# Patient Record
Sex: FEMALE | Race: BLACK OR AFRICAN AMERICAN | NOT HISPANIC OR LATINO | Employment: OTHER | URBAN - METROPOLITAN AREA
[De-identification: names, ages, dates, MRNs, and addresses within clinical notes are randomized per-mention and may not be internally consistent; named-entity substitution may affect disease eponyms.]

---

## 2018-09-26 ENCOUNTER — TELEPHONE (OUTPATIENT)
Dept: CARDIAC REHAB | Facility: HOSPITAL | Age: 43
End: 2018-09-26

## 2019-08-05 ENCOUNTER — APPOINTMENT (OUTPATIENT)
Dept: GENERAL RADIOLOGY | Facility: HOSPITAL | Age: 44
End: 2019-08-05

## 2019-08-05 ENCOUNTER — HOSPITAL ENCOUNTER (INPATIENT)
Facility: HOSPITAL | Age: 44
LOS: 2 days | Discharge: HOME | End: 2019-08-07
Attending: EMERGENCY MEDICINE | Admitting: HOSPITALIST

## 2019-08-05 DIAGNOSIS — I50.9 ACUTE ON CHRONIC CONGESTIVE HEART FAILURE, UNSPECIFIED HEART FAILURE TYPE (HCC): Primary | ICD-10-CM

## 2019-08-05 LAB
ALBUMIN SERPL-MCNC: 4 G/DL (ref 3.5–5.2)
ALBUMIN/GLOB SERPL: 1.7 G/DL
ALP SERPL-CCNC: 80 U/L (ref 39–117)
ALT SERPL W P-5'-P-CCNC: 38 U/L (ref 1–33)
ANION GAP SERPL CALCULATED.3IONS-SCNC: 11.4 MMOL/L (ref 5–15)
AST SERPL-CCNC: 36 U/L (ref 1–32)
BASOPHILS # BLD AUTO: 0.02 10*3/MM3 (ref 0–0.2)
BASOPHILS NFR BLD AUTO: 0.3 % (ref 0–1.5)
BILIRUB SERPL-MCNC: 0.3 MG/DL (ref 0.2–1.2)
BUN BLD-MCNC: 13 MG/DL (ref 6–20)
BUN/CREAT SERPL: 14.4 (ref 7–25)
CALCIUM SPEC-SCNC: 8.6 MG/DL (ref 8.6–10.5)
CHLORIDE SERPL-SCNC: 110 MMOL/L (ref 98–107)
CO2 SERPL-SCNC: 23.6 MMOL/L (ref 22–29)
CREAT BLD-MCNC: 0.9 MG/DL (ref 0.57–1)
DEPRECATED RDW RBC AUTO: 46.6 FL (ref 37–54)
EOSINOPHIL # BLD AUTO: 0.07 10*3/MM3 (ref 0–0.4)
EOSINOPHIL NFR BLD AUTO: 1.2 % (ref 0.3–6.2)
ERYTHROCYTE [DISTWIDTH] IN BLOOD BY AUTOMATED COUNT: 13.2 % (ref 12.3–15.4)
GFR SERPL CREATININE-BSD FRML MDRD: 83 ML/MIN/1.73
GLOBULIN UR ELPH-MCNC: 2.3 GM/DL
GLUCOSE BLD-MCNC: 104 MG/DL (ref 65–99)
HCG SERPL QL: NEGATIVE
HCT VFR BLD AUTO: 35 % (ref 34–46.6)
HGB BLD-MCNC: 11.3 G/DL (ref 12–15.9)
HOLD SPECIMEN: NORMAL
HOLD SPECIMEN: NORMAL
LYMPHOCYTES # BLD AUTO: 2.02 10*3/MM3 (ref 0.7–3.1)
LYMPHOCYTES NFR BLD AUTO: 34.8 % (ref 19.6–45.3)
MAGNESIUM SERPL-MCNC: 2 MG/DL (ref 1.6–2.6)
MCH RBC QN AUTO: 31.2 PG (ref 26.6–33)
MCHC RBC AUTO-ENTMCNC: 32.3 G/DL (ref 31.5–35.7)
MCV RBC AUTO: 96.7 FL (ref 79–97)
MONOCYTES # BLD AUTO: 0.45 10*3/MM3 (ref 0.1–0.9)
MONOCYTES NFR BLD AUTO: 7.7 % (ref 5–12)
NEUTROPHILS # BLD AUTO: 3.24 10*3/MM3 (ref 1.7–7)
NEUTROPHILS NFR BLD AUTO: 55.8 % (ref 42.7–76)
NT-PROBNP SERPL-MCNC: 5151 PG/ML (ref 5–450)
PLATELET # BLD AUTO: 122 10*3/MM3 (ref 140–450)
PMV BLD AUTO: 13.4 FL (ref 6–12)
POTASSIUM BLD-SCNC: 4 MMOL/L (ref 3.5–5.2)
PROT SERPL-MCNC: 6.3 G/DL (ref 6–8.5)
RBC # BLD AUTO: 3.62 10*6/MM3 (ref 3.77–5.28)
SODIUM BLD-SCNC: 145 MMOL/L (ref 136–145)
TROPONIN T SERPL-MCNC: <0.01 NG/ML (ref 0–0.03)
WBC NRBC COR # BLD: 5.81 10*3/MM3 (ref 3.4–10.8)
WHOLE BLOOD HOLD SPECIMEN: NORMAL
WHOLE BLOOD HOLD SPECIMEN: NORMAL

## 2019-08-05 PROCEDURE — 85025 COMPLETE CBC W/AUTO DIFF WBC: CPT | Performed by: NURSE PRACTITIONER

## 2019-08-05 PROCEDURE — 84484 ASSAY OF TROPONIN QUANT: CPT | Performed by: NURSE PRACTITIONER

## 2019-08-05 PROCEDURE — 99284 EMERGENCY DEPT VISIT MOD MDM: CPT

## 2019-08-05 PROCEDURE — 83735 ASSAY OF MAGNESIUM: CPT | Performed by: NURSE PRACTITIONER

## 2019-08-05 PROCEDURE — 93005 ELECTROCARDIOGRAM TRACING: CPT

## 2019-08-05 PROCEDURE — 93005 ELECTROCARDIOGRAM TRACING: CPT | Performed by: EMERGENCY MEDICINE

## 2019-08-05 PROCEDURE — 83880 ASSAY OF NATRIURETIC PEPTIDE: CPT | Performed by: NURSE PRACTITIONER

## 2019-08-05 PROCEDURE — 80053 COMPREHEN METABOLIC PANEL: CPT | Performed by: NURSE PRACTITIONER

## 2019-08-05 PROCEDURE — 84703 CHORIONIC GONADOTROPIN ASSAY: CPT | Performed by: NURSE PRACTITIONER

## 2019-08-05 PROCEDURE — 71046 X-RAY EXAM CHEST 2 VIEWS: CPT

## 2019-08-05 PROCEDURE — 93010 ELECTROCARDIOGRAM REPORT: CPT | Performed by: INTERNAL MEDICINE

## 2019-08-05 RX ORDER — BUMETANIDE 0.25 MG/ML
2 INJECTION INTRAMUSCULAR; INTRAVENOUS ONCE
Status: COMPLETED | OUTPATIENT
Start: 2019-08-05 | End: 2019-08-05

## 2019-08-05 RX ADMIN — METOPROLOL TARTRATE 2.5 MG: 5 INJECTION, SOLUTION INTRAVENOUS at 23:40

## 2019-08-05 RX ADMIN — BUMETANIDE 2 MG: 0.25 INJECTION INTRAMUSCULAR; INTRAVENOUS at 23:36

## 2019-08-06 ENCOUNTER — APPOINTMENT (OUTPATIENT)
Dept: CARDIOLOGY | Facility: HOSPITAL | Age: 44
End: 2019-08-06

## 2019-08-06 PROBLEM — B20 HIV (HUMAN IMMUNODEFICIENCY VIRUS INFECTION): Status: ACTIVE | Noted: 2019-08-06

## 2019-08-06 PROBLEM — I50.9 ACUTE ON CHRONIC CONGESTIVE HEART FAILURE: Status: ACTIVE | Noted: 2019-08-06

## 2019-08-06 PROBLEM — I10 HYPERTENSION: Status: ACTIVE | Noted: 2019-08-06

## 2019-08-06 PROBLEM — R06.09 EXERTIONAL DYSPNEA: Status: ACTIVE | Noted: 2019-08-06

## 2019-08-06 PROBLEM — I50.23 ACUTE ON CHRONIC SYSTOLIC CHF (CONGESTIVE HEART FAILURE): Status: ACTIVE | Noted: 2019-08-06

## 2019-08-06 PROBLEM — E66.9 CLASS 2 OBESITY IN ADULT: Status: ACTIVE | Noted: 2019-08-06

## 2019-08-06 PROBLEM — R07.9 CHEST PAIN: Status: ACTIVE | Noted: 2019-08-06

## 2019-08-06 PROBLEM — J45.909 ASTHMA: Status: ACTIVE | Noted: 2019-08-06

## 2019-08-06 LAB
ANION GAP SERPL CALCULATED.3IONS-SCNC: 12.4 MMOL/L (ref 5–15)
BH CV ECHO MEAS - ACS: 1.9 CM
BH CV ECHO MEAS - AO MAX PG (FULL): 3 MMHG
BH CV ECHO MEAS - AO MAX PG: 5.7 MMHG
BH CV ECHO MEAS - AO MEAN PG (FULL): 1 MMHG
BH CV ECHO MEAS - AO MEAN PG: 3 MMHG
BH CV ECHO MEAS - AO ROOT AREA (BSA CORRECTED): 1.4
BH CV ECHO MEAS - AO ROOT AREA: 6.6 CM^2
BH CV ECHO MEAS - AO ROOT DIAM: 2.9 CM
BH CV ECHO MEAS - AO V2 MAX: 119 CM/SEC
BH CV ECHO MEAS - AO V2 MEAN: 81.2 CM/SEC
BH CV ECHO MEAS - AO V2 VTI: 19.9 CM
BH CV ECHO MEAS - AVA(I,A): 2.2 CM^2
BH CV ECHO MEAS - AVA(I,D): 2.2 CM^2
BH CV ECHO MEAS - AVA(V,A): 2.4 CM^2
BH CV ECHO MEAS - AVA(V,D): 2.4 CM^2
BH CV ECHO MEAS - BSA(HAYCOCK): 2.2 M^2
BH CV ECHO MEAS - BSA: 2.1 M^2
BH CV ECHO MEAS - BZI_BMI: 35 KILOGRAMS/M^2
BH CV ECHO MEAS - BZI_METRIC_HEIGHT: 167.6 CM
BH CV ECHO MEAS - BZI_METRIC_WEIGHT: 98.4 KG
BH CV ECHO MEAS - EDV(CUBED): 474.6 ML
BH CV ECHO MEAS - EDV(MOD-SP2): 282 ML
BH CV ECHO MEAS - EDV(MOD-SP4): 363 ML
BH CV ECHO MEAS - EDV(TEICH): 325.7 ML
BH CV ECHO MEAS - EF(CUBED): 11.1 %
BH CV ECHO MEAS - EF(MOD-BP): 10 %
BH CV ECHO MEAS - EF(MOD-SP2): 5.3 %
BH CV ECHO MEAS - EF(MOD-SP4): 15.2 %
BH CV ECHO MEAS - EF(TEICH): 8.4 %
BH CV ECHO MEAS - ESV(CUBED): 421.9 ML
BH CV ECHO MEAS - ESV(MOD-SP2): 267 ML
BH CV ECHO MEAS - ESV(MOD-SP4): 308 ML
BH CV ECHO MEAS - ESV(TEICH): 298.3 ML
BH CV ECHO MEAS - FS: 3.8 %
BH CV ECHO MEAS - IVS/LVPW: 0.91
BH CV ECHO MEAS - IVSD: 1 CM
BH CV ECHO MEAS - LAT PEAK E' VEL: 5 CM/SEC
BH CV ECHO MEAS - LV DIASTOLIC VOL/BSA (35-75): 175.3 ML/M^2
BH CV ECHO MEAS - LV MASS(C)D: 413.1 GRAMS
BH CV ECHO MEAS - LV MASS(C)DI: 199.5 GRAMS/M^2
BH CV ECHO MEAS - LV MAX PG: 2.7 MMHG
BH CV ECHO MEAS - LV MEAN PG: 2 MMHG
BH CV ECHO MEAS - LV SYSTOLIC VOL/BSA (12-30): 148.7 ML/M^2
BH CV ECHO MEAS - LV V1 MAX: 81.8 CM/SEC
BH CV ECHO MEAS - LV V1 MEAN: 57.8 CM/SEC
BH CV ECHO MEAS - LV V1 VTI: 12.5 CM
BH CV ECHO MEAS - LVIDD: 7.8 CM
BH CV ECHO MEAS - LVIDS: 7.5 CM
BH CV ECHO MEAS - LVLD AP2: 9.3 CM
BH CV ECHO MEAS - LVLD AP4: 10 CM
BH CV ECHO MEAS - LVLS AP2: 8.7 CM
BH CV ECHO MEAS - LVLS AP4: 9.5 CM
BH CV ECHO MEAS - LVOT AREA (M): 3.5 CM^2
BH CV ECHO MEAS - LVOT AREA: 3.5 CM^2
BH CV ECHO MEAS - LVOT DIAM: 2.1 CM
BH CV ECHO MEAS - LVPWD: 1.1 CM
BH CV ECHO MEAS - MED PEAK E' VEL: 6 CM/SEC
BH CV ECHO MEAS - MR MAX PG: 94.9 MMHG
BH CV ECHO MEAS - MR MAX VEL: 487 CM/SEC
BH CV ECHO MEAS - MR MEAN PG: 52 MMHG
BH CV ECHO MEAS - MR MEAN VEL: 329 CM/SEC
BH CV ECHO MEAS - MR VTI: 158 CM
BH CV ECHO MEAS - MV A DUR: 0.11 SEC
BH CV ECHO MEAS - MV A MAX VEL: 120 CM/SEC
BH CV ECHO MEAS - MV DEC SLOPE: 1275 CM/SEC^2
BH CV ECHO MEAS - MV DEC TIME: 0.09 SEC
BH CV ECHO MEAS - MV E MAX VEL: 108 CM/SEC
BH CV ECHO MEAS - MV E/A: 0.9
BH CV ECHO MEAS - MV MAX PG: 8.1 MMHG
BH CV ECHO MEAS - MV MEAN PG: 3 MMHG
BH CV ECHO MEAS - MV P1/2T MAX VEL: 132 CM/SEC
BH CV ECHO MEAS - MV P1/2T: 30.3 MSEC
BH CV ECHO MEAS - MV V2 MAX: 142 CM/SEC
BH CV ECHO MEAS - MV V2 MEAN: 80.5 CM/SEC
BH CV ECHO MEAS - MV V2 VTI: 20.3 CM
BH CV ECHO MEAS - MVA P1/2T LCG: 1.7 CM^2
BH CV ECHO MEAS - MVA(P1/2T): 7.3 CM^2
BH CV ECHO MEAS - MVA(VTI): 2.1 CM^2
BH CV ECHO MEAS - PA ACC TIME: 0.07 SEC
BH CV ECHO MEAS - PA MAX PG (FULL): 0.99 MMHG
BH CV ECHO MEAS - PA MAX PG: 1.6 MMHG
BH CV ECHO MEAS - PA PR(ACCEL): 47.5 MMHG
BH CV ECHO MEAS - PA V2 MAX: 63.8 CM/SEC
BH CV ECHO MEAS - PULM A REVS DUR: 0.11 SEC
BH CV ECHO MEAS - PULM A REVS VEL: 28.4 CM/SEC
BH CV ECHO MEAS - PULM DIAS VEL: 39.9 CM/SEC
BH CV ECHO MEAS - PULM S/D: 1.5
BH CV ECHO MEAS - PULM SYS VEL: 60.3 CM/SEC
BH CV ECHO MEAS - PVA(V,A): 1.6 CM^2
BH CV ECHO MEAS - PVA(V,D): 1.6 CM^2
BH CV ECHO MEAS - QP/QS: 0.4
BH CV ECHO MEAS - RAP SYSTOLE: 8 MMHG
BH CV ECHO MEAS - RV MAX PG: 0.64 MMHG
BH CV ECHO MEAS - RV MEAN PG: 0 MMHG
BH CV ECHO MEAS - RV V1 MAX: 39.9 CM/SEC
BH CV ECHO MEAS - RV V1 MEAN: 27.6 CM/SEC
BH CV ECHO MEAS - RV V1 VTI: 6.9 CM
BH CV ECHO MEAS - RVOT AREA: 2.5 CM^2
BH CV ECHO MEAS - RVOT DIAM: 1.8 CM
BH CV ECHO MEAS - RVSP: 36 MMHG
BH CV ECHO MEAS - SI(AO): 63.5 ML/M^2
BH CV ECHO MEAS - SI(CUBED): 25.4 ML/M^2
BH CV ECHO MEAS - SI(LVOT): 20.9 ML/M^2
BH CV ECHO MEAS - SI(MOD-SP2): 7.2 ML/M^2
BH CV ECHO MEAS - SI(MOD-SP4): 26.6 ML/M^2
BH CV ECHO MEAS - SI(TEICH): 13.2 ML/M^2
BH CV ECHO MEAS - SV(AO): 131.4 ML
BH CV ECHO MEAS - SV(CUBED): 52.7 ML
BH CV ECHO MEAS - SV(LVOT): 43.3 ML
BH CV ECHO MEAS - SV(MOD-SP2): 15 ML
BH CV ECHO MEAS - SV(MOD-SP4): 55 ML
BH CV ECHO MEAS - SV(RVOT): 17.5 ML
BH CV ECHO MEAS - SV(TEICH): 27.4 ML
BH CV ECHO MEAS - TAPSE (>1.6): 2 CM2
BH CV ECHO MEAS - TR MAX VEL: 288 CM/SEC
BH CV ECHO MEASUREMENTS AVERAGE E/E' RATIO: 19.64
BH CV VAS BP RIGHT ARM: NORMAL MMHG
BH CV XLRA - RV BASE: 3.3 CM
BH CV XLRA - TDI S': 10 CM/SEC
BUN BLD-MCNC: 14 MG/DL (ref 6–20)
BUN/CREAT SERPL: 17.5 (ref 7–25)
CALCIUM SPEC-SCNC: 8.5 MG/DL (ref 8.6–10.5)
CHLORIDE SERPL-SCNC: 102 MMOL/L (ref 98–107)
CO2 SERPL-SCNC: 22.6 MMOL/L (ref 22–29)
CREAT BLD-MCNC: 0.8 MG/DL (ref 0.57–1)
DEPRECATED RDW RBC AUTO: 47.2 FL (ref 37–54)
ERYTHROCYTE [DISTWIDTH] IN BLOOD BY AUTOMATED COUNT: 13.2 % (ref 12.3–15.4)
GFR SERPL CREATININE-BSD FRML MDRD: 95 ML/MIN/1.73
GLUCOSE BLD-MCNC: 103 MG/DL (ref 65–99)
HCT VFR BLD AUTO: 36 % (ref 34–46.6)
HGB BLD-MCNC: 11.5 G/DL (ref 12–15.9)
LEFT ATRIUM VOLUME INDEX: 37 ML/M2
MAXIMAL PREDICTED HEART RATE: 177 BPM
MCH RBC QN AUTO: 30.8 PG (ref 26.6–33)
MCHC RBC AUTO-ENTMCNC: 31.9 G/DL (ref 31.5–35.7)
MCV RBC AUTO: 96.5 FL (ref 79–97)
PLATELET # BLD AUTO: 127 10*3/MM3 (ref 140–450)
PMV BLD AUTO: 12.9 FL (ref 6–12)
POTASSIUM BLD-SCNC: 3.7 MMOL/L (ref 3.5–5.2)
RBC # BLD AUTO: 3.73 10*6/MM3 (ref 3.77–5.28)
SODIUM BLD-SCNC: 137 MMOL/L (ref 136–145)
STRESS TARGET HR: 150 BPM
TROPONIN T SERPL-MCNC: <0.01 NG/ML (ref 0–0.03)
URATE SERPL-MCNC: 4.8 MG/DL (ref 2.4–5.7)
WBC NRBC COR # BLD: 7.06 10*3/MM3 (ref 3.4–10.8)

## 2019-08-06 PROCEDURE — G0378 HOSPITAL OBSERVATION PER HR: HCPCS

## 2019-08-06 PROCEDURE — 99222 1ST HOSP IP/OBS MODERATE 55: CPT | Performed by: INTERNAL MEDICINE

## 2019-08-06 PROCEDURE — 84550 ASSAY OF BLOOD/URIC ACID: CPT | Performed by: NURSE PRACTITIONER

## 2019-08-06 PROCEDURE — 25010000002 PERFLUTREN (DEFINITY) 8.476 MG IN SODIUM CHLORIDE 0.9 % 10 ML INJECTION: Performed by: INTERNAL MEDICINE

## 2019-08-06 PROCEDURE — 85027 COMPLETE CBC AUTOMATED: CPT | Performed by: NURSE PRACTITIONER

## 2019-08-06 PROCEDURE — 84484 ASSAY OF TROPONIN QUANT: CPT | Performed by: NURSE PRACTITIONER

## 2019-08-06 PROCEDURE — 80048 BASIC METABOLIC PNL TOTAL CA: CPT | Performed by: NURSE PRACTITIONER

## 2019-08-06 PROCEDURE — 94640 AIRWAY INHALATION TREATMENT: CPT

## 2019-08-06 PROCEDURE — 84484 ASSAY OF TROPONIN QUANT: CPT | Performed by: HOSPITALIST

## 2019-08-06 PROCEDURE — 94799 UNLISTED PULMONARY SVC/PX: CPT

## 2019-08-06 PROCEDURE — 93306 TTE W/DOPPLER COMPLETE: CPT

## 2019-08-06 PROCEDURE — 93306 TTE W/DOPPLER COMPLETE: CPT | Performed by: INTERNAL MEDICINE

## 2019-08-06 RX ORDER — FUROSEMIDE 20 MG/1
20 TABLET ORAL EVERY OTHER DAY
Status: ON HOLD | COMMUNITY
End: 2019-08-07 | Stop reason: SDUPTHER

## 2019-08-06 RX ORDER — NITROGLYCERIN 0.4 MG/1
0.4 TABLET SUBLINGUAL
Status: DISCONTINUED | OUTPATIENT
Start: 2019-08-05 | End: 2019-08-07 | Stop reason: HOSPADM

## 2019-08-06 RX ORDER — CARVEDILOL 6.25 MG/1
6.25 TABLET ORAL 2 TIMES DAILY WITH MEALS
Status: DISCONTINUED | OUTPATIENT
Start: 2019-08-06 | End: 2019-08-07

## 2019-08-06 RX ORDER — ASPIRIN 81 MG/1
81 TABLET ORAL DAILY
Status: DISCONTINUED | OUTPATIENT
Start: 2019-08-06 | End: 2019-08-07 | Stop reason: HOSPADM

## 2019-08-06 RX ORDER — ASPIRIN 81 MG/1
81 TABLET ORAL DAILY
COMMUNITY
End: 2020-12-17 | Stop reason: HOSPADM

## 2019-08-06 RX ORDER — LOSARTAN POTASSIUM 25 MG/1
25 TABLET ORAL DAILY
Status: DISCONTINUED | OUTPATIENT
Start: 2019-08-06 | End: 2019-08-06

## 2019-08-06 RX ORDER — LOSARTAN POTASSIUM 25 MG/1
25 TABLET ORAL DAILY
COMMUNITY
End: 2019-08-07 | Stop reason: HOSPADM

## 2019-08-06 RX ORDER — EMTRICITABINE AND TENOFOVIR DISOPROXIL FUMARATE 100; 150 MG/1; MG/1
1 TABLET, FILM COATED ORAL DAILY
Status: DISCONTINUED | OUTPATIENT
Start: 2019-08-06 | End: 2019-08-06

## 2019-08-06 RX ORDER — BISACODYL 5 MG/1
5 TABLET, DELAYED RELEASE ORAL DAILY PRN
Status: DISCONTINUED | OUTPATIENT
Start: 2019-08-06 | End: 2019-08-07 | Stop reason: HOSPADM

## 2019-08-06 RX ORDER — IPRATROPIUM BROMIDE AND ALBUTEROL SULFATE 2.5; .5 MG/3ML; MG/3ML
3 SOLUTION RESPIRATORY (INHALATION) EVERY 6 HOURS PRN
Status: DISCONTINUED | OUTPATIENT
Start: 2019-08-06 | End: 2019-08-07 | Stop reason: HOSPADM

## 2019-08-06 RX ORDER — ONDANSETRON 2 MG/ML
4 INJECTION INTRAMUSCULAR; INTRAVENOUS EVERY 6 HOURS PRN
Status: DISCONTINUED | OUTPATIENT
Start: 2019-08-06 | End: 2019-08-07 | Stop reason: HOSPADM

## 2019-08-06 RX ORDER — ACETAMINOPHEN 325 MG/1
650 TABLET ORAL EVERY 4 HOURS PRN
Status: DISCONTINUED | OUTPATIENT
Start: 2019-08-06 | End: 2019-08-07 | Stop reason: HOSPADM

## 2019-08-06 RX ORDER — FUROSEMIDE 20 MG/1
20 TABLET ORAL EVERY OTHER DAY
Status: DISCONTINUED | OUTPATIENT
Start: 2019-08-06 | End: 2019-08-06

## 2019-08-06 RX ORDER — RITONAVIR 100 MG/1
100 TABLET ORAL NIGHTLY
COMMUNITY
End: 2022-03-24

## 2019-08-06 RX ORDER — RITONAVIR 100 MG/1
100 TABLET ORAL DAILY
Status: DISCONTINUED | OUTPATIENT
Start: 2019-08-06 | End: 2019-08-06

## 2019-08-06 RX ORDER — RITONAVIR 100 MG/1
100 TABLET ORAL DAILY
Status: DISCONTINUED | OUTPATIENT
Start: 2019-08-06 | End: 2019-08-07 | Stop reason: HOSPADM

## 2019-08-06 RX ORDER — BUMETANIDE 0.25 MG/ML
2 INJECTION INTRAMUSCULAR; INTRAVENOUS EVERY 12 HOURS
Status: DISCONTINUED | OUTPATIENT
Start: 2019-08-06 | End: 2019-08-07

## 2019-08-06 RX ORDER — EMTRICITABINE AND TENOFOVIR DISOPROXIL FUMARATE 100; 150 MG/1; MG/1
1 TABLET, FILM COATED ORAL DAILY
Status: ON HOLD | COMMUNITY
End: 2020-07-08

## 2019-08-06 RX ORDER — CALCIUM CARBONATE 200(500)MG
2 TABLET,CHEWABLE ORAL 2 TIMES DAILY PRN
Status: DISCONTINUED | OUTPATIENT
Start: 2019-08-06 | End: 2019-08-07 | Stop reason: HOSPADM

## 2019-08-06 RX ORDER — POTASSIUM CHLORIDE 750 MG/1
40 CAPSULE, EXTENDED RELEASE ORAL DAILY
Status: DISCONTINUED | OUTPATIENT
Start: 2019-08-06 | End: 2019-08-07 | Stop reason: HOSPADM

## 2019-08-06 RX ORDER — SODIUM CHLORIDE 0.9 % (FLUSH) 0.9 %
3-10 SYRINGE (ML) INJECTION AS NEEDED
Status: DISCONTINUED | OUTPATIENT
Start: 2019-08-05 | End: 2019-08-07 | Stop reason: HOSPADM

## 2019-08-06 RX ORDER — EMTRICITABINE AND TENOFOVIR DISOPROXIL FUMARATE 200; 300 MG/1; MG/1
1 TABLET, FILM COATED ORAL DAILY
Status: DISCONTINUED | OUTPATIENT
Start: 2019-08-06 | End: 2019-08-07 | Stop reason: HOSPADM

## 2019-08-06 RX ORDER — ONDANSETRON 4 MG/1
4 TABLET, FILM COATED ORAL EVERY 6 HOURS PRN
Status: DISCONTINUED | OUTPATIENT
Start: 2019-08-06 | End: 2019-08-07 | Stop reason: HOSPADM

## 2019-08-06 RX ORDER — SODIUM CHLORIDE 0.9 % (FLUSH) 0.9 %
3 SYRINGE (ML) INJECTION EVERY 12 HOURS SCHEDULED
Status: DISCONTINUED | OUTPATIENT
Start: 2019-08-06 | End: 2019-08-07 | Stop reason: HOSPADM

## 2019-08-06 RX ORDER — ALBUTEROL SULFATE 90 UG/1
2 AEROSOL, METERED RESPIRATORY (INHALATION) EVERY 4 HOURS PRN
COMMUNITY
End: 2022-04-11 | Stop reason: SDUPTHER

## 2019-08-06 RX ORDER — ERGOCALCIFEROL 1.25 MG/1
50000 CAPSULE ORAL
COMMUNITY

## 2019-08-06 RX ADMIN — POTASSIUM CHLORIDE 40 MEQ: 750 CAPSULE, EXTENDED RELEASE ORAL at 11:14

## 2019-08-06 RX ADMIN — SACUBITRIL AND VALSARTAN 1 TABLET: 24; 26 TABLET, FILM COATED ORAL at 21:54

## 2019-08-06 RX ADMIN — IPRATROPIUM BROMIDE AND ALBUTEROL SULFATE 3 ML: 2.5; .5 SOLUTION RESPIRATORY (INHALATION) at 01:33

## 2019-08-06 RX ADMIN — SACUBITRIL AND VALSARTAN 1 TABLET: 24; 26 TABLET, FILM COATED ORAL at 11:15

## 2019-08-06 RX ADMIN — CARVEDILOL 6.25 MG: 6.25 TABLET, FILM COATED ORAL at 18:18

## 2019-08-06 RX ADMIN — ASPIRIN 81 MG: 81 TABLET, COATED ORAL at 08:09

## 2019-08-06 RX ADMIN — BUMETANIDE 2 MG: 0.25 INJECTION INTRAMUSCULAR; INTRAVENOUS at 21:53

## 2019-08-06 RX ADMIN — CARVEDILOL 6.25 MG: 6.25 TABLET, FILM COATED ORAL at 11:14

## 2019-08-06 RX ADMIN — RITONAVIR 100 MG: 100 TABLET ORAL at 21:53

## 2019-08-06 RX ADMIN — BUMETANIDE 2 MG: 0.25 INJECTION INTRAMUSCULAR; INTRAVENOUS at 11:15

## 2019-08-06 RX ADMIN — SODIUM CHLORIDE, PRESERVATIVE FREE 3 ML: 5 INJECTION INTRAVENOUS at 08:09

## 2019-08-06 RX ADMIN — PERFLUTREN 2 ML: 6.52 INJECTION, SUSPENSION INTRAVENOUS at 10:55

## 2019-08-06 RX ADMIN — SODIUM CHLORIDE, PRESERVATIVE FREE 3 ML: 5 INJECTION INTRAVENOUS at 21:53

## 2019-08-06 RX ADMIN — SODIUM CHLORIDE, PRESERVATIVE FREE 3 ML: 5 INJECTION INTRAVENOUS at 01:16

## 2019-08-06 NOTE — ED NOTES
Pt c/o SOA and chest pain that began 2 hours ago. Hx of heart disease and has defibrillator. Reports cough and HA.      Cora Gomez RN  08/05/19 2128

## 2019-08-06 NOTE — PROGRESS NOTES
Discharge Planning Assessment  [facility]     Patient Name: Nina Cheung  MRN: [id number]  Today's Date: 8/6/2019    Admit Date: 8/5/2019    Discharge Needs Assessment     Row Name 08/06/19 1603       Living Environment    Lives With  spouse    Current Living Arrangements  home/apartment/condo    Primary Care Provided by  self    Quality of Family Relationships  supportive    Able to Return to Prior Arrangements  yes       Discharge Needs Assessment    Concerns to be Addressed  adjustment to diagnosis/illness;medication    Equipment Currently Used at Home  none    Equipment Needed After Discharge  none        Discharge Plan     Row Name 08/06/19 1607       Plan    Patient/Family in Agreement with Plan  yes    Plan Comments  CCP spoke with Pt @ bedside.  Confirmed face sheet and primary pharmacy as [facility] on [facility]   Pt states she is independent with ADL's.  Plans are home.  Instructed that CCP would cont to follow and assist with dc.Enma Livingston RN    Row Name 08/06/19 1602       Plan    Plan  Plans are home. CCP noted plan to start on Entresto.  Per pt's managed medicaid, she shoule be covered to Entresto.  CCP started PA with [employer].  Enma Livingston RN/CCP        Destination      No service coordination in this encounter.      Durable Medical Equipment      No service coordination in this encounter.      Dialysis/Infusion      No service coordination in this encounter.      Home Medical Care      No service coordination in this encounter.      Therapy      No service coordination in this encounter.      Community Resources      No service coordination in this encounter.          Demographic Summary    No documentation.       Functional Status    No documentation.       Psychosocial    No documentation.       Abuse/Neglect    No documentation.       Legal    No documentation.       Substance Abuse    No documentation.       Patient Forms    No documentation.           Enma Livingston RN

## 2019-08-06 NOTE — H&P
Patient Name:  Nina Cheung  YOB: 1975  MRN:  [id number]  Admit Date:  8/5/2019  Patient Care Team:  System, Provider Not In as PCP - General      Subjective   History Present Illness     Chief Complaint   Patient presents with   • Chest Pain       Ms. Cheung is a 43 y.o. former smoker with a history of HIV, cardiomyopathy, ICD, pacemaker, HTN, obesity  that presents to [facility] complaining of shortness of breath worse with exertion and chest tightness.  Associated symptoms include a nonproductive cough.  Symptoms became progressively worse until yesterday evening when she had constant chest pain while laying in bed.  She has not taken her blood pressure medication in 4 days.  She denies dizziness, lightheadedness, syncope, palpitations, diaphoresis, fever, or chills. She denies BLE edema, orthopnea, or PND.  Does not follow a heart healthy or low-sodium diet.  Her  states she eats a lot of fried junk food.  She is primarily followed by cardiologist, Dr. Boyd at [facility]. She had an MI in May 2019 treated at [facility] in [place] and was placed on metoprolol tartrate at that time. She feels since starting this medication her cardiac symptoms and general fatigue has been worse. Prior cardiac workup is not available but patient reports an prior cardiac catheterization with EF of 15%.  She is also followed by infectious disease doctors at [facility] for HIV.  She has a history of asthma secondary to prior tobacco abuse for 20 years.  She uses an inhaler as needed but states it is under control.           History of Present Illness  Review of Systems   Constitutional: Positive for activity change and fatigue. Negative for appetite change, chills, diaphoresis, fever and unexpected weight change.   HENT: Negative for congestion and trouble swallowing.    Eyes: Negative for visual disturbance.   Respiratory: Positive for cough, chest  tightness and shortness of breath. Negative for choking, wheezing and stridor.    Cardiovascular: Positive for chest pain. Negative for palpitations and leg swelling.   Gastrointestinal: Negative for abdominal distention, abdominal pain, blood in stool, constipation, diarrhea, nausea and vomiting.   Endocrine: Negative for polydipsia, polyphagia and polyuria.   Genitourinary: Negative for decreased urine volume and dysuria.   Musculoskeletal: Negative for back pain and joint swelling.   Skin: Negative for color change and rash.   Allergic/Immunologic: Negative for environmental allergies and food allergies.   Neurological: Negative for dizziness, weakness, light-headedness, numbness and headaches.   Hematological: Does not bruise/bleed easily.   Psychiatric/Behavioral: Negative.  Negative for confusion.        Personal History     Past Medical History:   Diagnosis Date   • Asthma    • CHF (congestive heart failure) (CMS/[facility])    • Hypertension      Past Surgical History:   Procedure Laterality Date   • CARDIAC CATHETERIZATION       History reviewed. No pertinent family history.  Social History     Tobacco Use   • Smoking status: Former Smoker     Last attempt to quit: 2015     Years since quittin.5   • Smokeless tobacco: Never Used   Substance Use Topics   • Alcohol use: Yes     Alcohol/week: 0.6 oz     Types: 1 Glasses of wine per week     Comment: RARELY   • Drug use: Yes     Types: Marijuana     Medications Prior to Admission   Medication Sig Dispense Refill Last Dose   • albuterol sulfate  (90 Base) MCG/ACT inhaler Inhale 2 puffs Every 4 (Four) Hours As Needed for Wheezing.      • aspirin 81 MG EC tablet Take 81 mg by mouth Daily.   2019 at 2000   • darunavir ethanolate (PREZISTA) 800 MG tablet tablet Take 800 mg by mouth Every Night.   2019 at Unknown time   • Emtricitabine-Tenofovir DF (TRUVADA) 100-150 MG per tablet Take 1 tablet by mouth Daily.   2019 at Unknown time   • furosemide  (LASIX) 20 MG tablet Take 20 mg by mouth Every Other Day.   8/3/2019   • losartan (COZAAR) 25 MG tablet Take 25 mg by mouth Daily.   Past Week at Unknown time   • metoprolol tartrate (LOPRESSOR) 25 MG tablet Take 25 mg by mouth Daily.   8/6/2019 at Unknown time   • ritonavir (NORVIR) 100 MG tablet Take 100 mg by mouth Daily.   8/5/2019 at Unknown time   • vitamin D (ERGOCALCIFEROL) 54733 units capsule capsule Take 50,000 Units by mouth 1 (One) Time Per Week.   8/3/2019     Allergies:  No Known Allergies    Objective    Objective     Vital Signs  Temp:  [97.3 °F (36.3 °C)-98.5 °F (36.9 °C)] 98.1 °F (36.7 °C)  Heart Rate:  [] 92  Resp:  [16-20] 16  BP: ()/() 99/62  SpO2:  [86 %-100 %] 96 %  on  Flow (L/min):  [2] 2;   Device (Oxygen Therapy): room air  Body mass index is 35.02 kg/m².    Physical Exam   Constitutional: She is oriented to person, place, and time. She appears well-developed and well-nourished. No distress.   HENT:   Head: Normocephalic and atraumatic.   Mouth/Throat: Oropharynx is clear and moist.   Eyes: EOM are normal. No scleral icterus.   Neck: No JVD present. No tracheal deviation present. No thyromegaly present.   Cardiovascular: Normal rate, regular rhythm, normal heart sounds and intact distal pulses.   No murmur heard.  Pulmonary/Chest: Effort normal and breath sounds normal. No stridor. No respiratory distress. She has no wheezes. She has no rales. She exhibits no tenderness.   Dry cough during exam   Abdominal: Soft. Bowel sounds are normal. She exhibits no shifting dullness, no distension, no pulsatile liver, no fluid wave, no abdominal bruit, no ascites, no pulsatile midline mass and no mass. There is no hepatosplenomegaly. There is no tenderness. There is no rigidity and no guarding. No hernia.   Musculoskeletal: Normal range of motion. She exhibits no edema.   Neurological: She is alert and oriented to person, place, and time. No cranial nerve deficit.   Skin: Skin is  warm and dry. Capillary refill takes less than 2 seconds. She is not diaphoretic.   Psychiatric: She has a normal mood and affect. Her behavior is normal. Judgment and thought content normal.   Nursing note and vitals reviewed.      Results Review:  I reviewed the patient's new clinical results.  I reviewed the patient's new imaging results and agree with the interpretation.  I reviewed the patient's other test results and agree with the interpretation  I personally viewed and interpreted the patient's EKG/Telemetry data  Discussed with ED provider.    Results from last 7 days   Lab Units 08/06/19  0432 08/05/19  2203   WBC 10*3/mm3 7.06 5.81   HEMOGLOBIN g/dL 11.5* 11.3*   HEMATOCRIT % 36.0 35.0   PLATELETS 10*3/mm3 127* 122*     Results from last 7 days   Lab Units 08/06/19  0432 08/05/19  2203   SODIUM mmol/L 137 145   POTASSIUM mmol/L 3.7 4.0   CHLORIDE mmol/L 102 110*   CO2 mmol/L 22.6 23.6   BUN mg/dL 14 13   CREATININE mg/dL 0.80 0.90   CALCIUM mg/dL 8.5* 8.6   BILIRUBIN mg/dL  --  0.3   ALK PHOS U/L  --  80   ALT (SGPT) U/L  --  38*   AST (SGOT) U/L  --  36*   GLUCOSE mg/dL 103* 104*     Lab Results   Lab Value Date/Time    TROPONINT <0.010 08/06/2019 1001    TROPONINT <0.010 08/06/2019 0432    TROPONINT <0.010 08/05/2019 2203     BNP 5151    Imaging Results (last 24 hours)     Procedure Component Value Units Date/Time    XR Chest 2 View [[id number]] Collected:  08/05/19 2255     Updated:  08/05/19 2259    Narrative:       PA AND LATERAL CHEST RADIOGRAPH     HISTORY: Short severe on exertion and chest pain     COMPARISON: None available.     FINDINGS:  Cardiomegaly and vascular congestion are noted. No pneumothorax is seen.  No definite pleural effusion is identified. Left-sided defibrillator is  present.       Impression:       Cardiomegaly and vascular congestion.     This report was finalized on 8/5/2019 10:56 PM by Dr. Opal Jimenez M.D.             Results for orders placed during the hospital  encounter of 08/05/19   Adult Transthoracic Echo Complete W/ Cont if Necessary Per Protocol    Narrative · The left ventricular cavity is severely dilated.  · Left ventricular systolic function is severely decreased.  · Severe mitral valve regurgitation is present  · Left ventricular diastolic dysfunction (grade III) consistent with   reversible restrictive pattern.  · Left atrial cavity size is mild-to-moderately dilated.  · The following left ventricular wall segments are hypokinetic: mid   anterior, apical anterior, basal anterolateral, mid anterolateral, apical   lateral, basal inferolateral, mid inferolateral, apical inferior, mid   inferior, apical septal, basal inferoseptal, mid inferoseptal, apex   hypokinetic, mid anteroseptal, basal anterior, basal inferior and basal   inferoseptal.  · Calculated EF = 10.0%          ECG 12 Lead   Final Result   HEART RATE= 101  bpm   RR Interval= 592  ms   OR Interval= 172  ms   P Horizontal Axis= -6  deg   P Front Axis= 66  deg   QRSD Interval= 119  ms   QT Interval= 399  ms   QRS Axis= 28  deg   T Wave Axis= -24  deg   - ABNORMAL ECG -   Sinus tachycardia   Multiform ventricular premature complexes   Probable left atrial enlargement   Incomplete left bundle branch block   Left ventricular hypertrophy   Prolonged QT interval   NO PRIOR TRACING AVAILABLE FOR COMPARISON   Electronically Signed By: Scotty McgarryPHAM) ([facility]) 05-Aug-2019 23:20:52   Date and Time of Study: 2019-08-05 21:33:08           Assessment/Plan     Active Hospital Problems    Diagnosis POA   • **Acute on chronic systolic CHF (congestive heart failure) (CMS/HCC) [I50.23] Yes   • Asthma [J45.909] Yes   • Hypertension [I10] Yes   • HIV (human immunodeficiency virus infection) (CMS/HCC) [B20] Yes   • Class 2 obesity in adult [E66.9] Yes   • Exertional dyspnea [R06.09] Yes   • Chest pain [R07.9] Yes   • Acute on chronic congestive heart failure (CMS/HCC) [I50.9] Yes       Ms. Cheung is a 43 y.o. former  smoker  that presents with chest pain and dyspnea secondary to decompensated acute on chronic systolic CHF. Patient's BNP 5,151 with negative tropinin. EKG with sinus tachycardia but no acute changes. Chest x-ray with-cardiomegaly and vascular congestion.   · Continue telemetry.  ·  Appreciate Cardiology assistance.   · records requested from [facility]  · Strict I/Os and daily weights. NC oxygen.   · Diuresis with BUMEX 2mg  q 12hrs x 3 doses.   · Monitor renal function. Assess UOP   · Echocardiogram today  · Consult nutrition for cardiac diet education  · HTN- discontinue metoprolol and change to carvedilol. Entresto samples per cardiology  · HIV + will follow up with [facility]. Continue current regimen   · Asthma- PRN duonebs       ·    · I discussed the patients findings and my recommendations with patient, family and nursing staff.    VTE Prophylaxis - SCDs .  Code Status - Full code.       SAMUEL Rodriguez  [facility]  08/06/19  9:27 AM    I supervised care provided by the APRN. We have discussed the case. I have reviewed  the note and agree with the plan of treatment. I personally interviewed the patient and examined the patient.  This is a pleasant 43-year-old.  There is slight crackles at the bases but she is not in any respiratory distress.  No lower extremity edema.  Plan as above with diuretics.  Change of medications likely to Coreg and Entresto per cardiology.  Monitor volume status and renal function.    Damien Zapata MD  [facility]  08/06/19  3:07 PM

## 2019-08-06 NOTE — ED NOTES
Fax sent to [facility] for request of cardiac cath lab documents.      Ivan Childs, NIKHIL  08/06/19 0019

## 2019-08-06 NOTE — CONSULTS
Date of Hospital Visit: [unfilled]ENC@  Encounter Provider: Lisset Melton MD  Place of Service: [facility] CARDIOLOGY  Patient Name: Nina Cheung  :1975  Referral Provider: Ángel Crystal MD    Chief complaint    Chest pain    History of Present Illness    She has a history of nonischemic dilated cardiomyopathy, HIV, hypertension, obesity.  She follows with Dr. Laina Boyd at [facility].  Her ejection fraction is 15%.  Over , she was visiting family with her  and she got to heart failure issues and was sent emergently to [facility].  They are, her carvedilol was changed to metoprolol and she says she is been more short winded and fatigued since that time.  They did an echocardiogram and a cardiac catheterization there.  I do not have any of those results.    She presented to the emergency department on 2019 with chest pain and short windedness.  She was fatigued but had no palpitations, dizziness or syncope.  She was having tightness across her chest but her main complaint was exertional dyspnea which was worse and orthopnea.  She still has a slight amount of this today but does feel overall better after IV Bumex.  She had not taken her blood pressure medications in 4 days.  On arrival her blood pressure was 126/100, heart rate 102.  Her laboratory values have shown negative troponin x2, proBNP elevation at 5151, CMP unremarkable, hemoglobin 11.3.  Her magnesium was 2 and her beta hCG was negative.  ECG shows a normal sinus rhythm, PVC, left ventricular hypertrophy with lateral T wave flattening.  Chest x-ray shows cardiomegaly and vascular congestion.    She has a history of illicit drug use but has not used drugs in 5 years.  She uses no alcohol.  She does not smoke.  She lives with her .  She just obtain disability.    Past Medical History:   Diagnosis Date   • Asthma    • CHF (congestive heart failure) (CMS/[facility])     • Hypertension        Past Surgical History:   Procedure Laterality Date   • CARDIAC CATHETERIZATION         Medications Prior to Admission   Medication Sig Dispense Refill Last Dose   • albuterol sulfate  (90 Base) MCG/ACT inhaler Inhale 2 puffs Every 4 (Four) Hours As Needed for Wheezing.      • aspirin 81 MG EC tablet Take 81 mg by mouth Daily.   8/5/2019 at 2000   • darunavir ethanolate (PREZISTA) 800 MG tablet tablet Take 800 mg by mouth Every Night.   8/5/2019 at Unknown time   • Emtricitabine-Tenofovir DF (TRUVADA) 100-150 MG per tablet Take 1 tablet by mouth Daily.   8/5/2019 at Unknown time   • furosemide (LASIX) 20 MG tablet Take 20 mg by mouth Every Other Day.   8/3/2019   • losartan (COZAAR) 25 MG tablet Take 25 mg by mouth Daily.   Past Week at Unknown time   • metoprolol tartrate (LOPRESSOR) 25 MG tablet Take 25 mg by mouth Daily.   8/6/2019 at Unknown time   • ritonavir (NORVIR) 100 MG tablet Take 100 mg by mouth Daily.   8/5/2019 at Unknown time   • vitamin D (ERGOCALCIFEROL) 92190 units capsule capsule Take 50,000 Units by mouth 1 (One) Time Per Week.   8/3/2019       Current Meds  Scheduled Meds:  aspirin 81 mg Oral Daily   darunavir ethanolate 800 mg Oral Nightly   Emtricitabine-Tenofovir DF 1 tablet Oral Daily   furosemide 20 mg Oral Every Other Day   losartan 25 mg Oral Daily   metoprolol tartrate 25 mg Oral Daily   ritonavir 100 mg Oral Daily   sodium chloride 3 mL Intravenous Q12H     Continuous Infusions:   PRN Meds:.•  acetaminophen  •  bisacodyl  •  calcium carbonate  •  ipratropium-albuterol  •  nitroglycerin  •  ondansetron **OR** ondansetron  •  sodium chloride    Allergies as of 08/05/2019   • (No Known Allergies)       Social History     Socioeconomic History   • Marital status:      Spouse name: Not on file   • Number of children: Not on file   • Years of education: Not on file   • Highest education level: Not on file   Tobacco Use   • Smoking status: Former Smoker      "Last attempt to quit: 2015     Years since quittin.5   • Smokeless tobacco: Never Used   Substance and Sexual Activity   • Alcohol use: Yes     Alcohol/week: 0.6 oz     Types: 1 Glasses of wine per week     Comment: RARELY   • Drug use: Yes     Types: Marijuana   • Sexual activity: Defer       History reviewed. No pertinent family history.    REVIEW OF SYSTEMS:   12 point ROS was performed and is negative except as outlined in HPI          Objective:   Temp:  [97.3 °F (36.3 °C)-98.5 °F (36.9 °C)] 97.3 °F (36.3 °C)  Heart Rate:  [] 95  Resp:  [18-20] 20  BP: (126-147)/() 135/98  Body mass index is 35.17 kg/m².  Flowsheet Rows      First Filed Value   Admission Height  167.6 cm (66\") Documented at 2019 2154   Admission Weight  105 kg (231 lb) Documented at 2019 2154        Vitals:    19 0607   BP:    Pulse: 95   Resp:    Temp:    SpO2: 100%       General Appearance:    Alert, cooperative, in no acute distress   Head:    Normocephalic, without obvious abnormality, atraumatic   Eyes:            Lids and lashes normal, conjunctivae and sclerae normal, no   icterus, no pallor, corneas clear   Ears:    Ears appear intact with no abnormalities noted   Throat:   No oral lesions, no thrush, oral mucosa moist   Neck:   No adenopathy, supple, trachea midline, no thyromegaly, no   carotid bruit, no JVD   Back:     No kyphosis present, no scoliosis present, no skin lesions, erythema or scars, no tenderness to percussion or palpation, range of motion normal   Lungs:     Clear to auscultation,respirations regular, even and unlabored    Heart:    Regular rhythm and normal rate, normal S1 and S2, no murmur, no gallop, no rub, no click   Chest Wall:    No abnormalities observed   Abdomen:     Normal bowel sounds, no masses, no organomegaly, soft        non-tender, non-distended, no guarding, no rebound  tenderness   Extremities:   Moves all extremities well, no edema, no cyanosis, no redness   Pulses: "   Pulses palpable and equal bilaterally. Normal radial, carotid, femoral, dorsalis pedis and posterior tibial pulses bilaterally. Normal abdominal aorta   Skin:  Psychiatric:   No bleeding, bruising or rash    Alert and oriented x 3, normal mood and affect                 Lab Review:    Results from last 7 days   Lab Units 08/06/19  0432 08/05/19  2203   SODIUM mmol/L 137 145   POTASSIUM mmol/L 3.7 4.0   CHLORIDE mmol/L 102 110*   CO2 mmol/L 22.6 23.6   BUN mg/dL 14 13   CREATININE mg/dL 0.80 0.90   CALCIUM mg/dL 8.5* 8.6   BILIRUBIN mg/dL  --  0.3   ALK PHOS U/L  --  80   ALT (SGPT) U/L  --  38*   AST (SGOT) U/L  --  36*   GLUCOSE mg/dL 103* 104*     Results from last 7 days   Lab Units 08/06/19  0432 08/05/19  2203   TROPONIN T ng/mL <0.010 <0.010       Results from last 7 days   Lab Units 08/06/19  0432 08/05/19  2203   WBC 10*3/mm3 7.06 5.81   HEMOGLOBIN g/dL 11.5* 11.3*   HEMATOCRIT % 36.0 35.0   PLATELETS 10*3/mm3 127* 122*         Results from last 7 days   Lab Units 08/05/19  2203   MAGNESIUM mg/dL 2.0         I personally viewed and interpreted the patient's EKG/Telemetry data  )  Patient Active Problem List   Diagnosis   • CHF (congestive heart failure) (CMS/[facility])     Assessment and Plan:      1. Chest pain and dyspnea.  Her symptoms are consistent with acute decompensated systolic congestive heart failure.  She has a known history of severe dilated nonischemic cardiomyopathy.  Continue IV Bumex for 3 more doses and obtain her [facility]  2. Hypertension.  Change from metoprolol to carvedilol.  Metoprolol tartrate is not advised medication for somebody with a history of heart failure.  Would also try Entresto for both her blood pressure and her nonischemic dilated cardiomyopathy.  I am not certain she will be able to afford this.  We will have to provide samples.  3. HIV positive    Check echo, change to carvedilol and Entresto.  IV Bumex for 3 more  doses.    Addendum: [facility] arteries on cardiac catheterization on 4/23/2019.  Her LVEDP was normal at 12 with a wedge pressure normal at 13 mmHg.  Her PA pressure was 28/15 with a mean of 19 mmHg, RA pressure 4 mmHg, cardiac index 2.5 L/min/m², PVR 1.2 Wood units.    Lisset Melton MD  08/06/19  7:43 AM.  Time spent in reviewing chart, discussion and examination:

## 2019-08-06 NOTE — PAYOR COMM NOTE
"Nina Cheung (43 y.o. Female)     PLEASE SEE ATTACHED CLINICAL REVIEW. PT. WAS ADMITTED ON 8/5/19 TO A MONITORED TELEM FLOOR.    PLEASE CALL   OR  [phone] WITH INPT AUTH AND DAYS APPROVED.     THANK YOU    ALMA ROSA KEITH LPN CCP    Date of Birth Social Security Number Address Home Phone MRN    1975  [id number]  [address]  [facility] [id number]  [id number]    Sikh Marital Status          Buddhism        Admission Date Admission Type Admitting Provider Attending Provider Department, Room/Bed    8/5/19 Emergency Damien Zapata MD Jackson, Alan David, MD [address], [address]/1    Discharge Date Discharge Disposition Discharge Destination                       Attending Provider:  Damien Zapata MD    Allergies:  No Known Allergies    Isolation:  None   Infection:  None   Code Status:  CPR    Ht:  167.6 cm (66\")   Wt:  98.4 kg (217 lb)    Admission Cmt:  None   Principal Problem:  Acute on chronic systolic CHF (congestive heart failure) (CMS/[facility]) [I50.23]                 Active Insurance as of 8/5/2019     Primary Coverage     Payor Plan Insurance Group Employer/Plan Group    [employer] [place] [facility] [place]      Payor Plan Address Payor Plan Phone Number Payor Plan Fax Number Effective Dates    [address] [address]   6/1/2019 - None Entered    [place] [address]       Subscriber Name Subscriber Birth Date Member ID       NINA CHEUNG 1975 [id number]                 Emergency Contacts      (Rel.) Home Phone Work Phone Mobile Phone    Chacho cheung (Spouse) -- -- [phone]               History & Physical      Chelle Gonzales, APRN at 8/6/2019  9:17 AM              Patient Name:  Nina hCeung  YOB: 1975  MRN:  [id number]  Admit Date:  8/5/2019  Patient Care Team:  System, Provider Not In as PCP - General      Subjective   History Present Illness     Chief Complaint   Patient " presents with   • Chest Pain       Ms. Cheung is a 43 y.o. former smoker with a history of HIV, cardiomyopathy, ICD, pacemaker, HTN, obesity  that presents to [facility] complaining of shortness of breath worse with exertion and chest tightness.  Associated symptoms include a nonproductive cough.  Symptoms became progressively worse until yesterday evening when she had constant chest pain while laying in bed.  She has not taken her blood pressure medication in 4 days.  She denies dizziness, lightheadedness, syncope, palpitations, diaphoresis, fever, or chills. She denies BLE edema, orthopnea, or PND.  Does not follow a heart healthy or low-sodium diet.  Her  states she eats a lot of fried junk food.  She is primarily followed by cardiologist, Dr. Boyd at [facility]. She had an MI in May 2019 treated at [facility] in [place] and was placed on metoprolol tartrate at that time. She feels since starting this medication her cardiac symptoms and general fatigue has been worse. Prior cardiac workup is not available but patient reports an prior cardiac catheterization with EF of 15%.  She is also followed by infectious disease doctors at [facility] for HIV.  She has a history of asthma secondary to prior tobacco abuse for 20 years.  She uses an inhaler as needed but states it is under control.           History of Present Illness  Review of Systems   Constitutional: Positive for activity change and fatigue. Negative for appetite change, chills, diaphoresis, fever and unexpected weight change.   HENT: Negative for congestion and trouble swallowing.    Eyes: Negative for visual disturbance.   Respiratory: Positive for cough, chest tightness and shortness of breath. Negative for choking, wheezing and stridor.    Cardiovascular: Positive for chest pain. Negative for palpitations and leg swelling.   Gastrointestinal: Negative for abdominal distention, abdominal pain, blood  in stool, constipation, diarrhea, nausea and vomiting.   Endocrine: Negative for polydipsia, polyphagia and polyuria.   Genitourinary: Negative for decreased urine volume and dysuria.   Musculoskeletal: Negative for back pain and joint swelling.   Skin: Negative for color change and rash.   Allergic/Immunologic: Negative for environmental allergies and food allergies.   Neurological: Negative for dizziness, weakness, light-headedness, numbness and headaches.   Hematological: Does not bruise/bleed easily.   Psychiatric/Behavioral: Negative.  Negative for confusion.        Personal History     Past Medical History:   Diagnosis Date   • Asthma    • CHF (congestive heart failure) (CMS/[facility])    • Hypertension      Past Surgical History:   Procedure Laterality Date   • CARDIAC CATHETERIZATION       History reviewed. No pertinent family history.  Social History     Tobacco Use   • Smoking status: Former Smoker     Last attempt to quit: 2015     Years since quittin.5   • Smokeless tobacco: Never Used   Substance Use Topics   • Alcohol use: Yes     Alcohol/week: 0.6 oz     Types: 1 Glasses of wine per week     Comment: RARELY   • Drug use: Yes     Types: Marijuana     Medications Prior to Admission   Medication Sig Dispense Refill Last Dose   • albuterol sulfate  (90 Base) MCG/ACT inhaler Inhale 2 puffs Every 4 (Four) Hours As Needed for Wheezing.      • aspirin 81 MG EC tablet Take 81 mg by mouth Daily.   2019 at 2000   • darunavir ethanolate (PREZISTA) 800 MG tablet tablet Take 800 mg by mouth Every Night.   2019 at Unknown time   • Emtricitabine-Tenofovir DF (TRUVADA) 100-150 MG per tablet Take 1 tablet by mouth Daily.   2019 at Unknown time   • furosemide (LASIX) 20 MG tablet Take 20 mg by mouth Every Other Day.   8/3/2019   • losartan (COZAAR) 25 MG tablet Take 25 mg by mouth Daily.   Past Week at Unknown time   • metoprolol tartrate (LOPRESSOR) 25 MG tablet Take 25 mg by mouth Daily.   2019 at  Unknown time   • ritonavir (NORVIR) 100 MG tablet Take 100 mg by mouth Daily.   8/5/2019 at Unknown time   • vitamin D (ERGOCALCIFEROL) 00909 units capsule capsule Take 50,000 Units by mouth 1 (One) Time Per Week.   8/3/2019     Allergies:  No Known Allergies    Objective    Objective     Vital Signs  Temp:  [97.3 °F (36.3 °C)-98.5 °F (36.9 °C)] 98.4 °F (36.9 °C)  Heart Rate:  [] 95  Resp:  [18-20] 18  BP: ()/() 93/63  SpO2:  [86 %-100 %] 100 %  on  Flow (L/min):  [2] 2;   Device (Oxygen Therapy): nasal cannula  Body mass index is 35.17 kg/m².    Physical Exam   Constitutional: She is oriented to person, place, and time. She appears well-developed and well-nourished. No distress.   HENT:   Head: Normocephalic and atraumatic.   Mouth/Throat: Oropharynx is clear and moist.   Eyes: EOM are normal. No scleral icterus.   Neck: No JVD present. No tracheal deviation present. No thyromegaly present.   Cardiovascular: Normal rate, regular rhythm, normal heart sounds and intact distal pulses.   No murmur heard.  Pulmonary/Chest: Effort normal and breath sounds normal. No stridor. No respiratory distress. She has no wheezes. She has no rales. She exhibits no tenderness.   Dry cough during exam   Abdominal: Soft. Bowel sounds are normal. She exhibits no shifting dullness, no distension, no pulsatile liver, no fluid wave, no abdominal bruit, no ascites, no pulsatile midline mass and no mass. There is no hepatosplenomegaly. There is no tenderness. There is no rigidity and no guarding. No hernia.   Musculoskeletal: Normal range of motion. She exhibits no edema.   Neurological: She is alert and oriented to person, place, and time. No cranial nerve deficit.   Skin: Skin is warm and dry. Capillary refill takes less than 2 seconds. She is not diaphoretic.   Psychiatric: She has a normal mood and affect. Her behavior is normal. Judgment and thought content normal.   Nursing note and vitals reviewed.      Results  Review:  I reviewed the patient's new clinical results.  I reviewed the patient's new imaging results and agree with the interpretation.  I reviewed the patient's other test results and agree with the interpretation  I personally viewed and interpreted the patient's EKG/Telemetry data  Discussed with ED provider.    Results from last 7 days   Lab Units 08/06/19  0432 08/05/19  2203   WBC 10*3/mm3 7.06 5.81   HEMOGLOBIN g/dL 11.5* 11.3*   HEMATOCRIT % 36.0 35.0   PLATELETS 10*3/mm3 127* 122*     Results from last 7 days   Lab Units 08/06/19  0432 08/05/19  2203   SODIUM mmol/L 137 145   POTASSIUM mmol/L 3.7 4.0   CHLORIDE mmol/L 102 110*   CO2 mmol/L 22.6 23.6   BUN mg/dL 14 13   CREATININE mg/dL 0.80 0.90   CALCIUM mg/dL 8.5* 8.6   BILIRUBIN mg/dL  --  0.3   ALK PHOS U/L  --  80   ALT (SGPT) U/L  --  38*   AST (SGOT) U/L  --  36*   GLUCOSE mg/dL 103* 104*     Lab Results   Lab Value Date/Time    TROPONINT <0.010 08/06/2019 0432    TROPONINT <0.010 08/05/2019 2203     BNP 5151    Imaging Results (last 24 hours)     Procedure Component Value Units Date/Time    XR Chest 2 View [[id number]] Collected:  08/05/19 2255     Updated:  08/05/19 2259    Narrative:       PA AND LATERAL CHEST RADIOGRAPH     HISTORY: Short severe on exertion and chest pain     COMPARISON: None available.     FINDINGS:  Cardiomegaly and vascular congestion are noted. No pneumothorax is seen.  No definite pleural effusion is identified. Left-sided defibrillator is  present.       Impression:       Cardiomegaly and vascular congestion.     This report was finalized on 8/5/2019 10:56 PM by Dr. Opal Jimenez M.D.                  ECG 12 Lead   Final Result   HEART RATE= 101  bpm   RR Interval= 592  ms   CO Interval= 172  ms   P Horizontal Axis= -6  deg   P Front Axis= 66  deg   QRSD Interval= 119  ms   QT Interval= 399  ms   QRS Axis= 28  deg   T Wave Axis= -24  deg   - ABNORMAL ECG -   Sinus tachycardia   Multiform ventricular premature complexes    Probable left atrial enlargement   Incomplete left bundle branch block   Left ventricular hypertrophy   Prolonged QT interval   NO PRIOR TRACING AVAILABLE FOR COMPARISON   Electronically Signed By: Scotty Mcgarry ([facility]) ([facility]) 05-Aug-2019 23:20:52   Date and Time of Study: 2019-08-05 21:33:08           Assessment/Plan     Active Hospital Problems    Diagnosis POA   • Asthma [J45.909] Yes   • Hypertension [I10] Yes   • HIV (human immunodeficiency virus infection) (CMS/[facility]) [B20] Yes   • Class 2 obesity in adult [E66.9] Yes   • Acute on chronic systolic CHF (congestive heart failure) (CMS/HCC) [I50.23] Yes   • Exertional dyspnea [R06.09] Unknown   • Chest pain [R07.9] Unknown       Ms. Cheung is a 43 y.o. former smoker  that presents with chest pain and dyspnea secondary to decompensated acute on chronic systolic CHF. Patient's BNP 5,151 with negative tropinin. EKG with sinus tachycardia but no acute changes. Chest x-ray with-cardiomegaly and vascular congestion.   · Continue telemetry.  ·  Appreciate Cardiology assistance.   · records requested from [facility]  · Strict I/Os and daily weights. NC oxygen.   · Diuresis with BUMEX 2mg  q 12hrs x 3 doses.   · Monitor renal function. Assess UOP   · Echocardiogram today  · Consult nutrition for cardiac diet education  · HTN- discontinue metoprolol and change to carvedilol. Entresto samples per cardiology  · HIV + will follow up with [facility]. Continue current regimen   · Asthma- PRN duonebs       ·    · I discussed the patients findings and my recommendations with patient, family and nursing staff.    VTE Prophylaxis - SCDs .  Code Status - Full code.       SAMUEL Rodriguez  [facility]  08/06/19  9:27 AM      Electronically signed by Chelle Gonzales APRN at 8/6/2019 10:02 AM          Emergency Department Notes      Cora Gomez, RN at 8/5/2019  9:25 PM        Pt c/o SOA and chest pain that began 2 hours ago. Hx of heart disease and has  "defibrillator. Reports cough and CARLTON.      Cora Gomez, RN  08/05/19 2126      Electronically signed by Cora Gomez RN at 8/5/2019  9:26 PM     Jeannette Hayes RN at 8/5/2019  9:52 PM        Pt reports CP that started x 2 hours ago. Pt reports \"a lot\" of walking today. Pt also c/o cough and SOB. Pt describes pain as \"burning\" and \"like I need to stretch, like it is my muscles\".     Pt hx defib, CHF     Jeannette Hayes, RN  08/05/19 2153      Electronically signed by Jeannette Hayes RN at 8/5/2019  9:53 PM     Coy Sue MD at 8/5/2019 11:30 PM        Pt is a 43 y.o. female who presents to the ED complaining of CP and SOA that started around 2000. Pt notes that she took a very ng walk today and when she got home she laid down and could not get settled. Pt's family note that the pt has not hd her BP medication in 4 days but was able to take it today.       On exam,  A&Ox3, no acute distress, appears to be mildly dyspneic at rest, mild rhonchi bilaterally  HEENT is unremarkable  Heart is RRR and without murmur, lungs are clear bilaterally   Abd is soft, non distended, non tender, positive bowel sounds  Extremities unremarkable    Labs and imaging reviewed.     Plan: I agree with the plan to admit the pt for further workup      MD ATTESTATION NOTE  The FATIMAH and I have discussed this patient's history, physical exam, and treatment plan.  I have reviewed the documentation and personally had a face to face interaction with the patient. I affirm the documentation and agree with the treatment and plan.  The attached note describes my personal findings.    Documentation assistance provided by laurie Medina for Dr. Sue. Information recorded by the laurie was done at my direction and has been verified and validated by me.     Sydnee Medina  08/05/19 6575       Coy Sue MD  08/06/19 2154      Electronically signed by Coy Sue MD at 8/6/2019  2:54 AM     Ivan Childs, RN at 8/6/2019 12:18 AM      "   Fax sent to [facility] for request of cardiac cath lab documents.      Ivan Childs, RN  08/06/19 0019      Electronically signed by Ivan Childs RN at 8/6/2019 12:19 AM       Intake & Output (last day)       08/05 0701 - 08/06 0700 08/06 0701 - 08/07 0700    P.O.  1020    Total Intake(mL/kg)  1020 (10.4)    Urine (mL/kg/hr) 3100 800 (1.6)    Stool  0    Total Output 3100 800    Net -3100 +220          Stool Unmeasured Occurrence  1 x        Orders (last 24 hrs)     Start     Ordered    08/06/19 1002  Inpatient Nutrition Consult  Once     Comments:  Heart healthy low Na diet education - CHF, CAD   Provider:  (Not yet assigned)    08/06/19 1002    08/06/19 0931  Uric Acid  Once      08/06/19 0930    08/06/19 0859  Diet Regular; Cardiac  Diet Effective Now      08/06/19 0858    08/06/19 0845  bumetanide (BUMEX) injection 2 mg  Every 12 Hours      08/06/19 0754    08/06/19 0756  Adult Transthoracic Echo Complete W/ Cont if Necessary Per Protocol  Once      08/06/19 0755    08/06/19 0745  NPO Diet  Diet Effective Now,   Status:  Canceled      08/06/19 0744    08/06/19 0522  Inpatient Cardiology Consult  Once     Specialty:  Cardiology  Provider:  Haley Ball MD    08/06/19 0522    08/06/19 0400  Troponin  Now Then Every 6 Hours     Comments:  Perform 6 Hours After Last Troponin (If Done)      08/06/19 0121    08/06/19 0005  Daily Weights  Daily      08/06/19 0004    08/06/19 0003  Place Sequential Compression Device  Once      08/06/19 0004    08/06/19 0003  Maintain Sequential Compression Device  Continuous      08/06/19 0004    08/06/19 0000  Vital Signs  Every 4 Hours      08/06/19 0004    08/05/19 2355  Diet Regular; Cardiac  Diet Effective Now,   Status:  Canceled      08/06/19 0004    08/05/19 2355  Troponin  Now Then Every 6 Hours,   Status:  Canceled     Comments:  Perform 6 Hours After Last Troponin (If Done)      08/06/19 0004    08/05/19 2354  Place Sequential Compression Device   Once      08/06/19 0004    08/05/19 2354  Maintain Sequential Compression Device  Continuous      08/06/19 0004    08/05/19 2354  Telemetry - Maintain IV Access  Continuous      08/06/19 0004    08/05/19 2354  Continuous Cardiac Monitoring  Continuous      08/06/19 0004    08/05/19 2354  May Be Off Telemetry for Tests  Continuous      08/06/19 0004    08/05/19 2354  ACLS Protocol For Life Threatening Dysrhythmias (Unless Code Status Indicates Otherwise)  Continuous      08/06/19 0004    08/05/19 2353  Cardiac Monitoring  Continuous,   Status:  Canceled      08/06/19 0004    08/05/19 2353  Vital Signs  Per Hospital Policy      08/06/19 0004    08/05/19 2353  Intake & Output  Every Shift      08/06/19 0004    08/05/19 2353  Weigh Patient  Once      08/06/19 0004    08/05/19 2353  Oxygen Therapy- Nasal Cannula; Titrate for SPO2: 90% - 95%  Continuous      08/06/19 0004    08/05/19 2353  Cardiac Rehab Evaluation and Enrollment  Once     Provider:  (Not yet assigned)    08/06/19 0004    08/05/19 2349  Inpatient Admission  Once      08/05/19 2348    08/05/19 2334  [facility] (on-call MD unless specified) Details  Once     Specialty:  Hospitalist  Provider:  (Not yet assigned)    08/05/19 2333    08/05/19 2322  [facility] (on-call MD unless specified)  Once     Specialty:  Cardiology  Provider:  (Not yet assigned)    08/05/19 2321    08/05/19 2210  Device Interrogation. Device type? Pacemaker; Company to contact? [employer] [phone]  Once      08/05/19 2209 08/05/19 2208  hCG, Serum, Qualitative  STAT      08/05/19 2207 08/05/19 2208  Cardiac monitoring  Once,   Status:  Canceled      08/05/19 2207 08/05/19 2208  Obtain medical records  Once     Comments:  Obtain [facility] Notify physician when available.    08/05/19 2207    Unscheduled  Telemetry - Pulse Oximetry  Continuous PRN     Comments:  If Patient Develops Unresponsiveness, Acute Dyspnea, Cyanosis or Suspected Hypoxemia Start Continuous Pulse Ox  Monitoring, Apply Oxygen & Notify Provider    08/06/19 0004    Unscheduled  Oxygen Therapy- Nasal Cannula; Titrate for SPO2: 90% - 95%  Continuous PRN     Comments:  If Patient Develops Unresponsiveness, Acute Dyspnea, Cyanosis or Suspected Hypoxemia Start Continuous Pulse Ox Monitoring, Apply Oxygen & Notify Provider    08/06/19 0004     All medication doses during the admission are shown, including meds that are no longer on order.   Scheduled Meds Sorted by Name   for Nina Cheung as of 8/4/19 through 8/6/19     1 Day 3 Days 7 Days 10 Days < Today >    Legend:                          Inactive     Active     Other Encounter    Linked               Medications 08/04/19 08/05/19 08/06/19   aspirin EC tablet 81 mg   Dose: 81 mg  Freq: Daily Route: PO  Start: 08/06/19 0900    Admin Instructions:   Herbal/drug interaction: Avoid use with ginkgo biloba. Do not crush or chew.  Do not exceed 4 grams of aspirin in a 24 hr period.    If given for pain, use the following pain scale:   Mild Pain = Pain Score of 1-3, CPOT 1-2  Moderate Pain = Pain Score of 4-6, CPOT 3-4  Severe Pain = Pain Score of 7-10, CPOT 5-8      0809            bumetanide (BUMEX) injection 2 mg   Dose: 2 mg  Freq: Every 12 Hours Route: IV  Start: 08/06/19 0845 End: 08/07/19 2044    Admin Instructions:   Give slow IV push over 1-2 minutes.      1115   2045          bumetanide (BUMEX) injection 2 mg   Dose: 2 mg  Freq: Once Route: IV  Start: 08/05/19 2323 End: 08/05/19 2336    Admin Instructions:   Give slow IV push over 1-2 minutes.     2336             carvedilol (COREG) tablet 6.25 mg   Dose: 6.25 mg  Freq: 2 Times Daily With Meals Route: PO  Start: 08/06/19 0845    Admin Instructions:   Give with food.      1114   1800          darunavir ethanolate (PREZISTA) tablet 800 mg   Dose: 800 mg  Freq: Nightly Route: PO  Start: 08/06/19 0300    Admin Instructions:   Swallow whole. Do not crush, split, or chew. Take with food. Do not miss dose. Do not  change times.      (0221) [C]   2100          Emtricitabine-Tenofovir DF (TRUVADA) 100-150 MG per tablet 1 tablet   Dose: 1 tablet  Freq: Daily Route: PO  Start: 08/06/19 0900    Admin Instructions:   Take with or without food.      (1115)            furosemide (LASIX) tablet 20 mg   Dose: 20 mg  Freq: Every Other Day Route: PO  Start: 08/06/19 0900 End: 08/06/19 0754      0754-D/C'd          losartan (COZAAR) tablet 25 mg   Dose: 25 mg  Freq: Daily Route: PO  Start: 08/06/19 0900 End: 08/06/19 0755      0755-D/C'd          metoprolol tartrate (LOPRESSOR) injection 2.5 mg   Dose: 2.5 mg  Freq: Once Route: IV  Start: 08/05/19 2323 End: 08/05/19 2340     2340             metoprolol tartrate (LOPRESSOR) tablet 25 mg   Dose: 25 mg  Freq: Daily Route: PO  Start: 08/06/19 0900 End: 08/06/19 0754      0754-D/C'd          perflutren (DEFINITY) 8.476 mg in sodium chloride 0.9 % 10 mL injection   Dose: 2 mL  Freq: Once Route: IV  Start: 08/06/19 1055 End: 08/06/19 1055    Admin Instructions:   Mix 1.3 mL of mechanically activated Definity with 8.7 mL of normal saline. A total of 2 mL of the resulting Definity solution was administered.      1055            potassium chloride (MICRO-K) CR capsule 40 mEq   Dose: 40 mEq  Freq: Daily Route: PO  Start: 08/06/19 0900    Admin Instructions:   Do not crush. Take with food.      1114            ritonavir (NORVIR) tablet 100 mg   Dose: 100 mg  Freq: Daily Route: PO  Start: 08/06/19 0900    Admin Instructions:   Swallow whole. Do not crush or chew. Take with meals.      (0809) [C]            sacubitril-valsartan (ENTRESTO) 24-26 MG tablet 1 tablet   Dose: 1 tablet  Freq: Every 12 Hours Scheduled Route: PO  Start: 08/06/19 0900    Admin Instructions:   Do not cut or crush tablet.      1115 2100          sodium chloride 0.9 % flush 3 mL   Dose: 3 mL  Freq: Every 12 Hours Scheduled Route: IV  Start: 08/06/19 0006      0116   0809   2100        Medications 08/04/19 08/05/19 08/06/19        Continuous Meds Sorted by Name   for Nina Cheung as of 8/4/19 through 8/6/19    Legend:                  Inactive     Active     Other Encounter    Linked               Medications 08/04/19 08/05/19 08/06/19       PRN Meds Sorted by Name   for Nina Cheung as of 8/4/19 through 8/6/19    Legend:                          Inactive     Active     Other Encounter    Linked               Medications 08/04/19 08/05/19 08/06/19   acetaminophen (TYLENOL) tablet 650 mg   Dose: 650 mg  Freq: Every 4 Hours PRN Route: PO  PRN Reason: Mild Pain   Start: 08/06/19 0001    Admin Instructions:   Do not exceed 4 grams of acetaminophen in a 24 hr period.    If given for pain, use the following pain scale:   Mild Pain = Pain Score of 1-3, CPOT 1-2  Moderate Pain = Pain Score of 4-6, CPOT 3-4  Severe Pain = Pain Score of 7-10, CPOT 5-8         bisacodyl (DULCOLAX) EC tablet 5 mg   Dose: 5 mg  Freq: Daily PRN Route: PO  PRN Reason: Constipation  Start: 08/06/19 0001    Admin Instructions:   Swallow whole. Do not crush, split, or chew tablet.         calcium carbonate (TUMS) chewable tablet 500 mg (200 mg elemental)   Dose: 2 tablet  Freq: 2 Times Daily PRN Route: PO  PRN Reason: Heartburn  Start: 08/06/19 0002    Admin Instructions:   One tablet contains 200 mg elemental calcium. Take with food.         ipratropium-albuterol (DUO-NEB) nebulizer solution 3 mL   Dose: 3 mL  Freq: Every 6 Hours PRN Route: NEBULIZATION  PRN Reason: Shortness of Air  Start: 08/06/19 0119      0133            nitroglycerin (NITROSTAT) SL tablet 0.4 mg   Dose: 0.4 mg  Freq: Every 5 Minutes PRN Route: SL  PRN Reason: Chest Pain  PRN Comment: Only if SBP Greater Than 100  Start: 08/05/19 0983    Admin Instructions:   If Pain Unrelieved After 3 Doses Notify MD         ondansetron (ZOFRAN) tablet 4 mg   Dose: 4 mg  Freq: Every 6 Hours PRN Route: PO  PRN Reasons: Nausea,Vomiting  Start: 08/06/19 0001    Admin Instructions:   If BOTH ondansetron (ZOFRAN)  and promethazine (PHENERGAN) are ordered use ondansetron first and THEN promethazine IF ondansetron is ineffective.         Or  ondansetron (ZOFRAN) injection 4 mg   Dose: 4 mg  Freq: Every 6 Hours PRN Route: IV  PRN Reasons: Nausea,Vomiting  Start: 19 0001    Admin Instructions:   If BOTH ondansetron (ZOFRAN) and promethazine (PHENERGAN) are ordered use ondansetron first and THEN promethazine IF ondansetron is ineffective.         sodium chloride 0.9 % flush 3-10 mL   Dose: 3-10 mL  Freq: As Needed Route: IV  PRN Reason: Line Care  Start: 19 2353         Medications 19                  Consult Notes (all)      Lisset Melton MD at 2019  7:43 AM      Consult Orders    1. Inpatient Cardiology Consult [[id number]] ordered by Ángel Crystal MD at 19 0522                Date of Hospital Visit: [unfilled]ENC@  Encounter Provider: Lisset Melton MD  Place of Service: [facility] CARDIOLOGY  Patient Name: Nina Cheung  :1975  Referral Provider: Ángel Crystal MD    Chief complaint    Chest pain    History of Present Illness    She has a history of nonischemic dilated cardiomyopathy, HIV, hypertension, obesity.  She follows with Dr. Laina Boyd at [facility].  Her ejection fraction is 15%.  Over [facility], she was visiting family with her  and she got to heart failure issues and was sent emergently to [facility].  They are, her carvedilol was changed to metoprolol and she says she is been more short winded and fatigued since that time.  They did an echocardiogram and a cardiac catheterization there.  I do not have any of those results.    She presented to the emergency department on 2019 with chest pain and short windedness.  She was fatigued but had no palpitations, dizziness or syncope.  She was having tightness across her chest but her main complaint was exertional dyspnea which was worse  and orthopnea.  She still has a slight amount of this today but does feel overall better after IV Bumex.  She had not taken her blood pressure medications in 4 days.  On arrival her blood pressure was 126/100, heart rate 102.  Her laboratory values have shown negative troponin x2, proBNP elevation at 5151, CMP unremarkable, hemoglobin 11.3.  Her magnesium was 2 and her beta hCG was negative.  ECG shows a normal sinus rhythm, PVC, left ventricular hypertrophy with lateral T wave flattening.  Chest x-ray shows cardiomegaly and vascular congestion.    She has a history of illicit drug use but has not used drugs in 5 years.  She uses no alcohol.  She does not smoke.  She lives with her .  She just obtain disability.    Past Medical History:   Diagnosis Date   • Asthma    • CHF (congestive heart failure) (CMS/[facility])    • Hypertension        Past Surgical History:   Procedure Laterality Date   • CARDIAC CATHETERIZATION         Medications Prior to Admission   Medication Sig Dispense Refill Last Dose   • albuterol sulfate  (90 Base) MCG/ACT inhaler Inhale 2 puffs Every 4 (Four) Hours As Needed for Wheezing.      • aspirin 81 MG EC tablet Take 81 mg by mouth Daily.   8/5/2019 at 2000   • darunavir ethanolate (PREZISTA) 800 MG tablet tablet Take 800 mg by mouth Every Night.   8/5/2019 at Unknown time   • Emtricitabine-Tenofovir DF (TRUVADA) 100-150 MG per tablet Take 1 tablet by mouth Daily.   8/5/2019 at Unknown time   • furosemide (LASIX) 20 MG tablet Take 20 mg by mouth Every Other Day.   8/3/2019   • losartan (COZAAR) 25 MG tablet Take 25 mg by mouth Daily.   Past Week at Unknown time   • metoprolol tartrate (LOPRESSOR) 25 MG tablet Take 25 mg by mouth Daily.   8/6/2019 at Unknown time   • ritonavir (NORVIR) 100 MG tablet Take 100 mg by mouth Daily.   8/5/2019 at Unknown time   • vitamin D (ERGOCALCIFEROL) 16269 units capsule capsule Take 50,000 Units by mouth 1 (One) Time Per Week.   8/3/2019       Current  "Meds  Scheduled Meds:  aspirin 81 mg Oral Daily   darunavir ethanolate 800 mg Oral Nightly   Emtricitabine-Tenofovir DF 1 tablet Oral Daily   furosemide 20 mg Oral Every Other Day   losartan 25 mg Oral Daily   metoprolol tartrate 25 mg Oral Daily   ritonavir 100 mg Oral Daily   sodium chloride 3 mL Intravenous Q12H     Continuous Infusions:   PRN Meds:.•  acetaminophen  •  bisacodyl  •  calcium carbonate  •  ipratropium-albuterol  •  nitroglycerin  •  ondansetron **OR** ondansetron  •  sodium chloride    Allergies as of 2019   • (No Known Allergies)       Social History     Socioeconomic History   • Marital status:      Spouse name: Not on file   • Number of children: Not on file   • Years of education: Not on file   • Highest education level: Not on file   Tobacco Use   • Smoking status: Former Smoker     Last attempt to quit: 2015     Years since quittin.5   • Smokeless tobacco: Never Used   Substance and Sexual Activity   • Alcohol use: Yes     Alcohol/week: 0.6 oz     Types: 1 Glasses of wine per week     Comment: RARELY   • Drug use: Yes     Types: Marijuana   • Sexual activity: Defer       History reviewed. No pertinent family history.    REVIEW OF SYSTEMS:   12 point ROS was performed and is negative except as outlined in HPI          Objective:   Temp:  [97.3 °F (36.3 °C)-98.5 °F (36.9 °C)] 97.3 °F (36.3 °C)  Heart Rate:  [] 95  Resp:  [18-20] 20  BP: (126-147)/() 135/98  Body mass index is 35.17 kg/m².  Flowsheet Rows      First Filed Value   Admission Height  167.6 cm (66\") Documented at 2019   Admission Weight  105 kg (231 lb) Documented at 2019 215        Vitals:    19 0607   BP:    Pulse: 95   Resp:    Temp:    SpO2: 100%       General Appearance:    Alert, cooperative, in no acute distress   Head:    Normocephalic, without obvious abnormality, atraumatic   Eyes:            Lids and lashes normal, conjunctivae and sclerae normal, no   icterus, no " pallor, corneas clear   Ears:    Ears appear intact with no abnormalities noted   Throat:   No oral lesions, no thrush, oral mucosa moist   Neck:   No adenopathy, supple, trachea midline, no thyromegaly, no   carotid bruit, no JVD   Back:     No kyphosis present, no scoliosis present, no skin lesions, erythema or scars, no tenderness to percussion or palpation, range of motion normal   Lungs:     Clear to auscultation,respirations regular, even and unlabored    Heart:    Regular rhythm and normal rate, normal S1 and S2, no murmur, no gallop, no rub, no click   Chest Wall:    No abnormalities observed   Abdomen:     Normal bowel sounds, no masses, no organomegaly, soft        non-tender, non-distended, no guarding, no rebound  tenderness   Extremities:   Moves all extremities well, no edema, no cyanosis, no redness   Pulses:   Pulses palpable and equal bilaterally. Normal radial, carotid, femoral, dorsalis pedis and posterior tibial pulses bilaterally. Normal abdominal aorta   Skin:  Psychiatric:   No bleeding, bruising or rash    Alert and oriented x 3, normal mood and affect                 Lab Review:    Results from last 7 days   Lab Units 08/06/19  0432 08/05/19 2203   SODIUM mmol/L 137 145   POTASSIUM mmol/L 3.7 4.0   CHLORIDE mmol/L 102 110*   CO2 mmol/L 22.6 23.6   BUN mg/dL 14 13   CREATININE mg/dL 0.80 0.90   CALCIUM mg/dL 8.5* 8.6   BILIRUBIN mg/dL  --  0.3   ALK PHOS U/L  --  80   ALT (SGPT) U/L  --  38*   AST (SGOT) U/L  --  36*   GLUCOSE mg/dL 103* 104*     Results from last 7 days   Lab Units 08/06/19  0432 08/05/19 2203   TROPONIN T ng/mL <0.010 <0.010       Results from last 7 days   Lab Units 08/06/19 0432 08/05/19 2203   WBC 10*3/mm3 7.06 5.81   HEMOGLOBIN g/dL 11.5* 11.3*   HEMATOCRIT % 36.0 35.0   PLATELETS 10*3/mm3 127* 122*         Results from last 7 days   Lab Units 08/05/19  2203   MAGNESIUM mg/dL 2.0         I personally viewed and interpreted the patient's EKG/Telemetry  data  )  Patient Active Problem List   Diagnosis   • CHF (congestive heart failure) (CMS/[facility])     Assessment and Plan:      1. Chest pain and dyspnea.  Her symptoms are consistent with acute decompensated systolic congestive heart failure.  She has a known history of severe dilated nonischemic cardiomyopathy.  Continue IV Bumex for 3 more doses and obtain her records from [facility]  2. Hypertension.  Change from metoprolol to carvedilol.  Metoprolol tartrate is not advised medication for somebody with a history of heart failure.  Would also try Entresto for both her blood pressure and her nonischemic dilated cardiomyopathy.  I am not certain she will be able to afford this.  We will have to provide samples.  3. HIV positive    Check echo, change to carvedilol and Entresto.  IV Bumex for 3 more doses.    Lisset Melton MD  08/06/19  7:43 AM.  Time spent in reviewing chart, discussion and examination:            Electronically signed by Lisset Melton MD at 8/6/2019  9:02 AM

## 2019-08-06 NOTE — PLAN OF CARE
Problem: Patient Care Overview  Goal: Plan of Care Review  Outcome: Ongoing (interventions implemented as appropriate)   08/06/19 0424   Coping/Psychosocial   Plan of Care Reviewed With patient   Plan of Care Review   Progress improving   OTHER   Outcome Summary meds per order, see vs, i+o, labs     Goal: Individualization and Mutuality  Outcome: Ongoing (interventions implemented as appropriate)    Goal: Discharge Needs Assessment  Outcome: Ongoing (interventions implemented as appropriate)    Goal: Interprofessional Rounds/Family Conf  Outcome: Ongoing (interventions implemented as appropriate)      Problem: Cardiac: Heart Failure (Adult)  Goal: Signs and Symptoms of Listed Potential Problems Will be Absent, Minimized or Managed (Cardiac: Heart Failure)  Outcome: Ongoing (interventions implemented as appropriate)      Problem: Hypertensive Disease/Crisis (Arterial) (Adult)  Goal: Signs and Symptoms of Listed Potential Problems Will be Absent, Minimized or Managed (Hypertensive Disease/Crisis)  Outcome: Ongoing (interventions implemented as appropriate)

## 2019-08-06 NOTE — ED NOTES
"Pt reports CP that started x 2 hours ago. Pt reports \"a lot\" of walking today. Pt also c/o cough and SOB. Pt describes pain as \"burning\" and \"like I need to stretch, like it is my muscles\".     Pt hx patrick, CHF     Jeannette Hayes, RN  08/05/19 9075    "

## 2019-08-06 NOTE — ED PROVIDER NOTES
EMERGENCY DEPARTMENT ENCOUNTER    CHIEF COMPLAINT  Chief Complaint: CP  History given by: pt  History limited by: nothing  Room Number: 14/14  PMD: System, Provider Not In  Dr Boyd, [facility] @ [facility]    HPI:  Pt is a 43 y.o. female with a hx of CHF who presents complaining of constant generalized CP that started at 0800 tonight while she was laying in bed. Pt also c/o SOA and productive cough but denies fever, vomiting, nausea and all other complaints at this time. She notes a hx of exertional SOA and CP but today was significantly more severe. Pt uses an inhaler for her respiratory sx. Pt states she was diagnosed with a mild MI in May 2019. After this episode, pt notes a change from her previous medication to Metoprolol.  She states her SOA has worsened since this change. She was worked up at [facility] in [place] for these events. Pt is compliant with her medication. She is scheduled to see her cardiologist on August 14. She states she had a cath performed in the past and previously had an EF of 15%. She has a [employer] defibrillator placed. She denies experiencing firing of the defibrillator. She takes Losartan, daily Aspirin, Truvada, Norvir, Prezista, and a vitamin. Pt is HIV positive. She is followed by the infectious disease doctors at [place] and is seen every 6 months.     Duration:  2 hours  Onset: gradual  Timing: constant  Location: chest  Radiation: none  Quality: mild  Intensity/Severity: mild  Progression: unchanged  Associated Symptoms: SOA, productive cough  Aggravating Factors: exertion  Alleviating Factors: none  Previous Episodes: CHF hx  Treatment before arrival: inhaler for SOA    PAST MEDICAL HISTORY  Active Ambulatory Problems     Diagnosis Date Noted   • No Active Ambulatory Problems     Resolved Ambulatory Problems     Diagnosis Date Noted   • No Resolved Ambulatory Problems     No Additional Past Medical History       PAST SURGICAL HISTORY  No past surgical history  on file.    FAMILY HISTORY  No family history on file.    SOCIAL HISTORY  Social History     Socioeconomic History   • Marital status:      Spouse name: Not on file   • Number of children: Not on file   • Years of education: Not on file   • Highest education level: Not on file       ALLERGIES  Patient has no known allergies.    REVIEW OF SYSTEMS  Review of Systems   Constitutional: Negative for fever.   HENT: Negative for sore throat.    Eyes: Negative.    Respiratory: Positive for cough (productive) and shortness of breath.    Cardiovascular: Positive for chest pain.   Gastrointestinal: Negative for abdominal pain, diarrhea and vomiting.   Genitourinary: Negative for dysuria.   Musculoskeletal: Negative for neck pain.   Skin: Negative for rash.   Allergic/Immunologic: Negative.    Neurological: Negative for weakness, numbness and headaches.   Hematological: Negative.    Psychiatric/Behavioral: Negative.    All other systems reviewed and are negative.      PHYSICAL EXAM  ED Triage Vitals [08/05/19 2126]   Temp Heart Rate Resp BP SpO2   98.5 °F (36.9 °C) 117 18 -- 98 %       Physical Exam   Constitutional: She is oriented to person, place, and time. No distress.   HENT:   Head: Normocephalic and atraumatic.   Eyes: EOM are normal. Pupils are equal, round, and reactive to light.   Neck: Normal range of motion. Neck supple.   Cardiovascular: Normal rate, regular rhythm and normal heart sounds.   Pulmonary/Chest: Effort normal and breath sounds normal. No respiratory distress. She exhibits no tenderness.   Abdominal: Soft. There is no tenderness. There is no rebound and no guarding.   Musculoskeletal: Normal range of motion. She exhibits no edema.   No BLE edema.   Neurological: She is alert and oriented to person, place, and time. She has normal sensation and normal strength.   Skin: Skin is warm and dry. No rash noted.   Psychiatric: Mood and affect normal.   Nursing note and vitals reviewed.      LAB  RESULTS  Lab Results (last 24 hours)     Procedure Component Value Units Date/Time    CBC & Differential [[id number]] Collected:  08/05/19 2203    Specimen:  Blood Updated:  08/05/19 2224    Narrative:       The following orders were created for panel order CBC & Differential.  Procedure                               Abnormality         Status                     ---------                               -----------         ------                     CBC Auto Differential[[id number]]        Abnormal            Final result                 Please view results for these tests on the individual orders.    Comprehensive Metabolic Panel [[id number]]  (Abnormal) Collected:  08/05/19 2203    Specimen:  Blood Updated:  08/05/19 2301     Glucose 104 mg/dL      BUN 13 mg/dL      Creatinine 0.90 mg/dL      Sodium 145 mmol/L      Potassium 4.0 mmol/L      Chloride 110 mmol/L      CO2 23.6 mmol/L      Calcium 8.6 mg/dL      Total Protein 6.3 g/dL      Albumin 4.00 g/dL      ALT (SGPT) 38 U/L      AST (SGOT) 36 U/L      Alkaline Phosphatase 80 U/L      Total Bilirubin 0.3 mg/dL      eGFR   Amer 83 mL/min/1.73      Globulin 2.3 gm/dL      A/G Ratio 1.7 g/dL      BUN/Creatinine Ratio 14.4     Anion Gap 11.4 mmol/L     Narrative:       GFR Normal >60  Chronic Kidney Disease <60  Kidney Failure <15    Troponin [[id number]]  (Normal) Collected:  08/05/19 2203    Specimen:  Blood Updated:  08/05/19 2301     Troponin T <0.010 ng/mL     Narrative:       Troponin T Reference Range:  <= 0.03 ng/mL-   Negative for AMI  >0.03 ng/mL-     Abnormal for myocardial necrosis.  Clinicians would have to utilize clinical acumen, EKG, Troponin and serial changes to determine if it is an Acute Myocardial Infarction or myocardial injury due to an underlying chronic condition.     BNP [[id number]]  (Abnormal) Collected:  08/05/19 2203    Specimen:  Blood Updated:  08/05/19 2259     proBNP 5,151.0 pg/mL     Narrative:       Among patients with dyspnea,  NT-proBNP is highly sensitive for the detection of acute congestive heart failure. In addition NT-proBNP of <300 pg/ml effectively rules out acute congestive heart failure with 99% negative predictive value.    hCG, Serum, Qualitative [[id number]]  (Normal) Collected:  08/05/19 2203    Specimen:  Blood Updated:  08/05/19 2243     HCG Qualitative Negative    Magnesium [[id number]]  (Normal) Collected:  08/05/19 2203    Specimen:  Blood Updated:  08/05/19 2301     Magnesium 2.0 mg/dL     CBC Auto Differential [[id number]]  (Abnormal) Collected:  08/05/19 2203    Specimen:  Blood Updated:  08/05/19 2224     WBC 5.81 10*3/mm3      RBC 3.62 10*6/mm3      Hemoglobin 11.3 g/dL      Hematocrit 35.0 %      MCV 96.7 fL      MCH 31.2 pg      MCHC 32.3 g/dL      RDW 13.2 %      RDW-SD 46.6 fl      MPV 13.4 fL      Platelets 122 10*3/mm3      Neutrophil % 55.8 %      Lymphocyte % 34.8 %      Monocyte % 7.7 %      Eosinophil % 1.2 %      Basophil % 0.3 %      Neutrophils, Absolute 3.24 10*3/mm3      Lymphocytes, Absolute 2.02 10*3/mm3      Monocytes, Absolute 0.45 10*3/mm3      Eosinophils, Absolute 0.07 10*3/mm3      Basophils, Absolute 0.02 10*3/mm3           I ordered the above labs and reviewed the results    RADIOLOGY  XR Chest 2 View   Final Result   Cardiomegaly and vascular congestion.       This report was finalized on 8/5/2019 10:56 PM by Dr. Opal Jimenez M.D.               I ordered the above noted radiological studies. Interpreted by radiologist. Reviewed by me in PACS.       PROCEDURES  Procedures    EKG interpreted by ED physician. See note.       PROGRESS AND CONSULTS     2207. Ordered CXR and labwork for workup.     2243. Per [employer], interrogation reveals no events over the last 72 hours.     2330. Discussed case with Dr Sue. After a bedside evaluation, he agrees with the plan of care.       MEDICAL DECISION MAKING  Results were reviewed/discussed with the patient and they were also made aware of  online access. Pt also made aware that some labs, such as cultures, will not be resulted during ER visit and follow up with PMD is necessary.     MDM  Number of Diagnoses or Management Options     Amount and/or Complexity of Data Reviewed  Clinical lab tests: ordered and reviewed (Hemoglobin 11.3)  Tests in the radiology section of CPT®: ordered and reviewed (Cardiomegaly and vascular congestion seen on CXR)  Tests in the medicine section of CPT®: ordered and reviewed (See EKG procedure note.)    Patient Progress  Patient progress: stable           Latest Documented Vital Signs:  As of 11:08 PM  BP- 126/100 HR- 102 Temp- 98.5 °F (36.9 °C) (Tympanic) O2 sat- 94%    --  Documentation assistance provided by laurie Altamirano for SAMUEL Watson.  Information recorded by the scribe was done at my direction and has been verified and validated by me.       Monse Altamirano  08/05/19 5364       Monse Altamirano  08/05/19 5370       Haley Watson APRN  08/15/19 1030

## 2019-08-06 NOTE — CONSULTS
"Attempted to meet with patient to  discussed benefits of cardiac rehab. Provided phase II information packet, which includes; general information about cardiac rehab, [facility]iac [facility] handout and [facility] letter article entitled “Cardiac Rehab is often the Best Medicine for Recovery\", stresses the importance of cardiac rehab after a heart event.   I provided the contact information for cardiac rehab here at [facility].   "

## 2019-08-06 NOTE — ED PROVIDER NOTES
Pt is a 43 y.o. female who presents to the ED complaining of CP and SOA that started around 2000. Pt notes that she took a very ng walk today and when she got home she laid down and could not get settled. Pt's family note that the pt has not hd her BP medication in 4 days but was able to take it today.       On exam,  A&Ox3, no acute distress, appears to be mildly dyspneic at rest, mild rhonchi bilaterally  HEENT is unremarkable  Heart is RRR and without murmur, lungs are clear bilaterally   Abd is soft, non distended, non tender, positive bowel sounds  Extremities unremarkable    Labs and imaging reviewed.     Plan: I agree with the plan to admit the pt for further workup      MD ATTESTATION NOTE  The FATIMAH and I have discussed this patient's history, physical exam, and treatment plan.  I have reviewed the documentation and personally had a face to face interaction with the patient. I affirm the documentation and agree with the treatment and plan.  The attached note describes my personal findings.    Documentation assistance provided by laurie Medina for Dr. Sue. Information recorded by the laurie was done at my direction and has been verified and validated by me.     Sydnee Medina  08/05/19 2462       Coy Sue MD  08/06/19 2667

## 2019-08-06 NOTE — PLAN OF CARE
Problem: Patient Care Overview  Goal: Plan of Care Review  Outcome: Ongoing (interventions implemented as appropriate)   08/06/19 6912   Coping/Psychosocial   Plan of Care Reviewed With patient   Plan of Care Review   Progress improving   OTHER   Outcome Summary A&O. VSS. cardiac medications given per order. Still complains of shortness of air but is not in any acute distress. Back on room air. Education material on Heart failure given to and discussed with patient. Will CTM.     Goal: Individualization and Mutuality  Outcome: Ongoing (interventions implemented as appropriate)    Goal: Discharge Needs Assessment  Outcome: Ongoing (interventions implemented as appropriate)    Goal: Interprofessional Rounds/Family Conf  Outcome: Ongoing (interventions implemented as appropriate)      Problem: Cardiac: Heart Failure (Adult)  Goal: Signs and Symptoms of Listed Potential Problems Will be Absent, Minimized or Managed (Cardiac: Heart Failure)  Outcome: Ongoing (interventions implemented as appropriate)      Problem: Hypertensive Disease/Crisis (Arterial) (Adult)  Goal: Signs and Symptoms of Listed Potential Problems Will be Absent, Minimized or Managed (Hypertensive Disease/Crisis)  Outcome: Ongoing (interventions implemented as appropriate)

## 2019-08-07 ENCOUNTER — TELEPHONE (OUTPATIENT)
Dept: CARDIOLOGY | Facility: CLINIC | Age: 44
End: 2019-08-07

## 2019-08-07 VITALS
HEART RATE: 93 BPM | RESPIRATION RATE: 18 BRPM | SYSTOLIC BLOOD PRESSURE: 83 MMHG | BODY MASS INDEX: 34.73 KG/M2 | OXYGEN SATURATION: 97 % | WEIGHT: 216.1 LBS | HEIGHT: 66 IN | TEMPERATURE: 97.4 F | DIASTOLIC BLOOD PRESSURE: 71 MMHG

## 2019-08-07 LAB
ANION GAP SERPL CALCULATED.3IONS-SCNC: 13.9 MMOL/L (ref 5–15)
BUN BLD-MCNC: 17 MG/DL (ref 6–20)
BUN/CREAT SERPL: 20.2 (ref 7–25)
CALCIUM SPEC-SCNC: 9.2 MG/DL (ref 8.6–10.5)
CHLORIDE SERPL-SCNC: 102 MMOL/L (ref 98–107)
CO2 SERPL-SCNC: 24.1 MMOL/L (ref 22–29)
CREAT BLD-MCNC: 0.84 MG/DL (ref 0.57–1)
GFR SERPL CREATININE-BSD FRML MDRD: 90 ML/MIN/1.73
GLUCOSE BLD-MCNC: 122 MG/DL (ref 65–99)
POTASSIUM BLD-SCNC: 4 MMOL/L (ref 3.5–5.2)
SODIUM BLD-SCNC: 140 MMOL/L (ref 136–145)

## 2019-08-07 PROCEDURE — 80048 BASIC METABOLIC PNL TOTAL CA: CPT | Performed by: INTERNAL MEDICINE

## 2019-08-07 PROCEDURE — 99232 SBSQ HOSP IP/OBS MODERATE 35: CPT | Performed by: INTERNAL MEDICINE

## 2019-08-07 RX ORDER — FUROSEMIDE 20 MG/1
20 TABLET ORAL DAILY
Qty: 30 TABLET | Refills: 0 | Status: ON HOLD | OUTPATIENT
Start: 2019-08-07 | End: 2020-07-08

## 2019-08-07 RX ORDER — CARVEDILOL 3.12 MG/1
3.12 TABLET ORAL 2 TIMES DAILY WITH MEALS
Qty: 60 TABLET | Refills: 0 | Status: SHIPPED | OUTPATIENT
Start: 2019-08-07 | End: 2020-07-13 | Stop reason: HOSPADM

## 2019-08-07 RX ORDER — CARVEDILOL 3.12 MG/1
3.12 TABLET ORAL 2 TIMES DAILY WITH MEALS
Status: DISCONTINUED | OUTPATIENT
Start: 2019-08-07 | End: 2019-08-07 | Stop reason: HOSPADM

## 2019-08-07 RX ORDER — POTASSIUM CHLORIDE 750 MG/1
20 CAPSULE, EXTENDED RELEASE ORAL DAILY
Qty: 60 CAPSULE | Refills: 0 | Status: SHIPPED | OUTPATIENT
Start: 2019-08-08 | End: 2020-08-28

## 2019-08-07 RX ORDER — FUROSEMIDE 20 MG/1
20 TABLET ORAL DAILY
Status: DISCONTINUED | OUTPATIENT
Start: 2019-08-07 | End: 2019-08-07 | Stop reason: HOSPADM

## 2019-08-07 RX ADMIN — ASPIRIN 81 MG: 81 TABLET, COATED ORAL at 08:34

## 2019-08-07 RX ADMIN — POTASSIUM CHLORIDE 40 MEQ: 750 CAPSULE, EXTENDED RELEASE ORAL at 08:34

## 2019-08-07 RX ADMIN — SACUBITRIL AND VALSARTAN 1 TABLET: 24; 26 TABLET, FILM COATED ORAL at 08:34

## 2019-08-07 RX ADMIN — SODIUM CHLORIDE, PRESERVATIVE FREE 3 ML: 5 INJECTION INTRAVENOUS at 08:44

## 2019-08-07 NOTE — PROGRESS NOTES
LOS: 1 day   Patient Care Team:  System, Provider Not In as PCP - General    Chief Complaint:     F/u CHF    Interval History:     Breathing is better.  She has no chest pain.  She is concerned about her low blood pressure but she has no dizziness.  She does not feel her heart racing or skipping.    Objective   Vital Signs  Temp:  [97.4 °F (36.3 °C)-98.4 °F (36.9 °C)] 98.2 °F (36.8 °C)  Heart Rate:  [] 95  Resp:  [16-18] 16  BP: ()/(62-88) 111/75    Intake/Output Summary (Last 24 hours) at 8/7/2019 0745  Last data filed at 8/6/2019 2300  Gross per 24 hour   Intake 2390 ml   Output 3500 ml   Net -1110 ml       Comfortable NAD  Neck supple, no JVD or thyromegaly appreciated  S1/S2 RRR, no m/r/g  Lungs CTA B, normal effort  Abdomen S/NT/ND (+) BS, no HSM appreciated  Extremities warm, no clubbing, cyanosis, or edema  No visible or palpable skin lesions  A/Ox4, mood and affect appropriate    Results Review:      Results from last 7 days   Lab Units 08/07/19  0443 08/06/19  0432 08/05/19  2203   SODIUM mmol/L 140 137 145   POTASSIUM mmol/L 4.0 3.7 4.0   CHLORIDE mmol/L 102 102 110*   CO2 mmol/L 24.1 22.6 23.6   BUN mg/dL 17 14 13   CREATININE mg/dL 0.84 0.80 0.90   GLUCOSE mg/dL 122* 103* 104*   CALCIUM mg/dL 9.2 8.5* 8.6     Results from last 7 days   Lab Units 08/06/19  1604 08/06/19  1001 08/06/19  0432   TROPONIN T ng/mL <0.010 <0.010 <0.010     Results from last 7 days   Lab Units 08/06/19  0432 08/05/19  2203   WBC 10*3/mm3 7.06 5.81   HEMOGLOBIN g/dL 11.5* 11.3*   HEMATOCRIT % 36.0 35.0   PLATELETS 10*3/mm3 127* 122*             Results from last 7 days   Lab Units 08/05/19  2203   MAGNESIUM mg/dL 2.0           I reviewed the patient's new clinical results.  I personally viewed and interpreted the patient's EKG/Telemetry data        Medication Review:     aspirin 81 mg Oral Daily   bumetanide 2 mg Intravenous Q12H   carvedilol 6.25 mg Oral BID With Meals   darunavir ethanolate 800 mg Oral Nightly    emtricitabine-tenofovir 1 tablet Oral Daily   potassium chloride 40 mEq Oral Daily   ritonavir 100 mg Oral Daily   sacubitril-valsartan 1 tablet Oral Q12H   sodium chloride 3 mL Intravenous Q12H            Assessment/Plan       Acute on chronic systolic CHF (congestive heart failure) (CMS/[facility])    Asthma    Hypertension    HIV (human immunodeficiency virus infection) (CMS/[facility])    Class 2 obesity in adult    Exertional dyspnea    Chest pain    Acute on chronic congestive heart failure (CMS/[facility])    1. Chest pain and dyspnea.  Her symptoms are consistent with acute decompensated systolic congestive heart failure.  She has a known history of severe dilated nonischemic cardiomyopathy.    2. Hypertension.  Change from metoprolol to carvedilol.  Metoprolol tartrate is not advised medication for somebody with a history of heart failure.  Would also try Entresto for both her blood pressure and her nonischemic dilated cardiomyopathy.  I am not certain she will be able to afford this.  We will have to provide samples.  3. HIV positive  4. Severe mitral insufficiency.     OK to d/c - f/u with cardiology at [facility]. Will get samples of entresto from office.            Lisset Melton MD  08/07/19  7:45 AM

## 2019-08-07 NOTE — PLAN OF CARE
Problem: Patient Care Overview  Goal: Plan of Care Review   08/07/19 1323   Coping/Psychosocial   Plan of Care Reviewed With patient   Plan of Care Review   Progress improving   OTHER   Outcome Summary low BP, meds changed per cardiology, no falls, no pain, no sob, dc home today, continue to monitor

## 2019-08-07 NOTE — PLAN OF CARE
Problem: Patient Care Overview  Goal: Plan of Care Review  Outcome: Ongoing (interventions implemented as appropriate)   08/07/19 0154   Coping/Psychosocial   Plan of Care Reviewed With patient   Plan of Care Review   Progress improving   OTHER   Outcome Summary On [place]aria[place] for CHF/Diuresing well-still on IV Bumex/Up ad huey/gait steady/hopes to be D/C'd in am/will continue to monitor       Problem: Cardiac: Heart Failure (Adult)  Goal: Signs and Symptoms of Listed Potential Problems Will be Absent, Minimized or Managed (Cardiac: Heart Failure)  Outcome: Ongoing (interventions implemented as appropriate)      Problem: Hypertensive Disease/Crisis (Arterial) (Adult)  Goal: Signs and Symptoms of Listed Potential Problems Will be Absent, Minimized or Managed (Hypertensive Disease/Crisis)  Outcome: Ongoing (interventions implemented as appropriate)

## 2019-08-07 NOTE — DISCHARGE SUMMARY
NAME: Nina Cheung ADMIT: 2019   : 1975  PCP: System, Provider Not In    MRN: [id number] LOS: 1 days   AGE/SEX: 43 y.o. female  ROOM: S508/1     Date of Admission:  2019  Date of Discharge:  2019    PCP: System, Provider Not In    CHIEF COMPLAINT  Chest Pain      DISCHARGE DIAGNOSIS  Active Hospital Problems    Diagnosis  POA   • **Acute on chronic systolic CHF (congestive heart failure) (CMS/HCC) [I50.23]  Yes   • Asthma [J45.909]  Yes   • Hypertension [I10]  Yes   • HIV (human immunodeficiency virus infection) (CMS/[facility]) [B20]  Yes   • Class 2 obesity in adult [E66.9]  Yes   • Exertional dyspnea [R06.09]  Yes   • Chest pain [R07.9]  Yes   • Acute on chronic congestive heart failure (CMS/HCC) [I50.9]  Yes      Resolved Hospital Problems   No resolved problems to display.       SECONDARY DIAGNOSES  Past Medical History:   Diagnosis Date   • Asthma    • CHF (congestive heart failure) (CMS/[facility])    • Hypertension        CONSULTS   Cardiology    HOSPITAL COURSE  Ms. Cheung is a 43 y.o. former smoker with a history of HIV, cardiomyopathy, ICD, pacemaker, HTN, obesity  that presents to [facility] complaining of shortness of breath worse with exertion and chest tightness.    She recently was hospitalized at [place] and at the time she had normal coronary arteries on cardiac catheterization on 2019.  Her LVEDP was normal at 12 with a wedge pressure normal at 13 mmHg.  Her PA pressure was 28/15 with a mean of 19 mmHg, RA pressure 4 mmHg, cardiac index 2.5 L/min/m², PVR 1.2 Wood units.    She was admitted here and treated for acute decompensated systolic CHF.  She was given IV diuretics with improvement of her symptoms.  Cardiology recommended changing of medications to Entresto as well as changing metoprolol to Coreg.  By  she is much improved and ready for discharge with close follow-up with her cardiologist.    DIAGNOSTICS    Collected  Updated  Procedure  Result Status       08/07/2019 0443  08/07/2019 0532  Basic Metabolic Panel [[id number]]    (Abnormal)   Blood     Final result  Glucose 122 Abnormally high  mg/dL   BUN 17 mg/dL   Creatinine 0.84 mg/dL   Sodium 140 mmol/L   Potassium 4.0 mmol/L   Chloride 102 mmol/L   CO2 24.1 mmol/L   Calcium 9.2 mg/dL   eGFR African Am 90 mL/min/1.73   BUN/Creatinine Ratio 20.2    Anion Gap 13.9 mmol/L          08/06/2019 1604  08/06/2019 1725  Troponin [[id number]]    Blood     Final result  Troponin T <0.010 ng/mL          08/06/2019 1001  08/06/2019 1044  Troponin [[id number]]    Blood from Arm, Left     Final result  Troponin T <0.010 ng/mL            XR Chest 2 View [[id number]] Coy as Reviewed   Order Status: Completed Collected: 08/05/19 2255    Updated: 08/05/19 2259   Narrative:     PA AND LATERAL CHEST RADIOGRAPH     HISTORY: Short severe on exertion and chest pain     COMPARISON: None available.     FINDINGS:  Cardiomegaly and vascular congestion are noted. No pneumothorax is seen.  No definite pleural effusion is identified. Left-sided defibrillator is  present.      Impression:     Cardiomegaly and vascular congestion.     2D ECHO 8/6/19  · The left ventricular cavity is severely dilated.  · Left ventricular systolic function is severely decreased.  · Severe mitral valve regurgitation is present  · Left ventricular diastolic dysfunction (grade III) consistent with reversible restrictive pattern.  · Left atrial cavity size is mild-to-moderately dilated.  · The following left ventricular wall segments are hypokinetic: mid anterior, apical anterior, basal anterolateral, mid anterolateral, apical lateral, basal inferolateral, mid inferolateral, apical inferior, mid inferior, apical septal, basal inferoseptal, mid inferoseptal, apex hypokinetic, mid anteroseptal, basal anterior, basal inferior and basal inferoseptal.  · Calculated EF = 10.0%    PHYSICAL EXAM  Objective    Alert  nad  No resp distress  Lungs clear    CONDITION ON  DISCHARGE  Stable.      DISCHARGE DISPOSITION   Home or Self Care      DISCHARGE MEDICATIONS       Your medication list      START taking these medications      Instructions Last Dose Given Next Dose Due   carvedilol 3.125 MG tablet  Commonly known as:  COREG      Take 1 tablet by mouth 2 (Two) Times a Day With Meals.       potassium chloride 10 MEQ CR capsule  Commonly known as:  MICRO-K  Start taking on:  8/8/2019      Take 2 capsules by mouth Daily.       sacubitril-valsartan 24-26 MG tablet  Commonly known as:  ENTRESTO      Take 1 tablet by mouth Every 12 (Twelve) Hours.          CHANGE how you take these medications      Instructions Last Dose Given Next Dose Due   furosemide 20 MG tablet  Commonly known as:  LASIX  What changed:  when to take this      Take 1 tablet by mouth Daily.          CONTINUE taking these medications      Instructions Last Dose Given Next Dose Due   albuterol sulfate  (90 Base) MCG/ACT inhaler  Commonly known as:  PROVENTIL HFA;VENTOLIN HFA;PROAIR HFA      Inhale 2 puffs Every 4 (Four) Hours As Needed for Wheezing.       aspirin 81 MG EC tablet      Take 81 mg by mouth Daily.       PREZISTA 800 MG tablet tablet  Generic drug:  darunavir ethanolate      Take 800 mg by mouth Every Night.       ritonavir 100 MG tablet  Commonly known as:  NORVIR      Take 100 mg by mouth Daily.       TRUVADA 100-150 MG per tablet  Generic drug:  Emtricitabine-Tenofovir DF      Take 1 tablet by mouth Daily.       vitamin D 14710 units capsule capsule  Commonly known as:  ERGOCALCIFEROL      Take 50,000 Units by mouth 1 (One) Time Per Week.          STOP taking these medications    losartan 25 MG tablet  Commonly known as:  COZAAR        metoprolol tartrate 25 MG tablet  Commonly known as:  LOPRESSOR              Where to Get Your Medications      These medications were sent to [facility] - [address] [address]    Hours:  7:00AM-6PM Mon-Fri Phone:  [phone]    · carvedilol 3.125 MG tablet  · furosemide 20 MG tablet  · potassium chloride 10 MEQ CR capsule  · sacubitril-valsartan 24-26 MG tablet        No future appointments.  Additional Instructions for the Follow-ups that You Need to Schedule     Discharge Follow-up with PCP   As directed       Currently Documented PCP:    System, Provider Not In    PCP Phone Number:    None     Follow Up Details:  2 weeks         Discharge Follow-up with Specialty: Cardiologist at [facility]; 1 Week   As directed      Specialty:  Cardiologist at [facility]    Follow Up:  1 Week           Follow-up Information     System, Provider Not In .    Why:  2 weeks  Contact information:  [facility] [address]                   TEST  RESULTS PENDING AT DISCHARGE         Damien Zapata MD  [facility]  08/07/19  1:29 PM      It was a pleasure taking care of this patient while in the hospital.   Greater than 32 minutes spent on discharge

## 2019-08-07 NOTE — PROGRESS NOTES
Continued Stay Note  [facility]     Patient Name: Nina Cheung  MRN: [id number]  Today's Date: 8/7/2019    Admit Date: 8/5/2019    Discharge Plan     Row Name 08/07/19 1048       Plan    Plan  home    Plan Comments  Spoke with pt at bedside.  She confirms plan to return home with her dtr upon DC.  She is in agreement with Meds to Bed.  Received confirmation that prior auth has been received from [employer].  CCP discussed possible HH for CHF monitoring.  Pt states she is not home-bound and does not feel she will need HH................sk        Discharge Codes    No documentation.             Nat Sorensen RN

## 2019-08-08 ENCOUNTER — READMISSION MANAGEMENT (OUTPATIENT)
Dept: CALL CENTER | Facility: HOSPITAL | Age: 44
End: 2019-08-08

## 2019-08-08 NOTE — PAYOR COMM NOTE
"Nina Cheung (43 y.o. Female)     PLEASE SEE ATTACHED DC SUMMARY    REF#[id number]    THANK YOU      ALMA ROSA KEITH LPN CCP          Date of Birth Social Security Number Address Home Phone MRN    1975  [address]  [address]  [facility] [address]  [id number]    [employer] Mar[employer] Status          Evangel[employer]        Admission Date Admission Type Admitting Provider Attending Provider Department, Room/Bed    19 Emergency Ángel Crystal MD  [address], [address]/    Discharge Date Discharge Disposition Discharge Destination        2019 Home or Self Care              Attending Provider:  (none)   Allergies:  No Known Allergies    Isolation:  None   Infection:  None   Code Status:  Prior    Ht:  167.6 cm (66\")   Wt:  98 kg (216 lb 1.6 oz)    Admission Cmt:  None   Principal Problem:  Acute on chronic systolic CHF (congestive heart failure) (CMS/[facility]) [I50.23]                 Active Insurance as of 2019     Primary Coverage     Payor Plan Insurance Group Employer/Plan Group    [facility] [employer] [facility] [employer] [place]      Payor Plan Address Payor Plan Phone Number Payor Plan Fax Number Effective Dates    [address] [address]   2019 - None Entered    [place] [address]       Subscriber Name Subscriber Birth Date Member ID       NINA CHEUNG 1975 [id number]                 Emergency Contacts      (Rel.) Home Phone Work Phone Mobile Phone    Chacho cheung (Spouse) -- -- [phone]               Discharge Summary      Damien Zapata MD at 2019  1:15 PM                 NAME: Nina Cheung ADMIT: 2019   : 1975  PCP: System, Provider Not In    MRN: [id number] LOS: 1 days   AGE/SEX: 43 y.o. female  ROOM: S508/1     Date of Admission:  2019  Date of Discharge:  2019    PCP: System, Provider Not In    CHIEF COMPLAINT  Chest Pain      DISCHARGE DIAGNOSIS  Active Hospital Problems    Diagnosis  POA   • **Acute " on chronic systolic CHF (congestive heart failure) (CMS/[facility]) [I50.23]  Yes   • Asthma [J45.909]  Yes   • Hypertension [I10]  Yes   • HIV (human immunodeficiency virus infection) (CMS/[facility]) [B20]  Yes   • Class 2 obesity in adult [E66.9]  Yes   • Exertional dyspnea [R06.09]  Yes   • Chest pain [R07.9]  Yes   • Acute on chronic congestive heart failure (CMS/HCC) [I50.9]  Yes      Resolved Hospital Problems   No resolved problems to display.       SECONDARY DIAGNOSES  Past Medical History:   Diagnosis Date   • Asthma    • CHF (congestive heart failure) (CMS/[facility])    • Hypertension        CONSULTS   Cardiology    HOSPITAL COURSE  Ms. Cheung is a 43 y.o. former smoker with a history of HIV, cardiomyopathy, ICD, pacemaker, HTN, obesity  that presents to [facility] complaining of shortness of breath worse with exertion and chest tightness.     She recently was hospitalized at [place] and at the time she had normal coronary arteries on cardiac catheterization on 4/23/2019.  Her LVEDP was normal at 12 with a wedge pressure normal at 13 mmHg.  Her PA pressure was 28/15 with a mean of 19 mmHg, RA pressure 4 mmHg, cardiac index 2.5 L/min/m², PVR 1.2 Wood units.    She was admitted here and treated for acute decompensated systolic CHF.  She was given IV diuretics with improvement of her symptoms.  Cardiology recommended changing of medications to Entresto as well as changing metoprolol to Coreg.  By 8/7 she is much improved and ready for discharge with close follow-up with her cardiologist.    DIAGNOSTICS    Collected  Updated  Procedure  Result Status      08/07/2019 0443  08/07/2019 0532  Basic Metabolic Panel [[id number]]    (Abnormal)   Blood     Final result  Glucose 122 Abnormally high  mg/dL   BUN 17 mg/dL   Creatinine 0.84 mg/dL   Sodium 140 mmol/L   Potassium 4.0 mmol/L   Chloride 102 mmol/L   CO2 24.1 mmol/L   Calcium 9.2 mg/dL   eGFR African Am 90 mL/min/1.73   BUN/Creatinine Ratio 20.2    Anion Gap 13.9  mmol/L          08/06/2019 1604  08/06/2019 1725  Troponin [[id number]]    Blood     Final result  Troponin T <0.010 ng/mL          08/06/2019 1001  08/06/2019 1044  Troponin [[id number]]    Blood from Arm, Left     Final result  Troponin T <0.010 ng/mL            XR Chest 2 View [[id number]] Coy as Reviewed   Order Status: Completed Collected: 08/05/19 2255    Updated: 08/05/19 2259   Narrative:     PA AND LATERAL CHEST RADIOGRAPH     HISTORY: Short severe on exertion and chest pain     COMPARISON: None available.     FINDINGS:  Cardiomegaly and vascular congestion are noted. No pneumothorax is seen.  No definite pleural effusion is identified. Left-sided defibrillator is  present.      Impression:     Cardiomegaly and vascular congestion.     2D ECHO 8/6/19  · The left ventricular cavity is severely dilated.  · Left ventricular systolic function is severely decreased.  · Severe mitral valve regurgitation is present  · Left ventricular diastolic dysfunction (grade III) consistent with reversible restrictive pattern.  · Left atrial cavity size is mild-to-moderately dilated.  · The following left ventricular wall segments are hypokinetic: mid anterior, apical anterior, basal anterolateral, mid anterolateral, apical lateral, basal inferolateral, mid inferolateral, apical inferior, mid inferior, apical septal, basal inferoseptal, mid inferoseptal, apex hypokinetic, mid anteroseptal, basal anterior, basal inferior and basal inferoseptal.  · Calculated EF = 10.0%    PHYSICAL EXAM  Objective    Alert  nad  No resp distress  Lungs clear    CONDITION ON DISCHARGE  Stable.      DISCHARGE DISPOSITION   Home or Self Care      DISCHARGE MEDICATIONS       Your medication list      START taking these medications      Instructions Last Dose Given Next Dose Due   carvedilol 3.125 MG tablet  Commonly known as:  COREG      Take 1 tablet by mouth 2 (Two) Times a Day With Meals.       potassium chloride 10 MEQ CR capsule  Commonly known  as:  MICRO-K  Start taking on:  8/8/2019      Take 2 capsules by mouth Daily.       sacubitril-valsartan 24-26 MG tablet  Commonly known as:  ENTRESTO      Take 1 tablet by mouth Every 12 (Twelve) Hours.          CHANGE how you take these medications      Instructions Last Dose Given Next Dose Due   furosemide 20 MG tablet  Commonly known as:  LASIX  What changed:  when to take this      Take 1 tablet by mouth Daily.          CONTINUE taking these medications      Instructions Last Dose Given Next Dose Due   albuterol sulfate  (90 Base) MCG/ACT inhaler  Commonly known as:  PROVENTIL HFA;VENTOLIN HFA;PROAIR HFA      Inhale 2 puffs Every 4 (Four) Hours As Needed for Wheezing.       aspirin 81 MG EC tablet      Take 81 mg by mouth Daily.       PREZISTA 800 MG tablet tablet  Generic drug:  darunavir ethanolate      Take 800 mg by mouth Every Night.       ritonavir 100 MG tablet  Commonly known as:  NORVIR      Take 100 mg by mouth Daily.       TRUVADA 100-150 MG per tablet  Generic drug:  Emtricitabine-Tenofovir DF      Take 1 tablet by mouth Daily.       vitamin D 23830 units capsule capsule  Commonly known as:  ERGOCALCIFEROL      Take 50,000 Units by mouth 1 (One) Time Per Week.          STOP taking these medications    losartan 25 MG tablet  Commonly known as:  COZAAR        metoprolol tartrate 25 MG tablet  Commonly known as:  LOPRESSOR              Where to Get Your Medications      These medications were sent to [facility] - [place] [address]    Hours:  7:00AM-6PM Mon-Fri Phone:  [phone]   · carvedilol 3.125 MG tablet  · furosemide 20 MG tablet  · potassium chloride 10 MEQ CR capsule  · sacubitril-valsartan 24-26 MG tablet        No future appointments.  Additional Instructions for the Follow-ups that You Need to Schedule     Discharge Follow-up with PCP   As directed       Currently Documented PCP:    System, Provider Not In    PCP Phone Number:    None     Follow  Up Details:  2 weeks         Discharge Follow-up with Specialty: Cardiologist at [facility]; 1 Week   As directed      Specialty:  Cardiologist at [facility]    Follow Up:  1 Week           Follow-up Information     System, Provider Not In .    Why:  2 weeks  Contact information:  [place] [address]                   TEST  RESULTS PENDING AT DISCHARGE         Damien Zapata MD  [facility]  08/07/19  1:29 PM      It was a pleasure taking care of this patient while in the hospital.   Greater than 32 minutes spent on discharge    Electronically signed by Damien Zapata MD at 8/7/2019  2:36 PM

## 2019-08-09 ENCOUNTER — READMISSION MANAGEMENT (OUTPATIENT)
Dept: CALL CENTER | Facility: HOSPITAL | Age: 44
End: 2019-08-09

## 2019-08-09 NOTE — OUTREACH NOTE
CHF Week 1 Survey      Responses   Facility patient discharged from?  [place]   Does the patient have one of the following disease processes/diagnoses(primary or secondary)?  CHF   Is there a successful TCM telephone encounter documented?  No   CHF Week 1 attempt successful?  No   Unsuccessful attempts  Attempt 1          Raegan Morejon RN

## 2019-08-09 NOTE — OUTREACH NOTE
Prep Survey      Responses   Facility patient discharged from?  [place]   Is patient eligible?  Yes   Discharge diagnosis  Acute on chronic systolic CHF (congestive heart    Does the patient have one of the following disease processes/diagnoses(primary or secondary)?  CHF   Does the patient have Home health ordered?  No   Is there a DME ordered?  No   Prep survey completed?  Yes          Jeanette Guerrero RN

## 2019-08-12 ENCOUNTER — READMISSION MANAGEMENT (OUTPATIENT)
Dept: CALL CENTER | Facility: HOSPITAL | Age: 44
End: 2019-08-12

## 2019-08-12 NOTE — OUTREACH NOTE
CHF Week 1 Survey      Responses   Facility patient discharged from?  [place]   Does the patient have one of the following disease processes/diagnoses(primary or secondary)?  CHF   Is there a successful TCM telephone encounter documented?  No   CHF Week 1 attempt successful?  No   Unsuccessful attempts  Attempt 2          Raegan Morejon RN

## 2019-08-15 ENCOUNTER — READMISSION MANAGEMENT (OUTPATIENT)
Dept: CALL CENTER | Facility: HOSPITAL | Age: 44
End: 2019-08-15

## 2019-08-15 NOTE — OUTREACH NOTE
CHF Week 2 Survey      Responses   Facility patient discharged from?  [place]   Does the patient have one of the following disease processes/diagnoses(primary or secondary)?  CHF   Week 2 attempt successful?  No   Unsuccessful attempts  Attempt 1          Amalia Steve RN

## 2019-08-16 ENCOUNTER — READMISSION MANAGEMENT (OUTPATIENT)
Dept: CALL CENTER | Facility: HOSPITAL | Age: 44
End: 2019-08-16

## 2019-08-16 NOTE — OUTREACH NOTE
CHF Week 2 Survey      Responses   Facility patient discharged from?  [place]   Does the patient have one of the following disease processes/diagnoses(primary or secondary)?  CHF   Week 2 attempt successful?  No   Unsuccessful attempts  Attempt 2          Kristel Santos RN

## 2020-07-07 ENCOUNTER — APPOINTMENT (OUTPATIENT)
Dept: GENERAL RADIOLOGY | Facility: HOSPITAL | Age: 45
End: 2020-07-07

## 2020-07-07 ENCOUNTER — HOSPITAL ENCOUNTER (INPATIENT)
Facility: HOSPITAL | Age: 45
LOS: 1 days | Discharge: HOME | End: 2020-07-13
Attending: EMERGENCY MEDICINE | Admitting: HOSPITALIST

## 2020-07-07 DIAGNOSIS — R06.02 SHORTNESS OF BREATH: ICD-10-CM

## 2020-07-07 DIAGNOSIS — I50.9 ACUTE ON CHRONIC CONGESTIVE HEART FAILURE, UNSPECIFIED HEART FAILURE TYPE (HCC): Primary | ICD-10-CM

## 2020-07-07 LAB
BASOPHILS # BLD AUTO: 0.02 10*3/MM3 (ref 0–0.2)
BASOPHILS NFR BLD AUTO: 0.2 % (ref 0–1.5)
DEPRECATED RDW RBC AUTO: 46 FL (ref 37–54)
EOSINOPHIL # BLD AUTO: 0.01 10*3/MM3 (ref 0–0.4)
EOSINOPHIL NFR BLD AUTO: 0.1 % (ref 0.3–6.2)
ERYTHROCYTE [DISTWIDTH] IN BLOOD BY AUTOMATED COUNT: 12.6 % (ref 12.3–15.4)
HCT VFR BLD AUTO: 36.2 % (ref 34–46.6)
HGB BLD-MCNC: 12.2 G/DL (ref 12–15.9)
IMM GRANULOCYTES # BLD AUTO: 0.02 10*3/MM3 (ref 0–0.05)
IMM GRANULOCYTES NFR BLD AUTO: 0.2 % (ref 0–0.5)
LYMPHOCYTES # BLD AUTO: 2.67 10*3/MM3 (ref 0.7–3.1)
LYMPHOCYTES NFR BLD AUTO: 28.2 % (ref 19.6–45.3)
MCH RBC QN AUTO: 33.7 PG (ref 26.6–33)
MCHC RBC AUTO-ENTMCNC: 33.7 G/DL (ref 31.5–35.7)
MCV RBC AUTO: 100 FL (ref 79–97)
MONOCYTES # BLD AUTO: 0.66 10*3/MM3 (ref 0.1–0.9)
MONOCYTES NFR BLD AUTO: 7 % (ref 5–12)
NEUTROPHILS NFR BLD AUTO: 6.09 10*3/MM3 (ref 1.7–7)
NEUTROPHILS NFR BLD AUTO: 64.3 % (ref 42.7–76)
NRBC BLD AUTO-RTO: 0.1 /100 WBC (ref 0–0.2)
PLATELET # BLD AUTO: 142 10*3/MM3 (ref 140–450)
PMV BLD AUTO: 12.9 FL (ref 6–12)
RBC # BLD AUTO: 3.62 10*6/MM3 (ref 3.77–5.28)
WBC # BLD AUTO: 9.47 10*3/MM3 (ref 3.4–10.8)

## 2020-07-07 PROCEDURE — 99284 EMERGENCY DEPT VISIT MOD MDM: CPT

## 2020-07-07 PROCEDURE — 93005 ELECTROCARDIOGRAM TRACING: CPT | Performed by: EMERGENCY MEDICINE

## 2020-07-07 PROCEDURE — 84484 ASSAY OF TROPONIN QUANT: CPT | Performed by: EMERGENCY MEDICINE

## 2020-07-07 PROCEDURE — 93005 ELECTROCARDIOGRAM TRACING: CPT

## 2020-07-07 PROCEDURE — 83880 ASSAY OF NATRIURETIC PEPTIDE: CPT | Performed by: EMERGENCY MEDICINE

## 2020-07-07 PROCEDURE — 71045 X-RAY EXAM CHEST 1 VIEW: CPT

## 2020-07-07 PROCEDURE — 80053 COMPREHEN METABOLIC PANEL: CPT | Performed by: EMERGENCY MEDICINE

## 2020-07-07 PROCEDURE — 93010 ELECTROCARDIOGRAM REPORT: CPT | Performed by: INTERNAL MEDICINE

## 2020-07-07 PROCEDURE — 85025 COMPLETE CBC W/AUTO DIFF WBC: CPT | Performed by: EMERGENCY MEDICINE

## 2020-07-07 RX ORDER — SODIUM CHLORIDE 0.9 % (FLUSH) 0.9 %
10 SYRINGE (ML) INJECTION AS NEEDED
Status: DISCONTINUED | OUTPATIENT
Start: 2020-07-07 | End: 2020-07-13 | Stop reason: HOSPADM

## 2020-07-08 ENCOUNTER — APPOINTMENT (OUTPATIENT)
Dept: CARDIOLOGY | Facility: HOSPITAL | Age: 45
End: 2020-07-08

## 2020-07-08 PROBLEM — R31.9 HEMATURIA: Status: ACTIVE | Noted: 2020-07-08

## 2020-07-08 PROBLEM — R10.9 FLANK PAIN: Status: ACTIVE | Noted: 2020-07-08

## 2020-07-08 PROBLEM — I42.8 NICM (NONISCHEMIC CARDIOMYOPATHY) (HCC): Status: ACTIVE | Noted: 2020-07-08

## 2020-07-08 PROBLEM — R79.89 ELEVATED LFTS: Status: ACTIVE | Noted: 2020-07-08

## 2020-07-08 PROBLEM — E87.6 HYPOPOTASSEMIA: Status: ACTIVE | Noted: 2020-07-08

## 2020-07-08 LAB
ALBUMIN SERPL-MCNC: 3.9 G/DL (ref 3.5–5.2)
ALBUMIN/GLOB SERPL: 1.6 G/DL
ALP SERPL-CCNC: 101 U/L (ref 39–117)
ALT SERPL W P-5'-P-CCNC: 68 U/L (ref 1–33)
ANION GAP SERPL CALCULATED.3IONS-SCNC: 10.5 MMOL/L (ref 5–15)
ANION GAP SERPL CALCULATED.3IONS-SCNC: 9.9 MMOL/L (ref 5–15)
AST SERPL-CCNC: 45 U/L (ref 1–32)
B PARAPERT DNA SPEC QL NAA+PROBE: NOT DETECTED
B PERT DNA SPEC QL NAA+PROBE: NOT DETECTED
BACTERIA UR QL AUTO: ABNORMAL /HPF
BILIRUB SERPL-MCNC: 0.6 MG/DL (ref 0–1.2)
BILIRUB UR QL STRIP: NEGATIVE
BUN SERPL-MCNC: 14 MG/DL (ref 6–20)
BUN SERPL-MCNC: 14 MG/DL (ref 6–20)
BUN/CREAT SERPL: 15.7 (ref 7–25)
BUN/CREAT SERPL: 17.7 (ref 7–25)
C PNEUM DNA NPH QL NAA+NON-PROBE: NOT DETECTED
CALCIUM SPEC-SCNC: 8.2 MG/DL (ref 8.6–10.5)
CALCIUM SPEC-SCNC: 8.9 MG/DL (ref 8.6–10.5)
CHLORIDE SERPL-SCNC: 105 MMOL/L (ref 98–107)
CHLORIDE SERPL-SCNC: 107 MMOL/L (ref 98–107)
CLARITY UR: CLEAR
CO2 SERPL-SCNC: 22.1 MMOL/L (ref 22–29)
CO2 SERPL-SCNC: 22.5 MMOL/L (ref 22–29)
COLOR UR: YELLOW
CREAT SERPL-MCNC: 0.79 MG/DL (ref 0.57–1)
CREAT SERPL-MCNC: 0.89 MG/DL (ref 0.57–1)
DEPRECATED RDW RBC AUTO: 45.2 FL (ref 37–54)
ERYTHROCYTE [DISTWIDTH] IN BLOOD BY AUTOMATED COUNT: 12.6 % (ref 12.3–15.4)
FLUAV H1 2009 PAND RNA NPH QL NAA+PROBE: NOT DETECTED
FLUAV H1 HA GENE NPH QL NAA+PROBE: NOT DETECTED
FLUAV H3 RNA NPH QL NAA+PROBE: NOT DETECTED
FLUAV SUBTYP SPEC NAA+PROBE: NOT DETECTED
FLUBV RNA ISLT QL NAA+PROBE: NOT DETECTED
GFR SERPL CREATININE-BSD FRML MDRD: 84 ML/MIN/1.73
GFR SERPL CREATININE-BSD FRML MDRD: 96 ML/MIN/1.73
GLOBULIN UR ELPH-MCNC: 2.5 GM/DL
GLUCOSE SERPL-MCNC: 120 MG/DL (ref 65–99)
GLUCOSE SERPL-MCNC: 129 MG/DL (ref 65–99)
GLUCOSE UR STRIP-MCNC: NEGATIVE MG/DL
HADV DNA SPEC NAA+PROBE: NOT DETECTED
HCOV 229E RNA SPEC QL NAA+PROBE: NOT DETECTED
HCOV HKU1 RNA SPEC QL NAA+PROBE: NOT DETECTED
HCOV NL63 RNA SPEC QL NAA+PROBE: NOT DETECTED
HCOV OC43 RNA SPEC QL NAA+PROBE: NOT DETECTED
HCT VFR BLD AUTO: 32.6 % (ref 34–46.6)
HGB BLD-MCNC: 10.9 G/DL (ref 12–15.9)
HGB UR QL STRIP.AUTO: ABNORMAL
HMPV RNA NPH QL NAA+NON-PROBE: NOT DETECTED
HOLD SPECIMEN: NORMAL
HOLD SPECIMEN: NORMAL
HPIV1 RNA SPEC QL NAA+PROBE: NOT DETECTED
HPIV2 RNA SPEC QL NAA+PROBE: NOT DETECTED
HPIV3 RNA NPH QL NAA+PROBE: NOT DETECTED
HPIV4 P GENE NPH QL NAA+PROBE: NOT DETECTED
HYALINE CASTS UR QL AUTO: ABNORMAL /LPF
KETONES UR QL STRIP: NEGATIVE
LEUKOCYTE ESTERASE UR QL STRIP.AUTO: NEGATIVE
M PNEUMO IGG SER IA-ACNC: NOT DETECTED
MCH RBC QN AUTO: 32.8 PG (ref 26.6–33)
MCHC RBC AUTO-ENTMCNC: 33.4 G/DL (ref 31.5–35.7)
MCV RBC AUTO: 98.2 FL (ref 79–97)
NITRITE UR QL STRIP: NEGATIVE
NT-PROBNP SERPL-MCNC: ABNORMAL PG/ML (ref 0–450)
PH UR STRIP.AUTO: <=5 [PH] (ref 5–8)
PLATELET # BLD AUTO: 132 10*3/MM3 (ref 140–450)
PMV BLD AUTO: 12.9 FL (ref 6–12)
POTASSIUM SERPL-SCNC: 3.3 MMOL/L (ref 3.5–5.2)
POTASSIUM SERPL-SCNC: 3.8 MMOL/L (ref 3.5–5.2)
POTASSIUM SERPL-SCNC: 4.4 MMOL/L (ref 3.5–5.2)
PROT SERPL-MCNC: 6.4 G/DL (ref 6–8.5)
PROT UR QL STRIP: NEGATIVE
RBC # BLD AUTO: 3.32 10*6/MM3 (ref 3.77–5.28)
RBC # UR: ABNORMAL /HPF
REF LAB TEST METHOD: ABNORMAL
RHINOVIRUS RNA SPEC NAA+PROBE: NOT DETECTED
RSV RNA NPH QL NAA+NON-PROBE: NOT DETECTED
SARS-COV-2 N GENE NPH QL NAA+PROBE: NOT DETECTED
SODIUM SERPL-SCNC: 138 MMOL/L (ref 136–145)
SODIUM SERPL-SCNC: 139 MMOL/L (ref 136–145)
SP GR UR STRIP: 1.01 (ref 1–1.03)
SQUAMOUS #/AREA URNS HPF: ABNORMAL /HPF
TROPONIN T SERPL-MCNC: <0.01 NG/ML (ref 0–0.03)
UROBILINOGEN UR QL STRIP: ABNORMAL
WBC # BLD AUTO: 7.91 10*3/MM3 (ref 3.4–10.8)
WBC UR QL AUTO: ABNORMAL /HPF
WHOLE BLOOD HOLD SPECIMEN: NORMAL
WHOLE BLOOD HOLD SPECIMEN: NORMAL

## 2020-07-08 PROCEDURE — 25010000002 ONDANSETRON PER 1 MG: Performed by: NURSE PRACTITIONER

## 2020-07-08 PROCEDURE — 0100U HC BIOFIRE FILMARRAY RESP PANEL 2: CPT | Performed by: HOSPITALIST

## 2020-07-08 PROCEDURE — 81001 URINALYSIS AUTO W/SCOPE: CPT | Performed by: NURSE PRACTITIONER

## 2020-07-08 PROCEDURE — G0378 HOSPITAL OBSERVATION PER HR: HCPCS

## 2020-07-08 PROCEDURE — 80048 BASIC METABOLIC PNL TOTAL CA: CPT | Performed by: NURSE PRACTITIONER

## 2020-07-08 PROCEDURE — 25010000002 FUROSEMIDE PER 20 MG: Performed by: PHYSICIAN ASSISTANT

## 2020-07-08 PROCEDURE — 84132 ASSAY OF SERUM POTASSIUM: CPT | Performed by: HOSPITALIST

## 2020-07-08 PROCEDURE — 99214 OFFICE O/P EST MOD 30 MIN: CPT | Performed by: INTERNAL MEDICINE

## 2020-07-08 PROCEDURE — 87635 SARS-COV-2 COVID-19 AMP PRB: CPT | Performed by: HOSPITALIST

## 2020-07-08 PROCEDURE — 85027 COMPLETE CBC AUTOMATED: CPT | Performed by: NURSE PRACTITIONER

## 2020-07-08 PROCEDURE — 36415 COLL VENOUS BLD VENIPUNCTURE: CPT | Performed by: NURSE PRACTITIONER

## 2020-07-08 RX ORDER — BUMETANIDE 1 MG/1
1 TABLET ORAL 2 TIMES DAILY
COMMUNITY
End: 2020-07-29

## 2020-07-08 RX ORDER — SODIUM CHLORIDE 0.9 % (FLUSH) 0.9 %
10 SYRINGE (ML) INJECTION EVERY 12 HOURS SCHEDULED
Status: DISCONTINUED | OUTPATIENT
Start: 2020-07-08 | End: 2020-07-13 | Stop reason: HOSPADM

## 2020-07-08 RX ORDER — RITONAVIR 100 MG/1
100 TABLET ORAL NIGHTLY
Status: DISCONTINUED | OUTPATIENT
Start: 2020-07-08 | End: 2020-07-13 | Stop reason: HOSPADM

## 2020-07-08 RX ORDER — ACETAMINOPHEN 650 MG/1
650 SUPPOSITORY RECTAL EVERY 4 HOURS PRN
Status: DISCONTINUED | OUTPATIENT
Start: 2020-07-08 | End: 2020-07-13 | Stop reason: HOSPADM

## 2020-07-08 RX ORDER — ASPIRIN 81 MG/1
81 TABLET ORAL DAILY
Status: DISCONTINUED | OUTPATIENT
Start: 2020-07-08 | End: 2020-07-13 | Stop reason: HOSPADM

## 2020-07-08 RX ORDER — POTASSIUM CHLORIDE 1.5 G/1.77G
40 POWDER, FOR SOLUTION ORAL AS NEEDED
Status: DISCONTINUED | OUTPATIENT
Start: 2020-07-08 | End: 2020-07-13 | Stop reason: HOSPADM

## 2020-07-08 RX ORDER — ONDANSETRON 4 MG/1
4 TABLET, FILM COATED ORAL EVERY 6 HOURS PRN
Status: DISCONTINUED | OUTPATIENT
Start: 2020-07-08 | End: 2020-07-13 | Stop reason: HOSPADM

## 2020-07-08 RX ORDER — ONDANSETRON 2 MG/ML
4 INJECTION INTRAMUSCULAR; INTRAVENOUS EVERY 6 HOURS PRN
Status: DISCONTINUED | OUTPATIENT
Start: 2020-07-08 | End: 2020-07-13 | Stop reason: HOSPADM

## 2020-07-08 RX ORDER — OXYCODONE AND ACETAMINOPHEN 7.5; 325 MG/1; MG/1
1 TABLET ORAL EVERY 6 HOURS PRN
Status: DISCONTINUED | OUTPATIENT
Start: 2020-07-08 | End: 2020-07-10

## 2020-07-08 RX ORDER — SODIUM CHLORIDE 0.9 % (FLUSH) 0.9 %
10 SYRINGE (ML) INJECTION AS NEEDED
Status: DISCONTINUED | OUTPATIENT
Start: 2020-07-08 | End: 2020-07-13 | Stop reason: HOSPADM

## 2020-07-08 RX ORDER — ACETAMINOPHEN 160 MG/5ML
650 SOLUTION ORAL EVERY 4 HOURS PRN
Status: DISCONTINUED | OUTPATIENT
Start: 2020-07-08 | End: 2020-07-13 | Stop reason: HOSPADM

## 2020-07-08 RX ORDER — ALBUTEROL SULFATE 2.5 MG/3ML
2.5 SOLUTION RESPIRATORY (INHALATION) EVERY 6 HOURS PRN
Status: DISCONTINUED | OUTPATIENT
Start: 2020-07-08 | End: 2020-07-13 | Stop reason: HOSPADM

## 2020-07-08 RX ORDER — OXYCODONE AND ACETAMINOPHEN 7.5; 325 MG/1; MG/1
1 TABLET ORAL EVERY 6 HOURS PRN
COMMUNITY
End: 2020-07-13 | Stop reason: HOSPADM

## 2020-07-08 RX ORDER — POTASSIUM CHLORIDE 7.45 MG/ML
10 INJECTION INTRAVENOUS
Status: DISCONTINUED | OUTPATIENT
Start: 2020-07-08 | End: 2020-07-13 | Stop reason: HOSPADM

## 2020-07-08 RX ORDER — POTASSIUM CHLORIDE 750 MG/1
40 CAPSULE, EXTENDED RELEASE ORAL AS NEEDED
Status: DISCONTINUED | OUTPATIENT
Start: 2020-07-08 | End: 2020-07-13 | Stop reason: HOSPADM

## 2020-07-08 RX ORDER — CARVEDILOL 3.12 MG/1
3.12 TABLET ORAL 2 TIMES DAILY WITH MEALS
Status: DISCONTINUED | OUTPATIENT
Start: 2020-07-08 | End: 2020-07-11

## 2020-07-08 RX ORDER — ACETAMINOPHEN 325 MG/1
650 TABLET ORAL EVERY 6 HOURS PRN
COMMUNITY

## 2020-07-08 RX ORDER — BUMETANIDE 1 MG/1
1 TABLET ORAL 2 TIMES DAILY
Status: DISCONTINUED | OUTPATIENT
Start: 2020-07-08 | End: 2020-07-13 | Stop reason: HOSPADM

## 2020-07-08 RX ORDER — FUROSEMIDE 10 MG/ML
80 INJECTION INTRAMUSCULAR; INTRAVENOUS ONCE
Status: COMPLETED | OUTPATIENT
Start: 2020-07-08 | End: 2020-07-08

## 2020-07-08 RX ORDER — BISACODYL 5 MG/1
5 TABLET, DELAYED RELEASE ORAL DAILY PRN
Status: DISCONTINUED | OUTPATIENT
Start: 2020-07-08 | End: 2020-07-13 | Stop reason: HOSPADM

## 2020-07-08 RX ORDER — POTASSIUM CHLORIDE 750 MG/1
20 CAPSULE, EXTENDED RELEASE ORAL DAILY
Status: DISCONTINUED | OUTPATIENT
Start: 2020-07-08 | End: 2020-07-13 | Stop reason: HOSPADM

## 2020-07-08 RX ORDER — ACETAMINOPHEN 325 MG/1
650 TABLET ORAL EVERY 4 HOURS PRN
Status: DISCONTINUED | OUTPATIENT
Start: 2020-07-08 | End: 2020-07-13 | Stop reason: HOSPADM

## 2020-07-08 RX ORDER — BUMETANIDE 0.25 MG/ML
2 INJECTION INTRAMUSCULAR; INTRAVENOUS ONCE
Status: COMPLETED | OUTPATIENT
Start: 2020-07-08 | End: 2020-07-08

## 2020-07-08 RX ORDER — CALCIUM CARBONATE 200(500)MG
2 TABLET,CHEWABLE ORAL 2 TIMES DAILY PRN
Status: DISCONTINUED | OUTPATIENT
Start: 2020-07-08 | End: 2020-07-13 | Stop reason: HOSPADM

## 2020-07-08 RX ORDER — NITROGLYCERIN 0.4 MG/1
0.4 TABLET SUBLINGUAL
Status: DISCONTINUED | OUTPATIENT
Start: 2020-07-08 | End: 2020-07-13 | Stop reason: HOSPADM

## 2020-07-08 RX ADMIN — SACUBITRIL AND VALSARTAN 1 TABLET: 24; 26 TABLET, FILM COATED ORAL at 21:35

## 2020-07-08 RX ADMIN — BUMETANIDE 1 MG: 1 TABLET ORAL at 21:35

## 2020-07-08 RX ADMIN — RITONAVIR 100 MG: 100 TABLET, FILM COATED ORAL at 21:35

## 2020-07-08 RX ADMIN — BUMETANIDE 2 MG: 0.25 INJECTION INTRAMUSCULAR; INTRAVENOUS at 09:55

## 2020-07-08 RX ADMIN — SACUBITRIL AND VALSARTAN 1 TABLET: 24; 26 TABLET, FILM COATED ORAL at 09:55

## 2020-07-08 RX ADMIN — BISACODYL 5 MG: 5 TABLET ORAL at 22:45

## 2020-07-08 RX ADMIN — POTASSIUM CHLORIDE 20 MEQ: 10 CAPSULE, COATED, EXTENDED RELEASE ORAL at 09:56

## 2020-07-08 RX ADMIN — SODIUM CHLORIDE, PRESERVATIVE FREE 10 ML: 5 INJECTION INTRAVENOUS at 21:35

## 2020-07-08 RX ADMIN — FUROSEMIDE 80 MG: 10 INJECTION, SOLUTION INTRAMUSCULAR; INTRAVENOUS at 00:38

## 2020-07-08 RX ADMIN — ONDANSETRON 4 MG: 2 INJECTION INTRAMUSCULAR; INTRAVENOUS at 16:29

## 2020-07-08 RX ADMIN — CARVEDILOL 3.12 MG: 3.12 TABLET, FILM COATED ORAL at 09:55

## 2020-07-08 RX ADMIN — POTASSIUM CHLORIDE 40 MEQ: 10 CAPSULE, COATED, EXTENDED RELEASE ORAL at 16:50

## 2020-07-08 RX ADMIN — OXYCODONE HYDROCHLORIDE AND ACETAMINOPHEN 1 TABLET: 7.5; 325 TABLET ORAL at 13:03

## 2020-07-08 RX ADMIN — OXYCODONE HYDROCHLORIDE AND ACETAMINOPHEN 1 TABLET: 7.5; 325 TABLET ORAL at 04:35

## 2020-07-08 RX ADMIN — EMTRICITABINE AND TENOFOVIR ALAFENAMIDE 1 TABLET: 200; 25 TABLET ORAL at 21:35

## 2020-07-08 RX ADMIN — DARUNAVIR 800 MG: 800 TABLET, FILM COATED ORAL at 21:35

## 2020-07-08 RX ADMIN — ASPIRIN 81 MG: 81 TABLET, COATED ORAL at 09:55

## 2020-07-08 NOTE — PROGRESS NOTES
Discharge Planning Assessment  [facility]     Patient Name: Nina Marroquin  MRN: [id number]  Today's Date: 7/8/2020    Admit Date: 7/7/2020    Discharge Needs Assessment     Row Name 07/08/20 1224       Living Environment    Lives With  alone    Current Living Arrangements  home/apartment/condo    Primary Care Provided by  self    Provides Primary Care For  no one    Family Caregiver if Needed  child(wilfred), adult    Family Caregiver Names  Daughter Bill    Able to Return to Prior Arrangements  yes       Resource/Environmental Concerns    Resource/Environmental Concerns  none    Transportation Concerns  car, none       Transition Planning    Patient/Family Anticipates Transition to  home    Patient/Family Anticipated Services at Transition  none       Discharge Needs Assessment    Readmission Within the Last 30 Days  no previous admission in last 30 days    Concerns to be Addressed  other (see comments)    Concerns Comments  Needs new PCP    Equipment Currently Used at Home  none    Anticipated Changes Related to Illness  none    Equipment Needed After Discharge  none        Discharge Plan     Row Name 07/08/20 1229       Plan    Plan  Home, follow for DME needs    Patient/Family in Agreement with Plan  yes    Plan Comments  Called patient on phone d/t isolation for r/o Covid. Explained CCP role and verified facesheet. Patient lives alone in an aprtment. She is IADLs and has no DME. She is still able to drive. [employer] is her emergency contact and is able to help at DC if needed. She denies previous SNF use but has used [facility]  in the past. She plans to return home at DC and plans to drive herself because she drove herself here. CCP to follow for any needs. Reshma Anthony RN               Demographic Summary     Row Name 07/08/20 1224       General Information    Admission Type  observation    Arrived From  emergency department    Referral Source  admission list    Reason for Consult  discharge  planning    Preferred Language  English       Contact Information    Permission Granted to Share Info With  family/designee        Functional Status     Row Name 07/08/20 1224       Functional Status    Usual Activity Tolerance  good    Current Activity Tolerance  good       Functional Status, IADL    Medications  independent    Meal Preparation  independent    Housekeeping  independent    Laundry  independent    Shopping  independent       Mental Status    General Appearance WDL  --       Mental Status Summary    Recent Changes in Mental Status/Cognitive Functioning  no changes        Psychosocial     Row Name 07/08/20 1224       Behavior WDL    Behavior WDL  WDL       Emotion Mood WDL    Emotion/Mood/Affect WDL  WDL       Speech WDL    Speech WDL  WDL       Perceptual State WDL    Perceptual State WDL  WDL       Thought Process WDL    Thought Process WDL  WDL       Intellectual Performance WDL    Intellectual Performance WDL  WDL       Coping/Stress    Major Change/Loss/Stressor  none        Abuse/Neglect     Row Name 07/08/20 1224       Personal Safety    Feels Unsafe at Home or Work/School  no    Feels Threatened by Someone  no    Does Anyone Try to Keep You From Having Contact with Others or Doing Things Outside Your Home?  no    Physical Signs of Abuse Present  no                Reshma Anthony RN

## 2020-07-08 NOTE — PLAN OF CARE
Problem: Patient Care Overview  Goal: Plan of Care Review  7/8/2020 0536 by Jamar Navarro RN  Outcome: Ongoing (interventions implemented as appropriate)  Flowsheets (Taken 7/8/2020 0536)  Progress: no change  Plan of Care Reviewed With: patient  Outcome Summary: New admit for CHF.  Long Hx of heart issues.  C/O productive cough for several weeks.  Up ad huey to [facility]SC.  C/O left ankle pain from recent surgery.  Wounds healed.

## 2020-07-08 NOTE — PLAN OF CARE
Problem: Fall Risk (Adult)  Goal: Identify Related Risk Factors and Signs and Symptoms  Outcome: Outcome(s) achieved  Flowsheets (Taken 7/8/2020 0315)  Related Risk Factors (Fall Risk): environment unfamiliar  Signs and Symptoms (Fall Risk): presence of risk factors     Problem: Pain, Chronic (Adult)  Goal: Identify Related Risk Factors and Signs and Symptoms  Outcome: Outcome(s) achieved  Flowsheets (Taken 7/8/2020 0315)  Related Risk Factors (Chronic Pain): disease process; knowledge deficit; physical disability  Signs and Symptoms (Chronic Pain): abnormal posturing/positioning; fatigue/weakness

## 2020-07-08 NOTE — H&P
Patient Name:  Nina Marroquin  YOB: 1975  MRN:  [id number]  Admit Date:  7/7/2020  Patient Care Team:  System, Provider Not In as PCP - General      Subjective   History Present Illness     Chief Complaint   Patient presents with   • Shortness of Breath   • Cough   • Headache       Ms. Marroquin is a 44 y.o. former smoker with a history of HIV, hypertension, severe dilated nonischemic cardiomyopathy, chronic systolic congestive heart failure, obesity, and asthma that presents to [facility] complaining of shortness of breath and cough.  She reports the symptoms have been ongoing for the past three weeks, but have worsened in the last week.  She reports orthopnea and states her shortness of breath is worse on exertion.  She states that she could not walk from her bed to the bathroom last night without being short of breath, so she reported to the ED.  She states her cough has been productive of clear sputum.  She reports that she was tested for COVID 19 last week and the results came back negative.  She complains of chest pain when coughing.  She denies radiation of the chest pain into the arm, neck, and jaw.  She denies fever, chills, and sore throat.  She denies leg swelling, palpitations, and weight gain.  She reports right flank pain that began 2 days ago.  She denies dysuria, frequency, urgency, suprapubic pain, and decreased urine.  Work up in the ED revealed BNP 13,351, ALT 68, and AST 45.  Chest x-ray showed moderate cardiomegaly, was negative for an acute process.  EKG showed sinus tachycardia with a left bundle branch block.  She received 80 mg IV Lasix in the ED.       History of Present Illness  Review of Systems   Constitutional: Negative.  Negative for chills and fever.   HENT: Positive for congestion. Negative for sore throat.    Eyes: Negative.    Respiratory: Positive for cough, shortness of breath and wheezing. Negative for chest tightness.     Cardiovascular: Positive for chest pain (when coughing only). Negative for palpitations and leg swelling.   Gastrointestinal: Positive for nausea. Negative for abdominal pain and vomiting.   Genitourinary: Positive for flank pain (right). Negative for decreased urine volume, dysuria, frequency and urgency.   Musculoskeletal: Positive for arthralgias (left ankle). Negative for gait problem and myalgias.   Skin: Negative for color change and pallor.   Neurological: Negative.  Negative for dizziness, light-headedness and headaches.   Psychiatric/Behavioral: Negative.         Personal History     Past Medical History:   Diagnosis Date   • Asthma    • CHF (congestive heart failure) (CMS/[facility])    • Hypertension      Past Surgical History:   Procedure Laterality Date   • CARDIAC CATHETERIZATION     • FRACTURE SURGERY Left     left ankle     No family history on file.  Social History     Tobacco Use   • Smoking status: Former Smoker     Last attempt to quit: 2015     Years since quittin.5   • Smokeless tobacco: Never Used   Substance Use Topics   • Alcohol use: Yes     Alcohol/week: 1.0 standard drinks     Types: 1 Glasses of wine per week     Comment: RARELY   • Drug use: Yes     Types: Marijuana     Medications Prior to Admission   Medication Sig Dispense Refill Last Dose   • acetaminophen (TYLENOL) 325 MG tablet Take 650 mg by mouth Every 6 (Six) Hours As Needed for Mild Pain .   Past Week at Unknown time   • albuterol sulfate  (90 Base) MCG/ACT inhaler Inhale 2 puffs Every 4 (Four) Hours As Needed for Wheezing.   2020 at Unknown time   • aspirin 81 MG EC tablet Take 81 mg by mouth Daily.   2020 at Unknown time   • bumetanide (BUMEX) 1 MG tablet Take 1 mg by mouth 2 (Two) Times a Day.   2020 at Unknown time   • carvedilol (COREG) 3.125 MG tablet Take 1 tablet by mouth 2 (Two) Times a Day With Meals. 60 tablet 0 2020 at Unknown time   • darunavir ethanolate (PREZISTA) 800 MG tablet tablet Take  800 mg by mouth Every Night.   7/7/2020 at Unknown time   • Emtricitabine-Tenofovir AF (DESCOVY) 200-25 MG per tablet Take  by mouth Every Night.   7/7/2020 at Unknown time   • oxyCODONE-acetaminophen (PERCOCET) 7.5-325 MG per tablet Take 1 tablet by mouth Every 6 (Six) Hours As Needed for Moderate Pain .   7/7/2020 at Unknown time   • potassium chloride (MICRO-K) 10 MEQ CR capsule Take 2 capsules by mouth Daily. 60 capsule 0 7/7/2020 at Unknown time   • ritonavir (NORVIR) 100 MG tablet Take 100 mg by mouth Every Night.   7/7/2020 at Unknown time   • sacubitril-valsartan (ENTRESTO) 24-26 MG tablet Take 1 tablet by mouth Every 12 (Twelve) Hours. 60 tablet 0 7/7/2020 at Unknown time   • vitamin D (ERGOCALCIFEROL) 95475 units capsule capsule Take 50,000 Units by mouth 1 (One) Time Per Week.   Past Week at Unknown time     Allergies:  No Known Allergies    Objective    Objective     Vital Signs  Temp:  [96.8 °F (36 °C)-97.5 °F (36.4 °C)] 97.5 °F (36.4 °C)  Heart Rate:  [] 95  Resp:  [18-20] 18  BP: (104-122)/(73-91) 108/82  SpO2:  [94 %-98 %] 96 %  on   ;   Device (Oxygen Therapy): room air  Body mass index is 32.75 kg/m².    Physical Exam   Constitutional: She is oriented to person, place, and time. She appears well-developed and well-nourished.   HENT:   Head: Normocephalic and atraumatic.   Eyes: Conjunctivae and EOM are normal.   Neck: Normal range of motion. Neck supple.   Cardiovascular: Normal rate, regular rhythm and normal heart sounds.   No murmur heard.  Pulmonary/Chest: Effort normal.   Abdominal: Soft. Bowel sounds are normal. She exhibits no distension. There is no tenderness. There is no rebound and no guarding.   Musculoskeletal: Normal range of motion. She exhibits tenderness (left ankle).   Neurological: She is alert and oriented to person, place, and time.   Skin: Skin is warm and dry.   Psychiatric: She has a normal mood and affect. Her behavior is normal.   Nursing note and vitals  reviewed.      Results Review:  I reviewed the patient's new clinical results.  I reviewed the patient's new imaging results and agree with the interpretation.  I reviewed the patient's other test results and agree with the interpretation  I personally viewed and interpreted the patient's EKG/Telemetry data  Discussed with ED provider.    Lab Results (last 24 hours)     Procedure Component Value Units Date/Time    CBC & Differential [[id number]] Collected:  07/07/20 2315    Specimen:  Blood Updated:  07/07/20 2327    Narrative:       The following orders were created for panel order CBC & Differential.  Procedure                               Abnormality         Status                     ---------                               -----------         ------                     CBC Auto Differential[[id number]]        Abnormal            Final result                 Please view results for these tests on the individual orders.    Comprehensive Metabolic Panel [[id number]]  (Abnormal) Collected:  07/07/20 2315    Specimen:  Blood Updated:  07/08/20 0008     Glucose 129 mg/dL      BUN 14 mg/dL      Creatinine 0.89 mg/dL      Sodium 139 mmol/L      Potassium 3.8 mmol/L      Chloride 107 mmol/L      CO2 22.1 mmol/L      Calcium 8.9 mg/dL      Total Protein 6.4 g/dL      Albumin 3.90 g/dL      ALT (SGPT) 68 U/L      AST (SGOT) 45 U/L      Alkaline Phosphatase 101 U/L      Total Bilirubin 0.6 mg/dL      eGFR  African Amer 84 mL/min/1.73      Globulin 2.5 gm/dL      A/G Ratio 1.6 g/dL      BUN/Creatinine Ratio 15.7     Anion Gap 9.9 mmol/L     Narrative:       GFR Normal >60  Chronic Kidney Disease <60  Kidney Failure <15      BNP [[id number]]  (Abnormal) Collected:  07/07/20 2315    Specimen:  Blood Updated:  07/08/20 0004     proBNP 13,351.0 pg/mL     Narrative:       Among patients with dyspnea, NT-proBNP is highly sensitive for the detection of acute congestive heart failure. In addition NT-proBNP of <300 pg/ml effectively  rules out acute congestive heart failure with 99% negative predictive value.    Results may be falsely decreased if patient taking Biotin.      Troponin [[id number]]  (Normal) Collected:  07/07/20 2315    Specimen:  Blood Updated:  07/08/20 0008     Troponin T <0.010 ng/mL     Narrative:       Troponin T Reference Range:  <= 0.03 ng/mL-   Negative for AMI  >0.03 ng/mL-     Abnormal for myocardial necrosis.  Clinicians would have to utilize clinical acumen, EKG, Troponin and serial changes to determine if it is an Acute Myocardial Infarction or myocardial injury due to an underlying chronic condition.       Results may be falsely decreased if patient taking Biotin.      CBC Auto Differential [[id number]]  (Abnormal) Collected:  07/07/20 2315    Specimen:  Blood Updated:  07/07/20 2327     WBC 9.47 10*3/mm3      RBC 3.62 10*6/mm3      Hemoglobin 12.2 g/dL      Hematocrit 36.2 %      .0 fL      MCH 33.7 pg      MCHC 33.7 g/dL      RDW 12.6 %      RDW-SD 46.0 fl      MPV 12.9 fL      Platelets 142 10*3/mm3      Neutrophil % 64.3 %      Lymphocyte % 28.2 %      Monocyte % 7.0 %      Eosinophil % 0.1 %      Basophil % 0.2 %      Immature Grans % 0.2 %      Neutrophils, Absolute 6.09 10*3/mm3      Lymphocytes, Absolute 2.67 10*3/mm3      Monocytes, Absolute 0.66 10*3/mm3      Eosinophils, Absolute 0.01 10*3/mm3      Basophils, Absolute 0.02 10*3/mm3      Immature Grans, Absolute 0.02 10*3/mm3      nRBC 0.1 /100 WBC           Imaging Results (Last 24 Hours)     Procedure Component Value Units Date/Time    XR Chest 1 View [[id number]] Collected:  07/07/20 2327     Updated:  07/07/20 2332    Narrative:       PORTABLE CHEST 07/07/2020 AT 10:33 PM     CLINICAL HISTORY: CHF. COPD.     Compared to the previous chest dated 08/05/2019.     The lungs appear fairly well-expanded and grossly free of infiltrates.  There are no pleural effusions. The heart is moderately enlarged and  unchanged. A pacemaker remains in satisfactory  position in the left  subclavian vein. The pulmonary vasculature is within normal limits.     IMPRESSIONS: Moderate cardiomegaly. No evidence of acute disease within  the chest.     This report was finalized on 7/7/2020 11:29 PM by Dr. Tarun Delgadillo M.D.             Results for orders placed during the hospital encounter of 08/05/19   Adult Transthoracic Echo Complete W/ Cont if Necessary Per Protocol    Narrative · The left ventricular cavity is severely dilated.  · Left ventricular systolic function is severely decreased.  · Severe mitral valve regurgitation is present  · Left ventricular diastolic dysfunction (grade III) consistent with   reversible restrictive pattern.  · Left atrial cavity size is mild-to-moderately dilated.  · The following left ventricular wall segments are hypokinetic: mid   anterior, apical anterior, basal anterolateral, mid anterolateral, apical   lateral, basal inferolateral, mid inferolateral, apical inferior, mid   inferior, apical septal, basal inferoseptal, mid inferoseptal, apex   hypokinetic, mid anteroseptal, basal anterior, basal inferior and basal   inferoseptal.  · Calculated EF = 10.0%          ECG 12 Lead   Preliminary Result   HEART RATE= 100  bpm   RR Interval= 600  ms   PA Interval= 173  ms   P Horizontal Axis= -47  deg   P Front Axis= 74  deg   QRSD Interval= 125  ms   QT Interval= 404  ms   QRS Axis= 26  deg   T Wave Axis=   deg   - ABNORMAL ECG -   Sinus tachycardia   Left atrial enlargement   Left bundle branch block   Electronically Signed By:    Date and Time of Study: 2020-07-07 22:09:20      ECG 12 Lead    (Results Pending)        Assessment/Plan     Active Hospital Problems    Diagnosis POA   • **Acute on chronic systolic CHF (congestive heart failure) (CMS/[facility]) [I50.23] Yes   • Hypertension [I10] Yes   • Asthma [J45.909] Yes   • HIV (human immunodeficiency virus infection) (CMS/[facility]) [B20] Yes   • Class 2 obesity in adult [E66.9] Yes     Acute on Chronic Systolic  Congestive Heart Failure  -She does not appear volume overloaded on exam  -Troponin negative, no ischemic changes on EKG  -Cardiology consult  -She received a large dose of Lasix in the ED. She takes PO Bumex at home. Will order Bumex 2 mg IV in the AM and defer any further diuretic dosing to cardiology  -Monitor renal function.  Assess UOP  -Echo performed 8/6/2019 showed a calculated EF of 10%  -Daily weights  -Strict I&O  -Cardiac diet  -Will send UA for complaints of right flank pain. She denies any other urinary complaints    Hypertension  -Stable. Continue home regimen  -Monitor    Asthma  -Respiratory status stable on room air  -Continue home inhalers    HIV  -She is followed by [facility]  -Continue current antiviral regimen    -I discussed the patients findings and my recommendations with patient.    VTE Prophylaxis - SCDs.  Code Status - Full code.       SAMUEL Roe  [facility]  07/08/20  04:08]

## 2020-07-08 NOTE — CONSULTS
Patient Name: Nina Marroquin  :1975  44 y.o.    Date of Admission: 2020  Date of Consultation:  20  Encounter Provider: Lisset Melton MD  Place of Service: [facility] CARDIOLOGY  Referring Provider: Landon Walters MD  Patient Care Team:  System, Provider Not In as PCP - General      Chief complaint: shortness of breath, cough and headache    History of Present Illness:       This is a 44-year-old female with history of HIV, hypertension, history of smoking, severe dilated nonischemic cardiomyopathy with chronic congestive heart failure, obesity and asthma.    She was diagnosed with heart failure and cardiomyopathy in 2019 at [facility] when she was there visiting her family.  There she had an echocardiogram and a cardiac catheterization.  Her cardiac catheterization showed normal coronaries on 2019.  Her LVEDP was 12, wedge pressure 13, PA pressure 28/15 with a mean of 19 mmHg, RA pressure 4, cardiac index 2.5 L/min/m² with a PVR of 1.2 Woods units.    She does have a history of medication noncompliance.  She also has a history of illicit drug use but has not used drugs in 5 years and uses no alcohol.  She does not smoke and lives with her .    She presented to the emergency department 2020 with short windedness and cough for 3 weeks.  She had no fevers or chills.  She had not had a weight change.  She had no extremity edema.  She had eaten a lot of salt on Father's Day as well as Fourth of July.  In addition she missed 2 days of her medications prior to .  She was having exertional dyspnea as well as orthopnea.  She was negative for COVID last week.  She has had chest pain with coughing.  She is also had right flank pain for 2 days.  In the emergency department, her proBNP was 51985, ALT 68, AST 45, chest x-ray moderate cardiomegaly, ECG showed sinus tachycardia with left bundle branch block.  She was given  80 of IV Lasix in the emergency department.    Her last echocardiogram was done 8/6/2019 showing severe left ventricular dilation with severe reduction of left ventricular systolic function, severe mitral insufficiency, mild to moderate left atrial enlargement, grade 3 reversible restrictive diastolic dysfunction, global hypokinesis, ejection fraction 10%.    She does feel somewhat better now.  She is still coughing.  She has had no tachycardia, dizziness or syncope.    ECHO 8/6/2019    · The left ventricular cavity is severely dilated.  · Left ventricular systolic function is severely decreased.  · Severe mitral valve regurgitation is present  · Left ventricular diastolic dysfunction (grade III) consistent with reversible restrictive pattern.  · Left atrial cavity size is mild-to-moderately dilated.  · The following left ventricular wall segments are hypokinetic: mid anterior, apical anterior, basal anterolateral, mid anterolateral, apical lateral, basal inferolateral, mid inferolateral, apical inferior, mid inferior, apical septal, basal inferoseptal, mid inferoseptal, apex hypokinetic, mid anteroseptal, basal anterior, basal inferior and basal inferoseptal.  · Calculated EF = 10.0%                     Past Medical History:   Diagnosis Date   • Asthma    • CHF (congestive heart failure) (CMS/[facility])    • Hypertension        Past Surgical History:   Procedure Laterality Date   • CARDIAC CATHETERIZATION     • FRACTURE SURGERY Left     left ankle         Prior to Admission medications    Medication Sig Start Date End Date Taking? Authorizing Provider   acetaminophen (TYLENOL) 325 MG tablet Take 650 mg by mouth Every 6 (Six) Hours As Needed for Mild Pain .   Yes Gerard Lyn MD   albuterol sulfate  (90 Base) MCG/ACT inhaler Inhale 2 puffs Every 4 (Four) Hours As Needed for Wheezing.   Yes Gerard Lyn MD   aspirin 81 MG EC tablet Take 81 mg by mouth Daily.   Yes Gerard Lyn MD    bumetanide (BUMEX) 1 MG tablet Take 1 mg by mouth 2 (Two) Times a Day.   Yes Gerard Lyn MD   carvedilol (COREG) 3.125 MG tablet Take 1 tablet by mouth 2 (Two) Times a Day With Meals. 19  Yes Damien Zapata MD   darunavir ethanolate (PREZISTA) 800 MG tablet tablet Take 800 mg by mouth Every Night.   Yes Gerard Lyn MD   Emtricitabine-Tenofovir AF (DESCOVY) 200-25 MG per tablet Take  by mouth Every Night.   Yes Gerard Lyn MD   oxyCODONE-acetaminophen (PERCOCET) 7.5-325 MG per tablet Take 1 tablet by mouth Every 6 (Six) Hours As Needed for Moderate Pain .   Yes Gerard Lyn MD   potassium chloride (MICRO-K) 10 MEQ CR capsule Take 2 capsules by mouth Daily. 19  Yes Damien Zapata MD   ritonavir (NORVIR) 100 MG tablet Take 100 mg by mouth Every Night.   Yes Gearrd Lyn MD   sacubitril-valsartan (ENTRESTO) 24-26 MG tablet Take 1 tablet by mouth Every 12 (Twelve) Hours. 19  Yes Damien Zapata MD   vitamin D (ERGOCALCIFEROL) 33892 units capsule capsule Take 50,000 Units by mouth 1 (One) Time Per Week.   Yes Gerard Lyn MD   Emtricitabine-Tenofovir DF (TRUVADA) 100-150 MG per tablet Take 1 tablet by mouth Daily.  20  Gerard Lyn MD   furosemide (LASIX) 20 MG tablet Take 1 tablet by mouth Daily. 19  Damien Zapata MD       No Known Allergies    Social History     Socioeconomic History   • Marital status:      Spouse name: Not on file   • Number of children: Not on file   • Years of education: Not on file   • Highest education level: Not on file   Tobacco Use   • Smoking status: Former Smoker     Last attempt to quit: 2015     Years since quittin.5   • Smokeless tobacco: Never Used   Substance and Sexual Activity   • Alcohol use: Yes     Alcohol/week: 1.0 standard drinks     Types: 1 Glasses of wine per week     Comment: RARELY   • Drug use: Yes     Types: Marijuana   • Sexual activity: Defer        No family history on file.    REVIEW OF SYSTEMS:   All systems reviewed.  Pertinent positives identified in HPI.  All other systems are negative.      Objective:     Vitals:    07/08/20 0150 07/08/20 0151 07/08/20 0248 07/08/20 0706   BP: 116/91  108/82 104/82   BP Location:   Right arm Left arm   Patient Position:   Lying Lying   Pulse:  98 95 88   Resp:   18 16   Temp:   97.5 °F (36.4 °C) 96.9 °F (36.1 °C)   TempSrc:   Temporal Temporal   SpO2:  95% 96%    Weight:       Height:         Body mass index is 32.75 kg/m².    General Appearance:    Alert, cooperative, in no acute distress   Head:    Normocephalic, without obvious abnormality, atraumatic   Eyes:            Lids and lashes normal, conjunctivae and sclerae normal, no icterus, no pallor, corneas clear   Ears:    Ears appear intact with no abnormalities noted   Throat:   No oral lesions, no thrush, oral mucosa moist   Neck:   No adenopathy, supple, trachea midline, no thyromegaly, no carotid bruit, no JVD   Back:     No kyphosis present, no scoliosis present, no skin lesions, erythema or scars, no tenderness to palpation, range of motion normal   Lungs:     Clear to auscultation, respirations regular, even and unlabored    Heart:    Regular rhythm and normal rate, normal S1 and S2, no murmur, no gallop, no rub, no click   Chest Wall:    No abnormalities observed   Abdomen:     Normal bowel sounds, no masses, no organomegaly, soft, nontender, nondistended, no guarding, no rebound  tenderness   Extremities:   Moves all extremities well, no edema, no cyanosis, no redness   Pulses:   Pulses palpable and equal bilaterally. Normal radial, carotid, dorsalis pedis and posterior tibial pulses bilaterally.    Skin:  Psychiatric:   No bleeding, bruising or rash    Alert and oriented x 3, normal mood and affect   Lab Review:     Results from last 7 days   Lab Units 07/08/20  0639 07/07/20  2315   SODIUM mmol/L 138 139   POTASSIUM mmol/L 3.3* 3.8   CHLORIDE mmol/L  105 107   CO2 mmol/L 22.5 22.1   BUN mg/dL 14 14   CREATININE mg/dL 0.79 0.89   CALCIUM mg/dL 8.2* 8.9   BILIRUBIN mg/dL  --  0.6   ALK PHOS U/L  --  101   ALT (SGPT) U/L  --  68*   AST (SGOT) U/L  --  45*   GLUCOSE mg/dL 120* 129*     Results from last 7 days   Lab Units 07/07/20  2315   TROPONIN T ng/mL <0.010     Results from last 7 days   Lab Units 07/08/20  0639   WBC 10*3/mm3 7.91   HEMOGLOBIN g/dL 10.9*   HEMATOCRIT % 32.6*   PLATELETS 10*3/mm3 132*                                   I personally viewed and interpreted the patient's EKG/Telemetry data.        Assessment and Plan:       1. Decompensated systolic congestive heart failure.  2. Severe dilated nonischemic cardiomyopathy.  3. Mitral insufficiency, repeat echocardiogram.  4. HIV positive    She had a lot of salt over the last 10 days as well as missing 2 days of medications.  I think is what put her into heart failure.  We will transition her onto oral Bumex and repeat an echo once her COVID testing is negative.  We will continue to follow.    She seen Dr. Crawford in the past but wants to follow-up in our clinic.    Lisset Melton MD  07/08/20  11:34

## 2020-07-08 NOTE — ED PROVIDER NOTES
EMERGENCY DEPARTMENT ENCOUNTER    Room Number:  N646/1  Date of encounter:  7/10/2020  PCP: System, Provider Not In  Historian: Patient      HPI:  Chief Complaint: Shortness of breath, cough  A complete HPI/ROS/PMH/PSH/SH/FH are unobtainable due to: Nothing    Context: Nina Marroquin is a 44 y.o. female who presents to the ED c/o shortness of breath and cough intermittently for 3 weeks but progressively worsening over the past week.  Patient has history of HIV as well as CHF.  She states the cough is productive with whitish sputum and her symptoms are worse at night when lying supine.  They improve slightly when she sits or stands up for an extended period of time.  She denies associated chest pain, fever, chills, abdominal pain, nausea, vomiting, diarrhea, and  symptoms.  She states she has been following social distance guidelines to the best of her ability and is unaware of any recent sick contacts at this time.  She is not currently on anticoagulant therapy but she does take 81 mg of ASA daily.      PAST MEDICAL HISTORY  Active Ambulatory Problems     Diagnosis Date Noted   • Asthma 08/06/2019   • Hypertension 08/06/2019   • HIV (human immunodeficiency virus infection) (CMS/[facility]) 08/06/2019   • Class 2 obesity in adult 08/06/2019   • Acute on chronic systolic CHF (congestive heart failure) (CMS/[facility]) 08/06/2019   • Exertional dyspnea 08/06/2019   • Chest pain 08/06/2019   • Acute on chronic congestive heart failure (CMS/[facility]) 08/06/2019     Resolved Ambulatory Problems     Diagnosis Date Noted   • No Resolved Ambulatory Problems     Past Medical History:   Diagnosis Date   • CHF (congestive heart failure) (CMS/[facility])          PAST SURGICAL HISTORY  Past Surgical History:   Procedure Laterality Date   • CARDIAC CATHETERIZATION     • FRACTURE SURGERY Left     left ankle         FAMILY HISTORY  No family history on file.      SOCIAL HISTORY  Social History     Socioeconomic History   • Marital status:       Spouse name: Not on file   • Number of children: Not on file   • Years of education: Not on file   • Highest education level: Not on file   Tobacco Use   • Smoking status: Former Smoker     Last attempt to quit: 2015     Years since quittin.5   • Smokeless tobacco: Never Used   Substance and Sexual Activity   • Alcohol use: Yes     Alcohol/week: 1.0 standard drinks     Types: 1 Glasses of wine per week     Comment: RARELY   • Drug use: Yes     Types: Marijuana   • Sexual activity: Defer         ALLERGIES  Patient has no known allergies.        REVIEW OF SYSTEMS  Review of Systems     All systems reviewed and negative except for those discussed in HPI.       PHYSICAL EXAM    I have reviewed the triage vital signs and nursing notes.    ED Triage Vitals   Temp Heart Rate Resp BP SpO2   20 2105 20 2105 20 21020 2229 20   96.8 °F (36 °C) 111 20 111/83 98 %      Temp src Heart Rate Source Patient Position BP Location FiO2 (%)   20 -- -- -- --   Tympanic           Physical Exam  GENERAL: WDWN female, nontoxic and in no acute distress  HENT: nares patent, mucous membranes moist  EYES: no scleral icterus, conjunctiva not pale in appearance  CV: regular rhythm, sinus tach, normal S1-S2, no rubs murmurs gallops, no obvious JVD, no obvious pedal edema, +2 DP and PT bilaterally.  RESPIRATORY: normal effort, question rales at the base of both lungs  ABDOMEN: soft, nontender  MUSCULOSKELETAL: no deformity  NEURO: alert, moves all extremities, follows commands, face symmetrical, speech normal, no focal neuro deficits  SKIN: warm, dry no obvious skin rash        LAB RESULTS    Ordered the above labs and independently reviewed the results.        RADIOLOGY  Ct Abdomen Pelvis With & Without Contrast    Result Date: 2020  CT SCAN OF THE ABDOMEN AND PELVIS WITHOUT AND WITH INTRAVENOUS CONTRAST  HISTORY: Right flank pain and hematuria. Thrombocytopenia.  The CT scan was  performed through the abdomen and pelvis without and with intravenous contrast and demonstrates the followin. The kidneys are normal in size. Although there is no evidence of obstruction, there is a suspicion of a very tiny 1 to 2 mm stone in the distal third of the right ureter as seen on image 144. Unfortunately the postcontrast images do not show contrast in this portion of the right ureter to confirm its position. However, it remains very suspicious for a very tiny nonobstructing stone. Following contrast, there is symmetric renal enhancement and subsequent renal excretion. No renal abnormality is seen. 2. There is a very small right pleural effusion. A pacemaker is present and there is a very small pericardial effusion as well. The liver, spleen, pancreas, both adrenal glands appear normal. The gallbladder shows no gallstones or wall thickening. 3. There is no aortic aneurysm or retroperitoneal lymphadenopathy. There is some scattered fluid in both large and small bowel loops which is nonspecific but could relate to an element of enteritis and clinical correlation is recommended. 4. In the pelvis the uterus is slightly enlarged. The urinary bladder is decompressed and unremarkable.      Radiation dose reduction techniques were utilized, including automated exposure control and exposure modulation based on body size.  This report was finalized on 2020 4:47 PM by Dr. Ta Cordero M.D.        I ordered the above noted radiological studies. Reviewed by me and discussed with radiologist.  See dictation for official radiology interpretation.      PROCEDURES    Procedures      MEDICATIONS GIVEN IN ER    Medications   sodium chloride 0.9 % flush 10 mL (has no administration in time range)   sodium chloride 0.9 % flush 10 mL (10 mL Intravenous Given 20)   sodium chloride 0.9 % flush 10 mL (has no administration in time range)   nitroglycerin (NITROSTAT) SL tablet 0.4 mg (has no administration in time  range)   acetaminophen (TYLENOL) tablet 650 mg (has no administration in time range)     Or   acetaminophen (TYLENOL) 160 MG/5ML solution 650 mg (has no administration in time range)     Or   acetaminophen (TYLENOL) suppository 650 mg (has no administration in time range)   bisacodyl (DULCOLAX) EC tablet 5 mg (5 mg Oral Given 7/8/20 2245)   ondansetron (ZOFRAN) tablet 4 mg ( Oral Not Given:  See Alt 7/8/20 1629)     Or   ondansetron (ZOFRAN) injection 4 mg (4 mg Intravenous Given 7/8/20 1629)   calcium carbonate (TUMS) chewable tablet 500 mg (200 mg elemental) (has no administration in time range)   albuterol (PROVENTIL) nebulizer solution 0.083% 2.5 mg/3mL (has no administration in time range)   aspirin EC tablet 81 mg (81 mg Oral Given 7/9/20 0900)   carvedilol (COREG) tablet 3.125 mg (3.125 mg Oral Given 7/9/20 1821)   darunavir ethanolate (PREZISTA) tablet 800 mg (800 mg Oral Given 7/9/20 2000)   Emtricitabine-Tenofovir AF (DESCOVY) 200-25 MG per tablet 1 tablet (1 tablet Oral Given 7/9/20 2001)   potassium chloride (MICRO-K) CR capsule 20 mEq (20 mEq Oral Given 7/9/20 0900)   ritonavir (NORVIR) tablet 100 mg (100 mg Oral Given 7/9/20 2000)   sacubitril-valsartan (ENTRESTO) 24-26 MG tablet 1 tablet (1 tablet Oral Given 7/9/20 2000)   oxyCODONE-acetaminophen (PERCOCET) 7.5-325 MG per tablet 1 tablet (1 tablet Oral Given 7/10/20 0454)   bumetanide (BUMEX) tablet 1 mg (1 mg Oral Given 7/9/20 2001)   potassium chloride (MICRO-K) CR capsule 40 mEq (40 mEq Oral Given 7/8/20 1650)     Or   potassium chloride (KLOR-CON) packet 40 mEq ( Oral Not Given:  See Alt 7/8/20 1650)     Or   potassium chloride 10 mEq in 100 mL IVPB ( Intravenous Not Given:  See Alt 7/8/20 4500)   furosemide (LASIX) injection 80 mg (80 mg Intravenous Given 7/8/20 0038)   bumetanide (BUMEX) injection 2 mg (2 mg Intravenous Given 7/8/20 1512)   perflutren (DEFINITY) 8.476 mg in sodium chloride 0.9 % 10 mL injection (3 mL Intravenous Given 7/9/20  0815)   iopamidol (ISOVUE-300) 61 % injection 100 mL (85 mL Intravenous Given by Other 7/9/20 1500)         PROGRESS, DATA ANALYSIS, CONSULTS, AND MEDICAL DECISION MAKING    All labs have been independently reviewed by me.  All radiology studies have been reviewed by me and discussed with radiologist dictating the report.   EKG's independently viewed and interpreted by me.  Discussion below represents my analysis of pertinent findings related to patient's condition, differential diagnosis, treatment plan and final disposition.    DDX: Pneumonia, viral bronchitis, other viral infection, CHF, undiagnosed reactive airway disease.  History, physical exam, labs, chest x-ray are most consistent with CHF exacerbation.  Discussed the patient case with Dr. Gonzales my supervising physician who is seen and evaluated the patient at bedside.  Is felt that the patient would be better served by admission and IV diuretics at this time.  We will place a call to the hospitalist.    ED Course as of Jul 10 0639   Tue Jul 07, 2020   2317 EKG          EKG time: 2209  Rhythm/Rate: 100/sinus tachycardia  P waves and GA: GA interval normal  QRS, axis: LBBB, poor R wave progression  ST and T waves: No acute ST or T wave changes    Interpreted Contemporaneously by me, independently viewed  -The poor wave progression is new when compared to 8/5/2019.    [RC]   Wed Jul 08, 2020   0053 proBNP(!): 13,351.0 [RC]   0053 Glucose(!): 129 [RC]   0053 WBC: 9.47 [RC]   0053 BUN: 14 [RC]   0053 Creatinine: 0.89 [RC]   0053 I viewed the chest x-ray there appears to be some slight vascular congestion.   eGFR  Am: 84 [RC]   0139 Discussed patient case with SAMUEL Snider with [facility].  To place patient is an op status/telemetry bed under Dr. Walters's care.    [RC]      ED Course User Index  [RC] Compa Uriostegui III, PA     Patient was admitted for acute exacerbation of CHF in August 2019 to this hospital.  Patient was treated conservatively  and was discharged to follow-up with [facility].      Patient was placed in face mask in first look. Patient was wearing facemask when I entered the room and throughout our encounter. I wore full protective equipment throughout this patient encounter including a face mask, and gloves. Hand hygiene was performed before donning protective equipment and after removal when leaving the room.    AS OF 06:39 VITALS:    BP - 100/74  HR - 72  TEMP - 97.7 °F (36.5 °C) (Oral)  O2 SATS - 95%        DIAGNOSIS  Final diagnoses:   Acute on chronic congestive heart failure, unspecified heart failure type (CMS/HCC)   Shortness of breath         DISPOSITION  ADMISSION    Discussed treatment plan and reason for admission with pt/family and admitting physician.  Pt/family voiced understanding of the plan for admission for further testing/treatment as needed.              Compa Uriostegui III, PA  07/10/20 0660

## 2020-07-08 NOTE — ED PROVIDER NOTES
The FATIMAH and I have discussed this patients history, physical exam, and treatment plan. I have reviewed the documentation and personally had a face to face interaction with the patient. I affirm the documentation and agree with the treatment and plan.  The following note describes my personal findings    This patient is a 44-year-old female with a history of HIV as well as congestive heart failure presenting to the emergency department with shortness of breath over the last few days but a cough over the past month or so.  The patient states that the cough is productive of white sputum.  The patient denies fevers and chills.    Exam: This patient is resting comfortably and in no distress, without gross neurological deficit.  She is afebrile with stable vital signs and nontoxic in appearance.  Cardiovascular exam shows a regular tachycardia without murmur or rub.  Pulmonary exam shows mild rhonchi throughout but a touch worse in bilateral bases.  She has had no respiratory distress.    Plan: I have reviewed the patient's work-up and her proBNP is markedly elevated at over 13,000 which is a significant elevation over her previous.  I believe that given her history as well as the symptoms today, along with the tachycardia, that she should be admitted and diuresed and treated as congestive heart failure.      The patient was wearing a facemask upon entrance into the room and remained in such throughout their visit.  I was wearing PPE including a facemask as well as gloves at any point entering the room and throughout the visit     Óscar Gonzales MD  07/10/20 4307

## 2020-07-09 ENCOUNTER — APPOINTMENT (OUTPATIENT)
Dept: CARDIOLOGY | Facility: HOSPITAL | Age: 45
End: 2020-07-09

## 2020-07-09 ENCOUNTER — APPOINTMENT (OUTPATIENT)
Dept: CT IMAGING | Facility: HOSPITAL | Age: 45
End: 2020-07-09

## 2020-07-09 LAB
ALBUMIN SERPL-MCNC: 3.5 G/DL (ref 3.5–5.2)
ALBUMIN/GLOB SERPL: 1.3 G/DL
ALP SERPL-CCNC: 90 U/L (ref 39–117)
ALT SERPL W P-5'-P-CCNC: 71 U/L (ref 1–33)
ANION GAP SERPL CALCULATED.3IONS-SCNC: 10.5 MMOL/L (ref 5–15)
AORTIC DIMENSIONLESS INDEX: 0.6 (DI)
AST SERPL-CCNC: 35 U/L (ref 1–32)
BH CV ECHO MEAS - ACS: 1.6 CM
BH CV ECHO MEAS - AO MAX PG (FULL): 2.3 MMHG
BH CV ECHO MEAS - AO MAX PG: 4.8 MMHG
BH CV ECHO MEAS - AO MEAN PG (FULL): 2 MMHG
BH CV ECHO MEAS - AO MEAN PG: 3 MMHG
BH CV ECHO MEAS - AO ROOT AREA (BSA CORRECTED): 1.5
BH CV ECHO MEAS - AO ROOT AREA: 7.5 CM^2
BH CV ECHO MEAS - AO ROOT DIAM: 3.1 CM
BH CV ECHO MEAS - AO V2 MAX: 109 CM/SEC
BH CV ECHO MEAS - AO V2 MEAN: 76.8 CM/SEC
BH CV ECHO MEAS - AO V2 VTI: 18.1 CM
BH CV ECHO MEAS - AVA(I,A): 2 CM^2
BH CV ECHO MEAS - AVA(I,D): 2 CM^2
BH CV ECHO MEAS - AVA(V,A): 2.5 CM^2
BH CV ECHO MEAS - AVA(V,D): 2.5 CM^2
BH CV ECHO MEAS - BSA(HAYCOCK): 2.1 M^2
BH CV ECHO MEAS - BSA: 2 M^2
BH CV ECHO MEAS - BZI_BMI: 32.8 KILOGRAMS/M^2
BH CV ECHO MEAS - BZI_METRIC_HEIGHT: 167.6 CM
BH CV ECHO MEAS - BZI_METRIC_WEIGHT: 92.1 KG
BH CV ECHO MEAS - CONTRAST EF (2CH): 7 CM2
BH CV ECHO MEAS - CONTRAST EF 4CH: 9 CM2
BH CV ECHO MEAS - EDV(CUBED): 512 ML
BH CV ECHO MEAS - EDV(MOD-SP2): 342 ML
BH CV ECHO MEAS - EDV(MOD-SP4): 340 ML
BH CV ECHO MEAS - EDV(TEICH): 344.6 ML
BH CV ECHO MEAS - EF(CUBED): 51.2 %
BH CV ECHO MEAS - EF(MOD-BP): 6 %
BH CV ECHO MEAS - EF(MOD-SP2): 9.6 %
BH CV ECHO MEAS - EF(MOD-SP4): 4.1 %
BH CV ECHO MEAS - EF(TEICH): 41.6 %
BH CV ECHO MEAS - ESV(CUBED): 250 ML
BH CV ECHO MEAS - ESV(MOD-SP2): 309 ML
BH CV ECHO MEAS - ESV(MOD-SP4): 326 ML
BH CV ECHO MEAS - ESV(TEICH): 201.2 ML
BH CV ECHO MEAS - FS: 21.3 %
BH CV ECHO MEAS - IVS/LVPW: 0.78
BH CV ECHO MEAS - IVSD: 0.7 CM
BH CV ECHO MEAS - LV DIASTOLIC VOL/BSA (35-75): 168.9 ML/M^2
BH CV ECHO MEAS - LV MASS(C)D: 310.7 GRAMS
BH CV ECHO MEAS - LV MASS(C)DI: 154.4 GRAMS/M^2
BH CV ECHO MEAS - LV MAX PG: 2.4 MMHG
BH CV ECHO MEAS - LV MEAN PG: 1 MMHG
BH CV ECHO MEAS - LV SYSTOLIC VOL/BSA (12-30): 162 ML/M^2
BH CV ECHO MEAS - LV V1 MAX: 77.6 CM/SEC
BH CV ECHO MEAS - LV V1 MEAN: 51.5 CM/SEC
BH CV ECHO MEAS - LV V1 VTI: 10.6 CM
BH CV ECHO MEAS - LVIDD: 8 CM
BH CV ECHO MEAS - LVIDS: 6.3 CM
BH CV ECHO MEAS - LVLD AP2: 9.8 CM
BH CV ECHO MEAS - LVLD AP4: 10 CM
BH CV ECHO MEAS - LVLS AP2: 9.7 CM
BH CV ECHO MEAS - LVLS AP4: 9.8 CM
BH CV ECHO MEAS - LVOT AREA (M): 3.5 CM^2
BH CV ECHO MEAS - LVOT AREA: 3.5 CM^2
BH CV ECHO MEAS - LVOT DIAM: 2.1 CM
BH CV ECHO MEAS - LVPWD: 0.9 CM
BH CV ECHO MEAS - MED PEAK E' VEL: 6.4 CM/SEC
BH CV ECHO MEAS - MR MAX PG: 79.9 MMHG
BH CV ECHO MEAS - MR MAX VEL: 447 CM/SEC
BH CV ECHO MEAS - MR MEAN PG: 48 MMHG
BH CV ECHO MEAS - MR MEAN VEL: 326 CM/SEC
BH CV ECHO MEAS - MR VTI: 144 CM
BH CV ECHO MEAS - MV A DUR: 0.1 SEC
BH CV ECHO MEAS - MV A MAX VEL: 83.9 CM/SEC
BH CV ECHO MEAS - MV DEC SLOPE: 1554 CM/SEC^2
BH CV ECHO MEAS - MV DEC TIME: 132 SEC
BH CV ECHO MEAS - MV E MAX VEL: 144 CM/SEC
BH CV ECHO MEAS - MV E/A: 1.7
BH CV ECHO MEAS - MV MAX PG: 10.1 MMHG
BH CV ECHO MEAS - MV MEAN PG: 4 MMHG
BH CV ECHO MEAS - MV P1/2T MAX VEL: 176 CM/SEC
BH CV ECHO MEAS - MV P1/2T: 33.2 MSEC
BH CV ECHO MEAS - MV V2 MAX: 159 CM/SEC
BH CV ECHO MEAS - MV V2 MEAN: 97.2 CM/SEC
BH CV ECHO MEAS - MV V2 VTI: 27.2 CM
BH CV ECHO MEAS - MVA P1/2T LCG: 1.3 CM^2
BH CV ECHO MEAS - MVA(P1/2T): 6.6 CM^2
BH CV ECHO MEAS - MVA(VTI): 1.3 CM^2
BH CV ECHO MEAS - PA ACC TIME: 0.05 SEC
BH CV ECHO MEAS - PA MAX PG: 2.2 MMHG
BH CV ECHO MEAS - PA PR(ACCEL): 56.5 MMHG
BH CV ECHO MEAS - PA V2 MAX: 73.9 CM/SEC
BH CV ECHO MEAS - PI END-D VEL: 153 CM/SEC
BH CV ECHO MEAS - PULM A REVS VEL: 30.8 CM/SEC
BH CV ECHO MEAS - PULM DIAS VEL: 46 CM/SEC
BH CV ECHO MEAS - PULM S/D: 0.74
BH CV ECHO MEAS - PULM SYS VEL: 34.1 CM/SEC
BH CV ECHO MEAS - RAP SYSTOLE: 3 MMHG
BH CV ECHO MEAS - RV BASE (<4.1) - OBSOLETE: 4.1 CM
BH CV ECHO MEAS - RVOT AREA: 4.2 CM^2
BH CV ECHO MEAS - RVOT DIAM: 2.3 CM
BH CV ECHO MEAS - RVSP: 48 MMHG
BH CV ECHO MEAS - SI(AO): 67.9 ML/M^2
BH CV ECHO MEAS - SI(CUBED): 130.1 ML/M^2
BH CV ECHO MEAS - SI(LVOT): 18.2 ML/M^2
BH CV ECHO MEAS - SI(MOD-SP2): 16.4 ML/M^2
BH CV ECHO MEAS - SI(MOD-SP4): 7 ML/M^2
BH CV ECHO MEAS - SI(TEICH): 71.3 ML/M^2
BH CV ECHO MEAS - SV(AO): 136.6 ML
BH CV ECHO MEAS - SV(CUBED): 262 ML
BH CV ECHO MEAS - SV(LVOT): 36.7 ML
BH CV ECHO MEAS - SV(MOD-SP2): 33 ML
BH CV ECHO MEAS - SV(MOD-SP4): 14 ML
BH CV ECHO MEAS - SV(TEICH): 143.4 ML
BH CV ECHO MEAS - TAPSE (>1.6): 2.1 CM
BH CV ECHO MEAS - TR MAX PG: 45 MMHG
BH CV ECHO MEAS - TR MAX VEL: 334 CM/SEC
BH CV XLRA - RV BASE: 3.9 CM
BH CV XLRA - TDI S': 9.6 CM/SEC
BILIRUB SERPL-MCNC: 0.5 MG/DL (ref 0–1.2)
BUN SERPL-MCNC: 10 MG/DL (ref 6–20)
BUN/CREAT SERPL: 14.5 (ref 7–25)
CALCIUM SPEC-SCNC: 9 MG/DL (ref 8.6–10.5)
CHLORIDE SERPL-SCNC: 103 MMOL/L (ref 98–107)
CO2 SERPL-SCNC: 22.5 MMOL/L (ref 22–29)
CREAT SERPL-MCNC: 0.69 MG/DL (ref 0.57–1)
GFR SERPL CREATININE-BSD FRML MDRD: 112 ML/MIN/1.73
GLOBULIN UR ELPH-MCNC: 2.7 GM/DL
GLUCOSE SERPL-MCNC: 124 MG/DL (ref 65–99)
LEFT ATRIUM VOLUME INDEX: 46.2 ML/M2
MAXIMAL PREDICTED HEART RATE: 176 BPM
POTASSIUM SERPL-SCNC: 3.8 MMOL/L (ref 3.5–5.2)
PROT SERPL-MCNC: 6.2 G/DL (ref 6–8.5)
SODIUM SERPL-SCNC: 136 MMOL/L (ref 136–145)
STRESS TARGET HR: 150 BPM

## 2020-07-09 PROCEDURE — G0378 HOSPITAL OBSERVATION PER HR: HCPCS

## 2020-07-09 PROCEDURE — 74178 CT ABD&PLV WO CNTR FLWD CNTR: CPT

## 2020-07-09 PROCEDURE — 93306 TTE W/DOPPLER COMPLETE: CPT | Performed by: INTERNAL MEDICINE

## 2020-07-09 PROCEDURE — 25010000002 PERFLUTREN (DEFINITY) 8.476 MG IN SODIUM CHLORIDE 0.9 % 10 ML INJECTION: Performed by: INTERNAL MEDICINE

## 2020-07-09 PROCEDURE — 80053 COMPREHEN METABOLIC PANEL: CPT | Performed by: HOSPITALIST

## 2020-07-09 PROCEDURE — 93306 TTE W/DOPPLER COMPLETE: CPT

## 2020-07-09 PROCEDURE — 25010000002 IOPAMIDOL 61 % SOLUTION: Performed by: HOSPITALIST

## 2020-07-09 PROCEDURE — 99213 OFFICE O/P EST LOW 20 MIN: CPT | Performed by: INTERNAL MEDICINE

## 2020-07-09 RX ADMIN — RITONAVIR 100 MG: 100 TABLET, FILM COATED ORAL at 20:00

## 2020-07-09 RX ADMIN — EMTRICITABINE AND TENOFOVIR ALAFENAMIDE 1 TABLET: 200; 25 TABLET ORAL at 20:01

## 2020-07-09 RX ADMIN — SODIUM CHLORIDE, PRESERVATIVE FREE 10 ML: 5 INJECTION INTRAVENOUS at 09:01

## 2020-07-09 RX ADMIN — SACUBITRIL AND VALSARTAN 1 TABLET: 24; 26 TABLET, FILM COATED ORAL at 20:00

## 2020-07-09 RX ADMIN — OXYCODONE HYDROCHLORIDE AND ACETAMINOPHEN 1 TABLET: 7.5; 325 TABLET ORAL at 20:00

## 2020-07-09 RX ADMIN — CARVEDILOL 3.12 MG: 3.12 TABLET, FILM COATED ORAL at 18:21

## 2020-07-09 RX ADMIN — CARVEDILOL 3.12 MG: 3.12 TABLET, FILM COATED ORAL at 09:00

## 2020-07-09 RX ADMIN — POTASSIUM CHLORIDE 20 MEQ: 10 CAPSULE, COATED, EXTENDED RELEASE ORAL at 09:00

## 2020-07-09 RX ADMIN — SACUBITRIL AND VALSARTAN 1 TABLET: 24; 26 TABLET, FILM COATED ORAL at 09:00

## 2020-07-09 RX ADMIN — OXYCODONE HYDROCHLORIDE AND ACETAMINOPHEN 1 TABLET: 7.5; 325 TABLET ORAL at 13:58

## 2020-07-09 RX ADMIN — SODIUM CHLORIDE, PRESERVATIVE FREE 10 ML: 5 INJECTION INTRAVENOUS at 20:01

## 2020-07-09 RX ADMIN — BUMETANIDE 1 MG: 1 TABLET ORAL at 20:01

## 2020-07-09 RX ADMIN — OXYCODONE HYDROCHLORIDE AND ACETAMINOPHEN 1 TABLET: 7.5; 325 TABLET ORAL at 03:18

## 2020-07-09 RX ADMIN — DARUNAVIR 800 MG: 800 TABLET, FILM COATED ORAL at 20:00

## 2020-07-09 RX ADMIN — ASPIRIN 81 MG: 81 TABLET, COATED ORAL at 09:00

## 2020-07-09 RX ADMIN — PERFLUTREN 3 ML: 6.52 INJECTION, SUSPENSION INTRAVENOUS at 08:15

## 2020-07-09 RX ADMIN — IOPAMIDOL 85 ML: 612 INJECTION, SOLUTION INTRAVENOUS at 15:00

## 2020-07-09 RX ADMIN — BUMETANIDE 1 MG: 1 TABLET ORAL at 09:00

## 2020-07-09 NOTE — PLAN OF CARE
Problem: Patient Care Overview  Goal: Plan of Care Review  Outcome: Ongoing (interventions implemented as appropriate)  Flowsheets (Taken 7/9/2020 6975)  Progress: no change  Plan of Care Reviewed With: patient  Outcome Summary: C/O right flank pain.  States no BM in couple days-medicated per MAR.  To have Abd CT and 2D echo today, could not tolerate yesterday.

## 2020-07-09 NOTE — PROGRESS NOTES
[facility]               ASSOCIATES     LOS: 0 days     Name: Nina Marroquin  Age: 44 y.o.  Sex: female  :  1975  MRN: [id number]         Primary Care Physician: System, Provider Not In    Diet Regular; Cardiac    Subjective   She is complaining of right flank pain still.  She is lying on her right side that is the only position where she can get comfortable.  She denies any dysuria.  Shortness of breath is better.     Review of Systems   Constitutional: Negative for fever.   Respiratory: Negative for cough.    Cardiovascular: Negative for chest pain.   Genitourinary: Positive for flank pain. Negative for dysuria.     Objective   Temp:  [96.9 °F (36.1 °C)-98.2 °F (36.8 °C)] 96.9 °F (36.1 °C)  Heart Rate:  [83-98] 98  Resp:  [16] 16  BP: ()/(60-80) 103/78  SpO2:  [92 %-95 %] 95 %  on   ;   Device (Oxygen Therapy): room air  Body mass index is 32.77 kg/m².    Physical Exam   Constitutional: She is oriented to person, place, and time. No distress.   Cardiovascular: Normal rate and regular rhythm.   Pulmonary/Chest: Effort normal and breath sounds normal. No respiratory distress.   Abdominal: Soft. Bowel sounds are normal. There is no tenderness. There is CVA tenderness (mild right). There is no rebound and no guarding.   Musculoskeletal: She exhibits no edema.   Neurological: She is alert and oriented to person, place, and time.   Skin: Skin is warm and dry.   Psychiatric: She has a normal mood and affect. Her behavior is normal.   Nursing note and vitals reviewed.    Reviewed medications and new clinical results    Scheduled Meds  aspirin 81 mg Oral Daily   bumetanide 1 mg Oral BID   carvedilol 3.125 mg Oral BID With Meals   darunavir ethanolate 800 mg Oral Nightly   Emtricitabine-Tenofovir AF 1 tablet Oral Daily   potassium chloride 20 mEq Oral Daily   ritonavir 100 mg Oral Nightly   sacubitril-valsartan 1 tablet Oral Q12H   sodium chloride 10 mL Intravenous Q12H      Continuous Infusions   PRN Meds  •  acetaminophen **OR** acetaminophen **OR** acetaminophen  •  albuterol  •  bisacodyl  •  calcium carbonate  •  nitroglycerin  •  ondansetron **OR** ondansetron  •  oxyCODONE-acetaminophen  •  potassium chloride **OR** potassium chloride **OR** potassium chloride  •  sodium chloride  •  sodium chloride    Results from last 7 days   Lab Units 07/08/20  0639 07/07/20  2315   WBC 10*3/mm3 7.91 9.47   HEMOGLOBIN g/dL 10.9* 12.2   PLATELETS 10*3/mm3 132* 142     Results from last 7 days   Lab Units 07/09/20  0504 07/08/20  2100 07/08/20  0639 07/07/20  2315   SODIUM mmol/L 136  --  138 139   POTASSIUM mmol/L 3.8 4.4 3.3* 3.8   CHLORIDE mmol/L 103  --  105 107   CO2 mmol/L 22.5  --  22.5 22.1   BUN mg/dL 10  --  14 14   CREATININE mg/dL 0.69  --  0.79 0.89   CALCIUM mg/dL 9.0  --  8.2* 8.9   GLUCOSE mg/dL 124*  --  120* 129*     Lab Results   Component Value Date    ANIONGAP 10.5 07/09/2020     Results from last 7 days   Lab Units 07/09/20  0504 07/07/20  2315   ALK PHOS U/L 90 101   BILIRUBIN mg/dL 0.5 0.6   ALT (SGPT) U/L 71* 68*   AST (SGOT) U/L 35* 45*      Estimated Creatinine Clearance: 118.9 mL/min (by C-G formula based on SCr of 0.69 mg/dL).    Lab Results   Component Value Date    COVID19 Not Detected 07/08/2020     Intake/Output Summary (Last 24 hours) at 7/9/2020 1257  Last data filed at 7/8/2020 1525  Gross per 24 hour   Intake --   Output 500 ml   Net -500 ml      I personally reviewed tele     Results for orders placed during the hospital encounter of 07/07/20   Adult Transthoracic Echo Complete W/ Cont if Necessary Per Protocol    Narrative · Calculated EF = 6.0%  · Left ventricular systolic function is severely decreased.  · The left ventricular cavity is severely dilated.  · Left ventricular wall thickness is consistent with a thin walled   ventricle.  · Left ventricular diastolic dysfunction (grade III) consistent with fixed   restricive pattern.  ·  Moderate-to-severe mitral valve regurgitation is present  · Moderate tricuspid valve regurgitation is present.  · Estimated right ventricular systolic pressure from tricuspid   regurgitation is moderately elevated (45-55 mmHg).  · Calculated right ventricular systolic pressure from tricuspid   regurgitation is 48 mmHg.  · The following left ventricular wall segments are hypokinetic: mid   anterior, apical anterior, mid anterolateral, apical lateral, mid   inferolateral, apical inferior, mid inferior, apical septal, mid   inferoseptal, apex hypokinetic and mid anteroseptal. The following left   ventricular wall segments are akinetic: basal anterior, basal   anterolateral, basal inferolateral, basal inferior, basal inferoseptal and   basal anteroseptal.          Assessment/Plan   Active Hospital Problems    Diagnosis  POA   • **Acute on chronic systolic CHF (congestive heart failure) (CMS/[facility]) [I50.23]  Yes   • NICM (nonischemic cardiomyopathy) (CMS/[facility]) [I42.8]  Unknown   • Elevated LFTs [R79.89]  Unknown   • Hypopotassemia [E87.6]  Unknown   • Hematuria [R31.9]  Unknown   • Flank pain [R10.9]  Unknown   • Hypertension [I10]  Yes   • Asthma [J45.909]  Yes   • HIV (human immunodeficiency virus infection) (CMS/[facility]) [B20]  Yes   • Class 2 obesity in adult [E66.9]  Yes      Resolved Hospital Problems   No resolved problems to display.     44 y.o. female history of hypertension, HIV, dilated nonischemic cardiomyopathy admitted with shortness of breath    · Decompensated systolic CHF, AICD 2017: Medications per cardiology.  EF 6%.  · Right flank pain, hematuria: CT pending (not done yet, ordered yesterday)  · Elevated LFTs  · Hypokalemia improved  · HIV  · Thrombocytopenia, mild: Continue to monitor  · Body mass index is 32.77 kg/m².   · Disposition to be determined.  · Discussed with patient and nursing staff.    aSmi Mercado MD   07/09/20  12:55

## 2020-07-09 NOTE — PROGRESS NOTES
LOS: 0 days   Patient Care Team:  System, Provider Not In as PCP - General    Chief Complaint:     F/u cardiomyopathy    Interval History:     Her breathing is stable and she is having some mild chest wall pain.  She denies heart racing or skipping.  She has had no dizziness or nausea.  She is fatigued but just recently took her pain medication.    Echo 7/9/20  Interpretation Summary     · Calculated EF = 6.0%  · Left ventricular systolic function is severely decreased.  · The left ventricular cavity is severely dilated.  · Left ventricular wall thickness is consistent with a thin walled ventricle.  · Left ventricular diastolic dysfunction (grade III) consistent with fixed restricive pattern.  · Moderate-to-severe mitral valve regurgitation is present  · Moderate tricuspid valve regurgitation is present.  · Estimated right ventricular systolic pressure from tricuspid regurgitation is moderately elevated (45-55 mmHg).  · Calculated right ventricular systolic pressure from tricuspid regurgitation is 48 mmHg.  · The following left ventricular wall segments are hypokinetic: mid anterior, apical anterior, mid anterolateral, apical lateral, mid inferolateral, apical inferior, mid inferior, apical septal, mid inferoseptal, apex hypokinetic and mid anteroseptal. The following left ventricular wall segments are akinetic: basal anterior, basal anterolateral, basal inferolateral, basal inferior, basal inferoseptal and basal anteroseptal.         Objective   Vital Signs  Temp:  [96.9 °F (36.1 °C)-98.2 °F (36.8 °C)] 96.9 °F (36.1 °C)  Heart Rate:  [83-93] 83  Resp:  [16] 16  BP: ()/(60-80) 103/78    Intake/Output Summary (Last 24 hours) at 7/9/2020 0913  Last data filed at 7/8/2020 1525  Gross per 24 hour   Intake --   Output 500 ml   Net -500 ml       Comfortable NAD  conjunctivae clear  Neck supple, no JVD or thyromegaly appreciated  S1/S2 RRR, no m/r/g  Lungs CTA B, normal effort  Abdomen S/NT/ND (+) BS, no HSM  appreciated  Extremities warm, no clubbing, cyanosis, or edema  No visible or palpable skin lesions  A/Ox4, mood and affect appropriate    Results Review:      Results from last 7 days   Lab Units 07/09/20  0504 07/08/20  2100 07/08/20  0639 07/07/20  2315   SODIUM mmol/L 136  --  138 139   POTASSIUM mmol/L 3.8 4.4 3.3* 3.8   CHLORIDE mmol/L 103  --  105 107   CO2 mmol/L 22.5  --  22.5 22.1   BUN mg/dL 10  --  14 14   CREATININE mg/dL 0.69  --  0.79 0.89   GLUCOSE mg/dL 124*  --  120* 129*   CALCIUM mg/dL 9.0  --  8.2* 8.9     Results from last 7 days   Lab Units 07/07/20  2315   TROPONIN T ng/mL <0.010     Results from last 7 days   Lab Units 07/08/20  0639 07/07/20  2315   WBC 10*3/mm3 7.91 9.47   HEMOGLOBIN g/dL 10.9* 12.2   HEMATOCRIT % 32.6* 36.2   PLATELETS 10*3/mm3 132* 142                       I reviewed the patient's new clinical results.  I personally viewed and interpreted the patient's EKG/Telemetry data        Medication Review:     aspirin 81 mg Oral Daily   bumetanide 1 mg Oral BID   carvedilol 3.125 mg Oral BID With Meals   darunavir ethanolate 800 mg Oral Nightly   Emtricitabine-Tenofovir AF 1 tablet Oral Daily   potassium chloride 20 mEq Oral Daily   ritonavir 100 mg Oral Nightly   sacubitril-valsartan 1 tablet Oral Q12H   sodium chloride 10 mL Intravenous Q12H            Assessment/Plan       Acute on chronic systolic CHF (congestive heart failure) (CMS/HCC)    Asthma    Hypertension    HIV (human immunodeficiency virus infection) (CMS/[facility])    Class 2 obesity in adult    NICM (nonischemic cardiomyopathy) (CMS/HCC)    Elevated LFTs    Hypopotassemia    Hematuria    Flank pain    1. Decompensated systolic congestive heart failure, s/p AICD done 2017.  2. Severe dilated nonischemic cardiomyopathy.  3. Mitral insufficiency, moderate to severe   4. HIV positive  5. Moderate TR  6. Obesity.     Continue current medications.  In the future, she might need upgrade to a biventricular AICD.  I would not do  this right now.  I think she is very close to discharge.  If she goes home, I would like to see her in the office in 4 weeks.    Lisset Melton MD  07/09/20  09:13

## 2020-07-09 NOTE — PROGRESS NOTES
Continued Stay Note  [facility]     Patient Name: Nina Marroquin  MRN: [id number]  Today's Date: 7/9/2020    Admit Date: 7/7/2020    Discharge Plan     Row Name 07/09/20 1322       Plan    Plan Comments  Spoke with [facility] and it is ok for patient to DC. Still awaiting CT scan. Reshma Anthony RN        Discharge Codes    No documentation.             Reshma Anthony RN

## 2020-07-10 LAB
ALBUMIN SERPL-MCNC: 3.5 G/DL (ref 3.5–5.2)
ALBUMIN/GLOB SERPL: 1.6 G/DL
ALP SERPL-CCNC: 74 U/L (ref 39–117)
ALT SERPL W P-5'-P-CCNC: 56 U/L (ref 1–33)
ANION GAP SERPL CALCULATED.3IONS-SCNC: 9.7 MMOL/L (ref 5–15)
AST SERPL-CCNC: 24 U/L (ref 1–32)
BILIRUB SERPL-MCNC: 0.5 MG/DL (ref 0–1.2)
BUN SERPL-MCNC: 8 MG/DL (ref 6–20)
BUN/CREAT SERPL: 10.1 (ref 7–25)
CALCIUM SPEC-SCNC: 9 MG/DL (ref 8.6–10.5)
CHLORIDE SERPL-SCNC: 102 MMOL/L (ref 98–107)
CO2 SERPL-SCNC: 26.3 MMOL/L (ref 22–29)
CREAT SERPL-MCNC: 0.79 MG/DL (ref 0.57–1)
DEPRECATED RDW RBC AUTO: 44.7 FL (ref 37–54)
ERYTHROCYTE [DISTWIDTH] IN BLOOD BY AUTOMATED COUNT: 12.4 % (ref 12.3–15.4)
GFR SERPL CREATININE-BSD FRML MDRD: 96 ML/MIN/1.73
GLOBULIN UR ELPH-MCNC: 2.2 GM/DL
GLUCOSE SERPL-MCNC: 112 MG/DL (ref 65–99)
HCT VFR BLD AUTO: 33.4 % (ref 34–46.6)
HGB BLD-MCNC: 11.2 G/DL (ref 12–15.9)
MCH RBC QN AUTO: 33 PG (ref 26.6–33)
MCHC RBC AUTO-ENTMCNC: 33.5 G/DL (ref 31.5–35.7)
MCV RBC AUTO: 98.5 FL (ref 79–97)
PLATELET # BLD AUTO: 129 10*3/MM3 (ref 140–450)
PMV BLD AUTO: 13 FL (ref 6–12)
POTASSIUM SERPL-SCNC: 3.7 MMOL/L (ref 3.5–5.2)
PROT SERPL-MCNC: 5.7 G/DL (ref 6–8.5)
RBC # BLD AUTO: 3.39 10*6/MM3 (ref 3.77–5.28)
SODIUM SERPL-SCNC: 138 MMOL/L (ref 136–145)
WBC # BLD AUTO: 7.68 10*3/MM3 (ref 3.4–10.8)

## 2020-07-10 PROCEDURE — G0378 HOSPITAL OBSERVATION PER HR: HCPCS

## 2020-07-10 PROCEDURE — 80053 COMPREHEN METABOLIC PANEL: CPT | Performed by: HOSPITALIST

## 2020-07-10 PROCEDURE — 85027 COMPLETE CBC AUTOMATED: CPT | Performed by: HOSPITALIST

## 2020-07-10 RX ORDER — HYDROCODONE BITARTRATE AND ACETAMINOPHEN 7.5; 325 MG/1; MG/1
1 TABLET ORAL EVERY 6 HOURS PRN
Status: DISCONTINUED | OUTPATIENT
Start: 2020-07-10 | End: 2020-07-13 | Stop reason: HOSPADM

## 2020-07-10 RX ORDER — MORPHINE SULFATE 2 MG/ML
2 INJECTION, SOLUTION INTRAMUSCULAR; INTRAVENOUS
Status: ACTIVE | OUTPATIENT
Start: 2020-07-10 | End: 2020-07-12

## 2020-07-10 RX ADMIN — HYDROCODONE BITARTRATE AND ACETAMINOPHEN 1 TABLET: 7.5; 325 TABLET ORAL at 21:07

## 2020-07-10 RX ADMIN — RITONAVIR 100 MG: 100 TABLET, FILM COATED ORAL at 21:07

## 2020-07-10 RX ADMIN — BUMETANIDE 1 MG: 1 TABLET ORAL at 09:09

## 2020-07-10 RX ADMIN — SACUBITRIL AND VALSARTAN 1 TABLET: 24; 26 TABLET, FILM COATED ORAL at 21:07

## 2020-07-10 RX ADMIN — ASPIRIN 81 MG: 81 TABLET, COATED ORAL at 09:09

## 2020-07-10 RX ADMIN — SODIUM CHLORIDE, PRESERVATIVE FREE 10 ML: 5 INJECTION INTRAVENOUS at 09:08

## 2020-07-10 RX ADMIN — SODIUM CHLORIDE, PRESERVATIVE FREE 10 ML: 5 INJECTION INTRAVENOUS at 21:08

## 2020-07-10 RX ADMIN — CARVEDILOL 3.12 MG: 3.12 TABLET, FILM COATED ORAL at 09:08

## 2020-07-10 RX ADMIN — OXYCODONE HYDROCHLORIDE AND ACETAMINOPHEN 1 TABLET: 7.5; 325 TABLET ORAL at 12:14

## 2020-07-10 RX ADMIN — EMTRICITABINE AND TENOFOVIR ALAFENAMIDE 1 TABLET: 200; 25 TABLET ORAL at 21:08

## 2020-07-10 RX ADMIN — SACUBITRIL AND VALSARTAN 1 TABLET: 24; 26 TABLET, FILM COATED ORAL at 09:09

## 2020-07-10 RX ADMIN — OXYCODONE HYDROCHLORIDE AND ACETAMINOPHEN 1 TABLET: 7.5; 325 TABLET ORAL at 04:54

## 2020-07-10 RX ADMIN — POTASSIUM CHLORIDE 20 MEQ: 10 CAPSULE, COATED, EXTENDED RELEASE ORAL at 09:09

## 2020-07-10 RX ADMIN — DARUNAVIR 800 MG: 800 TABLET, FILM COATED ORAL at 21:07

## 2020-07-10 RX ADMIN — SERTRALINE 50 MG: 50 TABLET, FILM COATED ORAL at 12:14

## 2020-07-10 NOTE — PLAN OF CARE
Problem: Patient Care Overview  Goal: Plan of Care Review  Outcome: Ongoing (interventions implemented as appropriate)  Flowsheets (Taken 7/10/2020 0641)  Progress: improving  Plan of Care Reviewed With: patient  Outcome Summary: Slept minimal, right flank pain frequent, worse with stretching and sudden movements, CT Abdomen done 7/9- to relay results to pt c/o attending, oral pain meds given,, NSR, BM yesterday, will monitor closely     Problem: Fall Risk (Adult)  Goal: Absence of Fall  Outcome: Ongoing (interventions implemented as appropriate)  Flowsheets (Taken 7/10/2020 0641)  Absence of Fall: making progress toward outcome     Problem: Pain, Chronic (Adult)  Goal: Acceptable Pain/Comfort Level and Functional Ability  Outcome: Ongoing (interventions implemented as appropriate)  Flowsheets (Taken 7/10/2020 0641)  Acceptable Pain/Comfort Level and Functional Ability: making progress toward outcome     Problem: Cardiac: Heart Failure (Adult)  Goal: Signs and Symptoms of Listed Potential Problems Will be Absent, Minimized or Managed (Cardiac: Heart Failure)  Outcome: Ongoing (interventions implemented as appropriate)  Flowsheets (Taken 7/10/2020 0641)  Problems Assessed (Heart Failure): all  Problems Present (Heart Failure): cardiac pump dysfunction; decreased quality of life; situational response; functional decline/self-care deficit

## 2020-07-10 NOTE — PROGRESS NOTES
Continued Stay Note  [facility]     Patient Name: Nina Marroquin  MRN: [id number]  Today's Date: 7/10/2020    Admit Date: 7/7/2020    Discharge Plan     Row Name 07/10/20 1546       Plan    Plan  Plans to return home, no other needs identified    Plan Comments  CCP met with patient at bedside for follow up. Patient confirmed plan is to return home at discharge. Patient states she does not have a PCP but follows the [facility]. Patient requested CCP call to request new patient appointment with [facility] PCP. CCP called and spoke to Lesa/[phone]; patient is scheduled with Darcie BAKER on 7/31/2020 at 12:45 PM. Patient was given information regarding her appointment. Patient denies any further needs. Ely TUCKER        Discharge Codes    No documentation.             LUANNE Arredondo

## 2020-07-10 NOTE — PROGRESS NOTES
[facility]               ASSOCIATES     LOS: 0 days     Name: Nina Marroquin  Age: 44 y.o.  Sex: female  :  1975  MRN: [id number]         Primary Care Physician: System, Provider Not In    Diet Regular; Cardiac    Subjective    Right flank pain unchanged.  Denies any chest pain or shortness of breath at the moment.    Review of Systems   Constitutional: Negative for fever.   Respiratory: Negative for cough.    Cardiovascular: Negative for chest pain.   Genitourinary: Positive for flank pain. Negative for dysuria.     Objective   Temp:  [96.9 °F (36.1 °C)-98.4 °F (36.9 °C)] 96.9 °F (36.1 °C)  Heart Rate:  [72-94] 86  Resp:  [16-17] 16  BP: (100-103)/(60-74) 100/60  SpO2:  [92 %-95 %] 94 %  on   ;   Device (Oxygen Therapy): room air  Body mass index is 32.47 kg/m².    Physical Exam   Constitutional: She is oriented to person, place, and time. No distress.   Cardiovascular: Normal rate and regular rhythm.   Pulmonary/Chest: Effort normal and breath sounds normal. No respiratory distress.   Abdominal: Soft. Bowel sounds are normal. There is no tenderness. There is CVA tenderness (mild right). There is no rebound and no guarding.   Musculoskeletal: She exhibits no edema.   Neurological: She is alert and oriented to person, place, and time.   Skin: Skin is warm and dry.   Psychiatric: She has a normal mood and affect. Her behavior is normal.   Nursing note and vitals reviewed.    Reviewed medications and new clinical results    Scheduled Meds    aspirin 81 mg Oral Daily   bumetanide 1 mg Oral BID   carvedilol 3.125 mg Oral BID With Meals   darunavir ethanolate 800 mg Oral Nightly   Emtricitabine-Tenofovir AF 1 tablet Oral Daily   potassium chloride 20 mEq Oral Daily   ritonavir 100 mg Oral Nightly   sacubitril-valsartan 1 tablet Oral Q12H   sertraline 50 mg Oral Daily   sodium chloride 10 mL Intravenous Q12H     Continuous Infusions   PRN Meds  •  acetaminophen **OR**  acetaminophen **OR** acetaminophen  •  albuterol  •  bisacodyl  •  calcium carbonate  •  nitroglycerin  •  ondansetron **OR** ondansetron  •  oxyCODONE-acetaminophen  •  potassium chloride **OR** potassium chloride **OR** potassium chloride  •  sodium chloride  •  sodium chloride    Results from last 7 days   Lab Units 07/10/20  0548 07/08/20  0639 07/07/20  2315   WBC 10*3/mm3 7.68 7.91 9.47   HEMOGLOBIN g/dL 11.2* 10.9* 12.2   PLATELETS 10*3/mm3 129* 132* 142     Results from last 7 days   Lab Units 07/10/20  0548 07/09/20  0504 07/08/20  2100 07/08/20  0639 07/07/20  2315   SODIUM mmol/L 138 136  --  138 139   POTASSIUM mmol/L 3.7 3.8 4.4 3.3* 3.8   CHLORIDE mmol/L 102 103  --  105 107   CO2 mmol/L 26.3 22.5  --  22.5 22.1   BUN mg/dL 8 10  --  14 14   CREATININE mg/dL 0.79 0.69  --  0.79 0.89   CALCIUM mg/dL 9.0 9.0  --  8.2* 8.9   GLUCOSE mg/dL 112* 124*  --  120* 129*     Lab Results   Component Value Date    ANIONGAP 9.7 07/10/2020     Results from last 7 days   Lab Units 07/10/20  0548 07/09/20  0504 07/07/20  2315   ALK PHOS U/L 74 90 101   BILIRUBIN mg/dL 0.5 0.5 0.6   ALT (SGPT) U/L 56* 71* 68*   AST (SGOT) U/L 24 35* 45*      Estimated Creatinine Clearance: 103.4 mL/min (by C-G formula based on SCr of 0.79 mg/dL).    Lab Results   Component Value Date    COVID19 Not Detected 07/08/2020       Intake/Output Summary (Last 24 hours) at 7/10/2020 1317  Last data filed at 7/9/2020 2330  Gross per 24 hour   Intake --   Output 500 ml   Net -500 ml      I personally reviewed CT: Suspicion right ureter stone nonobstructing.  Possibly some enteritis.    Assessment/Plan   Active Hospital Problems    Diagnosis  POA   • **Acute on chronic systolic CHF (congestive heart failure) (CMS/HCC) [I50.23]  Yes   • NICM (nonischemic cardiomyopathy) (CMS/HCC) [I42.8]  Unknown   • Elevated LFTs [R79.89]  Unknown   • Hypopotassemia [E87.6]  Unknown   • Hematuria [R31.9]  Unknown   • Flank pain [R10.9]  Unknown   • Hypertension  [I10]  Yes   • Asthma [J45.909]  Yes   • HIV (human immunodeficiency virus infection) (CMS/HCC) [B20]  Yes   • Class 2 obesity in adult [E66.9]  Yes      Resolved Hospital Problems   No resolved problems to display.     44 y.o. female history of hypertension, HIV, dilated nonischemic cardiomyopathy admitted with shortness of breath    · Decompensated systolic CHF, AICD 2017: Medications per cardiology.  EF 6%.  · Right flank pain, hematuria: CT noted.  Will ask urology to see.  · Elevated LFTs.  Improved possibly from CHF  · Hypokalemia resolved  · HIV  · Thrombocytopenia, mild: Continue to monitor  · Body mass index is 32.47 kg/m².   · Disposition to be determined.  · Discussed with patient and nursing staff.    Sami Mercado MD   07/10/20  13:17

## 2020-07-10 NOTE — PLAN OF CARE
Pt medicated prn for right flank pain with some relief, up ad huey to bsc, vss, will ctm.      Problem: Patient Care Overview  Goal: Plan of Care Review  Outcome: Ongoing (interventions implemented as appropriate)  Flowsheets (Taken 7/10/2020 6791)  Plan of Care Reviewed With: patient

## 2020-07-11 LAB
ANION GAP SERPL CALCULATED.3IONS-SCNC: 9.3 MMOL/L (ref 5–15)
BUN SERPL-MCNC: 10 MG/DL (ref 6–20)
BUN/CREAT SERPL: 13.7 (ref 7–25)
CALCIUM SPEC-SCNC: 9 MG/DL (ref 8.6–10.5)
CHLORIDE SERPL-SCNC: 100 MMOL/L (ref 98–107)
CO2 SERPL-SCNC: 27.7 MMOL/L (ref 22–29)
CREAT SERPL-MCNC: 0.73 MG/DL (ref 0.57–1)
DEPRECATED RDW RBC AUTO: 45.9 FL (ref 37–54)
ERYTHROCYTE [DISTWIDTH] IN BLOOD BY AUTOMATED COUNT: 12.6 % (ref 12.3–15.4)
GFR SERPL CREATININE-BSD FRML MDRD: 105 ML/MIN/1.73
GLUCOSE SERPL-MCNC: 132 MG/DL (ref 65–99)
HCT VFR BLD AUTO: 35.1 % (ref 34–46.6)
HGB BLD-MCNC: 11.9 G/DL (ref 12–15.9)
MCH RBC QN AUTO: 33.8 PG (ref 26.6–33)
MCHC RBC AUTO-ENTMCNC: 33.9 G/DL (ref 31.5–35.7)
MCV RBC AUTO: 99.7 FL (ref 79–97)
PLATELET # BLD AUTO: 148 10*3/MM3 (ref 140–450)
PMV BLD AUTO: 12.5 FL (ref 6–12)
POTASSIUM SERPL-SCNC: 3.7 MMOL/L (ref 3.5–5.2)
RBC # BLD AUTO: 3.52 10*6/MM3 (ref 3.77–5.28)
SODIUM SERPL-SCNC: 137 MMOL/L (ref 136–145)
WBC # BLD AUTO: 8.53 10*3/MM3 (ref 3.4–10.8)

## 2020-07-11 PROCEDURE — 99213 OFFICE O/P EST LOW 20 MIN: CPT | Performed by: INTERNAL MEDICINE

## 2020-07-11 PROCEDURE — G0378 HOSPITAL OBSERVATION PER HR: HCPCS

## 2020-07-11 PROCEDURE — 85027 COMPLETE CBC AUTOMATED: CPT | Performed by: HOSPITALIST

## 2020-07-11 PROCEDURE — 80048 BASIC METABOLIC PNL TOTAL CA: CPT | Performed by: HOSPITALIST

## 2020-07-11 RX ORDER — HYDROCODONE BITARTRATE AND ACETAMINOPHEN 7.5; 325 MG/1; MG/1
1 TABLET ORAL EVERY 6 HOURS PRN
Qty: 10 TABLET | Refills: 0 | Status: SHIPPED | OUTPATIENT
Start: 2020-07-11 | End: 2020-07-20

## 2020-07-11 RX ADMIN — DARUNAVIR 800 MG: 800 TABLET, FILM COATED ORAL at 20:20

## 2020-07-11 RX ADMIN — POTASSIUM CHLORIDE 20 MEQ: 10 CAPSULE, COATED, EXTENDED RELEASE ORAL at 09:16

## 2020-07-11 RX ADMIN — EMTRICITABINE AND TENOFOVIR ALAFENAMIDE 1 TABLET: 200; 25 TABLET ORAL at 20:20

## 2020-07-11 RX ADMIN — RITONAVIR 100 MG: 100 TABLET, FILM COATED ORAL at 20:20

## 2020-07-11 RX ADMIN — SERTRALINE 50 MG: 50 TABLET, FILM COATED ORAL at 09:17

## 2020-07-11 RX ADMIN — ASPIRIN 81 MG: 81 TABLET, COATED ORAL at 09:17

## 2020-07-11 RX ADMIN — SODIUM CHLORIDE, PRESERVATIVE FREE 10 ML: 5 INJECTION INTRAVENOUS at 09:21

## 2020-07-11 NOTE — DISCHARGE SUMMARY
[facility]    Date of Discharge:  7/13/2020    PCP: System, Provider Not In    Discharge Diagnosis:   Active Hospital Problems    Diagnosis  POA   • **Acute on chronic systolic CHF (congestive heart failure) (CMS/[facility]) [I50.23]  Yes   • NICM (nonischemic cardiomyopathy) (CMS/[facility]) [I42.8]  Unknown   • Elevated LFTs [R79.89]  Unknown   • Hypopotassemia [E87.6]  Unknown   • Hematuria [R31.9]  Unknown   • Flank pain [R10.9]  Unknown   • Hypertension [I10]  Yes   • Asthma [J45.909]  Yes   • HIV (human immunodeficiency virus infection) (CMS/[facility]) [B20]  Yes   • Class 2 obesity in adult [E66.9]  Yes   • Acute on chronic congestive heart failure (CMS/[facility]) [I50.9]  Yes      Resolved Hospital Problems   No resolved problems to display.      Consults     Date and Time Order Name Status Description    7/10/2020 1319 Inpatient Urology Consult Completed     7/8/2020 0144 Inpatient Cardiology Consult Completed     7/8/2020 0041 [facility] (on-call MD unless specified) Details Completed         Hospital Course  Please see history and physical for details. Patient is a 44 y.o. female with a history of HIV, hypertension, smoker, severe dilated nonischemic cardiomyopathy admitted with shortness of breath.  She apparently has had a lot of salt over the previous 10 days as well as missing 2 days of medications.  Cardiology felt that was what put her into heart failure.  She was given IV diuretics with improvement in her symptoms and was quickly transitioned to p.o.  Echocardiogram is below.  Cardiology stated that she might need to upgrade to a biventricular AICD.  They will see her in 4 weeks.  Cardiology discontinued carvedilol due to hypotension.    She complained of right flank pain and a CT scan showed a 2 mm minimally obstructing stone in the right ureter.  Urology talked with the patient and she did not want to have anything done at this point and they felt she would probably pass  the stone without much problem.  Pain is controlled and she had like to go home today.  CT scan also showed a slightly enlarged uterus.  She can follow-up with PCP regarding any further work-up (she normally follows with [facility] but states that she is going to see a doctor in the [facility] system)    I discussed the patient's findings and my recommendations with patient.    Results for orders placed during the hospital encounter of 07/07/20   Adult Transthoracic Echo Complete W/ Cont if Necessary Per Protocol    Narrative · Calculated EF = 6.0%  · Left ventricular systolic function is severely decreased.  · The left ventricular cavity is severely dilated.  · Left ventricular wall thickness is consistent with a thin walled   ventricle.  · Left ventricular diastolic dysfunction (grade III) consistent with fixed   restricive pattern.  · Moderate-to-severe mitral valve regurgitation is present  · Moderate tricuspid valve regurgitation is present.  · Estimated right ventricular systolic pressure from tricuspid   regurgitation is moderately elevated (45-55 mmHg).  · Calculated right ventricular systolic pressure from tricuspid   regurgitation is 48 mmHg.  · The following left ventricular wall segments are hypokinetic: mid   anterior, apical anterior, mid anterolateral, apical lateral, mid   inferolateral, apical inferior, mid inferior, apical septal, mid   inferoseptal, apex hypokinetic and mid anteroseptal. The following left   ventricular wall segments are akinetic: basal anterior, basal   anterolateral, basal inferolateral, basal inferior, basal inferoseptal and   basal anteroseptal.         Condition on Discharge: Improved.  She is feeling better today.  She still has some right flank pain.  Today she states she does not wish to have any procedures and is hoping to pass it on her own.  She would like to go home today.    Temp:  [97.1 °F (36.2 °C)-98.7 °F (37.1 °C)] 97.6 °F (36.4 °C)  Heart Rate:  []  101  Resp:  [16] 16  BP: ()/(73-81) 102/78  Body mass index is 32.07 kg/m².    Physical Exam   Constitutional: She is oriented to person, place, and time. No distress.   Cardiovascular: Normal rate and regular rhythm.   Pulmonary/Chest: Effort normal and breath sounds normal. No respiratory distress.   Abdominal: Soft. Bowel sounds are normal. There is no tenderness.   Neurological: She is alert and oriented to person, place, and time.   Skin: Skin is warm and dry.   Psychiatric: She has a normal mood and affect. Her behavior is normal.   Nursing note and vitals reviewed.       Discharge Medications      New Medications      Instructions Start Date   HYDROcodone-acetaminophen 7.5-325 MG per tablet  Commonly known as:  NORCO   1 tablet, Oral, Every 6 Hours PRN         Continue These Medications      Instructions Start Date   acetaminophen 325 MG tablet  Commonly known as:  TYLENOL   650 mg, Oral, Every 6 Hours PRN      albuterol sulfate  (90 Base) MCG/ACT inhaler  Commonly known as:  PROVENTIL HFA;VENTOLIN HFA;PROAIR HFA   2 puffs, Inhalation, Every 4 Hours PRN      aspirin 81 MG EC tablet   81 mg, Oral, Daily      bumetanide 1 MG tablet  Commonly known as:  BUMEX   1 mg, Oral, 2 Times Daily      Emtricitabine-Tenofovir -25 MG per tablet  Commonly known as:  DESCOVY   Oral, Nightly      Entresto 24-26 MG tablet  Generic drug:  sacubitril-valsartan   1 tablet, Oral, Every 12 Hours Scheduled      potassium chloride 10 MEQ CR capsule  Commonly known as:  MICRO-K   20 mEq, Oral, Daily      Prezista 800 MG tablet tablet  Generic drug:  darunavir ethanolate   800 mg, Oral, Nightly      ritonavir 100 MG tablet  Commonly known as:  NORVIR   100 mg, Oral, Nightly      sertraline 50 MG tablet  Commonly known as:  ZOLOFT   50 mg, Oral, Daily      vitamin D 1.25 MG ([address] UT) capsule capsule  Commonly known as:  ERGOCALCIFEROL   50,000 Units, Oral, Weekly         Stop These Medications    carvedilol 3.125 MG  tablet  Commonly known as:  COREG     oxyCODONE-acetaminophen 7.5-325 MG per tablet  Commonly known as:  PERCOCET           Diet Instructions     Diet: Regular, Cardiac      Discharge Diet:   Regular  Cardiac            Activity Instructions     Activity as Tolerated           Additional Instructions for the Follow-ups that You Need to Schedule     Call MD for problems / concerns.   As directed        Follow-up Information     Lisset Melton MD Follow up in 4 week(s).    Specialty:  Cardiology  Why:  Call to schedule follow up appointment  Contact information:  [address] [facility] [address]  [place] [address]  [phone]             Andrew Watson MD Follow up in 2 week(s).    Specialty:  Urology  Contact information:  [address] [facility] [address] [facility] [address]  [place] [address]  [phone]             [facility] Follow up.    Contact information:  [place] [address]  [phone]           [facility] Follow up.    Contact information:  [address] [place] [id number] [phone]                Sami Mercado MD  07/13/20  11:10    Discharge time spent greater than 30 minutes.

## 2020-07-11 NOTE — PLAN OF CARE
Problem: Patient Care Overview  Goal: Plan of Care Review  7/11/2020 0619 by Nic Shine RN  Outcome: Ongoing (interventions implemented as appropriate)  Flowsheets (Taken 7/11/2020 0619)  Plan of Care Reviewed With: patient  Outcome Summary: Medicated for back pain. Bumex held dose last night due to low BP 88 manually -APRN Crawford's ordered to hold.,Vital signs monitored. Bed alarm refused this am . SCDs removed this am per patient request. Seen by Dr. Basilio and told patient that there will be no surgery at this time. Informed Dr. Basilio that patient was only given Lortab (pain controlled better). Afebrile. Monitored,

## 2020-07-11 NOTE — PLAN OF CARE
Pt remains hypotensive, no signs of distress, c/o weakness and dizziness when getting up to the bedside commode, on RA. Pt has orders to be discharge, however pt started feeling generalized weakness and dizziness.MD aware.Discharge orders are in however pt is not going to be discharged as of now per Dr. Mercado. Educated about bed alarm and prevention of falls, pt agreed to bed alarm, will continue to monitor

## 2020-07-11 NOTE — NURSING NOTE
SAMUEL Crawford informed of patient's  Manual  Low  BP 88/62 and was notified about  All the ordered meds that is scheduled for 9 pm and was told to give all except to hold Bumex tonight  (give entresto) and for pain med,changed Percocet to Norco (under Dr. Kc).

## 2020-07-11 NOTE — PROGRESS NOTES
Right 1-2 mm distal ureteral stone    afvss  Pain well controlled    Patient doews not want operative intervention    Ok to d/c home   Follow up with dr ayaan sifuentes in 2 weeks  Please call if any questions

## 2020-07-11 NOTE — PROGRESS NOTES
LOS: 0 days   Patient Care Team:  System, Provider Not In as PCP - General    Chief Complaint:     Dizziness and hypotension    Interval History:     She just wants to sleep. She is dizzy and weak.  Her BP is very low.  She has no chest pain and does not feel her heart racing.     Objective   Vital Signs  Temp:  [96.6 °F (35.9 °C)-97.3 °F (36.3 °C)] 97.1 °F (36.2 °C)  Heart Rate:  [78-97] 95  Resp:  [14-18] 16  BP: ()/(51-85) 97/85    Intake/Output Summary (Last 24 hours) at 7/11/2020 1417  Last data filed at 7/11/2020 0036  Gross per 24 hour   Intake 200 ml   Output 600 ml   Net -400 ml       Comfortable NAD  conjunctivae clear  Neck supple, no JVD or thyromegaly appreciated  S1/S2 RRR, no m/r/g  Lungs CTA B, normal effort  Abdomen S/NT/ND (+) BS, no HSM appreciated  Extremities warm, no clubbing, cyanosis, or edema  No visible or palpable skin lesions  A/Ox4, mood and affect appropriate    Results Review:      Results from last 7 days   Lab Units 07/11/20  0447 07/10/20  0548 07/09/20  0504   SODIUM mmol/L 137 138 136   POTASSIUM mmol/L 3.7 3.7 3.8   CHLORIDE mmol/L 100 102 103   CO2 mmol/L 27.7 26.3 22.5   BUN mg/dL 10 8 10   CREATININE mg/dL 0.73 0.79 0.69   GLUCOSE mg/dL 132* 112* 124*   CALCIUM mg/dL 9.0 9.0 9.0     Results from last 7 days   Lab Units 07/07/20  2315   TROPONIN T ng/mL <0.010     Results from last 7 days   Lab Units 07/11/20  0447 07/10/20  0548 07/08/20  0639   WBC 10*3/mm3 8.53 7.68 7.91   HEMOGLOBIN g/dL 11.9* 11.2* 10.9*   HEMATOCRIT % 35.1 33.4* 32.6*   PLATELETS 10*3/mm3 148 129* 132*                       I reviewed the patient's new clinical results.  I personally viewed and interpreted the patient's EKG/Telemetry data        Medication Review:     aspirin 81 mg Oral Daily   bumetanide 1 mg Oral BID   carvedilol 3.125 mg Oral BID With Meals   darunavir ethanolate 800 mg Oral Nightly   Emtricitabine-Tenofovir AF 1 tablet Oral Daily   potassium chloride 20 mEq Oral Daily    ritonavir 100 mg Oral Nightly   sacubitril-valsartan 1 tablet Oral Q12H   sertraline 50 mg Oral Daily   sodium chloride 10 mL Intravenous Q12H            Assessment/Plan       Acute on chronic systolic CHF (congestive heart failure) (CMS/HCC)    Asthma    Hypertension    HIV (human immunodeficiency virus infection) (CMS/HCC)    Class 2 obesity in adult    NICM (nonischemic cardiomyopathy) (CMS/HCC)    Elevated LFTs    Hypopotassemia    Hematuria    Flank pain    1. Decompensated systolic congestive heart failure, s/p AICD done 2017.  2. Severe dilated nonischemic cardiomyopathy.  3. Mitral insufficiency, moderate to severe   4. HIV positive  5. Moderate TR  6. Obesity.   7. Hypotension with weakness and fatigue.      Stop coreg, continue entresto and bumex, will follow.     Lisset Melton MD  07/11/20  14:17

## 2020-07-11 NOTE — NURSING NOTE
"Pt's BP has been decreasing since yesterday. Pt has remained asymptomatic, however during afternoon rounding, the pt c/o \"not feeling good\". Orthostatic Bps have been taken and charted. MD aware. No new orders. Thank you  "

## 2020-07-11 NOTE — CONSULTS
FIRST UROLOGY CONSULT      Patient Identification:  NAME:  Nina Marroquin  Age:  44 y.o.   Sex:  female   :  1975   MRN:  [id number]       Chief complaint: Flank pain    History of present illness: 44-year-old female history of congestive heart failure comes in with shortness of breath.  Started having right-sided flank pain.  CT scan shows a 2 mm minimally obstructing stone in the right ureter distally.  No prior history of stones.  No nausea.  Pain is manageable.      Past medical history:  Past Medical History:   Diagnosis Date   • Asthma    • CHF (congestive heart failure) (CMS/HCC)    • Hypertension        Past surgical history:  Past Surgical History:   Procedure Laterality Date   • CARDIAC CATHETERIZATION     • FRACTURE SURGERY Left     left ankle       Allergies:  Patient has no known allergies.    Home medications:  Medications Prior to Admission   Medication Sig Dispense Refill Last Dose   • acetaminophen (TYLENOL) 325 MG tablet Take 650 mg by mouth Every 6 (Six) Hours As Needed for Mild Pain .   Past Week at Unknown time   • albuterol sulfate  (90 Base) MCG/ACT inhaler Inhale 2 puffs Every 4 (Four) Hours As Needed for Wheezing.   2020 at Unknown time   • aspirin 81 MG EC tablet Take 81 mg by mouth Daily.   2020 at Unknown time   • bumetanide (BUMEX) 1 MG tablet Take 1 mg by mouth 2 (Two) Times a Day.   2020 at Unknown time   • carvedilol (COREG) 3.125 MG tablet Take 1 tablet by mouth 2 (Two) Times a Day With Meals. 60 tablet 0 2020 at Unknown time   • darunavir ethanolate (PREZISTA) 800 MG tablet tablet Take 800 mg by mouth Every Night.   2020 at Unknown time   • Emtricitabine-Tenofovir AF (DESCOVY) 200-25 MG per tablet Take  by mouth Every Night.   2020 at Unknown time   • oxyCODONE-acetaminophen (PERCOCET) 7.5-325 MG per tablet Take 1 tablet by mouth Every 6 (Six) Hours As Needed for Moderate Pain .   2020 at Unknown time   • potassium  chloride (MICRO-K) 10 MEQ CR capsule Take 2 capsules by mouth Daily. 60 capsule 0 2020 at Unknown time   • ritonavir (NORVIR) 100 MG tablet Take 100 mg by mouth Every Night.   2020 at Unknown time   • sacubitril-valsartan (ENTRESTO) 24-26 MG tablet Take 1 tablet by mouth Every 12 (Twelve) Hours. 60 tablet 0 2020 at Unknown time   • sertraline (ZOLOFT) 50 MG tablet Take 50 mg by mouth Daily.      • vitamin D (ERGOCALCIFEROL) 30458 units capsule capsule Take 50,000 Units by mouth 1 (One) Time Per Week.   Past Week at Unknown time       Hospital medications:    aspirin 81 mg Oral Daily   bumetanide 1 mg Oral BID   carvedilol 3.125 mg Oral BID With Meals   darunavir ethanolate 800 mg Oral Nightly   Emtricitabine-Tenofovir AF 1 tablet Oral Daily   potassium chloride 20 mEq Oral Daily   ritonavir 100 mg Oral Nightly   sacubitril-valsartan 1 tablet Oral Q12H   sertraline 50 mg Oral Daily   sodium chloride 10 mL Intravenous Q12H        •  acetaminophen **OR** acetaminophen **OR** acetaminophen  •  albuterol  •  bisacodyl  •  calcium carbonate  •  nitroglycerin  •  ondansetron **OR** ondansetron  •  oxyCODONE-acetaminophen  •  potassium chloride **OR** potassium chloride **OR** potassium chloride  •  sodium chloride  •  sodium chloride    Family history:  No family history on file.    Social history:  Social History     Tobacco Use   • Smoking status: Former Smoker     Last attempt to quit: 2015     Years since quittin.5   • Smokeless tobacco: Never Used   Substance Use Topics   • Alcohol use: Yes     Alcohol/week: 1.0 standard drinks     Types: 1 Glasses of wine per week     Comment: RARELY   • Drug use: Yes     Types: Marijuana       Review of systems:    Negative 12-system ROS except for the following: No fevers or chills.  Pain is above nausea as above.      Objective:  TMax 24 hours:   Temp (24hrs), Av.5 °F (36.4 °C), Min:96.9 °F (36.1 °C), Max:98.3 °F (36.8 °C)      Vitals Ranges:   Temp:  [96.9 °F  (36.1 °C)-98.3 °F (36.8 °C)] 97 °F (36.1 °C)  Heart Rate:  [72-91] 91  Resp:  [16-18] 18  BP: ()/(54-74) 88/68    Intake/Output Last 3 shifts:  I/O last 3 completed shifts:  In: -   Out: 900 [Urine:900]     Physical Exam:       General Appearance:    Alert, cooperative, in no acute distress   Head:    Normocephalic, without obvious abnormality, atraumatic   Eyes:          PERRL, conjunctivae and corneas clear   Ears:    Normal external inspection   Throat:   No oral lesions, oral mucosa moist   Neck:   Supple, no LAD, trachea midline   Back:     No CVA tenderness   Lungs:     Respirations unlabored, symmetric excursion    Heart:    RRR, intact peripheral pulses   Abdomen:    Mildly tender right flank   :   Deferred   Extremities:   No edema, no deformity   Skin:   No bleeding, bruising or rashes   Neuro/Psych:   Orientation intact, mood/affect pleasant, no focal findings       Results review:   I reviewed the patient's new clinical results.    Data review:  Lab Results (last 24 hours)     Procedure Component Value Units Date/Time    Comprehensive Metabolic Panel [[id number]]  (Abnormal) Collected:  07/10/20 0548    Specimen:  Blood from Hand, Left Updated:  07/10/20 0730     Glucose 112 mg/dL      BUN 8 mg/dL      Creatinine 0.79 mg/dL      Sodium 138 mmol/L      Potassium 3.7 mmol/L      Chloride 102 mmol/L      CO2 26.3 mmol/L      Calcium 9.0 mg/dL      Total Protein 5.7 g/dL      Albumin 3.50 g/dL      ALT (SGPT) 56 U/L      AST (SGOT) 24 U/L      Alkaline Phosphatase 74 U/L      Total Bilirubin 0.5 mg/dL      eGFR  African Amer 96 mL/min/1.73      Globulin 2.2 gm/dL      A/G Ratio 1.6 g/dL      BUN/Creatinine Ratio 10.1     Anion Gap 9.7 mmol/L     Narrative:       GFR Normal >60  Chronic Kidney Disease <60  Kidney Failure <15      CBC (No Diff) [[id number]]  (Abnormal) Collected:  07/10/20 0548    Specimen:  Blood Updated:  07/10/20 0658     WBC 7.68 10*3/mm3      RBC 3.39 10*6/mm3      Hemoglobin 11.2  g/dL      Hematocrit 33.4 %      MCV 98.5 fL      MCH 33.0 pg      MCHC 33.5 g/dL      RDW 12.4 %      RDW-SD 44.7 fl      MPV 13.0 fL      Platelets 129 10*3/mm3            Imaging:  Imaging Results (Last 24 Hours)     ** No results found for the last 24 hours. **             Assessment:       Acute on chronic systolic CHF (congestive heart failure) (CMS/HCC)    Asthma    Hypertension    HIV (human immunodeficiency virus infection) (CMS/HCC)    Class 2 obesity in adult    NICM (nonischemic cardiomyopathy) (CMS/HCC)    Elevated LFTs    Hypopotassemia    Hematuria    Flank pain    Nonobstructing right distal stone    Plan:     We will manage conservatively.  She does not want to have anything done at this point.  We will see how she is doing in the morning.  She should be able to pass this calculus without too much issues.    Andrew Watson MD  07/10/20  20:05

## 2020-07-11 NOTE — PROGRESS NOTES
[facility]               ASSOCIATES     LOS: 0 days     Name: Nina Marroquin  Age: 44 y.o.  Sex: female  :  1975  MRN: [id number]         Primary Care Physician: System, Provider Not In    Diet Regular; Cardiac    Subjective    She was feeling fine this morning and wanted to go home however had some hypotension now is feeling dizzy and lightheaded.  Flank pain was acceptable    Review of Systems   Constitutional: Negative for fever.   Respiratory: Negative for cough.    Cardiovascular: Negative for chest pain.   Genitourinary: Negative for dysuria.   Neurological: Positive for dizziness and light-headedness.     Objective   Temp:  [96.6 °F (35.9 °C)-97.3 °F (36.3 °C)] 97.1 °F (36.2 °C)  Heart Rate:  [78-97] 95  Resp:  [14-18] 16  BP: ()/(51-85) 97/85  SpO2:  [91 %-96 %] 95 %  on   ;   Device (Oxygen Therapy): room air  Body mass index is 32.59 kg/m².    Physical Exam   Constitutional: She is oriented to person, place, and time. No distress.   Cardiovascular: Normal rate and regular rhythm.   Pulmonary/Chest: Effort normal and breath sounds normal. No respiratory distress.   Abdominal: Soft. Bowel sounds are normal. There is no tenderness. There is no rebound and no guarding.   Musculoskeletal: She exhibits no edema.   Neurological: She is alert and oriented to person, place, and time.   Skin: Skin is warm and dry.   Psychiatric: She has a normal mood and affect. Her behavior is normal.   Nursing note and vitals reviewed.    Reviewed medications and new clinical results    Scheduled Meds    aspirin 81 mg Oral Daily   bumetanide 1 mg Oral BID   darunavir ethanolate 800 mg Oral Nightly   Emtricitabine-Tenofovir AF 1 tablet Oral Daily   potassium chloride 20 mEq Oral Daily   ritonavir 100 mg Oral Nightly   sacubitril-valsartan 1 tablet Oral Q12H   sertraline 50 mg Oral Daily   sodium chloride 10 mL Intravenous Q12H     Continuous Infusions   PRN Meds  •   acetaminophen **OR** acetaminophen **OR** acetaminophen  •  albuterol  •  bisacodyl  •  calcium carbonate  •  HYDROcodone-acetaminophen  •  Morphine  •  nitroglycerin  •  ondansetron **OR** ondansetron  •  potassium chloride **OR** potassium chloride **OR** potassium chloride  •  sodium chloride  •  sodium chloride    Results from last 7 days   Lab Units 07/11/20  0447 07/10/20  0548 07/08/20  0639 07/07/20  2315   WBC 10*3/mm3 8.53 7.68 7.91 9.47   HEMOGLOBIN g/dL 11.9* 11.2* 10.9* 12.2   PLATELETS 10*3/mm3 148 129* 132* 142     Results from last 7 days   Lab Units 07/11/20  0447 07/10/20  0548 07/09/20  0504 07/08/20 2100 07/08/20 0639 07/07/20  2315   SODIUM mmol/L 137 138 136  --  138 139   POTASSIUM mmol/L 3.7 3.7 3.8 4.4 3.3* 3.8   CHLORIDE mmol/L 100 102 103  --  105 107   CO2 mmol/L 27.7 26.3 22.5  --  22.5 22.1   BUN mg/dL 10 8 10  --  14 14   CREATININE mg/dL 0.73 0.79 0.69  --  0.79 0.89   CALCIUM mg/dL 9.0 9.0 9.0  --  8.2* 8.9   GLUCOSE mg/dL 132* 112* 124*  --  120* 129*     Lab Results   Component Value Date    ANIONGAP 9.3 07/11/2020     Results from last 7 days   Lab Units 07/10/20  0548 07/09/20  0504 07/07/20  2315   ALK PHOS U/L 74 90 101   BILIRUBIN mg/dL 0.5 0.5 0.6   ALT (SGPT) U/L 56* 71* 68*   AST (SGOT) U/L 24 35* 45*      Estimated Creatinine Clearance: 112.1 mL/min (by C-G formula based on SCr of 0.73 mg/dL).    Lab Results   Component Value Date    COVID19 Not Detected 07/08/2020       Intake/Output Summary (Last 24 hours) at 7/11/2020 1512  Last data filed at 7/11/2020 0036  Gross per 24 hour   Intake 200 ml   Output 600 ml   Net -400 ml      Assessment/Plan   Active Hospital Problems    Diagnosis  POA   • **Acute on chronic systolic CHF (congestive heart failure) (CMS/[facility]) [I50.23]  Yes   • NICM (nonischemic cardiomyopathy) (CMS/[facility]) [I42.8]  Unknown   • Elevated LFTs [R79.89]  Unknown   • Hypopotassemia [E87.6]  Unknown   • Hematuria [R31.9]  Unknown   • Flank pain [R10.9]  Unknown    • Hypertension [I10]  Yes   • Asthma [J45.909]  Yes   • HIV (human immunodeficiency virus infection) (CMS/HCC) [B20]  Yes   • Class 2 obesity in adult [E66.9]  Yes      Resolved Hospital Problems   No resolved problems to display.     44 y.o. female history of hypertension, HIV, dilated nonischemic cardiomyopathy admitted with shortness of breath    · Decompensated systolic CHF, AICD 2017: Medications per cardiology.  EF 6%.  Hypotensive and symptomatic today.  Cardiology stopped Coreg but continuing Entresto and Bumex.  Continue to monitor for now.  · Right flank pain, hematuria, right kidney stone: Patient declines any intervention at this time she will follow-up with urology in 2 weeks  · Elevated LFTs.  Improved possibly from CHF  · Hypokalemia resolved  · HIV  · Thrombocytopenia, mild: Improved today  · Body mass index is 32.59 kg/m².   · Disposition to be determined initially was going home today but was hypotensive, symptomatic  · Discussed with patient and nursing staff.    Sami Mercado MD   07/11/20  15:12

## 2020-07-12 LAB
ALBUMIN SERPL-MCNC: 3.5 G/DL (ref 3.5–5.2)
ALBUMIN/GLOB SERPL: 1.6 G/DL
ALP SERPL-CCNC: 72 U/L (ref 39–117)
ALT SERPL W P-5'-P-CCNC: 34 U/L (ref 1–33)
ANION GAP SERPL CALCULATED.3IONS-SCNC: 9.9 MMOL/L (ref 5–15)
AST SERPL-CCNC: 15 U/L (ref 1–32)
BILIRUB SERPL-MCNC: 0.6 MG/DL (ref 0–1.2)
BUN SERPL-MCNC: 11 MG/DL (ref 6–20)
BUN/CREAT SERPL: 12.4 (ref 7–25)
CALCIUM SPEC-SCNC: 9.1 MG/DL (ref 8.6–10.5)
CHLORIDE SERPL-SCNC: 101 MMOL/L (ref 98–107)
CO2 SERPL-SCNC: 25.1 MMOL/L (ref 22–29)
CREAT SERPL-MCNC: 0.89 MG/DL (ref 0.57–1)
GFR SERPL CREATININE-BSD FRML MDRD: 84 ML/MIN/1.73
GLOBULIN UR ELPH-MCNC: 2.2 GM/DL
GLUCOSE SERPL-MCNC: 104 MG/DL (ref 65–99)
POTASSIUM SERPL-SCNC: 4 MMOL/L (ref 3.5–5.2)
PROT SERPL-MCNC: 5.7 G/DL (ref 6–8.5)
SODIUM SERPL-SCNC: 136 MMOL/L (ref 136–145)

## 2020-07-12 PROCEDURE — 80053 COMPREHEN METABOLIC PANEL: CPT | Performed by: HOSPITALIST

## 2020-07-12 RX ADMIN — ASPIRIN 81 MG: 81 TABLET, COATED ORAL at 09:12

## 2020-07-12 RX ADMIN — SERTRALINE 50 MG: 50 TABLET, FILM COATED ORAL at 09:12

## 2020-07-12 RX ADMIN — HYDROCODONE BITARTRATE AND ACETAMINOPHEN 1 TABLET: 7.5; 325 TABLET ORAL at 16:40

## 2020-07-12 RX ADMIN — BUMETANIDE 1 MG: 1 TABLET ORAL at 20:29

## 2020-07-12 RX ADMIN — BUMETANIDE 1 MG: 1 TABLET ORAL at 09:14

## 2020-07-12 RX ADMIN — EMTRICITABINE AND TENOFOVIR ALAFENAMIDE 1 TABLET: 200; 25 TABLET ORAL at 20:29

## 2020-07-12 RX ADMIN — DARUNAVIR 800 MG: 800 TABLET, FILM COATED ORAL at 20:29

## 2020-07-12 RX ADMIN — SACUBITRIL AND VALSARTAN 1 TABLET: 24; 26 TABLET, FILM COATED ORAL at 20:29

## 2020-07-12 RX ADMIN — POTASSIUM CHLORIDE 20 MEQ: 10 CAPSULE, COATED, EXTENDED RELEASE ORAL at 09:12

## 2020-07-12 RX ADMIN — SACUBITRIL AND VALSARTAN 1 TABLET: 24; 26 TABLET, FILM COATED ORAL at 09:14

## 2020-07-12 RX ADMIN — SODIUM CHLORIDE, PRESERVATIVE FREE 10 ML: 5 INJECTION INTRAVENOUS at 05:35

## 2020-07-12 RX ADMIN — RITONAVIR 100 MG: 100 TABLET, FILM COATED ORAL at 20:29

## 2020-07-12 NOTE — PLAN OF CARE
Problem: Patient Care Overview  Goal: Plan of Care Review  Outcome: Ongoing (interventions implemented as appropriate)  Flowsheets (Taken 7/12/2020 2068)  Plan of Care Reviewed With: patient  Outcome Summary: Patient resting well,no complaints of of pain. VItals taken and recorded. Not in any distress. Daily weight. Possible discharge today?

## 2020-07-12 NOTE — PROGRESS NOTES
[facility]               ASSOCIATES     LOS: 0 days     Name: Nina Marroquin  Age: 44 y.o.  Sex: female  :  1975  MRN: [id number]         Primary Care Physician: System, Provider Not In    Diet Regular; Cardiac    Subjective    Does not feel as well today mostly right flank pain.  Taking mostly p.o. pain medication.  Denies that she is dizzy or lightheaded and denies having felt dizzy or lightheaded yesterday.    Review of Systems   Constitutional: Negative for fever.   Respiratory: Negative for cough.    Cardiovascular: Negative for chest pain.   Genitourinary: Positive for flank pain. Negative for dysuria.   Neurological: Negative for dizziness and light-headedness.     Objective   Temp:  [96.4 °F (35.8 °C)-98.1 °F (36.7 °C)] 98.1 °F (36.7 °C)  Heart Rate:  [] 96  Resp:  [16] 16  BP: ()/(51-88) 113/88  SpO2:  [91 %-98 %] 95 %  on   ;   Device (Oxygen Therapy): room air  Body mass index is 32.6 kg/m².    Physical Exam   Constitutional: She is oriented to person, place, and time. No distress.   Cardiovascular: Normal rate and regular rhythm.   Pulmonary/Chest: Effort normal and breath sounds normal. No respiratory distress.   Abdominal: Soft. Bowel sounds are normal. There is no tenderness. There is CVA tenderness (right). There is no rebound and no guarding.   Musculoskeletal: She exhibits no edema.   Neurological: She is alert and oriented to person, place, and time.   Skin: Skin is warm and dry.   Psychiatric: She has a normal mood and affect. Her behavior is normal.   Nursing note and vitals reviewed.    Reviewed medications and new clinical results    Scheduled Meds    aspirin 81 mg Oral Daily   bumetanide 1 mg Oral BID   darunavir ethanolate 800 mg Oral Nightly   Emtricitabine-Tenofovir AF 1 tablet Oral Daily   potassium chloride 20 mEq Oral Daily   ritonavir 100 mg Oral Nightly   sacubitril-valsartan 1 tablet Oral Q12H   sertraline 50 mg Oral Daily    sodium chloride 10 mL Intravenous Q12H     Continuous Infusions   PRN Meds  •  acetaminophen **OR** acetaminophen **OR** acetaminophen  •  albuterol  •  bisacodyl  •  calcium carbonate  •  HYDROcodone-acetaminophen  •  Morphine  •  nitroglycerin  •  ondansetron **OR** ondansetron  •  potassium chloride **OR** potassium chloride **OR** potassium chloride  •  sodium chloride  •  sodium chloride    Results from last 7 days   Lab Units 07/11/20 0447 07/10/20  0548 07/08/20  0639 07/07/20  2315   WBC 10*3/mm3 8.53 7.68 7.91 9.47   HEMOGLOBIN g/dL 11.9* 11.2* 10.9* 12.2   PLATELETS 10*3/mm3 148 129* 132* 142     Results from last 7 days   Lab Units 07/12/20  0640 07/11/20 0447 07/10/20  0548 07/09/20  0504 07/08/20  2100 07/08/20  0639 07/07/20  2315   SODIUM mmol/L 136 137 138 136  --  138 139   POTASSIUM mmol/L 4.0 3.7 3.7 3.8 4.4 3.3* 3.8   CHLORIDE mmol/L 101 100 102 103  --  105 107   CO2 mmol/L 25.1 27.7 26.3 22.5  --  22.5 22.1   BUN mg/dL 11 10 8 10  --  14 14   CREATININE mg/dL 0.89 0.73 0.79 0.69  --  0.79 0.89   CALCIUM mg/dL 9.1 9.0 9.0 9.0  --  8.2* 8.9   GLUCOSE mg/dL 104* 132* 112* 124*  --  120* 129*     Lab Results   Component Value Date    ANIONGAP 9.9 07/12/2020     Results from last 7 days   Lab Units 07/12/20  0640 07/10/20  0548 07/09/20  0504   ALK PHOS U/L 72 74 90   BILIRUBIN mg/dL 0.6 0.5 0.5   ALT (SGPT) U/L 34* 56* 71*   AST (SGOT) U/L 15 24 35*      Estimated Creatinine Clearance: 91.9 mL/min (by C-G formula based on SCr of 0.89 mg/dL).    Lab Results   Component Value Date    COVID19 Not Detected 07/08/2020       Intake/Output Summary (Last 24 hours) at 7/12/2020 1233  Last data filed at 7/11/2020 2020  Gross per 24 hour   Intake 100 ml   Output --   Net 100 ml      Assessment/Plan   Active Hospital Problems    Diagnosis  POA   • **Acute on chronic systolic CHF (congestive heart failure) (CMS/[facility]) [I50.23]  Yes   • NICM (nonischemic cardiomyopathy) (CMS/[facility]) [I42.8]  Unknown   • Elevated  LFTs [R79.89]  Unknown   • Hypopotassemia [E87.6]  Unknown   • Hematuria [R31.9]  Unknown   • Flank pain [R10.9]  Unknown   • Hypertension [I10]  Yes   • Asthma [J45.909]  Yes   • HIV (human immunodeficiency virus infection) (CMS/HCC) [B20]  Yes   • Class 2 obesity in adult [E66.9]  Yes      Resolved Hospital Problems   No resolved problems to display.     44 y.o. female history of hypertension, HIV, dilated nonischemic cardiomyopathy admitted with shortness of breath    · Decompensated systolic CHF, AICD 2017: Medications per cardiology.  EF 6%.  Hypotensive yesterday.  Cardiology adjusted medications.  Entresto, Bumex  · Right flank pain, hematuria, right kidney stone: Patient does not want any intervention.  Follow-up with urology in 2 weeks  · Elevated LFTs.  Improved possibly from CHF   · Hypokalemia resolved  · HIV continue antiretroviral therapy  · Thrombocytopenia, mild: Improved   · Body mass index is 32.6 kg/m².   · Disposition to be determined probably soon  · Discussed with patient and nursing staff.    Sami Mercado MD   07/12/20  12:33

## 2020-07-12 NOTE — PLAN OF CARE
VSS, no signs of distress, loss of IV but pt refuses another IV insertion. Pt had two BM today, c/o pain (See MAR), continues on RA, A&Ox4, will continue to monitor

## 2020-07-12 NOTE — NURSING NOTE
Patient called RN to come to her room again for the third time about turning her bed alarm off. Patient was educated why the bed alarm is used and that is for safety and prevent her from falling with previous low BP readings. Patient verbalized understanding but still strongly argued that she wants it turned off. Charge Nurse Talita updated.

## 2020-07-13 VITALS
HEIGHT: 66 IN | WEIGHT: 198.7 LBS | DIASTOLIC BLOOD PRESSURE: 71 MMHG | SYSTOLIC BLOOD PRESSURE: 98 MMHG | TEMPERATURE: 96.7 F | BODY MASS INDEX: 31.93 KG/M2 | HEART RATE: 101 BPM | OXYGEN SATURATION: 96 % | RESPIRATION RATE: 16 BRPM

## 2020-07-13 RX ADMIN — SODIUM CHLORIDE, PRESERVATIVE FREE 10 ML: 5 INJECTION INTRAVENOUS at 09:42

## 2020-07-13 RX ADMIN — SERTRALINE 50 MG: 50 TABLET, FILM COATED ORAL at 09:42

## 2020-07-13 RX ADMIN — POTASSIUM CHLORIDE 20 MEQ: 10 CAPSULE, COATED, EXTENDED RELEASE ORAL at 09:42

## 2020-07-13 RX ADMIN — ASPIRIN 81 MG: 81 TABLET, COATED ORAL at 09:42

## 2020-07-13 RX ADMIN — BUMETANIDE 1 MG: 1 TABLET ORAL at 09:42

## 2020-07-13 RX ADMIN — SACUBITRIL AND VALSARTAN 1 TABLET: 24; 26 TABLET, FILM COATED ORAL at 09:42

## 2020-07-13 NOTE — PROGRESS NOTES
Case Management Discharge Note      Final Note: Discharged home--patient denied having any needs. Ely Gibbs CSW         Destination      No service has been selected for the patient.      Durable Medical Equipment      No service has been selected for the patient.      Dialysis/Infusion      No service has been selected for the patient.      Home Medical Care      No service has been selected for the patient.      Therapy      No service has been selected for the patient.      Community Resources      No service has been selected for the patient.        Transportation Services  Private: Car    Final Discharge Disposition Code: 01 - home or self-care

## 2020-07-13 NOTE — PLAN OF CARE
Patient resting well. No complaints of pain. Not in any distress. Vital signs as recorded. Refused bed alarm. Possible discharge today. Monitored.

## 2020-07-14 ENCOUNTER — READMISSION MANAGEMENT (OUTPATIENT)
Dept: CALL CENTER | Facility: HOSPITAL | Age: 45
End: 2020-07-14

## 2020-07-14 NOTE — OUTREACH NOTE
Prep Survey      Responses   [facility] facility patient discharged from?  [place]   Is LACE score < 7 ?  No   Eligibility  Readm Mgmt   Discharge diagnosis  CHF   COVID-19 Test Status  Negative   Does the patient have one of the following disease processes/diagnoses(primary or secondary)?  CHF   Does the patient have Home health ordered?  No   Is there a DME ordered?  No   Comments regarding appointments  multiple apmts listed   Medication alerts for this patient  see AVS   Prep survey completed?  Yes          Bia Watson RN

## 2020-07-15 ENCOUNTER — READMISSION MANAGEMENT (OUTPATIENT)
Dept: CALL CENTER | Facility: HOSPITAL | Age: 45
End: 2020-07-15

## 2020-07-15 NOTE — OUTREACH NOTE
CHF Week 1 Survey      Responses   [facility] patient discharged from?  [place]   Does the patient have one of the following disease processes/diagnoses(primary or secondary)?  CHF   CHF Week 1 attempt successful?  Yes   Call start time  1544   Call end time  1552   Discharge diagnosis  CHF   Meds reviewed with patient/caregiver?  Yes   Is the patient having any side effects they believe may be caused by any medication additions or changes?  No   Does the patient have all medications ordered at discharge?  Yes   Is the patient taking all medications as directed (includes completed medication regime)?  Yes   Does the patient have a primary care provider?   Yes   Does the patient have an appointment with their PCP within 7 days of discharge?  Yes   Has the patient kept scheduled appointments due by today?  N/A   Psychosocial issues?  No   Comments  Pt reports SOA is better but reports still having pain in her right side. No issues urinating. She is on a NA restriction and 2L fluid restriction daily.    Did the patient receive a copy of their discharge instructions?  Yes   Nursing interventions  Reviewed instructions with patient   What is the patient's perception of their health status since discharge?  Improving   Nursing interventions  Nurse provided patient education   Is the patient weighing daily?  Yes   Does the patient have scales?  Yes   Daily weight interventions  Education provided on importance of daily weight   Is the patient able to teach back Heart Failure diet management?  Yes   Is the patient able to teach back signs and symptoms of worsening condition? (i.e. weight gain, shortness of air, etc.)  Yes   Is the patient/caregiver able to teach back the hierarchy of who to call/visit for symptoms/problems? PCP, Specialist, Home health nurse, Urgent Care, ED, 911  Yes    CHF Week 1 call completed?  Yes          Sherry Shukla RN

## 2020-07-22 ENCOUNTER — READMISSION MANAGEMENT (OUTPATIENT)
Dept: CALL CENTER | Facility: HOSPITAL | Age: 45
End: 2020-07-22

## 2020-07-22 NOTE — OUTREACH NOTE
CHF Week 2 Survey      Responses   [facility] [place] patient discharged from?  [place]   Does the patient have one of the following disease processes/diagnoses(primary or secondary)?  CHF   Week 2 attempt successful?  Yes   Call start time  1918   Call end time  1923   Discharge diagnosis  CHF   Meds reviewed with patient/caregiver?  Yes   Is the patient having any side effects they believe may be caused by any medication additions or changes?  No   Does the patient have all medications ordered at discharge?  Yes   Is the patient taking all medications as directed (includes completed medication regime)?  Yes   Does the patient have a primary care provider?   Yes   Does the patient have an appointment with their PCP within 7 days of discharge?  Yes   Has the patient kept scheduled appointments due by today?  N/A   Has home health visited the patient within 72 hours of discharge?  N/A   Pulse Ox monitoring  None   Did the patient receive a copy of their discharge instructions?  Yes   Nursing interventions  Reviewed instructions with patient   What is the patient's perception of their health status since discharge?  New symptoms unrelated to diagnosis   Nursing interventions  Nurse provided patient education   Is the patient weighing daily?  Yes   Does the patient have scales?  Yes   Is the patient able to teach back Heart Failure diet management?  Yes   Is the patient able to teach back Heart Failure Zones?  Yes   Is the patient able to teach back signs and symptoms of worsening condition? (i.e. weight gain, shortness of air, etc.)  Yes   Is the patient/caregiver able to teach back the hierarchy of who to call/visit for symptoms/problems? PCP, Specialist, Home health nurse, Urgent Care, ED, 911  Yes   Additional teach back comments  Patient states she is having pain in her legs and back from the stones.  States she was only given 6 pills to help.  Advised to contact PCP regarding the new for refills on pain  medication.  States she weighs daily and no weight gain is noted.     CHF Week 2 call completed?  Yes   Wrap up additional comments  Pt to request refills of pain medication to PCP          Enma Medina LPN

## 2020-07-28 ENCOUNTER — READMISSION MANAGEMENT (OUTPATIENT)
Dept: CALL CENTER | Facility: HOSPITAL | Age: 45
End: 2020-07-28

## 2020-07-28 NOTE — OUTREACH NOTE
CHF Week 3 Survey      Responses   [facility] patient discharged from?  [place]   Does the patient have one of the following disease processes/diagnoses(primary or secondary)?  CHF   Week 3 attempt successful?  Yes   Call start time  1557   Call end time  1559   Meds reviewed with patient/caregiver?  Yes   Is the patient having any side effects they believe may be caused by any medication additions or changes?  No   Does the patient have all medications ordered at discharge?  Yes   Is the patient taking all medications as directed (includes completed medication regime)?  Yes   Does the patient have a primary care provider?   Yes   Comments regarding PCP  PATIENT HAS TWO FOLLOW UP APPOINTMENTS THIS WEEK   Has the patient kept scheduled appointments due by today?  N/A   Has home health visited the patient within 72 hours of discharge?  N/A   Pulse Ox monitoring  None   Did the patient receive a copy of their discharge instructions?  Yes   Nursing interventions  Reviewed instructions with patient   What is the patient's perception of their health status since discharge?  Improving   Nursing interventions  Nurse provided patient education   Is the patient weighing daily?  Yes   Does the patient have scales?  Yes   Daily weight interventions  Education provided on importance of daily weight   Is the patient able to teach back Heart Failure diet management?  Yes   Is the patient able to teach back Heart Failure Zones?  Yes   Is the patient able to teach back signs and symptoms of worsening condition? (i.e. weight gain, shortness of air, etc.)  Yes   Is the patient/caregiver able to teach back the hierarchy of who to call/visit for symptoms/problems? PCP, Specialist, Home health nurse, Urgent Care, ED, 911  Yes   CHF Week 3 call completed?  Yes          Vivi Moore LPN

## 2020-07-29 ENCOUNTER — CLINICAL SUPPORT NO REQUIREMENTS (OUTPATIENT)
Dept: CARDIOLOGY | Facility: CLINIC | Age: 45
End: 2020-07-29

## 2020-07-29 ENCOUNTER — HOSPITAL ENCOUNTER (OUTPATIENT)
Dept: CARDIOLOGY | Facility: HOSPITAL | Age: 45
Discharge: HOME | End: 2020-07-29
Admitting: INTERNAL MEDICINE

## 2020-07-29 ENCOUNTER — OFFICE VISIT (OUTPATIENT)
Dept: CARDIOLOGY | Facility: CLINIC | Age: 45
End: 2020-07-29

## 2020-07-29 VITALS
HEART RATE: 106 BPM | HEIGHT: 66 IN | SYSTOLIC BLOOD PRESSURE: 102 MMHG | WEIGHT: 200 LBS | BODY MASS INDEX: 32.14 KG/M2 | DIASTOLIC BLOOD PRESSURE: 62 MMHG

## 2020-07-29 DIAGNOSIS — I50.22 CHRONIC SYSTOLIC CONGESTIVE HEART FAILURE (HCC): Primary | ICD-10-CM

## 2020-07-29 DIAGNOSIS — I50.22 CHRONIC SYSTOLIC CONGESTIVE HEART FAILURE (HCC): ICD-10-CM

## 2020-07-29 DIAGNOSIS — I10 ESSENTIAL HYPERTENSION: ICD-10-CM

## 2020-07-29 DIAGNOSIS — Z21 ASYMPTOMATIC HIV INFECTION (HCC): ICD-10-CM

## 2020-07-29 DIAGNOSIS — I42.8 NICM (NONISCHEMIC CARDIOMYOPATHY) (HCC): Primary | ICD-10-CM

## 2020-07-29 DIAGNOSIS — I42.8 NICM (NONISCHEMIC CARDIOMYOPATHY) (HCC): ICD-10-CM

## 2020-07-29 LAB
ALBUMIN SERPL-MCNC: 3.8 G/DL (ref 3.5–5.2)
ALBUMIN/GLOB SERPL: 1.4 G/DL
ALP SERPL-CCNC: 68 U/L (ref 39–117)
ALT SERPL W P-5'-P-CCNC: 10 U/L (ref 1–33)
ANION GAP SERPL CALCULATED.3IONS-SCNC: 10.1 MMOL/L (ref 5–15)
AST SERPL-CCNC: 12 U/L (ref 1–32)
BILIRUB SERPL-MCNC: 0.5 MG/DL (ref 0–1.2)
BUN SERPL-MCNC: 18 MG/DL (ref 6–20)
BUN/CREAT SERPL: 19.4 (ref 7–25)
CALCIUM SPEC-SCNC: 8.9 MG/DL (ref 8.6–10.5)
CHLORIDE SERPL-SCNC: 100 MMOL/L (ref 98–107)
CO2 SERPL-SCNC: 23.9 MMOL/L (ref 22–29)
CREAT SERPL-MCNC: 0.93 MG/DL (ref 0.57–1)
GFR SERPL CREATININE-BSD FRML MDRD: 79 ML/MIN/1.73
GLOBULIN UR ELPH-MCNC: 2.7 GM/DL
GLUCOSE SERPL-MCNC: 107 MG/DL (ref 65–99)
MAGNESIUM SERPL-MCNC: 1.8 MG/DL (ref 1.6–2.6)
NT-PROBNP SERPL-MCNC: 4176 PG/ML (ref 0–450)
POTASSIUM SERPL-SCNC: 3.6 MMOL/L (ref 3.5–5.2)
PROT SERPL-MCNC: 6.5 G/DL (ref 6–8.5)
SODIUM SERPL-SCNC: 134 MMOL/L (ref 136–145)
T-UPTAKE NFR SERPL: 1.15 TBI (ref 0.8–1.3)
T4 SERPL-MCNC: 5.38 MCG/DL (ref 4.5–11.7)
TSH SERPL DL<=0.05 MIU/L-ACNC: 2.85 UIU/ML (ref 0.27–4.2)

## 2020-07-29 PROCEDURE — 83735 ASSAY OF MAGNESIUM: CPT | Performed by: INTERNAL MEDICINE

## 2020-07-29 PROCEDURE — 99214 OFFICE O/P EST MOD 30 MIN: CPT | Performed by: INTERNAL MEDICINE

## 2020-07-29 PROCEDURE — 84436 ASSAY OF TOTAL THYROXINE: CPT | Performed by: INTERNAL MEDICINE

## 2020-07-29 PROCEDURE — 84479 ASSAY OF THYROID (T3 OR T4): CPT | Performed by: INTERNAL MEDICINE

## 2020-07-29 PROCEDURE — 25010000002 FUROSEMIDE PER 20 MG: Performed by: INTERNAL MEDICINE

## 2020-07-29 PROCEDURE — 96374 THER/PROPH/DIAG INJ IV PUSH: CPT

## 2020-07-29 PROCEDURE — 36415 COLL VENOUS BLD VENIPUNCTURE: CPT

## 2020-07-29 PROCEDURE — 83880 ASSAY OF NATRIURETIC PEPTIDE: CPT | Performed by: INTERNAL MEDICINE

## 2020-07-29 PROCEDURE — 80053 COMPREHEN METABOLIC PANEL: CPT | Performed by: INTERNAL MEDICINE

## 2020-07-29 PROCEDURE — 93282 PRGRMG EVAL IMPLANTABLE DFB: CPT | Performed by: INTERNAL MEDICINE

## 2020-07-29 PROCEDURE — 93000 ELECTROCARDIOGRAM COMPLETE: CPT | Performed by: INTERNAL MEDICINE

## 2020-07-29 PROCEDURE — 84443 ASSAY THYROID STIM HORMONE: CPT | Performed by: INTERNAL MEDICINE

## 2020-07-29 RX ORDER — FUROSEMIDE 10 MG/ML
40 INJECTION INTRAMUSCULAR; INTRAVENOUS ONCE
Status: COMPLETED | OUTPATIENT
Start: 2020-07-29 | End: 2020-07-29

## 2020-07-29 RX ORDER — OXYCODONE AND ACETAMINOPHEN 10; 325 MG/1; MG/1
1 TABLET ORAL AS NEEDED
COMMUNITY
Start: 2020-03-07 | End: 2020-07-31

## 2020-07-29 RX ORDER — METOPROLOL SUCCINATE 25 MG/1
25 TABLET, EXTENDED RELEASE ORAL NIGHTLY
Qty: 90 TABLET | Refills: 3 | Status: SHIPPED | OUTPATIENT
Start: 2020-07-29 | End: 2020-08-28 | Stop reason: SDUPTHER

## 2020-07-29 RX ORDER — FUROSEMIDE 80 MG
80 TABLET ORAL 2 TIMES DAILY
Qty: 180 TABLET | Refills: 3 | Status: SHIPPED | OUTPATIENT
Start: 2020-07-29 | End: 2021-07-12

## 2020-07-29 RX ADMIN — FUROSEMIDE 40 MG: 10 INJECTION, SOLUTION INTRAMUSCULAR; INTRAVENOUS at 12:10

## 2020-07-29 NOTE — PROGRESS NOTES
Date of Office Visit: 2020  Encounter Provider: Lisset Melton MD  Place of Service: [facility] CARDIOLOGY  Patient Name: Nina Marroquin  :1975      Patient ID:  Nina Marroquin is a 44 y.o. female is here for  followup for nonischemic cardiomyopathy and congestive heart failure        History of Present Illness    She has a history of HIV, essential hypertension, severe dilated nonischemic cardiomyopathy with chronic systolic congestive heart failure, obesity, history of illicit drug use and asthma.  She has an AICD.  It is a single-chamber [employer].    She was diagnosed with heart failure and cardiomyopathy in 2019 at [facility] when she was there visiting family.  An echocardiogram on cardiac catheterization which confirmed this diagnosis.  Her cardiac catheterization done 2019 showed normal coronary arteries, LVEDP 12, wedge pressure 13, PA pressure 28/15 with a mean of 19 mmHg, RA pressure 4, cardiac index 2.5 L/min/m² with a PVR of 1.2 [place] units.    She presented emergency on 20 with short windedness and cough for 3 weeks prior to presentation.  Her weight is been stable and she had no lower extremity edema but she had missed her cardiac medications and had been eating a lot of salt.  On arrival, she was found to be in congestive heart failure.  Echo done 2020 showed severe left ventricular dilation, ejection fraction 6%, very thin left ventricular walls, grade 3 diastolic dysfunction, moderate to severe mitral insufficiency, moderate tricuspid insufficiency, RVSP 48 mmHg.  There was essentially global hypokinesis with akinesis at the bases.  She was able to be dismissed on 2020.  While in the hospital, we did recommend consideration for upgrade to a biventricular AICD.  She also had some right flank pain during hospitalization and CT scan showed a 2 mm minimally obstructing stone in the right  ureter.  Urology saw her and felt like she could pass the stone without further intervention.    She is now again having more orthopnea and PND.  She thinks that the Bumex is not as effective for her as Lasix.  Her heart rates been faster.  She has had less energy.  She has had no dizziness or syncope but had been on carvedilol in the past which was stopped due to hypotension.  She has had no fevers, chills but has had a cough.  It is worse when she lies down.  She has had mild vomiting and very poor appetite with some nausea.  She has abdominal distention and fullness.  She has been eating no salt food but was eating some cold cuts when she was first released from the hospital.  Interrogation today of her ICD shows 10 and 16 beats of ventricular tachycardia and normal ICD function.    Past Medical History:   Diagnosis Date   • Asthma    • CHF (congestive heart failure) (CMS/HCC)    • HIV (human immunodeficiency virus infection) (CMS/[facility])    • Hypertension    • NICM (nonischemic cardiomyopathy) (CMS/[facility])          Past Surgical History:   Procedure Laterality Date   • CARDIAC CATHETERIZATION     • FRACTURE SURGERY Left     left ankle       Current Outpatient Medications on File Prior to Visit   Medication Sig Dispense Refill   • acetaminophen (TYLENOL) 325 MG tablet Take 650 mg by mouth Every 6 (Six) Hours As Needed for Mild Pain .     • albuterol sulfate  (90 Base) MCG/ACT inhaler Inhale 2 puffs Every 4 (Four) Hours As Needed for Wheezing.     • aspirin 81 MG EC tablet Take 81 mg by mouth Daily.     • darunavir ethanolate (PREZISTA) 800 MG tablet tablet Take 800 mg by mouth Every Night.     • Emtricitabine-Tenofovir AF (DESCOVY) 200-25 MG per tablet Take  by mouth Every Night.     • oxyCODONE-acetaminophen (PERCOCET)  MG per tablet Take 1 tablet by mouth As Needed.     • potassium chloride (MICRO-K) 10 MEQ CR capsule Take 2 capsules by mouth Daily. 60 capsule 0   • ritonavir (NORVIR) 100 MG tablet Take  100 mg by mouth Every Night.     • sacubitril-valsartan (ENTRESTO) 24-26 MG tablet Take 1 tablet by mouth Every 12 (Twelve) Hours. 60 tablet 0   • sertraline (ZOLOFT) 50 MG tablet Take 50 mg by mouth Daily.     • vitamin D (ERGOCALCIFEROL) 92073 units capsule capsule Take 50,000 Units by mouth 1 (One) Time Per Week.     • [DISCONTINUED] bumetanide (BUMEX) 1 MG tablet Take 1 mg by mouth 2 (Two) Times a Day.       No current facility-administered medications on file prior to visit.        Social History     Socioeconomic History   • Marital status:      Spouse name: Not on file   • Number of children: Not on file   • Years of education: Not on file   • Highest education level: Not on file   Tobacco Use   • Smoking status: Former Smoker     Last attempt to quit:      Years since quittin.5   • Smokeless tobacco: Never Used   Substance and Sexual Activity   • Alcohol use: Yes     Alcohol/week: 1.0 standard drinks     Types: 1 Glasses of wine per week     Comment: RARELY   • Drug use: Yes     Types: Marijuana   • Sexual activity: Defer           Review of Systems   Constitution: Negative.   HENT: Negative for congestion.    Eyes: Negative for vision loss in left eye and vision loss in right eye.   Respiratory: Negative.  Negative for cough, hemoptysis, shortness of breath, sleep disturbances due to breathing, snoring, sputum production and wheezing.    Endocrine: Negative.    Hematologic/Lymphatic: Negative.    Skin: Negative for poor wound healing and rash.   Musculoskeletal: Negative for falls, gout, muscle cramps and myalgias.   Gastrointestinal: Negative for abdominal pain, diarrhea, dysphagia, hematemesis, melena, nausea and vomiting.   Neurological: Negative for excessive daytime sleepiness, dizziness, headaches, light-headedness, loss of balance, seizures and vertigo.   Psychiatric/Behavioral: Negative for depression and substance abuse. The patient is not nervous/anxious.        Procedures    ECG 12  "Lead  Date/Time: 7/29/2020 10:38 AM  Performed by: Lisset Melton MD  Authorized by: Lisset Melton MD   Comparison: compared with previous ECG   Similar to previous ECG  Rhythm: sinus tachycardia  Conduction: left bundle branch block  Other findings: left atrial abnormality    Clinical impression: abnormal EKG                Objective:      Vitals:    07/29/20 1020   BP: 102/62   BP Location: Left arm   Patient Position: Sitting   Pulse: 106   Weight: 90.7 kg (200 lb)   Height: 167.6 cm (66\")     Body mass index is 32.28 kg/m².    Physical Exam   Constitutional: She is oriented to person, place, and time. She appears well-developed and well-nourished. No distress.   HENT:   Head: Normocephalic and atraumatic.   Eyes: Conjunctivae are normal. No scleral icterus.   Neck: Neck supple. No JVD present. Carotid bruit is not present. No thyromegaly present.   Cardiovascular: Normal rate, regular rhythm, S1 normal, S2 normal and intact distal pulses.  No extrasystoles are present. PMI is not displaced. Exam reveals no gallop.   No murmur heard.  Pulses:       Carotid pulses are 2+ on the right side, and 2+ on the left side.       Radial pulses are 2+ on the right side, and 2+ on the left side.        Dorsalis pedis pulses are 2+ on the right side, and 2+ on the left side.        Posterior tibial pulses are 2+ on the right side, and 2+ on the left side.   Pulmonary/Chest: Effort normal and breath sounds normal. No respiratory distress. She has no wheezes. She has no rhonchi. She has no rales. She exhibits no tenderness.   Abdominal: Soft. Bowel sounds are normal. She exhibits no distension, no abdominal bruit and no mass. There is no tenderness.   Musculoskeletal: She exhibits no edema or deformity.   Lymphadenopathy:     She has no cervical adenopathy.   Neurological: She is alert and oriented to person, place, and time. No cranial nerve deficit.   Skin: Skin is warm and dry. No rash noted. She is not " diaphoretic. No cyanosis. No pallor. Nails show no clubbing.   Psychiatric: She has a normal mood and affect. Judgment normal.   Vitals reviewed.      Lab Review:       Assessment:      Diagnosis Plan   1. NICM (nonischemic cardiomyopathy) (CMS/HCC)  furosemide (LASIX) injection 40 mg    Basic Metabolic Panel    Magnesium    Thyroid Panel With TSH    Ambulatory Referral to Cardiac Electrophysiology   2. Chronic systolic congestive heart failure (CMS/HCC)     3. Essential hypertension     4. Asymptomatic HIV infection (CMS/HCC)       1. Severe dilated nonischemic cardiomyopathy, ejection fraction 6%, AICD in place, chronic systolic heart failure.  She is decompensating again.  Will adjust medications and give her IV Lasix today.  2. Essential hypertension, controlled.  3. HIV positive.  4. Diastolic dysfunction  5. Obesity  6. Asthma     Plan:       Lasix 40 mg IV once daily check a BMP and CBC as well.  Stop Bumex as is not as effective for her and change to Lasix 80 mg twice daily.  Try low-dose metoprolol XL 25 mg nightly, refer to EP about possible biventricular ICD upgrade.  Will have device nurses see her today.  Follow-up with Nilda in 1 month.

## 2020-07-31 ENCOUNTER — OFFICE VISIT (OUTPATIENT)
Dept: INTERNAL MEDICINE | Age: 45
End: 2020-07-31

## 2020-07-31 VITALS
WEIGHT: 198 LBS | DIASTOLIC BLOOD PRESSURE: 62 MMHG | HEIGHT: 66 IN | TEMPERATURE: 98.2 F | HEART RATE: 97 BPM | SYSTOLIC BLOOD PRESSURE: 110 MMHG | BODY MASS INDEX: 31.82 KG/M2 | OXYGEN SATURATION: 97 %

## 2020-07-31 DIAGNOSIS — Z12.39 BREAST CANCER SCREENING: ICD-10-CM

## 2020-07-31 DIAGNOSIS — Z80.3 FAMILY HISTORY OF BREAST CANCER: ICD-10-CM

## 2020-07-31 DIAGNOSIS — I50.22 CHRONIC SYSTOLIC CONGESTIVE HEART FAILURE (HCC): ICD-10-CM

## 2020-07-31 DIAGNOSIS — R10.9 FLANK PAIN: ICD-10-CM

## 2020-07-31 DIAGNOSIS — N85.2 UTERINE ENLARGEMENT: ICD-10-CM

## 2020-07-31 DIAGNOSIS — Z21 ASYMPTOMATIC HIV INFECTION (HCC): ICD-10-CM

## 2020-07-31 DIAGNOSIS — F41.9 ANXIETY: Primary | ICD-10-CM

## 2020-07-31 DIAGNOSIS — Z12.31 ENCOUNTER FOR SCREENING MAMMOGRAM FOR MALIGNANT NEOPLASM OF BREAST: ICD-10-CM

## 2020-07-31 PROCEDURE — 99203 OFFICE O/P NEW LOW 30 MIN: CPT | Performed by: NURSE PRACTITIONER

## 2020-07-31 NOTE — PROGRESS NOTES
[facility] [place] [facility]  SIRI Rudddsoe / 44 y.o. / female  07/31/2020      ASSESSMENT & PLAN:    Problem List Items Addressed This Visit        Cardiovascular and Mediastinum    Chronic systolic congestive heart failure (CMS/HCC)    Relevant Medications    sacubitril-valsartan (ENTRESTO) 24-26 MG tablet    metoprolol succinate XL (TOPROL-XL) 25 MG 24 hr tablet       Nervous and Auditory    Flank pain       Other    HIV (human immunodeficiency virus infection) (CMS/HCC)    Relevant Medications    ritonavir (NORVIR) 100 MG tablet    darunavir ethanolate (PREZISTA) 800 MG tablet tablet    Emtricitabine-Tenofovir AF (DESCOVY) 200-25 MG per tablet      Other Visit Diagnoses     Anxiety    -  Primary    Relevant Orders    Ambulatory Referral to Behavioral Health    Uterine enlargement        Relevant Orders    Ambulatory Referral to Gynecology    Breast cancer screening        Relevant Orders    Mammo Screening Bilateral With CAD    Family history of breast cancer        Relevant Orders    Mammo Screening Bilateral With CAD    Encounter for screening mammogram for malignant neoplasm of breast         Relevant Orders    Mammo Screening Bilateral With CAD        Orders Placed This Encounter   Procedures   • Mammo Screening Bilateral With CAD   • Ambulatory Referral to Behavioral Health   • Ambulatory Referral to Gynecology     No orders of the defined types were placed in this encounter.      Summary/Discussion:    1. Anxiety  Discussed with patient that documentation and evaluation for need for emotional support animal would have to be done through behavioral health/ psychiatry. She is agreeable to referral.     - Ambulatory Referral to Behavioral Health    2. Uterine enlargement  Incidentally noted on CT scan during hospitalization. Patient has not had GYN in about 3 years. Denies any pelvic pain. History of heavy periods, regular. Denies any known family history fibroids.     - Ambulatory  "Referral to Gynecology    3. Breast cancer screening    - Mammo Screening Bilateral With CAD    4. Family history of breast cancer    - Mammo Screening Bilateral With CAD    5. Encounter for screening mammogram for malignant neoplasm of breast     - Mammo Screening Bilateral With CAD    6. Chronic systolic congestive heart failure (CMS/HCC)  Managed by cardiology.     7. Flank pain  Provided patient with information to contact urology office for outpatient follow up.     8. Asymptomatic HIV infection (CMS/HCC)  Continue management per         Return in about 4 months (around 11/30/2020) for Next scheduled follow up.    ____________________________________________________________________    VITALS:    Visit Vitals  /62   Pulse 97   Temp 98.2 °F (36.8 °C) (Temporal)   Ht 167.6 cm (65.98\")   Wt 89.8 kg (198 lb)   LMP 07/07/2020   SpO2 97%   BMI 31.97 kg/m²       BP Readings from Last 3 Encounters:   07/31/20 110/62   07/29/20 102/62   07/13/20 98/71     Wt Readings from Last 3 Encounters:   07/31/20 89.8 kg (198 lb)   07/29/20 90.7 kg (200 lb)   07/13/20 90.1 kg (198 lb 11.2 oz)      Body mass index is 31.97 kg/m².    CC: Main reason(s) for today's visit: Establish Care and Congestive Heart Failure      HPI:    Patient is a 44 y.o. female who is here to establish care. Previous patient of the [facility] with [facility]. Sees  [facility] for HIV management.     She has a history of HIV, essential hypertension, severe dilated nonischemic cardiomyopathy with chronic systolic congestive heart failure, obesity, history of illicit drug use and asthma.    History of significant HF, followed by cardiology. Recent discharge from hospital for acute exacerbation. Appointment with Dr. Melton 2 days ago, lasix increased, patient referred to EP cardiologist to discuss changing AICD.     Reports still having right flank pain, was seen in hospital, diagnosed with 2mm kidney stone. No intervention indicated. Per progress note, it " appears that patient was supposed to follow-up outpatient 2 weeks after discharge but she was never made aware of this appointment.  She continues to have some right flank pain, is out of pain medication that she was given at discharge.     Reports history of multiple abnormal mammograms. Family history of breast cancer in mother and grandmother.  Mammograms are being done every 6 months, [facility]     She is also requesting documentation for her dog as an emotional support animal for her apartment complex. History of anxiety, on sertraline.     Patient Care Team:  Darcie Ledbetter APRN as PCP - General (Internal Medicine)  Lisset Melton MD as Consulting Physician (Cardiology)  Andrew Watson MD as Consulting Physician (Urology)  ____________________________________________________________________      REVIEW OF SYSTEMS    Review of Systems   Constitutional: Negative for activity change, appetite change and unexpected weight change.   HENT: Negative for tinnitus.    Eyes: Negative for visual disturbance.   Respiratory: Negative for cough, chest tightness and shortness of breath.    Cardiovascular: Negative for chest pain, palpitations and leg swelling.   Genitourinary: Positive for flank pain. Negative for difficulty urinating and dysuria.   Allergic/Immunologic: Positive for immunocompromised state.   Neurological: Negative for dizziness, light-headedness and headaches.       PHYSICAL EXAMINATION    Physical Exam   Constitutional: She is oriented to person, place, and time. Vital signs are normal. She appears well-developed and well-nourished. She is cooperative. She does not appear ill. No distress.   Cardiovascular: Normal rate, regular rhythm, S1 normal, S2 normal and normal heart sounds.   No murmur heard.  Pulmonary/Chest: Effort normal and breath sounds normal. She has no decreased breath sounds. She has no wheezes. She has no rhonchi. She has no  rales.   Neurological: She is alert and oriented to person, place, and time.   Skin: Skin is warm, dry and intact.   Psychiatric: She has a normal mood and affect. Her speech is normal and behavior is normal. Judgment and thought content normal. Cognition and memory are normal.   Nursing note and vitals reviewed.        REVIEWED DATA:    Labs:   Lab Results   Component Value Date     (L) 07/29/2020    K 3.6 07/29/2020    AST 12 07/29/2020    ALT 10 07/29/2020    BUN 18 07/29/2020    CREATININE 0.93 07/29/2020    CREATININE 0.89 07/12/2020    CREATININE 0.73 07/11/2020    EGFRIFAFRI 79 07/29/2020       Lab Results   Component Value Date    GLUCOSE 107 (H) 07/29/2020    GLUCOSE 104 (H) 07/12/2020    GLUCOSE 132 (H) 07/11/2020       No results found for: LDL, HDL, TRIG, CHOLHDLRATIO    Lab Results   Component Value Date    TSH 2.850 07/29/2020          Lab Results   Component Value Date    WBC 8.53 07/11/2020    HGB 11.9 (L) 07/11/2020    HGB 11.2 (L) 07/10/2020    HGB 10.9 (L) 07/08/2020     07/11/2020         Imaging:        Medical Tests:        Summary of old records / correspondence / consultant report:        Request outside records:        ______________________________________________________________________    ALLERGIES  No Known Allergies     MEDICATIONS  Current Outpatient Medications on File Prior to Visit   Medication Sig   • acetaminophen (TYLENOL) 325 MG tablet Take 650 mg by mouth Every 6 (Six) Hours As Needed for Mild Pain .   • albuterol sulfate  (90 Base) MCG/ACT inhaler Inhale 2 puffs Every 4 (Four) Hours As Needed for Wheezing.   • aspirin 81 MG EC tablet Take 81 mg by mouth Daily.   • darunavir ethanolate (PREZISTA) 800 MG tablet tablet Take 800 mg by mouth Every Night.   • Emtricitabine-Tenofovir AF (DESCOVY) 200-25 MG per tablet Take  by mouth Every Night.   • furosemide (LASIX) 80 MG tablet Take 1 tablet by mouth 2 (Two) Times a Day.   • metoprolol succinate XL (TOPROL-XL)  25 MG 24 hr tablet Take 1 tablet by mouth Every Night.   • potassium chloride (MICRO-K) 10 MEQ CR capsule Take 2 capsules by mouth Daily.   • ritonavir (NORVIR) 100 MG tablet Take 100 mg by mouth Every Night.   • sacubitril-valsartan (ENTRESTO) 24-26 MG tablet Take 1 tablet by mouth Every 12 (Twelve) Hours.   • sertraline (ZOLOFT) 50 MG tablet Take 50 mg by mouth Daily.   • vitamin D (ERGOCALCIFEROL) 11724 units capsule capsule Take 50,000 Units by mouth 1 (One) Time Per Week.   • [DISCONTINUED] oxyCODONE-acetaminophen (PERCOCET)  MG per tablet Take 1 tablet by mouth As Needed.     No current facility-administered medications on file prior to visit.        PFSH:     The following portions of the patient's history were reviewed and updated as appropriate: Allergies / Current Medications / Past Medical History / Surgical History / Social History / Family History    PROBLEM LIST   Patient Active Problem List   Diagnosis   • Asthma   • Essential hypertension   • HIV (human immunodeficiency virus infection) (CMS/HCC)   • Class 2 obesity in adult   • Exertional dyspnea   • Chest pain   • Chronic systolic congestive heart failure (CMS/HCC)   • NICM (nonischemic cardiomyopathy) (CMS/HCC)   • Elevated LFTs   • Hypopotassemia   • Hematuria   • Flank pain       PAST MEDICAL HISTORY  Past Medical History:   Diagnosis Date   • Asthma    • CHF (congestive heart failure) (CMS/HCC)    • HIV (human immunodeficiency virus infection) (CMS/HCC)    • Hypertension    • NICM (nonischemic cardiomyopathy) (CMS/HCC)        SURGICAL HISTORY  Past Surgical History:   Procedure Laterality Date   • CARDIAC CATHETERIZATION     • FRACTURE SURGERY Left     left ankle       SOCIAL HISTORY  Social History     Socioeconomic History   • Marital status:      Spouse name: Not on file   • Number of children: Not on file   • Years of education: Not on file   • Highest education level: Not on file   Tobacco Use   • Smoking status: Former Smoker      Last attempt to quit: 2015     Years since quittin.5   • Smokeless tobacco: Never Used   Substance and Sexual Activity   • Alcohol use: Yes     Alcohol/week: 1.0 standard drinks     Types: 1 Glasses of wine per week     Comment: RARELY   • Drug use: Yes     Types: Marijuana   • Sexual activity: Defer       FAMILY HISTORY  Family History   Problem Relation Age of Onset   • Cancer Mother    • Breast cancer Mother    • Bone cancer Mother    • Hypertension Maternal Grandfather    • Hyperlipidemia Maternal Grandfather    • Diabetes Maternal Grandfather    • Prostate cancer Maternal Grandfather    • Ovarian cysts Daughter    • Breast cancer Maternal Grandmother    • Heart disease Maternal Grandmother    • No Known Problems Daughter    • No Known Problems Daughter          **Lyric Disclaimer:   Much of this encounter note is an electronic transcription/translation of spoken language to printed text. The electronic translation of spoken language may permit erroneous, or at times, nonsensical words or phrases to be inadvertently transcribed. Although I have reviewed the note for such errors, some may still exist.

## 2020-08-07 ENCOUNTER — READMISSION MANAGEMENT (OUTPATIENT)
Dept: CALL CENTER | Facility: HOSPITAL | Age: 45
End: 2020-08-07

## 2020-08-07 NOTE — OUTREACH NOTE
CHF Week 4 Survey      Responses   [facility] patient discharged from?  [place]   Does the patient have one of the following disease processes/diagnoses(primary or secondary)?  CHF   Week 4 attempt successful?  Yes   Call start time  1736   Call end time  1739   Discharge diagnosis  CHF   Is patient permission given to speak with other caregiver?  No   Meds reviewed with patient/caregiver?  Yes   Is the patient having any side effects they believe may be caused by any medication additions or changes?  No   Is the patient taking all medications as directed (includes completed medication regime)?  Yes   Has the patient kept scheduled appointments due by today?  Yes   Is the patient still receiving Home Health Services?  N/A   Pulse Ox monitoring  None   Psychosocial issues?  No   What is the patient's perception of their health status since discharge?  Improving   Nursing interventions  Nurse provided patient education   Is the patient weighing daily?  Yes   Does the patient have scales?  Yes   Daily weight interventions  Education provided on importance of daily weight   Is the patient able to teach back Heart Failure diet management?  Yes   Is the patient able to teach back signs and symptoms of worsening condition? (i.e. weight gain, shortness of air, etc.)  Yes   Week 4 Call Completed?  Yes   Would the patient like one additional call?  No   Graduated  Yes   Did the patient feel the follow up calls were helpful during their recovery period?  Yes   Was the number of calls appropriate?  Yes          Angie Whitfield RN

## 2020-08-26 ENCOUNTER — TELEPHONE (OUTPATIENT)
Dept: CARDIOLOGY | Facility: CLINIC | Age: 45
End: 2020-08-26

## 2020-08-28 ENCOUNTER — OFFICE VISIT (OUTPATIENT)
Dept: CARDIOLOGY | Facility: CLINIC | Age: 45
End: 2020-08-28

## 2020-08-28 ENCOUNTER — TELEPHONE (OUTPATIENT)
Dept: CARDIOLOGY | Facility: CLINIC | Age: 45
End: 2020-08-28

## 2020-08-28 ENCOUNTER — TELEPHONE (OUTPATIENT)
Dept: CARDIAC REHAB | Facility: HOSPITAL | Age: 45
End: 2020-08-28

## 2020-08-28 ENCOUNTER — HOSPITAL ENCOUNTER (OUTPATIENT)
Dept: CARDIOLOGY | Facility: HOSPITAL | Age: 45
Discharge: HOME | End: 2020-08-28
Admitting: NURSE PRACTITIONER

## 2020-08-28 VITALS
WEIGHT: 195.4 LBS | HEIGHT: 66 IN | BODY MASS INDEX: 31.4 KG/M2 | SYSTOLIC BLOOD PRESSURE: 110 MMHG | HEART RATE: 71 BPM | DIASTOLIC BLOOD PRESSURE: 60 MMHG

## 2020-08-28 DIAGNOSIS — E87.6 HYPOKALEMIA: Primary | ICD-10-CM

## 2020-08-28 DIAGNOSIS — I44.7 LBBB (LEFT BUNDLE BRANCH BLOCK): ICD-10-CM

## 2020-08-28 DIAGNOSIS — I42.8 NICM (NONISCHEMIC CARDIOMYOPATHY) (HCC): Primary | ICD-10-CM

## 2020-08-28 DIAGNOSIS — I42.8 NICM (NONISCHEMIC CARDIOMYOPATHY) (HCC): ICD-10-CM

## 2020-08-28 DIAGNOSIS — I10 ESSENTIAL HYPERTENSION: ICD-10-CM

## 2020-08-28 DIAGNOSIS — R06.09 EXERTIONAL DYSPNEA: ICD-10-CM

## 2020-08-28 DIAGNOSIS — Z95.810 AICD (AUTOMATIC CARDIOVERTER/DEFIBRILLATOR) PRESENT: ICD-10-CM

## 2020-08-28 DIAGNOSIS — R07.2 PRECORDIAL PAIN: ICD-10-CM

## 2020-08-28 DIAGNOSIS — I50.22 CHRONIC SYSTOLIC CONGESTIVE HEART FAILURE (HCC): ICD-10-CM

## 2020-08-28 PROBLEM — R10.9 FLANK PAIN: Status: RESOLVED | Noted: 2020-07-08 | Resolved: 2020-08-28

## 2020-08-28 PROBLEM — R31.9 HEMATURIA: Status: RESOLVED | Noted: 2020-07-08 | Resolved: 2020-08-28

## 2020-08-28 LAB
ANION GAP SERPL CALCULATED.3IONS-SCNC: 13.5 MMOL/L (ref 5–15)
BUN SERPL-MCNC: 10 MG/DL (ref 6–20)
BUN/CREAT SERPL: 12 (ref 7–25)
CALCIUM SPEC-SCNC: 9.4 MG/DL (ref 8.6–10.5)
CHLORIDE SERPL-SCNC: 102 MMOL/L (ref 98–107)
CO2 SERPL-SCNC: 23.5 MMOL/L (ref 22–29)
CREAT SERPL-MCNC: 0.83 MG/DL (ref 0.57–1)
GFR SERPL CREATININE-BSD FRML MDRD: 91 ML/MIN/1.73
GLUCOSE SERPL-MCNC: 122 MG/DL (ref 65–99)
NT-PROBNP SERPL-MCNC: 2756 PG/ML (ref 0–450)
POTASSIUM SERPL-SCNC: 3.5 MMOL/L (ref 3.5–5.2)
SODIUM SERPL-SCNC: 139 MMOL/L (ref 136–145)

## 2020-08-28 PROCEDURE — 99214 OFFICE O/P EST MOD 30 MIN: CPT | Performed by: NURSE PRACTITIONER

## 2020-08-28 PROCEDURE — 93000 ELECTROCARDIOGRAM COMPLETE: CPT | Performed by: NURSE PRACTITIONER

## 2020-08-28 PROCEDURE — 36415 COLL VENOUS BLD VENIPUNCTURE: CPT

## 2020-08-28 PROCEDURE — 83880 ASSAY OF NATRIURETIC PEPTIDE: CPT | Performed by: NURSE PRACTITIONER

## 2020-08-28 PROCEDURE — 80048 BASIC METABOLIC PNL TOTAL CA: CPT | Performed by: NURSE PRACTITIONER

## 2020-08-28 RX ORDER — METOPROLOL SUCCINATE 25 MG/1
25 TABLET, EXTENDED RELEASE ORAL 2 TIMES DAILY
Qty: 180 TABLET | Refills: 1 | Status: SHIPPED | OUTPATIENT
Start: 2020-08-28 | End: 2020-10-15 | Stop reason: SDUPTHER

## 2020-08-28 RX ORDER — POTASSIUM CHLORIDE 20 MEQ/1
60 TABLET, EXTENDED RELEASE ORAL DAILY
Qty: 90 TABLET | Refills: 1 | Status: ON HOLD | OUTPATIENT
Start: 2020-08-28 | End: 2020-12-17 | Stop reason: SDUPTHER

## 2020-08-28 NOTE — TELEPHONE ENCOUNTER
Blood work reviewed.  proBNP level has improved. BUN and creatinine normal.  Sodium normal.  Potassium low at 3.5.  Glucose 122.  She is currently taking potassium chloride 20 mEq daily and I recommended increasing that to 60 mEq daily. Patient informed & verbalizes understanding.  She will repeat a BMP in September 2020 at the main lab.

## 2020-08-28 NOTE — PROGRESS NOTES
Date of Office Visit: 2020  Encounter Provider: SAMUEL Yee  Place of Service: [facility] CARDIOLOGY  Patient Name: Nina Cheung  :1975  Primary Cardiologist: Dr. Lisset Melton     Chief Complaint   Patient presents with   • Follow-up   :     Dear SAMUEL Tamez,    HPI: Nina Cheung is a pleasant 44 y.o. female who presents today for cardiac follow up. She is a new patient to me and her previous records have been reviewed.     She has a known history of HIV, essential hypertension, obesity, asthma, and history of illicit drug use.    In 2019, she was diagnosed with heart failure and nonischemic cardiomyopathy at [facility] when visiting family there.  On 2019 cardiac catheterization showed normal coronary arteries, LVEDP 12, wedge pressure 13, PA pressure 28/15 with a mean of 19 mmHg, RA pressure 4, cardiac index 2.5 L/min/m² with a PVR of 1.2 Woods units.  She has a single-chamber [employer] AICD.    On 2020 she presented to the emergency department with dyspnea and cough for 3 weeks.  Symptoms were consistent with congestive heart failure.  Repeat echocardiogram 2020 showed severe left ventricular dilation, ejection fraction 6%, very thin left ventricular walls, grade 3 diastolic dysfunction, moderate to severe mitral insufficiency, moderate tricuspid insufficiency, RVSP 48 mmHg, and essentially global hypokinesis with akinesis at the bases.  During the hospitalization there was consideration that she may need upgrade to a biventricular AICD.  She was also diagnosed with a kidney stone during that admission.    On 2020 she presented to the office for follow-up with Dr. Melton.  She said her bumetanide was not as effective for her as the furosemide.  She reported faster heart rates, less energy, orthopnea, mild vomiting, very poor appetite, nausea, abdominal distention and fullness.  Her ICD was  interrogated which showed 10 and 16 beats of ventricular tachycardia. She was given 1 dose of IV furosemide.  BMP and CBC were stable.  Her bumetanide was discontinued and she was changed to furosemide 80 mg twice per day and started on metoprolol XL 25 mg nightly.  She will be seeing Dr. Jamar Azul in the near future for possible biventricular ICD upgrade.    She presents today for follow-up visit.  She is tolerating all of her medications without adverse effects.  She continues to experience dyspnea with exertion and is straining feeling of the chest that radiates to her left arm with activity.  For example it takes her about an hour to get dressed in the morning.  She really thinks if she had more exercise and was enrolled in cardiac rehab with supervision that she would improve her activity level.  She reports a cough, occasional brief palpitations, and abdominal bloating.  She denies PND, orthopnea, dizziness, syncope, or bleeding.  Her blood pressure and heart rate are normal today.  Her weights at home have ranged 194 to 196 pounds.  She is due for repeat BMP today.        Past Medical History:   Diagnosis Date   • AICD (automatic cardioverter/defibrillator) present 3/6/2020   • Asthma    • CHF (congestive heart failure) (CMS/[facility])    • Class 2 obesity in adult    • HIV (human immunodeficiency virus infection) (CMS/[facility])    • Hypertension    • LBBB (left bundle branch block)    • NICM (nonischemic cardiomyopathy) (CMS/[facility])     7/2020 EF 6% per echocardiogram; AICD present       Past Surgical History:   Procedure Laterality Date   • CARDIAC CATHETERIZATION     • CARDIAC DEFIBRILLATOR PLACEMENT     • FRACTURE SURGERY Left     left ankle       Social History     Socioeconomic History   • Marital status:      Spouse name: Not on file   • Number of children: Not on file   • Years of education: Not on file   • Highest education level: Not on file   Tobacco Use   • Smoking status: Former Smoker     Last  attempt to quit: 2015     Years since quittin.6   • Smokeless tobacco: Never Used   Substance and Sexual Activity   • Alcohol use: Yes     Alcohol/week: 1.0 standard drinks     Types: 1 Glasses of wine per week     Comment: RARELY//caffeine use: Occasionally   • Drug use: Yes     Types: Marijuana   • Sexual activity: Defer       Family History   Problem Relation Age of Onset   • Cancer Mother    • Breast cancer Mother    • Bone cancer Mother    • Hypertension Maternal Grandfather    • Hyperlipidemia Maternal Grandfather    • Diabetes Maternal Grandfather    • Prostate cancer Maternal Grandfather    • Ovarian cysts Daughter    • Breast cancer Maternal Grandmother    • Heart disease Maternal Grandmother    • No Known Problems Daughter    • No Known Problems Daughter        The following portion of the patient's history were reviewed and updated as appropriate: past medical history, past surgical history, past social history, past family history, allergies, current medications, and problem list.    Review of Systems   Constitution: Negative for chills, diaphoresis, fever, malaise/fatigue, night sweats, weight gain and weight loss.   HENT: Negative for hearing loss, nosebleeds, sore throat and tinnitus.    Eyes: Negative for blurred vision, double vision, pain and visual disturbance.   Cardiovascular: Positive for chest pain and dyspnea on exertion. Negative for claudication, cyanosis, irregular heartbeat, leg swelling, near-syncope, orthopnea, palpitations, paroxysmal nocturnal dyspnea and syncope.   Respiratory: Positive for cough and shortness of breath. Negative for hemoptysis, snoring and wheezing.    Endocrine: Negative for cold intolerance, heat intolerance and polyuria.   Hematologic/Lymphatic: Negative for bleeding problem. Does not bruise/bleed easily.   Skin: Negative for color change, dry skin, flushing and itching.   Musculoskeletal: Negative for falls, joint pain, joint swelling, muscle cramps, muscle  weakness and myalgias.   Gastrointestinal: Positive for bloating. Negative for abdominal pain, constipation, heartburn, melena, nausea and vomiting.   Genitourinary: Negative for dysuria and hematuria.   Neurological: Negative for excessive daytime sleepiness, dizziness, light-headedness, loss of balance, numbness, paresthesias, seizures and vertigo.   Psychiatric/Behavioral: Negative for altered mental status, depression, memory loss and substance abuse. The patient does not have insomnia and is not nervous/anxious.    Allergic/Immunologic: Negative for environmental allergies.       No Known Allergies      Current Outpatient Medications:   •  acetaminophen (TYLENOL) 325 MG tablet, Take 650 mg by mouth Every 6 (Six) Hours As Needed for Mild Pain ., Disp: , Rfl:   •  albuterol sulfate  (90 Base) MCG/ACT inhaler, Inhale 2 puffs Every 4 (Four) Hours As Needed for Wheezing., Disp: , Rfl:   •  aspirin 81 MG EC tablet, Take 81 mg by mouth Daily., Disp: , Rfl:   •  darunavir ethanolate (PREZISTA) 800 MG tablet tablet, Take 800 mg by mouth Every Night., Disp: , Rfl:   •  Emtricitabine-Tenofovir AF (DESCOVY) 200-25 MG per tablet, Take  by mouth Every Night., Disp: , Rfl:   •  furosemide (LASIX) 80 MG tablet, Take 1 tablet by mouth 2 (Two) Times a Day., Disp: 180 tablet, Rfl: 3  •  metoprolol succinate XL (TOPROL-XL) 25 MG 24 hr tablet, Take 1 tablet by mouth 2 (Two) Times a Day., Disp: 180 tablet, Rfl: 1  •  potassium chloride (MICRO-K) 10 MEQ CR capsule, Take 2 capsules by mouth Daily., Disp: 60 capsule, Rfl: 0  •  ritonavir (NORVIR) 100 MG tablet, Take 100 mg by mouth Every Night., Disp: , Rfl:   •  sacubitril-valsartan (ENTRESTO) 24-26 MG tablet, Take 1 tablet by mouth Every 12 (Twelve) Hours., Disp: 60 tablet, Rfl: 0  •  sertraline (ZOLOFT) 50 MG tablet, Take 50 mg by mouth Daily., Disp: , Rfl:   •  vitamin D (ERGOCALCIFEROL) 78294 units capsule capsule, Take 50,000 Units by mouth 1 (One) Time Per Week., Disp: ,  "Rfl:         Objective:     Vitals:    08/28/20 0759 08/28/20 0800   BP: 110/60 110/60   BP Location: Left arm Right arm   Pulse: 71    Weight: 88.6 kg (195 lb 6.4 oz)    Height: 167.6 cm (66\")      Body mass index is 31.54 kg/m².    PHYSICAL EXAM:    Vitals Reviewed.   General Appearance: No acute distress, well developed and well nourished. Obese.   Eyes: Conjunctiva and lids: No erythema, swelling, or discharge. Sclera non-icteric.  Wears glasses.  HENT: Atraumatic, normocephalic. External eyes, ears, and nose normal. No hearing loss noted. Mucous membranes normal. Lips not cyanotic. Neck supple with no tenderness.  Respiratory: No signs of respiratory distress. Respiration rhythm and depth normal.   Clear to auscultation. No rales, crackles, rhonchi, or wheezing auscultated.   Cardiovascular:  Jugular Venous Pressure: Normal  Heart Rate and Rhythm: Normal, Heart Sounds: Normal S1 and S2. No S3 or S4 noted.  Murmurs: No murmurs noted. No rubs, thrills, or gallops.   Lower Extremities: No edema noted.  Gastrointestinal:  Abdomen soft, non-distended, non-tender. Normal bowel sounds. No hepatomegaly.   Musculoskeletal: Normal movement of extremities  Skin and Nails: General appearance normal. No pallor, cyanosis, diaphoresis. Skin temperature normal. No clubbing of fingernails.   Psychiatric: Patient alert and oriented to person, place, and time. Speech and behavior appropriate. Normal mood and affect.       ECG 12 Lead  Date/Time: 8/28/2020 8:00 AM  Performed by: Nilda Gallagher APRN  Authorized by: Nilda Gallagher APRN   Comparison: compared with previous ECG from 7/29/2020  Similar to previous ECG  Rhythm: sinus rhythm  Rate: normal  BPM: 71  Conduction: left bundle branch block  T Waves: T waves normal  QRS axis: normal  Other findings: non-specific ST-T wave changes and left atrial abnormality    Clinical impression: abnormal EKG              Assessment:       Diagnosis Plan   1. NICM (nonischemic " cardiomyopathy) (CMS/[facility])  Basic Metabolic Panel    proBNP    Ambulatory Referral to Cardiac Rehab    ECG 12 Lead   2. Chronic systolic congestive heart failure (CMS/[facility])  Basic Metabolic Panel    proBNP    Ambulatory Referral to Cardiac Rehab   3. Exertional dyspnea  Basic Metabolic Panel    proBNP    Ambulatory Referral to Cardiac Rehab   4. Precordial pain  Ambulatory Referral to Cardiac Rehab   5. LBBB (left bundle branch block)  ECG 12 Lead   6. AICD (automatic cardioverter/defibrillator) present     7. Essential hypertension            Plan:       1/2.  Nonischemic Cardiomyopathy and Chronic Systolic Heart Failure: NYHA class IIIa.  EF 6% per echocardiogram and AICD present.  She is on goal-directed medical therapy with metoprolol succinate and sacubitril/valsartan.  She feels that her heart failure symptoms have improved since she was switched from bumetanide back to furosemide.  She is due for repeat BMP and proBNP today.  She does not feel that she needs an adjustment in her furosemide dosage.  She will continue with a low-sodium diet and daily weights.  I provided her my business card and asked her to call with a weight gain of 2 to 3 pounds overnight, 4-5 pounds in 1 week, or any other concerning symptoms.  I would like to further titrate metoprolol succinate to 25 mg twice per day.  On her next visit I may add spironolactone.  She said she was on the middle dosage of Entresto and did not tolerate that.    3/4.  Dyspnea and Chest Pain: Whenever she exerts herself she experiences significant dyspnea and a strain feeling of her left chest and arm.  She really wants to participate in cardiac rehab and the referral has been placed.    5.  Left Bundle Branch Block: Chronic and unchanged.    6.  AICD: We have referred her to Dr. Jamar Azul for consideration of biventricular ICD upgrade.    7.  Hypertension: Blood pressure is well controlled.    8.  Overall I think she is very stable today and euvolemic.  I  would like to keep a close eye on her and she will be seen by Dr. Jamar Azul in September and I have scheduled her an appointment with me in October.    ADDENDUM 8/28/2020:  Blood work reviewed.  proBNP level has improved. BUN and creatinine normal.  Sodium normal.  Potassium low at 3.5.  Glucose 122.  She is currently taking potassium chloride 20 mEq daily and I recommended increasing that to 60 mEq daily. Patient informed & verbalizes understanding.  She will repeat a BMP in September 2020 at the main lab.    As always, it has been a pleasure to participate in your patient's care. Thank you.       Sincerely,       SAMUEL Marquez  [facility] Cardiology      · COVID-19 Precautions - Patient was compliant in wearing a mask. When I saw the patient, I used appropriate personal protective equipment (PPE) including mask (standard procedure).  Additionally, I used gown, gloves, and eye shield if indicated.  Hand hygiene was completed before and after seeing the patient.  · Dictated utilizing Dragon Dictation

## 2020-08-28 NOTE — TELEPHONE ENCOUNTER
Referral received.  I just spoke with patient and she is scheduled for her initial assessment for cardiac rehab on Monday, August 31st, 2020 at 3:00 pm.  She is very excited about attending the program.  Thank you for the referral!

## 2020-08-31 ENCOUNTER — OFFICE VISIT (OUTPATIENT)
Dept: CARDIAC REHAB | Facility: HOSPITAL | Age: 45
End: 2020-08-31

## 2020-08-31 DIAGNOSIS — I50.23 ACUTE ON CHRONIC SYSTOLIC CHF (CONGESTIVE HEART FAILURE) (HCC): Primary | ICD-10-CM

## 2020-08-31 PROCEDURE — 93797 PHYS/QHP OP CAR RHAB WO ECG: CPT

## 2020-09-04 ENCOUNTER — TREATMENT (OUTPATIENT)
Dept: CARDIAC REHAB | Facility: HOSPITAL | Age: 45
End: 2020-09-04

## 2020-09-04 DIAGNOSIS — I50.23 ACUTE ON CHRONIC SYSTOLIC CHF (CONGESTIVE HEART FAILURE) (HCC): Primary | ICD-10-CM

## 2020-09-04 PROCEDURE — 93798 PHYS/QHP OP CAR RHAB W/ECG: CPT

## 2020-09-09 ENCOUNTER — TELEPHONE (OUTPATIENT)
Dept: CARDIOLOGY | Facility: CLINIC | Age: 45
End: 2020-09-09

## 2020-09-09 NOTE — TELEPHONE ENCOUNTER
Spoke with pt and reminded her to get her labs drawn before or after her appt tomorrow with Dr. Toro

## 2020-09-10 ENCOUNTER — OFFICE VISIT (OUTPATIENT)
Dept: CARDIOLOGY | Facility: CLINIC | Age: 45
End: 2020-09-10

## 2020-09-10 ENCOUNTER — LAB (OUTPATIENT)
Dept: LAB | Facility: HOSPITAL | Age: 45
End: 2020-09-10

## 2020-09-10 ENCOUNTER — CLINICAL SUPPORT NO REQUIREMENTS (OUTPATIENT)
Dept: CARDIOLOGY | Facility: CLINIC | Age: 45
End: 2020-09-10

## 2020-09-10 VITALS
WEIGHT: 199 LBS | HEART RATE: 99 BPM | DIASTOLIC BLOOD PRESSURE: 66 MMHG | HEIGHT: 66 IN | BODY MASS INDEX: 31.98 KG/M2 | SYSTOLIC BLOOD PRESSURE: 98 MMHG

## 2020-09-10 DIAGNOSIS — I42.8 NICM (NONISCHEMIC CARDIOMYOPATHY) (HCC): Primary | ICD-10-CM

## 2020-09-10 DIAGNOSIS — E87.6 HYPOKALEMIA: ICD-10-CM

## 2020-09-10 DIAGNOSIS — I50.22 CHRONIC SYSTOLIC CONGESTIVE HEART FAILURE (HCC): Primary | ICD-10-CM

## 2020-09-10 DIAGNOSIS — I50.22 CHRONIC SYSTOLIC CONGESTIVE HEART FAILURE (HCC): ICD-10-CM

## 2020-09-10 PROBLEM — I44.7 LBBB (LEFT BUNDLE BRANCH BLOCK): Status: RESOLVED | Noted: 2020-08-28 | Resolved: 2020-09-10

## 2020-09-10 LAB
ANION GAP SERPL CALCULATED.3IONS-SCNC: 11.2 MMOL/L (ref 5–15)
BUN SERPL-MCNC: 11 MG/DL (ref 6–20)
BUN/CREAT SERPL: 13.3 (ref 7–25)
CALCIUM SPEC-SCNC: 9.3 MG/DL (ref 8.6–10.5)
CHLORIDE SERPL-SCNC: 103 MMOL/L (ref 98–107)
CO2 SERPL-SCNC: 26.8 MMOL/L (ref 22–29)
CREAT SERPL-MCNC: 0.83 MG/DL (ref 0.57–1)
GFR SERPL CREATININE-BSD FRML MDRD: 91 ML/MIN/1.73
GLUCOSE SERPL-MCNC: 97 MG/DL (ref 65–99)
POTASSIUM SERPL-SCNC: 4.5 MMOL/L (ref 3.5–5.2)
SODIUM SERPL-SCNC: 141 MMOL/L (ref 136–145)

## 2020-09-10 PROCEDURE — 36415 COLL VENOUS BLD VENIPUNCTURE: CPT

## 2020-09-10 PROCEDURE — 93000 ELECTROCARDIOGRAM COMPLETE: CPT | Performed by: INTERNAL MEDICINE

## 2020-09-10 PROCEDURE — 99204 OFFICE O/P NEW MOD 45 MIN: CPT | Performed by: INTERNAL MEDICINE

## 2020-09-10 PROCEDURE — 80048 BASIC METABOLIC PNL TOTAL CA: CPT

## 2020-09-10 PROCEDURE — 93282 PRGRMG EVAL IMPLANTABLE DFB: CPT | Performed by: INTERNAL MEDICINE

## 2020-09-10 NOTE — PROGRESS NOTES
Date of Office Visit: 09/10/2020  Encounter Provider: Jamar Azul MD  Place of Service: [facility] CARDIOLOGY  Patient Name: Nina Cheung  :1975    Chief Complaint   Patient presents with   • Cardiomyopathy     New Patient Consult / NICM RM ref   :     HPI: Nina Cheung is a 44 y.o. female who presents today for consideration of biventricular ICD.  The patient has a history of severe nonischemic cardiomyopathy, with ejection fraction of 10%.  She has NYHA class II symptoms.  She has had EKGs which are interpreted as left bundle branch block.  I reviewed her outside records from [facility].  She was hospitalized at [facility] several months ago, where she had left heart catheterization, with no progression of coronary artery disease, and normal right heart pressures.  She improved with diuresis.  She had some nonsustained episodes of tachycardia noted on her device, but no other significant abnormalities.  Her ICD was originally implanted in 2017 at the [facility].  I reviewed her previous EKGs, her left bundle branch block is minimal.  The QRS is at most 130 ms, and I would really describe it is more of an incomplete left bundle branch block.  This is been consistent going back at least to the beginning of our records in 2018.          Past Medical History:   Diagnosis Date   • AICD (automatic cardioverter/defibrillator) present 3/6/2020   • Asthma    • CHF (congestive heart failure) (CMS/[facility])    • Class 2 obesity in adult    • HIV (human immunodeficiency virus infection) (CMS/[facility])    • Hypertension    • LBBB (left bundle branch block)    • NICM (nonischemic cardiomyopathy) (CMS/[facility])     2020 EF 6% per echocardiogram; AICD present       Past Surgical History:   Procedure Laterality Date   • CARDIAC CATHETERIZATION     • CARDIAC DEFIBRILLATOR PLACEMENT     • FRACTURE SURGERY Left     left ankle       Social History      Socioeconomic History   • Marital status:      Spouse name: Not on file   • Number of children: Not on file   • Years of education: Not on file   • Highest education level: Not on file   Tobacco Use   • Smoking status: Former Smoker     Last attempt to quit:      Years since quittin.6   • Smokeless tobacco: Never Used   Substance and Sexual Activity   • Alcohol use: Yes     Alcohol/week: 1.0 standard drinks     Types: 1 Glasses of wine per week     Comment: RARELY//caffeine use: Occasionally   • Drug use: Yes     Types: Marijuana   • Sexual activity: Defer       Family History   Problem Relation Age of Onset   • Cancer Mother    • Breast cancer Mother    • Bone cancer Mother    • Hypertension Maternal Grandfather    • Hyperlipidemia Maternal Grandfather    • Diabetes Maternal Grandfather    • Prostate cancer Maternal Grandfather    • Ovarian cysts Daughter    • Breast cancer Maternal Grandmother    • Heart disease Maternal Grandmother    • No Known Problems Daughter    • No Known Problems Daughter        Review of Systems   Constitution: Positive for malaise/fatigue.   HENT: Negative.    Eyes: Negative.    Cardiovascular: Positive for dyspnea on exertion. Negative for chest pain, leg swelling and near-syncope.   Respiratory: Positive for shortness of breath. Negative for cough.    Endocrine: Negative.    Hematologic/Lymphatic: Negative.    Skin: Negative.    Musculoskeletal: Negative.    Gastrointestinal: Negative.    Genitourinary: Negative.    Neurological: Negative.  Negative for weakness.   Psychiatric/Behavioral: Negative.    Allergic/Immunologic: Negative.        No Known Allergies      Current Outpatient Medications:   •  acetaminophen (TYLENOL) 325 MG tablet, Take 650 mg by mouth Every 6 (Six) Hours As Needed for Mild Pain ., Disp: , Rfl:   •  albuterol sulfate  (90 Base) MCG/ACT inhaler, Inhale 2 puffs Every 4 (Four) Hours As Needed for Wheezing., Disp: , Rfl:   •  aspirin 81 MG EC  "tablet, Take 81 mg by mouth Daily., Disp: , Rfl:   •  darunavir ethanolate (PREZISTA) 800 MG tablet tablet, Take 800 mg by mouth Every Night., Disp: , Rfl:   •  Emtricitabine-Tenofovir AF (DESCOVY) 200-25 MG per tablet, Take  by mouth Every Night., Disp: , Rfl:   •  furosemide (LASIX) 80 MG tablet, Take 1 tablet by mouth 2 (Two) Times a Day., Disp: 180 tablet, Rfl: 3  •  metoprolol succinate XL (TOPROL-XL) 25 MG 24 hr tablet, Take 1 tablet by mouth 2 (Two) Times a Day., Disp: 180 tablet, Rfl: 1  •  potassium chloride (K-DUR,KLOR-CON) 20 MEQ CR tablet, Take 3 tablets by mouth Daily., Disp: 90 tablet, Rfl: 1  •  ritonavir (NORVIR) 100 MG tablet, Take 100 mg by mouth Every Night., Disp: , Rfl:   •  sacubitril-valsartan (ENTRESTO) 24-26 MG tablet, Take 1 tablet by mouth Every 12 (Twelve) Hours., Disp: 60 tablet, Rfl: 0  •  sertraline (ZOLOFT) 50 MG tablet, Take 50 mg by mouth Daily., Disp: , Rfl:   •  vitamin D (ERGOCALCIFEROL) 36724 units capsule capsule, Take 50,000 Units by mouth 1 (One) Time Per Week., Disp: , Rfl:       Objective:     Vitals:    09/10/20 1304   BP: 98/66   BP Location: Right arm   Patient Position: Sitting   Cuff Size: Adult   Pulse: 99   Weight: 90.3 kg (199 lb)   Height: 167.6 cm (66\")     Body mass index is 32.12 kg/m².    PHYSICAL EXAM:    Physical Exam   Constitutional: She is oriented to person, place, and time. She appears well-developed and well-nourished. No distress.   HENT:   Head: Normocephalic and atraumatic.   Eyes: Right eye exhibits no discharge. Left eye exhibits no discharge.   Neck: No JVD present. No tracheal deviation present.   Cardiovascular: Normal rate, regular rhythm and normal heart sounds.   Pulmonary/Chest: Effort normal and breath sounds normal.   ICD in place on left chest.   Abdominal: Soft. She exhibits no distension.   Musculoskeletal: She exhibits no edema.   Neurological: She is alert and oriented to person, place, and time.   Skin: Skin is warm and dry. "   Psychiatric: She has a normal mood and affect. Her behavior is normal. Judgment and thought content normal.   Nursing note and vitals reviewed.          ECG 12 Lead  Date/Time: 9/10/2020 3:24 PM  Performed by: Jamar Azul MD  Authorized by: Jamar Azul MD   Comparison: compared with previous ECG from 8/28/2020  Rhythm: sinus rhythm  Conduction: incomplete left bundle branch block        I personally reviewed her echocardiographic images.  She has severe left ventricular dilation, and decreased systolic function.      Assessment:       Diagnosis Plan   1. NICM (nonischemic cardiomyopathy) (CMS/HCC)     2. Chronic systolic congestive heart failure (CMS/HCC)            Plan:       After scrutinizing her EKGs.  I do not think that she meets criteria for implantation of a CRT device.  I reviewed all her past EKGs from the year, and several are labeled as left bundle branch block, but close examination to me just does not show this degree of QRS widening.  They vary between probably 100 ms to 120 ms in duration.  I think it is clear that she does not have a class I indication for CRT placement, the possibly an argument could be made for class II indication.  At this point no plans for CRT implantation, return to routine follow-up with Dr. Melton.    As always, it has been a pleasure to participate in your patient's care.      Sincerely,         Jamar Azul MD

## 2020-09-11 ENCOUNTER — TELEPHONE (OUTPATIENT)
Dept: CARDIOLOGY | Facility: CLINIC | Age: 45
End: 2020-09-11

## 2020-09-11 ENCOUNTER — TREATMENT (OUTPATIENT)
Dept: CARDIAC REHAB | Facility: HOSPITAL | Age: 45
End: 2020-09-11

## 2020-09-11 DIAGNOSIS — I50.23 ACUTE ON CHRONIC SYSTOLIC CHF (CONGESTIVE HEART FAILURE) (HCC): Primary | ICD-10-CM

## 2020-09-11 PROCEDURE — 93798 PHYS/QHP OP CAR RHAB W/ECG: CPT

## 2020-09-11 NOTE — TELEPHONE ENCOUNTER
Notified patient of results. She verbalized understanding.    Geovanna Page, RN  Triage [facility]

## 2020-09-11 NOTE — TELEPHONE ENCOUNTER
----- Message from SAMUEL Torres sent at 9/11/2020  5:41 AM EDT -----  Please call patient.  BMP normal.  Potassium now 4.5 which is excellent.  Continue potassium 60 mEq daily. Thank you.

## 2020-09-14 ENCOUNTER — TREATMENT (OUTPATIENT)
Dept: CARDIAC REHAB | Facility: HOSPITAL | Age: 45
End: 2020-09-14

## 2020-09-14 DIAGNOSIS — I50.23 ACUTE ON CHRONIC SYSTOLIC CHF (CONGESTIVE HEART FAILURE) (HCC): Primary | ICD-10-CM

## 2020-09-14 PROCEDURE — 93798 PHYS/QHP OP CAR RHAB W/ECG: CPT

## 2020-09-18 ENCOUNTER — TREATMENT (OUTPATIENT)
Dept: CARDIAC REHAB | Facility: HOSPITAL | Age: 45
End: 2020-09-18

## 2020-09-18 DIAGNOSIS — I50.23 ACUTE ON CHRONIC SYSTOLIC CHF (CONGESTIVE HEART FAILURE) (HCC): Primary | ICD-10-CM

## 2020-09-18 PROCEDURE — 93798 PHYS/QHP OP CAR RHAB W/ECG: CPT

## 2020-09-25 ENCOUNTER — TELEPHONE (OUTPATIENT)
Dept: INTERNAL MEDICINE | Age: 45
End: 2020-09-25

## 2020-09-25 NOTE — TELEPHONE ENCOUNTER
PATIENT STATES THAT SHE IS HAVING THE FOLLOWING SYMPTOMS:    COUGHING FOR 2 WEEKS  COUGHING CAUSES CHEST PAIN  HEADACHE  INHALER DOES NOT WORK    PATIENT REQUESTING A COUGH MEDICINE.      PATIENT CALL BACK #[phone]     PHARMACY: [address] ([facility] [place] - [address] [facility] [phone] [facility] [phone] FX

## 2020-09-29 ENCOUNTER — HOSPITAL ENCOUNTER (EMERGENCY)
Facility: HOSPITAL | Age: 45
Discharge: HOME | End: 2020-09-30
Attending: EMERGENCY MEDICINE | Admitting: EMERGENCY MEDICINE

## 2020-09-29 ENCOUNTER — APPOINTMENT (OUTPATIENT)
Dept: GENERAL RADIOLOGY | Facility: HOSPITAL | Age: 45
End: 2020-09-29

## 2020-09-29 DIAGNOSIS — I42.0 DILATED CARDIOMYOPATHY (HCC): ICD-10-CM

## 2020-09-29 DIAGNOSIS — J30.2 SEASONAL ALLERGIES: ICD-10-CM

## 2020-09-29 DIAGNOSIS — R05.9 COUGH: Primary | ICD-10-CM

## 2020-09-29 LAB
ALBUMIN SERPL-MCNC: 4 G/DL (ref 3.5–5.2)
ALBUMIN/GLOB SERPL: 1.5 G/DL
ALP SERPL-CCNC: 105 U/L (ref 39–117)
ALT SERPL W P-5'-P-CCNC: 90 U/L (ref 1–33)
ANION GAP SERPL CALCULATED.3IONS-SCNC: 13.3 MMOL/L (ref 5–15)
AST SERPL-CCNC: 44 U/L (ref 1–32)
BASOPHILS # BLD AUTO: 0.03 10*3/MM3 (ref 0–0.2)
BASOPHILS NFR BLD AUTO: 0.3 % (ref 0–1.5)
BILIRUB SERPL-MCNC: 0.5 MG/DL (ref 0–1.2)
BUN SERPL-MCNC: 15 MG/DL (ref 6–20)
BUN/CREAT SERPL: 12.9 (ref 7–25)
CALCIUM SPEC-SCNC: 9.3 MG/DL (ref 8.6–10.5)
CHLORIDE SERPL-SCNC: 104 MMOL/L (ref 98–107)
CO2 SERPL-SCNC: 19.7 MMOL/L (ref 22–29)
CREAT SERPL-MCNC: 1.16 MG/DL (ref 0.57–1)
D DIMER PPP FEU-MCNC: 1.5 MCGFEU/ML (ref 0–0.49)
DEPRECATED RDW RBC AUTO: 46 FL (ref 37–54)
EOSINOPHIL # BLD AUTO: 0.02 10*3/MM3 (ref 0–0.4)
EOSINOPHIL NFR BLD AUTO: 0.2 % (ref 0.3–6.2)
ERYTHROCYTE [DISTWIDTH] IN BLOOD BY AUTOMATED COUNT: 12.7 % (ref 12.3–15.4)
GFR SERPL CREATININE-BSD FRML MDRD: 62 ML/MIN/1.73
GLOBULIN UR ELPH-MCNC: 2.6 GM/DL
GLUCOSE SERPL-MCNC: 150 MG/DL (ref 65–99)
HCT VFR BLD AUTO: 37.2 % (ref 34–46.6)
HGB BLD-MCNC: 12.5 G/DL (ref 12–15.9)
IMM GRANULOCYTES # BLD AUTO: 0.03 10*3/MM3 (ref 0–0.05)
IMM GRANULOCYTES NFR BLD AUTO: 0.3 % (ref 0–0.5)
LYMPHOCYTES # BLD AUTO: 2.57 10*3/MM3 (ref 0.7–3.1)
LYMPHOCYTES NFR BLD AUTO: 27.5 % (ref 19.6–45.3)
MCH RBC QN AUTO: 33.7 PG (ref 26.6–33)
MCHC RBC AUTO-ENTMCNC: 33.6 G/DL (ref 31.5–35.7)
MCV RBC AUTO: 100.3 FL (ref 79–97)
MONOCYTES # BLD AUTO: 0.56 10*3/MM3 (ref 0.1–0.9)
MONOCYTES NFR BLD AUTO: 6 % (ref 5–12)
NEUTROPHILS NFR BLD AUTO: 6.14 10*3/MM3 (ref 1.7–7)
NEUTROPHILS NFR BLD AUTO: 65.7 % (ref 42.7–76)
NRBC BLD AUTO-RTO: 0 /100 WBC (ref 0–0.2)
NT-PROBNP SERPL-MCNC: ABNORMAL PG/ML (ref 0–450)
PLAT MORPH BLD: NORMAL
PLATELET # BLD AUTO: 120 10*3/MM3 (ref 140–450)
PMV BLD AUTO: 14.1 FL (ref 6–12)
POTASSIUM SERPL-SCNC: 3.8 MMOL/L (ref 3.5–5.2)
PROT SERPL-MCNC: 6.6 G/DL (ref 6–8.5)
RBC # BLD AUTO: 3.71 10*6/MM3 (ref 3.77–5.28)
RBC MORPH BLD: NORMAL
SODIUM SERPL-SCNC: 137 MMOL/L (ref 136–145)
TROPONIN T SERPL-MCNC: <0.01 NG/ML (ref 0–0.03)
WBC # BLD AUTO: 9.35 10*3/MM3 (ref 3.4–10.8)
WBC MORPH BLD: NORMAL

## 2020-09-29 PROCEDURE — 71045 X-RAY EXAM CHEST 1 VIEW: CPT

## 2020-09-29 PROCEDURE — 84145 PROCALCITONIN (PCT): CPT | Performed by: EMERGENCY MEDICINE

## 2020-09-29 PROCEDURE — 99284 EMERGENCY DEPT VISIT MOD MDM: CPT

## 2020-09-29 PROCEDURE — 85007 BL SMEAR W/DIFF WBC COUNT: CPT | Performed by: NURSE PRACTITIONER

## 2020-09-29 PROCEDURE — 85379 FIBRIN DEGRADATION QUANT: CPT | Performed by: NURSE PRACTITIONER

## 2020-09-29 PROCEDURE — 80053 COMPREHEN METABOLIC PANEL: CPT | Performed by: NURSE PRACTITIONER

## 2020-09-29 PROCEDURE — 83880 ASSAY OF NATRIURETIC PEPTIDE: CPT | Performed by: NURSE PRACTITIONER

## 2020-09-29 PROCEDURE — 0202U NFCT DS 22 TRGT SARS-COV-2: CPT | Performed by: NURSE PRACTITIONER

## 2020-09-29 PROCEDURE — 85025 COMPLETE CBC W/AUTO DIFF WBC: CPT | Performed by: NURSE PRACTITIONER

## 2020-09-29 PROCEDURE — 93005 ELECTROCARDIOGRAM TRACING: CPT | Performed by: EMERGENCY MEDICINE

## 2020-09-29 PROCEDURE — 84484 ASSAY OF TROPONIN QUANT: CPT | Performed by: NURSE PRACTITIONER

## 2020-09-29 PROCEDURE — 93010 ELECTROCARDIOGRAM REPORT: CPT | Performed by: INTERNAL MEDICINE

## 2020-09-29 RX ORDER — SODIUM CHLORIDE 0.9 % (FLUSH) 0.9 %
10 SYRINGE (ML) INJECTION AS NEEDED
Status: DISCONTINUED | OUTPATIENT
Start: 2020-09-29 | End: 2020-09-30 | Stop reason: HOSPADM

## 2020-09-30 ENCOUNTER — APPOINTMENT (OUTPATIENT)
Dept: CT IMAGING | Facility: HOSPITAL | Age: 45
End: 2020-09-30

## 2020-09-30 VITALS
TEMPERATURE: 98 F | SYSTOLIC BLOOD PRESSURE: 110 MMHG | OXYGEN SATURATION: 94 % | RESPIRATION RATE: 16 BRPM | DIASTOLIC BLOOD PRESSURE: 83 MMHG | HEART RATE: 89 BPM

## 2020-09-30 LAB

## 2020-09-30 PROCEDURE — 71275 CT ANGIOGRAPHY CHEST: CPT

## 2020-09-30 PROCEDURE — 0 IOPAMIDOL PER 1 ML: Performed by: EMERGENCY MEDICINE

## 2020-09-30 RX ORDER — LEVOCETIRIZINE DIHYDROCHLORIDE 5 MG/1
5 TABLET, FILM COATED ORAL EVERY EVENING
Qty: 30 TABLET | Refills: 0 | Status: SHIPPED | OUTPATIENT
Start: 2020-09-30 | End: 2020-10-23

## 2020-09-30 RX ORDER — FAMOTIDINE 20 MG/1
20 TABLET, FILM COATED ORAL 2 TIMES DAILY
Qty: 60 TABLET | Refills: 0 | Status: SHIPPED | OUTPATIENT
Start: 2020-09-30 | End: 2020-10-15

## 2020-09-30 RX ADMIN — IOPAMIDOL 95 ML: 755 INJECTION, SOLUTION INTRAVENOUS at 02:42

## 2020-09-30 RX ADMIN — IOPAMIDOL 95 ML: 755 INJECTION, SOLUTION INTRAVENOUS at 05:46

## 2020-10-08 ENCOUNTER — TELEPHONE (OUTPATIENT)
Dept: INTERNAL MEDICINE | Age: 45
End: 2020-10-08

## 2020-10-08 DIAGNOSIS — J32.9 SINUSITIS, UNSPECIFIED CHRONICITY, UNSPECIFIED LOCATION: Primary | ICD-10-CM

## 2020-10-08 RX ORDER — AMOXICILLIN AND CLAVULANATE POTASSIUM 875; 125 MG/1; MG/1
1 TABLET, FILM COATED ORAL EVERY 12 HOURS SCHEDULED
Qty: 20 TABLET | Refills: 0 | Status: SHIPPED | OUTPATIENT
Start: 2020-10-08 | End: 2020-10-18

## 2020-10-08 NOTE — TELEPHONE ENCOUNTER
Contact patient.     Will send RX to pharmacy for antibiotic for what sounds like possible sinus infection.    Why is she requesting orders for PFTs and sleep study? She will probably need an appointment to discuss these.

## 2020-10-08 NOTE — TELEPHONE ENCOUNTER
Pt is having cough and congestion for two weeks. Sinus headache and facial pain. Pt went to the [facility] on 9/[facility]/20 covid was neg and ct was negative for blood clots. Pt would like something for congestion and a referral for an sleep study and lung function test.

## 2020-10-15 ENCOUNTER — OFFICE VISIT (OUTPATIENT)
Dept: CARDIOLOGY | Facility: CLINIC | Age: 45
End: 2020-10-15

## 2020-10-15 ENCOUNTER — HOSPITAL ENCOUNTER (OUTPATIENT)
Dept: CARDIOLOGY | Facility: HOSPITAL | Age: 45
Discharge: HOME | End: 2020-10-15
Admitting: NURSE PRACTITIONER

## 2020-10-15 VITALS
HEART RATE: 82 BPM | BODY MASS INDEX: 31.6 KG/M2 | DIASTOLIC BLOOD PRESSURE: 60 MMHG | HEIGHT: 66 IN | SYSTOLIC BLOOD PRESSURE: 110 MMHG | WEIGHT: 196.6 LBS

## 2020-10-15 DIAGNOSIS — I42.8 NICM (NONISCHEMIC CARDIOMYOPATHY) (HCC): ICD-10-CM

## 2020-10-15 DIAGNOSIS — I50.22 CHRONIC SYSTOLIC CONGESTIVE HEART FAILURE (HCC): Primary | ICD-10-CM

## 2020-10-15 DIAGNOSIS — Z95.810 AICD (AUTOMATIC CARDIOVERTER/DEFIBRILLATOR) PRESENT: ICD-10-CM

## 2020-10-15 DIAGNOSIS — I10 ESSENTIAL HYPERTENSION: ICD-10-CM

## 2020-10-15 DIAGNOSIS — I50.22 CHRONIC SYSTOLIC CONGESTIVE HEART FAILURE (HCC): ICD-10-CM

## 2020-10-15 DIAGNOSIS — I44.7 LBBB (LEFT BUNDLE BRANCH BLOCK): ICD-10-CM

## 2020-10-15 DIAGNOSIS — R05.9 COUGH: ICD-10-CM

## 2020-10-15 DIAGNOSIS — I10 ESSENTIAL HYPERTENSION: Primary | ICD-10-CM

## 2020-10-15 DIAGNOSIS — R06.09 EXERTIONAL DYSPNEA: ICD-10-CM

## 2020-10-15 PROBLEM — R07.9 CHEST PAIN: Status: RESOLVED | Noted: 2019-08-06 | Resolved: 2020-10-15

## 2020-10-15 LAB
BASOPHILS # BLD AUTO: 0.03 10*3/MM3 (ref 0–0.2)
BASOPHILS NFR BLD AUTO: 0.4 % (ref 0–1.5)
DEPRECATED RDW RBC AUTO: 44.7 FL (ref 37–54)
EOSINOPHIL # BLD AUTO: 0.06 10*3/MM3 (ref 0–0.4)
EOSINOPHIL NFR BLD AUTO: 0.8 % (ref 0.3–6.2)
ERYTHROCYTE [DISTWIDTH] IN BLOOD BY AUTOMATED COUNT: 12.5 % (ref 12.3–15.4)
HCT VFR BLD AUTO: 39.9 % (ref 34–46.6)
HGB BLD-MCNC: 13.5 G/DL (ref 12–15.9)
IMM GRANULOCYTES # BLD AUTO: 0.02 10*3/MM3 (ref 0–0.05)
IMM GRANULOCYTES NFR BLD AUTO: 0.3 % (ref 0–0.5)
LYMPHOCYTES # BLD AUTO: 2.82 10*3/MM3 (ref 0.7–3.1)
LYMPHOCYTES NFR BLD AUTO: 35.7 % (ref 19.6–45.3)
MCH RBC QN AUTO: 33.2 PG (ref 26.6–33)
MCHC RBC AUTO-ENTMCNC: 33.8 G/DL (ref 31.5–35.7)
MCV RBC AUTO: 98 FL (ref 79–97)
MONOCYTES # BLD AUTO: 0.49 10*3/MM3 (ref 0.1–0.9)
MONOCYTES NFR BLD AUTO: 6.2 % (ref 5–12)
NEUTROPHILS NFR BLD AUTO: 4.47 10*3/MM3 (ref 1.7–7)
NEUTROPHILS NFR BLD AUTO: 56.6 % (ref 42.7–76)
NRBC BLD AUTO-RTO: 0 /100 WBC (ref 0–0.2)
PLATELET # BLD AUTO: 159 10*3/MM3 (ref 140–450)
PMV BLD AUTO: 13 FL (ref 6–12)
RBC # BLD AUTO: 4.07 10*6/MM3 (ref 3.77–5.28)
WBC # BLD AUTO: 7.89 10*3/MM3 (ref 3.4–10.8)

## 2020-10-15 PROCEDURE — 93000 ELECTROCARDIOGRAM COMPLETE: CPT | Performed by: NURSE PRACTITIONER

## 2020-10-15 PROCEDURE — 96374 THER/PROPH/DIAG INJ IV PUSH: CPT

## 2020-10-15 PROCEDURE — 85025 COMPLETE CBC W/AUTO DIFF WBC: CPT | Performed by: NURSE PRACTITIONER

## 2020-10-15 PROCEDURE — 25010000002 FUROSEMIDE PER 20 MG: Performed by: NURSE PRACTITIONER

## 2020-10-15 PROCEDURE — 99214 OFFICE O/P EST MOD 30 MIN: CPT | Performed by: NURSE PRACTITIONER

## 2020-10-15 PROCEDURE — 36415 COLL VENOUS BLD VENIPUNCTURE: CPT

## 2020-10-15 RX ORDER — METOPROLOL SUCCINATE 25 MG/1
25 TABLET, EXTENDED RELEASE ORAL 2 TIMES DAILY
Qty: 180 TABLET | Refills: 1 | Status: ON HOLD | OUTPATIENT
Start: 2020-10-15 | End: 2020-10-27

## 2020-10-15 RX ORDER — FUROSEMIDE 10 MG/ML
80 INJECTION INTRAMUSCULAR; INTRAVENOUS ONCE
Status: COMPLETED | OUTPATIENT
Start: 2020-10-15 | End: 2020-10-15

## 2020-10-15 RX ADMIN — FUROSEMIDE 80 MG: 10 INJECTION, SOLUTION INTRAMUSCULAR; INTRAVENOUS at 15:39

## 2020-10-15 NOTE — PROGRESS NOTES
Date of Office Visit: 10/15/2020  Encounter Provider: SAMUEL Yee  Place of Service: [facility] CARDIOLOGY  Patient Name: Nina Cheung  :1975  Primary Cardiologist: Dr. Lisset Melton     Chief Complaint   Patient presents with   • Follow-up   :     Dear SAMUEL Tamez,    HPI: Nina Cheung is a pleasant 44 y.o. female who presents today for cardiac follow up.     She has a known history of HIV, essential hypertension, obesity, asthma, and history of illicit drug use.    In 2019, she was diagnosed with nonischemic cardiomyopathy and heart failure at [facility].  She was visiting family there. On 2019 cardiac catheterization showed normal coronary arteries, LVEDP 12, wedge pressure 13, PA pressure 28/15 with a mean of 19 mmHg, RA pressure 4, cardiac index 2.5 L/min/m² with a PVR of 1.2 Woods units.  She underwent single-chamber [employer] AICD implantation.    In 2020, she presented to the [facility] ED with heart failure symptoms.  On 2020 repeat echocardiogram showed the following: severe left ventricular dilation, ejection fraction 6%, very thin left ventricular walls, grade 3 diastolic dysfunction, moderate to severe mitral insufficiency, moderate tricuspid insufficiency, RVSP 48 mmHg, and essentially global hypokinesis with akinesis at the bases.  Her medications were adjusted and she received IV diuretic therapy.    Post hospitalization, she followed up with Dr. Melton in the office and felt that the bumetanide was not as effective as oral furosemide.  She was still experiencing heart failure symptoms.  ICD was interrogated and showed nonsustained ventricular tachycardia.  She has been unable to tolerate the moderate dose of Entresto.  On her last visit I titrated her beta-blocker.    On 9/10/2020, she saw Dr. Jamar Azul in the office and he did not feel that she met criteria for implantation of CRT  device.    She recently presented to the [facility] emergency department with shortness of breath, orthopnea, congestion, and productive cough.  Her d-dimer was elevated and a CTA was negative for pulmonary emboli.  Her proBNP was elevated.  She was treated for an upper respiratory infection with antibiotic therapy.    She presents today for follow-up visit.  She is concerned that she is still coughing up clear phlegm and is experiencing shortness of breath.  She was experiencing orthopnea which has resolved.  Her weights have been stable at home.  Blood pressure and heart rate are normal.  She feels that she is retaining fluid in her abdomen and really thinks that her cough is more from heart failure than the upper respiratory infection.  She denies chest pain, palpitations, lower extremity edema, dizziness, syncope, or bleeding.    Past Medical History:   Diagnosis Date   • AICD (automatic cardioverter/defibrillator) present 3/6/2020   • Asthma    • CHF (congestive heart failure) (CMS/[facility])    • Class 2 obesity in adult    • HIV (human immunodeficiency virus infection) (CMS/[facility])    • Hypertension    • LBBB (left bundle branch block)    • NICM (nonischemic cardiomyopathy) (CMS/[facility])     2020 EF 6% per echocardiogram; AICD present       Past Surgical History:   Procedure Laterality Date   • CARDIAC CATHETERIZATION     • CARDIAC DEFIBRILLATOR PLACEMENT     • FRACTURE SURGERY Left     left ankle       Social History     Socioeconomic History   • Marital status:      Spouse name: Not on file   • Number of children: Not on file   • Years of education: Not on file   • Highest education level: Not on file   Tobacco Use   • Smoking status: Former Smoker     Quit date:      Years since quittin.7   • Smokeless tobacco: Never Used   Substance and Sexual Activity   • Alcohol use: Yes     Comment: holiday and special occ//caffeine use: 1 cup daily   • Drug use: Yes     Types: Marijuana   • Sexual activity: Defer        Family History   Problem Relation Age of Onset   • Cancer Mother    • Breast cancer Mother    • Bone cancer Mother    • Hypertension Maternal Grandfather    • Hyperlipidemia Maternal Grandfather    • Diabetes Maternal Grandfather    • Prostate cancer Maternal Grandfather    • Ovarian cysts Daughter    • Breast cancer Maternal Grandmother    • Heart disease Maternal Grandmother    • No Known Problems Daughter    • No Known Problems Daughter        The following portion of the patient's history were reviewed and updated as appropriate: past medical history, past surgical history, past social history, past family history, allergies, current medications, and problem list.    Review of Systems   Constitution: Negative for chills, diaphoresis, fever, malaise/fatigue, night sweats, weight gain and weight loss.   HENT: Negative for hearing loss, nosebleeds, sore throat and tinnitus.    Eyes: Negative for blurred vision, double vision, pain and visual disturbance.   Cardiovascular: Positive for dyspnea on exertion. Negative for chest pain, claudication, cyanosis, irregular heartbeat, leg swelling, near-syncope, orthopnea, palpitations, paroxysmal nocturnal dyspnea and syncope.   Respiratory: Positive for cough and shortness of breath. Negative for hemoptysis, snoring and wheezing.    Endocrine: Negative for cold intolerance, heat intolerance and polyuria.   Hematologic/Lymphatic: Negative for bleeding problem. Does not bruise/bleed easily.   Skin: Negative for color change, dry skin, flushing and itching.   Musculoskeletal: Negative for falls, joint pain, joint swelling, muscle cramps, muscle weakness and myalgias.   Gastrointestinal: Positive for bloating. Negative for abdominal pain, constipation, heartburn, melena, nausea and vomiting.   Genitourinary: Negative for dysuria and hematuria.   Neurological: Negative for excessive daytime sleepiness, dizziness, light-headedness, loss of balance, numbness, paresthesias,  seizures and vertigo.   Psychiatric/Behavioral: Negative for altered mental status, depression, memory loss and substance abuse. The patient does not have insomnia and is not nervous/anxious.    Allergic/Immunologic: Negative for environmental allergies.       No Known Allergies      Current Outpatient Medications:   •  acetaminophen (TYLENOL) 325 MG tablet, Take 650 mg by mouth Every 6 (Six) Hours As Needed for Mild Pain ., Disp: , Rfl:   •  albuterol sulfate  (90 Base) MCG/ACT inhaler, Inhale 2 puffs Every 4 (Four) Hours As Needed for Wheezing., Disp: , Rfl:   •  amoxicillin-clavulanate (Augmentin) 875-125 MG per tablet, Take 1 tablet by mouth Every 12 (Twelve) Hours for 10 days., Disp: 20 tablet, Rfl: 0  •  aspirin 81 MG EC tablet, Take 81 mg by mouth Daily., Disp: , Rfl:   •  darunavir ethanolate (PREZISTA) 800 MG tablet tablet, Take 800 mg by mouth Every Night., Disp: , Rfl:   •  Emtricitabine-Tenofovir AF (DESCOVY) 200-25 MG per tablet, Take  by mouth Every Night., Disp: , Rfl:   •  furosemide (LASIX) 80 MG tablet, Take 1 tablet by mouth 2 (Two) Times a Day., Disp: 180 tablet, Rfl: 3  •  levocetirizine (Xyzal) 5 MG tablet, Take 1 tablet by mouth Every Evening., Disp: 30 tablet, Rfl: 0  •  metoprolol succinate XL (TOPROL-XL) 25 MG 24 hr tablet, Take 1 tablet by mouth 2 (Two) Times a Day., Disp: 180 tablet, Rfl: 1  •  potassium chloride (K-DUR,KLOR-CON) 20 MEQ CR tablet, Take 3 tablets by mouth Daily., Disp: 90 tablet, Rfl: 1  •  ritonavir (NORVIR) 100 MG tablet, Take 100 mg by mouth Every Night., Disp: , Rfl:   •  sacubitril-valsartan (ENTRESTO) 24-26 MG tablet, Take 1 tablet by mouth Every 12 (Twelve) Hours., Disp: 60 tablet, Rfl: 0  •  sertraline (ZOLOFT) 50 MG tablet, Take 50 mg by mouth Daily., Disp: , Rfl:   •  vitamin D (ERGOCALCIFEROL) 56721 units capsule capsule, Take 50,000 Units by mouth 1 (One) Time Per Week., Disp: , Rfl:   No current facility-administered medications for this visit.        "  Objective:     Vitals:    10/15/20 1437 10/15/20 1438   BP: 110/60 110/60   BP Location: Left arm Right arm   Pulse: 82    Weight: 89.2 kg (196 lb 9.6 oz)    Height: 167.6 cm (66\")      Body mass index is 31.73 kg/m².    PHYSICAL EXAM:    Vitals Reviewed.   General Appearance: No acute distress, well developed and well nourished. Obese.   Eyes: Conjunctiva and lids: No erythema, swelling, or discharge. Sclera non-icteric.  Wears glasses.  HENT: Atraumatic, normocephalic. External eyes, ears, and nose normal. No hearing loss noted. Mucous membranes normal. Lips not cyanotic. Neck supple with no tenderness.  Respiratory: No signs of respiratory distress. Respiration rhythm and depth normal.   Clear to auscultation. No rales, crackles, rhonchi, or wheezing auscultated.   Cardiovascular:  Jugular Venous Pressure: Normal  Heart Rate and Rhythm: Normal, Heart Sounds: Normal S1 and S2. No S3 or S4 noted.  Murmurs: No murmurs noted. No rubs, thrills, or gallops.   Lower Extremities: No edema noted.  Gastrointestinal:  Abdomen soft, non-distended, non-tender. Normal bowel sounds. No hepatomegaly.   Musculoskeletal: Normal movement of extremities  Skin and Nails: General appearance normal. No pallor, cyanosis, diaphoresis. Skin temperature normal. No clubbing of fingernails.   Psychiatric: Patient alert and oriented to person, place, and time. Speech and behavior appropriate. Normal mood and affect.       ECG 12 Lead    Date/Time: 10/15/2020 2:42 PM  Performed by: Nilda Gallagher APRN  Authorized by: Nilda Gallagher APRN   Comparison: compared with previous ECG from 9/29/2020  Similar to previous ECG  Rhythm: sinus rhythm  Rate: normal  BPM: 82  Conduction: left bundle branch block  T Waves: T waves normal  QRS axis: normal  Other findings: non-specific ST-T wave changes    Clinical impression: abnormal EKG              Assessment:       Diagnosis Plan   1. Chronic systolic congestive heart failure (CMS/HCC)     2. NICM " (nonischemic cardiomyopathy) (CMS/HCC)     3. Exertional dyspnea     4. Cough     5. LBBB (left bundle branch block)     6. AICD (automatic cardioverter/defibrillator) present     7. Essential hypertension            Plan:       1/2.  Nonischemic Cardiomyopathy and Chronic Systolic Heart Failure: NYHA class IIIa.  EF 6% per echocardiogram and AICD present.  She is on goal-directed medical therapy with metoprolol succinate and sacubitril/valsartan.  She is unable to tolerate the moderate dosage of sacubitril/valsartan.  Although she has abdominal bloating, she did not feel that the bumetanide was as effective as the furosemide.  On her last visit I titrated her metoprolol succinate to 25 mg twice per day and she said she did it for a couple days but did not like it.  She is willing to retry the metoprolol succinate 25 mg twice per day.  She will call me with an update in a week and at that time I may add spironolactone 25 mg daily to her regimen.  I will refer her to the heart failure clinic for further education about heart failure management.  She admits that it is very hard to follow a low-sodium diet.    3/4.  Shortness of breath and Cough: She is currently being treated for an upper respiratory infection with antibiotic therapy.  She is concerned that she may also be experiencing heart failure symptoms and is had abdominal bloating.  I recommended a CBC, BMP, and proBNP to be checked today.  I will give her 1 dose of IV furosemide 80 mg in our CEC unit.  We will see if that makes a difference in her shortness of breath and cough.    5.  Left Bundle Branch Block: Chronic and unchanged.    6.  AICD: We referred her to Dr. Jamar Azul for consideration of CRT device and he did not feel that she qualified.    7.  Hypertension: Blood pressure is well controlled.    8.  She will call me with an update in 1 week and further recommendations to follow.    ADDENDUM:  · CBC was stable and patient was informed.  proBNP  and BMP to be hemolyzed.  We will recheck when she goes to the heart failure clinic.    As always, it has been a pleasure to participate in your patient's care. Thank you.       Sincerely,       SAMUEL Marquez  [facility] Cardiology      · COVID-19 Precautions - Patient was compliant in wearing a mask. When I saw the patient, I used appropriate personal protective equipment (PPE) including mask (standard procedure).  Additionally, I used gown, gloves, and eye shield if indicated.  Hand hygiene was completed before and after seeing the patient.  · Dictated utilizing Dragon Dictation

## 2020-10-20 ENCOUNTER — TELEPHONE (OUTPATIENT)
Dept: CARDIOLOGY | Facility: HOSPITAL | Age: 45
End: 2020-10-20

## 2020-10-20 NOTE — TELEPHONE ENCOUNTER
Called patient to set up appointment. Left VM message with clinic telephone number.  Will call back.    Lisha Shine RN  [facility]

## 2020-10-21 ENCOUNTER — TELEPHONE (OUTPATIENT)
Dept: CARDIOLOGY | Facility: HOSPITAL | Age: 45
End: 2020-10-21

## 2020-10-21 NOTE — TELEPHONE ENCOUNTER
Patient called to schedule appointment.  I left a previous VM abut setting up appt. at HF Clinic.  Appt. scheduled for Tues, Oct 27, 2020 at 2:00 pm.  I asked her to bring all of her medications to her appointment. Clinic telephone number provided.  She asked me to email the address, which I will send via [employer].  Patient had no additional questions at this time.    Lisha Shine RN  [facility]

## 2020-10-22 ENCOUNTER — TELEPHONE (OUTPATIENT)
Dept: CARDIOLOGY | Facility: CLINIC | Age: 45
End: 2020-10-22

## 2020-10-22 NOTE — TELEPHONE ENCOUNTER
Can you please call patient and check on her and see how her heart failure symptoms are doing?  Thank you

## 2020-10-23 ENCOUNTER — OFFICE VISIT (OUTPATIENT)
Dept: INTERNAL MEDICINE | Age: 45
End: 2020-10-23

## 2020-10-23 DIAGNOSIS — R06.01 ORTHOPNEA: ICD-10-CM

## 2020-10-23 DIAGNOSIS — R09.82 POST-NASAL DRAINAGE: ICD-10-CM

## 2020-10-23 DIAGNOSIS — R05.9 COUGH: Primary | ICD-10-CM

## 2020-10-23 PROCEDURE — 99214 OFFICE O/P EST MOD 30 MIN: CPT | Performed by: NURSE PRACTITIONER

## 2020-10-23 RX ORDER — FLUTICASONE PROPIONATE 50 MCG
2 SPRAY, SUSPENSION (ML) NASAL DAILY
Qty: 1 BOTTLE | Refills: 2 | Status: SHIPPED | OUTPATIENT
Start: 2020-10-23

## 2020-10-23 RX ORDER — BENZONATATE 100 MG/1
100 CAPSULE ORAL 3 TIMES DAILY PRN
Qty: 30 CAPSULE | Refills: 0 | Status: ON HOLD | OUTPATIENT
Start: 2020-10-23 | End: 2020-10-27

## 2020-10-23 RX ORDER — LORATADINE 10 MG/1
10 TABLET ORAL DAILY
Qty: 30 TABLET | Refills: 0 | Status: ON HOLD | OUTPATIENT
Start: 2020-10-23 | End: 2020-10-27

## 2020-10-23 NOTE — PROGRESS NOTES
[facility] [place] INTERNAL MEDICINE  Darcie Cheung / 44 y.o. / female  10/23/2020      ASSESSMENT & PLAN:    Problem List Items Addressed This Visit     None      Visit Diagnoses     Cough    -  Primary    Relevant Medications    fluticasone (Flonase) 50 MCG/ACT nasal spray    loratadine (Claritin) 10 MG tablet    benzonatate (TESSALON) 100 MG capsule    Post-nasal drainage        Relevant Medications    fluticasone (Flonase) 50 MCG/ACT nasal spray    loratadine (Claritin) 10 MG tablet    benzonatate (TESSALON) 100 MG capsule    Orthopnea            No orders of the defined types were placed in this encounter.    New Medications Ordered This Visit   Medications   • fluticasone (Flonase) 50 MCG/ACT nasal spray     Si sprays into the nostril(s) as directed by provider Daily.     Dispense:  1 bottle     Refill:  2   • loratadine (Claritin) 10 MG tablet     Sig: Take 1 tablet by mouth Daily.     Dispense:  30 tablet     Refill:  0   • benzonatate (TESSALON) 100 MG capsule     Sig: Take 1 capsule by mouth 3 (Three) Times a Day As Needed for Cough.     Dispense:  30 capsule     Refill:  0       Summary/Discussion:    This patient has consented to a telehealth visit via phone. The visit was scheduled as a telehealth phone visit to comply with patient safety concerns in accordance with CDC recommendations.  All vitals recorded within this visit are reported by the patient.  I spent 17 minutes in total including but not limited to the 12 minutes spent in direct conversation with this patient.       1. Cough  Difficult to assert obtain because of cough as this is a telephone visit and I am unable to assess her lungs or vital signs.  It does sound like this is likely allergy related with a component of postnasal drainage.  Recommend she start different antihistamine, start fluticasone nasal spray, will send Tessalon Perles into her pharmacy.  She does have an appointment with the heart failure clinic on  Tuesday and they will draw labs at that time to determine if orthopnea related to CHF.   Instructed patient if symptoms worsen over the weekend to go to the ER for evaluation.  I also discussed that if she is not feeling better in the next couple of weeks that she would need to make an appointment to actually physically come into the office at that time so I could fully evaluate her.  She has had multiple negative Covid test up to this point.    - fluticasone (Flonase) 50 MCG/ACT nasal spray; 2 sprays into the nostril(s) as directed by provider Daily.  Dispense: 1 bottle; Refill: 2  - loratadine (Claritin) 10 MG tablet; Take 1 tablet by mouth Daily.  Dispense: 30 tablet; Refill: 0  - benzonatate (TESSALON) 100 MG capsule; Take 1 capsule by mouth 3 (Three) Times a Day As Needed for Cough.  Dispense: 30 capsule; Refill: 0    2. Post-nasal drainage    - fluticasone (Flonase) 50 MCG/ACT nasal spray; 2 sprays into the nostril(s) as directed by provider Daily.  Dispense: 1 bottle; Refill: 2  - loratadine (Claritin) 10 MG tablet; Take 1 tablet by mouth Daily.  Dispense: 30 tablet; Refill: 0  - benzonatate (TESSALON) 100 MG capsule; Take 1 capsule by mouth 3 (Three) Times a Day As Needed for Cough.  Dispense: 30 capsule; Refill: 0    3. Orthopnea          No follow-ups on file.    ____________________________________________________________________    MEDICATIONS  Current Outpatient Medications   Medication Sig Dispense Refill   • acetaminophen (TYLENOL) 325 MG tablet Take 650 mg by mouth Every 6 (Six) Hours As Needed for Mild Pain .     • albuterol sulfate  (90 Base) MCG/ACT inhaler Inhale 2 puffs Every 4 (Four) Hours As Needed for Wheezing.     • aspirin 81 MG EC tablet Take 81 mg by mouth Daily.     • darunavir ethanolate (PREZISTA) 800 MG tablet tablet Take 800 mg by mouth Every Night.     • Emtricitabine-Tenofovir AF (DESCOVY) 200-25 MG per tablet Take  by mouth Every Night.     • furosemide (LASIX) 80 MG tablet  Take 1 tablet by mouth 2 (Two) Times a Day. 180 tablet 3   • metoprolol succinate XL (TOPROL-XL) 25 MG 24 hr tablet Take 1 tablet by mouth 2 (Two) Times a Day. 180 tablet 1   • potassium chloride (K-DUR,KLOR-CON) 20 MEQ CR tablet Take 3 tablets by mouth Daily. 90 tablet 1   • ritonavir (NORVIR) 100 MG tablet Take 100 mg by mouth Every Night.     • sacubitril-valsartan (ENTRESTO) 24-26 MG tablet Take 1 tablet by mouth Every 12 (Twelve) Hours. 60 tablet 0   • sertraline (ZOLOFT) 50 MG tablet Take 50 mg by mouth Daily.     • vitamin D (ERGOCALCIFEROL) 67452 units capsule capsule Take 50,000 Units by mouth 1 (One) Time Per Week.     • benzonatate (TESSALON) 100 MG capsule Take 1 capsule by mouth 3 (Three) Times a Day As Needed for Cough. 30 capsule 0   • fluticasone (Flonase) 50 MCG/ACT nasal spray 2 sprays into the nostril(s) as directed by provider Daily. 1 bottle 2   • loratadine (Claritin) 10 MG tablet Take 1 tablet by mouth Daily. 30 tablet 0     No current facility-administered medications for this visit.           VITALS:    There were no vitals taken for this visit.    BP Readings from Last 3 Encounters:   10/15/20 110/60   09/30/20 110/83   09/10/20 98/66     Wt Readings from Last 3 Encounters:   10/15/20 89.2 kg (196 lb 9.6 oz)   09/10/20 90.3 kg (199 lb)   08/28/20 88.6 kg (195 lb 6.4 oz)      There is no height or weight on file to calculate BMI.    CC:  Main reason(s) for today's visit: Cough      HPI:     You have chosen to receive care through a telephone visit. Do you consent to use a telephone visit for your medical care today? Yes    Cough: Patient complains of productive cough and orthopnea.  Symptoms began several weeks ago.  The cough is productive of clear sputum, nocturnal and is aggravated by reclining position Associated symptoms include:postnasal drip.Patient does have a history of asthma. Patient does not have a history of environmental allergens.  Patient does have a history of smoking.  Patient  had previous Chest X-ray. She reports issue with needing to clear throat, coughing so much that it is causing vomiting.     She was seen in the emergency room on September 29 for similar symptoms, had negative Covid testing, negative chest x-ray, CTA of the chest, and treated for allergies.  She subsequently contacted me a few days later around her symptoms were worse, she was prescribed a course of Augmentin, which seemed to help somewhat while taking, but symptoms returned.  She is also seen recently by cardiology, given dose of IV Lasix, but patient is adamant that her symptoms are not related to her heart failure as her weights have been stable.  She is going to the heart failure clinic on Tuesday and will have labs done at that time, including BNP and BMP.         Patient Care Team:  Darcie Ledbetter APRN as PCP - General (Internal Medicine)  Lisset Melton MD as Consulting Physician (Cardiology)  Andrew Watson MD as Consulting Physician (Urology)    ____________________________________________________________________    REVIEW OF SYSTEMS    Review of Systems   Constitutional: Negative for chills, fever and unexpected weight change.   HENT: Positive for congestion and postnasal drip. Negative for ear pain, rhinorrhea, sinus pressure, sneezing, sore throat and trouble swallowing.    Respiratory: Positive for cough. Negative for shortness of breath and wheezing.    Cardiovascular: Negative for chest pain, palpitations and leg swelling.   Gastrointestinal: Negative for diarrhea, nausea and vomiting.   Neurological: Positive for headaches.   Hematological: Negative for adenopathy.         PHYSICAL EXAMINATION    Physical Exam  Neurological:      Mental Status: She is alert and oriented to person, place, and time. She is not disoriented.   Psychiatric:         Attention and Perception: She is attentive.         Speech: Speech normal.         Behavior: Behavior is cooperative.         Thought  Content: Thought content normal.         Judgment: Judgment normal.     UNABLE TO PERFORM PHYSICAL EXAMINATION AS THIS IS A TELEPHONE ENCOUNTER VISIT.       REVIEWED DATA:    Labs:     Lab Results   Component Value Date     09/29/2020    K 3.8 09/29/2020    AST 44 (H) 09/29/2020    ALT 90 (H) 09/29/2020    BUN 15 09/29/2020    CREATININE 1.16 (H) 09/29/2020    CREATININE 0.83 09/10/2020    CREATININE 0.83 08/28/2020    EGFRIFAFRI 62 09/29/2020       Lab Results   Component Value Date    GLUCOSE 150 (H) 09/29/2020    GLUCOSE 97 09/10/2020    GLUCOSE 122 (H) 08/28/2020       No results found for: LDL, HDL, TRIG, CHOLHDLRATIO    Lab Results   Component Value Date    TSH 2.850 07/29/2020       Lab Results   Component Value Date    WBC 7.89 10/15/2020    HGB 13.5 10/15/2020    HGB 12.5 09/29/2020    HGB 11.9 (L) 07/11/2020     10/15/2020       Lab Results   Component Value Date    GLUCOSEU Negative 07/08/2020    BLOODU Trace (A) 07/08/2020    NITRITEU Negative 07/08/2020    LEUKOCYTESUR Negative 07/08/2020       Imaging:         Medical Tests:         Summary of old records / correspondence / consultant report:         Request outside records:         ALLERGIES  No Known Allergies     PFSH:     The following portions of the patient's history were reviewed and updated as appropriate: Allergies / Current Medications / Past Medical History / Surgical History / Social History / Family History    PROBLEM LIST   Patient Active Problem List   Diagnosis   • Asthma   • Essential hypertension   • HIV (human immunodeficiency virus infection) (CMS/[facility])   • Class 2 obesity in adult   • Exertional dyspnea   • Chronic systolic congestive heart failure (CMS/[facility])   • NICM (nonischemic cardiomyopathy) (CMS/[facility])   • Elevated LFTs   • Hypopotassemia   • AICD (automatic cardioverter/defibrillator) present       PAST MEDICAL HISTORY  Past Medical History:   Diagnosis Date   • AICD (automatic cardioverter/defibrillator) present  3/6/2020   • Asthma    • CHF (congestive heart failure) (CMS/[facility])    • Class 2 obesity in adult    • HIV (human immunodeficiency virus infection) (CMS/[facility])    • Hypertension    • LBBB (left bundle branch block)    • NICM (nonischemic cardiomyopathy) (CMS/[facility])     2020 EF 6% per echocardiogram; AICD present       SURGICAL HISTORY  Past Surgical History:   Procedure Laterality Date   • CARDIAC CATHETERIZATION     • CARDIAC DEFIBRILLATOR PLACEMENT     • FRACTURE SURGERY Left     left ankle       SOCIAL HISTORY  Social History     Socioeconomic History   • Marital status:      Spouse name: Not on file   • Number of children: Not on file   • Years of education: Not on file   • Highest education level: Not on file   Tobacco Use   • Smoking status: Former Smoker     Quit date:      Years since quittin.8   • Smokeless tobacco: Never Used   Substance and Sexual Activity   • Alcohol use: Yes     Comment: holiday and special occ//caffeine use: 1 cup daily   • Drug use: Yes     Types: Marijuana   • Sexual activity: Defer       FAMILY HISTORY  Family History   Problem Relation Age of Onset   • Cancer Mother    • Breast cancer Mother    • Bone cancer Mother    • Hypertension Maternal Grandfather    • Hyperlipidemia Maternal Grandfather    • Diabetes Maternal Grandfather    • Prostate cancer Maternal Grandfather    • Ovarian cysts Daughter    • Breast cancer Maternal Grandmother    • Heart disease Maternal Grandmother    • No Known Problems Daughter    • No Known Problems Daughter          **Dragon Disclaimer:   Much of this encounter note is an electronic transcription/translation of spoken language to printed text. The electronic translation of spoken language may permit erroneous, or at times, nonsensical words or phrases to be inadvertently transcribed. Although I have reviewed the note for such errors, some may still exist.

## 2020-10-23 NOTE — TELEPHONE ENCOUNTER
Nilda,    Spoke with patient. She said she has been sick lately, but not with symptoms of heart failure. She has no swelling or weight gain. She does have a productive cough, headache, nausea and vomiting. She has spoken with her PCP about these symptoms and they asked her to go to the ER or urgent care to be COVID tested and have other tests completed relating to her symptoms. She refused to go because she has been tested for COVID three times and the tests have been negative.    Thank you,    Geovanna Page RN  Triage [facility] [place]

## 2020-10-26 ENCOUNTER — TELEPHONE (OUTPATIENT)
Dept: CARDIOLOGY | Facility: HOSPITAL | Age: 45
End: 2020-10-26

## 2020-10-26 NOTE — TELEPHONE ENCOUNTER
Left VM message for patient to remind her of her appt. Tomorrow, Oct 27, 2020 @ 2:00pm.  Clinic telephone number and address provided.    Lisha Shine RN  [facility]

## 2020-10-27 ENCOUNTER — HOSPITAL ENCOUNTER (INPATIENT)
Facility: HOSPITAL | Age: 45
LOS: 6 days | Discharge: HOME | End: 2020-11-02
Attending: EMERGENCY MEDICINE | Admitting: INTERNAL MEDICINE

## 2020-10-27 ENCOUNTER — APPOINTMENT (OUTPATIENT)
Dept: GENERAL RADIOLOGY | Facility: HOSPITAL | Age: 45
End: 2020-10-27

## 2020-10-27 DIAGNOSIS — R19.7 DIARRHEA FOLLOWING GASTROINTESTINAL SURGERY: ICD-10-CM

## 2020-10-27 DIAGNOSIS — Z98.890 DIARRHEA FOLLOWING GASTROINTESTINAL SURGERY: ICD-10-CM

## 2020-10-27 DIAGNOSIS — I50.9 ACUTE ON CHRONIC CONGESTIVE HEART FAILURE, UNSPECIFIED HEART FAILURE TYPE (HCC): Primary | ICD-10-CM

## 2020-10-27 DIAGNOSIS — I42.8 NONISCHEMIC CARDIOMYOPATHY (HCC): ICD-10-CM

## 2020-10-27 DIAGNOSIS — R11.2 INTRACTABLE VOMITING WITH NAUSEA: ICD-10-CM

## 2020-10-27 PROBLEM — I50.23 ACUTE ON CHRONIC SYSTOLIC CONGESTIVE HEART FAILURE: Status: ACTIVE | Noted: 2020-10-27

## 2020-10-27 LAB
ALBUMIN SERPL-MCNC: 3.6 G/DL (ref 3.5–5.2)
ALBUMIN/GLOB SERPL: 1.8 G/DL
ALP SERPL-CCNC: 144 U/L (ref 39–117)
ALT SERPL W P-5'-P-CCNC: 231 U/L (ref 1–33)
ANION GAP SERPL CALCULATED.3IONS-SCNC: 12.5 MMOL/L (ref 5–15)
AST SERPL-CCNC: 147 U/L (ref 1–32)
B PARAPERT DNA SPEC QL NAA+PROBE: NOT DETECTED
B PERT DNA SPEC QL NAA+PROBE: NOT DETECTED
BACTERIA UR QL AUTO: ABNORMAL /HPF
BASOPHILS # BLD AUTO: 0.03 10*3/MM3 (ref 0–0.2)
BASOPHILS NFR BLD AUTO: 0.3 % (ref 0–1.5)
BILIRUB SERPL-MCNC: 1.2 MG/DL (ref 0–1.2)
BILIRUB UR QL STRIP: NEGATIVE
BUN SERPL-MCNC: 16 MG/DL (ref 6–20)
BUN/CREAT SERPL: 14.8 (ref 7–25)
C PNEUM DNA NPH QL NAA+NON-PROBE: NOT DETECTED
CALCIUM SPEC-SCNC: 8.5 MG/DL (ref 8.6–10.5)
CHLORIDE SERPL-SCNC: 103 MMOL/L (ref 98–107)
CLARITY UR: CLEAR
CO2 SERPL-SCNC: 22.5 MMOL/L (ref 22–29)
COLOR UR: YELLOW
CREAT SERPL-MCNC: 1.08 MG/DL (ref 0.57–1)
DEPRECATED RDW RBC AUTO: 45.2 FL (ref 37–54)
EOSINOPHIL # BLD AUTO: 0.01 10*3/MM3 (ref 0–0.4)
EOSINOPHIL NFR BLD AUTO: 0.1 % (ref 0.3–6.2)
ERYTHROCYTE [DISTWIDTH] IN BLOOD BY AUTOMATED COUNT: 12.8 % (ref 12.3–15.4)
FLUAV SUBTYP SPEC NAA+PROBE: NOT DETECTED
FLUBV RNA ISLT QL NAA+PROBE: NOT DETECTED
GFR SERPL CREATININE-BSD FRML MDRD: 67 ML/MIN/1.73
GLOBULIN UR ELPH-MCNC: 2 GM/DL
GLUCOSE SERPL-MCNC: 163 MG/DL (ref 65–99)
GLUCOSE UR STRIP-MCNC: NEGATIVE MG/DL
HADV DNA SPEC NAA+PROBE: NOT DETECTED
HCG SERPL QL: NEGATIVE
HCOV 229E RNA SPEC QL NAA+PROBE: NOT DETECTED
HCOV HKU1 RNA SPEC QL NAA+PROBE: NOT DETECTED
HCOV NL63 RNA SPEC QL NAA+PROBE: NOT DETECTED
HCOV OC43 RNA SPEC QL NAA+PROBE: NOT DETECTED
HCT VFR BLD AUTO: 34.4 % (ref 34–46.6)
HGB BLD-MCNC: 11.8 G/DL (ref 12–15.9)
HGB UR QL STRIP.AUTO: ABNORMAL
HMPV RNA NPH QL NAA+NON-PROBE: NOT DETECTED
HPIV1 RNA SPEC QL NAA+PROBE: NOT DETECTED
HPIV2 RNA SPEC QL NAA+PROBE: NOT DETECTED
HPIV3 RNA NPH QL NAA+PROBE: NOT DETECTED
HPIV4 P GENE NPH QL NAA+PROBE: NOT DETECTED
HYALINE CASTS UR QL AUTO: ABNORMAL /LPF
KETONES UR QL STRIP: NEGATIVE
LEUKOCYTE ESTERASE UR QL STRIP.AUTO: NEGATIVE
LIPASE SERPL-CCNC: 25 U/L (ref 13–60)
LYMPHOCYTES # BLD AUTO: 2.3 10*3/MM3 (ref 0.7–3.1)
LYMPHOCYTES NFR BLD AUTO: 24.2 % (ref 19.6–45.3)
M PNEUMO IGG SER IA-ACNC: NOT DETECTED
MCH RBC QN AUTO: 33.7 PG (ref 26.6–33)
MCHC RBC AUTO-ENTMCNC: 34.3 G/DL (ref 31.5–35.7)
MCV RBC AUTO: 98.3 FL (ref 79–97)
MONOCYTES # BLD AUTO: 0.64 10*3/MM3 (ref 0.1–0.9)
MONOCYTES NFR BLD AUTO: 6.7 % (ref 5–12)
NEUTROPHILS NFR BLD AUTO: 6.47 10*3/MM3 (ref 1.7–7)
NEUTROPHILS NFR BLD AUTO: 68.3 % (ref 42.7–76)
NITRITE UR QL STRIP: NEGATIVE
NT-PROBNP SERPL-MCNC: ABNORMAL PG/ML (ref 0–450)
PH UR STRIP.AUTO: 7 [PH] (ref 5–8)
PLATELET # BLD AUTO: 127 10*3/MM3 (ref 140–450)
PMV BLD AUTO: 13.5 FL (ref 6–12)
POTASSIUM SERPL-SCNC: 4 MMOL/L (ref 3.5–5.2)
PROT SERPL-MCNC: 5.6 G/DL (ref 6–8.5)
PROT UR QL STRIP: NEGATIVE
QT INTERVAL: 374 MS
RBC # BLD AUTO: 3.5 10*6/MM3 (ref 3.77–5.28)
RBC # UR: ABNORMAL /HPF
REF LAB TEST METHOD: ABNORMAL
RHINOVIRUS RNA SPEC NAA+PROBE: NOT DETECTED
RSV RNA NPH QL NAA+NON-PROBE: NOT DETECTED
SARS-COV-2 RNA NPH QL NAA+NON-PROBE: NOT DETECTED
SODIUM SERPL-SCNC: 138 MMOL/L (ref 136–145)
SP GR UR STRIP: 1.01 (ref 1–1.03)
SQUAMOUS #/AREA URNS HPF: ABNORMAL /HPF
TROPONIN T SERPL-MCNC: <0.01 NG/ML (ref 0–0.03)
UROBILINOGEN UR QL STRIP: ABNORMAL
WBC # BLD AUTO: 9.49 10*3/MM3 (ref 3.4–10.8)
WBC UR QL AUTO: ABNORMAL /HPF

## 2020-10-27 PROCEDURE — 83880 ASSAY OF NATRIURETIC PEPTIDE: CPT | Performed by: EMERGENCY MEDICINE

## 2020-10-27 PROCEDURE — 99221 1ST HOSP IP/OBS SF/LOW 40: CPT | Performed by: NURSE PRACTITIONER

## 2020-10-27 PROCEDURE — 84703 CHORIONIC GONADOTROPIN ASSAY: CPT | Performed by: EMERGENCY MEDICINE

## 2020-10-27 PROCEDURE — 85025 COMPLETE CBC W/AUTO DIFF WBC: CPT | Performed by: EMERGENCY MEDICINE

## 2020-10-27 PROCEDURE — 93005 ELECTROCARDIOGRAM TRACING: CPT | Performed by: EMERGENCY MEDICINE

## 2020-10-27 PROCEDURE — 93010 ELECTROCARDIOGRAM REPORT: CPT | Performed by: INTERNAL MEDICINE

## 2020-10-27 PROCEDURE — 99284 EMERGENCY DEPT VISIT MOD MDM: CPT

## 2020-10-27 PROCEDURE — 25010000002 ONDANSETRON PER 1 MG: Performed by: NURSE PRACTITIONER

## 2020-10-27 PROCEDURE — 71045 X-RAY EXAM CHEST 1 VIEW: CPT

## 2020-10-27 PROCEDURE — 84484 ASSAY OF TROPONIN QUANT: CPT | Performed by: EMERGENCY MEDICINE

## 2020-10-27 PROCEDURE — 25010000002 ENOXAPARIN PER 10 MG: Performed by: NURSE PRACTITIONER

## 2020-10-27 PROCEDURE — 80053 COMPREHEN METABOLIC PANEL: CPT | Performed by: EMERGENCY MEDICINE

## 2020-10-27 PROCEDURE — 81001 URINALYSIS AUTO W/SCOPE: CPT | Performed by: EMERGENCY MEDICINE

## 2020-10-27 PROCEDURE — 25010000002 FUROSEMIDE PER 20 MG: Performed by: PHYSICIAN ASSISTANT

## 2020-10-27 PROCEDURE — 83690 ASSAY OF LIPASE: CPT | Performed by: EMERGENCY MEDICINE

## 2020-10-27 PROCEDURE — 0202U NFCT DS 22 TRGT SARS-COV-2: CPT | Performed by: EMERGENCY MEDICINE

## 2020-10-27 RX ORDER — ONDANSETRON 2 MG/ML
4 INJECTION INTRAMUSCULAR; INTRAVENOUS EVERY 6 HOURS PRN
Status: DISCONTINUED | OUTPATIENT
Start: 2020-10-27 | End: 2020-11-02 | Stop reason: HOSPADM

## 2020-10-27 RX ORDER — ACETAMINOPHEN 160 MG/5ML
650 SOLUTION ORAL EVERY 4 HOURS PRN
Status: DISCONTINUED | OUTPATIENT
Start: 2020-10-27 | End: 2020-11-02 | Stop reason: HOSPADM

## 2020-10-27 RX ORDER — POTASSIUM CHLORIDE 750 MG/1
40 TABLET, FILM COATED, EXTENDED RELEASE ORAL DAILY
Status: DISCONTINUED | OUTPATIENT
Start: 2020-10-27 | End: 2020-11-02 | Stop reason: HOSPADM

## 2020-10-27 RX ORDER — RITONAVIR 100 MG/1
100 TABLET ORAL
Status: DISCONTINUED | OUTPATIENT
Start: 2020-10-27 | End: 2020-10-27

## 2020-10-27 RX ORDER — ACETAMINOPHEN 650 MG/1
650 SUPPOSITORY RECTAL EVERY 4 HOURS PRN
Status: DISCONTINUED | OUTPATIENT
Start: 2020-10-27 | End: 2020-11-02 | Stop reason: HOSPADM

## 2020-10-27 RX ORDER — SODIUM CHLORIDE 0.9 % (FLUSH) 0.9 %
10 SYRINGE (ML) INJECTION AS NEEDED
Status: DISCONTINUED | OUTPATIENT
Start: 2020-10-27 | End: 2020-11-02 | Stop reason: HOSPADM

## 2020-10-27 RX ORDER — RITONAVIR 100 MG/1
100 TABLET ORAL NIGHTLY
Status: DISCONTINUED | OUTPATIENT
Start: 2020-10-27 | End: 2020-11-02 | Stop reason: HOSPADM

## 2020-10-27 RX ORDER — ASPIRIN 81 MG/1
81 TABLET ORAL DAILY
Status: DISCONTINUED | OUTPATIENT
Start: 2020-10-27 | End: 2020-11-02 | Stop reason: HOSPADM

## 2020-10-27 RX ORDER — ALBUTEROL SULFATE 2.5 MG/3ML
2.5 SOLUTION RESPIRATORY (INHALATION) EVERY 4 HOURS PRN
Status: DISCONTINUED | OUTPATIENT
Start: 2020-10-27 | End: 2020-11-02 | Stop reason: HOSPADM

## 2020-10-27 RX ORDER — FUROSEMIDE 10 MG/ML
80 INJECTION INTRAMUSCULAR; INTRAVENOUS ONCE
Status: COMPLETED | OUTPATIENT
Start: 2020-10-27 | End: 2020-10-27

## 2020-10-27 RX ORDER — FLUTICASONE PROPIONATE 50 MCG
2 SPRAY, SUSPENSION (ML) NASAL DAILY
Status: DISCONTINUED | OUTPATIENT
Start: 2020-10-27 | End: 2020-11-02 | Stop reason: HOSPADM

## 2020-10-27 RX ORDER — RITONAVIR 100 MG/1
100 TABLET ORAL NIGHTLY
Status: DISCONTINUED | OUTPATIENT
Start: 2020-10-27 | End: 2020-10-27

## 2020-10-27 RX ORDER — ACETAMINOPHEN 325 MG/1
650 TABLET ORAL EVERY 4 HOURS PRN
Status: DISCONTINUED | OUTPATIENT
Start: 2020-10-27 | End: 2020-11-02 | Stop reason: HOSPADM

## 2020-10-27 RX ORDER — FUROSEMIDE 10 MG/ML
80 INJECTION INTRAMUSCULAR; INTRAVENOUS DAILY
Status: DISCONTINUED | OUTPATIENT
Start: 2020-10-28 | End: 2020-10-29

## 2020-10-27 RX ORDER — ONDANSETRON 4 MG/1
4 TABLET, FILM COATED ORAL EVERY 6 HOURS PRN
Status: DISCONTINUED | OUTPATIENT
Start: 2020-10-27 | End: 2020-11-02 | Stop reason: HOSPADM

## 2020-10-27 RX ORDER — CARVEDILOL 3.12 MG/1
3.12 TABLET ORAL 2 TIMES DAILY WITH MEALS
Status: DISCONTINUED | OUTPATIENT
Start: 2020-10-27 | End: 2020-11-02 | Stop reason: HOSPADM

## 2020-10-27 RX ORDER — NITROGLYCERIN 0.4 MG/1
0.4 TABLET SUBLINGUAL
Status: DISCONTINUED | OUTPATIENT
Start: 2020-10-27 | End: 2020-11-02 | Stop reason: HOSPADM

## 2020-10-27 RX ADMIN — DARUNAVIR 800 MG: 800 TABLET, FILM COATED ORAL at 20:34

## 2020-10-27 RX ADMIN — ASPIRIN 81 MG: 81 TABLET, COATED ORAL at 16:35

## 2020-10-27 RX ADMIN — RITONAVIR 100 MG: 100 TABLET, FILM COATED ORAL at 20:33

## 2020-10-27 RX ADMIN — EMTRICITABINE AND TENOFOVIR ALAFENAMIDE 1 TABLET: 200; 25 TABLET ORAL at 20:32

## 2020-10-27 RX ADMIN — SACUBITRIL AND VALSARTAN 1 TABLET: 24; 26 TABLET, FILM COATED ORAL at 20:32

## 2020-10-27 RX ADMIN — ACETAMINOPHEN 650 MG: 325 TABLET, FILM COATED ORAL at 23:00

## 2020-10-27 RX ADMIN — POTASSIUM CHLORIDE 40 MEQ: 750 TABLET, EXTENDED RELEASE ORAL at 16:35

## 2020-10-27 RX ADMIN — CARVEDILOL 3.12 MG: 3.12 TABLET, FILM COATED ORAL at 17:28

## 2020-10-27 RX ADMIN — FUROSEMIDE 80 MG: 20 INJECTION, SOLUTION INTRAMUSCULAR; INTRAVENOUS at 13:05

## 2020-10-27 RX ADMIN — SERTRALINE 50 MG: 50 TABLET, FILM COATED ORAL at 16:35

## 2020-10-27 RX ADMIN — ENOXAPARIN SODIUM 40 MG: 40 INJECTION SUBCUTANEOUS at 16:35

## 2020-10-27 RX ADMIN — ONDANSETRON HYDROCHLORIDE 4 MG: 2 INJECTION, SOLUTION INTRAMUSCULAR; INTRAVENOUS at 20:35

## 2020-10-28 LAB
ALBUMIN SERPL-MCNC: 3.4 G/DL (ref 3.5–5.2)
ALBUMIN/GLOB SERPL: 2.1 G/DL
ALP SERPL-CCNC: 135 U/L (ref 39–117)
ALPHA1 GLOB MFR UR ELPH: 162 MG/DL (ref 90–200)
ALT SERPL W P-5'-P-CCNC: 268 U/L (ref 1–33)
AMMONIA BLD-SCNC: 50 UMOL/L (ref 11–51)
ANION GAP SERPL CALCULATED.3IONS-SCNC: 10.9 MMOL/L (ref 5–15)
AST SERPL-CCNC: 177 U/L (ref 1–32)
BILIRUB CONJ SERPL-MCNC: 0.4 MG/DL (ref 0–0.3)
BILIRUB INDIRECT SERPL-MCNC: 0.5 MG/DL
BILIRUB SERPL-MCNC: 0.9 MG/DL (ref 0–1.2)
BILIRUB SERPL-MCNC: 1.2 MG/DL (ref 0–1.2)
BUN SERPL-MCNC: 15 MG/DL (ref 6–20)
BUN/CREAT SERPL: 15.8 (ref 7–25)
CALCIUM SPEC-SCNC: 8 MG/DL (ref 8.6–10.5)
CERULOPLASMIN SERPL-MCNC: 30 MG/DL (ref 19–39)
CHLORIDE SERPL-SCNC: 102 MMOL/L (ref 98–107)
CK SERPL-CCNC: 91 U/L (ref 20–180)
CO2 SERPL-SCNC: 22.1 MMOL/L (ref 22–29)
CREAT SERPL-MCNC: 0.95 MG/DL (ref 0.57–1)
DEPRECATED RDW RBC AUTO: 44.5 FL (ref 37–54)
ERYTHROCYTE [DISTWIDTH] IN BLOOD BY AUTOMATED COUNT: 12.5 % (ref 12.3–15.4)
FERRITIN SERPL-MCNC: 67.8 NG/ML (ref 13–150)
GFR SERPL CREATININE-BSD FRML MDRD: 77 ML/MIN/1.73
GLOBULIN UR ELPH-MCNC: 1.6 GM/DL
GLUCOSE SERPL-MCNC: 146 MG/DL (ref 65–99)
HCT VFR BLD AUTO: 33.7 % (ref 34–46.6)
HGB BLD-MCNC: 11.3 G/DL (ref 12–15.9)
IGA1 MFR SER: 226 MG/DL (ref 70–400)
IGG1 SER-MCNC: 615 MG/DL (ref 700–1600)
IGM SERPL-MCNC: 69 MG/DL (ref 40–230)
MCH RBC QN AUTO: 32.8 PG (ref 26.6–33)
MCHC RBC AUTO-ENTMCNC: 33.5 G/DL (ref 31.5–35.7)
MCV RBC AUTO: 98 FL (ref 79–97)
PLATELET # BLD AUTO: 99 10*3/MM3 (ref 140–450)
PMV BLD AUTO: 13.4 FL (ref 6–12)
POTASSIUM SERPL-SCNC: 3.6 MMOL/L (ref 3.5–5.2)
PROT SERPL-MCNC: 5 G/DL (ref 6–8.5)
RBC # BLD AUTO: 3.44 10*6/MM3 (ref 3.77–5.28)
SODIUM SERPL-SCNC: 135 MMOL/L (ref 136–145)
TSH SERPL DL<=0.05 MIU/L-ACNC: 2.33 UIU/ML (ref 0.27–4.2)
WBC # BLD AUTO: 9.08 10*3/MM3 (ref 3.4–10.8)

## 2020-10-28 PROCEDURE — 86696 HERPES SIMPLEX TYPE 2 TEST: CPT | Performed by: INTERNAL MEDICINE

## 2020-10-28 PROCEDURE — 80074 ACUTE HEPATITIS PANEL: CPT | Performed by: INTERNAL MEDICINE

## 2020-10-28 PROCEDURE — 99222 1ST HOSP IP/OBS MODERATE 55: CPT | Performed by: INTERNAL MEDICINE

## 2020-10-28 PROCEDURE — 86645 CMV ANTIBODY IGM: CPT | Performed by: INTERNAL MEDICINE

## 2020-10-28 PROCEDURE — 25010000002 FUROSEMIDE PER 20 MG: Performed by: NURSE PRACTITIONER

## 2020-10-28 PROCEDURE — 84443 ASSAY THYROID STIM HORMONE: CPT | Performed by: INTERNAL MEDICINE

## 2020-10-28 PROCEDURE — 82248 BILIRUBIN DIRECT: CPT | Performed by: INTERNAL MEDICINE

## 2020-10-28 PROCEDURE — 86664 EPSTEIN-BARR NUCLEAR ANTIGEN: CPT | Performed by: INTERNAL MEDICINE

## 2020-10-28 PROCEDURE — 80053 COMPREHEN METABOLIC PANEL: CPT | Performed by: NURSE PRACTITIONER

## 2020-10-28 PROCEDURE — 83516 IMMUNOASSAY NONANTIBODY: CPT | Performed by: INTERNAL MEDICINE

## 2020-10-28 PROCEDURE — 99232 SBSQ HOSP IP/OBS MODERATE 35: CPT | Performed by: INTERNAL MEDICINE

## 2020-10-28 PROCEDURE — 86644 CMV ANTIBODY: CPT | Performed by: INTERNAL MEDICINE

## 2020-10-28 PROCEDURE — 82390 ASSAY OF CERULOPLASMIN: CPT | Performed by: INTERNAL MEDICINE

## 2020-10-28 PROCEDURE — 25010000002 ENOXAPARIN PER 10 MG: Performed by: NURSE PRACTITIONER

## 2020-10-28 PROCEDURE — 82247 BILIRUBIN TOTAL: CPT | Performed by: INTERNAL MEDICINE

## 2020-10-28 PROCEDURE — 85027 COMPLETE CBC AUTOMATED: CPT | Performed by: NURSE PRACTITIONER

## 2020-10-28 PROCEDURE — 86665 EPSTEIN-BARR CAPSID VCA: CPT | Performed by: INTERNAL MEDICINE

## 2020-10-28 PROCEDURE — 82784 ASSAY IGA/IGD/IGG/IGM EACH: CPT | Performed by: INTERNAL MEDICINE

## 2020-10-28 PROCEDURE — 82140 ASSAY OF AMMONIA: CPT | Performed by: INTERNAL MEDICINE

## 2020-10-28 PROCEDURE — 86694 HERPES SIMPLEX NES ANTBDY: CPT | Performed by: INTERNAL MEDICINE

## 2020-10-28 PROCEDURE — 82728 ASSAY OF FERRITIN: CPT | Performed by: INTERNAL MEDICINE

## 2020-10-28 PROCEDURE — 82103 ALPHA-1-ANTITRYPSIN TOTAL: CPT | Performed by: INTERNAL MEDICINE

## 2020-10-28 PROCEDURE — 86695 HERPES SIMPLEX TYPE 1 TEST: CPT | Performed by: INTERNAL MEDICINE

## 2020-10-28 PROCEDURE — 82550 ASSAY OF CK (CPK): CPT | Performed by: INTERNAL MEDICINE

## 2020-10-28 RX ORDER — ALUMINA, MAGNESIA, AND SIMETHICONE 2400; 2400; 240 MG/30ML; MG/30ML; MG/30ML
15 SUSPENSION ORAL EVERY 6 HOURS PRN
Status: DISCONTINUED | OUTPATIENT
Start: 2020-10-28 | End: 2020-11-02 | Stop reason: HOSPADM

## 2020-10-28 RX ADMIN — SACUBITRIL AND VALSARTAN 1 TABLET: 24; 26 TABLET, FILM COATED ORAL at 08:04

## 2020-10-28 RX ADMIN — CARVEDILOL 3.12 MG: 3.12 TABLET, FILM COATED ORAL at 08:04

## 2020-10-28 RX ADMIN — EMTRICITABINE AND TENOFOVIR ALAFENAMIDE 1 TABLET: 200; 25 TABLET ORAL at 20:05

## 2020-10-28 RX ADMIN — ASPIRIN 81 MG: 81 TABLET, COATED ORAL at 08:04

## 2020-10-28 RX ADMIN — CARVEDILOL 3.12 MG: 3.12 TABLET, FILM COATED ORAL at 17:11

## 2020-10-28 RX ADMIN — RITONAVIR 100 MG: 100 TABLET, FILM COATED ORAL at 20:05

## 2020-10-28 RX ADMIN — SERTRALINE 50 MG: 50 TABLET, FILM COATED ORAL at 08:04

## 2020-10-28 RX ADMIN — ALUMINUM HYDROXIDE, MAGNESIUM HYDROXIDE, AND DIMETHICONE 15 ML: 400; 400; 40 SUSPENSION ORAL at 11:54

## 2020-10-28 RX ADMIN — ENOXAPARIN SODIUM 40 MG: 40 INJECTION SUBCUTANEOUS at 15:34

## 2020-10-28 RX ADMIN — FUROSEMIDE 80 MG: 10 INJECTION, SOLUTION INTRAMUSCULAR; INTRAVENOUS at 08:04

## 2020-10-28 RX ADMIN — FLUTICASONE PROPIONATE 2 SPRAY: 50 SPRAY, METERED NASAL at 08:04

## 2020-10-28 RX ADMIN — DARUNAVIR 800 MG: 800 TABLET, FILM COATED ORAL at 20:05

## 2020-10-28 RX ADMIN — ACETAMINOPHEN 650 MG: 325 TABLET, FILM COATED ORAL at 20:10

## 2020-10-28 RX ADMIN — SACUBITRIL AND VALSARTAN 1 TABLET: 24; 26 TABLET, FILM COATED ORAL at 20:05

## 2020-10-28 RX ADMIN — POTASSIUM CHLORIDE 40 MEQ: 750 TABLET, EXTENDED RELEASE ORAL at 08:03

## 2020-10-29 ENCOUNTER — APPOINTMENT (OUTPATIENT)
Dept: ULTRASOUND IMAGING | Facility: HOSPITAL | Age: 45
End: 2020-10-29

## 2020-10-29 LAB
ACTIN IGG SERPL-ACNC: 6 UNITS (ref 0–19)
ALBUMIN SERPL-MCNC: 3.4 G/DL (ref 3.5–5.2)
ALBUMIN/GLOB SERPL: 1.9 G/DL
ALP SERPL-CCNC: 150 U/L (ref 39–117)
ALT SERPL W P-5'-P-CCNC: 239 U/L (ref 1–33)
ANION GAP SERPL CALCULATED.3IONS-SCNC: 11.4 MMOL/L (ref 5–15)
AST SERPL-CCNC: 112 U/L (ref 1–32)
BILIRUB SERPL-MCNC: 0.9 MG/DL (ref 0–1.2)
BUN SERPL-MCNC: 14 MG/DL (ref 6–20)
BUN/CREAT SERPL: 15.2 (ref 7–25)
CALCIUM SPEC-SCNC: 8.3 MG/DL (ref 8.6–10.5)
CHLORIDE SERPL-SCNC: 101 MMOL/L (ref 98–107)
CMV IGG SERPL IA-ACNC: >10 U/ML (ref 0–0.59)
CMV IGM SERPL IA-ACNC: <30 AU/ML (ref 0–29.9)
CO2 SERPL-SCNC: 21.6 MMOL/L (ref 22–29)
CREAT SERPL-MCNC: 0.92 MG/DL (ref 0.57–1)
DEPRECATED RDW RBC AUTO: 44 FL (ref 37–54)
EBV NA IGG SER IA-ACNC: 279 U/ML (ref 0–17.9)
EBV VCA IGG SER IA-ACNC: >600 U/ML (ref 0–17.9)
EBV VCA IGM SER IA-ACNC: <36 U/ML (ref 0–35.9)
ERYTHROCYTE [DISTWIDTH] IN BLOOD BY AUTOMATED COUNT: 12.3 % (ref 12.3–15.4)
GFR SERPL CREATININE-BSD FRML MDRD: 80 ML/MIN/1.73
GLOBULIN UR ELPH-MCNC: 1.8 GM/DL
GLUCOSE SERPL-MCNC: 149 MG/DL (ref 65–99)
HAV IGM SERPL QL IA: NORMAL
HBV CORE IGM SERPL QL IA: NORMAL
HBV SURFACE AG SERPL QL IA: NORMAL
HCT VFR BLD AUTO: 33.1 % (ref 34–46.6)
HCV AB SER DONR QL: NORMAL
HGB BLD-MCNC: 11.2 G/DL (ref 12–15.9)
HSV1 IGG SER IA-ACNC: 14.6 INDEX (ref 0–0.9)
HSV1+2 IGM SER IA-ACNC: <0.91 RATIO (ref 0–0.9)
HSV2 IGG SER IA-ACNC: 8.99 INDEX (ref 0–0.9)
IRON 24H UR-MRATE: 28 MCG/DL (ref 37–145)
IRON SATN MFR SERPL: 6 % (ref 20–50)
MAGNESIUM SERPL-MCNC: 2 MG/DL (ref 1.6–2.6)
MCH RBC QN AUTO: 33.1 PG (ref 26.6–33)
MCHC RBC AUTO-ENTMCNC: 33.8 G/DL (ref 31.5–35.7)
MCV RBC AUTO: 97.9 FL (ref 79–97)
MITOCHONDRIA M2 IGG SER-ACNC: <20 UNITS (ref 0–20)
PLATELET # BLD AUTO: 117 10*3/MM3 (ref 140–450)
PMV BLD AUTO: 12.9 FL (ref 6–12)
POTASSIUM SERPL-SCNC: 3.5 MMOL/L (ref 3.5–5.2)
PROT SERPL-MCNC: 5.2 G/DL (ref 6–8.5)
RBC # BLD AUTO: 3.38 10*6/MM3 (ref 3.77–5.28)
SERVICE CMNT-IMP: ABNORMAL
SODIUM SERPL-SCNC: 134 MMOL/L (ref 136–145)
TIBC SERPL-MCNC: 447 MCG/DL (ref 298–536)
TRANSFERRIN SERPL-MCNC: 300 MG/DL (ref 200–360)
WBC # BLD AUTO: 7.57 10*3/MM3 (ref 3.4–10.8)

## 2020-10-29 PROCEDURE — 25010000002 FUROSEMIDE PER 20 MG: Performed by: NURSE PRACTITIONER

## 2020-10-29 PROCEDURE — 83540 ASSAY OF IRON: CPT | Performed by: INTERNAL MEDICINE

## 2020-10-29 PROCEDURE — 76705 ECHO EXAM OF ABDOMEN: CPT

## 2020-10-29 PROCEDURE — 83735 ASSAY OF MAGNESIUM: CPT | Performed by: INTERNAL MEDICINE

## 2020-10-29 PROCEDURE — 99232 SBSQ HOSP IP/OBS MODERATE 35: CPT | Performed by: INTERNAL MEDICINE

## 2020-10-29 PROCEDURE — 84466 ASSAY OF TRANSFERRIN: CPT | Performed by: INTERNAL MEDICINE

## 2020-10-29 PROCEDURE — 99232 SBSQ HOSP IP/OBS MODERATE 35: CPT | Performed by: NURSE PRACTITIONER

## 2020-10-29 PROCEDURE — 85027 COMPLETE CBC AUTOMATED: CPT | Performed by: INTERNAL MEDICINE

## 2020-10-29 PROCEDURE — 94799 UNLISTED PULMONARY SVC/PX: CPT

## 2020-10-29 PROCEDURE — 80053 COMPREHEN METABOLIC PANEL: CPT | Performed by: INTERNAL MEDICINE

## 2020-10-29 PROCEDURE — 25010000002 ENOXAPARIN PER 10 MG: Performed by: NURSE PRACTITIONER

## 2020-10-29 RX ORDER — FUROSEMIDE 10 MG/ML
80 INJECTION INTRAMUSCULAR; INTRAVENOUS
Status: DISCONTINUED | OUTPATIENT
Start: 2020-10-29 | End: 2020-10-30

## 2020-10-29 RX ADMIN — SODIUM CHLORIDE, PRESERVATIVE FREE 10 ML: 5 INJECTION INTRAVENOUS at 08:41

## 2020-10-29 RX ADMIN — ASPIRIN 81 MG: 81 TABLET, COATED ORAL at 08:40

## 2020-10-29 RX ADMIN — SACUBITRIL AND VALSARTAN 1 TABLET: 24; 26 TABLET, FILM COATED ORAL at 08:40

## 2020-10-29 RX ADMIN — SACUBITRIL AND VALSARTAN 1 TABLET: 24; 26 TABLET, FILM COATED ORAL at 21:09

## 2020-10-29 RX ADMIN — ACETAMINOPHEN 650 MG: 325 TABLET, FILM COATED ORAL at 12:32

## 2020-10-29 RX ADMIN — DARUNAVIR 800 MG: 800 TABLET, FILM COATED ORAL at 21:09

## 2020-10-29 RX ADMIN — ENOXAPARIN SODIUM 40 MG: 40 INJECTION SUBCUTANEOUS at 16:21

## 2020-10-29 RX ADMIN — POTASSIUM CHLORIDE 40 MEQ: 750 TABLET, EXTENDED RELEASE ORAL at 08:40

## 2020-10-29 RX ADMIN — FUROSEMIDE 80 MG: 10 INJECTION, SOLUTION INTRAMUSCULAR; INTRAVENOUS at 08:41

## 2020-10-29 RX ADMIN — RITONAVIR 100 MG: 100 TABLET, FILM COATED ORAL at 21:09

## 2020-10-29 RX ADMIN — CARVEDILOL 3.12 MG: 3.12 TABLET, FILM COATED ORAL at 08:41

## 2020-10-29 RX ADMIN — FUROSEMIDE 80 MG: 10 INJECTION, SOLUTION INTRAMUSCULAR; INTRAVENOUS at 17:27

## 2020-10-29 RX ADMIN — CARVEDILOL 3.12 MG: 3.12 TABLET, FILM COATED ORAL at 17:27

## 2020-10-29 RX ADMIN — EMTRICITABINE AND TENOFOVIR ALAFENAMIDE 1 TABLET: 200; 25 TABLET ORAL at 21:09

## 2020-10-29 RX ADMIN — FLUTICASONE PROPIONATE 2 SPRAY: 50 SPRAY, METERED NASAL at 08:41

## 2020-10-29 RX ADMIN — SERTRALINE 50 MG: 50 TABLET, FILM COATED ORAL at 08:40

## 2020-10-30 PROBLEM — R19.7 DIARRHEA FOLLOWING GASTROINTESTINAL SURGERY: Status: ACTIVE | Noted: 2020-10-27

## 2020-10-30 PROBLEM — Z98.890 DIARRHEA FOLLOWING GASTROINTESTINAL SURGERY: Status: ACTIVE | Noted: 2020-10-27

## 2020-10-30 PROBLEM — R11.2 INTRACTABLE VOMITING WITH NAUSEA: Status: ACTIVE | Noted: 2020-10-30

## 2020-10-30 PROCEDURE — 99232 SBSQ HOSP IP/OBS MODERATE 35: CPT | Performed by: NURSE PRACTITIONER

## 2020-10-30 PROCEDURE — 94799 UNLISTED PULMONARY SVC/PX: CPT

## 2020-10-30 PROCEDURE — 25010000002 FUROSEMIDE PER 20 MG: Performed by: NURSE PRACTITIONER

## 2020-10-30 PROCEDURE — 25010000002 ENOXAPARIN PER 10 MG: Performed by: NURSE PRACTITIONER

## 2020-10-30 RX ORDER — FUROSEMIDE 80 MG
80 TABLET ORAL DAILY
Status: DISCONTINUED | OUTPATIENT
Start: 2020-10-31 | End: 2020-11-02 | Stop reason: HOSPADM

## 2020-10-30 RX ORDER — POLYETHYLENE GLYCOL 3350 17 G/17G
1 POWDER, FOR SOLUTION ORAL 2 TIMES DAILY
Status: COMPLETED | OUTPATIENT
Start: 2020-10-30 | End: 2020-10-30

## 2020-10-30 RX ORDER — POLYETHYLENE GLYCOL 3350 17 G/17G
1 POWDER, FOR SOLUTION ORAL ONCE
Status: DISCONTINUED | OUTPATIENT
Start: 2020-10-30 | End: 2020-10-30

## 2020-10-30 RX ADMIN — FLUTICASONE PROPIONATE 2 SPRAY: 50 SPRAY, METERED NASAL at 08:45

## 2020-10-30 RX ADMIN — SERTRALINE 50 MG: 50 TABLET, FILM COATED ORAL at 08:45

## 2020-10-30 RX ADMIN — CARVEDILOL 3.12 MG: 3.12 TABLET, FILM COATED ORAL at 17:21

## 2020-10-30 RX ADMIN — POTASSIUM CHLORIDE 40 MEQ: 750 TABLET, EXTENDED RELEASE ORAL at 08:45

## 2020-10-30 RX ADMIN — POLYETHYLENE GLYCOL 3350 1 BOTTLE: 17 POWDER, FOR SOLUTION ORAL at 16:41

## 2020-10-30 RX ADMIN — EMTRICITABINE AND TENOFOVIR ALAFENAMIDE 1 TABLET: 200; 25 TABLET ORAL at 21:04

## 2020-10-30 RX ADMIN — FUROSEMIDE 80 MG: 10 INJECTION, SOLUTION INTRAMUSCULAR; INTRAVENOUS at 08:45

## 2020-10-30 RX ADMIN — ENOXAPARIN SODIUM 40 MG: 40 INJECTION SUBCUTANEOUS at 16:41

## 2020-10-30 RX ADMIN — DARUNAVIR 800 MG: 800 TABLET, FILM COATED ORAL at 21:04

## 2020-10-30 RX ADMIN — SACUBITRIL AND VALSARTAN 1 TABLET: 24; 26 TABLET, FILM COATED ORAL at 21:04

## 2020-10-30 RX ADMIN — ASPIRIN 81 MG: 81 TABLET, COATED ORAL at 08:45

## 2020-10-30 RX ADMIN — CARVEDILOL 3.12 MG: 3.12 TABLET, FILM COATED ORAL at 08:45

## 2020-10-30 RX ADMIN — RITONAVIR 100 MG: 100 TABLET, FILM COATED ORAL at 21:04

## 2020-10-30 RX ADMIN — POLYETHYLENE GLYCOL 3350 1 BOTTLE: 17 POWDER, FOR SOLUTION ORAL at 21:12

## 2020-10-30 RX ADMIN — SACUBITRIL AND VALSARTAN 1 TABLET: 24; 26 TABLET, FILM COATED ORAL at 08:45

## 2020-10-31 ENCOUNTER — ANESTHESIA (OUTPATIENT)
Dept: ICU | Facility: HOSPITAL | Age: 45
End: 2020-10-31

## 2020-10-31 ENCOUNTER — ANESTHESIA EVENT (OUTPATIENT)
Dept: ICU | Facility: HOSPITAL | Age: 45
End: 2020-10-31

## 2020-10-31 LAB
ALBUMIN SERPL-MCNC: 3.4 G/DL (ref 3.5–5.2)
ALP SERPL-CCNC: 168 U/L (ref 39–117)
ALT SERPL W P-5'-P-CCNC: 184 U/L (ref 1–33)
AST SERPL-CCNC: 71 U/L (ref 1–32)
BILIRUB CONJ SERPL-MCNC: 0.3 MG/DL (ref 0–0.3)
BILIRUB INDIRECT SERPL-MCNC: 0.5 MG/DL
BILIRUB SERPL-MCNC: 0.8 MG/DL (ref 0–1.2)
MAGNESIUM SERPL-MCNC: 1.9 MG/DL (ref 1.6–2.6)
POTASSIUM SERPL-SCNC: 3.8 MMOL/L (ref 3.5–5.2)
PROT SERPL-MCNC: 5.3 G/DL (ref 6–8.5)

## 2020-10-31 PROCEDURE — G0463 HOSPITAL OUTPT CLINIC VISIT: HCPCS | Performed by: INTERNAL MEDICINE

## 2020-10-31 PROCEDURE — 99232 SBSQ HOSP IP/OBS MODERATE 35: CPT | Performed by: INTERNAL MEDICINE

## 2020-10-31 PROCEDURE — 84132 ASSAY OF SERUM POTASSIUM: CPT | Performed by: INTERNAL MEDICINE

## 2020-10-31 PROCEDURE — 80076 HEPATIC FUNCTION PANEL: CPT | Performed by: INTERNAL MEDICINE

## 2020-10-31 PROCEDURE — 25010000002 ENOXAPARIN PER 10 MG: Performed by: NURSE PRACTITIONER

## 2020-10-31 PROCEDURE — 83735 ASSAY OF MAGNESIUM: CPT | Performed by: INTERNAL MEDICINE

## 2020-10-31 RX ORDER — SODIUM CHLORIDE 9 MG/ML
30 INJECTION, SOLUTION INTRAVENOUS CONTINUOUS PRN
Status: CANCELLED | OUTPATIENT
Start: 2020-10-31

## 2020-10-31 RX ADMIN — EMTRICITABINE AND TENOFOVIR ALAFENAMIDE 1 TABLET: 200; 25 TABLET ORAL at 20:50

## 2020-10-31 RX ADMIN — CARVEDILOL 3.12 MG: 3.12 TABLET, FILM COATED ORAL at 07:54

## 2020-10-31 RX ADMIN — ENOXAPARIN SODIUM 40 MG: 40 INJECTION SUBCUTANEOUS at 17:26

## 2020-10-31 RX ADMIN — RITONAVIR 100 MG: 100 TABLET, FILM COATED ORAL at 20:51

## 2020-10-31 RX ADMIN — CARVEDILOL 3.12 MG: 3.12 TABLET, FILM COATED ORAL at 17:26

## 2020-10-31 RX ADMIN — SACUBITRIL AND VALSARTAN 1 TABLET: 24; 26 TABLET, FILM COATED ORAL at 20:50

## 2020-10-31 RX ADMIN — DARUNAVIR 800 MG: 800 TABLET, FILM COATED ORAL at 20:51

## 2020-11-01 LAB
ALBUMIN SERPL-MCNC: 3.2 G/DL (ref 3.5–5.2)
ALBUMIN/GLOB SERPL: 1.7 G/DL
ALP SERPL-CCNC: 148 U/L (ref 39–117)
ALT SERPL W P-5'-P-CCNC: 157 U/L (ref 1–33)
ANION GAP SERPL CALCULATED.3IONS-SCNC: 9.3 MMOL/L (ref 5–15)
AST SERPL-CCNC: 53 U/L (ref 1–32)
BASOPHILS # BLD AUTO: 0.02 10*3/MM3 (ref 0–0.2)
BASOPHILS NFR BLD AUTO: 0.3 % (ref 0–1.5)
BILIRUB SERPL-MCNC: 0.7 MG/DL (ref 0–1.2)
BUN SERPL-MCNC: 15 MG/DL (ref 6–20)
BUN/CREAT SERPL: 16 (ref 7–25)
CALCIUM SPEC-SCNC: 8.6 MG/DL (ref 8.6–10.5)
CHLORIDE SERPL-SCNC: 102 MMOL/L (ref 98–107)
CO2 SERPL-SCNC: 24.7 MMOL/L (ref 22–29)
CREAT SERPL-MCNC: 0.94 MG/DL (ref 0.57–1)
DEPRECATED RDW RBC AUTO: 44.5 FL (ref 37–54)
EOSINOPHIL # BLD AUTO: 0.02 10*3/MM3 (ref 0–0.4)
EOSINOPHIL NFR BLD AUTO: 0.3 % (ref 0.3–6.2)
ERYTHROCYTE [DISTWIDTH] IN BLOOD BY AUTOMATED COUNT: 12.3 % (ref 12.3–15.4)
GFR SERPL CREATININE-BSD FRML MDRD: 78 ML/MIN/1.73
GLOBULIN UR ELPH-MCNC: 1.9 GM/DL
GLUCOSE SERPL-MCNC: 113 MG/DL (ref 65–99)
HCT VFR BLD AUTO: 35.1 % (ref 34–46.6)
HGB BLD-MCNC: 11.5 G/DL (ref 12–15.9)
LYMPHOCYTES # BLD AUTO: 2.35 10*3/MM3 (ref 0.7–3.1)
LYMPHOCYTES # BLD MANUAL: 1.62 10*3/MM3 (ref 0.7–3.1)
LYMPHOCYTES NFR BLD AUTO: 35.5 % (ref 19.6–45.3)
LYMPHOCYTES NFR BLD MANUAL: 24.5 % (ref 19.6–45.3)
LYMPHOCYTES NFR BLD MANUAL: 3.2 % (ref 5–12)
MCH RBC QN AUTO: 32.8 PG (ref 26.6–33)
MCHC RBC AUTO-ENTMCNC: 32.8 G/DL (ref 31.5–35.7)
MCV RBC AUTO: 100 FL (ref 79–97)
MONOCYTES # BLD AUTO: 0.21 10*3/MM3 (ref 0.1–0.9)
MONOCYTES # BLD AUTO: 0.5 10*3/MM3 (ref 0.1–0.9)
MONOCYTES NFR BLD AUTO: 7.6 % (ref 5–12)
NEUTROPHILS # BLD AUTO: 4.79 10*3/MM3 (ref 1.7–7)
NEUTROPHILS NFR BLD AUTO: 3.71 10*3/MM3 (ref 1.7–7)
NEUTROPHILS NFR BLD AUTO: 56 % (ref 42.7–76)
NEUTROPHILS NFR BLD MANUAL: 72.3 % (ref 42.7–76)
PLAT MORPH BLD: NORMAL
PLATELET # BLD AUTO: 158 10*3/MM3 (ref 140–450)
PMV BLD AUTO: 13.1 FL (ref 6–12)
POTASSIUM SERPL-SCNC: 3.9 MMOL/L (ref 3.5–5.2)
PROT SERPL-MCNC: 5.1 G/DL (ref 6–8.5)
RBC # BLD AUTO: 3.51 10*6/MM3 (ref 3.77–5.28)
RBC MORPH BLD: NORMAL
SMUDGE CELLS BLD QL SMEAR: ABNORMAL
SODIUM SERPL-SCNC: 136 MMOL/L (ref 136–145)
WBC # BLD AUTO: 6.62 10*3/MM3 (ref 3.4–10.8)

## 2020-11-01 PROCEDURE — 99232 SBSQ HOSP IP/OBS MODERATE 35: CPT | Performed by: INTERNAL MEDICINE

## 2020-11-01 PROCEDURE — 85007 BL SMEAR W/DIFF WBC COUNT: CPT | Performed by: INTERNAL MEDICINE

## 2020-11-01 PROCEDURE — 85025 COMPLETE CBC W/AUTO DIFF WBC: CPT | Performed by: INTERNAL MEDICINE

## 2020-11-01 PROCEDURE — 99231 SBSQ HOSP IP/OBS SF/LOW 25: CPT | Performed by: INTERNAL MEDICINE

## 2020-11-01 PROCEDURE — 80053 COMPREHEN METABOLIC PANEL: CPT | Performed by: INTERNAL MEDICINE

## 2020-11-01 RX ADMIN — CARVEDILOL 3.12 MG: 3.12 TABLET, FILM COATED ORAL at 19:20

## 2020-11-01 RX ADMIN — SERTRALINE 50 MG: 50 TABLET, FILM COATED ORAL at 08:14

## 2020-11-01 RX ADMIN — SACUBITRIL AND VALSARTAN 1 TABLET: 24; 26 TABLET, FILM COATED ORAL at 08:14

## 2020-11-01 RX ADMIN — EMTRICITABINE AND TENOFOVIR ALAFENAMIDE 1 TABLET: 200; 25 TABLET ORAL at 21:46

## 2020-11-01 RX ADMIN — RITONAVIR 100 MG: 100 TABLET, FILM COATED ORAL at 21:46

## 2020-11-01 RX ADMIN — POTASSIUM CHLORIDE 40 MEQ: 750 TABLET, EXTENDED RELEASE ORAL at 08:14

## 2020-11-01 RX ADMIN — DARUNAVIR 800 MG: 800 TABLET, FILM COATED ORAL at 21:46

## 2020-11-01 RX ADMIN — FUROSEMIDE 80 MG: 80 TABLET ORAL at 08:14

## 2020-11-01 RX ADMIN — SACUBITRIL AND VALSARTAN 1 TABLET: 24; 26 TABLET, FILM COATED ORAL at 21:46

## 2020-11-01 RX ADMIN — ASPIRIN 81 MG: 81 TABLET, COATED ORAL at 08:14

## 2020-11-01 RX ADMIN — CARVEDILOL 3.12 MG: 3.12 TABLET, FILM COATED ORAL at 08:14

## 2020-11-01 NOTE — PLAN OF CARE
Goal Outcome Evaluation:  Plan of Care Reviewed With: patient  Progress: improving  Outcome Summary: VSS, no c/o pain, Ad huey, hopeful discharge today.  Will continue to monitor.

## 2020-11-01 NOTE — PLAN OF CARE
Vss, RA, NSR, A&O, up ad huey. Denies pain. Cleared for discharge by cardiology and GI however pt has informed me that attending physician wants to keep her until tomorrow. CTM

## 2020-11-01 NOTE — PROGRESS NOTES
Name: Nina Saez ADMIT: 10/27/2020   : 1975  PCP: Darcie Ledbetter APRN    MRN: [id number] LOS: 5 days   AGE/SEX: 44 y.o. female  ROOM: [facility]     Subjective   Subjective     Patient is lying in the bed and is in no major distress.  No new events overnight.  Denies nausea, vomiting abdominal pain, chest pain.  She denies any shortness of breath, PND, orthopnea.     Objective   Objective   Vital Signs  Temp:  [97.2 °F (36.2 °C)-98.3 °F (36.8 °C)] 97.9 °F (36.6 °C)  Heart Rate:  [] 81  Resp:  [18] 18  BP: ()/(67-75) 94/70  SpO2:  [90 %-100 %] 90 %  on   ;   Device (Oxygen Therapy): room air    Intake/Output Summary (Last 24 hours) at 2020 1528  Last data filed at 2020 1300  Gross per 24 hour   Intake 960 ml   Output --   Net 960 ml     Body mass index is 32.75 kg/m².      10/30/20  0500 10/31/20  0550 20  0602   Weight: 90.2 kg (198 lb 12.8 oz) 88.6 kg (195 lb 6.4 oz) 92 kg (202 lb 14.4 oz)     Physical Exam  General.  Middle-aged female alert oriented x3 no apparent pain distress with diaphoresis normal mood and affect   eyes.  Pupils equal round and reactive intact extraocular musculature no pallor or jaundice normal conjunctive and lids  ENT.  Moist mucous membrane  Neck.  Supple no JVD no lymphadenopathy no thyromegaly  Cardio vascular.  Regular rate and rhythm no gallops or murmurs  Chest.  Poor but clear to auscultation bilaterally with no added sounds   Abdomen.  Soft lax no tenderness no organomegaly no guarding or rebound  Extremities.  No clubbing cyanosis or edema  CNS.  No acute focal neurological deficits    Results Review:      Results from last 7 days   Lab Units 20  0538 10/31/20  1047 10/29/20  0630 10/28/20  0443 10/27/20  1036   SODIUM mmol/L 136  --  134* 135* 138   POTASSIUM mmol/L 3.9 3.8 3.5 3.6 4.0   CHLORIDE mmol/L 102  --  101 102 103   CO2 mmol/L 24.7  --  21.6* 22.1 22.5   BUN mg/dL 15  --  14 15 16   CREATININE mg/dL 0.94  --  0.92 0.95  1.08*   GLUCOSE mg/dL 113*  --  149* 146* 163*   CALCIUM mg/dL 8.6  --  8.3* 8.0* 8.5*   AST (SGOT) U/L 53* 71* 112* 177* 147*   ALT (SGPT) U/L 157* 184* 239* 268* 231*     Estimated Creatinine Clearance: 87.3 mL/min (by C-G formula based on SCr of 0.94 mg/dL).          Results from last 7 days   Lab Units 10/28/20  0443 10/27/20  1036   CK TOTAL U/L 91  --    TROPONIN T ng/mL  --  <0.010     Results from last 7 days   Lab Units 10/27/20  1036   PROBNP pg/mL 16,206.0*     Results from last 7 days   Lab Units 10/28/20  1918   TSH uIU/mL 2.330     Results from last 7 days   Lab Units 10/31/20  1047 10/29/20  0630   MAGNESIUM mg/dL 1.9 2.0           Invalid input(s): LDLCALC  Results from last 7 days   Lab Units 11/01/20  0537 10/29/20  0630 10/28/20  0443 10/27/20  1036   WBC 10*3/mm3 6.62 7.57 9.08 9.49   HEMOGLOBIN g/dL 11.5* 11.2* 11.3* 11.8*   HEMATOCRIT % 35.1 33.1* 33.7* 34.4   PLATELETS 10*3/mm3 158 117* 99* 127*   MCV fL 100.0* 97.9* 98.0* 98.3*   MCH pg 32.8 33.1* 32.8 33.7*   MCHC g/dL 32.8 33.8 33.5 34.3   RDW % 12.3 12.3 12.5 12.8   RDW-SD fl 44.5 44.0 44.5 45.2   MPV fL 13.1* 12.9* 13.4* 13.5*   NEUTROPHIL % % 56.0  --   --  68.3   LYMPHOCYTE % % 35.5  --   --  24.2   MONOCYTES % % 7.6  --   --  6.7   EOSINOPHIL % % 0.3  --   --  0.1*   BASOPHIL % % 0.3  --   --  0.3   NEUTROS ABS 10*3/mm3 3.71  4.79  --   --  6.47   LYMPHS ABS 10*3/mm3 2.35  --   --  2.30   MONOS ABS 10*3/mm3 0.50  --   --  0.64   EOS ABS 10*3/mm3 0.02  --   --  0.01   BASOS ABS 10*3/mm3 0.02  --   --  0.03                     Results from last 7 days   Lab Units 10/28/20  1918 10/27/20  1036   LIPASE U/L  --  25   AMMONIA umol/L 50  --          Results from last 7 days   Lab Units 10/27/20  1305   ADENOVIRUS DETECTION BY PCR  Not Detected   CORONAVIRUS 229E  Not Detected   CORONAVIRUS HKU1  Not Detected   CORONAVIRUS NL63  Not Detected   CORONAVIRUS OC43  Not Detected   HUMAN METAPNEUMOVIRUS  Not Detected   HUMAN RHINOVIRUS/ENTEROVIRUS   Not Detected   INFLUENZA B PCR  Not Detected   PARAINFLUENZA 1  Not Detected   PARAINFLUENZA VIRUS 2  Not Detected   PARAINFLUENZA VIRUS 3  Not Detected   PARAINFLUENZA VIRUS 4  Not Detected   BORDETELLA PERTUSSIS PCR  Not Detected   CHLAMYDOPHILA PNEUMONIAE PCR  Not Detected   MYCOPLAMA PNEUMO PCR  Not Detected   INFLUENZA A PCR  Not Detected   RSV, PCR  Not Detected     Results from last 7 days   Lab Units 10/27/20  1410   NITRITE UA  Negative   WBC UA /HPF 0-2   BACTERIA UA /HPF None Seen   SQUAM EPITHEL UA /HPF 0-2           Imaging:  Imaging Results (Last 24 Hours)     ** No results found for the last 24 hours. **             I reviewed the patient's new clinical results / labs / tests / procedures      Assessment/Plan     Active Hospital Problems    Diagnosis  POA   • **Acute on chronic systolic congestive heart failure (CMS/HCC) [I50.23]  Yes   • Intractable vomiting with nausea [R11.2]  Unknown   • Diarrhea following gastrointestinal surgery [R19.7, Z98.890]  Unknown   • NICM (nonischemic cardiomyopathy) (CMS/HCC) [I42.8]  Yes   • Elevated LFTs [R79.89]  Yes   • AICD (automatic cardioverter/defibrillator) present [Z95.810]  Yes   • Essential hypertension [I10]  Yes   • HIV (human immunodeficiency virus infection) (CMS/HCC) [B20]  Yes   • Asthma [J45.909]  Yes      Resolved Hospital Problems   No resolved problems to display.       1. Combined chronic systolic/ diastolic heart failure with non ischemia.  Patient is not a candidate for CRT given that she has narrow QRS.  Underwent AICD placement.  Currently patient is compensated and is on aspirin, Coreg, Entresto, Lasix.  Currently not requiring any oxygen and appears stable hemodynamically.  Patient's ejection fraction is 6 and might benefit from transplant evaluation and will have her follow-up with cardiology as an outpatient basis.  2. V-tach, as mentioned above.  3. History HIV, continue with Descovy and Norvir.  4. Acute hepatitis, elevated LFTs were  felt to be from passive congestion and been improving.  Patient has declined EGD and colonoscopy for further workup.  Gallbladder ultrasound revealed sludge.  5. Mild anemia and thrombocytopenia, no need for any further workup at this point during this hospital stay.  Advised her to follow-up with primary care provider as an outpatient basis.  6. History of bronchial asthma, currently not in any exacerbation.

## 2020-11-01 NOTE — PROGRESS NOTES
[facility]  Progress note: 2020    Patient Identification:  Name:Nina Saez  Age:44 y.o.  Sex: female  :  1975  MRN: [id number]           CC:  Cardiomyopathy, ventricular tachycardia    Interval history:  Patient refused EGD and colonoscopy yesterday afternoon.  Transaminases improved.  Nausea and abdominal pain improved    Vital Signs:   Temp:  [97.2 °F (36.2 °C)-98.3 °F (36.8 °C)] 98.3 °F (36.8 °C)  Heart Rate:  [] 104  Resp:  [16-18] 18  BP: ()/(67-79) 105/68    Intake/Output Summary (Last 24 hours) at 2020 1119  Last data filed at 2020 0300  Gross per 24 hour   Intake 240 ml   Output --   Net 240 ml       Physical Examination:    General Appearance No acute distress   Neck No adenopathy, supple, trachea midline, no thyromegaly, no carotid bruit, no JVD   Lungs Clear to auscultation,respirations regular, even and unlabored   Heart Regular rhythm and normal rate, normal S1 and S2, no murmur, no gallop, no rub, no click   Chest wall No abnormalities observed   Abdomen Normal bowel sounds, no masses, no hepatomegaly, soft   Extremities Moves all extremities well, no edema, no cyanosis, no redness   Neurological Alert and oriented x 3     Lab Review:  Personally reviewed the labs, radiology imaging and other cardiac procedures.   Results from last 7 days   Lab Units 20  0538   SODIUM mmol/L 136   POTASSIUM mmol/L 3.9   CHLORIDE mmol/L 102   CO2 mmol/L 24.7   BUN mg/dL 15   CREATININE mg/dL 0.94   CALCIUM mg/dL 8.6   BILIRUBIN mg/dL 0.7   ALK PHOS U/L 148*   ALT (SGPT) U/L 157*   AST (SGOT) U/L 53*   GLUCOSE mg/dL 113*     Results from last 7 days   Lab Units 10/28/20  0443 10/27/20  1036   CK TOTAL U/L 91  --    TROPONIN T ng/mL  --  <0.010     Results from last 7 days   Lab Units 20  0537 10/29/20  0630 10/28/20  0443   WBC 10*3/mm3 6.62 7.57 9.08   HEMOGLOBIN g/dL 11.5* 11.2* 11.3*   HEMATOCRIT % 35.1 33.1* 33.7*   PLATELETS 10*3/mm3 158 117* 99*  Nursing Note by Corry Marrufo NCMA at 11/17/18 08:40 AM     Author:  Corry Marrufo NCMA Service:  (none) Author Type:  Certified Medical Assistant     Filed:  11/17/18 08:40 AM Encounter Date:  11/17/2018 Status:  Signed     :  Corry Marrufo NCMA (Certified Medical Assistant)            If provider orders tests at today's visit, patient would like to be contacted via[HH1.1T] phone[HH1.1M] (gifteehart or by telephone).  If to contact patient by phone, patient's preferred phone # is  [phone] (cell)   and it is[HH1.1T] ok[HH1.1M] to leave message on voice mail or with family member.  If medications are ordered at today's visit, the pharmacy name/location patient would like them to be sent to is [facility]  [address])  .[HH1.1T]       Revision History        User Key Date/Time User Provider Type Action    > HH1.1 11/17/18 08:40 AM Corry Marrufo NCMA Certified Medical Assistant Sign    M - Manual, T - Template                 Medication Review:   Meds reviewed  Scheduled Meds:aspirin, 81 mg, Oral, Daily  carvedilol, 3.125 mg, Oral, BID With Meals  darunavir, 800 mg, Oral, Nightly    And  ritonavir, 100 mg, Oral, Nightly  Emtricitabine-Tenofovir AF, 1 tablet, Oral, Nightly  enoxaparin, 40 mg, Subcutaneous, Q24H  fluticasone, 2 spray, Nasal, Daily  furosemide, 80 mg, Oral, Daily  potassium chloride, 40 mEq, Oral, Daily  sacubitril-valsartan, 1 tablet, Oral, Q12H  sertraline, 50 mg, Oral, Daily      I personally viewed and interpreted the patient's EKG/Telemetry data    Assessment and Plan  1.  Severe nonischemic systolic and diastolic dysfunction/heart failure,  not a candidate for CRT with narrow QRS  2.  Ventricular tachycardia, status post AICD placement.  Not surprising given severity of LV dysfunction.  Clinically she is compensated at the moment with the overt heart failure.  Patient and GI service have decided to not pursue further testing with colonoscopy or EGD for now.  Clinically improved.  Plan on referral to [facility] transplant service to see as an outpatient.  No change in current medical regimen.  3.  History of HIV  4.  History of drug use, now clean  5.  Elevated liver function with gallbladder sludge.  Recommendations per GI service.  Repeat labs added to labs drawn earlier this morning  6.  Diarrhea      Mini Chou  11/1/202011:19 EST  25min spent in reviewing records, discussion and examination of the patient and discussion with other members of the patient's medical team.     Dictated utilizing Dragon dictation

## 2020-11-02 ENCOUNTER — READMISSION MANAGEMENT (OUTPATIENT)
Dept: CALL CENTER | Facility: HOSPITAL | Age: 45
End: 2020-11-02

## 2020-11-02 VITALS
DIASTOLIC BLOOD PRESSURE: 79 MMHG | SYSTOLIC BLOOD PRESSURE: 97 MMHG | BODY MASS INDEX: 32.47 KG/M2 | TEMPERATURE: 97.8 F | HEART RATE: 87 BPM | OXYGEN SATURATION: 97 % | WEIGHT: 202 LBS | RESPIRATION RATE: 16 BRPM | HEIGHT: 66 IN

## 2020-11-02 LAB
ANION GAP SERPL CALCULATED.3IONS-SCNC: 9.6 MMOL/L (ref 5–15)
BASOPHILS # BLD AUTO: 0.03 10*3/MM3 (ref 0–0.2)
BASOPHILS NFR BLD AUTO: 0.5 % (ref 0–1.5)
BUN SERPL-MCNC: 14 MG/DL (ref 6–20)
BUN/CREAT SERPL: 15.4 (ref 7–25)
CALCIUM SPEC-SCNC: 8.6 MG/DL (ref 8.6–10.5)
CHLORIDE SERPL-SCNC: 104 MMOL/L (ref 98–107)
CO2 SERPL-SCNC: 23.4 MMOL/L (ref 22–29)
CREAT SERPL-MCNC: 0.91 MG/DL (ref 0.57–1)
DEPRECATED RDW RBC AUTO: 44.2 FL (ref 37–54)
EOSINOPHIL # BLD AUTO: 0.03 10*3/MM3 (ref 0–0.4)
EOSINOPHIL NFR BLD AUTO: 0.5 % (ref 0.3–6.2)
ERYTHROCYTE [DISTWIDTH] IN BLOOD BY AUTOMATED COUNT: 12.5 % (ref 12.3–15.4)
GFR SERPL CREATININE-BSD FRML MDRD: 81 ML/MIN/1.73
GLUCOSE SERPL-MCNC: 114 MG/DL (ref 65–99)
HCT VFR BLD AUTO: 33.4 % (ref 34–46.6)
HGB BLD-MCNC: 11.5 G/DL (ref 12–15.9)
IMM GRANULOCYTES # BLD AUTO: 0.02 10*3/MM3 (ref 0–0.05)
IMM GRANULOCYTES NFR BLD AUTO: 0.3 % (ref 0–0.5)
LYMPHOCYTES # BLD AUTO: 2.36 10*3/MM3 (ref 0.7–3.1)
LYMPHOCYTES NFR BLD AUTO: 35.5 % (ref 19.6–45.3)
MAGNESIUM SERPL-MCNC: 2 MG/DL (ref 1.6–2.6)
MCH RBC QN AUTO: 33.8 PG (ref 26.6–33)
MCHC RBC AUTO-ENTMCNC: 34.4 G/DL (ref 31.5–35.7)
MCV RBC AUTO: 98.2 FL (ref 79–97)
MONOCYTES # BLD AUTO: 0.47 10*3/MM3 (ref 0.1–0.9)
MONOCYTES NFR BLD AUTO: 7.1 % (ref 5–12)
NEUTROPHILS NFR BLD AUTO: 3.73 10*3/MM3 (ref 1.7–7)
NEUTROPHILS NFR BLD AUTO: 56.1 % (ref 42.7–76)
NRBC BLD AUTO-RTO: 0 /100 WBC (ref 0–0.2)
PLATELET # BLD AUTO: 156 10*3/MM3 (ref 140–450)
PMV BLD AUTO: 12.8 FL (ref 6–12)
POTASSIUM SERPL-SCNC: 4.3 MMOL/L (ref 3.5–5.2)
RBC # BLD AUTO: 3.4 10*6/MM3 (ref 3.77–5.28)
SODIUM SERPL-SCNC: 137 MMOL/L (ref 136–145)
WBC # BLD AUTO: 6.64 10*3/MM3 (ref 3.4–10.8)

## 2020-11-02 PROCEDURE — 83735 ASSAY OF MAGNESIUM: CPT | Performed by: INTERNAL MEDICINE

## 2020-11-02 PROCEDURE — 80048 BASIC METABOLIC PNL TOTAL CA: CPT | Performed by: INTERNAL MEDICINE

## 2020-11-02 PROCEDURE — 85025 COMPLETE CBC W/AUTO DIFF WBC: CPT | Performed by: INTERNAL MEDICINE

## 2020-11-02 PROCEDURE — 99231 SBSQ HOSP IP/OBS SF/LOW 25: CPT | Performed by: INTERNAL MEDICINE

## 2020-11-02 RX ORDER — CARVEDILOL 3.12 MG/1
3.12 TABLET ORAL 2 TIMES DAILY WITH MEALS
Qty: 60 TABLET | Refills: 0 | Status: SHIPPED | OUTPATIENT
Start: 2020-11-02 | End: 2021-03-17

## 2020-11-02 RX ADMIN — FUROSEMIDE 80 MG: 80 TABLET ORAL at 08:59

## 2020-11-02 RX ADMIN — SACUBITRIL AND VALSARTAN 1 TABLET: 24; 26 TABLET, FILM COATED ORAL at 08:59

## 2020-11-02 RX ADMIN — ASPIRIN 81 MG: 81 TABLET, COATED ORAL at 08:59

## 2020-11-02 RX ADMIN — POTASSIUM CHLORIDE 40 MEQ: 750 TABLET, EXTENDED RELEASE ORAL at 08:59

## 2020-11-02 RX ADMIN — SERTRALINE 50 MG: 50 TABLET, FILM COATED ORAL at 08:59

## 2020-11-02 RX ADMIN — CARVEDILOL 3.12 MG: 3.12 TABLET, FILM COATED ORAL at 08:59

## 2020-11-02 NOTE — PROGRESS NOTES
Case Management Discharge Note      Final Note: Pt discharged home, no known needs.  PATRICE peres RN         Selected Continued Care - Discharged on 11/2/2020 Admission date: 10/27/2020 - Discharge disposition: Home or Self Care    Destination    No services have been selected for the patient.              Durable Medical Equipment    No services have been selected for the patient.              Dialysis/Infusion    No services have been selected for the patient.              Home Medical Care    No services have been selected for the patient.              Therapy    No services have been selected for the patient.              Community Resources    No services have been selected for the patient.                  Transportation Services  Private: Car    Final Discharge Disposition Code: 01 - home or self-care

## 2020-11-02 NOTE — PROGRESS NOTES
Hospital Follow Up    LOS:  LOS: 6 days   Patient Name: Nina Saez  Age/Sex: 44 y.o. female  : 1975  MRN: [id number]    Day of Service: 20   Length of Stay: 6  Encounter Provider: Saul Lewis MD  Place of Service: [facility] CARDIOLOGY  Patient Care Team:  Darcie Ledbetter APRN as PCP - General (Internal Medicine)  Lisset Melton MD as Consulting Physician (Cardiology)  Andrew Watson MD as Consulting Physician (Urology)    Subjective:     Chief Complaint: Cardiomyopathy/ventricular tachycardia    Interval History: Patient says she feels good wants to go home.  No shortness of breath no chest pains no lower extremity edema.    Objective:     Objective:  Temp:  [97.1 °F (36.2 °C)-98 °F (36.7 °C)] 97.8 °F (36.6 °C)  Heart Rate:  [75-88] 87  Resp:  [16-18] 16  BP: ()/(65-76) 104/71     Intake/Output Summary (Last 24 hours) at 2020 1037  Last data filed at 2020 0900  Gross per 24 hour   Intake 720 ml   Output --   Net 720 ml     Body mass index is 32.6 kg/m².      10/31/20  0550 20  0602 20  0500   Weight: 88.6 kg (195 lb 6.4 oz) 92 kg (202 lb 14.4 oz) 91.6 kg (202 lb)     Weight change: -0.408 kg (-14.4 oz)      Physical Exam:   General : Alert, cooperative, in no acute distress.  Neuro: Alert,cooperative and oriented.  Lungs: CTAB. Normal respiratory effort and rate.  CV: Regular rate and rhythm, normal S1 and S2, no murmurs, gallops or rubs.  ABD: Soft, nontender, nondistended. Positive bowel sounds.  Extr: No edema or cyanosis, moves all extremities.    Lab Review:   Results from last 7 days   Lab Units 20  0559 20  0538 10/31/20  1047   SODIUM mmol/L 137 136  --    POTASSIUM mmol/L 4.3 3.9 3.8   CHLORIDE mmol/L 104 102  --    CO2 mmol/L 23.4 24.7  --    BUN mg/dL 14 15  --    CREATININE mg/dL 0.91 0.94  --    GLUCOSE mg/dL 114* 113*  --    CALCIUM mg/dL 8.6 8.6  --    AST (SGOT) U/L  --  53* 71*   ALT  (SGPT) U/L  --  157* 184*     Results from last 7 days   Lab Units 10/28/20  0443 10/27/20  1036   CK TOTAL U/L 91  --    TROPONIN T ng/mL  --  <0.010     Results from last 7 days   Lab Units 11/02/20  0559 11/01/20  0537   WBC 10*3/mm3 6.64 6.62   HEMOGLOBIN g/dL 11.5* 11.5*   HEMATOCRIT % 33.4* 35.1   PLATELETS 10*3/mm3 156 158         Results from last 7 days   Lab Units 10/31/20  1047 10/29/20  0630   MAGNESIUM mg/dL 1.9 2.0           Invalid input(s): LDLCALC  Results from last 7 days   Lab Units 10/27/20  1036   PROBNP pg/mL 16,206.0*     Results from last 7 days   Lab Units 10/28/20  1918   TSH uIU/mL 2.330       Current Medications:   Scheduled Meds:aspirin, 81 mg, Oral, Daily  carvedilol, 3.125 mg, Oral, BID With Meals  darunavir, 800 mg, Oral, Nightly    And  ritonavir, 100 mg, Oral, Nightly  Emtricitabine-Tenofovir AF, 1 tablet, Oral, Nightly  enoxaparin, 40 mg, Subcutaneous, Q24H  fluticasone, 2 spray, Nasal, Daily  furosemide, 80 mg, Oral, Daily  potassium chloride, 40 mEq, Oral, Daily  sacubitril-valsartan, 1 tablet, Oral, Q12H  sertraline, 50 mg, Oral, Daily      Continuous Infusions:     Allergies:  No Known Allergies    Assessment:       Acute on chronic systolic congestive heart failure (CMS/[facility])    Asthma    Essential hypertension    HIV (human immunodeficiency virus infection) (CMS/[facility])    NICM (nonischemic cardiomyopathy) (CMS/[facility])    Elevated LFTs    AICD (automatic cardioverter/defibrillator) present    Intractable vomiting with nausea    Diarrhea following gastrointestinal surgery        Plan:     1.  Severe nonischemic systolic and diastolic dysfunction/heart failure,  not a candidate for CRT with narrow QRS  2.  Ventricular tachycardia, status post AICD placement.  Not surprising given severity of LV dysfunction.  Clinically she is compensated at the moment with the overt heart failure.  Patient and GI service have decided to not pursue further testing with colonoscopy or EGD for now.   Clinically improved.  Plan on referral to [facility] transplant service to see as an outpatient.  No change in current medical regimen.  3.  History of HIV  4.  History of drug use, now clean  5.  Patient remains stable from a cardiovascular standpoint.  Okay to discharge from my standpoint.  My office did make an appointment for her to be evaluated by [facility] transplant services.  We will see patient on an as-needed basis.    Saul Lewis MD  11/02/20  10:37 EST

## 2020-11-02 NOTE — PLAN OF CARE
Goal Outcome Evaluation:  Plan of Care Reviewed With: patient  Progress: improving  Outcome Summary: VSS, denies pain, ad huey, no issues overnight. Hopeful discharge home today.  Will continue to monitor.

## 2020-11-02 NOTE — OUTREACH NOTE
Prep Survey      Responses   [facility] patient discharged from?  [place]   Is LACE score < 7 ?  No   Eligibility  [facility]   Date of Admission  10/27/20   Date of Discharge  11/02/20   Discharge Disposition  Home or Self Care   Discharge diagnosis  A/C CHF, elevated liver enzymes, HIV   Does the patient have one of the following disease processes/diagnoses(primary or secondary)?  CHF   Does the patient have Home health ordered?  No   Is there a DME ordered?  No   Prep survey completed?  Yes          Marisabel Watson RN

## 2020-11-03 ENCOUNTER — TRANSITIONAL CARE MANAGEMENT TELEPHONE ENCOUNTER (OUTPATIENT)
Dept: CALL CENTER | Facility: HOSPITAL | Age: 45
End: 2020-11-03

## 2020-11-03 ENCOUNTER — TELEPHONE (OUTPATIENT)
Dept: CARDIOLOGY | Facility: CLINIC | Age: 45
End: 2020-11-03

## 2020-11-03 DIAGNOSIS — I42.8 NICM (NONISCHEMIC CARDIOMYOPATHY) (HCC): Primary | ICD-10-CM

## 2020-11-03 DIAGNOSIS — I50.22 CHRONIC SYSTOLIC CONGESTIVE HEART FAILURE (HCC): ICD-10-CM

## 2020-11-03 NOTE — TELEPHONE ENCOUNTER
Attempted to call patient to schedule appointment in heart failure clinic.  Voicemail unavailable, no release of information signed to discuss patient with her daughter.  Will attempt to call patient again.    Kiley Boyd, APRN

## 2020-11-03 NOTE — DISCHARGE SUMMARY
"    Patient Name: Nina Saez  : 1975  MRN: [id number]    Date of Admission: 10/27/2020  Date of Discharge:  2020  Primary Care Physician: Darcie Ledbetter APRN      Chief Complaint:   Shortness of Breath      Discharge Diagnoses     Active Hospital Problems    Diagnosis  POA   • **Acute on chronic systolic congestive heart failure (CMS/HCC) [I50.23]  Yes   • Intractable vomiting with nausea [R11.2]  Unknown   • Diarrhea following gastrointestinal surgery [R19.7, Z98.890]  Unknown   • NICM (nonischemic cardiomyopathy) (CMS/[facility]) [I42.8]  Yes   • Elevated LFTs [R79.89]  Yes   • AICD (automatic cardioverter/defibrillator) present [Z95.810]  Yes   • Essential hypertension [I10]  Yes   • HIV (human immunodeficiency virus infection) (CMS/[facility]) [B20]  Yes   • Asthma [J45.909]  Yes      Resolved Hospital Problems   No resolved problems to display.        Hospital Course   Ms. Saez is a 44 y.o. female with a history of NICM, AICD, HIV, HTN, Asthma, LBBB that presents to [facility] complaining of worsening dyspnea, orthopnea, nausea, epigastric discomfort, cough. She was supposed to be seen in the heart failure clinic today but due to worsening symptoms, presented to ED. She reports her symptoms are worse than what she has been hospitalized for previously. She is unable to lay flat, has ULRICH. She reports a \"foamy\" cough; denies wheezing. Has some epigastric discomfort. No LE swelling but feels distended in her abdomen. She has a hard time following low sodium diet restriction. Unknown weight changes. She reports she has not taken metoprolol in ~ 2 days due to intolerance; she is concerned it is causing her to be nauseated, have epigastric pain. Denies recent fever, chest pain, palpitations, diarrhea, dysuria.       Afebrile. HR 110s. BP stable. Lipase wnl. Hgb 11.8, WBC 9.49, Plts 127. Trop neg. BNP 30751. Gluc 163, BUN/Cr 16/1.08, ALT//147, ALK phos 144. RVP/Covid 19 neg. UA 3+ " blood, 31-50 RBC (pt is menstruating) otherwise negative. CXR increased pulm vascular congestion & RLL opacification since previous imaging, suggestive of pulm edema.        1. Combined chronic systolic/ diastolic heart failure with non ischemia.  Patient is not a candidate for CRT given that she has narrow QRS.  Underwent AICD placement.  Currently patient is compensated and is on aspirin, Coreg, Entresto, Lasix.  Currently not requiring any oxygen and appears stable hemodynamically.  Patient's ejection fraction is 6% and might benefit from transplant evaluation and will have her follow-up with cardiology as an outpatient basis. Cardiology staff with call patient with appoint date and time for  heart transplant clinic follow up.  2. V-tach, as mentioned above.  3. History HIV, continue with Descovy and Norvir.  4. Acute hepatitis, elevated LFTs were felt to be from passive congestion and been improving.  Patient has declined EGD and colonoscopy for further workup.  Gallbladder ultrasound revealed sludge.  5. Mild anemia and thrombocytopenia, no need for any further workup at this point during this hospital stay.  Advised her to follow-up with primary care provider as an outpatient basis.  6. History of bronchial asthma, currently not in any exacerbation.     Please note patient was seen and examined today on the day of discharge.    Day of Discharge           Physical Exam:  Temp:  [97.1 °F (36.2 °C)-98 °F (36.7 °C)] 97.8 °F (36.6 °C)  Heart Rate:  [75-88] 87  Resp:  [16] 16  BP: ()/(69-79) 97/79  Body mass index is 32.6 kg/m².  Physical Exam    General.  Middle-aged female alert oriented x3 no apparent pain distress with diaphoresis normal mood and affect   eyes.  Pupils equal round and reactive intact extraocular musculature no pallor or jaundice normal conjunctive and lids  ENT.  Moist mucous membrane  Neck.  Supple no JVD no lymphadenopathy no thyromegaly  Cardio vascular.  Regular rate and rhythm no  gallops or murmurs  Chest.  Poor but clear to auscultation bilaterally with no added sounds   Abdomen.  Soft lax no tenderness no organomegaly no guarding or rebound  Extremities.  No clubbing cyanosis or edema  CNS.  No acute focal neurological deficits        Consultants     Consult Orders (all) (From admission, onward)     Start     Ordered    10/28/20 1514  Inpatient Gastroenterology Consult  Once     Specialty:  Gastroenterology  Provider:  Michael Bryant MD    10/28/20 1514    10/27/20 1228  [facility] (on-call MD unless specified) Details  Once     Specialty:  Hospitalist  Provider:  (Not yet assigned)    10/27/20 1227    10/27/20 1227  Cardiology (on-call MD unless specified)  Once     Specialty:  Cardiology  Provider:  Lisset Melton MD    10/27/20 1227              Procedures     Imaging Results (All)     Procedure Component Value Units Date/Time    US Gallbladder [[id number]] Collected: 10/29/20 0547     Updated: 10/29/20 0552    Narrative:      PROCEDURE: US GALLBLADDER-     HISTORY: Right upper quadrant pain.     TECHNIQUE: Grayscale and color Doppler ultrasound of the gallbladder was  performed.     COMPARISON: CT abdomen and pelvis 07/09/2020.      FINDINGS:     MEASUREMENTS:   Liver:  15.2 cm   Common bile duct diameter:  0.2 cm  Right kidney: 11.4 x 3.9 x 4.7 cm     LIVER: The liver is normal in size and echogenicity. The echotexture is  smooth. There is no intrahepatic mass or biliary ductal dilatation.  There is hepatopetal flow in the main portal vein.      GALLBLADDER: Low level echogenic material is seen in the gallbladder  consistent with sludge. There are no shadowing stones. There is no  gallbladder wall thickening or pericholecystic fluid. No sonographic  Hernandes's sign was reported. There is no extrahepatic biliary ductal  dilatation.     PANCREAS: The visualized portions of the pancreas are within normal  limits.     RIGHT KIDNEY: There is no hydronephrosis. No shadowing renal stone  is  visualized.       Impression:         Biliary sludge. No biliary ductal dilatation.     This report was finalized on 10/29/2020 5:49 AM by Dr. Toya Viramontes M.D.       XR Chest 1 View [[id number]] Collected: 10/27/20 1056     Updated: 10/27/20 1101    Narrative:      Portable chest radiograph     HISTORY:Shortness of air     TECHNIQUE: Single AP portable radiograph of the chest     COMPARISON:Chest radiograph 09/29/2020       Impression:      FINDINGS AND IMPRESSION:  Evaluation is suboptimal due to patient body habitus.     Left pacer/AICD is present. There is apparent increased pulmonary  vascular congestion as well as right lower lung pulmonary opacification  since 09/29/2020. There is marked cardiomegaly, as before. Constellation  findings are most suggestive of pulmonary edema in the appropriate  clinical context with pneumonia also a differential consideration.  Correlation with patient history is recommended.     No pneumothorax or pleural effusion is seen.     This report was finalized on 10/27/2020 10:58 AM by Dr. Ricardo Enrique M.D.             Pertinent Labs     Results from last 7 days   Lab Units 11/02/20 0559 11/01/20 0537 10/29/20  0630 10/28/20  0443   WBC 10*3/mm3 6.64 6.62 7.57 9.08   HEMOGLOBIN g/dL 11.5* 11.5* 11.2* 11.3*   PLATELETS 10*3/mm3 156 158 117* 99*     Results from last 7 days   Lab Units 11/02/20 0559 11/01/20 0538 10/31/20  1047 10/29/20  0630 10/28/20  0443   SODIUM mmol/L 137 136  --  134* 135*   POTASSIUM mmol/L 4.3 3.9 3.8 3.5 3.6   CHLORIDE mmol/L 104 102  --  101 102   CO2 mmol/L 23.4 24.7  --  21.6* 22.1   BUN mg/dL 14 15  --  14 15   CREATININE mg/dL 0.91 0.94  --  0.92 0.95   GLUCOSE mg/dL 114* 113*  --  149* 146*   Estimated Creatinine Clearance: 89.9 mL/min (by C-G formula based on SCr of 0.91 mg/dL).  Results from last 7 days   Lab Units 11/01/20 0538 10/31/20  1047 10/29/20  0630 10/28/20  1918 10/28/20  0443   ALBUMIN g/dL 3.20* 3.40* 3.40*  --  3.40*    BILIRUBIN mg/dL 0.7 0.8 0.9 0.9 1.2   ALK PHOS U/L 148* 168* 150*  --  135*   AST (SGOT) U/L 53* 71* 112*  --  177*   ALT (SGPT) U/L 157* 184* 239*  --  268*     Results from last 7 days   Lab Units 11/02/20  0559 11/01/20  0538 10/31/20  1047 10/29/20  0630 10/28/20  0443   CALCIUM mg/dL 8.6 8.6  --  8.3* 8.0*   ALBUMIN g/dL  --  3.20* 3.40* 3.40* 3.40*   MAGNESIUM mg/dL 2.0  --  1.9 2.0  --      Results from last 7 days   Lab Units 10/28/20  1918 10/27/20  1036   LIPASE U/L  --  25   AMMONIA umol/L 50  --      Results from last 7 days   Lab Units 10/28/20  0443 10/27/20  1036   CK TOTAL U/L 91  --    TROPONIN T ng/mL  --  <0.010   PROBNP pg/mL  --  16,206.0*           Invalid input(s): LDLCALC        Test Results Pending at Discharge       Discharge Details        Discharge Medications      New Medications      Instructions Start Date   carvedilol 3.125 MG tablet  Commonly known as: COREG   3.125 mg, Oral, 2 Times Daily With Meals         Changes to Medications      Instructions Start Date   Prezista 800 MG tablet tablet  Generic drug: darunavir ethanolate  What changed: Another medication with the same name was added. Make sure you understand how and when to take each.   800 mg, Oral, Nightly      darunavir ethanolate 800 MG tablet tablet  Commonly known as: PREZISTA  What changed: You were already taking a medication with the same name, and this prescription was added. Make sure you understand how and when to take each.   800 mg, Oral, Nightly         Continue These Medications      Instructions Start Date   acetaminophen 325 MG tablet  Commonly known as: TYLENOL   650 mg, Oral, Every 6 Hours PRN      albuterol sulfate  (90 Base) MCG/ACT inhaler  Commonly known as: PROVENTIL HFA;VENTOLIN HFA;PROAIR HFA   2 puffs, Inhalation, Every 4 Hours PRN      aspirin 81 MG EC tablet   81 mg, Oral, Daily      Emtricitabine-Tenofovir -25 MG per tablet  Commonly known as: DESCOVY   Oral, Nightly      Entresto  24-26 MG tablet  Generic drug: sacubitril-valsartan   1 tablet, Oral, Every 12 Hours Scheduled      fluticasone 50 MCG/ACT nasal spray  Commonly known as: Flonase   2 sprays, Nasal, Daily      furosemide 80 MG tablet  Commonly known as: LASIX   80 mg, Oral, 2 Times Daily      potassium chloride 20 MEQ CR tablet  Commonly known as: K-DUR,KLOR-CON   60 mEq, Oral, Daily      ritonavir 100 MG tablet  Commonly known as: NORVIR   100 mg, Oral, Nightly      sertraline 50 MG tablet  Commonly known as: ZOLOFT   50 mg, Oral, Daily      vitamin D 1.25 MG (02451 UT) capsule capsule  Commonly known as: ERGOCALCIFEROL   50,000 Units, Oral, Every 30 Days             No Known Allergies      Discharge Disposition:  Home or Self Care    Discharge Diet:  No active diet order      Discharge Activity:   Activity Instructions     Activity as Tolerated            CODE STATUS:    Code Status and Medical Interventions:   Ordered at: 10/27/20 1300     Code Status:    CPR     Medical Interventions (Level of Support Prior to Arrest):    Full       Future Appointments   Date Time Provider Department Center   11/4/2020  4:00 PM TELEMETRY MONITOR -  [facility] CARD  [facility]   11/6/2020  4:00 PM TELEMETRY MONITOR -  [facility] CARD  [facility]   11/9/2020  4:00 PM TELEMETRY MONITOR -  [facility] CARD  [facility]   11/11/2020  4:00 PM TELEMETRY MONITOR -  [facility] CARD  [facility] CAR [facility]   11/13/2020  4:00 PM TELEMETRY MONITOR -  [facility] CARD  [facility] CAR [facility]   11/16/2020  4:00 PM TELEMETRY MONITOR -  [facility] CARD [facility]   11/18/2020  4:00 PM TELEMETRY MONITOR -  [facility] CARD [facility]   11/20/2020  4:00 PM TELEMETRY MONITOR -  [facility] CARD [facility] CAR [facility]   11/23/2020  4:00 PM TELEMETRY MONITOR -  NOMI CARD  NOMI CAR [facility]   11/25/2020  4:00 PM TELEMETRY MONITOR -  [facility]OM[facility] CARD  [facility] CAR [facility]   11/27/2020  4:00 PM TELEMETRY MONITOR -  NOMI CARD  NOMI CAR NOMI   11/30/2020  2:15 PM Darcie Ledbetter APRN [facility] None   11/30/2020  4:00 PM TELEMETRY  MONITOR - BH NOMI CARD [facility] NOMI CAR N[facility]   12/2/2020  4:00 PM TELEMETRY MONITOR - BH NOMI CARD BH [facility]OMI CAR NOMI   12/4/2020  4:00 PM TELEMETRY MONITOR - BH NOMI CARD BH NOMI CAR NOMI     Additional Instructions for the Follow-ups that You Need to Schedule     Discharge Follow-up with PCP   As directed       Currently Documented PCP:    Darcie Ledbetter APRN    PCP Phone Number:    [phone]     Follow Up Details: 2 weeks         Discharge Follow-up with Specified Provider: ; 1 Month   As directed      To:     Follow Up: 1 Month           Follow-up Information     Angelica Chou MD Follow up.    Specialty: Cardiology  Why: This is the cardiology group that has seen you for this admission. Their office will set up your referral to outpatient heart failure clinic. Please call their number if you have any questions or concerns  Contact information:  [address]  [place] [address]  [phone]             Jamar Burger MD Follow up.    Specialty: Gastroenterology  Why: This is the gastroenterology group that has seen you for this admission. Please call their number if you have any questions or concerns.  Contact information:  [address]  [facility] [address]  [phone]             Darcie Ledbetter APRN .    Specialty: Internal Medicine  Why: 2 weeks  Contact information:  [address]  [facility] [address]  [phone]                   Additional Instructions for the Follow-ups that You Need to Schedule     Discharge Follow-up with PCP   As directed       Currently Documented PCP:    Darcie Ledbetter APRN    PCP Phone Number:    [phone]     Follow Up Details: 2 weeks         Discharge Follow-up with Specified Provider: ; 1 Month   As directed      To:     Follow Up: 1 Month           Time Spent on Discharge:  Greater than 30 minutes      Homer Owen MD  [facility]  11/02/20  20:41 EST

## 2020-11-03 NOTE — OUTREACH NOTE
Call Center TCM Note      Responses   [facility] patient discharged from?  [place]   Does the patient have one of the following disease processes/diagnoses(primary or secondary)?  CHF   TCM attempt successful?  Yes   Discharge diagnosis  A/C CHF, elevated liver enzymes, HIV   Meds reviewed with patient/caregiver?  Yes   Is the patient having any side effects they believe may be caused by any medication additions or changes?  No   Does the patient have all medications ordered at discharge?  Yes   Is the patient taking all medications as directed (includes completed medication regime)?  Yes   Medication comments  Pt states understanding of all medication changes   Does the patient have a primary care provider?   Yes   Does the patient have an appointment with their PCP within 7 days of discharge?  Yes   Comments regarding PCP  Darcie BAKER   Has the patient kept scheduled appointments due by today?  Yes   Has home health visited the patient within 72 hours of discharge?  N/A   Psychosocial issues?  No   Did the patient receive a copy of their discharge instructions?  Yes   Nursing interventions  Reviewed instructions with patient   What is the patient's perception of their health status since discharge?  Improving   Is the patient weighing daily?  Yes   Does the patient have scales?  Yes   Is the patient able to teach back Heart Failure diet management?  Yes   Is the patient able to teach back signs and symptoms of worsening condition? (i.e. weight gain, shortness of air, etc.)  Yes   If the patient is a current smoker, are they able to teach back resources for cessation?  Not a smoker   Is the patient/caregiver able to teach back the hierarchy of who to call/visit for symptoms/problems? PCP, Specialist, Home health nurse, Urgent Care, ED, 911  Yes   TCM call completed?  Yes   Wrap up additional comments  Pt has no questions/concerns and states she is feeling so much better. Understands medication  changes. Pt has cardiac rehab Telemetry appts, and will be fwp with GI and Cardio. Pt is now sched for TCM FWP by [facility] on 11/09/2020           Angie Mosley MA    11/3/2020, 12:52 EST

## 2020-11-09 ENCOUNTER — TELEMEDICINE (OUTPATIENT)
Dept: INTERNAL MEDICINE | Age: 45
End: 2020-11-09

## 2020-11-09 DIAGNOSIS — R79.89 ELEVATED LFTS: ICD-10-CM

## 2020-11-09 DIAGNOSIS — Z21 ASYMPTOMATIC HIV INFECTION (HCC): ICD-10-CM

## 2020-11-09 DIAGNOSIS — I50.23 ACUTE ON CHRONIC SYSTOLIC CONGESTIVE HEART FAILURE (HCC): Primary | ICD-10-CM

## 2020-11-09 DIAGNOSIS — R11.0 NAUSEA: ICD-10-CM

## 2020-11-09 PROCEDURE — 99495 TRANSJ CARE MGMT MOD F2F 14D: CPT | Performed by: NURSE PRACTITIONER

## 2020-11-09 RX ORDER — PROMETHAZINE HYDROCHLORIDE 12.5 MG/1
12.5 TABLET ORAL EVERY 6 HOURS PRN
Qty: 15 TABLET | Refills: 0 | Status: SHIPPED | OUTPATIENT
Start: 2020-11-09 | End: 2020-12-07 | Stop reason: ALTCHOICE

## 2020-11-09 NOTE — PROGRESS NOTES
"Transitional Care Follow Up Visit  Subjective     Nina Saez is a 44 y.o. female who presents for a transitional care management visit.    Within 48 business hours after discharge our office contacted her via telephone to coordinate her care and needs.      I reviewed and discussed the details of that call along with the discharge summary, hospital problems, inpatient lab results, inpatient diagnostic studies, and consultation reports with Nina.     Current outpatient and discharge medications have been reconciled for the patient.  Reviewed by: SAMUEL Robins      Date of TCM Phone Call 11/2/2020   [facility]   Date of Admission 10/27/2020   Date of Discharge 11/2/2020   Discharge Disposition Home or Self Care     Risk for Readmission (LACE) Score: 15 (11/2/2020  6:00 AM)      History of Present Illness   Course During Hospital Stay:  Ms. Saez is a 44 y.o. female with a history of NICM, AICD, HIV, HTN, Asthma, LBBB that presents to [facility] complaining of worsening dyspnea, orthopnea, nausea, epigastric discomfort, cough. She was supposed to be seen in the heart failure clinic today but due to worsening symptoms, presented to ED. She reports her symptoms are worse than what she has been hospitalized for previously. She is unable to lay flat, has ULRICH. She reports a \"foamy\" cough; denies wheezing. Has some epigastric discomfort. No LE swelling but feels distended in her abdomen. She has a hard time following low sodium diet restriction. Unknown weight changes. She reports she has not taken metoprolol in ~ 2 days due to intolerance; she is concerned it is causing her to be nauseated, have epigastric pain. Denies recent fever, chest pain, palpitations, diarrhea, dysuria.        Afebrile. HR 110s. BP stable. Lipase wnl. Hgb 11.8, WBC 9.49, Plts 127. Trop neg. BNP 41004. Gluc 163, BUN/Cr 16/1.08, ALT//147, ALK phos 144. RVP/Covid 19 neg. UA 3+ blood, 31-50 RBC (pt is " menstruating) otherwise negative. CXR increased pulm vascular congestion & RLL opacification since previous imaging, suggestive of pulm edema.        1. Combined chronic systolic/ diastolic heart failure with non ischemia.  Patient is not a candidate for CRT given that she has narrow QRS.  Underwent AICD placement.  Currently patient is compensated and is on aspirin, Coreg, Entresto, Lasix.  Currently not requiring any oxygen and appears stable hemodynamically.  Patient's ejection fraction is 6% and might benefit from transplant evaluation and will have her follow-up with cardiology as an outpatient basis. Cardiology staff with call patient with appoint date and time for  heart transplant clinic follow up.  2. V-tach, as mentioned above.  3. History HIV, continue with Descovy and Norvir.  4. Acute hepatitis, elevated LFTs were felt to be from passive congestion and been improving.  Patient has declined EGD and colonoscopy for further workup.  Gallbladder ultrasound revealed sludge.  5. Mild anemia and thrombocytopenia, no need for any further workup at this point during this hospital stay.  Advised her to follow-up with primary care provider as an outpatient basis.  6. History of bronchial asthma, currently not in any exacerbation.     Please note patient was seen and examined today on the day of discharge.       Patient reports continued nausea at home, difficulty eating. Her weights have been stable. She did have an appointment scheduled today for the heart failure clinic, but she reports that she did not go to it as she did not feel good enough to leave the house. Denies any chest pain or shortness of breath currently, leg swelling at baseline.   She does have an appointment in [place] in December for evaluation for heart transplant.    The following portions of the patient's history were reviewed and updated as appropriate: allergies, current medications, past family history, past medical history, past  social history, past surgical history and problem list.    Review of Systems   Constitutional: Positive for fatigue. Negative for fever and unexpected weight change.   Respiratory: Negative for shortness of breath.    Cardiovascular: Positive for leg swelling. Negative for chest pain.   Gastrointestinal: Positive for nausea. Negative for constipation, diarrhea and vomiting.       Objective   Physical Exam  Vitals signs and nursing note reviewed.   Constitutional:       Appearance: She is well-developed.   Neurological:      Mental Status: She is alert and oriented to person, place, and time. She is not disoriented.   Psychiatric:         Attention and Perception: She is attentive.         Speech: Speech normal.         Behavior: Behavior normal. Behavior is cooperative.         Thought Content: Thought content normal.         Judgment: Judgment normal.         Assessment/Plan   Problems Addressed this Visit        Cardiovascular and Mediastinum    Acute on chronic systolic congestive heart failure (CMS/[facility])       Other    HIV (human immunodeficiency virus infection) (CMS/[facility])    Elevated LFTs      Other Visit Diagnoses     Nausea    -  Primary    Relevant Medications    promethazine (PHENERGAN) 12.5 MG tablet      Diagnoses       Codes Comments    Nausea    -  Primary ICD-10-CM: R11.0  ICD-9-CM: 787.02     Acute on chronic systolic congestive heart failure (CMS/[facility])     ICD-10-CM: I50.23  ICD-9-CM: 428.23, 428.0     Elevated LFTs     ICD-10-CM: R79.89  ICD-9-CM: 790.6     Asymptomatic HIV infection (CMS/[facility])     ICD-10-CM: Z21  ICD-9-CM: V08         This visit has been rescheduled as a video visit to comply with patient safety concerns in accordance with CDC recommendations. Total time of discussion was 10 minutes.      1. Acute on chronic systolic congestive heart failure (CMS/HCC)  Patient reports weights have been stable, no significant shortness of breath or swelling beyond baseline.  She did have an appointment  scheduled for the heart failure clinic today, but reports she felt too poorly to attend.  Advised patient to reschedule this appointment as it is crucial to ensure to watch for early symptoms of decompensation/fluid overload.     2. Nausea    - promethazine (PHENERGAN) 12.5 MG tablet; Take 1 tablet by mouth Every 6 (Six) Hours As Needed for Nausea or Vomiting.  Dispense: 15 tablet; Refill: 0    3. Elevated LFTs  LFTs were trending down at time of discharge.  Elevated LFTs were thought to be related to the congestion.  Unable to recheck labs today as this is a telephone/video visit.  Patient has a scheduled follow-up with me at the end of this month, will likely need to check labs at that time.     4. Asymptomatic HIV infection (CMS/HCC)

## 2020-11-12 ENCOUNTER — APPOINTMENT (OUTPATIENT)
Dept: CARDIOLOGY | Facility: HOSPITAL | Age: 45
End: 2020-11-12

## 2020-11-12 ENCOUNTER — READMISSION MANAGEMENT (OUTPATIENT)
Dept: CALL CENTER | Facility: HOSPITAL | Age: 45
End: 2020-11-12

## 2020-11-12 NOTE — OUTREACH NOTE
CHF Week 2 Survey      Responses   [facility] patient discharged from?  [place]   Does the patient have one of the following disease processes/diagnoses(primary or secondary)?  CHF   Week 2 attempt successful?  No   Unsuccessful attempts  Attempt 1          Enma Medina LPN

## 2020-11-13 ENCOUNTER — HOSPITAL ENCOUNTER (OUTPATIENT)
Dept: CARDIOLOGY | Facility: HOSPITAL | Age: 45
Discharge: HOME | End: 2020-11-13
Admitting: NURSE PRACTITIONER

## 2020-11-13 VITALS
HEART RATE: 102 BPM | WEIGHT: 191 LBS | BODY MASS INDEX: 30.7 KG/M2 | DIASTOLIC BLOOD PRESSURE: 76 MMHG | OXYGEN SATURATION: 96 % | HEIGHT: 66 IN | SYSTOLIC BLOOD PRESSURE: 110 MMHG | RESPIRATION RATE: 20 BRPM

## 2020-11-13 DIAGNOSIS — I50.22 CHRONIC SYSTOLIC CONGESTIVE HEART FAILURE (HCC): Primary | ICD-10-CM

## 2020-11-13 DIAGNOSIS — Z21 ASYMPTOMATIC HIV INFECTION (HCC): ICD-10-CM

## 2020-11-13 DIAGNOSIS — I42.8 NICM (NONISCHEMIC CARDIOMYOPATHY) (HCC): ICD-10-CM

## 2020-11-13 LAB
ANION GAP SERPL CALCULATED.3IONS-SCNC: 9.2 MMOL/L (ref 5–15)
BUN SERPL-MCNC: 13 MG/DL (ref 6–20)
BUN/CREAT SERPL: 11.3 (ref 7–25)
CALCIUM SPEC-SCNC: 8.6 MG/DL (ref 8.6–10.5)
CHLORIDE SERPL-SCNC: 106 MMOL/L (ref 98–107)
CO2 SERPL-SCNC: 21.8 MMOL/L (ref 22–29)
CREAT SERPL-MCNC: 1.15 MG/DL (ref 0.57–1)
GFR SERPL CREATININE-BSD FRML MDRD: 62 ML/MIN/1.73
GLUCOSE SERPL-MCNC: 117 MG/DL (ref 65–99)
NT-PROBNP SERPL-MCNC: 6803 PG/ML (ref 0–450)
POTASSIUM SERPL-SCNC: 4.8 MMOL/L (ref 3.5–5.2)
SODIUM SERPL-SCNC: 137 MMOL/L (ref 136–145)

## 2020-11-13 PROCEDURE — G0463 HOSPITAL OUTPT CLINIC VISIT: HCPCS

## 2020-11-13 PROCEDURE — 83880 ASSAY OF NATRIURETIC PEPTIDE: CPT

## 2020-11-13 PROCEDURE — 99215 OFFICE O/P EST HI 40 MIN: CPT | Performed by: NURSE PRACTITIONER

## 2020-11-13 PROCEDURE — 80048 BASIC METABOLIC PNL TOTAL CA: CPT

## 2020-11-13 RX ORDER — EMPAGLIFLOZIN 10 MG/1
1 TABLET, FILM COATED ORAL DAILY
Qty: 30 TABLET | Refills: 11 | Status: SHIPPED | OUTPATIENT
Start: 2020-11-13 | End: 2021-12-01 | Stop reason: SDUPTHER

## 2020-11-13 NOTE — PROGRESS NOTES
[facility] HEART FAILURE      Patient Name: Nina Saez  :1975  Age: 44 y.o.  Sex: female  Referring Provider: Nilda Gallagher APRN   Primary Cardiologist: Lisset Melton MD  Encounter Provider:  SAMUEL Cisneros      Chief Complaint:   Chief Complaint   Patient presents with   • Congestive Heart Failure         History of Present Illness this 44-year-old female, new to this provider, comes to us today for further evaluation of her chronic systolic congestive heart failure.  Current diagnoses to include severe nonischemic cardiomyopathy, left bundle branch block, hypertension, HIV, obesity, and asthma.    Historically she had followed with Dr. Laina Boyd at [facility].  In spring 2019 she was visiting family in [place] and was taken emergently to hospitals.  Echocardiogram and cardiac catheterization were performed at that point, carvedilol was changed to metoprolol.    In 2019 she presented through the emergency department at [facility] with chest pain and shortness of breath.  She was treated with IV diuresis, troponin was negative x2 and proBNP was elevated at 5151.  Entresto was started at that point given her severe dilated cardiomyopathy.  She was to follow with cardiology at [facility] at that time.    In 2020 she again presented to the emergency department with shortness of breath and cough x3 weeks.  BNP was elevated at 13,351.  Carvedilol was discontinued that admission given hypotension.  At subsequent outpatient appointment it was felt that Bumex was less effective than Lasix by the patient.  She complained of progressively worsening fatigue.  AICD, single-chamber [employer], interrogation 2020 revealed 10-16 beats of VT.    On September 10, 2020 she saw Dr. Jamar Azul MD, for consideration of upgrade to biventricular device.  At that point he felt that her left bundle branch block  was incomplete and she did not meet criteria for implantation of CRT-D.    She presented 10/27/2020 to [facility] with complaints of acute on chronic shortness of breath that began approximately 1 week prior.  BNP was further elevated at 16,206 that point.  Pulmonary edema was noted on chest x-ray.  Spironolactone was started that admission.  Referral for evaluation by [facility] transplant services was made November 2, 2020.    [facility] with normal filling pressures.  Echocardiogram at that time revealed an EF less than 15% with global hypokinesis-information extracted from external medical record note.  Cardiac catheterization May 30, 2017 performed at [facility] yielded codominant system with normal left main, LAD with distal 30% stenosis, RCA normal, LVEDP 7 mmHg.    Echocardiogram July 9, 2020 revealed EF of 6%, grade 3 LV diastolic dysfunction, significant LV wall motion hypokinesis/akinesis, RVSP 45 to 55 mmHg secondary to moderate tricuspid valve regurgitation, moderate to severe MR noted.    Last remote device check 10/17/2020 revealed 1 episode of NSVT lasting 27 beats, normal function.  BMP 11/2/2020 revealed creatinine of 0.91 with GFR of 81, electrolytes stable and normal.  TSH normal 10/28/2020.  Last BNP 10/27/2020 was 16,206.    She is currently euvolemic, although she complains of very significant fatigue and dyspnea on exertion.  Her daughter is instrumental in helping her to care for herself.  She is currently scheduled to be evaluated at the [facility] on December 1.      The following portions of the patient's history were reviewed and updated as appropriate: allergies, current medications, past family history, past medical history, past social history, past surgical history and problem list.    Current Outpatient Medications   Medication Sig Dispense Refill   •  acetaminophen (TYLENOL) 325 MG tablet Take 650 mg by mouth Every 6 (Six) Hours As Needed for Mild Pain .     • albuterol sulfate  (90 Base) MCG/ACT inhaler Inhale 2 puffs Every 4 (Four) Hours As Needed for Wheezing.     • aspirin 81 MG EC tablet Take 81 mg by mouth Daily.     • carvedilol (COREG) 3.125 MG tablet Take 1 tablet by mouth 2 (Two) Times a Day With Meals for 30 days. 60 tablet 0   • darunavir ethanolate (PREZISTA) 800 MG tablet tablet Take 800 mg by mouth Every Night.     • Emtricitabine-Tenofovir AF (DESCOVY) 200-25 MG per tablet Take  by mouth Every Night.     • fluticasone (Flonase) 50 MCG/ACT nasal spray 2 sprays into the nostril(s) as directed by provider Daily. 1 bottle 2   • furosemide (LASIX) 80 MG tablet Take 1 tablet by mouth 2 (Two) Times a Day. 180 tablet 3   • potassium chloride (K-DUR,KLOR-CON) 20 MEQ CR tablet Take 3 tablets by mouth Daily. 90 tablet 1   • promethazine (PHENERGAN) 12.5 MG tablet Take 1 tablet by mouth Every 6 (Six) Hours As Needed for Nausea or Vomiting. 15 tablet 0   • ritonavir (NORVIR) 100 MG tablet Take 100 mg by mouth Every Night.     • sacubitril-valsartan (ENTRESTO) 24-26 MG tablet Take 1 tablet by mouth Every 12 (Twelve) Hours. 60 tablet 0   • sertraline (ZOLOFT) 50 MG tablet Take 50 mg by mouth Daily.     • vitamin D (ERGOCALCIFEROL) 18770 units capsule capsule Take 50,000 Units by mouth Every 30 (Thirty) Days.     • darunavir ethanolate (PREZISTA) 800 MG tablet tablet Take 1 tablet by mouth Every Night for 30 days. 30 tablet 0   • Empagliflozin (Jardiance) 10 MG tablet Take 10 mg by mouth Daily. 30 tablet 11     No current facility-administered medications for this encounter.        Past Medical History:   Diagnosis Date   • AICD (automatic cardioverter/defibrillator) present 3/6/2020   • Asthma    • CHF (congestive heart failure) (CMS/[facility])    • Class 2 obesity in adult    • HIV (human immunodeficiency virus infection) (CMS/[facility])    • Hypertension    • LBBB  (left bundle branch block)    • NICM (nonischemic cardiomyopathy) (CMS/HCC)     7/2020 EF 6% per echocardiogram; AICD present       Past Surgical History:   Procedure Laterality Date   • CARDIAC CATHETERIZATION     • CARDIAC DEFIBRILLATOR PLACEMENT     • FRACTURE SURGERY Left     left ankle       Physical Exam  Vitals signs and nursing note reviewed.   Constitutional:       General: She is not in acute distress.     Appearance: She is well-developed. She is ill-appearing.   HENT:      Head: Normocephalic and atraumatic.   Eyes:      Conjunctiva/sclera: Conjunctivae normal.      Pupils: Pupils are equal, round, and reactive to light.   Neck:      Vascular: No JVD.   Cardiovascular:      Rate and Rhythm: Normal rate and regular rhythm.      Heart sounds: Normal heart sounds. No murmur. No friction rub. No gallop.    Pulmonary:      Effort: Pulmonary effort is normal. No respiratory distress.      Breath sounds: Normal breath sounds.   Abdominal:      General: Bowel sounds are normal. There is no distension.      Palpations: Abdomen is soft.   Musculoskeletal:         General: No swelling or deformity.   Skin:     General: Skin is warm and dry.      Capillary Refill: Capillary refill takes less than 2 seconds.   Neurological:      Mental Status: She is alert and oriented to person, place, and time. Mental status is at baseline.   Psychiatric:         Attention and Perception: Attention normal.         Mood and Affect: Affect is tearful.         Behavior: Behavior is cooperative.          Review of Systems   Constitutional: Positive for fatigue. Negative for appetite change.   HENT: Negative for congestion and nosebleeds.    Eyes: Negative for visual disturbance.   Respiratory: Positive for shortness of breath. Negative for cough and chest tightness.    Cardiovascular: Negative for chest pain, palpitations and leg swelling.   Gastrointestinal: Negative for abdominal distention and blood in stool.   Genitourinary:  "Negative for enuresis and frequency.   Musculoskeletal: Negative for joint swelling and myalgias.   Neurological: Negative for dizziness, syncope, weakness, light-headedness, numbness and headaches.   Hematological: Does not bruise/bleed easily.   Psychiatric/Behavioral: Negative for decreased concentration and sleep disturbance.        OBJECTIVE:  /76 (BP Location: Left arm, Patient Position: Sitting, Cuff Size: Adult)   Pulse 102   Resp 20   Ht 167.6 cm (65.98\")   Wt 86.6 kg (191 lb)   LMP 10/27/2020   SpO2 96%   BMI 30.84 kg/m²      Body mass index is 30.84 kg/m².  Wt Readings from Last 1 Encounters:   11/13/20 86.6 kg (191 lb)       Lab Review:  Renal Function: Estimated Creatinine Clearance: 87.4 mL/min (by C-G formula based on SCr of 0.91 mg/dL).    Lab Results   Component Value Date    PROBNP 6,803.0 (H) 11/13/2020       Results for orders placed during the hospital encounter of 07/07/20   Adult Transthoracic Echo Complete W/ Cont if Necessary Per Protocol    Narrative · Calculated EF = 6.0%  · Left ventricular systolic function is severely decreased.  · The left ventricular cavity is severely dilated.  · Left ventricular wall thickness is consistent with a thin walled   ventricle.  · Left ventricular diastolic dysfunction (grade III) consistent with fixed   restricive pattern.  · Moderate-to-severe mitral valve regurgitation is present  · Moderate tricuspid valve regurgitation is present.  · Estimated right ventricular systolic pressure from tricuspid   regurgitation is moderately elevated (45-55 mmHg).  · Calculated right ventricular systolic pressure from tricuspid   regurgitation is 48 mmHg.  · The following left ventricular wall segments are hypokinetic: mid   anterior, apical anterior, mid anterolateral, apical lateral, mid   inferolateral, apical inferior, mid inferior, apical septal, mid   inferoseptal, apex hypokinetic and mid anteroseptal. The following left   ventricular wall segments " are akinetic: basal anterior, basal   anterolateral, basal inferolateral, basal inferior, basal inferoseptal and   basal anteroseptal.            6 MINUTE WALK  Patient declined       Cardiac Procedures:  1. Cardiac catheterization [facility] 5/30/17       2.  R/C [facility] 4/2019   Data deficit      ASSESSMENT:     Diagnosis Plan   1. Chronic systolic congestive heart failure (CMS/HCC)  Basic Metabolic Panel    BNP   2. NICM (nonischemic cardiomyopathy) (CMS/[facility])     3. Asymptomatic HIV infection (CMS/[facility])           PLAN OF CARE:  1.  HFrEF-most recent EF per echocardiogram 6%.  NYHA class IV.  Currently stable.  Evaluation with [facility] transplant team scheduled December 1.  Currently she is on Entresto, Lasix, carvedilol, and potassium supplementation.  Historically she has been unable to tolerate metoprolol succinate as it made her very dizzy.  Not on spironolactone given relative hypotension.    Diet was reviewed with the patient, she does a good job of cooking for herself and using salt free substitutes for seasoning.  Her primary source of protein is baked fish and chicken using Mrs. Giang and [employer] seasoning.  I plan to further review diet with her as she is seeking ideas of things to eat that also actually taste good.    She is compliant with weighing herself daily, although she does not write this down and keep a log.  Have asked that she begin keeping a log as it can be difficult to track more insidious weight gains by memory alone.    I will start Jardiance 10 mg daily.  I will see her back in 2 weeks or sooner if needed.  Written literature provided regarding low sodium diet, as well as instructions as below.      Directions for when to call the clinic reviewed with the patient to include weight gain of 2 to 3 pounds in 24 hours, weight gain of 5 to 10 pounds within 7 days; worsening shortness of breath; worsening lower extremity edema or abdominal distention.    2.  Nonobstructive CAD-diffuse  30% LAD lesion noted on prior cardiac catheterization as above.  Denies angina.  Stable.    3.  Nonischemic cardiomyopathy (dilated)-presence of AICD noted.  Most recent EF per echocardiogram is 6%.  Most recent AICD interrogation as above.  Currently on Entresto.  Unable to reach target dose given low normal blood pressure.    4.  NSVT-noted on most recent device interrogation.  Currently on beta-blockade.  No shocks received.     5.  HIV-history noted.        Start Jardiance 10 mg daily; follow up in HF clinic in 2 weeks or sooner if needed; Keep log of weight        Written directions provided to patient  • Start Jardiance 10 mg daily.   • Weigh yourself each morning as soon as you wake up in the same amount of clothing.  Write these weights down.   • If you notice you have gained 2 lbs within 24 hours, or 5 or more lbs within one week or less, call the heart failure clinic immediately.   • If you notice you are more short of breath, or have swelling in your abdomen or legs please call the heart failure clinic right away.   • Please follow up here in 2 weeks.    • If you eat any canned foods, rinse in a strainer with water really well prior to cooking/eating.  This will remove the majority of the sodium it is preserved in.     Thank you for allowing me to participate in the care of your patient,    Time spent in direct care with patient >45 minutes  Time spent in chart review prior >30 minutes     SAMUEL Cisneros  [facility] HEART FAIL[facility]  11/13/20  13:56 EST      **Lyric Disclaimer:**  Much of this encounter note is an electronic transcription/translation of spoken language to printed text. The electronic translation of spoken language may permit erroneous, or at times, nonsensical words or phrases to be inadvertently transcribed. Although I have reviewed the note for such errors, some may still exist.

## 2020-11-16 ENCOUNTER — READMISSION MANAGEMENT (OUTPATIENT)
Dept: CALL CENTER | Facility: HOSPITAL | Age: 45
End: 2020-11-16

## 2020-11-16 NOTE — OUTREACH NOTE
CHF Week 2 Survey      Responses   [facility] patient discharged from?  [place]   Does the patient have one of the following disease processes/diagnoses(primary or secondary)?  CHF   Week 2 attempt successful?  Yes   Call start time  1502   Call end time  1503   Discharge diagnosis  A/C CHF, elevated liver enzymes, HIV   Meds reviewed with patient/caregiver?  Yes   Is the patient having any side effects they believe may be caused by any medication additions or changes?  No   Does the patient have all medications ordered at discharge?  Yes   Is the patient taking all medications as directed (includes completed medication regime)?  Yes   Does the patient have a primary care provider?   Yes   Does the patient have an appointment with their PCP within 7 days of discharge?  Yes   Has the patient kept scheduled appointments due by today?  Yes   Pulse Ox monitoring  None   Psychosocial issues?  No   Did the patient receive a copy of their discharge instructions?  Yes   Nursing interventions  Reviewed instructions with patient   What is the patient's perception of their health status since discharge?  Improving   Nursing interventions  Nurse provided patient education   Is the patient weighing daily?  Yes   Does the patient have scales?  Yes   Daily weight interventions  Education provided on importance of daily weight   Is the patient able to teach back Heart Failure diet management?  Yes   Is the patient able to teach back Heart Failure Zones?  Yes   Is the patient able to teach back signs and symptoms of worsening condition? (i.e. weight gain, shortness of air, etc.)  Yes   If the patient is a current smoker, are they able to teach back resources for cessation?  Not a smoker   Is the patient/caregiver able to teach back the hierarchy of who to call/visit for symptoms/problems? PCP, Specialist, Home health nurse, Urgent Care, ED, 911  Yes   CHF Week 2 call completed?  Yes          Inez Acuña RN

## 2020-11-23 ENCOUNTER — TELEPHONE (OUTPATIENT)
Dept: CARDIOLOGY | Facility: CLINIC | Age: 45
End: 2020-11-23

## 2020-11-23 NOTE — TELEPHONE ENCOUNTER
The patient called stating that since starting SGLT2 inhibitor she has developed a yeast infection.  She does not currently have a diagnosis of diabetes mellitus type 2, nor does she seem to have elevated blood sugars based upon lab work.  I advised that she call her PCP to get treated, and we will continue the medication for now.  If this becomes a recurrent issue unfortunately we will have to discontinue it.  Understanding and agreement verbalized.  All questions and concerns addressed at this time.    SAMUEL Cisneros

## 2020-11-24 ENCOUNTER — READMISSION MANAGEMENT (OUTPATIENT)
Dept: CALL CENTER | Facility: HOSPITAL | Age: 45
End: 2020-11-24

## 2020-11-24 NOTE — OUTREACH NOTE
CHF Week 3 Survey      Responses   [facility] patient discharged from?  [place]   Does the patient have one of the following disease processes/diagnoses(primary or secondary)?  CHF   Week 3 attempt successful?  No   Unsuccessful attempts  Attempt 1          Sera Bradford RN

## 2020-11-27 ENCOUNTER — APPOINTMENT (OUTPATIENT)
Dept: CARDIOLOGY | Facility: HOSPITAL | Age: 45
End: 2020-11-27

## 2020-11-30 ENCOUNTER — TELEPHONE (OUTPATIENT)
Dept: INTERNAL MEDICINE | Age: 45
End: 2020-11-30

## 2020-11-30 ENCOUNTER — HOSPITAL ENCOUNTER (OUTPATIENT)
Dept: CARDIOLOGY | Facility: HOSPITAL | Age: 45
Discharge: HOME | End: 2020-11-30
Admitting: NURSE PRACTITIONER

## 2020-11-30 VITALS
HEIGHT: 66 IN | DIASTOLIC BLOOD PRESSURE: 68 MMHG | SYSTOLIC BLOOD PRESSURE: 100 MMHG | HEART RATE: 104 BPM | OXYGEN SATURATION: 97 % | WEIGHT: 191 LBS | RESPIRATION RATE: 20 BRPM | BODY MASS INDEX: 30.7 KG/M2

## 2020-11-30 DIAGNOSIS — I10 ESSENTIAL HYPERTENSION: ICD-10-CM

## 2020-11-30 DIAGNOSIS — I42.8 NICM (NONISCHEMIC CARDIOMYOPATHY) (HCC): ICD-10-CM

## 2020-11-30 DIAGNOSIS — I50.22 CHRONIC SYSTOLIC CONGESTIVE HEART FAILURE (HCC): Primary | ICD-10-CM

## 2020-11-30 DIAGNOSIS — Z00.00 ROUTINE HEALTH MAINTENANCE: Primary | ICD-10-CM

## 2020-11-30 PROCEDURE — G0463 HOSPITAL OUTPT CLINIC VISIT: HCPCS

## 2020-11-30 PROCEDURE — 99214 OFFICE O/P EST MOD 30 MIN: CPT | Performed by: NURSE PRACTITIONER

## 2020-11-30 NOTE — PROGRESS NOTES
[facility] HEART FAILURE      Patient Name: Nina Saez  :1975  Age: 45 y.o.  Sex: female  Referring Provider: Lisset Melton MD   Primary Cardiologist: Lisset Melton MD  Encounter Provider:  SAMUEL Cisneros      Chief Complaint:   Chief Complaint   Patient presents with   • Congestive Heart Failure         Congestive Heart Failure  Associated symptoms include fatigue. Pertinent negatives include no chest pain, palpitations or shortness of breath.    this 44-year-old female, known to this provider, comes to us today for further evaluation of her chronic systolic congestive heart failure.  Current diagnoses to include severe nonischemic cardiomyopathy, left bundle branch block, hypertension, HIV, obesity, and asthma.    Historically she had followed with Dr. Laina Boyd at [facility].  In spring 2019 she was visiting family in [place] and was taken emergently to [facility].  Echocardiogram and cardiac catheterization were performed at that point, carvedilol was changed to metoprolol.    In 2019 she presented through the emergency department at [facility] with chest pain and shortness of breath.  She was treated with IV diuresis, troponin was negative x2 and proBNP was elevated at 5151.  Entresto was started at that point given her severe dilated cardiomyopathy.  She was to follow with cardiology at [facility] at that time.    In 2020 she again presented to the emergency department with shortness of breath and cough x3 weeks.  BNP was elevated at 13,351.  Carvedilol was discontinued that admission given hypotension.  At subsequent outpatient appointment it was felt that Bumex was less effective than Lasix by the patient.  She complained of progressively worsening fatigue.  AICD, single-chamber [employer], interrogation 2020 revealed 10-16 beats of VT.    On September 10, 2020 she saw Dr. Roldan  MD Jorge Alberto, for consideration of upgrade to biventricular device.  At that point he felt that her left bundle branch block was incomplete and she did not meet criteria for implantation of CRT-D.    She presented 10/27/2020 to [facility] with complaints of acute on chronic shortness of breath that began approximately 1 week prior.  BNP was further elevated at 16,206 that point.  Pulmonary edema was noted on chest x-ray.  Spironolactone was started that admission.  Referral for evaluation by  transplant services was made November 2, 2020.    Left and right cardiac catheterization at [facility] revealed no obstructive CAD with normal filling pressures.  Echocardiogram at that time revealed an EF less than 15% with global hypokinesis-information extracted from external medical record note.  [facility] codominant system with normal left main, LAD with distal 30% stenosis, RCA normal, LVEDP 7 mmHg.    Echocardiogram July 9, 2020 revealed EF of 6%, grade 3 LV diastolic dysfunction, significant LV wall motion hypokinesis/akinesis, RVSP 45 to 55 mmHg secondary to moderate tricuspid valve regurgitation, moderate to severe MR noted.    Last remote device check 10/17/2020 revealed 1 episode of NSVT lasting 27 beats, normal function.  BMP 11/2/2020 revealed creatinine of 0.91 with GFR of 81, electrolytes stable and normal.  TSH normal 10/28/2020.  Last BNP 10/27/2020 was 16,206.    Jardiance 10 mg daily was started on 11/13/2020.  She is tolerating this well.  She has experienced some weight loss since then, and is currently feeling significantly better.  She is now able to be more active with her grandchildren and is able to take better care of herself at home as well.  She is currently scheduled Wednesday, December 2 at  for further evaluation for heart transplant.  She did experience a yeast infection after starting  Jardiance, however after treatment this has cleared up and she has had no recurrence to date.      The following portions of the patient's history were reviewed and updated as appropriate: allergies, current medications, past family history, past medical history, past social history, past surgical history and problem list.    Current Outpatient Medications   Medication Sig Dispense Refill   • acetaminophen (TYLENOL) 325 MG tablet Take 650 mg by mouth Every 6 (Six) Hours As Needed for Mild Pain .     • albuterol sulfate  (90 Base) MCG/ACT inhaler Inhale 2 puffs Every 4 (Four) Hours As Needed for Wheezing.     • aspirin 81 MG EC tablet Take 81 mg by mouth Daily.     • carvedilol (COREG) 3.125 MG tablet Take 1 tablet by mouth 2 (Two) Times a Day With Meals for 30 days. 60 tablet 0   • darunavir ethanolate (PREZISTA) 800 MG tablet tablet Take 800 mg by mouth Every Night.     • darunavir ethanolate (PREZISTA) 800 MG tablet tablet Take 1 tablet by mouth Every Night for 30 days. 30 tablet 0   • Empagliflozin (Jardiance) 10 MG tablet Take 10 mg by mouth Daily. 30 tablet 11   • Emtricitabine-Tenofovir AF (DESCOVY) 200-25 MG per tablet Take  by mouth Every Night.     • fluticasone (Flonase) 50 MCG/ACT nasal spray 2 sprays into the nostril(s) as directed by provider Daily. 1 bottle 2   • furosemide (LASIX) 80 MG tablet Take 1 tablet by mouth 2 (Two) Times a Day. 180 tablet 3   • potassium chloride (K-DUR,KLOR-CON) 20 MEQ CR tablet Take 3 tablets by mouth Daily. 90 tablet 1   • promethazine (PHENERGAN) 12.5 MG tablet Take 1 tablet by mouth Every 6 (Six) Hours As Needed for Nausea or Vomiting. 15 tablet 0   • ritonavir (NORVIR) 100 MG tablet Take 100 mg by mouth Every Night.     • sacubitril-valsartan (ENTRESTO) 24-26 MG tablet Take 1 tablet by mouth Every 12 (Twelve) Hours. 60 tablet 0   • sertraline (ZOLOFT) 50 MG tablet Take 50 mg by mouth Daily.     • vitamin D (ERGOCALCIFEROL) 48689 units capsule capsule Take 50,000  Units by mouth Every 30 (Thirty) Days.       No current facility-administered medications for this encounter.        Past Medical History:   Diagnosis Date   • AICD (automatic cardioverter/defibrillator) present 3/6/2020   • Asthma    • CHF (congestive heart failure) (CMS/[facility])    • Class 2 obesity in adult    • HIV (human immunodeficiency virus infection) (CMS/[facility])    • Hypertension    • LBBB (left bundle branch block)    • NICM (nonischemic cardiomyopathy) (CMS/[facility])     7/2020 EF 6% per echocardiogram; AICD present       Past Surgical History:   Procedure Laterality Date   • CARDIAC CATHETERIZATION     • CARDIAC DEFIBRILLATOR PLACEMENT     • FRACTURE SURGERY Left     left ankle       Physical Exam  Vitals signs and nursing note reviewed.   Constitutional:       General: She is not in acute distress.     Appearance: She is well-developed. She is not ill-appearing.   HENT:      Head: Normocephalic and atraumatic.   Eyes:      Conjunctiva/sclera: Conjunctivae normal.      Pupils: Pupils are equal, round, and reactive to light.   Neck:      Vascular: No JVD.   Cardiovascular:      Rate and Rhythm: Normal rate and regular rhythm.      Heart sounds: Normal heart sounds. No murmur. No friction rub. No gallop.    Pulmonary:      Effort: Pulmonary effort is normal. No respiratory distress.      Breath sounds: Normal breath sounds.   Abdominal:      General: Bowel sounds are normal. There is no distension.      Palpations: Abdomen is soft.   Musculoskeletal:         General: No swelling or deformity.   Skin:     General: Skin is warm and dry.      Capillary Refill: Capillary refill takes less than 2 seconds.   Neurological:      Mental Status: She is alert and oriented to person, place, and time. Mental status is at baseline.   Psychiatric:         Attention and Perception: Attention normal.         Mood and Affect: Mood normal. Affect is tearful.         Behavior: Behavior normal. Behavior is cooperative.          Review of  "Systems   Constitutional: Positive for fatigue. Negative for appetite change.   HENT: Negative for congestion and nosebleeds.    Eyes: Negative for visual disturbance.   Respiratory: Negative for cough, chest tightness and shortness of breath.    Cardiovascular: Negative for chest pain, palpitations and leg swelling.   Gastrointestinal: Negative for abdominal distention and blood in stool.   Genitourinary: Negative for enuresis and frequency.   Musculoskeletal: Negative for joint swelling and myalgias.   Neurological: Negative for dizziness, syncope, weakness, light-headedness, numbness and headaches.   Hematological: Does not bruise/bleed easily.   Psychiatric/Behavioral: Negative for decreased concentration and sleep disturbance.        OBJECTIVE:  /68 (BP Location: Left arm, Patient Position: Sitting, Cuff Size: Adult)   Pulse 104   Resp 20   Ht 167.6 cm (65.98\")   Wt 86.6 kg (191 lb)   SpO2 97%   BMI 30.84 kg/m²      Body mass index is 30.84 kg/m².  Wt Readings from Last 1 Encounters:   11/30/20 86.6 kg (191 lb)       Lab Review:  Renal Function: Estimated Creatinine Clearance: 68.5 mL/min (A) (by C-G formula based on SCr of 1.15 mg/dL (H)).    Lab Results   Component Value Date    PROBNP 6,803.0 (H) 11/13/2020       Results for orders placed during the hospital encounter of 07/07/20   Adult Transthoracic Echo Complete W/ Cont if Necessary Per Protocol    Narrative · Calculated EF = 6.0%  · Left ventricular systolic function is severely decreased.  · The left ventricular cavity is severely dilated.  · Left ventricular wall thickness is consistent with a thin walled   ventricle.  · Left ventricular diastolic dysfunction (grade III) consistent with fixed   restricive pattern.  · Moderate-to-severe mitral valve regurgitation is present  · Moderate tricuspid valve regurgitation is present.  · Estimated right ventricular systolic pressure from tricuspid   regurgitation is moderately elevated (45-55 " mmHg).  · Calculated right ventricular systolic pressure from tricuspid   regurgitation is 48 mmHg.  · The following left ventricular wall segments are hypokinetic: mid   anterior, apical anterior, mid anterolateral, apical lateral, mid   inferolateral, apical inferior, mid inferior, apical septal, mid   inferoseptal, apex hypokinetic and mid anteroseptal. The following left   ventricular wall segments are akinetic: basal anterior, basal   anterolateral, basal inferolateral, basal inferior, basal inferoseptal and   basal anteroseptal.            6 MINUTE WALK  Patient declined       Cardiac Procedures:  1. Cardiac catheterization [facility] 5/30/17       2.  R/LHC [facility] 4/2019   Data deficit      ASSESSMENT:     Diagnosis Plan   1. Chronic systolic congestive heart failure (CMS/HCC)     2. NICM (nonischemic cardiomyopathy) (CMS/HCC)     3. Essential hypertension           PLAN OF CARE:  1.  HFrEF-most recent EF per echocardiogram 6%.  NYHA class IV.  Currently stable.  Evaluation with [facility] transplant team scheduled December 1.  Currently she is on Entresto, Lasix, carvedilol, and potassium supplementation.  Historically she has been unable to tolerate metoprolol succinate as it made her very dizzy.  Not on spironolactone given relative hypotension.    Jardiance 10 mg daily was started 11/13/2020.  She is tolerating this very well, although she did have one yeast infection which has resolved with treatment and not recurred.  She is now feeling tremendously better since starting this medication.  She is to follow-up with [facility] on December 2 regarding placement on transplant list.    She remains compliant with weighing herself daily and has brought this log with her, as well as a blood pressure log to her office appointment today.  Blood pressure is low normal, but tolerable.  Weight is trending down by approximately 4 to 6 pounds.  I will have her continue her current medication regimen, and plan to  follow-up in 6 weeks once she has been evaluated by .    Directions for when to call the clinic reviewed with the patient to include weight gain of 2 to 3 pounds in 24 hours, weight gain of 5 to 10 pounds within 7 days; worsening shortness of breath; worsening lower extremity edema or abdominal distention.    2.  Nonobstructive CAD-diffuse 30% LAD lesion noted on prior cardiac catheterization as above.  Remains without angina, stable.    3.  Nonischemic cardiomyopathy (dilated)-presence of AICD noted.  Most recent EF per echocardiogram is 6%.  Most recent AICD interrogation as above.  Currently on Entresto.  Unable to reach target dose given low normal blood pressure.    4.  NSVT-noted on most recent device interrogation.  Currently on beta-blockade.  No shocks received.     5.  HIV-history noted.      Continue current plan of care; follow-up in 6 weeks or sooner if needed        Thank you for allowing me to participate in the care of your patient,         Kiley Boyd APRDEBRA  [facility] HEART FAILURE  11/30/20  13:56 EST      **Lyric Disclaimer:**  Much of this encounter note is an electronic transcription/translation of spoken language to printed text. The electronic translation of spoken language may permit erroneous, or at times, nonsensical words or phrases to be inadvertently transcribed. Although I have reviewed the note for such errors, some may still exist.

## 2020-12-07 ENCOUNTER — TELEPHONE (OUTPATIENT)
Dept: INTERNAL MEDICINE | Age: 45
End: 2020-12-07

## 2020-12-07 ENCOUNTER — TELEMEDICINE (OUTPATIENT)
Dept: INTERNAL MEDICINE | Age: 45
End: 2020-12-07

## 2020-12-07 VITALS — BODY MASS INDEX: 30.34 KG/M2 | WEIGHT: 187.9 LBS | SYSTOLIC BLOOD PRESSURE: 107 MMHG | DIASTOLIC BLOOD PRESSURE: 83 MMHG

## 2020-12-07 DIAGNOSIS — I50.22 CHRONIC SYSTOLIC CONGESTIVE HEART FAILURE (HCC): ICD-10-CM

## 2020-12-07 DIAGNOSIS — R11.0 CHRONIC NAUSEA: Primary | ICD-10-CM

## 2020-12-07 PROCEDURE — 99214 OFFICE O/P EST MOD 30 MIN: CPT | Performed by: NURSE PRACTITIONER

## 2020-12-07 RX ORDER — ONDANSETRON 4 MG/1
4 TABLET, ORALLY DISINTEGRATING ORAL EVERY 8 HOURS PRN
Qty: 30 TABLET | Refills: 0 | Status: SHIPPED | OUTPATIENT
Start: 2020-12-07 | End: 2022-03-24

## 2020-12-07 RX ORDER — IVABRADINE 5 MG/1
5 TABLET, FILM COATED ORAL 2 TIMES DAILY WITH MEALS
Status: ON HOLD | COMMUNITY
Start: 2020-12-03 | End: 2020-12-11

## 2020-12-07 RX ORDER — SPIRONOLACTONE 25 MG/1
25 TABLET ORAL DAILY
COMMUNITY
Start: 2020-12-03

## 2020-12-07 NOTE — PROGRESS NOTES
[facility]  Darcie Saez / 45 y.o. / female  12/07/2020      ASSESSMENT & PLAN:    Problem List Items Addressed This Visit        Cardiovascular and Mediastinum    Chronic systolic congestive heart failure (CMS/HCC)    Relevant Medications    sacubitril-valsartan (ENTRESTO) 24-26 MG tablet    Corlanor 5 MG tablet tablet      Other Visit Diagnoses     Chronic nausea    -  Primary    Relevant Medications    ondansetron ODT (Zofran ODT) 4 MG disintegrating tablet    Other Relevant Orders    Ambulatory Referral to Gastroenterology        Orders Placed This Encounter   Procedures   • Ambulatory Referral to Gastroenterology     New Medications Ordered This Visit   Medications   • ondansetron ODT (Zofran ODT) 4 MG disintegrating tablet     Sig: Place 1 tablet on the tongue Every 8 (Eight) Hours As Needed for Nausea or Vomiting.     Dispense:  30 tablet     Refill:  0       Summary/Discussion:    This patient has consented to a telehealth visit via phone. The visit was scheduled as a telehealth phone visit to comply with patient safety concerns in accordance with CDC recommendations.  All vitals recorded within this visit are reported by the patient.  I spent 16 minutes in total including but not limited to the 11 minutes spent in direct conversation with this patient.     1. Chronic nausea  Ongoing nausea x 6 weeks. Patient reports she was told that liver enzymes were normal within the past couple of weeks.  DC Phenergan, try Zofran.  Advised patient referral to gastroenterology would be next step for evaluation, she is agreeable to this.   Consider possibly related to polypharmacy as well.     - ondansetron ODT (Zofran ODT) 4 MG disintegrating tablet; Place 1 tablet on the tongue Every 8 (Eight) Hours As Needed for Nausea or Vomiting.  Dispense: 30 tablet; Refill: 0  - Ambulatory Referral to Gastroenterology    2. Chronic systolic congestive heart failure (CMS/HCC)  Stable weight  and vital signs.   Continue management per HF Clinic/ [facility] Transplant clinic.       No follow-ups on file.    ____________________________________________________________________    MEDICATIONS  Current Outpatient Medications   Medication Sig Dispense Refill   • acetaminophen (TYLENOL) 325 MG tablet Take 650 mg by mouth Every 6 (Six) Hours As Needed for Mild Pain .     • albuterol sulfate  (90 Base) MCG/ACT inhaler Inhale 2 puffs Every 4 (Four) Hours As Needed for Wheezing.     • aspirin 81 MG EC tablet Take 81 mg by mouth Daily.     • Corlanor 5 MG tablet tablet Take 5 mg by mouth 2 (Two) Times a Day With Meals.     • darunavir ethanolate (PREZISTA) 800 MG tablet tablet Take 800 mg by mouth Every Night.     • Empagliflozin (Jardiance) 10 MG tablet Take 10 mg by mouth Daily. 30 tablet 11   • Emtricitabine-Tenofovir AF (DESCOVY) 200-25 MG per tablet Take  by mouth Every Night.     • fluticasone (Flonase) 50 MCG/ACT nasal spray 2 sprays into the nostril(s) as directed by provider Daily. 1 bottle 2   • furosemide (LASIX) 80 MG tablet Take 1 tablet by mouth 2 (Two) Times a Day. 180 tablet 3   • potassium chloride (K-DUR,KLOR-CON) 20 MEQ CR tablet Take 3 tablets by mouth Daily. 90 tablet 1   • ritonavir (NORVIR) 100 MG tablet Take 100 mg by mouth Every Night.     • sacubitril-valsartan (ENTRESTO) 24-26 MG tablet Take 1 tablet by mouth Every 12 (Twelve) Hours. 60 tablet 0   • sertraline (ZOLOFT) 50 MG tablet Take 50 mg by mouth Daily.     • spironolactone (ALDACTONE) 25 MG tablet TAKE 1 2 (ONE HALF) TABLET BY MOUTH ONCE DAILY     • vitamin D (ERGOCALCIFEROL) 42019 units capsule capsule Take 50,000 Units by mouth Every 30 (Thirty) Days.     • carvedilol (COREG) 3.125 MG tablet Take 1 tablet by mouth 2 (Two) Times a Day With Meals for 30 days. 60 tablet 0   • ondansetron ODT (Zofran ODT) 4 MG disintegrating tablet Place 1 tablet on the tongue Every 8 (Eight) Hours As Needed for Nausea or Vomiting. 30 tablet 0     No  "current facility-administered medications for this visit.           VITALS:    Visit Vitals  /83   Wt 85.2 kg (187 lb 14.4 oz)   BMI 30.34 kg/m²       BP Readings from Last 3 Encounters:   12/07/20 107/83   11/30/20 100/68   11/13/20 110/76     Wt Readings from Last 3 Encounters:   12/07/20 85.2 kg (187 lb 14.4 oz)   11/30/20 86.6 kg (191 lb)   11/13/20 86.6 kg (191 lb)      Body mass index is 30.34 kg/m².    CC:  Main reason(s) for today's visit: Nausea (C/O NAUSEA X1 MONTH ) and Congestive Heart Failure      HPI:   You have chosen to receive care through a telephone visit. Do you consent to use a telephone visit for your medical care today? Yes    Patient reports ongoing nausea for about 6 weeks now. She attributed it to metoprolol which she stopped taking prior to most recent hospitalization.  She was hospitalized end of October for acute heart failure exacerbation.  She also had some acute elevation of liver enzymes while in the hospital/acute hepatitis, was unable to have EGD done due to risks with her severe cardiac issues.    She reports nausea is ongoing, lasts all day.  Not any better or worse with food.  She denies any abdominal pain.  Not having any jaundice, vomiting, diarrhea,, constipation. Taking Phenergan without much relief. No other recent medication changes.     She is in the process of evaluation for heart transplant through .  Following up with [facility]. Reports weights stable, BP good.     She also reports recently had labs done about 2 weeks ago at the [facility] and states \"labs were good, liver enzymes were normal\".     Patient Care Team:  Darcie Ledbetter APRN as PCP - General (Internal Medicine)  Lisset Melton MD as Consulting Physician (Cardiology)  Andrew Watson MD as Consulting Physician (Urology)    ____________________________________________________________________    REVIEW OF SYSTEMS    Review of Systems   Constitutional: Positive for appetite " change and fatigue. Negative for activity change, fever and unexpected weight change.   Gastrointestinal: Positive for nausea. Negative for abdominal pain, blood in stool, constipation, diarrhea and vomiting.   Genitourinary: Negative for dysuria.         PHYSICAL EXAMINATION    Physical Exam  Neurological:      Mental Status: She is alert and oriented to person, place, and time. She is not disoriented.   Psychiatric:         Attention and Perception: She is attentive.         Speech: Speech normal.         Behavior: Behavior is cooperative.         Thought Content: Thought content normal.         Judgment: Judgment normal.     UNABLE TO PERFORM PHYSICAL EXAMINATION AS THIS IS A TELEPHONE ENCOUNTER VISIT.     REVIEWED DATA:    Labs:     Lab Results   Component Value Date     11/13/2020    K 4.8 11/13/2020    AST 53 (H) 11/01/2020     (H) 11/01/2020    BUN 13 11/13/2020    CREATININE 1.15 (H) 11/13/2020    CREATININE 0.91 11/02/2020    CREATININE 0.94 11/01/2020    EGFRIFAFRI 62 11/13/2020       Lab Results   Component Value Date    GLUCOSE 117 (H) 11/13/2020    GLUCOSE 114 (H) 11/02/2020    GLUCOSE 113 (H) 11/01/2020       No results found for: LDL, HDL, TRIG, CHOLHDLRATIO    Lab Results   Component Value Date    TSH 2.330 10/28/2020       Lab Results   Component Value Date    WBC 6.64 11/02/2020    HGB 11.5 (L) 11/02/2020    HGB 11.5 (L) 11/01/2020    HGB 11.2 (L) 10/29/2020     11/02/2020       Lab Results   Component Value Date    GLUCOSEU Negative 10/27/2020    BLOODU Large (3+) (A) 10/27/2020    NITRITEU Negative 10/27/2020    LEUKOCYTESUR Negative 10/27/2020       Imaging:         Medical Tests:         Summary of old records / correspondence / consultant report:         Request outside records:         ALLERGIES  No Known Allergies     PFSH:     The following portions of the patient's history were reviewed and updated as appropriate: Allergies / Current Medications / Past Medical History /  Surgical History / Social History / Family History    PROBLEM LIST   Patient Active Problem List   Diagnosis   • Asthma   • Essential hypertension   • HIV (human immunodeficiency virus infection) (CMS/[facility])   • Class 2 obesity in adult   • Exertional dyspnea   • Chronic systolic congestive heart failure (CMS/HCC)   • NICM (nonischemic cardiomyopathy) (CMS/[facility])   • Elevated LFTs   • Hypopotassemia   • AICD (automatic cardioverter/defibrillator) present   • Acute on chronic systolic congestive heart failure (CMS/HCC)   • Intractable vomiting with nausea   • Diarrhea following gastrointestinal surgery       PAST MEDICAL HISTORY  Past Medical History:   Diagnosis Date   • AICD (automatic cardioverter/defibrillator) present 3/6/2020   • Asthma    • CHF (congestive heart failure) (CMS/[facility])    • Class 2 obesity in adult    • HIV (human immunodeficiency virus infection) (CMS/[facility])    • Hypertension    • LBBB (left bundle branch block)    • NICM (nonischemic cardiomyopathy) (CMS/[facility])     2020 EF 6% per echocardiogram; AICD present       SURGICAL HISTORY  Past Surgical History:   Procedure Laterality Date   • CARDIAC CATHETERIZATION     • CARDIAC DEFIBRILLATOR PLACEMENT     • FRACTURE SURGERY Left     left ankle       SOCIAL HISTORY  Social History     Socioeconomic History   • Marital status:      Spouse name: Not on file   • Number of children: Not on file   • Years of education: Not on file   • Highest education level: Not on file   Tobacco Use   • Smoking status: Former Smoker     Quit date:      Years since quittin.9   • Smokeless tobacco: Never Used   Substance and Sexual Activity   • Alcohol use: Yes     Comment: holiday and special occ//caffeine use: 1 cup daily   • Drug use: Yes     Types: Marijuana   • Sexual activity: Defer       FAMILY HISTORY  Family History   Problem Relation Age of Onset   • Cancer Mother    • Breast cancer Mother    • Bone cancer Mother    • Hypertension Maternal Grandfather     • Hyperlipidemia Maternal Grandfather    • Diabetes Maternal Grandfather    • Prostate cancer Maternal Grandfather    • Ovarian cysts Daughter    • Breast cancer Maternal Grandmother    • Heart disease Maternal Grandmother    • No Known Problems Daughter    • No Known Problems Daughter          **Lyric Disclaimer:   Much of this encounter note is an electronic transcription/translation of spoken language to printed text. The electronic translation of spoken language may permit erroneous, or at times, nonsensical words or phrases to be inadvertently transcribed. Although I have reviewed the note for such errors, some may still exist.

## 2020-12-07 NOTE — TELEPHONE ENCOUNTER
Contact patient.     She has a video visit this afternoon already scheduled with me at 2:45, lets discuss then.

## 2020-12-07 NOTE — TELEPHONE ENCOUNTER
PATIENT HAS BEEN EXPERIENCING CONSTANT NAUSEA FOR ABOUT A MONTH AND SIRI MENEZES CALLED IN promethazine (PHENERGAN) 12.5 MG tablet TO HELP GET IT UNDER CONTROL. PATIENT IS STATING IT IS NOT HELPING, NOT STRONG ENOUGH TO COMPLETELY KICK THE NAUSEA. SHE WANTS TO KNOW IF THERE IS SOMETHING SIRI WANTS TO DO TO HELP PATIENT FIGURE OUT WHY SHE HAS BEEN STAYING SO NAUSEOUS. DOES SHE WANT HER TO COME IN, RUN TEST, OR PRESCRIBE SOMETHING DIFFERENT?       PATIENT CAN BE REACHED AT: [phone]

## 2020-12-08 ENCOUNTER — DOCUMENTATION (OUTPATIENT)
Dept: CARDIOLOGY | Facility: CLINIC | Age: 45
End: 2020-12-08

## 2020-12-08 ENCOUNTER — TELEPHONE (OUTPATIENT)
Dept: INTERNAL MEDICINE | Age: 45
End: 2020-12-08

## 2020-12-08 NOTE — PROGRESS NOTES
The patient called to update us on her recent visit with .  They have added spironolactone 12.5 mg daily, as well as Corlanor.

## 2020-12-08 NOTE — TELEPHONE ENCOUNTER
----- Message from SAMUEL Robins sent at 12/7/2020  3:56 PM EST -----  Please call the [facility] (HIV clinic) to get patient's most recent labwork. Thanks.

## 2020-12-11 ENCOUNTER — APPOINTMENT (OUTPATIENT)
Dept: CT IMAGING | Facility: HOSPITAL | Age: 45
End: 2020-12-11

## 2020-12-11 ENCOUNTER — APPOINTMENT (OUTPATIENT)
Dept: GENERAL RADIOLOGY | Facility: HOSPITAL | Age: 45
End: 2020-12-11

## 2020-12-11 ENCOUNTER — HOSPITAL ENCOUNTER (INPATIENT)
Facility: HOSPITAL | Age: 45
LOS: 5 days | Discharge: HOME HEALTH | End: 2020-12-17
Attending: EMERGENCY MEDICINE | Admitting: INTERNAL MEDICINE

## 2020-12-11 ENCOUNTER — TELEPHONE (OUTPATIENT)
Dept: CARDIOLOGY | Facility: HOSPITAL | Age: 45
End: 2020-12-11

## 2020-12-11 DIAGNOSIS — E87.6 HYPOPOTASSEMIA: ICD-10-CM

## 2020-12-11 DIAGNOSIS — I26.99 ACUTE PULMONARY EMBOLISM, UNSPECIFIED PULMONARY EMBOLISM TYPE, UNSPECIFIED WHETHER ACUTE COR PULMONALE PRESENT (HCC): ICD-10-CM

## 2020-12-11 DIAGNOSIS — R04.2 HEMOPTYSIS: ICD-10-CM

## 2020-12-11 DIAGNOSIS — I26.99 OTHER ACUTE PULMONARY EMBOLISM, UNSPECIFIED WHETHER ACUTE COR PULMONALE PRESENT (HCC): Primary | ICD-10-CM

## 2020-12-11 LAB
ALBUMIN SERPL-MCNC: 3.6 G/DL (ref 3.5–5.2)
ALBUMIN/GLOB SERPL: 1.5 G/DL
ALP SERPL-CCNC: 127 U/L (ref 39–117)
ALT SERPL W P-5'-P-CCNC: 51 U/L (ref 1–33)
ANION GAP SERPL CALCULATED.3IONS-SCNC: 10.6 MMOL/L (ref 5–15)
APTT PPP: 26.5 SECONDS (ref 22.7–35.4)
APTT PPP: >200 SECONDS (ref 22.7–35.4)
AST SERPL-CCNC: 29 U/L (ref 1–32)
BACTERIA UR QL AUTO: ABNORMAL /HPF
BASOPHILS # BLD AUTO: 0.03 10*3/MM3 (ref 0–0.2)
BASOPHILS # BLD AUTO: 0.04 10*3/MM3 (ref 0–0.2)
BASOPHILS NFR BLD AUTO: 0.3 % (ref 0–1.5)
BASOPHILS NFR BLD AUTO: 0.4 % (ref 0–1.5)
BILIRUB SERPL-MCNC: 1.1 MG/DL (ref 0–1.2)
BILIRUB UR QL STRIP: NEGATIVE
BUN SERPL-MCNC: 10 MG/DL (ref 6–20)
BUN/CREAT SERPL: 10.2 (ref 7–25)
CALCIUM SPEC-SCNC: 8.6 MG/DL (ref 8.6–10.5)
CHLORIDE SERPL-SCNC: 97 MMOL/L (ref 98–107)
CLARITY UR: ABNORMAL
CO2 SERPL-SCNC: 23.4 MMOL/L (ref 22–29)
COLOR UR: YELLOW
CREAT SERPL-MCNC: 0.98 MG/DL (ref 0.57–1)
D DIMER PPP FEU-MCNC: 2.47 MCGFEU/ML (ref 0–0.49)
DEPRECATED RDW RBC AUTO: 47.1 FL (ref 37–54)
DEPRECATED RDW RBC AUTO: 47.5 FL (ref 37–54)
EOSINOPHIL # BLD AUTO: 0 10*3/MM3 (ref 0–0.4)
EOSINOPHIL # BLD AUTO: 0 10*3/MM3 (ref 0–0.4)
EOSINOPHIL NFR BLD AUTO: 0 % (ref 0.3–6.2)
EOSINOPHIL NFR BLD AUTO: 0 % (ref 0.3–6.2)
ERYTHROCYTE [DISTWIDTH] IN BLOOD BY AUTOMATED COUNT: 13.2 % (ref 12.3–15.4)
ERYTHROCYTE [DISTWIDTH] IN BLOOD BY AUTOMATED COUNT: 13.2 % (ref 12.3–15.4)
GFR SERPL CREATININE-BSD FRML MDRD: 74 ML/MIN/1.73
GLOBULIN UR ELPH-MCNC: 2.4 GM/DL
GLUCOSE SERPL-MCNC: 123 MG/DL (ref 65–99)
GLUCOSE UR STRIP-MCNC: ABNORMAL MG/DL
HCG SERPL QL: NEGATIVE
HCT VFR BLD AUTO: 36.5 % (ref 34–46.6)
HCT VFR BLD AUTO: 37.8 % (ref 34–46.6)
HGB BLD-MCNC: 12 G/DL (ref 12–15.9)
HGB BLD-MCNC: 12.4 G/DL (ref 12–15.9)
HGB UR QL STRIP.AUTO: ABNORMAL
HYALINE CASTS UR QL AUTO: ABNORMAL /LPF
IMM GRANULOCYTES # BLD AUTO: 0.04 10*3/MM3 (ref 0–0.05)
IMM GRANULOCYTES # BLD AUTO: 0.06 10*3/MM3 (ref 0–0.05)
IMM GRANULOCYTES NFR BLD AUTO: 0.4 % (ref 0–0.5)
IMM GRANULOCYTES NFR BLD AUTO: 0.5 % (ref 0–0.5)
INR PPP: 1.52 (ref 0.9–1.1)
KETONES UR QL STRIP: NEGATIVE
LEUKOCYTE ESTERASE UR QL STRIP.AUTO: NEGATIVE
LYMPHOCYTES # BLD AUTO: 1.69 10*3/MM3 (ref 0.7–3.1)
LYMPHOCYTES # BLD AUTO: 1.86 10*3/MM3 (ref 0.7–3.1)
LYMPHOCYTES NFR BLD AUTO: 16.5 % (ref 19.6–45.3)
LYMPHOCYTES NFR BLD AUTO: 18.1 % (ref 19.6–45.3)
MCH RBC QN AUTO: 31.7 PG (ref 26.6–33)
MCH RBC QN AUTO: 32.3 PG (ref 26.6–33)
MCHC RBC AUTO-ENTMCNC: 32.8 G/DL (ref 31.5–35.7)
MCHC RBC AUTO-ENTMCNC: 32.9 G/DL (ref 31.5–35.7)
MCV RBC AUTO: 96.7 FL (ref 79–97)
MCV RBC AUTO: 98.4 FL (ref 79–97)
MONOCYTES # BLD AUTO: 0.91 10*3/MM3 (ref 0.1–0.9)
MONOCYTES # BLD AUTO: 1.05 10*3/MM3 (ref 0.1–0.9)
MONOCYTES NFR BLD AUTO: 9.3 % (ref 5–12)
MONOCYTES NFR BLD AUTO: 9.7 % (ref 5–12)
NEUTROPHILS NFR BLD AUTO: 6.68 10*3/MM3 (ref 1.7–7)
NEUTROPHILS NFR BLD AUTO: 71.5 % (ref 42.7–76)
NEUTROPHILS NFR BLD AUTO: 73.3 % (ref 42.7–76)
NEUTROPHILS NFR BLD AUTO: 8.26 10*3/MM3 (ref 1.7–7)
NITRITE UR QL STRIP: NEGATIVE
NRBC BLD AUTO-RTO: 0 /100 WBC (ref 0–0.2)
NRBC BLD AUTO-RTO: 0.1 /100 WBC (ref 0–0.2)
PH UR STRIP.AUTO: 6.5 [PH] (ref 5–8)
PLATELET # BLD AUTO: 138 10*3/MM3 (ref 140–450)
PLATELET # BLD AUTO: 150 10*3/MM3 (ref 140–450)
PMV BLD AUTO: 12.5 FL (ref 6–12)
PMV BLD AUTO: 12.8 FL (ref 6–12)
POTASSIUM SERPL-SCNC: 3.4 MMOL/L (ref 3.5–5.2)
PROT SERPL-MCNC: 6 G/DL (ref 6–8.5)
PROT UR QL STRIP: NEGATIVE
PROTHROMBIN TIME: 18.1 SECONDS (ref 11.7–14.2)
RBC # BLD AUTO: 3.71 10*6/MM3 (ref 3.77–5.28)
RBC # BLD AUTO: 3.91 10*6/MM3 (ref 3.77–5.28)
RBC # UR: ABNORMAL /HPF
REF LAB TEST METHOD: ABNORMAL
SODIUM SERPL-SCNC: 131 MMOL/L (ref 136–145)
SP GR UR STRIP: 1.01 (ref 1–1.03)
SQUAMOUS #/AREA URNS HPF: ABNORMAL /HPF
TROPONIN T SERPL-MCNC: <0.01 NG/ML (ref 0–0.03)
UROBILINOGEN UR QL STRIP: ABNORMAL
WBC # BLD AUTO: 11.27 10*3/MM3 (ref 3.4–10.8)
WBC # BLD AUTO: 9.35 10*3/MM3 (ref 3.4–10.8)
WBC UR QL AUTO: ABNORMAL /HPF

## 2020-12-11 PROCEDURE — 71275 CT ANGIOGRAPHY CHEST: CPT

## 2020-12-11 PROCEDURE — 85730 THROMBOPLASTIN TIME PARTIAL: CPT | Performed by: INTERNAL MEDICINE

## 2020-12-11 PROCEDURE — 71045 X-RAY EXAM CHEST 1 VIEW: CPT

## 2020-12-11 PROCEDURE — 36415 COLL VENOUS BLD VENIPUNCTURE: CPT | Performed by: PHYSICIAN ASSISTANT

## 2020-12-11 PROCEDURE — G0378 HOSPITAL OBSERVATION PER HR: HCPCS

## 2020-12-11 PROCEDURE — 85025 COMPLETE CBC W/AUTO DIFF WBC: CPT | Performed by: PHYSICIAN ASSISTANT

## 2020-12-11 PROCEDURE — 84703 CHORIONIC GONADOTROPIN ASSAY: CPT | Performed by: PHYSICIAN ASSISTANT

## 2020-12-11 PROCEDURE — U0004 COV-19 TEST NON-CDC HGH THRU: HCPCS | Performed by: PHYSICIAN ASSISTANT

## 2020-12-11 PROCEDURE — 85730 THROMBOPLASTIN TIME PARTIAL: CPT | Performed by: PHYSICIAN ASSISTANT

## 2020-12-11 PROCEDURE — 93010 ELECTROCARDIOGRAM REPORT: CPT | Performed by: INTERNAL MEDICINE

## 2020-12-11 PROCEDURE — 85379 FIBRIN DEGRADATION QUANT: CPT | Performed by: PHYSICIAN ASSISTANT

## 2020-12-11 PROCEDURE — 80053 COMPREHEN METABOLIC PANEL: CPT | Performed by: PHYSICIAN ASSISTANT

## 2020-12-11 PROCEDURE — 85610 PROTHROMBIN TIME: CPT | Performed by: PHYSICIAN ASSISTANT

## 2020-12-11 PROCEDURE — 0 IOPAMIDOL PER 1 ML: Performed by: EMERGENCY MEDICINE

## 2020-12-11 PROCEDURE — 99284 EMERGENCY DEPT VISIT MOD MDM: CPT

## 2020-12-11 PROCEDURE — 25010000002 HEPARIN (PORCINE) PER 1000 UNITS: Performed by: PHYSICIAN ASSISTANT

## 2020-12-11 PROCEDURE — 81001 URINALYSIS AUTO W/SCOPE: CPT | Performed by: PHYSICIAN ASSISTANT

## 2020-12-11 PROCEDURE — 93005 ELECTROCARDIOGRAM TRACING: CPT | Performed by: PHYSICIAN ASSISTANT

## 2020-12-11 PROCEDURE — 84484 ASSAY OF TROPONIN QUANT: CPT | Performed by: PHYSICIAN ASSISTANT

## 2020-12-11 RX ORDER — POTASSIUM CHLORIDE 7.45 MG/ML
10 INJECTION INTRAVENOUS
Status: DISCONTINUED | OUTPATIENT
Start: 2020-12-11 | End: 2020-12-17 | Stop reason: HOSPADM

## 2020-12-11 RX ORDER — NITROGLYCERIN 0.4 MG/1
0.4 TABLET SUBLINGUAL
Status: DISCONTINUED | OUTPATIENT
Start: 2020-12-11 | End: 2020-12-17 | Stop reason: HOSPADM

## 2020-12-11 RX ORDER — ONDANSETRON 4 MG/1
4 TABLET, FILM COATED ORAL EVERY 6 HOURS PRN
Status: DISCONTINUED | OUTPATIENT
Start: 2020-12-11 | End: 2020-12-17 | Stop reason: HOSPADM

## 2020-12-11 RX ORDER — CARVEDILOL 3.12 MG/1
3.12 TABLET ORAL 2 TIMES DAILY WITH MEALS
Status: DISCONTINUED | OUTPATIENT
Start: 2020-12-11 | End: 2020-12-17 | Stop reason: HOSPADM

## 2020-12-11 RX ORDER — ASPIRIN 81 MG/1
81 TABLET ORAL DAILY
Status: DISCONTINUED | OUTPATIENT
Start: 2020-12-11 | End: 2020-12-12

## 2020-12-11 RX ORDER — HEPARIN SODIUM 10000 [USP'U]/100ML
17.7 INJECTION, SOLUTION INTRAVENOUS
Status: DISCONTINUED | OUTPATIENT
Start: 2020-12-11 | End: 2020-12-14

## 2020-12-11 RX ORDER — POTASSIUM CHLORIDE 750 MG/1
40 TABLET, FILM COATED, EXTENDED RELEASE ORAL AS NEEDED
Status: DISCONTINUED | OUTPATIENT
Start: 2020-12-11 | End: 2020-12-17 | Stop reason: HOSPADM

## 2020-12-11 RX ORDER — ONDANSETRON 2 MG/ML
4 INJECTION INTRAMUSCULAR; INTRAVENOUS EVERY 6 HOURS PRN
Status: DISCONTINUED | OUTPATIENT
Start: 2020-12-11 | End: 2020-12-17 | Stop reason: HOSPADM

## 2020-12-11 RX ORDER — RITONAVIR 100 MG/1
100 TABLET ORAL NIGHTLY
Status: DISCONTINUED | OUTPATIENT
Start: 2020-12-11 | End: 2020-12-17 | Stop reason: HOSPADM

## 2020-12-11 RX ORDER — POTASSIUM CHLORIDE 1.5 G/1.77G
40 POWDER, FOR SOLUTION ORAL AS NEEDED
Status: DISCONTINUED | OUTPATIENT
Start: 2020-12-11 | End: 2020-12-17 | Stop reason: HOSPADM

## 2020-12-11 RX ORDER — SPIRONOLACTONE 25 MG/1
12.5 TABLET ORAL DAILY
Status: DISCONTINUED | OUTPATIENT
Start: 2020-12-11 | End: 2020-12-17 | Stop reason: HOSPADM

## 2020-12-11 RX ORDER — UREA 10 %
3 LOTION (ML) TOPICAL NIGHTLY PRN
Status: DISCONTINUED | OUTPATIENT
Start: 2020-12-11 | End: 2020-12-17 | Stop reason: HOSPADM

## 2020-12-11 RX ORDER — ACETAMINOPHEN 325 MG/1
650 TABLET ORAL EVERY 4 HOURS PRN
Status: DISCONTINUED | OUTPATIENT
Start: 2020-12-11 | End: 2020-12-17 | Stop reason: HOSPADM

## 2020-12-11 RX ORDER — POTASSIUM CHLORIDE 750 MG/1
60 TABLET, EXTENDED RELEASE ORAL DAILY
Status: DISCONTINUED | OUTPATIENT
Start: 2020-12-11 | End: 2020-12-17

## 2020-12-11 RX ORDER — ALBUTEROL SULFATE 2.5 MG/3ML
2.5 SOLUTION RESPIRATORY (INHALATION) EVERY 6 HOURS PRN
Status: DISCONTINUED | OUTPATIENT
Start: 2020-12-11 | End: 2020-12-17 | Stop reason: HOSPADM

## 2020-12-11 RX ORDER — HEPARIN SODIUM 5000 [USP'U]/ML
40-80 INJECTION, SOLUTION INTRAVENOUS; SUBCUTANEOUS EVERY 6 HOURS PRN
Status: DISCONTINUED | OUTPATIENT
Start: 2020-12-11 | End: 2020-12-14

## 2020-12-11 RX ORDER — FUROSEMIDE 80 MG
80 TABLET ORAL
Status: DISCONTINUED | OUTPATIENT
Start: 2020-12-11 | End: 2020-12-17 | Stop reason: HOSPADM

## 2020-12-11 RX ORDER — HEPARIN SODIUM 5000 [USP'U]/ML
80 INJECTION, SOLUTION INTRAVENOUS; SUBCUTANEOUS ONCE
Status: COMPLETED | OUTPATIENT
Start: 2020-12-11 | End: 2020-12-11

## 2020-12-11 RX ORDER — FLUTICASONE PROPIONATE 50 MCG
2 SPRAY, SUSPENSION (ML) NASAL DAILY
Status: DISCONTINUED | OUTPATIENT
Start: 2020-12-11 | End: 2020-12-12

## 2020-12-11 RX ADMIN — HEPARIN SODIUM 6800 UNITS: 5000 INJECTION INTRAVENOUS; SUBCUTANEOUS at 15:27

## 2020-12-11 RX ADMIN — HEPARIN SODIUM 17.7 UNITS/KG/HR: 10000 INJECTION, SOLUTION INTRAVENOUS at 15:28

## 2020-12-11 RX ADMIN — IOPAMIDOL 95 ML: 755 INJECTION, SOLUTION INTRAVENOUS at 14:45

## 2020-12-11 NOTE — ED NOTES
Patient was placed in face mask in first look.  Patient was wearing a face mask throughout our encounter.  I wore protective eye protection throughout the encounter.  Hand hygiene was performed before and after patient encounter.       Patient c/o cough x 2 months, patient was seen for cough 2 weeks ago at the [facility].     Livan White RN  12/11/20 [id number]

## 2020-12-11 NOTE — ED PROVIDER NOTES
EMERGENCY DEPARTMENT ENCOUNTER    Room Number:  42/42  Date seen:  12/11/2020  Time seen: 12:07 EST  PCP: Darcie Ledbetter APRN  Historian: Patient    HPI:  Chief complaint: Cough  A complete HPI/ROS/PMH/PSH/SH/FH are unobtainable due to: None  Context:Nina Saez is a 45 y.o. female, hx of congestive heart failure, who presents to the ED with c/o chronic productive cough for several months.  She says that she woke up this morning with coughing with blood-tinged sputum.  Patient denies any shortness of breath.  He says that she have chest pain when she coughs.  Denies any recent travel or prolonged immobilization, denies calf pain.  She does report that she take aspirin but denies any other anticoagulants.  Denies any history of pulmonary embolism or DVTs.    Patient's cardiologist is Dr. Melton.    Patient was placed in face mask in first look. Patient was wearing facemask when I entered the room and throughout our encounter. I wore full protective equipment throughout this patient encounter including a face mask, goggles, and gloves. Hand hygiene was performed before donning protective equipment and after removal when leaving the room.    MEDICAL RECORD REVIEW  Patient had a echocardiogram done on July 2020 which showed an EF of 6%.    Patient had a CTA chest September 2020 which showed no pulmonary embolism.    ALLERGIES  Patient has no known allergies.    PAST MEDICAL HISTORY  Active Ambulatory Problems     Diagnosis Date Noted   • Asthma 08/06/2019   • Essential hypertension 08/06/2019   • HIV (human immunodeficiency virus infection) (CMS/[facility]) 08/06/2019   • Class 2 obesity in adult 08/06/2019   • Exertional dyspnea 08/06/2019   • Chronic systolic congestive heart failure (CMS/[facility]) 08/06/2019   • NICM (nonischemic cardiomyopathy) (CMS/[facility]) 07/08/2020   • Elevated LFTs 07/08/2020   • Hypopotassemia 07/08/2020   • AICD (automatic cardioverter/defibrillator) present 03/06/2020   • Acute on chronic systolic  congestive heart failure (CMS/HCC) 10/27/2020   • Intractable vomiting with nausea 10/30/2020   • Diarrhea following gastrointestinal surgery 10/27/2020     Resolved Ambulatory Problems     Diagnosis Date Noted   • Chest pain 2019   • Hematuria 2020   • Flank pain 2020   • LBBB (left bundle branch block) 2020     Past Medical History:   Diagnosis Date   • CHF (congestive heart failure) (CMS/HCC)    • Hypertension        PAST SURGICAL HISTORY  Past Surgical History:   Procedure Laterality Date   • CARDIAC CATHETERIZATION     • CARDIAC DEFIBRILLATOR PLACEMENT     • FRACTURE SURGERY Left     left ankle       FAMILY HISTORY  Family History   Problem Relation Age of Onset   • Cancer Mother    • Breast cancer Mother    • Bone cancer Mother    • Hypertension Maternal Grandfather    • Hyperlipidemia Maternal Grandfather    • Diabetes Maternal Grandfather    • Prostate cancer Maternal Grandfather    • Ovarian cysts Daughter    • Breast cancer Maternal Grandmother    • Heart disease Maternal Grandmother    • No Known Problems Daughter    • No Known Problems Daughter        SOCIAL HISTORY  Social History     Socioeconomic History   • Marital status:      Spouse name: Not on file   • Number of children: Not on file   • Years of education: Not on file   • Highest education level: Not on file   Tobacco Use   • Smoking status: Former Smoker     Quit date:      Years since quittin.9   • Smokeless tobacco: Never Used   Substance and Sexual Activity   • Alcohol use: Yes     Comment: holiday and special occ//caffeine use: 1 cup daily   • Drug use: Yes     Types: Marijuana   • Sexual activity: Defer       REVIEW OF SYSTEMS  Review of Systems    All systems reviewed and negative except for those discussed in HPI.     PHYSICAL EXAM    ED Triage Vitals   Temp Heart Rate Resp BP SpO2   20 1120 20 1120 20 1120 20 1159 20 1120   98.4 °F (36.9 °C) 114 16 106/84 100 %       Temp src Heart Rate Source Patient Position BP Location FiO2 (%)   12/11/20 1120 12/11/20 1120 -- -- --   Tympanic Monitor        Physical Exam    I have reviewed the triage vital signs and nursing notes.      GENERAL: not distressed, appears older than stated age  HENT: nares patent  EYES: no scleral icterus  NECK: no ROM limitations  CV: regular rhythm, mildly tachycardic with a rate of 105, no calf tenderness, no lower extremity swelling  RESPIRATORY: normal effort, diminished breath sounds to right lower lobe  ABDOMEN: soft, nontender  MUSCULOSKELETAL: no deformity  NEURO: alert, moves all extremities, follows commands  SKIN: warm, dry    LAB RESULTS  Recent Results (from the past 24 hour(s))   ECG 12 Lead    Collection Time: 12/11/20 12:29 PM   Result Value Ref Range    QT Interval 420 ms   Comprehensive Metabolic Panel    Collection Time: 12/11/20 12:36 PM    Specimen: Blood   Result Value Ref Range    Glucose 123 (H) 65 - 99 mg/dL    BUN 10 6 - 20 mg/dL    Creatinine 0.98 0.57 - 1.00 mg/dL    Sodium 131 (L) 136 - 145 mmol/L    Potassium 3.4 (L) 3.5 - 5.2 mmol/L    Chloride 97 (L) 98 - 107 mmol/L    CO2 23.4 22.0 - 29.0 mmol/L    Calcium 8.6 8.6 - 10.5 mg/dL    Total Protein 6.0 6.0 - 8.5 g/dL    Albumin 3.60 3.50 - 5.20 g/dL    ALT (SGPT) 51 (H) 1 - 33 U/L    AST (SGOT) 29 1 - 32 U/L    Alkaline Phosphatase 127 (H) 39 - 117 U/L    Total Bilirubin 1.1 0.0 - 1.2 mg/dL    eGFR  African Amer 74 >60 mL/min/1.73    Globulin 2.4 gm/dL    A/G Ratio 1.5 g/dL    BUN/Creatinine Ratio 10.2 7.0 - 25.0    Anion Gap 10.6 5.0 - 15.0 mmol/L   Troponin    Collection Time: 12/11/20 12:36 PM    Specimen: Blood   Result Value Ref Range    Troponin T <0.010 0.000 - 0.030 ng/mL   hCG, Serum, Qualitative    Collection Time: 12/11/20 12:36 PM    Specimen: Blood   Result Value Ref Range    HCG Qualitative Negative Negative   CBC Auto Differential    Collection Time: 12/11/20 12:36 PM    Specimen: Blood   Result Value Ref Range     WBC 9.35 3.40 - 10.80 10*3/mm3    RBC 3.91 3.77 - 5.28 10*6/mm3    Hemoglobin 12.4 12.0 - 15.9 g/dL    Hematocrit 37.8 34.0 - 46.6 %    MCV 96.7 79.0 - 97.0 fL    MCH 31.7 26.6 - 33.0 pg    MCHC 32.8 31.5 - 35.7 g/dL    RDW 13.2 12.3 - 15.4 %    RDW-SD 47.1 37.0 - 54.0 fl    MPV 12.8 (H) 6.0 - 12.0 fL    Platelets 150 140 - 450 10*3/mm3    Neutrophil % 71.5 42.7 - 76.0 %    Lymphocyte % 18.1 (L) 19.6 - 45.3 %    Monocyte % 9.7 5.0 - 12.0 %    Eosinophil % 0.0 (L) 0.3 - 6.2 %    Basophil % 0.3 0.0 - 1.5 %    Immature Grans % 0.4 0.0 - 0.5 %    Neutrophils, Absolute 6.68 1.70 - 7.00 10*3/mm3    Lymphocytes, Absolute 1.69 0.70 - 3.10 10*3/mm3    Monocytes, Absolute 0.91 (H) 0.10 - 0.90 10*3/mm3    Eosinophils, Absolute 0.00 0.00 - 0.40 10*3/mm3    Basophils, Absolute 0.03 0.00 - 0.20 10*3/mm3    Immature Grans, Absolute 0.04 0.00 - 0.05 10*3/mm3    nRBC 0.1 0.0 - 0.2 /100 WBC   D-dimer, Quantitative    Collection Time: 12/11/20 12:36 PM    Specimen: Blood   Result Value Ref Range    D-Dimer, Quantitative 2.47 (H) 0.00 - 0.49 MCGFEU/mL   Protime-INR    Collection Time: 12/11/20 12:36 PM    Specimen: Blood   Result Value Ref Range    Protime 18.1 (H) 11.7 - 14.2 Seconds    INR 1.52 (H) 0.90 - 1.10   aPTT    Collection Time: 12/11/20 12:36 PM    Specimen: Blood   Result Value Ref Range    PTT 26.5 22.7 - 35.4 seconds   Urinalysis With Microscopic If Indicated (No Culture) - Urine, Clean Catch    Collection Time: 12/11/20 12:47 PM    Specimen: Urine, Clean Catch   Result Value Ref Range    Color, UA Yellow Yellow, Straw    Appearance, UA Turbid (A) Clear    pH, UA 6.5 5.0 - 8.0    Specific Gravity, UA 1.014 1.005 - 1.030    Glucose, UA >=1000 mg/dL (3+) (A) Negative    Ketones, UA Negative Negative    Bilirubin, UA Negative Negative    Blood, UA Trace (A) Negative    Protein, UA Negative Negative    Leuk Esterase, UA Negative Negative    Nitrite, UA Negative Negative    Urobilinogen, UA 1.0 E.U./dL 0.2 - 1.0 E.U./dL    Urinalysis, Microscopic Only - Urine, Clean Catch    Collection Time: 12/11/20 12:47 PM    Specimen: Urine, Clean Catch   Result Value Ref Range    RBC, UA 3-5 (A) None Seen, 0-2 /HPF    WBC, UA 0-2 None Seen, 0-2 /HPF    Bacteria, UA None Seen None Seen /HPF    Squamous Epithelial Cells, UA 0-2 None Seen, 0-2 /HPF    Hyaline Casts, UA 0-2 None Seen /LPF    Methodology Automated Microscopy          RADIOLOGY RESULTS  CT Angiogram Chest   Final Result   There are peripheral, acute pulmonary thromboemboli in right   upper and right lower lobe pulmonary artery branches. That along the   anterolateral right lower lobe appears occlusive and there is   parenchymal change suggesting pulmonary infarction along the   anterolateral right lower lobe. No other embolism is identified. There   is massive dilatation of the left cardiac chambers as was seen on   previous CT. There is no enlargement of the right heart chambers. I   called the findings to Anahy Rocha PA-C, in the emergency department   at 3:15 PM.       This report was finalized on 12/11/2020 3:27 PM by Dr. Gerardo Joshua M.D.          XR Chest 1 View   Final Result            PROGRESS, DATA ANALYSIS, CONSULTS AND MEDICAL DECISION MAKING  All labs have been independently reviewed by me.  All radiology studies have been reviewed by me and discussed with radiologist dictating the report. Discussion below represents my analysis of pertinent findings related to patient's condition, differential diagnosis, treatment plan and final disposition.     ED Course as of Dec 11 1636   Fri Dec 11, 2020   1516 I have discussed with Dr. Brian Kumar, radiology regarding patient.  CTA chest does show a massively dilated left heart which is similar to her CTA chest in September 2020.  He notes to acute pulmonary emboli in the right upper lobe and right lower lobe with pulmonary infarct located on her lateral periphery of her right lung.  RV LV ratio is 0.6.    [SS]   1538 I  "discussed with Dr. Prakash [facility] regarding patient.  Agrees to admit patient for further monitoring.  At this time patient is hemodynamically stable.  O2 sats are 100% on room air.    [SS]      ED Course User Index  [SS] Anahy Rocha PA-C       The differential diagnosis include but are not limited to pulmonary embolism, bronchitis, ACS, congestive heart failure.       Reviewed pt's history and workup with Dr. Lr.  After a bedside evaluation, Dr. Lr agrees with the plan of care.      (FOR ADMIT) Based on the patient's lab findings and presenting symptoms, the doctor and I feel it is appropriate to admit the patient for further management, evaluation, and treatment.  I have discussed this with the admitting team.  I have also discussed this with the patient/family.  They are in agreement with admission.          Disposition vitals:  /79   Pulse 90   Temp 98.4 °F (36.9 °C) (Tympanic)   Resp 16   Ht 167.6 cm (66\")   Wt 84.4 kg (186 lb)   SpO2 99%   BMI 30.02 kg/m²       DIAGNOSIS  Final diagnoses:   Other acute pulmonary embolism, unspecified whether acute cor pulmonale present (CMS/[facility])   Hemoptysis       FOLLOW UP   No follow-up provider specified.       Anahy Rocha PA-C  12/11/20 [id number]    "

## 2020-12-11 NOTE — TELEPHONE ENCOUNTER
Pt called stating she is has coughed up blood-tinged sputum several times this.   morning.  Per Pt it started this AM.  I asked her how much blood and she said she did not know, it was just in the phlegm she coughed up.  She denies SOA, fever, chills, chest pain.   She said her BP is WNL. She denies any recent exposure to someone with COVID.  I instructed her to call her primary care doctor to see if she could be seen there or if she needed to go to urgent care/ ER.  She verbalized understanding.      Lisha Shine RN  [facility]

## 2020-12-11 NOTE — ED NOTES
A 46 y/o  female presents to the ER c/o cough x 2 wks that has gotten worse after starting on jardiance and aldactone. States this am she started having blood in her sputum. States she has CHf and HIV with viral load of < 50. Pt denies chest pain/SOA. abd pain, N/V/D. Pt does c/o a headache but denies dizziness/vertiogo.     Aox4. Very pleasant female.   Skin w/d to touch and cap refill brisk.  resp e/u.   abd soft, non-tender and bsx4.   No edema to lower ext.   radioal pulses 1+.      Jossie Szymanski RN  12/11/20 [id number]

## 2020-12-11 NOTE — ED PROVIDER NOTES
I have supervised the care provided by the midlevel provider.    We have discussed this patient's history, physical exam, and treatment plan.   I have reviewed the note and have personally examined the patient and agree with the plan of care.  See attached attending note.  My personal findings are below:    Patient states she has had a productive cough for the past few months.  Sputum is usually white or yellow.  Today, she reports coughing up some blood-tinged sputum.  She denies shortness of breath, fever, chills, sore throat, leg swelling, calf pain, or known exposure to anyone diagnosed with COVID-19.    On exam: Awake and alert.  Heart is regular rate and rhythm.  Lungs are clear bilaterally.  Abdomen is soft and nontender.  No pedal edema.  No calf tenderness.    Plan is to obtain labs, chest x-ray, and EKG.    EKG          EKG time: 12:29 PM  Rhythm/Rate: Sinus rhythm with PVC, rate 96  P waves and DC: LAE  QRS, axis: LBBB, LAD  ST and T waves: Nonspecific ST changes laterally    Interpreted Contemporaneously by me, independently viewed  EKG is not significantly changed compared to prior KG done 10/27/2020      1520: CTA of the chest shows acute right-sided pulmonary emboli without evidence of heart strain.  Patient will be started on heparin and admitted.     Scotty Lr MD  12/11/20 1523

## 2020-12-12 ENCOUNTER — APPOINTMENT (OUTPATIENT)
Dept: CARDIOLOGY | Facility: HOSPITAL | Age: 45
End: 2020-12-12

## 2020-12-12 LAB
ANION GAP SERPL CALCULATED.3IONS-SCNC: 12.7 MMOL/L (ref 5–15)
APTT PPP: 147.5 SECONDS (ref 22.7–35.4)
APTT PPP: 62.3 SECONDS (ref 22.7–35.4)
BASOPHILS # BLD AUTO: 0.03 10*3/MM3 (ref 0–0.2)
BASOPHILS NFR BLD AUTO: 0.3 % (ref 0–1.5)
BH CV LOWER VASCULAR LEFT COMMON FEMORAL AUGMENT: NORMAL
BH CV LOWER VASCULAR LEFT COMMON FEMORAL COMPETENT: NORMAL
BH CV LOWER VASCULAR LEFT COMMON FEMORAL COMPRESS: NORMAL
BH CV LOWER VASCULAR LEFT COMMON FEMORAL PHASIC: NORMAL
BH CV LOWER VASCULAR LEFT COMMON FEMORAL SPONT: NORMAL
BH CV LOWER VASCULAR LEFT DISTAL FEMORAL COMPRESS: NORMAL
BH CV LOWER VASCULAR LEFT GASTRONEMIUS COMPRESS: NORMAL
BH CV LOWER VASCULAR LEFT GREATER SAPH AK COMPRESS: NORMAL
BH CV LOWER VASCULAR LEFT GREATER SAPH BK COMPRESS: NORMAL
BH CV LOWER VASCULAR LEFT LESSER SAPH COMPRESS: NORMAL
BH CV LOWER VASCULAR LEFT MID FEMORAL AUGMENT: NORMAL
BH CV LOWER VASCULAR LEFT MID FEMORAL COMPETENT: NORMAL
BH CV LOWER VASCULAR LEFT MID FEMORAL COMPRESS: NORMAL
BH CV LOWER VASCULAR LEFT MID FEMORAL PHASIC: NORMAL
BH CV LOWER VASCULAR LEFT MID FEMORAL SPONT: NORMAL
BH CV LOWER VASCULAR LEFT PERONEAL COMPRESS: NORMAL
BH CV LOWER VASCULAR LEFT POPLITEAL AUGMENT: NORMAL
BH CV LOWER VASCULAR LEFT POPLITEAL COMPETENT: NORMAL
BH CV LOWER VASCULAR LEFT POPLITEAL COMPRESS: NORMAL
BH CV LOWER VASCULAR LEFT POPLITEAL PHASIC: NORMAL
BH CV LOWER VASCULAR LEFT POPLITEAL SPONT: NORMAL
BH CV LOWER VASCULAR LEFT POSTERIOR TIBIAL COMPRESS: NORMAL
BH CV LOWER VASCULAR LEFT PROFUNDA FEMORAL COMPRESS: NORMAL
BH CV LOWER VASCULAR LEFT PROXIMAL FEMORAL COMPRESS: NORMAL
BH CV LOWER VASCULAR LEFT SAPHENOFEMORAL JUNCTION COMPRESS: NORMAL
BH CV LOWER VASCULAR RIGHT COMMON FEMORAL AUGMENT: NORMAL
BH CV LOWER VASCULAR RIGHT COMMON FEMORAL COMPETENT: NORMAL
BH CV LOWER VASCULAR RIGHT COMMON FEMORAL COMPRESS: NORMAL
BH CV LOWER VASCULAR RIGHT COMMON FEMORAL PHASIC: NORMAL
BH CV LOWER VASCULAR RIGHT COMMON FEMORAL SPONT: NORMAL
BH CV LOWER VASCULAR RIGHT DISTAL FEMORAL COMPRESS: NORMAL
BH CV LOWER VASCULAR RIGHT GASTRONEMIUS COMPRESS: NORMAL
BH CV LOWER VASCULAR RIGHT GREATER SAPH AK COMPRESS: NORMAL
BH CV LOWER VASCULAR RIGHT GREATER SAPH BK COMPRESS: NORMAL
BH CV LOWER VASCULAR RIGHT LESSER SAPH COMPRESS: NORMAL
BH CV LOWER VASCULAR RIGHT MID FEMORAL AUGMENT: NORMAL
BH CV LOWER VASCULAR RIGHT MID FEMORAL COMPETENT: NORMAL
BH CV LOWER VASCULAR RIGHT MID FEMORAL COMPRESS: NORMAL
BH CV LOWER VASCULAR RIGHT MID FEMORAL PHASIC: NORMAL
BH CV LOWER VASCULAR RIGHT MID FEMORAL SPONT: NORMAL
BH CV LOWER VASCULAR RIGHT PERONEAL COMPRESS: NORMAL
BH CV LOWER VASCULAR RIGHT POPLITEAL AUGMENT: NORMAL
BH CV LOWER VASCULAR RIGHT POPLITEAL COMPETENT: NORMAL
BH CV LOWER VASCULAR RIGHT POPLITEAL COMPRESS: NORMAL
BH CV LOWER VASCULAR RIGHT POPLITEAL PHASIC: NORMAL
BH CV LOWER VASCULAR RIGHT POPLITEAL SPONT: NORMAL
BH CV LOWER VASCULAR RIGHT POSTERIOR TIBIAL COMPRESS: NORMAL
BH CV LOWER VASCULAR RIGHT PROFUNDA FEMORAL COMPRESS: NORMAL
BH CV LOWER VASCULAR RIGHT PROXIMAL FEMORAL COMPRESS: NORMAL
BH CV LOWER VASCULAR RIGHT SAPHENOFEMORAL JUNCTION COMPRESS: NORMAL
BUN SERPL-MCNC: 8 MG/DL (ref 6–20)
BUN/CREAT SERPL: 8.4 (ref 7–25)
CALCIUM SPEC-SCNC: 8.5 MG/DL (ref 8.6–10.5)
CHLORIDE SERPL-SCNC: 96 MMOL/L (ref 98–107)
CO2 SERPL-SCNC: 24.3 MMOL/L (ref 22–29)
CREAT SERPL-MCNC: 0.95 MG/DL (ref 0.57–1)
DEPRECATED RDW RBC AUTO: 47.3 FL (ref 37–54)
EOSINOPHIL # BLD AUTO: 0 10*3/MM3 (ref 0–0.4)
EOSINOPHIL NFR BLD AUTO: 0 % (ref 0.3–6.2)
ERYTHROCYTE [DISTWIDTH] IN BLOOD BY AUTOMATED COUNT: 13.4 % (ref 12.3–15.4)
GFR SERPL CREATININE-BSD FRML MDRD: 77 ML/MIN/1.73
GLUCOSE SERPL-MCNC: 99 MG/DL (ref 65–99)
HCT VFR BLD AUTO: 34.8 % (ref 34–46.6)
HGB BLD-MCNC: 11.5 G/DL (ref 12–15.9)
LYMPHOCYTES # BLD AUTO: 2.76 10*3/MM3 (ref 0.7–3.1)
LYMPHOCYTES NFR BLD AUTO: 23.6 % (ref 19.6–45.3)
MAGNESIUM SERPL-MCNC: 2 MG/DL (ref 1.6–2.6)
MCH RBC QN AUTO: 32 PG (ref 26.6–33)
MCHC RBC AUTO-ENTMCNC: 33 G/DL (ref 31.5–35.7)
MCV RBC AUTO: 96.9 FL (ref 79–97)
MONOCYTES # BLD AUTO: 1.31 10*3/MM3 (ref 0.1–0.9)
MONOCYTES NFR BLD AUTO: 11.2 % (ref 5–12)
NEUTROPHILS NFR BLD AUTO: 64.6 % (ref 42.7–76)
NEUTROPHILS NFR BLD AUTO: 7.58 10*3/MM3 (ref 1.7–7)
NT-PROBNP SERPL-MCNC: ABNORMAL PG/ML (ref 0–450)
PLATELET # BLD AUTO: 135 10*3/MM3 (ref 140–450)
PMV BLD AUTO: 13.3 FL (ref 6–12)
POTASSIUM SERPL-SCNC: 3.5 MMOL/L (ref 3.5–5.2)
QT INTERVAL: 420 MS
RBC # BLD AUTO: 3.59 10*6/MM3 (ref 3.77–5.28)
SARS-COV-2 RNA RESP QL NAA+PROBE: NOT DETECTED
SODIUM SERPL-SCNC: 133 MMOL/L (ref 136–145)
WBC # BLD AUTO: 11.71 10*3/MM3 (ref 3.4–10.8)

## 2020-12-12 PROCEDURE — 85730 THROMBOPLASTIN TIME PARTIAL: CPT | Performed by: INTERNAL MEDICINE

## 2020-12-12 PROCEDURE — 80048 BASIC METABOLIC PNL TOTAL CA: CPT | Performed by: INTERNAL MEDICINE

## 2020-12-12 PROCEDURE — 25010000002 HEPARIN (PORCINE) PER 1000 UNITS: Performed by: PHYSICIAN ASSISTANT

## 2020-12-12 PROCEDURE — 83735 ASSAY OF MAGNESIUM: CPT | Performed by: INTERNAL MEDICINE

## 2020-12-12 PROCEDURE — 97161 PT EVAL LOW COMPLEX 20 MIN: CPT

## 2020-12-12 PROCEDURE — 83880 ASSAY OF NATRIURETIC PEPTIDE: CPT | Performed by: NURSE PRACTITIONER

## 2020-12-12 PROCEDURE — 85025 COMPLETE CBC W/AUTO DIFF WBC: CPT | Performed by: PHYSICIAN ASSISTANT

## 2020-12-12 PROCEDURE — 93970 EXTREMITY STUDY: CPT

## 2020-12-12 RX ORDER — GUAIFENESIN AND CODEINE PHOSPHATE 100; 10 MG/5ML; MG/5ML
5 SOLUTION ORAL EVERY 4 HOURS PRN
Status: DISCONTINUED | OUTPATIENT
Start: 2020-12-12 | End: 2020-12-17 | Stop reason: HOSPADM

## 2020-12-12 RX ORDER — BUTALBITAL, ACETAMINOPHEN AND CAFFEINE 50; 325; 40 MG/1; MG/1; MG/1
1 TABLET ORAL EVERY 4 HOURS PRN
Status: DISCONTINUED | OUTPATIENT
Start: 2020-12-12 | End: 2020-12-14

## 2020-12-12 RX ADMIN — FUROSEMIDE 80 MG: 80 TABLET ORAL at 17:24

## 2020-12-12 RX ADMIN — GUAIFENESIN AND CODEINE PHOSPHATE 5 ML: 10; 100 LIQUID ORAL at 20:23

## 2020-12-12 RX ADMIN — HEPARIN SODIUM 13.7 UNITS/KG/HR: 10000 INJECTION, SOLUTION INTRAVENOUS at 19:24

## 2020-12-12 RX ADMIN — SACUBITRIL AND VALSARTAN 1 TABLET: 24; 26 TABLET, FILM COATED ORAL at 07:57

## 2020-12-12 RX ADMIN — EMTRICITABINE AND TENOFOVIR ALAFENAMIDE 1 TABLET: 200; 25 TABLET ORAL at 20:18

## 2020-12-12 RX ADMIN — CARVEDILOL 3.12 MG: 3.12 TABLET, FILM COATED ORAL at 07:57

## 2020-12-12 RX ADMIN — SACUBITRIL AND VALSARTAN 1 TABLET: 24; 26 TABLET, FILM COATED ORAL at 20:17

## 2020-12-12 RX ADMIN — FUROSEMIDE 80 MG: 80 TABLET ORAL at 07:58

## 2020-12-12 RX ADMIN — BUTALBITAL, ACETAMINOPHEN, AND CAFFEINE 1 TABLET: 50; 325; 40 TABLET ORAL at 13:32

## 2020-12-12 RX ADMIN — CARVEDILOL 3.12 MG: 3.12 TABLET, FILM COATED ORAL at 17:24

## 2020-12-12 RX ADMIN — DARUNAVIR 800 MG: 800 TABLET, FILM COATED ORAL at 20:18

## 2020-12-12 RX ADMIN — BUTALBITAL, ACETAMINOPHEN, AND CAFFEINE 1 TABLET: 50; 325; 40 TABLET ORAL at 20:17

## 2020-12-12 RX ADMIN — RITONAVIR 100 MG: 100 TABLET, FILM COATED ORAL at 20:18

## 2020-12-12 NOTE — NURSING NOTE
Pt did not want to take her night meds because she doesn't have an appetite, and pt only wants to take her meds with food.

## 2020-12-12 NOTE — H&P
HISTORY AND PHYSICAL   [facility]        Patient Identification:  Name: Nina Saez  Age: 45 y.o.  Sex: female  :  1975  MRN: [id number]                     Primary Care Physician: Darcie Ledbetter APRN    Chief Complaint:  45 year old female presented to the emergency room with hemoptysis which started this morning; she has had a chronic cough for months; she denies shortness of breath; she has a history of chf and is followed by dr ponce; she also has a history of hiv; she has not past history of dvt or pe and is not aware of any family history of clotting disorders; she denies fever, chills or sick contacts    History of Present Illness:   As above    Past Medical History:  Past Medical History:   Diagnosis Date   • AICD (automatic cardioverter/defibrillator) present 3/6/2020   • Asthma    • CHF (congestive heart failure) (CMS/[facility])    • Class 2 obesity in adult    • HIV (human immunodeficiency virus infection) (CMS/[facility])    • Hypertension    • LBBB (left bundle branch block)    • NICM (nonischemic cardiomyopathy) (CMS/[facility])     2020 EF 6% per echocardiogram; AICD present     Past Surgical History:  Past Surgical History:   Procedure Laterality Date   • CARDIAC CATHETERIZATION     • CARDIAC DEFIBRILLATOR PLACEMENT     • FRACTURE SURGERY Left     left ankle      Home Meds:  Medications Prior to Admission   Medication Sig Dispense Refill Last Dose   • acetaminophen (TYLENOL) 325 MG tablet Take 650 mg by mouth Every 6 (Six) Hours As Needed for Mild Pain .      • albuterol sulfate  (90 Base) MCG/ACT inhaler Inhale 2 puffs Every 4 (Four) Hours As Needed for Wheezing.      • aspirin 81 MG EC tablet Take 81 mg by mouth Daily.      • carvedilol (COREG) 3.125 MG tablet Take 1 tablet by mouth 2 (Two) Times a Day With Meals for 30 days. 60 tablet 0    • Corlanor 5 MG tablet tablet Take 5 mg by mouth 2 (Two) Times a Day With Meals.      • darunavir ethanolate (PREZISTA) 800 MG tablet  tablet Take 800 mg by mouth Every Night.      • Empagliflozin (Jardiance) 10 MG tablet Take 10 mg by mouth Daily. 30 tablet 11    • Emtricitabine-Tenofovir AF (DESCOVY) 200-25 MG per tablet Take  by mouth Every Night.      • fluticasone (Flonase) 50 MCG/ACT nasal spray 2 sprays into the nostril(s) as directed by provider Daily. 1 bottle 2    • furosemide (LASIX) 80 MG tablet Take 1 tablet by mouth 2 (Two) Times a Day. 180 tablet 3    • ondansetron ODT (Zofran ODT) 4 MG disintegrating tablet Place 1 tablet on the tongue Every 8 (Eight) Hours As Needed for Nausea or Vomiting. 30 tablet 0    • potassium chloride (K-DUR,KLOR-CON) 20 MEQ CR tablet Take 3 tablets by mouth Daily. 90 tablet 1    • ritonavir (NORVIR) 100 MG tablet Take 100 mg by mouth Every Night.      • sacubitril-valsartan (ENTRESTO) 24-26 MG tablet Take 1 tablet by mouth Every 12 (Twelve) Hours. 60 tablet 0    • sertraline (ZOLOFT) 50 MG tablet Take 50 mg by mouth Daily.      • spironolactone (ALDACTONE) 25 MG tablet TAKE 1 2 (ONE HALF) TABLET BY MOUTH ONCE DAILY      • vitamin D (ERGOCALCIFEROL) 07053 units capsule capsule Take 50,000 Units by mouth Every 30 (Thirty) Days.          Allergies:  No Known Allergies  Immunizations:  Immunization History   Administered Date(s) Administered   • Flu Vaccine Split Quad 2014, 2015   • Fluzone High Dose =>65 Years ([employer] ONLY) 2016, 10/05/2017   • Hep A / Hep B 2008   • Hepatitis B Vaccine Adult IM 2017   • Pneumococcal Conjugate 13-Valent (PCV13) 2017   • Tdap 2015     Social History:   Social History     Social History Narrative   • Not on file     Social History     Socioeconomic History   • Marital status:      Spouse name: Not on file   • Number of children: Not on file   • Years of education: Not on file   • Highest education level: Not on file   Tobacco Use   • Smoking status: Former Smoker     Quit date:      Years since quittin.9   • Smokeless  "tobacco: Never Used   Substance and Sexual Activity   • Alcohol use: Yes     Comment: holiday and special occ//caffeine use: 1 cup daily   • Drug use: Yes     Types: Marijuana   • Sexual activity: Defer       Family History:  Family History   Problem Relation Age of Onset   • Cancer Mother    • Breast cancer Mother    • Bone cancer Mother    • Hypertension Maternal Grandfather    • Hyperlipidemia Maternal Grandfather    • Diabetes Maternal Grandfather    • Prostate cancer Maternal Grandfather    • Ovarian cysts Daughter    • Breast cancer Maternal Grandmother    • Heart disease Maternal Grandmother    • No Known Problems Daughter    • No Known Problems Daughter         Review of Systems  See history of present illness and past medical history.  Patient denies headache, dizziness, syncope, falls, trauma, change in vision, change in hearing, change in taste, changes in weight, changes in appetite, focal weakness, numbness, or paresthesia.  Patient denies chest pain, palpitations, dyspnea, orthopnea, PND, cough, sinus pressure, rhinorrhea, epistaxis, hemoptysis, nausea, vomiting,hematemesis, diarrhea, constipation or hematchezia.  Denies cold or heat intolerance, polydipsia, polyuria, polyphagia. Denies hematuria, pyuria, dysuria, hesitancy, frequency or urgency. Denies consumption of raw and under cooked meats foods or change in water source.  Denies fever, chills, sweats, night sweats.  Denies missing any routine medications. Remainder of ROS is negative.    Objective:  T Max 24 hrs: Temp (24hrs), Av.4 °F (36.9 °C), Min:98.4 °F (36.9 °C), Max:98.4 °F (36.9 °C)    Vitals Ranges:   Temp:  [98.4 °F (36.9 °C)] 98.4 °F (36.9 °C)  Heart Rate:  [] 90  Resp:  [16] 16  BP: (103-106)/(79-84) 103/79      Exam:  /79   Pulse 90   Temp 98.4 °F (36.9 °C) (Tympanic)   Resp 16   Ht 167.6 cm (66\")   Wt 84.4 kg (186 lb)   SpO2 99%   BMI 30.02 kg/m²     General Appearance:    Alert, cooperative, no distress, " appears stated age   Head:    Normocephalic, without obvious abnormality, atraumatic   Eyes:    PERRL, conjunctivae/corneas clear, EOM's intact, both eyes   Ears:    Normal external ear canals, both ears   Nose:   Nares normal, septum midline, mucosa normal, no drainage    or sinus tenderness   Throat:   Lips, mucosa, and tongue normal   Neck:   Supple, symmetrical, trachea midline, no adenopathy;     thyroid:  no enlargement/tenderness/nodules; no carotid    bruit or JVD   Back:     Symmetric, no curvature, ROM normal, no CVA tenderness   Lungs:     Clear to auscultation bilaterally, respirations unlabored   Chest Wall:    No tenderness or deformity    Heart:    Regular rate and rhythm, S1 and S2 normal, no murmur, rub   or gallop   Abdomen:     Soft, nontender, bowel sounds active all four quadrants,     no masses, no hepatomegaly, no splenomegaly   Extremities:   Extremities normal, atraumatic, no cyanosis or edema   Pulses:   2+ and symmetric all extremities   Skin:   Skin color, texture, turgor normal, no rashes or lesions   Lymph nodes:   Cervical, supraclavicular, and axillary nodes normal   Neurologic:   CNII-XII intact, normal strength, sensation intact throughout      .    Data Review:  Labs in chart were reviewed.  WBC   Date Value Ref Range Status   12/11/2020 9.35 3.40 - 10.80 10*3/mm3 Final     Hemoglobin   Date Value Ref Range Status   12/11/2020 12.4 12.0 - 15.9 g/dL Final     Hematocrit   Date Value Ref Range Status   12/11/2020 37.8 34.0 - 46.6 % Final     Platelets   Date Value Ref Range Status   12/11/2020 150 140 - 450 10*3/mm3 Final     Sodium   Date Value Ref Range Status   12/11/2020 131 (L) 136 - 145 mmol/L Final     Potassium   Date Value Ref Range Status   12/11/2020 3.4 (L) 3.5 - 5.2 mmol/L Final     Chloride   Date Value Ref Range Status   12/11/2020 97 (L) 98 - 107 mmol/L Final     CO2   Date Value Ref Range Status   12/11/2020 23.4 22.0 - 29.0 mmol/L Final     BUN   Date Value Ref  Range Status   12/11/2020 10 6 - 20 mg/dL Final     Creatinine   Date Value Ref Range Status   12/11/2020 0.98 0.57 - 1.00 mg/dL Final     Glucose   Date Value Ref Range Status   12/11/2020 123 (H) 65 - 99 mg/dL Final     Calcium   Date Value Ref Range Status   12/11/2020 8.6 8.6 - 10.5 mg/dL Final     AST (SGOT)   Date Value Ref Range Status   12/11/2020 29 1 - 32 U/L Final     ALT (SGPT)   Date Value Ref Range Status   12/11/2020 51 (H) 1 - 33 U/L Final     Alkaline Phosphatase   Date Value Ref Range Status   12/11/2020 127 (H) 39 - 117 U/L Final                Imaging Results (All)     Procedure Component Value Units Date/Time    CT Angiogram Chest [[id number]] Collected: 12/11/20 1518     Updated: 12/11/20 1530    Narrative:      CT ANGIOGRAM CHEST     HISTORY: Elevated D dimer. Evaluate for pulmonary embolism.     TECHNIQUE: CT angiogram of the chest is provided and correlated with  chest x-ray from earlier today and a chest CT angiogram from 09/30/2020.  Multiplanar and 3-dimensional reformatted images were produced at a  separate workstation.     Radiation dose reduction techniques were utilized, including automated  exposure control and exposure modulation based on body size.     FINDINGS: There is a cardiac pacing device and there is dilatation of  the left ventricle which seems to primarily account for the massively  enlarged cardiac silhouette seen on x-ray. The appearance of the cardiac  structures appears unchanged in comparison with previous CT angiogram.  There is normal caliber of the pulmonary arteries. A small peripheral  pulmonary embolism is observed in the posterior right upper lobe on  image 46. There is also an occlusive embolism along the anterolateral  right lower lobe branch seen on images 83 and below. There is a patch of  parenchymal opacity of about 6 cm diameter peripheral to this area,  likely representing pulmonary infarction. There is no central pulmonary  thromboembolism. No embolus  is identified on the left. There is no  dilatation of the right cardiac chambers. RV:LV ratio measures 0.6.     The lungs are otherwise clear. Airways are patent. Upper abdominal  structures appear grossly normal.       Impression:      There are peripheral, acute pulmonary thromboemboli in right  upper and right lower lobe pulmonary artery branches. That along the  anterolateral right lower lobe appears occlusive and there is  parenchymal change suggesting pulmonary infarction along the  anterolateral right lower lobe. No other embolism is identified. There  is massive dilatation of the left cardiac chambers as was seen on  previous CT. There is no enlargement of the right heart chambers. I  called the findings to Anahy Rocha PA-C, in the emergency department  at 3:15 PM.     This report was finalized on 12/11/2020 3:27 PM by Dr. Gerardo Joshua M.D.       XR Chest 1 View [[id number]] Collected: 12/11/20 1328     Updated: 12/11/20 1422    Narrative:      ONE VIEW PORTABLE CHEST     HISTORY: Persistent cough. Episode of hemoptysis.     FINDINGS: There is prominent cardiomegaly with a pacemaker in place and  this shows no change from 10/27/2020. The lungs are well-expanded and  clear and there is no evidence of pneumonia.     This report was finalized on 12/11/2020 2:19 PM by Dr. Ta Cordero M.D.           Patient Active Problem List   Diagnosis Code   • Asthma J45.909   • Essential hypertension I10   • HIV (human immunodeficiency virus infection) (CMS/[facility]) B20   • Class 2 obesity in adult E66.9   • Exertional dyspnea R06.00   • Chronic systolic congestive heart failure (CMS/[facility]) I50.22   • NICM (nonischemic cardiomyopathy) (CMS/[facility]) I42.8   • Elevated LFTs R79.89   • Hypopotassemia E87.6   • AICD (automatic cardioverter/defibrillator) present Z95.810   • Acute on chronic systolic congestive heart failure (CMS/[facility]) I50.23   • Intractable vomiting with nausea R11.2   • Diarrhea following gastrointestinal  surgery R19.7, Z98.890   • Acute pulmonary embolism (CMS/HCC) I26.99       Assessment:    Acute pulmonary embolism (CMS/HCC)  hemoptysis  Nonischemic cardiomyopathy  Chronic systolic chf  Hypokalemia  Hyponatremia  Hyperglycemia  hiv  Nonischemic cardiomyopathy  Obesity    Plan:  Will continue heparin  Check hgb tonight since she continues with some hemoptysis  Monitor glucose  Seems euvolemic presently  Monitor electrolytes and blood sugar  Xi patient and ED provider  Lorenza Ohara MD  12/11/2020  19:52 EST

## 2020-12-12 NOTE — THERAPY EVALUATION
Patient Name: iNna Saez  : 1975    MRN: [id number]                              Today's Date: 2020       Admit Date: 2020    Visit Dx:     ICD-10-CM ICD-9-CM   1. Other acute pulmonary embolism, unspecified whether acute cor pulmonale present (CMS/[facility])  I26.99 415.19   2. Hemoptysis  R04.2 786.30     Patient Active Problem List   Diagnosis   • Asthma   • Essential hypertension   • HIV (human immunodeficiency virus infection) (CMS/[facility])   • Class 2 obesity in adult   • Exertional dyspnea   • Chronic systolic congestive heart failure (CMS/[facility])   • NICM (nonischemic cardiomyopathy) (CMS/[facility])   • Elevated LFTs   • Hypopotassemia   • AICD (automatic cardioverter/defibrillator) present   • Acute on chronic systolic congestive heart failure (CMS/[facility])   • Intractable vomiting with nausea   • Diarrhea following gastrointestinal surgery   • Acute pulmonary embolism (CMS/[facility])     Past Medical History:   Diagnosis Date   • AICD (automatic cardioverter/defibrillator) present 3/6/2020   • Asthma    • CHF (congestive heart failure) (CMS/[facility])    • Class 2 obesity in adult    • HIV (human immunodeficiency virus infection) (CMS/[facility])    • Hypertension    • LBBB (left bundle branch block)    • NICM (nonischemic cardiomyopathy) (CMS/[facility])     2020 EF 6% per echocardiogram; AICD present     Past Surgical History:   Procedure Laterality Date   • CARDIAC CATHETERIZATION     • CARDIAC DEFIBRILLATOR PLACEMENT     • FRACTURE SURGERY Left     left ankle     General Information     Row Name 20 0926          Physical Therapy Time and Intention    Document Type  evaluation  -DO     Mode of Treatment  physical therapy  -DO     Row Name 20 0926          General Information    Patient Profile Reviewed  yes  -DO     Prior Level of Function  independent:;all household mobility;community mobility  -DO     Existing Precautions/Restrictions  fall  -DO     Barriers to Rehab  none identified  -DO     Row Name 20  0926          Living Environment    Lives With  child(wilfred), adult  -DO     Row Name 12/12/20 0926          Home Main Entrance    Number of Stairs, Main Entrance  other (see comments) flight of stairs.  -DO     Row Name 12/12/20 0926          Stairs Within Home, Primary    Number of Stairs, Within Home, Primary  none  -DO     Row Name 12/12/20 0926          Cognition    Orientation Status (Cognition)  oriented x 4  -DO     Row Name 12/12/20 0926          Safety Issues, Functional Mobility    Impairments Affecting Function (Mobility)  endurance/activity tolerance  -DO       User Key  (r) = Recorded By, (t) = Taken By, (c) = Cosigned By    Initials Name Provider Type    DO Romel Richardson PT Physical Therapist        Mobility     Row Name 12/12/20 0927          Bed Mobility    Bed Mobility  supine-sit;sit-supine  -DO     Supine-Sit Wake (Bed Mobility)  independent  -DO     Sit-Supine Wake (Bed Mobility)  independent  -DO     Row Name 12/12/20 0927          Sit-Stand Transfer    Sit-Stand Wake (Transfers)  supervision  -DO     Assistive Device (Sit-Stand Transfers)  -- none  -DO     Row Name 12/12/20 0927          Gait/Stairs (Locomotion)    Wake Level (Gait)  supervision  -DO     Assistive Device (Gait)  -- none; pt pushed IV pole for 15ft in room.  -DO     Distance in Feet (Gait)  100  -DO     Wake Level (Stairs)  not tested pt defers steps performance today.  -DO       User Key  (r) = Recorded By, (t) = Taken By, (c) = Cosigned By    Initials Name Provider Type    DO Romel Richardson, PT Physical Therapist        Obj/Interventions     Row Name 12/12/20 0929          Range of Motion Comprehensive    General Range of Motion  no range of motion deficits identified  -DO     Row Name 12/12/20 0929          Strength Comprehensive (MMT)    General Manual Muscle Testing (MMT) Assessment  no strength deficits identified  -DO     Row Name 12/12/20 0929          Balance    Balance  Assessment  sitting static balance;standing static balance  -DO     Static Sitting Balance  WNL  -DO     Static Standing Balance  WNL  -DO     [facility] Name 12/12/20 0929          Sensory Assessment (Somatosensory)    Sensory Assessment (Somatosensory)  sensation intact  -DO       User Key  (r) = Recorded By, (t) = Taken By, (c) = Cosigned By    Initials Name Provider Type    DO Romel Richardson, PT Physical Therapist        Goals/Plan    No documentation.       Clinical Impression     Row Name 12/12/20 0929          Plan of Care Review    Plan of Care Reviewed With  patient  -DO     Outcome Summary  Pt is a 45 y.o. female admitted for hemoptysis and SOA, with a PMH which includes CHF and HIV. Pt presents to PT with concerns for impaired mobility. Pt performed bed mobility independently, transfers with supervision, and ambulated 100ft with supervision for safety only. She also elected to push the IV pole within her room for about 15ft with supervision and no evidence of imbalance. Encouraged stair assessment, however pt declines citing fatigue this AM. Pt demonstrate no evidence of overt unsteadiness/LOB throughout. Pt does not require skilled PT at this time as she appears to be at her baseline functional mobility. PT recommends d/c home when appropriate.  -DO     [facility] Name 12/12/20 0929          Therapy Assessment/Plan (PT)    Criteria for Skilled Interventions Met (PT)  no problems identified which require skilled intervention;no  -DO     Row Name 12/12/20 0929          Vital Signs    Pre SpO2 (%)  97  -DO     O2 Delivery Pre Treatment  room air  -DO     Intra SpO2 (%)  91  -DO     O2 Delivery Intra Treatment  room air  -DO     Post SpO2 (%)  97  -DO     O2 Delivery Post Treatment  room air  -DO     Pre Patient Position  Supine  -DO     Intra Patient Position  Standing  -DO     Post Patient Position  Sitting  -DO     Row Name 12/12/20 0929          Positioning and Restraints    Pre-Treatment Position  in bed  -DO      Post Treatment Position  bed  -DO     In Bed  notified nsg;supine;encouraged to call for assist;call light within reach  -DO       User Key  (r) = Recorded By, (t) = Taken By, (c) = Cosigned By    Initials Name Provider Type    DO Romel Richardson PT Physical Therapist        Outcome Measures     Row Name 12/12/20 0933          How much help from another person do you currently need...    Turning from your back to your side while in flat bed without using bedrails?  4  -DO     Moving from lying on back to sitting on the side of a flat bed without bedrails?  4  -DO     Moving to and from a bed to a chair (including a wheelchair)?  4  -DO     Standing up from a chair using your arms (e.g., wheelchair, bedside chair)?  4  -DO     Climbing 3-5 steps with a railing?  3  -DO     To walk in hospital room?  4  -DO     AM-PAC 6 Clicks Score (PT)  23  -DO     Row Name 12/12/20 0933          Functional Assessment    Outcome Measure Options  AM-PAC 6 Clicks Basic Mobility (PT)  -DO       User Key  (r) = Recorded By, (t) = Taken By, (c) = Cosigned By    Initials Name Provider Type    DO Romel Richardson, HANNAH Physical Therapist        Physical Therapy Education                 Title: PT OT SLP Therapies (Done)     Topic: Physical Therapy (Done)     Point: Mobility training (Done)     Learning Progress Summary           Patient Acceptance, E, VU,NR by DO at 12/12/2020 0934                   Point: Home exercise program (Done)     Learning Progress Summary           Patient Acceptance, E, VU,NR by DO at 12/12/2020 0934                   Point: Body mechanics (Done)     Learning Progress Summary           Patient Acceptance, E, VU,NR by DO at 12/12/2020 0934                   Point: Precautions (Done)     Learning Progress Summary           Patient Acceptance, E, VU,NR by DO at 12/12/2020 0934                               User Key     Initials Effective Dates Name Provider Type Discipline    DO 03/07/18 -  Romel Richardson, PT  Physical Therapist PT              PT Recommendation and Plan     Plan of Care Reviewed With: patient  Outcome Summary: Pt is a 45 y.o. female admitted for hemoptysis and SOA, with a PMH which includes CHF and HIV. Pt presents to PT with concerns for impaired mobility. Pt performed bed mobility independently, transfers with supervision, and ambulated 100ft with supervision for safety only. She also elected to push the IV pole within her room for about 15ft with supervision and no evidence of imbalance. Encouraged stair assessment, however pt declines citing fatigue this AM. Pt demonstrate no evidence of overt unsteadiness/LOB throughout. Pt does not require skilled PT at this time as she appears to be at her baseline functional mobility. PT recommends d/c home when appropriate.     Time Calculation:   PT Charges     Row Name 12/12/20 0935             Time Calculation    Start Time  0906  -DO      Stop Time  0914  -DO      Time Calculation (min)  8 min  -DO      PT Received On  12/12/20  -DO        User Key  (r) = Recorded By, (t) = Taken By, (c) = Cosigned By    Initials Name Provider Type    DO Romel Richardson PT Physical Therapist        Therapy Charges for Today     Code Description Service Date Service Provider Modifiers Qty    [id number]  PT EVAL LOW COMPLEXITY 1 12/12/2020 Romel Richardson, PT GP 1          PT G-Codes  Outcome Measure Options: AM-PAC 6 Clicks Basic Mobility (PT)  AM-PAC 6 Clicks Score (PT): 23    Romel Richardson PT  12/12/2020

## 2020-12-12 NOTE — NURSING NOTE
Notified Donald Mandujano, APRN. The ptt of 147.5. no new orders, just to continue heparin protocol.

## 2020-12-12 NOTE — PROGRESS NOTES
Progress Note    Name: Nina Saez ADMIT: 2020   : 1975  PCP: Darcie Ledbetter APRN    MRN: [id number] LOS: 0 days   AGE/SEX: 45 y.o. female  ROOM: [facility]   Date of Encounter Visit: 20    Subjective:     Interval History: Still having quite a bit of hemoptysis.     REVIEW OF SYSTEMS:   no fever or chills, but low-grade temp reported.  No chest pain, palpitations or edema.  Some right low lower rib pain with deep inspiration.  No shortness of breath.  Mild cough.  Hemoptysis.  No nausea, vomiting or diarrhea.  Reports stools looks like tobacco strings.    Objective:   Temp:  [98.3 °F (36.8 °C)-99 °F (37.2 °C)] 99 °F (37.2 °C)  Heart Rate:  [] 96  Resp:  [18-22] 18  BP: ()/(58-85) 91/72   SpO2:  [94 %-100 %] 97 %  on    Device (Oxygen Therapy): room air    Intake/Output Summary (Last 24 hours) at 2020 1309  Last data filed at 2020 1242  Gross per 24 hour   Intake 980 ml   Output 450 ml   Net 530 ml     Body mass index is 29.12 kg/m².      20  1120 20  1954 20  0559   Weight: 84.4 kg (186 lb) 83.5 kg (184 lb) 81.8 kg (180 lb 6.4 oz)     Weight change:     Physical Exam  Constitutional:       General: She is not in acute distress.     Appearance: She is not diaphoretic.   HENT:      Head: Atraumatic.      Nose: Nose normal.   Eyes:      Conjunctiva/sclera: Conjunctivae normal.   Cardiovascular:      Rate and Rhythm: Normal rate and regular rhythm.      Heart sounds: No murmur.   Pulmonary:      Effort: Pulmonary effort is normal.      Breath sounds: No wheezing or rales.      Comments: Diminished bases  Abdominal:      General: Bowel sounds are normal.      Palpations: Abdomen is soft.      Tenderness: There is no abdominal tenderness.   Musculoskeletal:         General: Swelling (Trace left ankle) and deformity (Left ankle) present.   Skin:     General: Skin is warm and dry.   Neurological:      Mental Status: She is alert.         Results Review:       Results from last 7 days   Lab Units 12/12/20  0518 12/11/20  1236   SODIUM mmol/L 133* 131*   POTASSIUM mmol/L 3.5 3.4*   CHLORIDE mmol/L 96* 97*   CO2 mmol/L 24.3 23.4   BUN mg/dL 8 10   CREATININE mg/dL 0.95 0.98   GLUCOSE mg/dL 99 123*   CALCIUM mg/dL 8.5* 8.6   AST (SGOT) U/L  --  29   ALT (SGPT) U/L  --  51*     Estimated Creatinine Clearance: 80.6 mL/min (by C-G formula based on SCr of 0.95 mg/dL).          Results from last 7 days   Lab Units 12/11/20  1236   TROPONIN T ng/mL <0.010             Results from last 7 days   Lab Units 12/12/20  0518   MAGNESIUM mg/dL 2.0           Invalid input(s): LDLCALC  Results from last 7 days   Lab Units 12/12/20  0518 12/11/20  2133 12/11/20  1236   WBC 10*3/mm3 11.71* 11.27* 9.35   HEMOGLOBIN g/dL 11.5* 12.0 12.4   HEMATOCRIT % 34.8 36.5 37.8   PLATELETS 10*3/mm3 135* 138* 150   MCV fL 96.9 98.4* 96.7   MCH pg 32.0 32.3 31.7   MCHC g/dL 33.0 32.9 32.8   RDW % 13.4 13.2 13.2   RDW-SD fl 47.3 47.5 47.1   MPV fL 13.3* 12.5* 12.8*   NEUTROPHIL % % 64.6 73.3 71.5   LYMPHOCYTE % % 23.6 16.5* 18.1*   MONOCYTES % % 11.2 9.3 9.7   EOSINOPHIL % % 0.0* 0.0* 0.0*   BASOPHIL % % 0.3 0.4 0.3   IMM GRAN % %  --  0.5 0.4   NEUTROS ABS 10*3/mm3 7.58* 8.26* 6.68   LYMPHS ABS 10*3/mm3 2.76 1.86 1.69   MONOS ABS 10*3/mm3 1.31* 1.05* 0.91*   EOS ABS 10*3/mm3 0.00 0.00 0.00   BASOS ABS 10*3/mm3 0.03 0.04 0.03   IMMATURE GRANS (ABS) 10*3/mm3  --  0.06* 0.04   NRBC /100 WBC  --  0.0 0.1     Results from last 7 days   Lab Units 12/12/20  0518 12/11/20  2133 12/11/20  1236   INR   --   --  1.52*   APTT seconds 147.5* >200.0* 26.5                             Results from last 7 days   Lab Units 12/11/20  1247   NITRITE UA  Negative   WBC UA /HPF 0-2   BACTERIA UA /HPF None Seen   SQUAM EPITHEL UA /HPF 0-2           Imaging:  Imaging Results (Last 24 Hours)     Procedure Component Value Units Date/Time    CT Angiogram Chest [[id number]] Collected: 12/11/20 1518     Updated: 12/11/20 1530     Narrative:      CT ANGIOGRAM CHEST     HISTORY: Elevated D dimer. Evaluate for pulmonary embolism.     TECHNIQUE: CT angiogram of the chest is provided and correlated with  chest x-ray from earlier today and a chest CT angiogram from 09/30/2020.  Multiplanar and 3-dimensional reformatted images were produced at a  separate workstation.     Radiation dose reduction techniques were utilized, including automated  exposure control and exposure modulation based on body size.     FINDINGS: There is a cardiac pacing device and there is dilatation of  the left ventricle which seems to primarily account for the massively  enlarged cardiac silhouette seen on x-ray. The appearance of the cardiac  structures appears unchanged in comparison with previous CT angiogram.  There is normal caliber of the pulmonary arteries. A small peripheral  pulmonary embolism is observed in the posterior right upper lobe on  image 46. There is also an occlusive embolism along the anterolateral  right lower lobe branch seen on images 83 and below. There is a patch of  parenchymal opacity of about 6 cm diameter peripheral to this area,  likely representing pulmonary infarction. There is no central pulmonary  thromboembolism. No embolus is identified on the left. There is no  dilatation of the right cardiac chambers. RV:LV ratio measures 0.6.     The lungs are otherwise clear. Airways are patent. Upper abdominal  structures appear grossly normal.       Impression:      There are peripheral, acute pulmonary thromboemboli in right  upper and right lower lobe pulmonary artery branches. That along the  anterolateral right lower lobe appears occlusive and there is  parenchymal change suggesting pulmonary infarction along the  anterolateral right lower lobe. No other embolism is identified. There  is massive dilatation of the left cardiac chambers as was seen on  previous CT. There is no enlargement of the right heart chambers. I  called the findings to  Anahy Rocha PA-C, in the emergency department  at 3:15 PM.     This report was finalized on 12/11/2020 3:27 PM by Dr. Gerardo Joshua M.D.       XR Chest 1 View [[id number]] Collected: 12/11/20 1328     Updated: 12/11/20 1422    Narrative:      ONE VIEW PORTABLE CHEST     HISTORY: Persistent cough. Episode of hemoptysis.     FINDINGS: There is prominent cardiomegaly with a pacemaker in place and  this shows no change from 10/27/2020. The lungs are well-expanded and  clear and there is no evidence of pneumonia.     This report was finalized on 12/11/2020 2:19 PM by Dr. Ta Cordero M.D.             I reviewed the patient's new clinical results and medications.         Medication Review:   Medications reviewed. See electronic record    Problem List:     Active Hospital Problems    Diagnosis  POA   • **Acute pulmonary embolism (CMS/HCC) [I26.99]  Yes   • Acute on chronic systolic congestive heart failure (CMS/HCC) [I50.23]  Yes   • NICM (nonischemic cardiomyopathy) (CMS/HCC) [I42.8]  Yes   • AICD (automatic cardioverter/defibrillator) present [Z95.810]  Yes   • Asthma [J45.909]  Yes   • Chronic systolic congestive heart failure (CMS/HCC) [I50.22]  Yes   • Class 2 obesity in adult [E66.9]  Yes   • Essential hypertension [I10]  Yes   • HIV (human immunodeficiency virus infection) (CMS/HCC) [B20]  Yes      Resolved Hospital Problems   No resolved problems to display.       Assessment and Plan:     45 y.o with history of indetectible HIV status, NICM s/p AICD and chronic systolic CHF who presented with complaints of hemoptysis and was found to have acute pulmonary embolism with infarct.     Acute PE with pulmonary infarct   -RUL and RLL peripheral emobilisms noted on CTA. There is an appearance of either active bleeding vs infarct in RLL. Unclear cause of PE. Although no significant edema BLE she did have a recent left ankle injury with decreased mobility raises concern for DVT. No family history of clots or  miscarriages, but does have a strong family history of breast cancer.   -trop ok and no sign of RV dilation, but does have significant LV dilation. BP decreased today, but is chronic. not requiring oxygen at this time, but still has significant hemoptysis. Likely not a candidate for intervention given clot is more in periphery.   -check BLE venous doppler  -consult pulmonary and defer further coagulopathy work up or procedures to them  -monitor saturations closely  -continue heparin drip. Will need hemoptysis settled before considering switching to OAC  -Hold aspirin and flonase for now    NICM s/p AICD/ chronic systolic CHF/HTN  -volume status overall stable. Significant LV dilation. Recent evaluation by [facility] heart transplant team and started on corlanor (but had not taken yet) and aldactone  -check BNP  -continue aldactone, entresto, carvedilol and Lasix  -Monitor MEAGHAN, daily weight and vitals    Asthma  -no sign of acute exacerbation. continue PRN bronchodilators      HIV  -chronic indetecable levels for years with recent test about a month ago and no recent med adjustments.   -continue home antivirals    VTE prophylaxis: heparin  CODE status: full code  Disposition: TBD    I discussed the patients findings and my recommendations with patient, nursing staff and Dr. Zapata.    I wore full protective equipment throughout this patient encounter including a face mask and eye shield. Hand hygiene was performed upon entering patients room and when leaving the room.      Kasia Andersen, APRN  12/12/20

## 2020-12-12 NOTE — CONSULTS
Referring Provider: Dr. Zapata  Reason for Consultation: Hemoptysis    Patient Care Team:  Darcie Ledbetter APRN as PCP - General (Internal Medicine)  Lisset Melton MD as Consulting Physician (Cardiology)  Andrew Watson MD as Consulting Physician (Urology)    Chief complaint:   Hemoptysis    History of present illness:    Subjective   This is a 45-year-old female patient, former smoker with history of HIV.      She presented to the hospital yesterday for hemoptysis of 1 day duration.  She described coughing up red blood.  She stated that she has chronic cough with whitish phlegm which he attributed to CHF but then yesterday her cough turned bloody.  She estimated the amount about several tablespoon.  Associated symptoms include right lower chest pain which is worse with cough.  There is no alleviating factors.  No shortness of breath.  She also has a headache.    CTA chest was consistent with small PE and pulmonary infiltrates in the RLL suggestive of pulmonary infarct.    She denies prior history of VTE or family history of such.    She has chronic fatigue and mostly immobile.  She stays on the couch for hours.  No recent travel.  She is compliant with HIV meds.  No history of TB.  TB test 20 years ago was negative reportedly.      Labs:  WBC 11; hemoglobin 11.5 at baseline; proBNP 83777; bicarb 24      Data reviewed:  Doppler lower extremities 12/12/2020: Negative for DVT.  Echo 7/9/2020: Left ventricle severely dilated; grade 3 diastolic dysfunction; moderate to severe MR; RVSP 48      Review of Systems  Constitutional: No fever or chills.   ENMT: No sinus congestion  Cardiovascular: Right lower chest pain.  No retrosternal chest pain , palpitation or legs swelling.    Respiratory: See above.  Gastrointestinal: No constipation, diarrhea or abdominal pain   Neurology: No headache, numbness or dizziness..  Headache.  Generalized weakness, chronic.  Musculoskeletal: No joints pain,  stiffness or swelling.   Psychiatry: No depression.  Genitourinary: No dysuria or frequent urination  Endo: No weight changes. No cold or warm intolerance.  Lymphatic: No swollen glands.  Integumentary: No rash.    History  Past Medical History:   Diagnosis Date   • AICD (automatic cardioverter/defibrillator) present 3/6/2020   • Asthma    • CHF (congestive heart failure) (CMS/[facility])    • Class 2 obesity in adult    • HIV (human immunodeficiency virus infection) (CMS/[facility])    • Hypertension    • LBBB (left bundle branch block)    • NICM (nonischemic cardiomyopathy) (CMS/[facility])     2020 EF 6% per echocardiogram; AICD present   ,   Past Surgical History:   Procedure Laterality Date   • CARDIAC CATHETERIZATION     • CARDIAC DEFIBRILLATOR PLACEMENT     • FRACTURE SURGERY Left     left ankle   ,   Family History   Problem Relation Age of Onset   • Cancer Mother    • Breast cancer Mother    • Bone cancer Mother    • Hypertension Maternal Grandfather    • Hyperlipidemia Maternal Grandfather    • Diabetes Maternal Grandfather    • Prostate cancer Maternal Grandfather    • Ovarian cysts Daughter    • Breast cancer Maternal Grandmother    • Heart disease Maternal Grandmother    • No Known Problems Daughter    • No Known Problems Daughter    ,   Social History     Tobacco Use   • Smoking status: Former Smoker     Quit date:      Years since quittin.9   • Smokeless tobacco: Never Used   Substance Use Topics   • Alcohol use: Not Currently     Frequency: Never     Comment: holiday and special occ//caffeine use: 1 cup daily   • Drug use: Never       ,   Medications Prior to Admission   Medication Sig Dispense Refill Last Dose   • aspirin 81 MG EC tablet Take 81 mg by mouth Daily.   2020 at Unknown time   • darunavir ethanolate (PREZISTA) 800 MG tablet tablet Take 800 mg by mouth Every Night.   2020 at Unknown time   • Empagliflozin (Jardiance) 10 MG tablet Take 10 mg by mouth Daily. 30 tablet 11 2020 at  Unknown time   • Emtricitabine-Tenofovir AF (DESCOVY) 200-25 MG per tablet Take  by mouth Every Night.   12/11/2020 at Unknown time   • fluticasone (Flonase) 50 MCG/ACT nasal spray 2 sprays into the nostril(s) as directed by provider Daily. 1 bottle 2 12/11/2020 at Unknown time   • furosemide (LASIX) 80 MG tablet Take 1 tablet by mouth 2 (Two) Times a Day. 180 tablet 3 12/11/2020 at Unknown time   • ondansetron ODT (Zofran ODT) 4 MG disintegrating tablet Place 1 tablet on the tongue Every 8 (Eight) Hours As Needed for Nausea or Vomiting. 30 tablet 0 Past Month at Unknown time   • potassium chloride (K-DUR,KLOR-CON) 20 MEQ CR tablet Take 3 tablets by mouth Daily. 90 tablet 1 12/11/2020 at Unknown time   • ritonavir (NORVIR) 100 MG tablet Take 100 mg by mouth Every Night.   12/11/2020 at Unknown time   • sacubitril-valsartan (ENTRESTO) 24-26 MG tablet Take 1 tablet by mouth Every 12 (Twelve) Hours. 60 tablet 0 12/11/2020 at Unknown time   • sertraline (ZOLOFT) 50 MG tablet Take 50 mg by mouth Daily.   12/11/2020 at Unknown time   • spironolactone (ALDACTONE) 25 MG tablet TAKE 1 2 (ONE HALF) TABLET BY MOUTH ONCE DAILY   12/11/2020 at Unknown time   • vitamin D (ERGOCALCIFEROL) 46660 units capsule capsule Take 50,000 Units by mouth Every 30 (Thirty) Days.   Past Month at Unknown time   • acetaminophen (TYLENOL) 325 MG tablet Take 650 mg by mouth Every 6 (Six) Hours As Needed for Mild Pain .   More than a month at Unknown time   • albuterol sulfate  (90 Base) MCG/ACT inhaler Inhale 2 puffs Every 4 (Four) Hours As Needed for Wheezing.   Unknown at Unknown time   • carvedilol (COREG) 3.125 MG tablet Take 1 tablet by mouth 2 (Two) Times a Day With Meals for 30 days. 60 tablet 0     and Allergies:  Patient has no known allergies.    Objective     Vital Signs   Temp:  [98.3 °F (36.8 °C)-99 °F (37.2 °C)] 99 °F (37.2 °C)  Heart Rate:  [] 93  Resp:  [18-22] 18  BP: ()/(58-85) 91/72    Physical  Exam:  Constitutional: Not in acute distress.  Eyes: Injected conjunctivae, EOMI. pupils equal reactive to light.  ENMT: Ndiaye 3. No oral thrush. Tonsils grade  Neck: Trachea midline. No thyromegaly  Heart: RRR, no murmur  Lungs: Good and equal air entry bilaterally.  Mild right lower lobe crackles.  No wheezing Non labored breathing.   Abdomen: Obese. Soft. No tenderness or dullness. No HSM.  Extremities: No cyanosis, clubbing or pitting edema.  Warm extremities and well-perfused.  Neuro: Conscious, alert, oriented x3.  Strength 5/5 in arms.  Psych: Appropriate mood and affect.    Integumentary: No rash.  Normal skin turgor  Lymphatic: No palpable cervical or supraclavicular lymph nodes.      Diagnostic imaging:  I personally and independently reviewed the following images:     CTA chest 12/11/2020:  Right lower lobe anterior pulmonary infiltrates suggestive of infarct.  Subsegmental RUL and RLL pulmonary embolism.           Assessment   1. Acute subsegmental pulmonary embolism, likely provoked by immobilization.  2. Right lower lobe pulmonary infarct  3. Hemoptysis, secondary to pulmonary infarct  4. HIV    Recommendations:    · Heparin drip.  Once stable and prior to discharge can transition to NOAC.  · Continue watching hemoptysis now.  It is not massive.  There has been no drop in hemoglobin.  There is not much to do about it. If it becomes severe then embolisations can be considered.   · Start codeine/guaifenesin as needed for cough        Dennis Diehl MD  12/12/20  18:33 EST

## 2020-12-12 NOTE — PLAN OF CARE
Goal Outcome Evaluation:  Plan of Care Reviewed With: patient     Outcome Summary: Pt is a 45 y.o. female admitted for hemoptysis and SOA, with a PMH which includes CHF and HIV. Pt presents to PT with concerns for impaired mobility. Pt performed bed mobility independently, transfers with supervision, and ambulated 100ft with supervision for safety only. She also elected to push the IV pole within her room for about 15ft with supervision and no evidence of imbalance. Encouraged stair assessment, however pt declines citing fatigue this AM. Pt demonstrate no evidence of overt unsteadiness/LOB throughout. Pt does not require skilled PT at this time as she appears to be at her baseline functional mobility. PT recommends d/c home when appropriate. Patient was wearing a face mask during this therapy encounter. Therapist used appropriate personal protective equipment including eye protection, mask, and gloves.  Mask used was standard procedure mask. Appropriate PPE was worn during the entire therapy session. Hand hygiene was completed before and after therapy session. Patient is not in enhanced droplet precautions.

## 2020-12-13 PROBLEM — R04.2 HEMOPTYSIS: Status: ACTIVE | Noted: 2020-12-13

## 2020-12-13 LAB
APTT PPP: 101.3 SECONDS (ref 22.7–35.4)
APTT PPP: 68 SECONDS (ref 22.7–35.4)
APTT PPP: 77.9 SECONDS (ref 22.7–35.4)
APTT PPP: 90.1 SECONDS (ref 22.7–35.4)
BASOPHILS # BLD AUTO: 0.03 10*3/MM3 (ref 0–0.2)
BASOPHILS NFR BLD AUTO: 0.3 % (ref 0–1.5)
DEPRECATED RDW RBC AUTO: 47.3 FL (ref 37–54)
EOSINOPHIL # BLD AUTO: 0 10*3/MM3 (ref 0–0.4)
EOSINOPHIL NFR BLD AUTO: 0 % (ref 0.3–6.2)
ERYTHROCYTE [DISTWIDTH] IN BLOOD BY AUTOMATED COUNT: 13.4 % (ref 12.3–15.4)
HCT VFR BLD AUTO: 32.6 % (ref 34–46.6)
HGB BLD-MCNC: 10.7 G/DL (ref 12–15.9)
IMM GRANULOCYTES # BLD AUTO: 0.05 10*3/MM3 (ref 0–0.05)
IMM GRANULOCYTES NFR BLD AUTO: 0.5 % (ref 0–0.5)
IRON 24H UR-MRATE: 19 MCG/DL (ref 37–145)
IRON SATN MFR SERPL: 4 % (ref 20–50)
LYMPHOCYTES # BLD AUTO: 1.98 10*3/MM3 (ref 0.7–3.1)
LYMPHOCYTES NFR BLD AUTO: 18 % (ref 19.6–45.3)
MCH RBC QN AUTO: 31.7 PG (ref 26.6–33)
MCHC RBC AUTO-ENTMCNC: 32.8 G/DL (ref 31.5–35.7)
MCV RBC AUTO: 96.4 FL (ref 79–97)
MONOCYTES # BLD AUTO: 1.3 10*3/MM3 (ref 0.1–0.9)
MONOCYTES NFR BLD AUTO: 11.8 % (ref 5–12)
NEUTROPHILS NFR BLD AUTO: 69.4 % (ref 42.7–76)
NEUTROPHILS NFR BLD AUTO: 7.63 10*3/MM3 (ref 1.7–7)
NRBC BLD AUTO-RTO: 0 /100 WBC (ref 0–0.2)
PLATELET # BLD AUTO: 140 10*3/MM3 (ref 140–450)
PMV BLD AUTO: 12.5 FL (ref 6–12)
RBC # BLD AUTO: 3.38 10*6/MM3 (ref 3.77–5.28)
TIBC SERPL-MCNC: 481 MCG/DL (ref 298–536)
TRANSFERRIN SERPL-MCNC: 323 MG/DL (ref 200–360)
WBC # BLD AUTO: 10.99 10*3/MM3 (ref 3.4–10.8)

## 2020-12-13 PROCEDURE — 25010000002 HEPARIN (PORCINE) PER 1000 UNITS: Performed by: PHYSICIAN ASSISTANT

## 2020-12-13 PROCEDURE — 83540 ASSAY OF IRON: CPT | Performed by: NURSE PRACTITIONER

## 2020-12-13 PROCEDURE — 84466 ASSAY OF TRANSFERRIN: CPT | Performed by: NURSE PRACTITIONER

## 2020-12-13 PROCEDURE — 85025 COMPLETE CBC W/AUTO DIFF WBC: CPT | Performed by: PHYSICIAN ASSISTANT

## 2020-12-13 PROCEDURE — 85730 THROMBOPLASTIN TIME PARTIAL: CPT | Performed by: INTERNAL MEDICINE

## 2020-12-13 RX ORDER — HYDROCODONE BITARTRATE AND ACETAMINOPHEN 7.5; 325 MG/1; MG/1
1 TABLET ORAL EVERY 4 HOURS PRN
Status: DISCONTINUED | OUTPATIENT
Start: 2020-12-13 | End: 2020-12-14

## 2020-12-13 RX ADMIN — GUAIFENESIN AND CODEINE PHOSPHATE 5 ML: 10; 100 LIQUID ORAL at 11:37

## 2020-12-13 RX ADMIN — SERTRALINE HYDROCHLORIDE 50 MG: 50 TABLET, FILM COATED ORAL at 08:15

## 2020-12-13 RX ADMIN — RITONAVIR 100 MG: 100 TABLET, FILM COATED ORAL at 20:24

## 2020-12-13 RX ADMIN — BUTALBITAL, ACETAMINOPHEN, AND CAFFEINE 1 TABLET: 50; 325; 40 TABLET ORAL at 06:45

## 2020-12-13 RX ADMIN — BUTALBITAL, ACETAMINOPHEN, AND CAFFEINE 1 TABLET: 50; 325; 40 TABLET ORAL at 11:37

## 2020-12-13 RX ADMIN — EMTRICITABINE AND TENOFOVIR ALAFENAMIDE 1 TABLET: 200; 25 TABLET ORAL at 20:24

## 2020-12-13 RX ADMIN — GUAIFENESIN AND CODEINE PHOSPHATE 5 ML: 10; 100 LIQUID ORAL at 06:45

## 2020-12-13 RX ADMIN — CARVEDILOL 3.12 MG: 3.12 TABLET, FILM COATED ORAL at 08:15

## 2020-12-13 RX ADMIN — FUROSEMIDE 80 MG: 80 TABLET ORAL at 08:15

## 2020-12-13 RX ADMIN — HYDROCODONE BITARTRATE AND ACETAMINOPHEN 1 TABLET: 7.5; 325 TABLET ORAL at 17:28

## 2020-12-13 RX ADMIN — HEPARIN SODIUM 13.7 UNITS/KG/HR: 10000 INJECTION, SOLUTION INTRAVENOUS at 17:54

## 2020-12-13 RX ADMIN — HEPARIN SODIUM 13.7 UNITS/KG/HR: 10000 INJECTION, SOLUTION INTRAVENOUS at 16:28

## 2020-12-13 RX ADMIN — SACUBITRIL AND VALSARTAN 1 TABLET: 24; 26 TABLET, FILM COATED ORAL at 08:15

## 2020-12-13 RX ADMIN — GUAIFENESIN AND CODEINE PHOSPHATE 5 ML: 10; 100 LIQUID ORAL at 23:22

## 2020-12-13 RX ADMIN — SACUBITRIL AND VALSARTAN 1 TABLET: 24; 26 TABLET, FILM COATED ORAL at 20:25

## 2020-12-13 RX ADMIN — GUAIFENESIN AND CODEINE PHOSPHATE 5 ML: 10; 100 LIQUID ORAL at 16:09

## 2020-12-13 RX ADMIN — FUROSEMIDE 80 MG: 80 TABLET ORAL at 17:31

## 2020-12-13 RX ADMIN — HYDROCODONE BITARTRATE AND ACETAMINOPHEN 1 TABLET: 7.5; 325 TABLET ORAL at 23:03

## 2020-12-13 RX ADMIN — BUTALBITAL, ACETAMINOPHEN, AND CAFFEINE 1 TABLET: 50; 325; 40 TABLET ORAL at 02:33

## 2020-12-13 RX ADMIN — POTASSIUM CHLORIDE 60 MEQ: 750 TABLET, EXTENDED RELEASE ORAL at 08:14

## 2020-12-13 RX ADMIN — GUAIFENESIN AND CODEINE PHOSPHATE 5 ML: 10; 100 LIQUID ORAL at 02:33

## 2020-12-13 RX ADMIN — DARUNAVIR 800 MG: 800 TABLET, FILM COATED ORAL at 20:24

## 2020-12-13 RX ADMIN — CARVEDILOL 3.12 MG: 3.12 TABLET, FILM COATED ORAL at 17:31

## 2020-12-13 RX ADMIN — BUTALBITAL, ACETAMINOPHEN, AND CAFFEINE 1 TABLET: 50; 325; 40 TABLET ORAL at 16:09

## 2020-12-13 NOTE — PLAN OF CARE
Goal Outcome Evaluation:  Plan of Care Reviewed With: patient  Progress: improving  Outcome Summary: pt is still on the Heparin gtt. aptt not therapeutic yet, titrating gtt. pt still having hemoptysis. prn guaifenisin and codeine given, prn fioricet for headache. room air. no SOA. will ctm.

## 2020-12-13 NOTE — PLAN OF CARE
Problem: Adult Inpatient Plan of Care  Goal: Plan of Care Review  Outcome: Ongoing, Progressing  Flowsheets  Taken 12/13/2020 1842 by Nabil Tavares RN  Progress: improving  Outcome Summary: Hemoptysis has slowed down, but still a persistant issue.  Pain around ribs has increased.  Mostly therapeutic on INR via hep gtt.  Taken 12/12/2020 0929 by Romel Richardson, PT  Plan of Care Reviewed With: patient  Goal: Patient-Specific Goal (Individualized)  Outcome: Ongoing, Progressing  Goal: Absence of Hospital-Acquired Illness or Injury  Outcome: Ongoing, Progressing  Intervention: Identify and Manage Fall Risk  Recent Flowsheet Documentation  Taken 12/13/2020 1800 by Nabil Tavares RN  Safety Promotion/Fall Prevention:   nonskid shoes/slippers when out of bed   room organization consistent   toileting scheduled   safety round/check completed  Taken 12/13/2020 1612 by Nabil Tavares RN  Safety Promotion/Fall Prevention:   activity supervised   clutter free environment maintained   fall prevention program maintained   muscle strengthening facilitated   nonskid shoes/slippers when out of bed   room organization consistent   safety round/check completed   toileting scheduled  Taken 12/13/2020 1438 by Nabil Tavares RN  Safety Promotion/Fall Prevention: safety round/check completed  Taken 12/13/2020 1245 by Nabil Tavares RN  Safety Promotion/Fall Prevention: safety round/check completed  Taken 12/13/2020 0843 by Nabil Tavares RN  Safety Promotion/Fall Prevention:   toileting scheduled   safety round/check completed   room organization consistent  Intervention: Prevent Skin Injury  Recent Flowsheet Documentation  Taken 12/13/2020 1800 by Nabil Tavares RN  Body Position: position changed independently  Taken 12/13/2020 1612 by Nabil Tavares RN  Body Position: dangle, side of bed  Taken 12/13/2020 1438 by Nabil Tavares RN  Body Position: weight shift assistance provided  Taken 12/13/2020 1245 by Nabil Tavares RN  Body  Position: side-lying, left  Taken 12/13/2020 0843 by Nabil Tavares RN  Body Position:   weight shift assistance provided   position changed independently  Intervention: Prevent and Manage VTE (venous thromboembolism) Risk  Recent Flowsheet Documentation  Taken 12/13/2020 0843 by Nabil Tavares RN  VTE Prevention/Management: (hep gttt )   bilateral   dorsiflexion/plantar flexion performed   bleeding risk factor(s) identified  Intervention: Prevent Infection  Recent Flowsheet Documentation  Taken 12/13/2020 1612 by Nabil Tavares RN  Infection Prevention:   cohorting utilized   environmental surveillance performed   equipment surfaces disinfected   personal protective equipment utilized   rest/sleep promoted   single patient room provided   hand hygiene promoted  Taken 12/13/2020 1438 by Nabil Tavares RN  Infection Prevention:   cohorting utilized   environmental surveillance performed   hand hygiene promoted   personal protective equipment utilized   rest/sleep promoted   single patient room provided   visitors restricted/screened   equipment surfaces disinfected  Goal: Optimal Comfort and Wellbeing  Outcome: Ongoing, Progressing  Goal: Readiness for Transition of Care  Outcome: Ongoing, Progressing   Goal Outcome Evaluation:  Plan of Care Reviewed With: patient  Progress: improving  Outcome Summary: Hemoptysis has slowed down, but still a persistant issue.  Pain around ribs has increased.  Mostly therapeutic on INR via hep gtt.

## 2020-12-13 NOTE — PROGRESS NOTES
Progress Note    Name: Nina Saez ADMIT: 2020   : 1975  PCP: Darcie Ledbetter APRN    MRN: [id number] LOS: 1 days   AGE/SEX: 45 y.o. female  ROOM: [facility]/   Date of Encounter Visit: 20    Subjective:     Interval History: Still having hemoptysis but less today. BLE venous doppler negative    REVIEW OF SYSTEMS:   no fever or chills, but low-grade temp reported.  No chest pain, palpitations or edema. R flank pain worse with deep inspiration.   No shortness of breath. Cough better with cough medicine. Hemoptysis.  No nausea, vomiting or diarrhea.  Reports more formed stool today without melena    Objective:   Temp:  [98.5 °F (36.9 °C)-99 °F (37.2 °C)] 98.5 °F (36.9 °C)  Heart Rate:  [81-98] 94  Resp:  [18-20] 20  BP: ()/(72-80) 99/74   SpO2:  [94 %-98 %] 95 %  on    Device (Oxygen Therapy): room air    Intake/Output Summary (Last 24 hours) at 2020 1153  Last data filed at 2020 0842  Gross per 24 hour   Intake 1767.23 ml   Output 950 ml   Net 817.23 ml     Body mass index is 29.86 kg/m².      20  0559 20  0617   Weight: 83.5 kg (184 lb) 81.8 kg (180 lb 6.4 oz) 83.9 kg (185 lb)     Weight change: -0.454 kg (-1 lb)    Physical Exam  Constitutional:       General: She is not in acute distress.     Appearance: She is not diaphoretic.   HENT:      Head: Atraumatic.      Nose: Nose normal.   Eyes:      Conjunctiva/sclera: Conjunctivae normal.   Cardiovascular:      Rate and Rhythm: Normal rate and regular rhythm.      Heart sounds: No murmur.   Pulmonary:      Effort: Pulmonary effort is normal.      Breath sounds: No wheezing or rales.      Comments: Diminished bases  Abdominal:      General: Bowel sounds are normal.      Palpations: Abdomen is soft.      Tenderness: There is no abdominal tenderness.   Musculoskeletal:         General: Deformity (Left ankle) present. No swelling.   Skin:     General: Skin is warm and dry.   Neurological:      Mental  Status: She is alert.         Results Review:      Results from last 7 days   Lab Units 12/12/20  0518 12/11/20  1236   SODIUM mmol/L 133* 131*   POTASSIUM mmol/L 3.5 3.4*   CHLORIDE mmol/L 96* 97*   CO2 mmol/L 24.3 23.4   BUN mg/dL 8 10   CREATININE mg/dL 0.95 0.98   GLUCOSE mg/dL 99 123*   CALCIUM mg/dL 8.5* 8.6   AST (SGOT) U/L  --  29   ALT (SGPT) U/L  --  51*     Estimated Creatinine Clearance: 81.6 mL/min (by C-G formula based on SCr of 0.95 mg/dL).          Results from last 7 days   Lab Units 12/11/20  1236   TROPONIN T ng/mL <0.010     Results from last 7 days   Lab Units 12/12/20  0518   PROBNP pg/mL 22,479.0*         Results from last 7 days   Lab Units 12/12/20  0518   MAGNESIUM mg/dL 2.0           Invalid input(s): LDLCALC  Results from last 7 days   Lab Units 12/13/20  0143 12/12/20  0518 12/11/20  2133 12/11/20  1236   WBC 10*3/mm3 10.99* 11.71* 11.27* 9.35   HEMOGLOBIN g/dL 10.7* 11.5* 12.0 12.4   HEMATOCRIT % 32.6* 34.8 36.5 37.8   PLATELETS 10*3/mm3 140 135* 138* 150   MCV fL 96.4 96.9 98.4* 96.7   MCH pg 31.7 32.0 32.3 31.7   MCHC g/dL 32.8 33.0 32.9 32.8   RDW % 13.4 13.4 13.2 13.2   RDW-SD fl 47.3 47.3 47.5 47.1   MPV fL 12.5* 13.3* 12.5* 12.8*   NEUTROPHIL % % 69.4 64.6 73.3 71.5   LYMPHOCYTE % % 18.0* 23.6 16.5* 18.1*   MONOCYTES % % 11.8 11.2 9.3 9.7   EOSINOPHIL % % 0.0* 0.0* 0.0* 0.0*   BASOPHIL % % 0.3 0.3 0.4 0.3   IMM GRAN % % 0.5  --  0.5 0.4   NEUTROS ABS 10*3/mm3 7.63* 7.58* 8.26* 6.68   LYMPHS ABS 10*3/mm3 1.98 2.76 1.86 1.69   MONOS ABS 10*3/mm3 1.30* 1.31* 1.05* 0.91*   EOS ABS 10*3/mm3 0.00 0.00 0.00 0.00   BASOS ABS 10*3/mm3 0.03 0.03 0.04 0.03   IMMATURE GRANS (ABS) 10*3/mm3 0.05  --  0.06* 0.04   NRBC /100 WBC 0.0  --  0.0 0.1     Results from last 7 days   Lab Units 12/13/20  0832 12/13/20  0143 12/12/20  1816 12/12/20  0518 12/11/20  2133 12/11/20  1236   INR   --   --   --   --   --  1.52*   APTT seconds 77.9* 101.3* 62.3* 147.5* >200.0* 26.5                              Results from last 7 days   Lab Units 12/11/20  1247   NITRITE UA  Negative   WBC UA /HPF 0-2   BACTERIA UA /HPF None Seen   SQUAM EPITHEL UA /HPF 0-2           Imaging:  Imaging Results (Last 24 Hours)     ** No results found for the last 24 hours. **          I reviewed the patient's new clinical results and medications.         Medication Review:   Medications reviewed. See electronic record    Problem List:     Active Hospital Problems    Diagnosis  POA   • **Acute pulmonary embolism (CMS/HCC) [I26.99]  Yes   • Acute on chronic systolic congestive heart failure (CMS/HCC) [I50.23]  Yes   • NICM (nonischemic cardiomyopathy) (CMS/[facility]) [I42.8]  Yes   • AICD (automatic cardioverter/defibrillator) present [Z95.810]  Yes   • Asthma [J45.909]  Yes   • Chronic systolic congestive heart failure (CMS/HCC) [I50.22]  Yes   • Class 2 obesity in adult [E66.9]  Yes   • Essential hypertension [I10]  Yes   • HIV (human immunodeficiency virus infection) (CMS/[facility]) [B20]  Yes      Resolved Hospital Problems   No resolved problems to display.       Assessment and Plan:     45 y.o with history of indetectible HIV status, NICM s/p AICD and chronic systolic CHF who presented with complaints of hemoptysis and was found to have acute pulmonary embolism with infarct.     Acute PE with RLL pulmonary infarct/ hemoptysis  -RUL and RLL peripheral emobilisms noted on CTA. Unclear etiology. BLE doppler negative for DVT. Possibly from recent sedentary state. No known hx of TB.  -Will likely need 6 months anticoagulation, but given chronic LV dilation may need prolonged course or further hypercoagulable work up as outpatient.   -monitor saturations. Currently on RA.   -PRN cough meds  -appreciate pulmonary input  -continue heparin drip and defer timing of switching to OAC to pulmonary given hemoptysis. Although less today.   -aspirin and Flonase on hold    NICM s/p AICD/ chronic systolic CHF/HTN  -volume status overall stable. Significant LV  dilation. Recent evaluation by [facility] heart transplant team.   -had not started recently ordered Corlanor and poor candidate given hypotension  -BNP significantly elevated  -continue aldactone, entresto, carvedilol and Lasix  -Monitor I&O, daily weight and vitals    Asthma  -no sign of acute exacerbation. continue PRN bronchodilators      HIV  -chronic indetecable levels for years with recent test about a month ago and no recent med adjustments.   -continue home antivirals    VTE prophylaxis: heparin  CODE status: full code  Disposition: TBD    I discussed the patients findings and my recommendations with patient.    I wore full protective equipment throughout this patient encounter including a face mask and eye shield. Hand hygiene was performed upon entering patients room and when leaving the room.      Kasia Andersen, APRN  12/13/20

## 2020-12-13 NOTE — PROGRESS NOTES
"                                              LOS: 1 day   Patient Care Team:  Darcie Ledbetter APRN as PCP - General (Internal Medicine)  Lisset Melton MD as Consulting Physician (Cardiology)  Andrew Watson MD as Consulting Physician (Urology)    Chief Complaint:  F/up hemoptysis. PE    Subjective   Interval History  Improved hemoptysis. No dyspnea.     REVIEW OF SYSTEMS:   CARDIOVASCULAR: Mild right sided chest pain improved. No palpitation. No bleeding elsewhere.     Constitutional: No fever or chills    Ventilator/Non-Invasive Ventilation Settings (From admission, onward)    None                Physical Exam:     Vital Signs  Temp:  [98.5 °F (36.9 °C)-98.7 °F (37.1 °C)] 98.5 °F (36.9 °C)  Heart Rate:  [81-95] 95  Resp:  [18-20] 18  BP: ()/(63-80) 95/63    Intake/Output Summary (Last 24 hours) at 12/13/2020 1807  Last data filed at 12/13/2020 1612  Gross per 24 hour   Intake 1327.23 ml   Output 950 ml   Net 377.23 ml     Flowsheet Rows      First Filed Value   Admission Height  167.6 cm (66\") Documented at 12/11/2020 1120   Admission Weight  84.4 kg (186 lb) Documented at 12/11/2020 1120          General Appearance:   Alert, cooperative, in no acute distress   ENMT:  Mallampati score 3, moist mucous membrane   Eyes:  Pupils equal and reactive to light. EOMI   Neck:    Trachea midline. No thyromegaly.   Lungs:    Mild crackles RLL. No wheezing. Non labored breathing.     Heart:   Regular rhythm and normal rate, normal S1 and S2, no         murmur   Skin:   No rash   Abdomen:     Soft. No tenderness. No HSM.   Neuro:  Conscious, alert, oriented x3. Strength 5/5 in upper and lower  ext   Extremities:  No cyanosis, clubbing or edema.  Warm extremities and well-perfused          Results Review:        Results from last 7 days   Lab Units 12/12/20  0518 12/11/20  1236   SODIUM mmol/L 133* 131*   POTASSIUM mmol/L 3.5 3.4*   CHLORIDE mmol/L 96* 97*   CO2 mmol/L 24.3 23.4   BUN mg/dL 8 10 "   CREATININE mg/dL 0.95 0.98   GLUCOSE mg/dL 99 123*   CALCIUM mg/dL 8.5* 8.6     Results from last 7 days   Lab Units 12/11/20  1236   TROPONIN T ng/mL <0.010     Results from last 7 days   Lab Units 12/13/20  0143 12/12/20  0518 12/11/20  2133   WBC 10*3/mm3 10.99* 11.71* 11.27*   HEMOGLOBIN g/dL 10.7* 11.5* 12.0   HEMATOCRIT % 32.6* 34.8 36.5   PLATELETS 10*3/mm3 140 135* 138*     Results from last 7 days   Lab Units 12/13/20  1540 12/13/20  0832 12/13/20  0143  12/11/20  1236   INR   --   --   --   --  1.52*   APTT seconds 68.0* 77.9* 101.3*   < > 26.5    < > = values in this interval not displayed.     Results from last 7 days   Lab Units 12/12/20  0518   PROBNP pg/mL 22,479.0*       I reviewed the patient's new clinical results.        Medication Review:   carvedilol, 3.125 mg, Oral, BID With Meals  darunavir ethanolate, 800 mg, Oral, Nightly  Emtricitabine-Tenofovir AF, 1 tablet, Oral, Nightly  furosemide, 80 mg, Oral, BID  influenza vaccine, 0.5 mL, Intramuscular, Once  potassium chloride, 60 mEq, Oral, Daily  ritonavir, 100 mg, Oral, Nightly  sacubitril-valsartan, 1 tablet, Oral, Q12H  sertraline, 50 mg, Oral, Daily  spironolactone, 12.5 mg, Oral, Daily        heparin (porcine), 17.7 Units/kg/hr, Last Rate: 13.7 Units/kg/hr (12/13/20 1628)          Assessment   1. Acute subsegmental pulmonary embolism, likely provoked by immobilization.  2. Right lower lobe pulmonary infarct  3. Hemoptysis, secondary to pulmonary infarct  4. HIV  5. Anemia, acute on chronic      Plan   · PRN Codeine for cough.   · Continue Heparin drip. When hemoptysis diminishes or cease then will transition to Warfarin or NOAC.   · Pulmonary angiography and embolization of massive hemoptysis. Though hemoglobin dropped, her hemoptysis has actually improved clinically.     Discussed with her nurse.         Dennis Diehl MD  12/13/20  18:07 EST          This note was dictated utilizing Dragon dictation

## 2020-12-14 LAB
APTT PPP: 81.7 SECONDS (ref 22.7–35.4)
BASOPHILS # BLD AUTO: 0.03 10*3/MM3 (ref 0–0.2)
BASOPHILS NFR BLD AUTO: 0.2 % (ref 0–1.5)
DEPRECATED RDW RBC AUTO: 47 FL (ref 37–54)
EOSINOPHIL # BLD AUTO: 0.01 10*3/MM3 (ref 0–0.4)
EOSINOPHIL NFR BLD AUTO: 0.1 % (ref 0.3–6.2)
ERYTHROCYTE [DISTWIDTH] IN BLOOD BY AUTOMATED COUNT: 13.2 % (ref 12.3–15.4)
HCT VFR BLD AUTO: 32.6 % (ref 34–46.6)
HGB BLD-MCNC: 10.9 G/DL (ref 12–15.9)
INR PPP: 1.88 (ref 0.9–1.1)
LYMPHOCYTES # BLD AUTO: 1.88 10*3/MM3 (ref 0.7–3.1)
LYMPHOCYTES NFR BLD AUTO: 13.1 % (ref 19.6–45.3)
MCH RBC QN AUTO: 32.3 PG (ref 26.6–33)
MCHC RBC AUTO-ENTMCNC: 33.4 G/DL (ref 31.5–35.7)
MCV RBC AUTO: 96.7 FL (ref 79–97)
MONOCYTES # BLD AUTO: 1.35 10*3/MM3 (ref 0.1–0.9)
MONOCYTES NFR BLD AUTO: 9.4 % (ref 5–12)
NEUTROPHILS NFR BLD AUTO: 10.98 10*3/MM3 (ref 1.7–7)
NEUTROPHILS NFR BLD AUTO: 76.7 % (ref 42.7–76)
PLATELET # BLD AUTO: 166 10*3/MM3 (ref 140–450)
PMV BLD AUTO: 13.1 FL (ref 6–12)
PROTHROMBIN TIME: 21.4 SECONDS (ref 11.7–14.2)
RBC # BLD AUTO: 3.37 10*6/MM3 (ref 3.77–5.28)
WBC # BLD AUTO: 14.32 10*3/MM3 (ref 3.4–10.8)

## 2020-12-14 PROCEDURE — 25010000002 ENOXAPARIN PER 10 MG: Performed by: INTERNAL MEDICINE

## 2020-12-14 PROCEDURE — 94762 N-INVAS EAR/PLS OXIMTRY CONT: CPT

## 2020-12-14 PROCEDURE — 85025 COMPLETE CBC W/AUTO DIFF WBC: CPT | Performed by: PHYSICIAN ASSISTANT

## 2020-12-14 PROCEDURE — 25010000002 ONDANSETRON PER 1 MG: Performed by: INTERNAL MEDICINE

## 2020-12-14 PROCEDURE — 85730 THROMBOPLASTIN TIME PARTIAL: CPT | Performed by: PHYSICIAN ASSISTANT

## 2020-12-14 PROCEDURE — 36415 COLL VENOUS BLD VENIPUNCTURE: CPT | Performed by: PHYSICIAN ASSISTANT

## 2020-12-14 PROCEDURE — 25010000002 HEPARIN (PORCINE) PER 1000 UNITS: Performed by: PHYSICIAN ASSISTANT

## 2020-12-14 PROCEDURE — 85610 PROTHROMBIN TIME: CPT | Performed by: INTERNAL MEDICINE

## 2020-12-14 RX ORDER — WARFARIN SODIUM 7.5 MG/1
7.5 TABLET ORAL
Status: COMPLETED | OUTPATIENT
Start: 2020-12-14 | End: 2020-12-14

## 2020-12-14 RX ORDER — HYDROCODONE BITARTRATE AND ACETAMINOPHEN 10; 325 MG/1; MG/1
1 TABLET ORAL EVERY 4 HOURS PRN
Status: DISCONTINUED | OUTPATIENT
Start: 2020-12-14 | End: 2020-12-17 | Stop reason: HOSPADM

## 2020-12-14 RX ORDER — WARFARIN SODIUM 5 MG/1
5 TABLET ORAL
Status: DISCONTINUED | OUTPATIENT
Start: 2020-12-14 | End: 2020-12-14 | Stop reason: DRUGHIGH

## 2020-12-14 RX ORDER — GUAIFENESIN AND CODEINE PHOSPHATE 100; 10 MG/5ML; MG/5ML
2.5 SOLUTION ORAL ONCE
Status: COMPLETED | OUTPATIENT
Start: 2020-12-15 | End: 2020-12-14

## 2020-12-14 RX ADMIN — RITONAVIR 100 MG: 100 TABLET, FILM COATED ORAL at 21:39

## 2020-12-14 RX ADMIN — ONDANSETRON 4 MG: 2 INJECTION INTRAMUSCULAR; INTRAVENOUS at 03:29

## 2020-12-14 RX ADMIN — GUAIFENESIN AND CODEINE PHOSPHATE 5 ML: 10; 100 LIQUID ORAL at 12:29

## 2020-12-14 RX ADMIN — SODIUM CHLORIDE 500 ML: 9 INJECTION, SOLUTION INTRAVENOUS at 23:51

## 2020-12-14 RX ADMIN — HYDROCODONE BITARTRATE AND ACETAMINOPHEN 1 TABLET: 7.5; 325 TABLET ORAL at 08:51

## 2020-12-14 RX ADMIN — SERTRALINE HYDROCHLORIDE 50 MG: 50 TABLET, FILM COATED ORAL at 08:41

## 2020-12-14 RX ADMIN — ENOXAPARIN SODIUM 90 MG: 100 INJECTION SUBCUTANEOUS at 21:39

## 2020-12-14 RX ADMIN — GUAIFENESIN AND CODEINE PHOSPHATE 5 ML: 10; 100 LIQUID ORAL at 08:51

## 2020-12-14 RX ADMIN — EMTRICITABINE AND TENOFOVIR ALAFENAMIDE 1 TABLET: 200; 25 TABLET ORAL at 21:39

## 2020-12-14 RX ADMIN — POTASSIUM CHLORIDE 60 MEQ: 750 TABLET, EXTENDED RELEASE ORAL at 08:40

## 2020-12-14 RX ADMIN — WARFARIN 7.5 MG: 7.5 TABLET ORAL at 21:39

## 2020-12-14 RX ADMIN — HYDROCODONE BITARTRATE AND ACETAMINOPHEN 1 TABLET: 7.5; 325 TABLET ORAL at 03:37

## 2020-12-14 RX ADMIN — CARVEDILOL 3.12 MG: 3.12 TABLET, FILM COATED ORAL at 08:40

## 2020-12-14 RX ADMIN — SACUBITRIL AND VALSARTAN 1 TABLET: 24; 26 TABLET, FILM COATED ORAL at 08:40

## 2020-12-14 RX ADMIN — DARUNAVIR 800 MG: 800 TABLET, FILM COATED ORAL at 21:40

## 2020-12-14 RX ADMIN — HYDROCODONE BITARTRATE AND ACETAMINOPHEN 1 TABLET: 10; 325 TABLET ORAL at 12:29

## 2020-12-14 RX ADMIN — FUROSEMIDE 80 MG: 80 TABLET ORAL at 08:40

## 2020-12-14 RX ADMIN — HEPARIN SODIUM 13.7 UNITS/KG/HR: 10000 INJECTION, SOLUTION INTRAVENOUS at 17:43

## 2020-12-14 RX ADMIN — GUAIFENESIN AND CODEINE PHOSPHATE 2.5 ML: 10; 100 LIQUID ORAL at 23:24

## 2020-12-14 NOTE — PROGRESS NOTES
Name: Nina Saez ADMIT: 2020   : 1975  PCP: Darcie Ledbetter APRN    MRN: [id number] LOS: 2 days   AGE/SEX: 45 y.o. female  ROOM: [facility]   Subjective   Chief Complaint   Patient presents with   • Cough     Patient c/o cough x 2 months, patient was seen for cough [facility].     Still with dyspnea  +pleuritic pain  +hemoptysis but better  On heparin gtt    ROS  No f/c  No n/v  No cp/palp  +soa/cough    Objective   Vital Signs  Temp:  [97.6 °F (36.4 °C)-98 °F (36.7 °C)] 97.6 °F (36.4 °C)  Heart Rate:  [81-95] 85  Resp:  [18-20] 18  BP: (86-96)/(64-68) 86/65  SpO2:  [95 %-99 %] 98 %  on  Flow (L/min):  [2] 2;   Device (Oxygen Therapy): nasal cannula  Body mass index is 30.51 kg/m².    Chronically ill, in some pain  Physical Exam  Constitutional:       General: She is in acute distress.      Appearance: She is well-developed.   HENT:      Head: Normocephalic and atraumatic.   Eyes:      General: No scleral icterus.  Neck:      Musculoskeletal: Neck supple.   Cardiovascular:      Rate and Rhythm: Regular rhythm.      Heart sounds: Murmur present.   Pulmonary:      Effort: Pulmonary effort is normal. No respiratory distress.   Abdominal:      General: There is no distension.      Palpations: Abdomen is soft.   Neurological:      Mental Status: She is alert.   Psychiatric:         Behavior: Behavior normal.         Results Review:       I reviewed the patient's new clinical results.  Results from last 7 days   Lab Units 20  0426 20  0143 20  0518 20  2133   WBC 10*3/mm3 14.32* 10.99* 11.71* 11.27*   HEMOGLOBIN g/dL 10.9* 10.7* 11.5* 12.0   PLATELETS 10*3/mm3 166 140 135* 138*     Results from last 7 days   Lab Units 20  0518 20  1236   SODIUM mmol/L 133* 131*   POTASSIUM mmol/L 3.5 3.4*   CHLORIDE mmol/L 96* 97*   CO2 mmol/L 24.3 23.4   BUN mg/dL 8 10   CREATININE mg/dL 0.95 0.98   GLUCOSE mg/dL 99 123*   Estimated Creatinine  Clearance: 82.5 mL/min (by C-G formula based on SCr of 0.95 mg/dL).  Results from last 7 days   Lab Units 12/11/20  1236   ALBUMIN g/dL 3.60   BILIRUBIN mg/dL 1.1   ALK PHOS U/L 127*   AST (SGOT) U/L 29   ALT (SGPT) U/L 51*     Results from last 7 days   Lab Units 12/12/20  0518 12/11/20  1236   CALCIUM mg/dL 8.5* 8.6   ALBUMIN g/dL  --  3.60   MAGNESIUM mg/dL 2.0  --          Coag   Results from last 7 days   Lab Units 12/14/20  0426 12/13/20  2231 12/13/20  1540 12/13/20  0832 12/13/20  0143 12/12/20  1816 12/12/20  0518  12/11/20  1236   INR   --   --   --   --   --   --   --   --  1.52*   APTT seconds 81.7* 90.1* 68.0* 77.9* 101.3* 62.3* 147.5*   < > 26.5    < > = values in this interval not displayed.     HbA1C No results found for: HGBA1C  Infection     Radiology(recent) No radiology results for the last day  No results found for: TROPONINT, TROPONINI, BNP  No components found for: TSH;2    carvedilol, 3.125 mg, Oral, BID With Meals  darunavir ethanolate, 800 mg, Oral, Nightly  Emtricitabine-Tenofovir AF, 1 tablet, Oral, Nightly  furosemide, 80 mg, Oral, BID  influenza vaccine, 0.5 mL, Intramuscular, Once  potassium chloride, 60 mEq, Oral, Daily  ritonavir, 100 mg, Oral, Nightly  sacubitril-valsartan, 1 tablet, Oral, Q12H  sertraline, 50 mg, Oral, Daily  spironolactone, 12.5 mg, Oral, Daily      heparin (porcine), 17.7 Units/kg/hr, Last Rate: 13.7 Units/kg/hr (12/13/20 1754)    Diet Regular; Cardiac      Assessment/Plan      Active Hospital Problems    Diagnosis  POA   • **Acute pulmonary embolism (CMS/HCC) [I26.99]  Yes   • Hemoptysis [R04.2]  Unknown   • Acute on chronic systolic congestive heart failure (CMS/HCC) [I50.23]  Yes   • NICM (nonischemic cardiomyopathy) (CMS/HCC) [I42.8]  Yes   • AICD (automatic cardioverter/defibrillator) present [Z95.810]  Yes   • Asthma [J45.909]  Yes   • Chronic systolic congestive heart failure (CMS/HCC) [I50.22]  Yes   • Class 2 obesity in adult [E66.9]  Yes   • Essential  hypertension [I10]  Yes   • HIV (human immunodeficiency virus infection) (CMS/HCC) [B20]  Yes      Resolved Hospital Problems   No resolved problems to display.       45 y.o with history of indetectible HIV status, NICM s/p AICD and chronic systolic CHF who presented with complaints of hemoptysis and was found to have acute pulmonary embolism with infarct.      Acute PE with RLL pulmonary infarct/ hemoptysis  -RUL and RLL peripheral emobilisms noted on CTA. Unclear etiology. BLE doppler negative for DVT. Possibly from recent sedentary state. No known hx of TB.  -Will likely need 6 months anticoagulation, but given chronic LV dilation may need prolonged course or further hypercoagulable work up as outpatient.   -monitor saturations. Currently on RA.   -PRN cough meds  -appreciate pulmonary input  -continue heparin drip and likely transition to oral agent tonight  -aspirin and Flonase on hold  -increase prn pain medications     NICM s/p AICD/ chronic systolic CHF/HTN  -volume status overall stable. Significant LV dilation. Recent evaluation by [facility] heart transplant team.   -had not started recently ordered Corlanor and poor candidate given hypotension  -BNP significantly elevated  -continue aldactone, entresto, carvedilol and Lasix  -Monitor I&O, daily weight and vitals  -BP a little low may have to hold some cardiac medications depending on times.      Asthma  -no sign of acute exacerbation. continue PRN bronchodilators       HIV  -chronic indetecable levels for years with recent test about a month ago and no recent med adjustments.   -continue home antivirals     VTE prophylaxis: heparin  CODE status: full code  Disposition: hopefully to home soon      JOSE JUAN RN  Greater than 36 minutes spent with greater than 50% counseling and coordinating care        Damien Zapata MD  [facility]  12/14/20  14:06 EST

## 2020-12-14 NOTE — PLAN OF CARE
Goal Outcome Evaluation:  Plan of Care Reviewed With: patient  Progress: improving  Outcome Summary: pt reports no hemoptysis this shift, continues to have c/o pain in R side rib cage and chest r/t coughing. norco given x2, zofran given x1. PTT remains within therapeutic range for heparin gtt. no dose changes. a.ox4, NSR. put on 2L NC for comfort, with sats at 100%. no acute changes. wctm.

## 2020-12-14 NOTE — DISCHARGE PLACEMENT REQUEST
"Nina Luis (45 y.o. Female)     Date of Birth Social Security Number Address Home Phone MRN    1975  [id number]  [address]  [facility] [id number] [phone] [id number]    Synagogue Marital Status          Rastafarian        Admission Date Admission Type Admitting Provider Attending Provider Department, Room/Bed    12/11/20 Emergency Stingl, MD Benny Hickey Alan David, MD [address], [address]    Discharge Date Discharge Disposition Discharge Destination                       Attending Provider: Damien Zapata MD    Allergies: No Known Allergies    Isolation: None   Infection: None   Code Status: CPR    Ht: 167.6 cm (66\")   Wt: 85.7 kg (189 lb)    Admission Cmt: None   Principal Problem: Acute pulmonary embolism (CMS/HCC) [I26.99]                 Active Insurance as of 12/11/2020     Primary Coverage     Payor Plan Insurance Group Employer/Plan Group    MEDICARE MEDICARE A & B      Payor Plan Address Payor Plan Phone Number Payor Plan Fax Number Effective Dates    [address] [phone]  5/1/2019 - None Entered    [facility] [id number]       Subscriber Name Subscriber Birth Date Member ID       NINA LUIS 1975 [id number]           Secondary Coverage     Payor Plan Insurance Group Employer/Plan Group    [place]      Payor Plan Address Payor Plan Phone Number Payor Plan Fax Number Effective Dates    [address] [address]   6/1/2019 - None Entered    [place] [address]       Subscriber Name Subscriber Birth Date Member ID       NINA LUIS 1975 [id number]                 Emergency Contacts      (Rel.) Home Phone Work Phone Mobile Phone    Bill Patel (Daughter) [phone] -- --              "

## 2020-12-14 NOTE — PROGRESS NOTES
Discharge Planning Assessment  [facility]     Patient Name: Nina Saez  MRN: [id number]  Today's Date: 12/14/2020    Admit Date: 12/11/2020    Discharge Needs Assessment     Row Name 12/14/20 5384       Living Environment    Lives With  alone    Current Living Arrangements  home/apartment/condo    Primary Care Provided by  self    Provides Primary Care For  no one    Family Caregiver if Needed  child(wilfred), adult    Family Caregiver Names  dtr, Bill Patel [phone]       Resource/Environmental Concerns    Resource/Environmental Concerns  none       Transition Planning    Patient/Family Anticipates Transition to  home    Patient/Family Anticipated Services at Transition  home health care    Transportation Anticipated  family or friend will provide       Discharge Needs Assessment    Readmission Within the Last 30 Days  no previous admission in last 30 days    Equipment Currently Used at Home  none    Concerns to be Addressed  denies needs/concerns at this time    Equipment Needed After Discharge  none    Discharge Facility/Level of Care Needs  home with home health    Current Discharge Risk  chronically ill        Discharge Plan     Row Name 12/14/20 8537       Plan    Plan  Home with referrals for home health pending for nursing and PT    Provided Post Acute Provider List?  Yes    Post Acute Provider List  Home Health;Nursing Home    Provided Post Acute Provider Quality & Resource List?  N/A    Patient/Family in Agreement with Plan  yes    Plan Comments  Facesheet information was verified with patient at bedside.  Patient lives in an apartment alone.  She is IADLs.  She is disabled.  She plans to have her dtr, Bill Patel [phone] come to her home and assist with her care at [place] and she would like referrals for Home Health for [facility] agency for nursing and PT if needed as she has used them before.  Referrals are pending.  She would like to use meds to beds at [facility] and her PCP was verfied in  [facility].  Her dtr will transport at [place]......................Angélica Garcia RN        Continued Care and Services - Admitted Since 12/11/2020    Coordination has not been started for this encounter.         Demographic Summary     Row Name 12/14/20 1753       General Information    Admission Type  inpatient    Arrived From  home    Required Notices Provided  Important Message from Medicare    Referral Source  admission list    Preferred Language  English       Contact Information    Permission Granted to Share Info With  ;family/designee    Contact Information Comments  dtr, Blil Patel [phone]        Functional Status     Row Name 12/14/20 3156       Functional Status    Usual Activity Tolerance  good    Current Activity Tolerance  moderate       Functional Status, IADL    Medications  independent    Meal Preparation  independent    Housekeeping  independent    Laundry  independent    Shopping  independent       Mental Status    General Appearance WDL  WDL       Employment/    Employment Status  disabled        Psychosocial    No documentation.       Abuse/Neglect    No documentation.       Legal    No documentation.       Substance Abuse    No documentation.       Patient Forms    No documentation.           Angélica Garcia RN

## 2020-12-14 NOTE — PROGRESS NOTES
"                                              LOS: 2 days   Patient Care Team:  Darcie Ledbetter APRN as PCP - General (Internal Medicine)  Lisset Melton MD as Consulting Physician (Cardiology)  Andrew Watson MD as Consulting Physician (Urology)    Chief Complaint:  F/up hemoptysis. PE    Subjective   Interval History  Reported an episode of awakening at night with shortness of breath and low oxygen.  Indeed, she had an SPO2 of 87% on RA when she was asleep for a brief time.    Otherwise denies shortness of breath at rest.  Continues to have mild right-sided chest pain.  Hemoptysis has improved significantly.    REVIEW OF SYSTEMS:   CARDIOVASCULAR: Mild right sided chest pain improved. No palpitation. No bleeding elsewhere.     Constitutional: No fever or chills    Ventilator/Non-Invasive Ventilation Settings (From admission, onward)    None                Physical Exam:     Vital Signs  Temp:  [97.6 °F (36.4 °C)-98 °F (36.7 °C)] 97.6 °F (36.4 °C)  Heart Rate:  [72-92] 84  Resp:  [18-20] 18  BP: (83-96)/(55-68) 86/63    Intake/Output Summary (Last 24 hours) at 12/14/2020 1734  Last data filed at 12/14/2020 1241  Gross per 24 hour   Intake 1174.38 ml   Output 400 ml   Net 774.38 ml     Flowsheet Rows      First Filed Value   Admission Height  167.6 cm (66\") Documented at 12/11/2020 1120   Admission Weight  84.4 kg (186 lb) Documented at 12/11/2020 1120          General Appearance:   Alert, cooperative, in no acute distress   ENMT:  Mallampati score 3, moist mucous membrane   Eyes:  Pupils equal and reactive to light. EOMI   Neck:    Trachea midline. No thyromegaly.   Lungs:    Mild crackles RLL. No wheezing. Non labored breathing.     Heart:   Regular rhythm and normal rate, normal S1 and S2, no         murmur   Skin:   No rash   Abdomen:     Soft. No tenderness. No HSM.   Neuro:  Conscious, alert, oriented x3. Strength 5/5 in upper and lower  ext   Extremities:  No cyanosis, clubbing or edema.  Warm " extremities and well-perfused          Results Review:        Results from last 7 days   Lab Units 12/12/20  0518 12/11/20  1236   SODIUM mmol/L 133* 131*   POTASSIUM mmol/L 3.5 3.4*   CHLORIDE mmol/L 96* 97*   CO2 mmol/L 24.3 23.4   BUN mg/dL 8 10   CREATININE mg/dL 0.95 0.98   GLUCOSE mg/dL 99 123*   CALCIUM mg/dL 8.5* 8.6     Results from last 7 days   Lab Units 12/11/20  1236   TROPONIN T ng/mL <0.010     Results from last 7 days   Lab Units 12/14/20  0426 12/13/20  0143 12/12/20  0518   WBC 10*3/mm3 14.32* 10.99* 11.71*   HEMOGLOBIN g/dL 10.9* 10.7* 11.5*   HEMATOCRIT % 32.6* 32.6* 34.8   PLATELETS 10*3/mm3 166 140 135*     Results from last 7 days   Lab Units 12/14/20  0426 12/13/20  2231 12/13/20  1540  12/11/20  1236   INR   --   --   --   --  1.52*   APTT seconds 81.7* 90.1* 68.0*   < > 26.5    < > = values in this interval not displayed.     Results from last 7 days   Lab Units 12/12/20  0518   PROBNP pg/mL 22,479.0*       I reviewed the patient's new clinical results.        Medication Review:   carvedilol, 3.125 mg, Oral, BID With Meals  darunavir ethanolate, 800 mg, Oral, Nightly  Emtricitabine-Tenofovir AF, 1 tablet, Oral, Nightly  furosemide, 80 mg, Oral, BID  influenza vaccine, 0.5 mL, Intramuscular, Once  potassium chloride, 60 mEq, Oral, Daily  ritonavir, 100 mg, Oral, Nightly  sacubitril-valsartan, 1 tablet, Oral, Q12H  sertraline, 50 mg, Oral, Daily  spironolactone, 12.5 mg, Oral, Daily        heparin (porcine), 17.7 Units/kg/hr, Last Rate: 13.7 Units/kg/hr (12/13/20 0184)          Assessment   1. Acute subsegmental pulmonary embolism, likely provoked by immobilization.  2. Right lower lobe pulmonary infarct  3. Hemoptysis, secondary to pulmonary infarct  4. HIV  5. Anemia, acute on chronic.  Stable  6. Intermittent hypoxia, mostly nocturnal.  Could suggest sleep apnea  7. Obesity (BMI = 30)      Plan   · Warfarin bridge.  Continue heparin.  DC heparin when INR reaches 2-3.  Can transition to  Lovenox at this point as her hemoptysis has improved significantly  · PRN Codeine for cough.   · Overnight oximetry on RA    Discussed with her nurse.         Dennis Diehl MD  12/14/20  17:34 EST          This note was dictated utilizing Dragon dictation

## 2020-12-15 LAB
ALBUMIN SERPL-MCNC: 3.1 G/DL (ref 3.5–5.2)
ANION GAP SERPL CALCULATED.3IONS-SCNC: 11.1 MMOL/L (ref 5–15)
BUN SERPL-MCNC: 18 MG/DL (ref 6–20)
BUN/CREAT SERPL: 14.9 (ref 7–25)
CALCIUM SPEC-SCNC: 8.6 MG/DL (ref 8.6–10.5)
CHLORIDE SERPL-SCNC: 96 MMOL/L (ref 98–107)
CO2 SERPL-SCNC: 23.9 MMOL/L (ref 22–29)
CREAT SERPL-MCNC: 1.21 MG/DL (ref 0.57–1)
GFR SERPL CREATININE-BSD FRML MDRD: 58 ML/MIN/1.73
GLUCOSE SERPL-MCNC: 130 MG/DL (ref 65–99)
INR PPP: 1.78 (ref 0.9–1.1)
PHOSPHATE SERPL-MCNC: 3.5 MG/DL (ref 2.5–4.5)
POTASSIUM SERPL-SCNC: 4.9 MMOL/L (ref 3.5–5.2)
PROTHROMBIN TIME: 20.5 SECONDS (ref 11.7–14.2)
SODIUM SERPL-SCNC: 131 MMOL/L (ref 136–145)

## 2020-12-15 PROCEDURE — 25010000002 ENOXAPARIN PER 10 MG: Performed by: INTERNAL MEDICINE

## 2020-12-15 PROCEDURE — 99222 1ST HOSP IP/OBS MODERATE 55: CPT | Performed by: INTERNAL MEDICINE

## 2020-12-15 PROCEDURE — 80069 RENAL FUNCTION PANEL: CPT | Performed by: INTERNAL MEDICINE

## 2020-12-15 PROCEDURE — 85610 PROTHROMBIN TIME: CPT | Performed by: INTERNAL MEDICINE

## 2020-12-15 RX ORDER — WARFARIN SODIUM 7.5 MG/1
7.5 TABLET ORAL
Status: COMPLETED | OUTPATIENT
Start: 2020-12-15 | End: 2020-12-15

## 2020-12-15 RX ADMIN — DARUNAVIR 800 MG: 800 TABLET, FILM COATED ORAL at 20:47

## 2020-12-15 RX ADMIN — CARVEDILOL 3.12 MG: 3.12 TABLET, FILM COATED ORAL at 18:03

## 2020-12-15 RX ADMIN — POTASSIUM CHLORIDE 60 MEQ: 750 TABLET, EXTENDED RELEASE ORAL at 09:12

## 2020-12-15 RX ADMIN — SERTRALINE HYDROCHLORIDE 50 MG: 50 TABLET, FILM COATED ORAL at 09:12

## 2020-12-15 RX ADMIN — GUAIFENESIN AND CODEINE PHOSPHATE 5 ML: 10; 100 LIQUID ORAL at 18:03

## 2020-12-15 RX ADMIN — ENOXAPARIN SODIUM 90 MG: 100 INJECTION SUBCUTANEOUS at 20:45

## 2020-12-15 RX ADMIN — WARFARIN 7.5 MG: 7.5 TABLET ORAL at 18:03

## 2020-12-15 RX ADMIN — EMTRICITABINE AND TENOFOVIR ALAFENAMIDE 1 TABLET: 200; 25 TABLET ORAL at 20:46

## 2020-12-15 RX ADMIN — HYDROCODONE BITARTRATE AND ACETAMINOPHEN 1 TABLET: 10; 325 TABLET ORAL at 11:24

## 2020-12-15 RX ADMIN — HYDROCODONE BITARTRATE AND ACETAMINOPHEN 1 TABLET: 10; 325 TABLET ORAL at 20:45

## 2020-12-15 RX ADMIN — ENOXAPARIN SODIUM 90 MG: 100 INJECTION SUBCUTANEOUS at 09:10

## 2020-12-15 RX ADMIN — GUAIFENESIN AND CODEINE PHOSPHATE 5 ML: 10; 100 LIQUID ORAL at 09:21

## 2020-12-15 RX ADMIN — FUROSEMIDE 80 MG: 80 TABLET ORAL at 18:03

## 2020-12-15 RX ADMIN — RITONAVIR 100 MG: 100 TABLET, FILM COATED ORAL at 20:45

## 2020-12-15 NOTE — NURSING NOTE
Contacted Cardiology regarding the patient's Coreg, Entresto, Spironolactone, and Lasix as related to her low blood pressures (80s/60s).     It was explained that the patient's low blood pressures are reasonable considering her cardiomyopathy and EF of 6%.     Cardiology further instructed:  - Hold Entresto until further instructed. Order has been discontinued at this time.    - Coreg Parameters to hold if systolic less than 85 or symptomatic.  - Continue to give spironolactone and Lasix as ordered, unless symptomatic.      ,

## 2020-12-15 NOTE — PROGRESS NOTES
Name: Nina Saez ADMIT: 2020   : 1975  PCP: Darcie Ledbetter APRN    MRN: [id number] LOS: 3 days   AGE/SEX: 45 y.o. female  ROOM: [facility]   Subjective   Chief Complaint   Patient presents with   • Cough     Patient c/o cough x 2 months, patient was seen for cough 2 weeks ago at the [facility].     Still with dyspnea  +pleuritic pain  hemoptysis resolved  On lovenox and coumadin    ROS  No f/c  No n/v  No cp/palp  +soa/cough    Objective   Vital Signs  Temp:  [97.2 °F (36.2 °C)-98.2 °F (36.8 °C)] 97.9 °F (36.6 °C)  Heart Rate:  [72-97] 89  Resp:  [18] 18  BP: (80-96)/(55-65) 82/58  SpO2:  [95 %-100 %] 98 %  on  Flow (L/min):  [2] 2;   Device (Oxygen Therapy): room air  Body mass index is 30.7 kg/m².    Chronically ill, in some pain  Physical Exam  Constitutional:       General: She is not in acute distress.     Appearance: She is well-developed.   HENT:      Head: Normocephalic and atraumatic.   Eyes:      General: No scleral icterus.  Neck:      Musculoskeletal: Neck supple.   Cardiovascular:      Rate and Rhythm: Regular rhythm.      Heart sounds: Murmur present.   Pulmonary:      Effort: Pulmonary effort is normal. No respiratory distress.   Abdominal:      General: There is no distension.      Palpations: Abdomen is soft.   Neurological:      Mental Status: She is alert.   Psychiatric:         Behavior: Behavior normal.         Results Review:       I reviewed the patient's new clinical results.  Results from last 7 days   Lab Units 20  0426 20  0143 20  0518 20  2133   WBC 10*3/mm3 14.32* 10.99* 11.71* 11.27*   HEMOGLOBIN g/dL 10.9* 10.7* 11.5* 12.0   PLATELETS 10*3/mm3 166 140 135* 138*     Results from last 7 days   Lab Units 12/15/20  0444 20  0518 20  1236   SODIUM mmol/L 131* 133* 131*   POTASSIUM mmol/L 4.9 3.5 3.4*   CHLORIDE mmol/L 96* 96* 97*   CO2 mmol/L 23.9 24.3 23.4   BUN mg/dL 18 8 10   CREATININE mg/dL 1.21* 0.95 0.98   GLUCOSE  mg/dL 130* 99 123*   Estimated Creatinine Clearance: 65 mL/min (A) (by C-G formula based on SCr of 1.21 mg/dL (H)).  Results from last 7 days   Lab Units 12/15/20  0444 12/11/20  1236   ALBUMIN g/dL 3.10* 3.60   BILIRUBIN mg/dL  --  1.1   ALK PHOS U/L  --  127*   AST (SGOT) U/L  --  29   ALT (SGPT) U/L  --  51*     Results from last 7 days   Lab Units 12/15/20  0444 12/12/20  0518 12/11/20  1236   CALCIUM mg/dL 8.6 8.5* 8.6   ALBUMIN g/dL 3.10*  --  3.60   MAGNESIUM mg/dL  --  2.0  --    PHOSPHORUS mg/dL 3.5  --   --          Coag   Results from last 7 days   Lab Units 12/15/20  0444 12/14/20  1945 12/14/20  0426 12/13/20  2231 12/13/20  1540 12/13/20  0832 12/13/20  0143 12/12/20  1816 12/12/20  0518  12/11/20  1236   INR  1.78* 1.88*  --   --   --   --   --   --   --   --  1.52*   APTT seconds  --   --  81.7* 90.1* 68.0* 77.9* 101.3* 62.3* 147.5*   < > 26.5    < > = values in this interval not displayed.     HbA1C No results found for: HGBA1C  Infection     Radiology(recent) No radiology results for the last day  No results found for: TROPONINT, TROPONINI, BNP  No components found for: TSH;2    carvedilol, 3.125 mg, Oral, BID With Meals  darunavir ethanolate, 800 mg, Oral, Nightly  Emtricitabine-Tenofovir AF, 1 tablet, Oral, Nightly  enoxaparin, 1 mg/kg, Subcutaneous, Q12H  furosemide, 80 mg, Oral, BID  influenza vaccine, 0.5 mL, Intramuscular, Once  potassium chloride, 60 mEq, Oral, Daily  ritonavir, 100 mg, Oral, Nightly  sacubitril-valsartan, 1 tablet, Oral, Q12H  sertraline, 50 mg, Oral, Daily  spironolactone, 12.5 mg, Oral, Daily      Pharmacy to dose warfarin,     Diet Regular; Cardiac      Assessment/Plan      Active Hospital Problems    Diagnosis  POA   • **Acute pulmonary embolism (CMS/HCC) [I26.99]  Yes   • Hemoptysis [R04.2]  Unknown   • Acute on chronic systolic congestive heart failure (CMS/HCC) [I50.23]  Yes   • NICM (nonischemic cardiomyopathy) (CMS/HCC) [I42.8]  Yes   • AICD (automatic  cardioverter/defibrillator) present [Z95.810]  Yes   • Asthma [J45.909]  Yes   • Chronic systolic congestive heart failure (CMS/HCC) [I50.22]  Yes   • Class 2 obesity in adult [E66.9]  Yes   • Essential hypertension [I10]  Yes   • HIV (human immunodeficiency virus infection) (CMS/HCC) [B20]  Yes      Resolved Hospital Problems   No resolved problems to display.       45 y.o with history of indetectible HIV status, NICM s/p AICD and chronic systolic CHF who presented with complaints of hemoptysis and was found to have acute pulmonary embolism with infarct. Had been on heparin gtt but now transitioning to coumadin/lovenox bridge. NOAC not ideal due to interaction with HIV medications.      Acute PE with RLL pulmonary infarct/ hemoptysis  -RUL and RLL peripheral emobilisms noted on CTA. Unclear etiology. BLE doppler negative for DVT. Possibly from recent sedentary state. No known hx of TB.  -Will likely need 6 months anticoagulation, but given chronic LV dilation may need prolonged course or further hypercoagulable work up as outpatient.   -monitor saturations. Currently on RA.   -PRN cough meds  -appreciate pulmonary input  - Had been on heparin gtt but now transitioning to coumadin/lovenox bridge. NOAC not ideal due to interaction with HIV medications.   -aspirin on hold  -increase prn pain medications     NICM s/p AICD/ chronic systolic CHF/HTN  -volume status overall stable. Significant LV dilation. Recent evaluation by [facility] heart transplant team.   -on aldactone, entresto, carvedilol and Lasix- BP low so some held this AM. Will have Cardiology evaluate to see if need to adjust medications at all.  -Monitor I&O, daily weight and vitals       Asthma  -no sign of acute exacerbation. continue PRN bronchodilators       HIV  -chronic indetecable levels for years with recent test about a month ago and no recent med adjustments.   -continue home antivirals    CODE status: full code  Disposition: hopefully to home  12/16      JOSE JUAN RN  Greater than 36 minutes spent with greater than 50% counseling and coordinating care        Damien Zapata MD  [facility]  12/15/20  14:06 EST

## 2020-12-15 NOTE — CONSULTS
Cardiology History & Physical / Consultation      Patient Name: Nina Saez  Age/Sex: 45 y.o. female  : 1975  MRN: [id number]    Date of Admission: 2020  Date of Encounter Visit: 12/15/20  Encounter Provider: Saul Lewis MD  Referring Provider: Lorenza Ohara MD  Place of Service: [facility] CARDIOLOGY  Patient Care Team:  Darcie Ledbetter APRN as PCP - General (Internal Medicine)  Lisset Melton MD as Consulting Physician (Cardiology)  Andrew Watson MD as Consulting Physician (Urology)          Subjective:     Chief Complaint: Hemoptysis    Reason for consultation: CHF    History of Present Illness:  Nina Saez is a 45 y.o. female who has a history of asthma, hypertension, HIV+, illicit drug use and obesity. She is a known patient of the practice, followed in office by Dr. Melton for nonischemic cardiomyopathy and heart failure diagnosed in . Cath from 19 showed normal coronary arteries, LVEDP 12, wedge pressure 13, PA pressure 28/15 with a mean of 19 mmHg, RA pressure 4, cardiac index 2.5L/min/m with a PVR of 1.2 Woods units. She underwent single-chamber [employer] AICD implantation. In 2020, she was diagnosed with CHF on an ECHO that showed severe left ventricular dilation, EF 6%, very thin left ventricular walls, grade 3 diastolic dysfunction, moderate to severe mitral insufficiency, moderate tricuspid insufficiency, RVSP 48 mmHg, and essentially global hypokinesis at the bases. She was seen on 9/10/2020 by Dr. Toro and he felt she did not meet criteria for CRT device. She was referred to the heart failure clinic for follow up. Was evaluated at  for possible LVAD/Heart transplant on 2020. Meds were adjusted with recommended follow up in 6 weeks.     She presented to the [facility] ED on 2020 for cough x2 weeks that has gotten worse and is now noting blood in her sputum. She denies  shortness of breath, fever, chills, sore throat, leg swelling, calf pain or known exposure to COVID.  Patient says whenever she takes a deep breath she has enormous amount of chest pain mostly on the right side of her chest.  CXR revealed prominent cardiomegaly with a pacemaker in place and this shows no change from 10/27/2020. The lungs are well-expanded and clear and there is no evidence of pneumonia. CTA of the chest demonstrated There are peripheral, acute pulmonary thromboemboli in right upper and right lower lobe pulmonary artery branches. That along the anterolateral right lower lobe appears occlusive and there is parenchymal change suggesting pulmonary infarction along the anterolateral right lower lobe. No other embolism is identified. There is massive dilatation of the left cardiac chambers as was seen on previous CT. There is no enlargement of the right heart chambers.     She was started on a heparin drip and admitted for management.     We are being consulted for her CHF medical management. Current labs indicate a proBNP 44576.0, sodium 131, potassium 4.9, BUN 18, Creatinine 1.21, INR 1.78, ptt 81.7, hgb 10.9/HCT 32.6. Her heparin drip has been d/c'd and she was started on Lovenox.        Previous Cardiac Testing:   Echo 07/09/2020:    Past Medical History:  Past Medical History:   Diagnosis Date   • AICD (automatic cardioverter/defibrillator) present 3/6/2020   • Asthma    • CHF (congestive heart failure) (CMS/[facility])    • Class 2 obesity in adult    • HIV (human immunodeficiency virus infection) (CMS/[facility])    • Hypertension    • LBBB (left bundle branch block)    • NICM (nonischemic cardiomyopathy) (CMS/[facility])     7/2020 EF 6% per echocardiogram; AICD present       Past Surgical History:   Procedure Laterality Date   • CARDIAC CATHETERIZATION     • CARDIAC DEFIBRILLATOR PLACEMENT     • FRACTURE SURGERY Left     left ankle       Home Medications:   Medications Prior to Admission   Medication Sig Dispense  Refill Last Dose   • aspirin 81 MG EC tablet Take 81 mg by mouth Daily.   12/11/2020 at Unknown time   • darunavir ethanolate (PREZISTA) 800 MG tablet tablet Take 800 mg by mouth Every Night.   12/11/2020 at Unknown time   • Empagliflozin (Jardiance) 10 MG tablet Take 10 mg by mouth Daily. 30 tablet 11 12/11/2020 at Unknown time   • Emtricitabine-Tenofovir AF (DESCOVY) 200-25 MG per tablet Take  by mouth Every Night.   12/11/2020 at Unknown time   • fluticasone (Flonase) 50 MCG/ACT nasal spray 2 sprays into the nostril(s) as directed by provider Daily. 1 bottle 2 12/11/2020 at Unknown time   • furosemide (LASIX) 80 MG tablet Take 1 tablet by mouth 2 (Two) Times a Day. 180 tablet 3 12/11/2020 at Unknown time   • ondansetron ODT (Zofran ODT) 4 MG disintegrating tablet Place 1 tablet on the tongue Every 8 (Eight) Hours As Needed for Nausea or Vomiting. 30 tablet 0 Past Month at Unknown time   • potassium chloride (K-DUR,KLOR-CON) 20 MEQ CR tablet Take 3 tablets by mouth Daily. 90 tablet 1 12/11/2020 at Unknown time   • ritonavir (NORVIR) 100 MG tablet Take 100 mg by mouth Every Night.   12/11/2020 at Unknown time   • sacubitril-valsartan (ENTRESTO) 24-26 MG tablet Take 1 tablet by mouth Every 12 (Twelve) Hours. 60 tablet 0 12/11/2020 at Unknown time   • sertraline (ZOLOFT) 50 MG tablet Take 50 mg by mouth Daily.   12/11/2020 at Unknown time   • spironolactone (ALDACTONE) 25 MG tablet TAKE 1 2 (ONE HALF) TABLET BY MOUTH ONCE DAILY   12/11/2020 at Unknown time   • vitamin D (ERGOCALCIFEROL) 89547 units capsule capsule Take 50,000 Units by mouth Every 30 (Thirty) Days.   Past Month at Unknown time   • acetaminophen (TYLENOL) 325 MG tablet Take 650 mg by mouth Every 6 (Six) Hours As Needed for Mild Pain .   More than a month at Unknown time   • albuterol sulfate  (90 Base) MCG/ACT inhaler Inhale 2 puffs Every 4 (Four) Hours As Needed for Wheezing.   Unknown at Unknown time   • carvedilol (COREG) 3.125 MG tablet Take  1 tablet by mouth 2 (Two) Times a Day With Meals for 30 days. 60 tablet 0        Allergies:  No Known Allergies    Past Social History:  Social History     Socioeconomic History   • Marital status:      Spouse name: Not on file   • Number of children: Not on file   • Years of education: Not on file   • Highest education level: Not on file   Tobacco Use   • Smoking status: Former Smoker     Quit date:      Years since quittin.9   • Smokeless tobacco: Never Used   Substance and Sexual Activity   • Alcohol use: Not Currently     Frequency: Never     Comment: holiday and special occ//caffeine use: 1 cup daily   • Drug use: Never   • Sexual activity: Defer       Past Family History: History reviewed. No pertinent family history.   Family History   Problem Relation Age of Onset   • Cancer Mother    • Breast cancer Mother    • Bone cancer Mother    • Hypertension Maternal Grandfather    • Hyperlipidemia Maternal Grandfather    • Diabetes Maternal Grandfather    • Prostate cancer Maternal Grandfather    • Ovarian cysts Daughter    • Breast cancer Maternal Grandmother    • Heart disease Maternal Grandmother    • No Known Problems Daughter    • No Known Problems Daughter        Review of Systems   Respiratory: Positive for shortness of breath.    Cardiovascular: Positive for chest pain.   All other systems reviewed and are negative.          Objective:     Objective:  Temp:  [97.2 °F (36.2 °C)-98.2 °F (36.8 °C)] 97.9 °F (36.6 °C)  Heart Rate:  [72-97] 89  Resp:  [18] 18  BP: (80-96)/(55-65) 82/58    Intake/Output Summary (Last 24 hours) at 12/15/2020 1303  Last data filed at 12/15/2020 0900  Gross per 24 hour   Intake 1100 ml   Output 250 ml   Net 850 ml     Body mass index is 30.7 kg/m².      20  0617 20  0552 12/15/20  0523   Weight: 83.9 kg (185 lb) 85.7 kg (189 lb) 86.3 kg (190 lb 3.2 oz)           Physical Exam:   Vitals signs reviewed.   Constitutional:       Appearance: Well-developed.    Eyes:      Conjunctiva/sclera: Conjunctivae normal.   HENT:      Head: Normocephalic.   Neck:      Musculoskeletal: Normal range of motion.   Pulmonary:      Breath sounds: Normal breath sounds.   Cardiovascular:      Normal rate. Regular rhythm.   Abdominal:      General: Bowel sounds are normal.      Palpations: Abdomen is soft.   Musculoskeletal: Normal range of motion.   Skin:     General: Skin is warm and dry.   Neurological:      Mental Status: Alert and oriented to person, place, and time.   Psychiatric:         Behavior: Behavior normal.          Labs:   Lab Review:     Results from last 7 days   Lab Units 12/15/20  0444 12/12/20  0518 12/11/20  1236   SODIUM mmol/L 131* 133* 131*   POTASSIUM mmol/L 4.9 3.5 3.4*   CHLORIDE mmol/L 96* 96* 97*   CO2 mmol/L 23.9 24.3 23.4   BUN mg/dL 18 8 10   CREATININE mg/dL 1.21* 0.95 0.98   GLUCOSE mg/dL 130* 99 123*   CALCIUM mg/dL 8.6 8.5* 8.6   AST (SGOT) U/L  --   --  29   ALT (SGPT) U/L  --   --  51*     Results from last 7 days   Lab Units 12/11/20  1236   TROPONIN T ng/mL <0.010     Results from last 7 days   Lab Units 12/14/20  0426   WBC 10*3/mm3 14.32*   HEMOGLOBIN g/dL 10.9*   HEMATOCRIT % 32.6*   PLATELETS 10*3/mm3 166     Results from last 7 days   Lab Units 12/15/20  0444 12/14/20  1945 12/14/20  0426 12/13/20  2231 12/13/20  1540  12/11/20  1236   INR  1.78* 1.88*  --   --   --   --  1.52*   APTT seconds  --   --  81.7* 90.1* 68.0*   < > 26.5    < > = values in this interval not displayed.         Results from last 7 days   Lab Units 12/12/20  0518   MAGNESIUM mg/dL 2.0         Results from last 7 days   Lab Units 12/12/20  0518   PROBNP pg/mL 22,479.0*                 PREVIOUS EKG 10/27/2020:        EKG 12/11/2020:           Assessment:       Acute pulmonary embolism (CMS/HCC)    Asthma    Essential hypertension    HIV (human immunodeficiency virus infection) (CMS/HCC)    Class 2 obesity in adult    Chronic systolic congestive heart failure (CMS/HCC)     NICM (nonischemic cardiomyopathy) (CMS/HCC)    AICD (automatic cardioverter/defibrillator) present    Acute on chronic systolic congestive heart failure (CMS/HCC)    Hemoptysis        Plan:   1.  Nonischemic cardiomyopathy.  Estimate ejection fraction was 6%.  She has been seen by transplant service at  for possible valve however she was doing reasonable enough that this was not placed at this time.  Patient had a BMP on 11/13/2020 of 6803 which is probably near baseline.  Is now markedly elevated at 22,000.  With the pulmonary embolism unsure she is also got some right-sided strain which is contributing to her increased BNP obviously she is still fluid overloaded.  2.  Bilateral pulmonary embolism with signs of pulmonary infarct which is probably the cause of the hemoptysis.  Patient is on Lovenox currently.  She is receiving pain medicines for her pleuritic chest discomfort.  3.  AICD  4.  HIV infection  5.  Hypertension  6.  Patient's creatinine is elevated.  Patient is on p.o. Lasix which is probably reasonable in the short-term may want to change her to IV Lasix if her kidney function holds.    Thank you for allowing me to participate in the care of Nina PHAM Saez. Feel free to contact me directly with any further questions or concerns.    Saul Lewis MD  [facility]  12/15/20  13:03 EST

## 2020-12-15 NOTE — CONSULTS
CHP responds to AD consult but pt states she does not recall asking for same.  Brief explanation re purpose and use of AD as well as continued availability of CHP via nurse for questions or to execute with notary.  Pt states understanding.  Brochure provided.

## 2020-12-15 NOTE — PROGRESS NOTES
Pharmacy Consult Day #1: Warfarin Dosing/ Monitoring    Nina Saez is a 45 y.o. female, who has a past medical history of AICD (automatic cardioverter/defibrillator) present (3/6/2020), Asthma, CHF (congestive heart failure) (CMS/[facility]), Class 2 obesity in adult, HIV (human immunodeficiency virus infection) (CMS/[facility]), Hypertension, LBBB (left bundle branch block), and NICM (nonischemic cardiomyopathy) (CMS/[facility]).    Social History     Tobacco Use    Smoking status: Former Smoker     Quit date:      Years since quittin.9    Smokeless tobacco: Never Used   Substance Use Topics    Alcohol use: Not Currently     Frequency: Never     Comment: holiday and special occ//caffeine use: 1 cup daily    Drug use: Never     Results from last 7 days   Lab Units 20  0426 20  2231 20  1540 20  0832 20  0143 20  1816 20  0518 20  2133 20  1236   INR   --   --   --   --   --   --   --   --  1.52*   APTT seconds 81.7* 90.1* 68.0* 77.9* 101.3* 62.3* 147.5* >200.0* 26.5   HEMOGLOBIN g/dL 10.9*  --   --   --  10.7*  --  11.5* 12.0 12.4   HEMATOCRIT % 32.6*  --   --   --  32.6*  --  34.8 36.5 37.8   PLATELETS 10*3/mm3 166  --   --   --  140  --  135* 138* 150     Results from last 7 days   Lab Units 20  0518 20  1236   SODIUM mmol/L 133* 131*   POTASSIUM mmol/L 3.5 3.4*   CHLORIDE mmol/L 96* 97*   CO2 mmol/L 24.3 23.4   BUN mg/dL 8 10   CREATININE mg/dL 0.95 0.98   CALCIUM mg/dL 8.5* 8.6   BILIRUBIN mg/dL  --  1.1   ALK PHOS U/L  --  127*   ALT (SGPT) U/L  --  51*   AST (SGOT) U/L  --  29   GLUCOSE mg/dL 99 123*     Anticoagulation history: No anticoagulation history    Hospital Anticoagulation:  Consulting provider: Dr. Damien Zapata  Start date: 20  Indication: Acute PE  Target INR: 2-3  Expected duration: ~6 months  Bridge Therapy: Enoxaparin 1 mg/kg SC q12h            Date              INR              Warfarin  Dose 7.5 mg                Potential  drug interactions:   Sertraline (major-home med)-concurrent use may result in an increased risk of bleeding.   Ritonavir (moderate-home med)-concurrent use may result in decreased plasma warfarin concentrations due to altered warfarin metabolism  Spironolactone (moderate-home med)-concurrent use leads to diuresis-induced concentration of clotting factors, decreasing anticoagulant effectiveness.     Relevant nutrition status: Regular cardiac diet    Education complete: No  Date:     Assessment/Plan:  Patient currently has a baseline INR since she has never been anticoagulated before. Due to interactions, I will initially dose warfarin daily until INR is stable. Since 2 of these interactions decrease effectiveness of warfarin, doses will need to be higher in order to overcome this. Will give one dose of 7.5 mg tonight, then reassess INR tomorrow morning. Daily PT/INR has already been ordered. Pharmacy will continue to follow until discharge or discontinuation of warfarin.     Radha Montaño, Pharm.D., [facility]   Clinical Staff Pharmacist  12/14/2020 19:58 EST

## 2020-12-15 NOTE — PROGRESS NOTES
Pharmacy Consult: Warfarin Dosing/ Monitoring    Nina Saez is a 45 y.o. female, estimated creatinine clearance is 65 mL/min (A) (by C-G formula based on SCr of 1.21 mg/dL (H)). weighing 86.3 kg (190 lb 3.2 oz).    PMH: AICD present (3/6/2020), Asthma, CHF, Class 2 obesity in adult, HIV, Hypertension, LBBB, and NICM     Social History     Tobacco Use    Smoking status: Former Smoker     Quit date:      Years since quittin.9    Smokeless tobacco: Never Used   Substance Use Topics    Alcohol use: Not Currently     Frequency: Never     Comment: holiday and special occ//caffeine use: 1 cup daily    Drug use: Never     Results from last 7 days   Lab Units 12/15/20  0444 12/14/20  1945 20  0426 20  2231 20  1540 20  0832 20  0143 20  1816 20  0518 20  2133 20  1236   INR  1.78* 1.88*  --   --   --   --   --   --   --   --  1.52*   APTT seconds  --   --  81.7* 90.1* 68.0* 77.9* 101.3* 62.3* 147.5* >200.0* 26.5   HEMOGLOBIN g/dL  --   --  10.9*  --   --   --  10.7*  --  11.5* 12.0 12.4   HEMATOCRIT %  --   --  32.6*  --   --   --  32.6*  --  34.8 36.5 37.8   PLATELETS 10*3/mm3  --   --  166  --   --   --  140  --  135* 138* 150     Results from last 7 days   Lab Units 12/15/20  0444 12/12/20  0518 20  1236   SODIUM mmol/L 131* 133* 131*   POTASSIUM mmol/L 4.9 3.5 3.4*   CHLORIDE mmol/L 96* 96* 97*   CO2 mmol/L 23.9 24.3 23.4   BUN mg/dL 18 8 10   CREATININE mg/dL 1.21* 0.95 0.98   CALCIUM mg/dL 8.6 8.5* 8.6   BILIRUBIN mg/dL  --   --  1.1   ALK PHOS U/L  --   --  127*   ALT (SGPT) U/L  --   --  51*   AST (SGOT) U/L  --   --  29   GLUCOSE mg/dL 130* 99 123*     Anticoagulation history:   Home Med: No anticoagulant    Hospital Anticoagulation:  Consulting provider: Dr ANNABEL Zapata  Start date:   Indication: Acute P.E.  Target INR: 2-3  Expected duration: ~ 6 months   Bridge Therapy: Enoxaparin 90 mg sq q12hrs    Date 12/14 12/15           INR 1.88  1.78           Warfarin dose 7.5 mg 7.5 mg             Potential drug interactions:   Sertraline (major-home med)-concurrent use may result in an increased risk of bleeding.   Ritonavir (moderate-home med)-concurrent use may result in decreased plasma warfarin concentrations due to altered warfarin metabolism  Spironolactone (moderate-home med)-concurrent use leads to diuresis-induced concentration of clotting factors, decreasing anticoagulant effectiveness.     Will continue to monitor for drug-drug interaction as medications are added to patient's profile.     Relevant nutrition status: Diet Regular; Cardiac    Other:   Dietary advances -> changes in dietary vitamin K intake may alter response to warfarin.  Acute infection/inflammation may cause an increased sensitivity to warfarin    Lab: Albumin=  3.10 g/dL (12/15/2020)    Education complete?/ Date: TBD    Assessment/Plan:  Dose: Give Warfarin 7.5 mg po x1 today  Monitor: s/s bleed  Follow up INR    Recommend continuation of enoxaparin bridge therapy until INR is at least >  2 for two consecutive days.    Pharmacy will continue to follow until discharge or discontinuation of warfarin.     Marychuy Eden [facility]  Clinical Staff Pharmacist

## 2020-12-16 LAB
ANION GAP SERPL CALCULATED.3IONS-SCNC: 10 MMOL/L (ref 5–15)
BUN SERPL-MCNC: 21 MG/DL (ref 6–20)
BUN/CREAT SERPL: 21.2 (ref 7–25)
CALCIUM SPEC-SCNC: 8.6 MG/DL (ref 8.6–10.5)
CHLORIDE SERPL-SCNC: 94 MMOL/L (ref 98–107)
CO2 SERPL-SCNC: 23 MMOL/L (ref 22–29)
CREAT SERPL-MCNC: 0.99 MG/DL (ref 0.57–1)
GFR SERPL CREATININE-BSD FRML MDRD: 74 ML/MIN/1.73
GLUCOSE SERPL-MCNC: 116 MG/DL (ref 65–99)
INR PPP: 3.49 (ref 0.9–1.1)
POTASSIUM SERPL-SCNC: 5.1 MMOL/L (ref 3.5–5.2)
PROTHROMBIN TIME: 34.8 SECONDS (ref 11.7–14.2)
SODIUM SERPL-SCNC: 127 MMOL/L (ref 136–145)

## 2020-12-16 PROCEDURE — 99233 SBSQ HOSP IP/OBS HIGH 50: CPT | Performed by: INTERNAL MEDICINE

## 2020-12-16 PROCEDURE — 80048 BASIC METABOLIC PNL TOTAL CA: CPT | Performed by: INTERNAL MEDICINE

## 2020-12-16 PROCEDURE — 85610 PROTHROMBIN TIME: CPT | Performed by: INTERNAL MEDICINE

## 2020-12-16 RX ADMIN — EMTRICITABINE AND TENOFOVIR ALAFENAMIDE 1 TABLET: 200; 25 TABLET ORAL at 20:11

## 2020-12-16 RX ADMIN — HYDROCODONE BITARTRATE AND ACETAMINOPHEN 1 TABLET: 10; 325 TABLET ORAL at 20:08

## 2020-12-16 RX ADMIN — RITONAVIR 100 MG: 100 TABLET, FILM COATED ORAL at 20:12

## 2020-12-16 RX ADMIN — HYDROCODONE BITARTRATE AND ACETAMINOPHEN 1 TABLET: 10; 325 TABLET ORAL at 01:19

## 2020-12-16 RX ADMIN — FUROSEMIDE 80 MG: 80 TABLET ORAL at 20:08

## 2020-12-16 RX ADMIN — GUAIFENESIN AND CODEINE PHOSPHATE 5 ML: 10; 100 LIQUID ORAL at 20:09

## 2020-12-16 RX ADMIN — CARVEDILOL 3.12 MG: 3.12 TABLET, FILM COATED ORAL at 08:19

## 2020-12-16 RX ADMIN — HYDROCODONE BITARTRATE AND ACETAMINOPHEN 1 TABLET: 10; 325 TABLET ORAL at 06:06

## 2020-12-16 RX ADMIN — GUAIFENESIN AND CODEINE PHOSPHATE 5 ML: 10; 100 LIQUID ORAL at 08:29

## 2020-12-16 RX ADMIN — CARVEDILOL 3.12 MG: 3.12 TABLET, FILM COATED ORAL at 20:08

## 2020-12-16 RX ADMIN — SERTRALINE HYDROCHLORIDE 50 MG: 50 TABLET, FILM COATED ORAL at 08:19

## 2020-12-16 RX ADMIN — SPIRONOLACTONE 12.5 MG: 25 TABLET ORAL at 08:19

## 2020-12-16 RX ADMIN — FUROSEMIDE 80 MG: 80 TABLET ORAL at 08:19

## 2020-12-16 RX ADMIN — GUAIFENESIN AND CODEINE PHOSPHATE 5 ML: 10; 100 LIQUID ORAL at 15:49

## 2020-12-16 RX ADMIN — DARUNAVIR 800 MG: 800 TABLET, FILM COATED ORAL at 20:12

## 2020-12-16 NOTE — PROGRESS NOTES
Hospital Follow Up    LOS:  LOS: 4 days   Patient Name: Nina Saez  Age/Sex: 45 y.o. female  : 1975  MRN: [id number]    Day of Service: 20   Length of Stay: 4  Encounter Provider: Saul Lewis MD  [facility] of Service[facility]  Patient Care Team:  Darcie Ledbetter APRN as PCP - General (Internal Medicine)  Lisset Melton MD as Consulting Physician (Cardiology)  Andrew Watson MD as Consulting Physician (Urology)    Subjective:     Chief Complaint: Cardiomyopathy    Interval History: Patient had less chest discomfort overnight but still persist. Patient also has had less hemoptysis.    Objective:     Objective:  Temp:  [97.4 °F (36.3 °C)-98.6 °F (37 °C)] 98.6 °F (37 °C)  Heart Rate:  [82-93] 82  Resp:  [18-20] 20  BP: (90-98)/(60-73) 90/67     Intake/Output Summary (Last 24 hours) at 2020 1238  Last data filed at 2020 0815  Gross per 24 hour   Intake 480 ml   Output --   Net 480 ml     Body mass index is 31.31 kg/m².      20  0552 12/15/20  0523 20  0652   Weight: 85.7 kg (189 lb) 86.3 kg (190 lb 3.2 oz) 88 kg (194 lb)     Weight change: 1.724 kg (3 lb 12.8 oz)      Physical Exam:   General : Alert, cooperative, in no acute distress.  Neuro: Alert,cooperative and oriented.  Lungs: CTAB. Normal respiratory effort and rate.  CV: Regular rate and rhythm, normal S1 and S2, no murmurs, gallops or rubs.  ABD: Soft, nontender, nondistended. Positive bowel sounds.  Extr: No edema or cyanosis, moves all extremities.    Lab Review:   Results from last 7 days   Lab Units 20  0445 12/15/20  0444  20  1236   SODIUM mmol/L 127* 131*   < > 131*   POTASSIUM mmol/L 5.1 4.9   < > 3.4*   CHLORIDE mmol/L 94* 96*   < > 97*   CO2 mmol/L 23.0 23.9   < > 23.4   BUN mg/dL 21* 18   < > 10   CREATININE mg/dL 0.99 1.21*   < > 0.98   GLUCOSE mg/dL 116* 130*   < > 123*   CALCIUM mg/dL 8.6 8.6   < > 8.6   AST (SGOT) U/L  --   --    --  29   ALT (SGPT) U/L  --   --   --  51*    < > = values in this interval not displayed.     Results from last 7 days   Lab Units 12/11/20  1236   TROPONIN T ng/mL <0.010     Results from last 7 days   Lab Units 12/14/20  0426 12/13/20  0143   WBC 10*3/mm3 14.32* 10.99*   HEMOGLOBIN g/dL 10.9* 10.7*   HEMATOCRIT % 32.6* 32.6*   PLATELETS 10*3/mm3 166 140     Results from last 7 days   Lab Units 12/16/20  0445 12/15/20  0444  12/14/20  0426 12/13/20  2231   INR  3.49* 1.78*   < >  --   --    APTT seconds  --   --   --  81.7* 90.1*    < > = values in this interval not displayed.     Results from last 7 days   Lab Units 12/12/20  0518   MAGNESIUM mg/dL 2.0           Invalid input(s): LDLCALC  Results from last 7 days   Lab Units 12/12/20  0518   PROBNP pg/mL 22,479.0*           Current Medications:   Scheduled Meds:carvedilol, 3.125 mg, Oral, BID With Meals  darunavir ethanolate, 800 mg, Oral, Nightly  Emtricitabine-Tenofovir AF, 1 tablet, Oral, Nightly  furosemide, 80 mg, Oral, BID  influenza vaccine, 0.5 mL, Intramuscular, Once  potassium chloride, 60 mEq, Oral, Daily  ritonavir, 100 mg, Oral, Nightly  sertraline, 50 mg, Oral, Daily  spironolactone, 12.5 mg, Oral, Daily      Continuous Infusions:Pharmacy to dose warfarin,         Allergies:  No Known Allergies    Assessment:       Acute pulmonary embolism (CMS/HCC)    Asthma    Essential hypertension    HIV (human immunodeficiency virus infection) (CMS/[facility])    Class 2 obesity in adult    Chronic systolic congestive heart failure (CMS/HCC)    NICM (nonischemic cardiomyopathy) (CMS/[facility])    AICD (automatic cardioverter/defibrillator) present    Acute on chronic systolic congestive heart failure (CMS/HCC)    Hemoptysis        Plan:   1.  Nonischemic cardiomyopathy.  Estimate ejection fraction was 6%.  She has been seen by transplant service at  for possible valve however she was doing reasonable enough that this was not placed at this time.  Patient had a BNP on  11/13/2020 of 6803 which is probably near baseline.  Is now markedly elevated at 22,000.   Patient unfortunately been very hypotensive. Letter medicines been held the last day or 2 which is probably a contributing factor. I stopped her Entresto due to profound hypotension and added back her diuretics as well as her beta-blocker which at the current time she is tolerating better. Again as her blood pressure allows we may try to add the Entresto back but will continue current course for the next day or 2.  2.  Bilateral pulmonary embolism with signs of pulmonary infarct which is probably the cause of the hemoptysis.  Patient is on Lovenox currently.  She is receiving pain medicines for her pleuritic chest discomfort.  3.  AICD  4.  HIV infection  5.  Hypertension  6.  Patient's creatinine is better. Medicines were added back today. I explained to the nursing staff that with her ejection fraction so low blood pressure in the 80s will be an acceptable range as long as she is not symptomatic.        Saul Lewis MD  12/16/20  12:38 EST

## 2020-12-16 NOTE — PROGRESS NOTES
[facility] HOSPITALIST    PROGRESS NOTE    Name:  Nina Saez   Age:  45 y.o.  Sex:  female  :  1975  MRN:  [id number]   Visit Number:  [id number]  Admission Date:  2020  Date Of Service:  20  Primary Care Physician:  Darcie Ledbetter APRN     LOS: 4 days :  Patient Care Team:  Darcie Ledbetter APRN as PCP - General (Internal Medicine)  Lisset Melton MD as Consulting Physician (Cardiology)  Andrew Watson MD as Consulting Physician (Urology):    History taken from:     patient chart    Chief Complaint:      Hemoptysis    Subjective     Interval History:     Patient seen and examined today.  Reviewed history and physical, lab work, x-rays, chart.    Patient 45-year-old female who came in on 2020 with hemoptysis.  She has history of CHF, HIV.  CT scan showed acute subsegmental pulmonary emboli.  There is also right lower lobe pulmonary infarct.  Pulmonology was consulted heparin drip was recommended and then the patient was placed on Coumadin.    Neurology was consulted as she has nonischemic cardiomyopathy with ejection fraction of 6%.  She has been seen at the transplant service by  however she has been doing reasonably well.  She has AICD in place.    Patient will need 6 months of anticoagulation.  She had been on heparin drip but was transitioned to Coumadin and Lovenox bridge.  NOAC was not ideal due to interaction with HIV medications.    She currently denies any chest pressure, shortness of breath, nausea, vomiting, or pain.  She is eager to go home.        Review of Systems:     All systems were reviewed and negative except for:  Respiratory: positive for  hemoptysis    Objective     Vital Signs:    Temp:  [97.4 °F (36.3 °C)-98.6 °F (37 °C)] 98.6 °F (37 °C)  Heart Rate:  [82-83] 83  Resp:  [20] 20  BP: (90-98)/(60-79) 96/79    Physical Exam:    General: Alert and oriented x4, well-developed well-nourished, mild distress  Heart: Regular  rate and rhythm without murmur rub or thrill  Lungs: Clear to auscultation bilaterally without use of accessory muscles of respiration.    Abdomen: Soft/nontender/nondistended.  No HSM is noted.  MSK: 5/5 strength in upper/lower extremities bilaterally.           Results Review:      I reviewed the patient's new clinical results.    Labs:    Lab Results (last 24 hours)     Procedure Component Value Units Date/Time    Protime-INR [[id number]]  (Abnormal) Collected: 12/16/20 0445    Specimen: Blood Updated: 12/16/20 0725     Protime 34.8 Seconds      INR 3.49    Basic Metabolic Panel [[id number]]  (Abnormal) Collected: 12/16/20 0445    Specimen: Blood Updated: 12/16/20 0716     Glucose 116 mg/dL      BUN 21 mg/dL      Creatinine 0.99 mg/dL      Sodium 127 mmol/L      Potassium 5.1 mmol/L      Chloride 94 mmol/L      CO2 23.0 mmol/L      Calcium 8.6 mg/dL      eGFR  African Amer 74 mL/min/1.73      BUN/Creatinine Ratio 21.2     Anion Gap 10.0 mmol/L     Narrative:      GFR Normal >60  Chronic Kidney Disease <60  Kidney Failure <15             Radiology:    Imaging Results (Last 24 Hours)     ** No results found for the last 24 hours. **          Medication Review:     carvedilol, 3.125 mg, Oral, BID With Meals  darunavir ethanolate, 800 mg, Oral, Nightly  Emtricitabine-Tenofovir AF, 1 tablet, Oral, Nightly  furosemide, 80 mg, Oral, BID  influenza vaccine, 0.5 mL, Intramuscular, Once  potassium chloride, 60 mEq, Oral, Daily  ritonavir, 100 mg, Oral, Nightly  sertraline, 50 mg, Oral, Daily  spironolactone, 12.5 mg, Oral, Daily        Pharmacy to dose warfarin,         Assessment/Plan     Problem List Items Addressed This Visit        Cardiovascular and Mediastinum    * (Principal) Acute pulmonary embolism (CMS/HCC) - Primary       Respiratory    Hemoptysis          1.  Acute PE with right lower lobe pulmonary infarct  2.  Severe nonischemic cardiomyopathy with AICD in place  3.  Chronic systolic CHF, continue  medications  4.  Essential hypertension, continue medications  5.  HIV with undetectable viral load.  On home antivirals.  6.  Hemoptysis    Plan:    INR is now 3.49.  Will discontinue Lovenox.  Monitor for any bleeding.  Patient is stable by tomorrow she can be discharged home.  Check lab work in the a.m.  Further recommendations will depend on clinical course.    Ruben Kirby,   12/16/20  17:08 EST

## 2020-12-16 NOTE — PLAN OF CARE
Goal Outcome Evaluation:  Plan of Care Reviewed With: patient  Progress: no change  Outcome Summary: Pt c/o pain under rt lower breast treated with Norco per MAR. BP remains low.

## 2020-12-16 NOTE — PROGRESS NOTES
"                                              LOS: 3 days   Patient Care Team:  Darcie Ledbetter APRN as PCP - General (Internal Medicine)  Lisset Melton MD as Consulting Physician (Cardiology)  Andrew Watson MD as Consulting Physician (Urology)    Chief Complaint:  F/up hemoptysis. PE    Subjective   Interval History  On room air.  Denies shortness of breath.  Continues to have cough but it is currently more whitish.  Very little blood coming to help with cough at times.    REVIEW OF SYSTEMS:   CARDIOVASCULAR: Mild right sided chest pain improved. No palpitation. No bleeding elsewhere.     Constitutional: No fever or chills    Ventilator/Non-Invasive Ventilation Settings (From admission, onward)    None                Physical Exam:     Vital Signs  Temp:  [97.4 °F (36.3 °C)-98.6 °F (37 °C)] 98.6 °F (37 °C)  Heart Rate:  [89-93] 93  Resp:  [18-20] 20  BP: (80-98)/(58-73) 98/73    Intake/Output Summary (Last 24 hours) at 12/15/2020 2316  Last data filed at 12/15/2020 1339  Gross per 24 hour   Intake 1340 ml   Output 250 ml   Net 1090 ml     Flowsheet Rows      First Filed Value   Admission Height  167.6 cm (66\") Documented at 12/11/2020 1120   Admission Weight  84.4 kg (186 lb) Documented at 12/11/2020 1120          General Appearance:   Alert, cooperative, in no acute distress   ENMT:  Mallampati score 3, moist mucous membrane   Eyes:  Pupils equal and reactive to light. EOMI   Neck:    Trachea midline. No thyromegaly.   Lungs:    Right lower lobe crackles slightly worse than previously.  No wheezing.  Nonlabored breathing    Heart:   Regular rhythm and normal rate, normal S1 and S2, no         murmur   Skin:   No rash   Abdomen:     Soft. No tenderness. No HSM.   Neuro:  Conscious, alert, oriented x3. Strength 5/5 in upper and lower  ext   Extremities:  No cyanosis, clubbing or edema.  Warm extremities and well-perfused          Results Review:        Results from last 7 days   Lab Units " 12/15/20  0444 12/12/20  0518 12/11/20  1236   SODIUM mmol/L 131* 133* 131*   POTASSIUM mmol/L 4.9 3.5 3.4*   CHLORIDE mmol/L 96* 96* 97*   CO2 mmol/L 23.9 24.3 23.4   BUN mg/dL 18 8 10   CREATININE mg/dL 1.21* 0.95 0.98   GLUCOSE mg/dL 130* 99 123*   CALCIUM mg/dL 8.6 8.5* 8.6     Results from last 7 days   Lab Units 12/11/20  1236   TROPONIN T ng/mL <0.010     Results from last 7 days   Lab Units 12/14/20  0426 12/13/20  0143 12/12/20  0518   WBC 10*3/mm3 14.32* 10.99* 11.71*   HEMOGLOBIN g/dL 10.9* 10.7* 11.5*   HEMATOCRIT % 32.6* 32.6* 34.8   PLATELETS 10*3/mm3 166 140 135*     Results from last 7 days   Lab Units 12/15/20  0444 12/14/20  1945 12/14/20  0426 12/13/20  2231 12/13/20  1540  12/11/20  1236   INR  1.78* 1.88*  --   --   --   --  1.52*   APTT seconds  --   --  81.7* 90.1* 68.0*   < > 26.5    < > = values in this interval not displayed.     Results from last 7 days   Lab Units 12/12/20  0518   PROBNP pg/mL 22,479.0*       I reviewed the patient's new clinical results.        Medication Review:   carvedilol, 3.125 mg, Oral, BID With Meals  darunavir ethanolate, 800 mg, Oral, Nightly  Emtricitabine-Tenofovir AF, 1 tablet, Oral, Nightly  enoxaparin, 1 mg/kg, Subcutaneous, Q12H  furosemide, 80 mg, Oral, BID  influenza vaccine, 0.5 mL, Intramuscular, Once  potassium chloride, 60 mEq, Oral, Daily  ritonavir, 100 mg, Oral, Nightly  sertraline, 50 mg, Oral, Daily  spironolactone, 12.5 mg, Oral, Daily        Pharmacy to dose warfarin,           Assessment   1. Acute subsegmental pulmonary embolism, likely provoked by immobilization.  2. Right lower lobe pulmonary infarct  3. Hemoptysis, not massive, secondary to pulmonary infarct  4. HIV  5. Anemia, acute on chronic.  Stable  6. Nocturnal hypoxemia: Wilberto SpO2 76% with hypoxic burden 51 minutes and OLAYINKA 16/H suggestive of both obstructive sleep apnea and possibly nocturnal hypoxemia secondary to other conditions such as PE recurrent severe systolic  cardiomyopathy  7. Obesity (BMI = 30)      Plan   · Warfarin bridge along with Lovenox.  Continue warfarin target INR 2-3.  Suspect she will be therapeutic by tomorrow.  Hemoptysis improving and suspect that it will gradually subside.  There has been no significant changes in hemoglobin  · PRN Codeine for cough.   · We will arrange for oxygen at night when discharge and outpatient sleep evaluation            Dennis Diehl MD  12/15/20  23:16 EST          This note was dictated utilizing Dragon dictation

## 2020-12-16 NOTE — PLAN OF CARE
Problem: Adult Inpatient Plan of Care  Goal: Plan of Care Review  12/16/2020 1535 by Flores Marcelo, RN  Outcome: Ongoing, Progressing  Flowsheets  Taken 12/16/2020 1535 by Flores Marcelo, RN  Progress: improving  Outcome Summary: Pain right chest improving a little, as is hemoptysis. BP acceptably low, asymptomatic, independent use of BSC. Educated about use of warfarin and signs of bleeding. Lovenox d/c'd as INR supratherapeutic. Will closely monitor.  Taken 12/16/2020 0612 by Jodi Sams, RN  Plan of Care Reviewed With: patient   Goal Outcome Evaluation:  Plan of Care Reviewed With: patient  Progress: improving  Outcome Summary: Pain right chest improving a little, as is hemoptysis. BP acceptably low, asymptomatic, independent use of BSC. Educated about use of warfarin and signs of bleeding. Lovenox d/c'd as INR supratherapeutic. Will closely monitor.

## 2020-12-16 NOTE — PROGRESS NOTES
Pharmacy Consult: Warfarin Dosing/ Monitoring    Nina Saez is a 45 y.o. female, estimated creatinine clearance is 80.2 mL/min (by C-G formula based on SCr of 0.99 mg/dL). weighing 88 kg (194 lb).    PMH: AICD present (3/6/2020), Asthma, CHF, Class 2 obesity in adult, HIV, Hypertension, LBBB, and NICM     Social History     Tobacco Use    Smoking status: Former Smoker     Quit date:      Years since quittin.9    Smokeless tobacco: Never Used   Substance Use Topics    Alcohol use: Not Currently     Frequency: Never     Comment: holiday and special occ//caffeine use: 1 cup daily    Drug use: Never     Results from last 7 days   Lab Units 12/16/20  0445 12/15/20  0444 12/14/20  1945 20  0426 20  2231 20  1540 20  0832 20  0143 20  1816 20  0518 20  2133 20  1236   INR  3.49* 1.78* 1.88*  --   --   --   --   --   --   --   --  1.52*   APTT seconds  --   --   --  81.7* 90.1* 68.0* 77.9* 101.3* 62.3* 147.5* >200.0* 26.5   HEMOGLOBIN g/dL  --   --   --  10.9*  --   --   --  10.7*  --  11.5* 12.0 12.4   HEMATOCRIT %  --   --   --  32.6*  --   --   --  32.6*  --  34.8 36.5 37.8   PLATELETS 10*3/mm3  --   --   --  166  --   --   --  140  --  135* 138* 150     Results from last 7 days   Lab Units 12/16/20  0445 12/15/20  0444 20  0518 20  1236   SODIUM mmol/L 127* 131* 133* 131*   POTASSIUM mmol/L 5.1 4.9 3.5 3.4*   CHLORIDE mmol/L 94* 96* 96* 97*   CO2 mmol/L 23.0 23.9 24.3 23.4   BUN mg/dL 21* 18 8 10   CREATININE mg/dL 0.99 1.21* 0.95 0.98   CALCIUM mg/dL 8.6 8.6 8.5* 8.6   BILIRUBIN mg/dL  --   --   --  1.1   ALK PHOS U/L  --   --   --  127*   ALT (SGPT) U/L  --   --   --  51*   AST (SGOT) U/L  --   --   --  29   GLUCOSE mg/dL 116* 130* 99 123*     Anticoagulation history:   Home Med: No anticoagulant    Hospital Anticoagulation:  Consulting provider: Dr ANNABEL Zapata  Start date: 2020  Indication: Acute P.E.  Target INR: 2-3  Expected  duration: ~ 6 months   Bridge Therapy: Enoxaparin 90 mg sq q12hrs    Date 12/14 12/15 12/16          INR 1.88 1.78 3.49          Warfarin dose 7.5 mg 7.5 mg Hold             Potential drug interactions:   Sertraline (major-home med)-concurrent use may result in an increased risk of bleeding.   Ritonavir (moderate-home med)-concurrent use may result in decreased plasma warfarin concentrations due to altered warfarin metabolism  Spironolactone (moderate-home med)-concurrent use leads to diuresis-induced concentration of clotting factors, decreasing anticoagulant effectiveness.     Will continue to monitor for drug-drug interaction as medications are added to patient's profile.     Relevant nutrition status: Diet Regular; Cardiac    Other:   Dietary advances -> changes in dietary vitamin K intake may alter response to warfarin.  Acute infection/inflammation may cause an increased sensitivity to warfarin    Lab: Albumin=  3.10 g/dL (12/15/2020)    Education complete?/ Date: TBD    Assessment/Plan:  Dose: INR at 3.49 today (above target) therefore will hold warfarin dosing for today.   Monitor: s/s bleed  Follow up INR    Recommend continuation of enoxaparin bridge therapy until INR is at least >  2 for two consecutive days.    Pharmacy will continue to follow until discharge or discontinuation of warfarin.     Nabil Azul [facility]  Clinical Staff Pharmacist

## 2020-12-17 ENCOUNTER — TELEPHONE (OUTPATIENT)
Dept: INTERNAL MEDICINE | Age: 45
End: 2020-12-17

## 2020-12-17 ENCOUNTER — READMISSION MANAGEMENT (OUTPATIENT)
Dept: CALL CENTER | Facility: HOSPITAL | Age: 45
End: 2020-12-17

## 2020-12-17 VITALS
WEIGHT: 194.2 LBS | SYSTOLIC BLOOD PRESSURE: 112 MMHG | HEART RATE: 88 BPM | HEIGHT: 66 IN | TEMPERATURE: 97.9 F | BODY MASS INDEX: 31.21 KG/M2 | RESPIRATION RATE: 20 BRPM | OXYGEN SATURATION: 97 % | DIASTOLIC BLOOD PRESSURE: 49 MMHG

## 2020-12-17 PROBLEM — I26.99 ACUTE PULMONARY EMBOLISM, UNSPECIFIED PULMONARY EMBOLISM TYPE, UNSPECIFIED WHETHER ACUTE COR PULMONALE PRESENT: Status: ACTIVE | Noted: 2020-12-17

## 2020-12-17 PROBLEM — I26.99 OTHER ACUTE PULMONARY EMBOLISM, UNSPECIFIED WHETHER ACUTE COR PULMONALE PRESENT: Status: ACTIVE | Noted: 2020-12-17

## 2020-12-17 LAB
ALBUMIN SERPL-MCNC: 2.8 G/DL (ref 3.5–5.2)
ALBUMIN SERPL-MCNC: 3.1 G/DL (ref 3.5–5.2)
ANION GAP SERPL CALCULATED.3IONS-SCNC: 11.2 MMOL/L (ref 5–15)
ANION GAP SERPL CALCULATED.3IONS-SCNC: 9.4 MMOL/L (ref 5–15)
BASOPHILS # BLD AUTO: 0.03 10*3/MM3 (ref 0–0.2)
BASOPHILS NFR BLD AUTO: 0.3 % (ref 0–1.5)
BUN SERPL-MCNC: 19 MG/DL (ref 6–20)
BUN SERPL-MCNC: 21 MG/DL (ref 6–20)
BUN/CREAT SERPL: 20.2 (ref 7–25)
BUN/CREAT SERPL: 21.6 (ref 7–25)
CALCIUM SPEC-SCNC: 8.5 MG/DL (ref 8.6–10.5)
CALCIUM SPEC-SCNC: 8.8 MG/DL (ref 8.6–10.5)
CHLORIDE SERPL-SCNC: 95 MMOL/L (ref 98–107)
CHLORIDE SERPL-SCNC: 96 MMOL/L (ref 98–107)
CO2 SERPL-SCNC: 18.8 MMOL/L (ref 22–29)
CO2 SERPL-SCNC: 23.6 MMOL/L (ref 22–29)
CREAT SERPL-MCNC: 0.88 MG/DL (ref 0.57–1)
CREAT SERPL-MCNC: 1.04 MG/DL (ref 0.57–1)
DEPRECATED RDW RBC AUTO: 45.5 FL (ref 37–54)
EOSINOPHIL # BLD AUTO: 0.02 10*3/MM3 (ref 0–0.4)
EOSINOPHIL NFR BLD AUTO: 0.2 % (ref 0.3–6.2)
ERYTHROCYTE [DISTWIDTH] IN BLOOD BY AUTOMATED COUNT: 13.2 % (ref 12.3–15.4)
GFR SERPL CREATININE-BSD FRML MDRD: 69 ML/MIN/1.73
GFR SERPL CREATININE-BSD FRML MDRD: 84 ML/MIN/1.73
GLUCOSE SERPL-MCNC: 112 MG/DL (ref 65–99)
GLUCOSE SERPL-MCNC: 162 MG/DL (ref 65–99)
HCT VFR BLD AUTO: 32.2 % (ref 34–46.6)
HGB BLD-MCNC: 10.6 G/DL (ref 12–15.9)
IMM GRANULOCYTES # BLD AUTO: 0.06 10*3/MM3 (ref 0–0.05)
IMM GRANULOCYTES NFR BLD AUTO: 0.6 % (ref 0–0.5)
INR PPP: 3.69 (ref 0.9–1.1)
INR PPP: 4.13 (ref 0.9–1.1)
LYMPHOCYTES # BLD AUTO: 2.05 10*3/MM3 (ref 0.7–3.1)
LYMPHOCYTES NFR BLD AUTO: 21.7 % (ref 19.6–45.3)
MAGNESIUM SERPL-MCNC: 2 MG/DL (ref 1.6–2.6)
MCH RBC QN AUTO: 31.1 PG (ref 26.6–33)
MCHC RBC AUTO-ENTMCNC: 32.9 G/DL (ref 31.5–35.7)
MCV RBC AUTO: 94.4 FL (ref 79–97)
MONOCYTES # BLD AUTO: 1.1 10*3/MM3 (ref 0.1–0.9)
MONOCYTES NFR BLD AUTO: 11.7 % (ref 5–12)
NEUTROPHILS NFR BLD AUTO: 6.18 10*3/MM3 (ref 1.7–7)
NEUTROPHILS NFR BLD AUTO: 65.5 % (ref 42.7–76)
NRBC BLD AUTO-RTO: 0 /100 WBC (ref 0–0.2)
PHOSPHATE SERPL-MCNC: 3.6 MG/DL (ref 2.5–4.5)
PHOSPHATE SERPL-MCNC: 3.9 MG/DL (ref 2.5–4.5)
PLATELET # BLD AUTO: 209 10*3/MM3 (ref 140–450)
PMV BLD AUTO: 11.9 FL (ref 6–12)
POTASSIUM SERPL-SCNC: 4.5 MMOL/L (ref 3.5–5.2)
POTASSIUM SERPL-SCNC: 4.9 MMOL/L (ref 3.5–5.2)
POTASSIUM SERPL-SCNC: 5.4 MMOL/L (ref 3.5–5.2)
PROTHROMBIN TIME: 36.3 SECONDS (ref 11.7–14.2)
PROTHROMBIN TIME: 39.7 SECONDS (ref 11.7–14.2)
RBC # BLD AUTO: 3.41 10*6/MM3 (ref 3.77–5.28)
SODIUM SERPL-SCNC: 126 MMOL/L (ref 136–145)
SODIUM SERPL-SCNC: 128 MMOL/L (ref 136–145)
WBC # BLD AUTO: 9.44 10*3/MM3 (ref 3.4–10.8)

## 2020-12-17 PROCEDURE — 83735 ASSAY OF MAGNESIUM: CPT | Performed by: INTERNAL MEDICINE

## 2020-12-17 PROCEDURE — 85610 PROTHROMBIN TIME: CPT | Performed by: NURSE PRACTITIONER

## 2020-12-17 PROCEDURE — 84132 ASSAY OF SERUM POTASSIUM: CPT | Performed by: INTERNAL MEDICINE

## 2020-12-17 PROCEDURE — 90686 IIV4 VACC NO PRSV 0.5 ML IM: CPT | Performed by: INTERNAL MEDICINE

## 2020-12-17 PROCEDURE — G0008 ADMIN INFLUENZA VIRUS VAC: HCPCS | Performed by: INTERNAL MEDICINE

## 2020-12-17 PROCEDURE — 99232 SBSQ HOSP IP/OBS MODERATE 35: CPT | Performed by: NURSE PRACTITIONER

## 2020-12-17 PROCEDURE — 85025 COMPLETE CBC W/AUTO DIFF WBC: CPT | Performed by: INTERNAL MEDICINE

## 2020-12-17 PROCEDURE — 80069 RENAL FUNCTION PANEL: CPT | Performed by: INTERNAL MEDICINE

## 2020-12-17 PROCEDURE — 25010000002 INFLUENZA VAC SPLIT QUAD 0.5 ML SUSPENSION PREFILLED SYRINGE: Performed by: INTERNAL MEDICINE

## 2020-12-17 PROCEDURE — 85610 PROTHROMBIN TIME: CPT | Performed by: INTERNAL MEDICINE

## 2020-12-17 RX ORDER — HYDROCODONE BITARTRATE AND ACETAMINOPHEN 10; 325 MG/1; MG/1
1 TABLET ORAL EVERY 4 HOURS PRN
Qty: 18 TABLET | Refills: 0 | Status: SHIPPED | OUTPATIENT
Start: 2020-12-17 | End: 2020-12-21

## 2020-12-17 RX ORDER — WARFARIN SODIUM 2.5 MG/1
TABLET ORAL
Qty: 90 TABLET | Refills: 0 | Status: SHIPPED | OUTPATIENT
Start: 2020-12-17 | End: 2021-01-28 | Stop reason: SDUPTHER

## 2020-12-17 RX ORDER — GUAIFENESIN AND CODEINE PHOSPHATE 100; 10 MG/5ML; MG/5ML
5 SOLUTION ORAL EVERY 4 HOURS PRN
Qty: 118 ML | Refills: 0 | Status: SHIPPED | OUTPATIENT
Start: 2020-12-17 | End: 2020-12-28 | Stop reason: SDUPTHER

## 2020-12-17 RX ORDER — POTASSIUM CHLORIDE 20 MEQ/1
40 TABLET, EXTENDED RELEASE ORAL DAILY
Qty: 90 TABLET | Refills: 1 | Status: SHIPPED | OUTPATIENT
Start: 2020-12-17 | End: 2021-07-20

## 2020-12-17 RX ADMIN — GUAIFENESIN AND CODEINE PHOSPHATE 5 ML: 10; 100 LIQUID ORAL at 04:09

## 2020-12-17 RX ADMIN — SERTRALINE HYDROCHLORIDE 50 MG: 50 TABLET, FILM COATED ORAL at 10:03

## 2020-12-17 RX ADMIN — INFLUENZA VIRUS VACCINE 0.5 ML: 15; 15; 15; 15 SUSPENSION INTRAMUSCULAR at 14:21

## 2020-12-17 RX ADMIN — SPIRONOLACTONE 12.5 MG: 25 TABLET ORAL at 10:03

## 2020-12-17 RX ADMIN — GUAIFENESIN AND CODEINE PHOSPHATE 5 ML: 10; 100 LIQUID ORAL at 10:03

## 2020-12-17 RX ADMIN — CARVEDILOL 3.12 MG: 3.12 TABLET, FILM COATED ORAL at 10:04

## 2020-12-17 RX ADMIN — HYDROCODONE BITARTRATE AND ACETAMINOPHEN 1 TABLET: 10; 325 TABLET ORAL at 00:24

## 2020-12-17 RX ADMIN — FUROSEMIDE 80 MG: 80 TABLET ORAL at 10:03

## 2020-12-17 NOTE — TELEPHONE ENCOUNTER
Em for requested orders for home health nursing and PT/INR at home.   Cardiology should be managing her warfarin and INR.

## 2020-12-17 NOTE — TELEPHONE ENCOUNTER
LULU FROM [facility] CALLED PT IS BEING DISCHARGE FROM [facility] TODAY DX OF PULMONARY EMBOLI   WANT TO FOLLOW HER FOR NURSING AND PROTIMES NEED ORDERS PLACE.  PLEASE CALL  [phone]

## 2020-12-17 NOTE — PROGRESS NOTES
"                                              LOS: 4 days   Patient Care Team:  Darcie Ledbetter APRN as PCP - General (Internal Medicine)  Lisset Melton MD as Consulting Physician (Cardiology)  Andrew Watson MD as Consulting Physician (Urology)    Chief Complaint:  F/up hemoptysis. PE    Subjective   Interval History  On room air.  Denies shortness of breath at rest. Hemoptysis seems to be worse today compared to yesterday     REVIEW OF SYSTEMS:   CARDIOVASCULAR: Mild right sided chest pain improved. No palpitation. No bleeding elsewhere.     Constitutional: No fever or chills    Ventilator/Non-Invasive Ventilation Settings (From admission, onward)    None                Physical Exam:     Vital Signs  Temp:  [97.7 °F (36.5 °C)-98.6 °F (37 °C)] 97.7 °F (36.5 °C)  Heart Rate:  [82-86] 86  Resp:  [20] 20  BP: ()/(67-79) 100/74    Intake/Output Summary (Last 24 hours) at 12/16/2020 2234  Last data filed at 12/16/2020 1344  Gross per 24 hour   Intake 462 ml   Output --   Net 462 ml     Flowsheet Rows      First Filed Value   Admission Height  167.6 cm (66\") Documented at 12/11/2020 1120   Admission Weight  84.4 kg (186 lb) Documented at 12/11/2020 1120          General Appearance:   Alert, cooperative, in no acute distress   ENMT:  Mallampati score 3, moist mucous membrane   Eyes:  Pupils equal and reactive to light. EOMI   Neck:    Trachea midline. No thyromegaly.   Lungs:    Right lower lobe crackles up to 1/3 of the chest.  No wheezing.  Nonlabored breathing    Heart:   Regular rhythm and normal rate, normal S1 and S2, no         murmur   Skin:   No rash   Abdomen:     Soft. No tenderness. No HSM.   Neuro:  Conscious, alert, oriented x3. Strength 5/5 in upper and lower  ext   Extremities:  No cyanosis, clubbing or edema.  Warm extremities and well-perfused          Results Review:        Results from last 7 days   Lab Units 12/16/20 0445 12/15/20  0444 12/12/20  0518   SODIUM mmol/L 127* 131* " 133*   POTASSIUM mmol/L 5.1 4.9 3.5   CHLORIDE mmol/L 94* 96* 96*   CO2 mmol/L 23.0 23.9 24.3   BUN mg/dL 21* 18 8   CREATININE mg/dL 0.99 1.21* 0.95   GLUCOSE mg/dL 116* 130* 99   CALCIUM mg/dL 8.6 8.6 8.5*     Results from last 7 days   Lab Units 12/11/20  1236   TROPONIN T ng/mL <0.010     Results from last 7 days   Lab Units 12/14/20  0426 12/13/20  0143 12/12/20  0518   WBC 10*3/mm3 14.32* 10.99* 11.71*   HEMOGLOBIN g/dL 10.9* 10.7* 11.5*   HEMATOCRIT % 32.6* 32.6* 34.8   PLATELETS 10*3/mm3 166 140 135*     Results from last 7 days   Lab Units 12/16/20  0445 12/15/20  0444 12/14/20  1945 12/14/20  0426 12/13/20  2231 12/13/20  1540   INR  3.49* 1.78* 1.88*  --   --   --    APTT seconds  --   --   --  81.7* 90.1* 68.0*     Results from last 7 days   Lab Units 12/12/20  0518   PROBNP pg/mL 22,479.0*       I reviewed the patient's new clinical results.        Medication Review:   carvedilol, 3.125 mg, Oral, BID With Meals  darunavir ethanolate, 800 mg, Oral, Nightly  Emtricitabine-Tenofovir AF, 1 tablet, Oral, Nightly  furosemide, 80 mg, Oral, BID  influenza vaccine, 0.5 mL, Intramuscular, Once  potassium chloride, 60 mEq, Oral, Daily  ritonavir, 100 mg, Oral, Nightly  sertraline, 50 mg, Oral, Daily  spironolactone, 12.5 mg, Oral, Daily        Pharmacy to dose warfarin,           Assessment   1. Acute subsegmental pulmonary embolism, likely provoked by immobilization.  2. Right lower lobe pulmonary infarct  3. Hemoptysis, not massive, secondary to pulmonary infarct  4. HIV  5. Anemia, acute on chronic.  Stable  6. Nocturnal hypoxemia: Wilberto SpO2 76% with hypoxic burden 51 minutes and OLAYINKA 16/H suggestive of both obstructive sleep apnea and possibly nocturnal hypoxemia secondary to other conditions such as PE recurrent severe systolic cardiomyopathy  7. Obesity (BMI = 30)  8. Supra therapeutic INR      Plan   · Warfarin per pharmacy dozing. Stop Lovenox. CBC in AM  · Check CXR in AM new baseline before  discharge  · PRN Codeine for cough.   · We will arrange for oxygen at night when discharge and outpatient sleep evaluation            Dennis Diehl MD  12/16/20  22:34 EST          This note was dictated utilizing Dragon dictation

## 2020-12-17 NOTE — OUTREACH NOTE
Prep Survey      Responses   [facility] patient discharged from?  [facility]   Is LACE score < 7 ?  No   Eligibility  [facility]   Date of Admission  12/11/20   Date of Discharge  12/17/20   Discharge Disposition  Home-Health Care [facility]   Discharge diagnosis  Acute PE with RLL pulmonary infarct, Chronic CHF (EF 6%), Hemoptysis,    Does the patient have one of the following disease processes/diagnoses(primary or secondary)?  Other   Does the patient have Home health ordered?  Yes   What is the Home health agency?   [facility]   Is there a DME ordered?  No   Prep survey completed?  Yes          Marisabel Watson RN

## 2020-12-17 NOTE — NURSING NOTE
[facility] called to confirm the patient's upcoming appointment there with Dr. Hernandez.  Scheduled for 01/28/2021 at 11:40.  Patient notified and aware of this appointment.

## 2020-12-17 NOTE — PROGRESS NOTES
Pharmacy Consult: Warfarin Dosing/ Monitoring    Nina Saez is a 45 y.o. female, estimated creatinine clearance is 80.2 mL/min (by C-G formula based on SCr of 0.99 mg/dL). weighing 88 kg (194 lb).    PMH: AICD present (3/6/2020), Asthma, CHF, Class 2 obesity in adult, HIV, Hypertension, LBBB, and NICM     Social History     Tobacco Use    Smoking status: Former Smoker     Quit date:      Years since quittin.9    Smokeless tobacco: Never Used   Substance Use Topics    Alcohol use: Not Currently     Frequency: Never     Comment: holiday and special occ//caffeine use: 1 cup daily    Drug use: Never     Results from last 7 days   Lab Units 12/16/20  0445 12/15/20  0444 12/14/20  1945 20  0426 20  2231 20  1540 20  0832 20  0143 20  1816 20  0518 20  2133 20  1236   INR  3.49* 1.78* 1.88*  --   --   --   --   --   --   --   --  1.52*   APTT seconds  --   --   --  81.7* 90.1* 68.0* 77.9* 101.3* 62.3* 147.5* >200.0* 26.5   HEMOGLOBIN g/dL  --   --   --  10.9*  --   --   --  10.7*  --  11.5* 12.0 12.4   HEMATOCRIT %  --   --   --  32.6*  --   --   --  32.6*  --  34.8 36.5 37.8   PLATELETS 10*3/mm3  --   --   --  166  --   --   --  140  --  135* 138* 150     Results from last 7 days   Lab Units 12/16/20  0445 12/15/20  0444 20  0518 20  1236   SODIUM mmol/L 127* 131* 133* 131*   POTASSIUM mmol/L 5.1 4.9 3.5 3.4*   CHLORIDE mmol/L 94* 96* 96* 97*   CO2 mmol/L 23.0 23.9 24.3 23.4   BUN mg/dL 21* 18 8 10   CREATININE mg/dL 0.99 1.21* 0.95 0.98   CALCIUM mg/dL 8.6 8.6 8.5* 8.6   BILIRUBIN mg/dL  --   --   --  1.1   ALK PHOS U/L  --   --   --  127*   ALT (SGPT) U/L  --   --   --  51*   AST (SGOT) U/L  --   --   --  29   GLUCOSE mg/dL 116* 130* 99 123*     Anticoagulation history:   Home Med: No anticoagulant    Hospital Anticoagulation:  Consulting provider: Dr ANNABEL Zapata  Start date: 2020  Indication: Acute P.E.  Target INR: 2-3  Expected  duration: ~ 6 months   Bridge Therapy: Enoxaparin 90 mg sq q12hrs (was dced yesterday)    Date 12/14 12/15 12/16 12/17         INR 1.88 1.78 3.49 3.69         Warfarin dose 7.5 mg 7.5 mg Hold  Hold           Potential drug interactions:   Sertraline (major-home med)-concurrent use may result in an increased risk of bleeding.   Ritonavir (moderate-home med)-concurrent use may result in decreased plasma warfarin concentrations due to altered warfarin metabolism  Spironolactone (moderate-home med)-concurrent use leads to diuresis-induced concentration of clotting factors, decreasing anticoagulant effectiveness.     Will continue to monitor for drug-drug interaction as medications are added to patient's profile.     Relevant nutrition status: Diet Regular; Cardiac    Other:   Dietary advances -> changes in dietary vitamin K intake may alter response to warfarin.  Acute infection/inflammation may cause an increased sensitivity to warfarin    Lab: Albumin=  3.10 g/dL (12/15/2020)    Education complete?/ Date: TBD    Assessment/Plan:  Dose: INR at 3.69 today (above target) therefore will continue to hold warfarin dosing for today. Patient going home so recommend to hold dose for a few days to allow time for INR to come down.   Monitor: s/s bleed  Follow up INR    Lovenox has been dced.    Pharmacy will continue to follow until discharge or discontinuation of warfarin.     Nabil Azul, [facility]  Clinical Staff Pharmacist

## 2020-12-17 NOTE — NURSING NOTE
Spoke with SAMUEL Louise, about patients critical INR at 4.13. Will closely monitor and was ordered to make sure patient has PT and INR in the morning.

## 2020-12-17 NOTE — PROGRESS NOTES
Continued Stay Note  [facility]     Patient Name: Nina Saez  MRN: [id number]  Today's Date: 12/17/2020    Admit Date: 12/11/2020    Discharge Plan     Row Name 12/17/20 1322       Plan    Plan  Home with [facility] for nursing and PT with family assist    Plan Comments  [facility] accepts patient for  services for nursning and PT        Discharge Codes    No documentation.       Expected Discharge Date and Time     Expected Discharge Date Expected Discharge Time    Dec 17, 2020             Angélica Garcia RN

## 2020-12-17 NOTE — DISCHARGE SUMMARY
[facility] HOSPITALIST   DISCHARGE SUMMARY      Name:  Nina Saze   Age:  45 y.o.  Sex:  female  :  1975  MRN:  [id number]   Visit Number:  [id number]  Primary Care Physician:  Darcie Ledbetter APRN  Date of Discharge:  2020  Admission Date:  2020      Discharge Diagnosis:     1.  Acute PE with right lower lobe pulmonary infarct  2.  Severe nonischemic cardiomyopathy with AICD in place, ejection fraction 6%  3.  Chronic systolic CHF, continue medications  4.  Essential hypertension, continue medications  5.  HIV with undetectable viral load.  On home antivirals.  6.  Hemoptysis, with stable hemoglobin.  7.  Supratherapeutic INR.  Active Hospital Problems    Diagnosis  POA   • **Acute pulmonary embolism (CMS/HCC) [I26.99]  Yes   • Hemoptysis [R04.2]  Unknown   • Acute on chronic systolic congestive heart failure (CMS/HCC) [I50.23]  Yes   • NICM (nonischemic cardiomyopathy) (CMS/HCC) [I42.8]  Yes   • AICD (automatic cardioverter/defibrillator) present [Z95.810]  Yes   • Asthma [J45.909]  Yes   • Chronic systolic congestive heart failure (CMS/HCC) [I50.22]  Yes   • Class 2 obesity in adult [E66.9]  Yes   • Essential hypertension [I10]  Yes   • HIV (human immunodeficiency virus infection) (CMS/HCC) [B20]  Yes      Resolved Hospital Problems   No resolved problems to display.         Presenting Problem/History of Present Illness:    Hemoptysis [R04.2]  Other acute pulmonary embolism, unspecified whether acute cor pulmonale present (CMS/HCC) [I26.99]  Other acute pulmonary embolism, unspecified whether acute cor pulmonale present (CMS/HCC) [I26.99]         Hospital Course:    Patient 45-year-old female who came in on 2020 with hemoptysis.  She has history of CHF, HIV.  CT scan showed acute subsegmental pulmonary emboli.  There is also right lower lobe pulmonary infarct.  Pulmonology was consulted heparin drip was recommended and then the patient was placed on  Coumadin.     Neurology was consulted as she has nonischemic cardiomyopathy with ejection fraction of 6%.  She has been seen at the transplant service by  however she has been doing reasonably well.  She has AICD in place.     Patient will need 6 months of anticoagulation.  She had been on heparin drip but was transitioned to Coumadin and Lovenox bridge.  NOAC was not ideal due to interaction with HIV medications.  INR is currently 3.69.  Recommend hold Coumadin for a few days, get INR checked in 2 days.  Referred to the anticoagulation clinic.  Home health can draw her blood as well.  Prescribed Coumadin 2.5 mg, would start once cleared by PCP or cardiologist.     She currently denies any chest pressure, shortness of breath, nausea, vomiting, or pain.    Still has hemoptysis, however this is not worsened.  Her hemoglobin is stable.  Her potassium was noted to be elevated at 5.4, recheck was 4.9.  Have reduced her potassium dose.  Recheck renal panel in 5 days with results to PCP.    Recommend patient follow-up with cardiology in 1 week, they scheduled the appointment.  Follow-up with PCP in 1 week.  If has worsening shortness of breath, or worsening hemoptysis she is to return to the emergency room.  Patient appears stable at present.    Procedures Performed:           Consults:     Consults     Date and Time Order Name Status Description    12/15/2020 1029 Inpatient Cardiology Consult Completed     12/12/2020 1308 Inpatient Pulmonology Consult      12/11/2020 1524 [facility] (on-call MD unless specified) Details Completed           Pertinent Test Results:     Lab Results (all)     Procedure Component Value Units Date/Time    Potassium [[id number]]  (Normal) Collected: 12/17/20 0929    Specimen: Blood Updated: 12/17/20 1053     Potassium 4.9 mmol/L     Protime-INR [[id number]]  (Abnormal) Collected: 12/17/20 0929    Specimen: Blood Updated: 12/17/20 1019     Protime 36.3 Seconds      INR 3.69    Renal Function Panel  [[id number]]  (Abnormal) Collected: 12/17/20 0254    Specimen: Blood Updated: 12/17/20 0430     Glucose 112 mg/dL      BUN 21 mg/dL      Creatinine 1.04 mg/dL      Sodium 128 mmol/L      Potassium 5.4 mmol/L      Chloride 95 mmol/L      CO2 23.6 mmol/L      Calcium 8.8 mg/dL      Albumin 3.10 g/dL      Phosphorus 3.6 mg/dL      Anion Gap 9.4 mmol/L      BUN/Creatinine Ratio 20.2     eGFR  African Amer 69 mL/min/1.73     Narrative:      GFR Normal >60  Chronic Kidney Disease <60  Kidney Failure <15      Magnesium [[id number]]  (Normal) Collected: 12/17/20 0254    Specimen: Blood Updated: 12/17/20 0430     Magnesium 2.0 mg/dL     Protime-INR [[id number]]  (Abnormal) Collected: 12/17/20 0254    Specimen: Blood from Hand, Left Updated: 12/17/20 0429     Protime 39.7 Seconds      INR 4.13    CBC & Differential [[id number]]  (Abnormal) Collected: 12/17/20 0254    Specimen: Blood Updated: 12/17/20 0414    Narrative:      The following orders were created for panel order CBC & Differential.  Procedure                               Abnormality         Status                     ---------                               -----------         ------                     CBC Auto Differential[[id number]]        Abnormal            Final result                 Please view results for these tests on the individual orders.    CBC Auto Differential [[id number]]  (Abnormal) Collected: 12/17/20 0254    Specimen: Blood Updated: 12/17/20 0414     WBC 9.44 10*3/mm3      RBC 3.41 10*6/mm3      Hemoglobin 10.6 g/dL      Hematocrit 32.2 %      MCV 94.4 fL      MCH 31.1 pg      MCHC 32.9 g/dL      RDW 13.2 %      RDW-SD 45.5 fl      MPV 11.9 fL      Platelets 209 10*3/mm3      Neutrophil % 65.5 %      Lymphocyte % 21.7 %      Monocyte % 11.7 %      Eosinophil % 0.2 %      Basophil % 0.3 %      Immature Grans % 0.6 %      Neutrophils, Absolute 6.18 10*3/mm3      Lymphocytes, Absolute 2.05 10*3/mm3      Monocytes, Absolute 1.10 10*3/mm3       Eosinophils, Absolute 0.02 10*3/mm3      Basophils, Absolute 0.03 10*3/mm3      Immature Grans, Absolute 0.06 10*3/mm3      nRBC 0.0 /100 WBC     Protime-INR [[id number]]  (Abnormal) Collected: 12/16/20 0445    Specimen: Blood Updated: 12/16/20 0725     Protime 34.8 Seconds      INR 3.49    Basic Metabolic Panel [[id number]]  (Abnormal) Collected: 12/16/20 0445    Specimen: Blood Updated: 12/16/20 0716     Glucose 116 mg/dL      BUN 21 mg/dL      Creatinine 0.99 mg/dL      Sodium 127 mmol/L      Potassium 5.1 mmol/L      Chloride 94 mmol/L      CO2 23.0 mmol/L      Calcium 8.6 mg/dL      eGFR  African Amer 74 mL/min/1.73      BUN/Creatinine Ratio 21.2     Anion Gap 10.0 mmol/L     Narrative:      GFR Normal >60  Chronic Kidney Disease <60  Kidney Failure <15      Renal Function Panel [[id number]]  (Abnormal) Collected: 12/15/20 0444    Specimen: Blood Updated: 12/15/20 0559     Glucose 130 mg/dL      BUN 18 mg/dL      Creatinine 1.21 mg/dL      Sodium 131 mmol/L      Potassium 4.9 mmol/L      Chloride 96 mmol/L      CO2 23.9 mmol/L      Calcium 8.6 mg/dL      Albumin 3.10 g/dL      Phosphorus 3.5 mg/dL      Anion Gap 11.1 mmol/L      BUN/Creatinine Ratio 14.9     eGFR  African Amer 58 mL/min/1.73     Narrative:      GFR Normal >60  Chronic Kidney Disease <60  Kidney Failure <15      Protime-INR [[id number]]  (Abnormal) Collected: 12/15/20 0444    Specimen: Blood from Hand, Left Updated: 12/15/20 0548     Protime 20.5 Seconds      INR 1.78    Protime-INR [[id number]]  (Abnormal) Collected: 12/14/20 1945    Specimen: Blood from Hand, Left Updated: 12/14/20 2017     Protime 21.4 Seconds      INR 1.88    CBC & Differential [[id number]]  (Abnormal) Collected: 12/14/20 0426    Specimen: Blood Updated: 12/14/20 0534    Narrative:      The following orders were created for panel order CBC & Differential.  Procedure                               Abnormality         Status                     ---------                                -----------         ------                     CBC Auto Differential[[id number]]        Abnormal            Final result                 Please view results for these tests on the individual orders.    CBC Auto Differential [[id number]]  (Abnormal) Collected: 12/14/20 0426    Specimen: Blood Updated: 12/14/20 0534     WBC 14.32 10*3/mm3      RBC 3.37 10*6/mm3      Hemoglobin 10.9 g/dL      Hematocrit 32.6 %      MCV 96.7 fL      MCH 32.3 pg      MCHC 33.4 g/dL      RDW 13.2 %      RDW-SD 47.0 fl      MPV 13.1 fL      Platelets 166 10*3/mm3      Neutrophil % 76.7 %      Lymphocyte % 13.1 %      Monocyte % 9.4 %      Eosinophil % 0.1 %      Basophil % 0.2 %      Neutrophils, Absolute 10.98 10*3/mm3      Lymphocytes, Absolute 1.88 10*3/mm3      Monocytes, Absolute 1.35 10*3/mm3      Eosinophils, Absolute 0.01 10*3/mm3      Basophils, Absolute 0.03 10*3/mm3     aPTT [[id number]]  (Abnormal) Collected: 12/14/20 0426    Specimen: Blood Updated: 12/14/20 0532     PTT 81.7 seconds     aPTT [[id number]]  (Abnormal) Collected: 12/13/20 2231    Specimen: Blood Updated: 12/13/20 2349     PTT 90.1 seconds     Iron Profile [[id number]]  (Abnormal) Collected: 12/13/20 1540    Specimen: Blood Updated: 12/13/20 1625     Iron 19 mcg/dL      Iron Saturation 4 %      Transferrin 323 mg/dL      TIBC 481 mcg/dL     aPTT [[id number]]  (Abnormal) Collected: 12/13/20 1540    Specimen: Blood Updated: 12/13/20 1608     PTT 68.0 seconds     aPTT [[id number]]  (Abnormal) Collected: 12/13/20 0832    Specimen: Blood from Arm, Left Updated: 12/13/20 0908     PTT 77.9 seconds     aPTT [[id number]]  (Abnormal) Collected: 12/13/20 0143    Specimen: Blood from Arm, Left Updated: 12/13/20 0213     .3 seconds     CBC & Differential [[id number]]  (Abnormal) Collected: 12/13/20 0143    Specimen: Blood Updated: 12/13/20 0200    Narrative:      The following orders were created for panel order CBC & Differential.  Procedure                                Abnormality         Status                     ---------                               -----------         ------                     CBC Auto Differential[[id number]]        Abnormal            Final result                 Please view results for these tests on the individual orders.    CBC Auto Differential [[id number]]  (Abnormal) Collected: 12/13/20 0143    Specimen: Blood Updated: 12/13/20 0200     WBC 10.99 10*3/mm3      RBC 3.38 10*6/mm3      Hemoglobin 10.7 g/dL      Hematocrit 32.6 %      MCV 96.4 fL      MCH 31.7 pg      MCHC 32.8 g/dL      RDW 13.4 %      RDW-SD 47.3 fl      MPV 12.5 fL      Platelets 140 10*3/mm3      Neutrophil % 69.4 %      Lymphocyte % 18.0 %      Monocyte % 11.8 %      Eosinophil % 0.0 %      Basophil % 0.3 %      Immature Grans % 0.5 %      Neutrophils, Absolute 7.63 10*3/mm3      Lymphocytes, Absolute 1.98 10*3/mm3      Monocytes, Absolute 1.30 10*3/mm3      Eosinophils, Absolute 0.00 10*3/mm3      Basophils, Absolute 0.03 10*3/mm3      Immature Grans, Absolute 0.05 10*3/mm3      nRBC 0.0 /100 WBC     aPTT [[id number]]  (Abnormal) Collected: 12/12/20 1816    Specimen: Blood Updated: 12/12/20 1854     PTT 62.3 seconds     BNP [[id number]]  (Abnormal) Collected: 12/12/20 0518    Specimen: Blood Updated: 12/12/20 1602     proBNP 22,479.0 pg/mL     Narrative:      Among patients with dyspnea, NT-proBNP is highly sensitive for the detection of acute congestive heart failure. In addition NT-proBNP of <300 pg/ml effectively rules out acute congestive heart failure with 99% negative predictive value.    Results may be falsely decreased if patient taking Biotin.      COVID PRE-OP / PRE-PROCEDURE SCREENING ORDER (NO ISOLATION) - Swab, Nasopharynx [[id number]] Collected: 12/11/20 1539    Specimen: Swab from Nasopharynx Updated: 12/12/20 1135    Narrative:      The following orders were created for panel order COVID PRE-OP / PRE-PROCEDURE SCREENING ORDER (NO ISOLATION) - Swab,  Nasopharynx.  Procedure                               Abnormality         Status                     ---------                               -----------         ------                     COVID-19,BIOTAP, NP/OP S...[[id number]]                      Final result                 Please view results for these tests on the individual orders.    COVID-19,BIOTAP, NP/OP SWAB IN TRANSPORT MEDIA OR SALINE 24-36 HR TAT - Swab, Nasopharynx [[id number]] Collected: 12/11/20 1539    Specimen: Swab from Nasopharynx Updated: 12/12/20 1135     SARS-CoV-2 PCR Not Detected     Comment: Nucleic acid specific to SARS-CoV-2 (COVID-19) virus was not detected in  this sample by the TaqPath (TM) COVID-19 Combo Kit.          SARS-CoV-2 (COVID-19) nucleic acid testing performed using [employer] Aptima (R) SARS-CoV-2 Assay or [employer] TaqPath (TM)  COVID-19 Combo Kit as indicated above under Emergency Use Authorization (EUA) from the FDA. Aptima (R) and TaqPath (TM) test performance  characteristics verified by [facility] in accordance with the FDAs Guidance Document (Policy for Diagnostic Tests for Coronavirus Disease-2019  during the Public Health Emergency) issued on March 16, 2020. The laboratory is regulated under CLIA as qualified to perform high-complexity testing  Unless otherwise noted, all testing was performed at [facility], CLIA No. [id number], [place] State Licensee No. [id number]       aPTT [[id number]]  (Abnormal) Collected: 12/12/20 0518    Specimen: Blood from Hand, Left Updated: 12/12/20 0652     .5 seconds     CBC & Differential [[id number]]  (Abnormal) Collected: 12/12/20 0518    Specimen: Blood Updated: 12/12/20 0648    Narrative:      The following orders were created for panel order CBC & Differential.  Procedure                               Abnormality         Status                     ---------                               -----------         ------                     CBC Auto Differential[[id number]]         Abnormal            Final result                 Please view results for these tests on the individual orders.    CBC Auto Differential [[id number]]  (Abnormal) Collected: 12/12/20 0518    Specimen: Blood Updated: 12/12/20 0648     WBC 11.71 10*3/mm3      RBC 3.59 10*6/mm3      Hemoglobin 11.5 g/dL      Hematocrit 34.8 %      MCV 96.9 fL      MCH 32.0 pg      MCHC 33.0 g/dL      RDW 13.4 %      RDW-SD 47.3 fl      MPV 13.3 fL      Platelets 135 10*3/mm3      Neutrophil % 64.6 %      Lymphocyte % 23.6 %      Monocyte % 11.2 %      Eosinophil % 0.0 %      Basophil % 0.3 %      Neutrophils, Absolute 7.58 10*3/mm3      Lymphocytes, Absolute 2.76 10*3/mm3      Monocytes, Absolute 1.31 10*3/mm3      Eosinophils, Absolute 0.00 10*3/mm3      Basophils, Absolute 0.03 10*3/mm3     Basic Metabolic Panel [[id number]]  (Abnormal) Collected: 12/12/20 0518    Specimen: Blood Updated: 12/12/20 0622     Glucose 99 mg/dL      BUN 8 mg/dL      Creatinine 0.95 mg/dL      Sodium 133 mmol/L      Potassium 3.5 mmol/L      Chloride 96 mmol/L      CO2 24.3 mmol/L      Calcium 8.5 mg/dL      eGFR   Amer 77 mL/min/1.73      BUN/Creatinine Ratio 8.4     Anion Gap 12.7 mmol/L     Narrative:      GFR Normal >60  Chronic Kidney Disease <60  Kidney Failure <15      Magnesium [[id number]]  (Normal) Collected: 12/12/20 0518    Specimen: Blood Updated: 12/12/20 0622     Magnesium 2.0 mg/dL     aPTT [[id number]]  (Abnormal) Collected: 12/11/20 2133    Specimen: Blood from Arm, Left Updated: 12/11/20 2209     PTT >200.0 seconds     CBC & Differential [[id number]]  (Abnormal) Collected: 12/11/20 2133    Specimen: Blood Updated: 12/11/20 2146    Narrative:      The following orders were created for panel order CBC & Differential.  Procedure                               Abnormality         Status                     ---------                               -----------         ------                     CBC Auto Differential[[id number]]         Abnormal            Final result                 Please view results for these tests on the individual orders.    CBC Auto Differential [[id number]]  (Abnormal) Collected: 12/11/20 2133    Specimen: Blood Updated: 12/11/20 2146     WBC 11.27 10*3/mm3      RBC 3.71 10*6/mm3      Hemoglobin 12.0 g/dL      Hematocrit 36.5 %      MCV 98.4 fL      MCH 32.3 pg      MCHC 32.9 g/dL      RDW 13.2 %      RDW-SD 47.5 fl      MPV 12.5 fL      Platelets 138 10*3/mm3      Neutrophil % 73.3 %      Lymphocyte % 16.5 %      Monocyte % 9.3 %      Eosinophil % 0.0 %      Basophil % 0.4 %      Immature Grans % 0.5 %      Neutrophils, Absolute 8.26 10*3/mm3      Lymphocytes, Absolute 1.86 10*3/mm3      Monocytes, Absolute 1.05 10*3/mm3      Eosinophils, Absolute 0.00 10*3/mm3      Basophils, Absolute 0.04 10*3/mm3      Immature Grans, Absolute 0.06 10*3/mm3      nRBC 0.0 /100 WBC     Protime-INR [[id number]]  (Abnormal) Collected: 12/11/20 1236    Specimen: Blood Updated: 12/11/20 1547     Protime 18.1 Seconds      INR 1.52    aPTT [[id number]]  (Normal) Collected: 12/11/20 1236    Specimen: Blood Updated: 12/11/20 1547     PTT 26.5 seconds     Comprehensive Metabolic Panel [[id number]]  (Abnormal) Collected: 12/11/20 1236    Specimen: Blood Updated: 12/11/20 1320     Glucose 123 mg/dL      BUN 10 mg/dL      Creatinine 0.98 mg/dL      Sodium 131 mmol/L      Potassium 3.4 mmol/L      Chloride 97 mmol/L      CO2 23.4 mmol/L      Calcium 8.6 mg/dL      Total Protein 6.0 g/dL      Albumin 3.60 g/dL      ALT (SGPT) 51 U/L      AST (SGOT) 29 U/L      Alkaline Phosphatase 127 U/L      Total Bilirubin 1.1 mg/dL      eGFR  African Amer 74 mL/min/1.73      Globulin 2.4 gm/dL      A/G Ratio 1.5 g/dL      BUN/Creatinine Ratio 10.2     Anion Gap 10.6 mmol/L     Narrative:      GFR Normal >60  Chronic Kidney Disease <60  Kidney Failure <15      Troponin [[id number]]  (Normal) Collected: 12/11/20 1236    Specimen: Blood Updated: 12/11/20 1317     Troponin  T <0.010 ng/mL     Narrative:      Troponin T Reference Range:  <= 0.03 ng/mL-   Negative for AMI  >0.03 ng/mL-     Abnormal for myocardial necrosis.  Clinicians would have to utilize clinical acumen, EKG, Troponin and serial changes to determine if it is an Acute Myocardial Infarction or myocardial injury due to an underlying chronic condition.       Results may be falsely decreased if patient taking Biotin.      hCG, Serum, Qualitative [[id number]]  (Normal) Collected: 12/11/20 1236    Specimen: Blood Updated: 12/11/20 1314     HCG Qualitative Negative    Urinalysis With Microscopic If Indicated (No Culture) - Urine, Clean Catch [[id number]]  (Abnormal) Collected: 12/11/20 1247    Specimen: Urine, Clean Catch Updated: 12/11/20 1303     Color, UA Yellow     Appearance, UA Turbid     pH, UA 6.5     Specific Gravity, UA 1.014     Glucose, UA >=1000 mg/dL (3+)     Ketones, UA Negative     Bilirubin, UA Negative     Blood, UA Trace     Protein, UA Negative     Leuk Esterase, UA Negative     Nitrite, UA Negative     Urobilinogen, UA 1.0 E.U./dL    Urinalysis, Microscopic Only - Urine, Clean Catch [[id number]]  (Abnormal) Collected: 12/11/20 1247    Specimen: Urine, Clean Catch Updated: 12/11/20 1303     RBC, UA 3-5 /HPF      WBC, UA 0-2 /HPF      Bacteria, UA None Seen /HPF      Squamous Epithelial Cells, UA 0-2 /HPF      Hyaline Casts, UA 0-2 /LPF      Methodology Automated Microscopy    D-dimer, Quantitative [[id number]]  (Abnormal) Collected: 12/11/20 1236    Specimen: Blood Updated: 12/11/20 1259     D-Dimer, Quantitative 2.47 MCGFEU/mL     Narrative:      The [employer] D-Dimer test used in conjunction with a clinical pretest probability (PTP) assessment model, has been approved by the FDA to rule out the presence of venous thromboembolism (VTE) in outpatients suspected of deep venous thrombosis (DVT) or pulmonary embolism (PE). The cut-off for negative predictive value is <0.50 MCGFEU/mL.    CBC & Differential [[id number]]   (Abnormal) Collected: 12/11/20 1236    Specimen: Blood Updated: 12/11/20 1246    Narrative:      The following orders were created for panel order CBC & Differential.  Procedure                               Abnormality         Status                     ---------                               -----------         ------                     CBC Auto Differential[[id number]]        Abnormal            Final result                 Please view results for these tests on the individual orders.    CBC Auto Differential [[id number]]  (Abnormal) Collected: 12/11/20 1236    Specimen: Blood Updated: 12/11/20 1246     WBC 9.35 10*3/mm3      RBC 3.91 10*6/mm3      Hemoglobin 12.4 g/dL      Hematocrit 37.8 %      MCV 96.7 fL      MCH 31.7 pg      MCHC 32.8 g/dL      RDW 13.2 %      RDW-SD 47.1 fl      MPV 12.8 fL      Platelets 150 10*3/mm3      Neutrophil % 71.5 %      Lymphocyte % 18.1 %      Monocyte % 9.7 %      Eosinophil % 0.0 %      Basophil % 0.3 %      Immature Grans % 0.4 %      Neutrophils, Absolute 6.68 10*3/mm3      Lymphocytes, Absolute 1.69 10*3/mm3      Monocytes, Absolute 0.91 10*3/mm3      Eosinophils, Absolute 0.00 10*3/mm3      Basophils, Absolute 0.03 10*3/mm3      Immature Grans, Absolute 0.04 10*3/mm3      nRBC 0.1 /100 WBC           Imaging Results (All)     Procedure Component Value Units Date/Time    CT Angiogram Chest [[id number]] Collected: 12/11/20 1518     Updated: 12/11/20 1530    Narrative:      CT ANGIOGRAM CHEST     HISTORY: Elevated D dimer. Evaluate for pulmonary embolism.     TECHNIQUE: CT angiogram of the chest is provided and correlated with  chest x-ray from earlier today and a chest CT angiogram from 09/30/2020.  Multiplanar and 3-dimensional reformatted images were produced at a  separate workstation.     Radiation dose reduction techniques were utilized, including automated  exposure control and exposure modulation based on body size.     FINDINGS: There is a cardiac pacing device and  there is dilatation of  the left ventricle which seems to primarily account for the massively  enlarged cardiac silhouette seen on x-ray. The appearance of the cardiac  structures appears unchanged in comparison with previous CT angiogram.  There is normal caliber of the pulmonary arteries. A small peripheral  pulmonary embolism is observed in the posterior right upper lobe on  image 46. There is also an occlusive embolism along the anterolateral  right lower lobe branch seen on images 83 and below. There is a patch of  parenchymal opacity of about 6 cm diameter peripheral to this area,  likely representing pulmonary infarction. There is no central pulmonary  thromboembolism. No embolus is identified on the left. There is no  dilatation of the right cardiac chambers. RV:LV ratio measures 0.6.     The lungs are otherwise clear. Airways are patent. Upper abdominal  structures appear grossly normal.       Impression:      There are peripheral, acute pulmonary thromboemboli in right  upper and right lower lobe pulmonary artery branches. That along the  anterolateral right lower lobe appears occlusive and there is  parenchymal change suggesting pulmonary infarction along the  anterolateral right lower lobe. No other embolism is identified. There  is massive dilatation of the left cardiac chambers as was seen on  previous CT. There is no enlargement of the right heart chambers. I  called the findings to Anahy Rocha PA-C, in the emergency department  at 3:15 PM.     This report was finalized on 12/11/2020 3:27 PM by Dr. Gerardo Joshua M.D.       XR Chest 1 View [[id number]] Collected: 12/11/20 1328     Updated: 12/11/20 1422    Narrative:      ONE VIEW PORTABLE CHEST     HISTORY: Persistent cough. Episode of hemoptysis.     FINDINGS: There is prominent cardiomegaly with a pacemaker in place and  this shows no change from 10/27/2020. The lungs are well-expanded and  clear and there is no evidence of pneumonia.     This  "report was finalized on 12/11/2020 2:19 PM by Dr. Ta Cordero M.D.             Condition on Discharge:      Stable    Vital Signs:    BP 96/75 (BP Location: Left arm, Patient Position: Sitting)   Pulse 94   Temp 97.6 °F (36.4 °C) (Oral)   Resp 20   Ht 167.6 cm (66\")   Wt 88.1 kg (194 lb 3.2 oz)   SpO2 98%   BMI 31.34 kg/m²     Physical Exam:       General: Alert and oriented x4, well-developed well-nourished, mild distress  Heart: Regular rate and rhythm without murmur rub or thrill  Lungs: Clear to auscultation bilaterally without use of accessory muscles of respiration.    Abdomen: Soft/nontender/nondistended.  No HSM is noted.  MSK: 5/5 strength in upper/lower extremities bilaterally    Discharge Disposition:    [place] [facility] [place]    [place]charge Medication:       Discharge Medications      New Medications      Instructions Start Date   guaiFENesin-codeine 100-10 MG/5ML liquid  Commonly known as: GUAIFENESIN AC   5 mL, Oral, Every 4 Hours PRN      HYDROcodone-acetaminophen  MG per tablet  Commonly known as: NORCO   1 tablet, Oral, Every 4 Hours PRN      warfarin 2.5 MG tablet  Commonly known as: Coumadin   Call PCP regarding adjustment of Coumadin.         Changes to Medications      Instructions Start Date   potassium chloride 20 MEQ CR tablet  Commonly known as: K-DUR,KLOR-CON  What changed: how much to take   40 mEq, Oral, Daily         Continue These Medications      Instructions Start Date   acetaminophen 325 MG tablet  Commonly known as: TYLENOL   650 mg, Oral, Every 6 Hours PRN      albuterol sulfate  (90 Base) MCG/ACT inhaler  Commonly known as: PROVENTIL HFA;VENTOLIN HFA;PROAIR HFA   2 puffs, Inhalation, Every 4 Hours PRN      carvedilol 3.125 MG tablet  Commonly known as: COREG   3.125 mg, Oral, 2 Times Daily With Meals      Emtricitabine-Tenofovir -25 MG per tablet  Commonly known as: DESCOVY   Oral, Nightly      Entresto 24-26 MG tablet  Generic drug: " sacubitril-valsartan   1 tablet, Oral, Every 12 Hours Scheduled      fluticasone 50 MCG/ACT nasal spray  Commonly known as: Flonase   2 sprays, Nasal, Daily      furosemide 80 MG tablet  Commonly known as: LASIX   80 mg, Oral, 2 Times Daily      Jardiance 10 MG tablet  Generic drug: Empagliflozin   10 mg, Oral, Daily      ondansetron ODT 4 MG disintegrating tablet  Commonly known as: Zofran ODT   4 mg, Translingual, Every 8 Hours PRN      Prezista 800 MG tablet tablet  Generic drug: darunavir ethanolate   800 mg, Oral, Nightly      ritonavir 100 MG tablet  Commonly known as: NORVIR   100 mg, Oral, Nightly      sertraline 50 MG tablet  Commonly known as: ZOLOFT   50 mg, Oral, Daily      spironolactone 25 MG tablet  Commonly known as: ALDACTONE   TAKE 1 2 (ONE HALF) TABLET BY MOUTH ONCE DAILY      vitamin D 1.25 MG (40403 UT) capsule capsule  Commonly known as: ERGOCALCIFEROL   50,000 Units, Oral, Every 30 Days         Stop These Medications    aspirin 81 MG EC tablet            Discharge Diet:     Diet Instructions     Diet: Cardiac      Discharge Diet: Cardiac          Activity at Discharge:         Follow-up Appointments:    Future Appointments   Date Time Provider Department Center   1/11/2021  2:00 PM [facility]   4/22/2021  1:15 PM Chayo Conde MD [facility] None     Additional Instructions for the Follow-ups that You Need to Schedule     Ambulatory Referral to Home Health   As directed      Face to Face Visit Date: 12/17/2020    Follow-up provider for Plan of Care?: I treated the patient in an acute care facility and will not continue treatment after discharge.    Follow-up provider: SIRI MENEZES [[id number]]    Reason/Clinical Findings: Congestive heart failure, pulmonary embolism    Describe mobility limitations that make leaving home difficult: Congestive heart failure, pulmonary embolism    Nursing/Therapeutic Services Requested: Skilled Nursing    Skilled nursing  "orders: Cardiopulmonary assessments (PT/INR check) Other    Frequency: 1 Week 1         Discharge Follow-up with PCP   As directed       Currently Documented PCP:    Darcie Ledbetter APRN    PCP Phone Number:    [phone]     Follow Up Details: 1 week         Discharge Follow-up with Specified Provider: Cardiology 1 week as scheduled.   As directed      To: Cardiology 1 week as scheduled.         Referral to Anticoagulation Monitoring   As directed       Select To DEPT SPEC to filter TO DEPT       Send INR reminders to the \"clinical pool\" of the TO DEPT     (ie:   [facility]  or [facility] CLINICAL POOL)     Renal Function Panel    Dec 22, 2020 (Approximate)      Results to PCP.    Order Comments: Results to PCP.          Protime-INR  (Three times a Week)  Dec 17, 2021      Results to PCP.    Order Comments: Results to PCP.                Test Results Pending at Discharge:      Spent 35 minutes with greater than 50% of time coordination of care with face time with patient, review of labs, arrangement of home health, discussion with nursing and CCM and counseling to patient.     Ruben Kirby,   12/17/20  12:21 EST  "

## 2020-12-17 NOTE — PROGRESS NOTES
"CC: CHF, PE    Interval History: continues to cough up some blood. INR high. lovenox was stopped. SBP . No dizziness.  Some pleuritic chest pain.       Vital Signs  Temp:  [97.6 °F (36.4 °C)-97.7 °F (36.5 °C)] 97.6 °F (36.4 °C)  Heart Rate:  [83-94] 94  Resp:  [20] 20  BP: ()/(67-79) 96/75    Intake/Output Summary (Last 24 hours) at 12/17/2020 0922  Last data filed at 12/17/2020 0326  Gross per 24 hour   Intake 222 ml   Output 750 ml   Net -528 ml     Flowsheet Rows      First Filed Value   Admission Height  167.6 cm (66\") Documented at 12/11/2020 1120   Admission Weight  84.4 kg (186 lb) Documented at 12/11/2020 1120          PHYSICAL EXAM:  General: No acute distress  Resp:NL Rate, unlabored, clear  CV:NL rate and rhythm, NL PMI, Nl S1 and S2, no Murmur, no gallop, no rub, No JVD. Normal pedal pulses  ABD:Nl sounds, no masses or tenderness, nondistended, no guarding or rebound  Neuro: alert,cooperative and oriented  Extr: No edema or cyanosis, moves all extremities      Results Review:    Results from last 7 days   Lab Units 12/17/20  0254   SODIUM mmol/L 128*   POTASSIUM mmol/L 5.4*   CHLORIDE mmol/L 95*   CO2 mmol/L 23.6   BUN mg/dL 21*   CREATININE mg/dL 1.04*   GLUCOSE mg/dL 112*   CALCIUM mg/dL 8.8     Results from last 7 days   Lab Units 12/11/20  1236   TROPONIN T ng/mL <0.010     Results from last 7 days   Lab Units 12/17/20  0254   WBC 10*3/mm3 9.44   HEMOGLOBIN g/dL 10.6*   HEMATOCRIT % 32.2*   PLATELETS 10*3/mm3 209     Results from last 7 days   Lab Units 12/17/20  0254 12/16/20  0445 12/15/20  0444  12/14/20  0426 12/13/20  2231 12/13/20  1540   INR  4.13* 3.49* 1.78*   < >  --   --   --    APTT seconds  --   --   --   --  81.7* 90.1* 68.0*    < > = values in this interval not displayed.         Results from last 7 days   Lab Units 12/17/20  0254   MAGNESIUM mg/dL 2.0         I reviewed the patient's new clinical results.  I personally viewed and interpreted the patient's EKG/Telemetry " data- NSR        Medication Review:   Meds reviewed    Pharmacy to dose warfarin,         Assessment/Plan    1. 45 year old female with Nonischemic cardiomyopathy.  Estimate ejection fraction was 6% 07/2020.  She has been seen by transplant service at  however she was doing reasonable enough that no aggressive arrangement was madde.  Patient had a BNP on 11/13/2020 of 6803 which is probably near baseline.  Then markedly elevated at 22,000 on 12/12/2020. Care is complicated by hypotension. Entresto stopped due to profound hypotension. BB, spironolactone and diuretics are now tolerated. SBP 80-mid 90s and asymptomatic. No ACE/ARB or ARNI at this time. If she does not tolerate low dose Entresto ( home medication to resume at discharge) would try at least lisinopril 2.5 mg daily.   -she is following in our hear failure clinic outpatient but will need to see  advanced heart failure clinic again apparently they offered her an LVAD. She is hopeful for transplant.   - corlanor not available inpatient   2.  Bilateral pulmonary embolism with signs of pulmonary infarct which is probably the cause of the hemoptysis. Lovenox stopped 12/16/2020 due to supra therapuetic INR.  -She is receiving pain medicines for her pleuritic chest discomfort.  3.  AICD  4.  HIV infection  5. History of hypertension but now low BP  6.  ANA- creatinine improving   7. Supratherapeutic INR- warfarin held today off lovenox  8. Moderate to severe MR on echo 07/2020    -Apparently was on corlanor per  advanced heart failure clinic. HR ideally should be closer to 70 bpm and is 90s. However, our pharmacy notified me that we do not carry it inpatient. SBP is stable.  From our standpoint, may be discharged and is to resume Entresto at home. She checks her BP and weight daily at home. Will arrange for 1 week follow up. Will need her INR followed closely outpatient would refer to the anticoagulation clinic.     SAMUEL De León  12/17/20  09:22  EST

## 2020-12-18 ENCOUNTER — TRANSITIONAL CARE MANAGEMENT TELEPHONE ENCOUNTER (OUTPATIENT)
Dept: CALL CENTER | Facility: HOSPITAL | Age: 45
End: 2020-12-18

## 2020-12-18 NOTE — PROGRESS NOTES
Case Management Discharge Note      Final Note: home with [facility]    Provided Post Acute Provider List?: Yes  Post Acute Provider List: Home Health, Nursing Home  Provided Post Acute Provider Quality & Resource List?: N/A    Selected Continued Care - Discharged on 12/17/2020 Admission date: 12/11/2020 - Discharge disposition: Home-Health Care Svc    Destination    No services have been selected for the patient.              Durable Medical Equipment    No services have been selected for the patient.              Dialysis/Infusion    No services have been selected for the patient.              Home Medical Care     Service Provider Selected Services Address Phone Fax Patient Preferred    [facility]  [facility] [place] [address] [phone] --          Therapy    No services have been selected for the patient.              Community Resources    No services have been selected for the patient.                  Transportation Services  Private: Car    Final Discharge Disposition Code: 06 - home with home health care

## 2020-12-18 NOTE — PROGRESS NOTES
Continued Stay Note  [facility]     Patient Name: Nina Saez  MRN: [id number]  Today's Date: 12/18/2020    Admit Date: 12/11/2020    Discharge Plan     Row Name 12/18/20 1329       Plan    Final Discharge Disposition Code  06 - home with home health care    Final Note  home with [facility]        Discharge Codes    No documentation.       Expected Discharge Date and Time     Expected Discharge Date Expected Discharge Time    Dec 17, 2020             Angélica Garcia RN

## 2020-12-18 NOTE — OUTREACH NOTE
Call Center TCM Note      Responses   [facility] patient discharged from?  [place]   Does the patient have one of the following disease processes/diagnoses(primary or secondary)?  Other   TCM attempt successful?  Yes   Call start time  1011   Call end time  1017   Discharge diagnosis  Acute PE with RLL pulmonary infarct, Chronic CHF (EF 6%), Hemoptysis,    Meds reviewed with patient/caregiver?  Yes   Is the patient having any side effects they believe may be caused by any medication additions or changes?  No   Does the patient have all medications ordered at discharge?  Yes   Is the patient taking all medications as directed (includes completed medication regime)?  Yes   Comments regarding appointments  Appt with ID on 12/22/20   Does the patient have a primary care provider?   Yes   Does the patient have an appointment with their PCP within 7 days of discharge?  Yes   Comments regarding PCP  Hospital d/c f/u appt is on 12/23/20 at 11:00 am with Zach Durand    Has the patient kept scheduled appointments due by today?  N/A   What is the Home health agency?   [facility]   Psychosocial issues?  No   Did the patient receive a copy of their discharge instructions?  Yes   Nursing interventions  Reviewed instructions with patient   What is the patient's perception of their health status since discharge?  Improving [Pt is still coughing up blood. She was told that should start to go away. ]   Is the patient/caregiver able to teach back signs and symptoms related to disease process for when to call PCP?  Yes   Is the patient/caregiver able to teach back signs and symptoms related to disease process for when to call 911?  Yes   Is the patient/caregiver able to teach back the hierarchy of who to call/visit for symptoms/problems? PCP, Specialist, Home health nurse, Urgent Care, ED, 911  Yes   If the patient is a current smoker, are they able to teach back resources for cessation?  Not a smoker   TCM call completed?  Yes           Janeth Gage RN    12/18/2020, 10:18 EST

## 2020-12-20 ENCOUNTER — TELEPHONE (OUTPATIENT)
Dept: CARDIOLOGY | Facility: CLINIC | Age: 45
End: 2020-12-20

## 2020-12-20 LAB — INR PPP: 1.5

## 2020-12-20 NOTE — TELEPHONE ENCOUNTER
Home health nurse called to report an INR of 1.5 today.     The patient was discharged from the hospital on 12/17 and her INR was 3.69. At discharge, she was ordered to hold warfarin for 2 days until INR recheck.  nurse stated recheck yesterday was 1.6 and that an on call provider directed her to give 5 mg warfarin to the patient. She then rechecked the INR today and it was 1.5.     Discussed with Dr. Lewis. Informed Kathleen, home health nurse, that patient needed to take 5 mg warfarin today, and that she needed to be started on subq Lovenox to bridge until she is again therapeutic. Discussed with Kathleen that if patient is unable to afford or obtain lovenox then she needed to return to the ER to be restarted on a heparin drip. Directed Kathleen to recheck INR tomorrow and to call the office.     Called patient to discuss plan of care. Patient is going to take 5 mg warfarin today. Patient vocalized apprehensive of self administering lovenox injection. Dr. Chappell stated that patient can be ordered 1.5 mg/kg daily lovenox injection instead of 1mg/kg twice daily. Patient agreed to return to ER if she is not able to afford lovenox. Pharmacy and weight were verified.

## 2020-12-21 NOTE — TELEPHONE ENCOUNTER
Received a call from [facility]. She stated that the Rx sent for Lovenox was 120mg/.8mL, however the sig sent with that stated to take .88mL, which would be more than one syringe. She is wanting to verify that this is the correct dosage.

## 2020-12-23 ENCOUNTER — TELEPHONE (OUTPATIENT)
Dept: CARDIOLOGY | Facility: CLINIC | Age: 45
End: 2020-12-23

## 2020-12-23 ENCOUNTER — LAB (OUTPATIENT)
Dept: LAB | Facility: HOSPITAL | Age: 45
End: 2020-12-23

## 2020-12-23 ENCOUNTER — OFFICE VISIT (OUTPATIENT)
Dept: INTERNAL MEDICINE | Age: 45
End: 2020-12-23

## 2020-12-23 VITALS
HEIGHT: 66 IN | TEMPERATURE: 97.3 F | SYSTOLIC BLOOD PRESSURE: 110 MMHG | DIASTOLIC BLOOD PRESSURE: 74 MMHG | BODY MASS INDEX: 30.34 KG/M2 | HEART RATE: 69 BPM | WEIGHT: 188.8 LBS | OXYGEN SATURATION: 99 %

## 2020-12-23 DIAGNOSIS — E87.1 HYPONATREMIA: ICD-10-CM

## 2020-12-23 DIAGNOSIS — Z86.711 HISTORY OF PULMONARY EMBOLISM: Primary | ICD-10-CM

## 2020-12-23 LAB
ANION GAP SERPL CALCULATED.3IONS-SCNC: 10.5 MMOL/L (ref 5–15)
BUN SERPL-MCNC: 13 MG/DL (ref 6–20)
BUN/CREAT SERPL: 13.5 (ref 7–25)
CALCIUM SPEC-SCNC: 8.9 MG/DL (ref 8.6–10.5)
CHLORIDE SERPL-SCNC: 98 MMOL/L (ref 98–107)
CO2 SERPL-SCNC: 25.5 MMOL/L (ref 22–29)
CREAT SERPL-MCNC: 0.96 MG/DL (ref 0.57–1)
D DIMER PPP FEU-MCNC: 4.99 MCGFEU/ML (ref 0–0.49)
GFR SERPL CREATININE-BSD FRML MDRD: 76 ML/MIN/1.73
GLUCOSE SERPL-MCNC: 114 MG/DL (ref 65–99)
INR PPP: 1.8
POTASSIUM SERPL-SCNC: 4.4 MMOL/L (ref 3.5–5.2)
SODIUM SERPL-SCNC: 134 MMOL/L (ref 136–145)

## 2020-12-23 PROCEDURE — 80048 BASIC METABOLIC PNL TOTAL CA: CPT | Performed by: NURSE PRACTITIONER

## 2020-12-23 PROCEDURE — 99495 TRANSJ CARE MGMT MOD F2F 14D: CPT | Performed by: NURSE PRACTITIONER

## 2020-12-23 PROCEDURE — 36415 COLL VENOUS BLD VENIPUNCTURE: CPT | Performed by: NURSE PRACTITIONER

## 2020-12-23 PROCEDURE — 85379 FIBRIN DEGRADATION QUANT: CPT | Performed by: NURSE PRACTITIONER

## 2020-12-23 NOTE — PROGRESS NOTES
Transitional Care Follow Up Visit  Subjective     Nina Saez is a 45 y.o. female who presents for a transitional care management visit.    Within 48 business hours after discharge our office contacted her via telephone to coordinate her care and needs.      I reviewed and discussed the details of that call along with the discharge summary, hospital problems, inpatient lab results, inpatient diagnostic studies, and consultation reports with Nina.     Current outpatient and discharge medications have been reconciled for the patient.  Reviewed by: SAMUEL West      Date of TCM Phone Call 12/17/2020   [facility]   Date of Admission 12/11/2020   Date of Discharge 12/17/2020   [facility]charge Disposition [facility]     Risk for Readmission (LACE) Score: 16 (12/17/2020  6:00 AM)      History of Present Illness   Course During Hospital Stay:      Patient presented to ED on 12/11 after experiencing hemoptysis. She has a significant history of CHF and HIV. On CT scan an acute subsegmental pulmonary emboli was found along with a right lower pulmonary infarct. Cardiology was also consulted due to current nonischemic cardiomyopathy with EF of 6%. She has an AICD and is on transplant list. She had no problems during hospitalization. During hospitalization pulm was also consulted and she was placed on a heparin drip and eventually bridged to coumadin with lovenox. Hemoptysis remained but significantly improved. Hgb remained stable at 10.6 at discharge. INR was within therapeutic range at 3.69. She was discharged with sodium 126. Denies any significant weakness or confusion. She currently has home health that is monitoring her INR weekly. Last INR Sunday 1.6. Dose was doubled and she has recheck this coming Sunday.     Today she complains of mild streaks of blood in sputum with cough that was improved since discharge, along with mild-mod right chest pain below her right breast. Pain has also improved since  discharge.      The following portions of the patient's history were reviewed and updated as appropriate: allergies, current medications, past family history, past medical history, past social history, past surgical history and problem list.    Review of Systems   Constitutional: Negative.    Respiratory: Positive for cough. Negative for chest tightness, shortness of breath and wheezing.    Cardiovascular: Positive for chest pain (right below breast with inspiration ).   Gastrointestinal: Negative.    Genitourinary: Negative.    Neurological: Negative.    Psychiatric/Behavioral: Negative.       Objective   Physical Exam  Constitutional:       Appearance: Normal appearance. She is obese.   Cardiovascular:      Rate and Rhythm: Normal rate and regular rhythm.   Pulmonary:      Effort: Pulmonary effort is normal.      Comments: Diminished right lower   Neurological:      Mental Status: She is alert and oriented to person, place, and time.   Psychiatric:         Thought Content: Thought content normal.         Judgment: Judgment normal.        Assessment/Plan   Problems Addressed this Visit     None      Visit Diagnoses     History of pulmonary embolism    -  Primary    Relevant Orders    D-dimer, Quantitative (Completed)    Hyponatremia        Relevant Orders    Basic metabolic panel (Completed)      Diagnoses       Codes Comments    History of pulmonary embolism    -  Primary ICD-10-CM: Z86.711  ICD-9-CM: V12.55     Hyponatremia     ICD-10-CM: E87.1  ICD-9-CM: 276.1         Summary and Discussion:   · Recheck sodium with significant hyponatremia at discharge. Recheck d-dimer a patient request.   · Seek urgent care for initiation of any shortness of air or worsening chest pain.   · Continue coumadin and INR monitoring with home health.   · Continue follow-up with cardiology 1 week after discharge per hospital discharge recommendation.

## 2020-12-23 NOTE — TELEPHONE ENCOUNTER
Alejandrina with [facility] called to report the patient's INR for today and the INR clinic was already closed.    PT   21.2  INR:  1.8    She takes a total of 5 mg warfarin (2 tablets 2.5mg each) nightly.    Her INR last Sunday was 1.5.    Thank you,  Raegan Fierro RN  [facility]  Triage

## 2020-12-24 ENCOUNTER — ANTICOAGULATION VISIT (OUTPATIENT)
Dept: PHARMACY | Facility: HOSPITAL | Age: 45
End: 2020-12-24

## 2020-12-24 ENCOUNTER — READMISSION MANAGEMENT (OUTPATIENT)
Dept: CALL CENTER | Facility: HOSPITAL | Age: 45
End: 2020-12-24

## 2020-12-24 DIAGNOSIS — I26.99 ACUTE PULMONARY EMBOLISM, UNSPECIFIED PULMONARY EMBOLISM TYPE, UNSPECIFIED WHETHER ACUTE COR PULMONALE PRESENT (HCC): ICD-10-CM

## 2020-12-24 DIAGNOSIS — I26.99 OTHER ACUTE PULMONARY EMBOLISM, UNSPECIFIED WHETHER ACUTE COR PULMONALE PRESENT (HCC): ICD-10-CM

## 2020-12-24 NOTE — OUTREACH NOTE
Medical Week 2 Survey      Responses   [facility] patient discharged from?  [place]   Does the patient have one of the following disease processes/diagnoses(primary or secondary)?  Other   Week 2 attempt successful?  Yes   Call start time  1240   Call end time  1241   Meds reviewed with patient/caregiver?  Yes   Is the patient taking all medications as directed (includes completed medication regime)?  Yes   Has the patient kept scheduled appointments due by today?  Yes   Comments  Has seen Dr. Serrano and  comes   Comments   comdes and feels better   What is the patient's perception of their health status since discharge?  Improving   Week 2 Call Completed?  Yes   Wrap up additional comments  Pt. has no questions, doing better, HH is coming          Kristel Santos RN

## 2020-12-24 NOTE — TELEPHONE ENCOUNTER
Called Alejandrina/[facility] and patient to adjust warfarin dose today and to have her use the enoxaparin previously prescribed. Next INR due on Sat 12/26 (see progress note dated 12/24).

## 2020-12-24 NOTE — PROGRESS NOTES
Anticoagulation Clinic Progress Note    Anticoagulation Summary  As of 2020    INR goal:  2.0-3.0   TTR:  --   INR used for dosin.80 (2020)   Warfarin maintenance plan:  5 mg every day   Weekly warfarin total:  35 mg   Plan last modified:  Clover Escobar RPH (2020)   Next INR check:  2020   Target end date:  Indefinite    Indications    Other acute pulmonary embolism  unspecified whether acute cor pulmonale present (CMS/HCC) [I26.99]  Acute pulmonary embolism  unspecified pulmonary embolism type  unspecified whether acute cor pulmonale present (CMS/HCC) [I26.99]             Anticoagulation Episode Summary     INR check location:      Preferred lab:      Send INR reminders to:  [facility]  POOL    Comments:        Anticoagulation Care Providers     Provider Role Specialty Phone number    Ruben Kirby DO Referring Hospitalist [phone]    Lisset Melton MD Referring Cardiology [phone]          INR History:  Anticoagulation Monitoring 2020   INR 1.80   INR Date 2020   INR Goal 2.0-3.0   Last Week Total 20 mg   Next Week Total 37.5 mg   Sun -   Mon -   Tue -   Wed -   Thu 7.5 mg ()   Fri 5 mg   Sat -   Visit Report -       Plan:  1. INR is Subtherapeutic yesterday- see above in Anticoagulation Summary.   Provided instructions to Alejandrina [phone] with [facility] to Boost warfarin to 7.5mg dose x 1 only, then resume 5mg daily- see above in Anticoagulation Summary.  Patient will  enoxaparin inj today at [facility] for $0 copay.  She will inject 120mg Subcut every 24 hours until INR >2 for recent PE.  2. Follow up in 2 days--Sat .  [facility] will do INR and call [facility] for warfarin/enoxaparin instructions.    3.  Appears patient will need mostly warfarin 5mg doses but may need 1-2x 2.5mg doses weekly to maintain INR 2-3 goal range.        Clover Escobar RPH

## 2020-12-26 ENCOUNTER — TELEPHONE (OUTPATIENT)
Dept: CARDIOLOGY | Facility: CLINIC | Age: 45
End: 2020-12-26

## 2020-12-26 LAB — INR PPP: 2

## 2020-12-26 NOTE — TELEPHONE ENCOUNTER
Kathleen with home health called to report patient's PT/INR 23.9/2.0. Patient was recently seen by anticoagulation clinic and was directed to continue lovenox until INR >2. Patient took warfarin 7.5 mg on 12/24 and has otherwise been taking 5 mg every day this week.     Discussed with SAMUEL Roberto. Advised Kathleen to have patient take 5 mg warfarin today and tomorrow. Additionally, advised her to have patient take 120 mg Lovenox today and to stop taking after today. Directed Kathleen to recheck patient's PT/INR on Monday and to have her call the office with those results.     Kathleen read back and verified and stated she will call patient with directions. Message sent to anticoagulation clinic to update them of current plan.

## 2020-12-28 ENCOUNTER — ANTICOAGULATION VISIT (OUTPATIENT)
Dept: PHARMACY | Facility: HOSPITAL | Age: 45
End: 2020-12-28

## 2020-12-28 DIAGNOSIS — I26.99 OTHER ACUTE PULMONARY EMBOLISM, UNSPECIFIED WHETHER ACUTE COR PULMONALE PRESENT (HCC): ICD-10-CM

## 2020-12-28 DIAGNOSIS — I26.99 ACUTE PULMONARY EMBOLISM, UNSPECIFIED PULMONARY EMBOLISM TYPE, UNSPECIFIED WHETHER ACUTE COR PULMONALE PRESENT (HCC): ICD-10-CM

## 2020-12-28 LAB — INR PPP: 2.8

## 2020-12-28 RX ORDER — GUAIFENESIN AND CODEINE PHOSPHATE 100; 10 MG/5ML; MG/5ML
5 SOLUTION ORAL EVERY 4 HOURS PRN
Qty: 118 ML | Refills: 0 | Status: SHIPPED | OUTPATIENT
Start: 2020-12-28 | End: 2021-07-30

## 2020-12-28 NOTE — PROGRESS NOTES
Anticoagulation Clinic Progress Note    Anticoagulation Summary  As of 2020    INR goal:  2.0-3.0   TTR:  100.0 % (1 d)   INR used for dosin.80 (2020)   Warfarin maintenance plan:  2.5 mg every Mon; 5 mg all other days   Weekly warfarin total:  32.5 mg   Plan last modified:  Iabn Howe RPH (2020)   Next INR check:  2020   Target end date:  Indefinite    Indications    Other acute pulmonary embolism  unspecified whether acute cor pulmonale present (CMS/HCC) [I26.99]  Acute pulmonary embolism  unspecified pulmonary embolism type  unspecified whether acute cor pulmonale present (CMS/HCC) [I26.99]             Anticoagulation Episode Summary     INR check location:      Preferred lab:      Send INR reminders to:   [facility]  POOL    Comments:        Anticoagulation Care Providers     Provider Role Specialty Phone number    Ruben Kirby DO Referring Hospitalist [phone]    Lisset Melton MD Referring Cardiology [phone]          INR History:  Anticoagulation Monitoring 2020   INR 1.80 2.80   INR Date 2020   INR Goal 2.0-3.0 2.0-3.0   Trend - Down   Last Week Total 20 mg 37.5 mg   Next Week Total 37.5 mg 32.5 mg   Sun - -   Mon - 2.5 mg ()   Tue - 5 mg   Wed - -   Thu 7.5 mg () -   Fri 5 mg -   Sat - -   Visit Report - -       Plan:  1. INR is Therapeutic today- see above in Anticoagulation Summary.   Provided instructions to Haley with [facility] to Change their warfarin regimen- see above in Anticoagulation Summary.  2. Follow up in 2 days due to holiday      Iban Howe RPH

## 2020-12-28 NOTE — TELEPHONE ENCOUNTER
Caller: Nina Saez    Relationship: Self    Best call back number: [phone]    Medication needed:   Requested Prescriptions     Pending Prescriptions Disp Refills   • guaiFENesin-codeine (GUAIFENESIN AC) 100-10 MG/5ML liquid 118 mL 0     Sig: Take 5 mL by mouth Every 4 (Four) Hours As Needed for Cough.       When do you need the refill by: ASAP    What details did the patient provide when requesting the medication: N/A    Does the patient have less than a 3 day supply:  [x] Yes  [] No    What is the patient's preferred pharmacy:  [facility] [address] [place] [address] ([facility]), [place] - [address] [facility] [phone] [facility] [phone] FX

## 2020-12-30 ENCOUNTER — TELEPHONE (OUTPATIENT)
Dept: INTERNAL MEDICINE | Age: 45
End: 2020-12-30

## 2020-12-30 ENCOUNTER — READMISSION MANAGEMENT (OUTPATIENT)
Dept: CALL CENTER | Facility: HOSPITAL | Age: 45
End: 2020-12-30

## 2020-12-30 ENCOUNTER — ANTICOAGULATION VISIT (OUTPATIENT)
Dept: PHARMACY | Facility: HOSPITAL | Age: 45
End: 2020-12-30

## 2020-12-30 DIAGNOSIS — I26.99 ACUTE PULMONARY EMBOLISM, UNSPECIFIED PULMONARY EMBOLISM TYPE, UNSPECIFIED WHETHER ACUTE COR PULMONALE PRESENT (HCC): ICD-10-CM

## 2020-12-30 DIAGNOSIS — I26.99 OTHER ACUTE PULMONARY EMBOLISM, UNSPECIFIED WHETHER ACUTE COR PULMONALE PRESENT (HCC): ICD-10-CM

## 2020-12-30 LAB — INR PPP: 3

## 2020-12-30 NOTE — OUTREACH NOTE
Medical Week 3 Survey      Responses   [facility] patient discharged from?  [place]   Does the patient have one of the following disease processes/diagnoses(primary or secondary)?  Other   Week 3 attempt successful?  No   Unsuccessful attempts  Attempt 1          Tarun Ashley RN

## 2020-12-30 NOTE — PROGRESS NOTES
Anticoagulation Clinic Progress Note    Anticoagulation Summary  As of 12/30/2020    INR goal:  2.0-3.0   TTR:  100.0 % (3 d)   INR used for dosing:  3.00 (12/30/2020)   Warfarin maintenance plan:  2.5 mg every Mon; 5 mg all other days   Weekly warfarin total:  32.5 mg   Plan last modified:  Iban Howe RPH (12/28/2020)   Next INR check:  1/4/2021   Target end date:  Indefinite    Indications    Other acute pulmonary embolism  unspecified whether acute cor pulmonale present (CMS/HCC) [I26.99]  Acute pulmonary embolism  unspecified pulmonary embolism type  unspecified whether acute cor pulmonale present (CMS/HCC) [I26.99]             Anticoagulation Episode Summary     INR check location:      Preferred lab:      Send INR reminders to:   NOMI BLANDON  POOL    Comments:        Anticoagulation Care Providers     Provider Role Specialty Phone number    Ruben Kirby DO Referring Hospitalist [phone]    Lisset Melton MD Referring Cardiology [phone]          INR History:  Anticoagulation Monitoring 12/24/2020 12/28/2020 12/30/2020   INR 1.80 2.80 3.00   INR Date 12/23/2020 12/28/2020 12/30/2020   INR Goal 2.0-3.0 2.0-3.0 2.0-3.0   Trend - Down Same   Last Week Total 20 mg 37.5 mg 35 mg   Next Week Total 37.5 mg 32.5 mg 32.5 mg   Sun - - 5 mg   Mon - 2.5 mg (12/28) -   Tue - 5 mg -   Wed - - 5 mg   Thu 7.5 mg (12/24) - 5 mg   Fri 5 mg - 5 mg   Sat - - 5 mg   Visit Report - - -   Some recent data might be hidden       Plan:  1. INR is Therapeutic today- see above in Anticoagulation Summary.   Provided instructions to Rosa with [facility] to Continue their warfarin regimen- see above in Anticoagulation Summary.  Enoxaparin injections are to stop if not already stopped.  2. Follow up in 5 days      Clover Escobar RPH   Desires repeat-to OR for repeat csection

## 2020-12-30 NOTE — TELEPHONE ENCOUNTER
Caller: STEPHANIE    Relationship: Home Health    Best call back number: [phone]    What orders are you requesting (i.e. lab or imaging): VERBAL ORDER FOR VIRTUAL HOME HEALTH VISITS    In what timeframe would the patient need to come in: ASAP    Where will you receive your lab/imaging services: HOME    Additional notes: STEPHANIE WOULD LIKE A CALLBACK FOR THIS ORDER, SHE SAID THE PATIENT IS BEING SEEN RELATED TO A PULMONARY EMBOLISM

## 2021-01-04 ENCOUNTER — READMISSION MANAGEMENT (OUTPATIENT)
Dept: CALL CENTER | Facility: HOSPITAL | Age: 46
End: 2021-01-04

## 2021-01-04 ENCOUNTER — HOSPITAL ENCOUNTER (EMERGENCY)
Facility: HOSPITAL | Age: 46
Discharge: HOME | End: 2021-01-04
Attending: EMERGENCY MEDICINE | Admitting: EMERGENCY MEDICINE

## 2021-01-04 VITALS
SYSTOLIC BLOOD PRESSURE: 107 MMHG | RESPIRATION RATE: 18 BRPM | DIASTOLIC BLOOD PRESSURE: 93 MMHG | TEMPERATURE: 98 F | HEART RATE: 74 BPM | OXYGEN SATURATION: 100 %

## 2021-01-04 DIAGNOSIS — T78.3XXA ANGIOEDEMA, INITIAL ENCOUNTER: Primary | ICD-10-CM

## 2021-01-04 LAB
ALBUMIN SERPL-MCNC: 3.5 G/DL (ref 3.5–5.2)
ALBUMIN/GLOB SERPL: 1.3 G/DL
ALP SERPL-CCNC: 141 U/L (ref 39–117)
ALT SERPL W P-5'-P-CCNC: 21 U/L (ref 1–33)
ANION GAP SERPL CALCULATED.3IONS-SCNC: 7.8 MMOL/L (ref 5–15)
APTT PPP: 31.8 SECONDS (ref 22.7–35.4)
AST SERPL-CCNC: 18 U/L (ref 1–32)
BASOPHILS # BLD AUTO: 0.02 10*3/MM3 (ref 0–0.2)
BASOPHILS NFR BLD AUTO: 0.3 % (ref 0–1.5)
BILIRUB SERPL-MCNC: 1.2 MG/DL (ref 0–1.2)
BUN SERPL-MCNC: 10 MG/DL (ref 6–20)
BUN/CREAT SERPL: 10 (ref 7–25)
CALCIUM SPEC-SCNC: 9.2 MG/DL (ref 8.6–10.5)
CHLORIDE SERPL-SCNC: 102 MMOL/L (ref 98–107)
CO2 SERPL-SCNC: 29.2 MMOL/L (ref 22–29)
CREAT SERPL-MCNC: 1 MG/DL (ref 0.57–1)
DEPRECATED RDW RBC AUTO: 46.2 FL (ref 37–54)
EOSINOPHIL # BLD AUTO: 0.03 10*3/MM3 (ref 0–0.4)
EOSINOPHIL NFR BLD AUTO: 0.4 % (ref 0.3–6.2)
ERYTHROCYTE [DISTWIDTH] IN BLOOD BY AUTOMATED COUNT: 13.5 % (ref 12.3–15.4)
GFR SERPL CREATININE-BSD FRML MDRD: 73 ML/MIN/1.73
GLOBULIN UR ELPH-MCNC: 2.8 GM/DL
GLUCOSE SERPL-MCNC: 98 MG/DL (ref 65–99)
HCT VFR BLD AUTO: 41.2 % (ref 34–46.6)
HGB BLD-MCNC: 13.3 G/DL (ref 12–15.9)
IMM GRANULOCYTES # BLD AUTO: 0.03 10*3/MM3 (ref 0–0.05)
IMM GRANULOCYTES NFR BLD AUTO: 0.4 % (ref 0–0.5)
INR PPP: 2.46 (ref 0.9–1.1)
LYMPHOCYTES # BLD AUTO: 1.69 10*3/MM3 (ref 0.7–3.1)
LYMPHOCYTES NFR BLD AUTO: 22.2 % (ref 19.6–45.3)
MCH RBC QN AUTO: 30.3 PG (ref 26.6–33)
MCHC RBC AUTO-ENTMCNC: 32.3 G/DL (ref 31.5–35.7)
MCV RBC AUTO: 93.8 FL (ref 79–97)
MONOCYTES # BLD AUTO: 0.5 10*3/MM3 (ref 0.1–0.9)
MONOCYTES NFR BLD AUTO: 6.6 % (ref 5–12)
NEUTROPHILS NFR BLD AUTO: 5.33 10*3/MM3 (ref 1.7–7)
NEUTROPHILS NFR BLD AUTO: 70.1 % (ref 42.7–76)
NRBC BLD AUTO-RTO: 0.1 /100 WBC (ref 0–0.2)
PLATELET # BLD AUTO: 248 10*3/MM3 (ref 140–450)
PMV BLD AUTO: 11.2 FL (ref 6–12)
POTASSIUM SERPL-SCNC: 4.8 MMOL/L (ref 3.5–5.2)
PROT SERPL-MCNC: 6.3 G/DL (ref 6–8.5)
PROTHROMBIN TIME: 26.4 SECONDS (ref 11.7–14.2)
RBC # BLD AUTO: 4.39 10*6/MM3 (ref 3.77–5.28)
SODIUM SERPL-SCNC: 139 MMOL/L (ref 136–145)
WBC # BLD AUTO: 7.6 10*3/MM3 (ref 3.4–10.8)

## 2021-01-04 PROCEDURE — 85610 PROTHROMBIN TIME: CPT | Performed by: EMERGENCY MEDICINE

## 2021-01-04 PROCEDURE — 96375 TX/PRO/DX INJ NEW DRUG ADDON: CPT

## 2021-01-04 PROCEDURE — 85730 THROMBOPLASTIN TIME PARTIAL: CPT | Performed by: EMERGENCY MEDICINE

## 2021-01-04 PROCEDURE — 25010000002 METHYLPREDNISOLONE PER 125 MG: Performed by: EMERGENCY MEDICINE

## 2021-01-04 PROCEDURE — 80053 COMPREHEN METABOLIC PANEL: CPT | Performed by: EMERGENCY MEDICINE

## 2021-01-04 PROCEDURE — 99283 EMERGENCY DEPT VISIT LOW MDM: CPT

## 2021-01-04 PROCEDURE — 25010000002 DIPHENHYDRAMINE PER 50 MG: Performed by: EMERGENCY MEDICINE

## 2021-01-04 PROCEDURE — 85025 COMPLETE CBC W/AUTO DIFF WBC: CPT | Performed by: EMERGENCY MEDICINE

## 2021-01-04 PROCEDURE — 96374 THER/PROPH/DIAG INJ IV PUSH: CPT

## 2021-01-04 RX ORDER — DIPHENHYDRAMINE HCL 25 MG
25 CAPSULE ORAL EVERY 6 HOURS PRN
Qty: 20 CAPSULE | Refills: 0 | Status: SHIPPED | OUTPATIENT
Start: 2021-01-04

## 2021-01-04 RX ORDER — DIPHENHYDRAMINE HYDROCHLORIDE 50 MG/ML
25 INJECTION INTRAMUSCULAR; INTRAVENOUS ONCE
Status: COMPLETED | OUTPATIENT
Start: 2021-01-04 | End: 2021-01-04

## 2021-01-04 RX ORDER — METHYLPREDNISOLONE SODIUM SUCCINATE 125 MG/2ML
125 INJECTION, POWDER, LYOPHILIZED, FOR SOLUTION INTRAMUSCULAR; INTRAVENOUS ONCE
Status: COMPLETED | OUTPATIENT
Start: 2021-01-04 | End: 2021-01-04

## 2021-01-04 RX ORDER — FAMOTIDINE 10 MG/ML
20 INJECTION, SOLUTION INTRAVENOUS ONCE
Status: COMPLETED | OUTPATIENT
Start: 2021-01-04 | End: 2021-01-04

## 2021-01-04 RX ORDER — PREDNISONE 20 MG/1
40 TABLET ORAL DAILY
Qty: 10 TABLET | Refills: 0 | Status: SHIPPED | OUTPATIENT
Start: 2021-01-04 | End: 2021-01-09

## 2021-01-04 RX ADMIN — METHYLPREDNISOLONE SODIUM SUCCINATE 125 MG: 125 INJECTION, POWDER, FOR SOLUTION INTRAMUSCULAR; INTRAVENOUS at 12:54

## 2021-01-04 RX ADMIN — DIPHENHYDRAMINE HYDROCHLORIDE 25 MG: 50 INJECTION, SOLUTION INTRAMUSCULAR; INTRAVENOUS at 12:52

## 2021-01-04 RX ADMIN — FAMOTIDINE 20 MG: 10 INJECTION INTRAVENOUS at 12:54

## 2021-01-04 NOTE — OUTREACH NOTE
Medical Week 3 Survey      Responses   [facility] patient discharged from?  [place]   Does the patient have one of the following disease processes/diagnoses(primary or secondary)?  Other   Week 3 attempt successful?  Yes   Call start time  1405   Rescheduled  Rescheduled-pt requested [At the ER at time of call.]          Sera Bradford RN

## 2021-01-04 NOTE — ED TRIAGE NOTES
..triage    Pt reports swelling to upper lip and rt side of face that started last night, reports some itching to throat, tolerating po

## 2021-01-04 NOTE — ED PROVIDER NOTES
EMERGENCY DEPARTMENT ENCOUNTER  Patient was placed in face mask in first look and the following protective measures were taken unless additional measures were taken and documented below in the ED course. Patient was wearing facemask when I entered the room and throughout our encounter. I wore full protective equipment throughout this patient encounter including a face mask, and gloves. Hand hygiene was performed before donning protective equipment and after removal when leaving the room.    Room Number:  42/42  Date of encounter:  1/4/2021  PCP: Darcie Ledbetter APRN    HPI:  Context: Nina Saez is a 45 y.o. female who presents to the ED c/o chief complaint of swelling of her upper lip.  Patient reports that she no swelling of her upper lip last night.  Patient reports that this morning it was unchanged, has not worsened, has not improved.  Patient denies any other swelling, no swelling of her tongue, no swelling of her mouth or throat, no difficulty swallowing, no difficulty breathing, no nausea vomiting, no rash or itching.  Patient reports she had a similar episode approximately a week ago where her lower lip swelled and then resolved overnight.  Patient denies any history of other swelling, no history of angioedema in the past.  Patient reports only new medication is Coumadin which she was recently placed on for pulmonary embolism.  Patient denies any new over-the-counter medications.  Patient believes that the swelling is related to something that she ate although denies any unusual foods.    MEDICAL HISTORY REVIEW  Reviewed in EPIC    PAST MEDICAL HISTORY  Active Ambulatory Problems     Diagnosis Date Noted   • Asthma 08/06/2019   • Essential hypertension 08/06/2019   • HIV (human immunodeficiency virus infection) (CMS/[facility]) 08/06/2019   • Class 2 obesity in adult 08/06/2019   • Exertional dyspnea 08/06/2019   • Chronic systolic congestive heart failure (CMS/[facility]) 08/06/2019   • NICM (nonischemic  cardiomyopathy) (CMS/HCC) 2020   • Elevated LFTs 2020   • Hypopotassemia 2020   • AICD (automatic cardioverter/defibrillator) present 2020   • Acute on chronic systolic congestive heart failure (CMS/HCC) 10/27/2020   • Intractable vomiting with nausea 10/30/2020   • Diarrhea following gastrointestinal surgery 10/27/2020   • Acute pulmonary embolism (CMS/HCC) 2020   • Hemoptysis 2020   • Other acute pulmonary embolism, unspecified whether acute cor pulmonale present (CMS/HCC) 2020   • Acute pulmonary embolism, unspecified pulmonary embolism type, unspecified whether acute cor pulmonale present (CMS/HCC) 2020     Resolved Ambulatory Problems     Diagnosis Date Noted   • Chest pain 2019   • Hematuria 2020   • Flank pain 2020   • LBBB (left bundle branch block) 2020     Past Medical History:   Diagnosis Date   • CHF (congestive heart failure) (CMS/HCC)    • Hypertension        PAST SURGICAL HISTORY  Past Surgical History:   Procedure Laterality Date   • CARDIAC CATHETERIZATION     • CARDIAC DEFIBRILLATOR PLACEMENT     • FRACTURE SURGERY Left     left ankle       FAMILY HISTORY  Family History   Problem Relation Age of Onset   • Cancer Mother    • Breast cancer Mother    • Bone cancer Mother    • Hypertension Maternal Grandfather    • Hyperlipidemia Maternal Grandfather    • Diabetes Maternal Grandfather    • Prostate cancer Maternal Grandfather    • Ovarian cysts Daughter    • Breast cancer Maternal Grandmother    • Heart disease Maternal Grandmother    • No Known Problems Daughter    • No Known Problems Daughter        SOCIAL HISTORY  Social History     Socioeconomic History   • Marital status: Legally      Spouse name: Not on file   • Number of children: Not on file   • Years of education: Not on file   • Highest education level: Not on file   Tobacco Use   • Smoking status: Former Smoker     Quit date:      Years since quittin.0    • Smokeless tobacco: Never Used   Substance and Sexual Activity   • Alcohol use: Not Currently     Frequency: Never     Comment: holiday and special occ//caffeine use: 1 cup daily   • Drug use: Never   • Sexual activity: Defer       ALLERGIES  Patient has no known allergies.    The patient's allergies have been reviewed    REVIEW OF SYSTEMS  All systems reviewed and negative except for those discussed in HPI.     PHYSICAL EXAM  I have reviewed the triage vital signs and nursing notes.  ED Triage Vitals [01/04/21 1130]   Temp Heart Rate Resp BP SpO2   98 °F (36.7 °C) 75 18 -- 99 %      Temp src Heart Rate Source Patient Position BP Location FiO2 (%)   -- -- -- -- --     General: No acute distress  HENT: NCAT, PERRL, Nares patent, isolated swelling of upper lip, no swelling of lower lip, no swelling of lingual, no posterior oropharyngeal swelling, uvula midline, no uvular crowding, voice normal, no difficulty handling secretions  Eyes: no scleral icterus  Neck: trachea midline, no ROM limitations, no stridor  CV: regular rhythm, regular rate  Respiratory: normal effort, CTAB  Abdomen: soft, nondistended, nontender to palpation, no rebound tenderness, no guarding or rigidity  : deferred  Musculoskeletal: no deformity  Neuro: alert, moves all extremities, follows commands  Skin: warm, dry, no rash    LAB RESULTS  Recent Results (from the past 24 hour(s))   Comprehensive Metabolic Panel    Collection Time: 01/04/21 12:45 PM    Specimen: Blood   Result Value Ref Range    Glucose 98 65 - 99 mg/dL    BUN 10 6 - 20 mg/dL    Creatinine 1.00 0.57 - 1.00 mg/dL    Sodium 139 136 - 145 mmol/L    Potassium 4.8 3.5 - 5.2 mmol/L    Chloride 102 98 - 107 mmol/L    CO2 29.2 (H) 22.0 - 29.0 mmol/L    Calcium 9.2 8.6 - 10.5 mg/dL    Total Protein 6.3 6.0 - 8.5 g/dL    Albumin 3.50 3.50 - 5.20 g/dL    ALT (SGPT) 21 1 - 33 U/L    AST (SGOT) 18 1 - 32 U/L    Alkaline Phosphatase 141 (H) 39 - 117 U/L    Total Bilirubin 1.2 0.0 - 1.2  mg/dL    eGFR  African Amer 73 >60 mL/min/1.73    Globulin 2.8 gm/dL    A/G Ratio 1.3 g/dL    BUN/Creatinine Ratio 10.0 7.0 - 25.0    Anion Gap 7.8 5.0 - 15.0 mmol/L   Protime-INR    Collection Time: 01/04/21 12:45 PM    Specimen: Blood   Result Value Ref Range    Protime 26.4 (H) 11.7 - 14.2 Seconds    INR 2.46 (H) 0.90 - 1.10   aPTT    Collection Time: 01/04/21 12:45 PM    Specimen: Blood   Result Value Ref Range    PTT 31.8 22.7 - 35.4 seconds   CBC Auto Differential    Collection Time: 01/04/21 12:45 PM    Specimen: Blood   Result Value Ref Range    WBC 7.60 3.40 - 10.80 10*3/mm3    RBC 4.39 3.77 - 5.28 10*6/mm3    Hemoglobin 13.3 12.0 - 15.9 g/dL    Hematocrit 41.2 34.0 - 46.6 %    MCV 93.8 79.0 - 97.0 fL    MCH 30.3 26.6 - 33.0 pg    MCHC 32.3 31.5 - 35.7 g/dL    RDW 13.5 12.3 - 15.4 %    RDW-SD 46.2 37.0 - 54.0 fl    MPV 11.2 6.0 - 12.0 fL    Platelets 248 140 - 450 10*3/mm3    Neutrophil % 70.1 42.7 - 76.0 %    Lymphocyte % 22.2 19.6 - 45.3 %    Monocyte % 6.6 5.0 - 12.0 %    Eosinophil % 0.4 0.3 - 6.2 %    Basophil % 0.3 0.0 - 1.5 %    Immature Grans % 0.4 0.0 - 0.5 %    Neutrophils, Absolute 5.33 1.70 - 7.00 10*3/mm3    Lymphocytes, Absolute 1.69 0.70 - 3.10 10*3/mm3    Monocytes, Absolute 0.50 0.10 - 0.90 10*3/mm3    Eosinophils, Absolute 0.03 0.00 - 0.40 10*3/mm3    Basophils, Absolute 0.02 0.00 - 0.20 10*3/mm3    Immature Grans, Absolute 0.03 0.00 - 0.05 10*3/mm3    nRBC 0.1 0.0 - 0.2 /100 WBC       I ordered the above labs and reviewed the results.    RADIOLOGY  No Radiology Exams Resulted Within Past 24 Hours    I ordered the above noted radiological studies. I reviewed the images and results. I agree with the radiologist interpretation.    PROCEDURES  Procedures    MEDICATIONS GIVEN IN ER  Medications   methylPREDNISolone sodium succinate (SOLU-Medrol) injection 125 mg (125 mg Intravenous Given 1/4/21 1254)   diphenhydrAMINE (BENADRYL) injection 25 mg (25 mg Intravenous Given 1/4/21 1252)    famotidine (PEPCID) injection 20 mg (20 mg Intravenous Given 1/4/21 9365)       PROGRESS, DATA ANALYSIS, CONSULTS, AND MEDICAL DECISION MAKING  A complete history and physical exam have been performed.  All available laboratory and imaging results have been reviewed by myself prior to disposition.    MDM  After the initial H&P, I discussed pertinent information from history and physical exam with patient/family.  Discussed differential diagnosis.  Discussed plan for ED evaluation/work-up/treatment.  All questions answered.  Patient/family is agreeable with plan.  ED Course as of Jan 04 1453   Mon Jan 04, 2021   1135 There is concern for isolated angioedema.  Symptoms stable since last night.  Treating with steroid cocktail, tension monitor, obtain screening lab work, plan for monitoring.    [JG]   1447 Patient reassessed.  Patient denies any change in her symptoms, no improvement of lip swelling, no worsening of lip swelling, no additional swelling.  Continues to deny any intraoral swelling, no difficulty speaking, swallowing, or breathing.  Patient continues to deny any other symptoms.  Lab work is unremarkable other than patient is therapeutic on her Coumadin.  No clear etiology of angioedema.  Will write for steroids and antihistamines.  Discharging with primary care and allergy follow-up.    [JG]   1448 The patient was reexamined.  They have had symptomatic improvement during their ED stay.  I discussed today's findings with the patient, explaining the pertinent positives and negatives from today's visit, and the plan of care.  Discussed plan for discharge as there is no emergent indication for admission.  Discussed limitation of the ED work-up and that this is to rule out life-threatening emergencies but that they could require further testing as determined by their primary care and or any referred specialist patient is agreeable and understands need for follow-up and repeat exam/testing.  Patient is aware  that discharge does not mean there is nothing wrong, indicates no emergency is present, and that they must continue their care with their primary care physician and/or any referred specialist.  They were given appropriate follow-up with their primary care physician and/or specialist.  I had an extensive discussion on the expected clinical course and return precautions.  Patient understands to return to the emergency department for continuation, worsening, or new symptoms.  I answered any of the patient's questions. Patient was discharged home in a stable condition.        [JG]      ED Course User Index  [JG] Live Broussard MD       AS OF 14:53 EST VITALS:    BP - 107/93  HR - 74  TEMP - 98 °F (36.7 °C)  O2 SATS - 100%    DIAGNOSIS  Final diagnoses:   Angioedema, initial encounter         DISPOSITION  DISCHARGE    Patient discharged in stable condition.    Reviewed implications of results, diagnosis, meds, responsibility to follow up, warning signs and symptoms of possible worsening, potential complications and reasons to return to ER.    Patient/Family voiced understanding of above instructions.    Discussed plan for discharge, as there is no emergent indication for admission. Patient referred to primary care provider for BP management due to today's BP. Pt/family is agreeable and understands need for follow up and repeat testing.  Pt is aware that discharge does not mean that nothing is wrong but it indicates no emergency is present that requires admission and they must continue care with follow-up as given below or physician of their choice.     FOLLOW-UP  Darcie Ledbetter, APRN  [address]  [place] [address]  [phone]    Schedule an appointment as soon as possible for a visit in 2 days  even if well    [facility]  [address]  [place] [facility] [address] [phone]  Schedule an appointment as soon as possible for a visit in 2 days  for further  evaluation angioedema         Medication List      New Prescriptions    diphenhydrAMINE 25 mg capsule  Commonly known as: BENADRYL  Take 1 capsule by mouth Every 6 (Six) Hours As Needed for Itching (swelling).     predniSONE 20 MG tablet  Commonly known as: DELTASONE  Take 2 tablets by mouth Daily for 5 days.           Where to Get Your Medications      You can get these medications from any pharmacy    Bring a paper prescription for each of these medications  · diphenhydrAMINE 25 mg capsule  · predniSONE 20 MG tablet          Live Broussard MD  01/04/21 7351

## 2021-01-07 ENCOUNTER — READMISSION MANAGEMENT (OUTPATIENT)
Dept: CALL CENTER | Facility: HOSPITAL | Age: 46
End: 2021-01-07

## 2021-01-07 ENCOUNTER — ANTICOAGULATION VISIT (OUTPATIENT)
Dept: PHARMACY | Facility: HOSPITAL | Age: 46
End: 2021-01-07

## 2021-01-07 ENCOUNTER — TELEPHONE (OUTPATIENT)
Dept: INTERNAL MEDICINE | Age: 46
End: 2021-01-07

## 2021-01-07 DIAGNOSIS — I26.99 ACUTE PULMONARY EMBOLISM, UNSPECIFIED PULMONARY EMBOLISM TYPE, UNSPECIFIED WHETHER ACUTE COR PULMONALE PRESENT (HCC): ICD-10-CM

## 2021-01-07 DIAGNOSIS — I26.99 OTHER ACUTE PULMONARY EMBOLISM, UNSPECIFIED WHETHER ACUTE COR PULMONALE PRESENT (HCC): ICD-10-CM

## 2021-01-07 LAB — INR PPP: 1.6

## 2021-01-07 NOTE — TELEPHONE ENCOUNTER
[facility] nurse states patient has a dry cough, patient states she feels like something is there to cough up but cant.  She is asking if Mucinex OTC is ok to take.  Please advise.

## 2021-01-07 NOTE — OUTREACH NOTE
Medical Week 3 Survey      Responses   [facility] patient discharged from?  [place]   Does the patient have one of the following disease processes/diagnoses(primary or secondary)?  Other   Week 3 attempt successful?  Yes   Call start time  1454   Call end time  1501   Discharge diagnosis  Acute PE with RLL pulmonary infarct, Chronic CHF (EF 6%), Hemoptysis,    Meds reviewed with patient/caregiver?  Yes   Is the patient having any side effects they believe may be caused by any medication additions or changes?  No   Does the patient have all medications ordered at discharge?  Yes   Is the patient taking all medications as directed (includes completed medication regime)?  Yes   Does the patient have a primary care provider?   Yes   Comments regarding PCP  Hospital d/c f/u appt is on 12/23/20 at 11:00 am with Zach Durand    Does the patient have an appointment with their PCP within 7 days of discharge?  Yes   Has the patient kept scheduled appointments due by today?  Yes   Comments  Has seen Dr. Serrano and  comes   What is the [facility] health agency?   [facility]    Has home health visited the patient within 72 hours of discharge?  N/A   Psychosocial issues?  No   Comments   comdes and feels better   Did the patient receive a copy of their discharge instructions?  Yes   Nursing interventions  Reviewed instructions with patient   What is the patient's perception of their health status since discharge?  Improving   Is the patient/caregiver able to teach back signs and symptoms related to disease process for when to call PCP?  Yes   Is the patient/caregiver able to teach back signs and symptoms related to disease process for when to call 911?  No   Is the patient/caregiver able to teach back the hierarchy of who to call/visit for symptoms/problems? PCP, Specialist, Home health nurse, Urgent Care, ED, 911  Yes   If the patient is a current smoker, are they able to teach back resources for cessation?  Not a smoker   Week  3 Call Completed?  Yes   Wrap up additional comments  She is doing better.           Poornima Marks RN

## 2021-01-14 ENCOUNTER — ANTICOAGULATION VISIT (OUTPATIENT)
Dept: PHARMACY | Facility: HOSPITAL | Age: 46
End: 2021-01-14

## 2021-01-14 DIAGNOSIS — I26.99 OTHER ACUTE PULMONARY EMBOLISM, UNSPECIFIED WHETHER ACUTE COR PULMONALE PRESENT (HCC): ICD-10-CM

## 2021-01-14 DIAGNOSIS — I26.99 ACUTE PULMONARY EMBOLISM, UNSPECIFIED PULMONARY EMBOLISM TYPE, UNSPECIFIED WHETHER ACUTE COR PULMONALE PRESENT (HCC): ICD-10-CM

## 2021-01-14 LAB — INR PPP: 3

## 2021-01-14 NOTE — PROGRESS NOTES
Anticoagulation Clinic Progress Note    Anticoagulation Summary  As of 1/14/2021    INR goal:  2.0-3.0   TTR:  82.5 % (2.6 wk)   INR used for dosing:  3.00 (1/14/2021)   Warfarin maintenance plan:  2.5 mg every Mon; 5 mg all other days   Weekly warfarin total:  32.5 mg   Plan last modified:  Iban Howe RPH (12/28/2020)   Next INR check:  1/21/2021   Target end date:  Indefinite    Indications    Other acute pulmonary embolism  unspecified whether acute cor pulmonale present (CMS/HCC) [I26.99]  Acute pulmonary embolism  unspecified pulmonary embolism type  unspecified whether acute cor pulmonale present (CMS/HCC) [I26.99]             Anticoagulation Episode Summary     INR check location:      Preferred lab:      Send INR reminders to:   NOMI BLANDON  POOL    Comments:        Anticoagulation Care Providers     Provider Role Specialty Phone number    Ruben Kirby DO Referring Hospitalist [phone]    Lisset Melton MD Referring Cardiology [phone]          INR History:  Anticoagulation Monitoring 12/30/2020 1/7/2021 1/14/2021   INR 3.00 1.60 3.00   INR Date 12/30/2020 1/7/2021 1/14/2021   INR Goal 2.0-3.0 2.0-3.0 2.0-3.0   Trend Same Same Same   Last Week Total 35 mg 32.5 mg 35 mg   Next Week Total 32.5 mg 35 mg 32.5 mg   Sun 5 mg 5 mg 5 mg   Mon - 2.5 mg 2.5 mg   Tue - 5 mg 5 mg   Wed 5 mg 5 mg 5 mg   Thu 5 mg 7.5 mg (1/7) 5 mg   Fri 5 mg 5 mg 5 mg   Sat 5 mg 5 mg 5 mg   Visit Report - - -   Some recent data might be hidden       Plan:  1. INR is Therapeutic today- see above in Anticoagulation Summary.   Provided instructions to Rosa with [facility] to Continue their warfarin regimen- see above in Anticoagulation Summary.  Left  at [phone].  2. Follow up in 1 week      Clover Escobar RPH

## 2021-01-15 ENCOUNTER — TELEPHONE (OUTPATIENT)
Dept: CARDIOLOGY | Facility: CLINIC | Age: 46
End: 2021-01-15

## 2021-01-15 ENCOUNTER — TELEPHONE (OUTPATIENT)
Dept: CARDIOLOGY | Facility: HOSPITAL | Age: 46
End: 2021-01-15

## 2021-01-15 ENCOUNTER — HOSPITAL ENCOUNTER (OUTPATIENT)
Dept: CARDIOLOGY | Facility: HOSPITAL | Age: 46
Discharge: HOME | End: 2021-01-15
Admitting: NURSE PRACTITIONER

## 2021-01-15 ENCOUNTER — READMISSION MANAGEMENT (OUTPATIENT)
Dept: CALL CENTER | Facility: HOSPITAL | Age: 46
End: 2021-01-15

## 2021-01-15 ENCOUNTER — TELEMEDICINE (OUTPATIENT)
Dept: INTERNAL MEDICINE | Age: 46
End: 2021-01-15

## 2021-01-15 VITALS
HEART RATE: 67 BPM | WEIGHT: 180 LBS | OXYGEN SATURATION: 98 % | BODY MASS INDEX: 28.93 KG/M2 | RESPIRATION RATE: 16 BRPM | SYSTOLIC BLOOD PRESSURE: 108 MMHG | HEIGHT: 66 IN | DIASTOLIC BLOOD PRESSURE: 76 MMHG

## 2021-01-15 DIAGNOSIS — T78.3XXD ANGIOEDEMA, SUBSEQUENT ENCOUNTER: Primary | ICD-10-CM

## 2021-01-15 DIAGNOSIS — I50.22 CHRONIC SYSTOLIC CONGESTIVE HEART FAILURE (HCC): ICD-10-CM

## 2021-01-15 DIAGNOSIS — I10 ESSENTIAL HYPERTENSION: ICD-10-CM

## 2021-01-15 DIAGNOSIS — Z01.818 PREPROCEDURAL EXAMINATION: ICD-10-CM

## 2021-01-15 DIAGNOSIS — I42.8 NICM (NONISCHEMIC CARDIOMYOPATHY) (HCC): ICD-10-CM

## 2021-01-15 DIAGNOSIS — Z21 ASYMPTOMATIC HIV INFECTION (HCC): ICD-10-CM

## 2021-01-15 DIAGNOSIS — Z01.818 PREOP EXAMINATION: Primary | ICD-10-CM

## 2021-01-15 DIAGNOSIS — I26.99 ACUTE PULMONARY EMBOLISM, UNSPECIFIED PULMONARY EMBOLISM TYPE, UNSPECIFIED WHETHER ACUTE COR PULMONALE PRESENT (HCC): ICD-10-CM

## 2021-01-15 DIAGNOSIS — I50.22 CHRONIC SYSTOLIC CONGESTIVE HEART FAILURE (HCC): Primary | ICD-10-CM

## 2021-01-15 PROCEDURE — 99213 OFFICE O/P EST LOW 20 MIN: CPT | Performed by: NURSE PRACTITIONER

## 2021-01-15 PROCEDURE — 99214 OFFICE O/P EST MOD 30 MIN: CPT | Performed by: NURSE PRACTITIONER

## 2021-01-15 PROCEDURE — G0463 HOSPITAL OUTPT CLINIC VISIT: HCPCS

## 2021-01-15 RX ORDER — IVABRADINE 5 MG/1
5 TABLET, FILM COATED ORAL 2 TIMES DAILY WITH MEALS
COMMUNITY
Start: 2021-01-04 | End: 2021-04-14

## 2021-01-15 NOTE — TELEPHONE ENCOUNTER
Called Pt to inform her that she needs to have her COVID test done on 1/22/21 per [facility] since her stress test date was moved.  Pt verbalized understanding and had no additional questions.      Lisha Shine RN  [facility]

## 2021-01-15 NOTE — TELEPHONE ENCOUNTER
I spoke with Miriam with [facility], heart failure coordinator for this patient, and they will allow her to be tested her for COVID by PCR, but per [employer] policy COVID test order can not be honored from .  I will order the test and fax results to  at [phone] once patient permission is received.     Kiley Boyd, APRN

## 2021-01-15 NOTE — PROGRESS NOTES
[facility] HEART FAILURE      Patient Name: Nina Saez  :1975  Age: 45 y.o.  Sex: female  Referring Provider: Lisset Melton MD   Primary Cardiologist: Lisset Melton MD  Encounter Provider:  ASMUEL Cisneros      Chief Complaint:   Chief Complaint   Patient presents with   • Congestive Heart Failure         Congestive Heart Failure  Associated symptoms include fatigue. Pertinent negatives include no chest pain, palpitations or shortness of breath.    this 44-year-old female, known to this provider, comes to us today for further evaluation of her chronic systolic congestive heart failure.  Current diagnoses to include severe nonischemic cardiomyopathy, left bundle branch block, hypertension, HIV, obesity, and asthma.    Historically she had followed with Dr. Laina Boyd at [facility].  In spring 2019 she was visiting family in [place] and was taken emergently to [facility].  Echocardiogram and cardiac catheterization were performed at that point, carvedilol was changed to metoprolol.    In 2019 she presented through the emergency department at [facility] with chest pain and shortness of breath.  She was treated with IV diuresis, troponin was negative x2 and proBNP was elevated at 5151.  Entresto was started at that point given her severe dilated cardiomyopathy.  She was to follow with cardiology at [facility] at that time.    In 2020 she again presented to the emergency department with shortness of breath and cough x3 weeks.  BNP was elevated at 13,351.  Carvedilol was discontinued that admission given hypotension.  At subsequent outpatient appointment it was felt that Bumex was less effective than Lasix by the patient.  She complained of progressively worsening fatigue.  AICD, single-chamber [employer], interrogation 2020 revealed 10-16 beats of VT.    On September 10, 2020 she saw Dr. Roldan  MD Jorge Alberto, for consideration of upgrade to biventricular device.  At that point he felt that her left bundle branch block was incomplete and she did not meet criteria for implantation of CRT-D.    She presented 10/27/2020 to [facility] with complaints of acute on chronic shortness of breath that began approximately 1 week prior.  BNP was further elevated at 16,206 that point.  Pulmonary edema was noted on chest x-ray.  Spironolactone was started that admission.  Referral for evaluation by [facility] transplant services was made November 2, 2020.    Left and right cardiac catheterization at [facility] revealed no obstructive CAD with normal filling pressures.  Echocardiogram at that time revealed an EF less than 15% with global hypokinesis-information extracted from external medical record note.  Cardiac catheterization May 30, 2017 performed at [facility] yielded codominant system with normal left main, LAD with distal 30% stenosis, RCA normal, LVEDP 7 mmHg.    Echocardiogram July 9, 2020 revealed EF of 6%, grade 3 LV diastolic dysfunction, significant LV wall motion hypokinesis/akinesis, RVSP 45 to 55 mmHg secondary to moderate tricuspid valve regurgitation, moderate to severe MR noted.    Last remote device check 10/17/2020 revealed 1 episode of NSVT lasting 27 beats, normal function.  CMP 1/4/2021 revealed creatinine of 1.0, GFR 73, electrolytes stable.  TSH normal 10/28/2020.  Last BNP 10/27/2020 was 16,206.    Jardiance 10 mg daily was started on 11/13/2020.      She was admitted from 12/11/2020 to 12/17/2020 for acute pulmonary embolism with right lower lobe pulmonary infarct.  She was started on warfarin at that time.  Additionally, she has been started on Corlanor as well as spironolactone for her heart failure as well.  January 4, 2021 she was evaluated and treated in the emergency department for possible angioedema.  She is to follow-up with her PCP regarding this  per ED note.  She is awaiting a stress test at .    She is currently doing relatively well at home, considering all that she has been through in the last 6 weeks.  She has no complaints at the present time.      The following portions of the patient's history were reviewed and updated as appropriate: allergies, current medications, past family history, past medical history, past social history, past surgical history and problem list.    Current Outpatient Medications   Medication Sig Dispense Refill   • acetaminophen (TYLENOL) 325 MG tablet Take 650 mg by mouth Every 6 (Six) Hours As Needed for Mild Pain .     • albuterol sulfate  (90 Base) MCG/ACT inhaler Inhale 2 puffs Every 4 (Four) Hours As Needed for Wheezing.     • Corlanor 5 MG tablet tablet Take 5 mg by mouth 2 (Two) Times a Day With Meals.     • darunavir ethanolate (PREZISTA) 800 MG tablet tablet Take 800 mg by mouth Every Night.     • diphenhydrAMINE (BENADRYL) 25 mg capsule Take 1 capsule by mouth Every 6 (Six) Hours As Needed for Itching (swelling). 20 capsule 0   • Empagliflozin (Jardiance) 10 MG tablet Take 10 mg by mouth Daily. 30 tablet 11   • Emtricitabine-Tenofovir AF (DESCOVY) 200-25 MG per tablet Take  by mouth Every Night.     • fluticasone (Flonase) 50 MCG/ACT nasal spray 2 sprays into the nostril(s) as directed by provider Daily. 1 bottle 2   • furosemide (LASIX) 80 MG tablet Take 1 tablet by mouth 2 (Two) Times a Day. 180 tablet 3   • guaiFENesin-codeine (GUAIFENESIN AC) 100-10 MG/5ML liquid Take 5 mL by mouth Every 4 (Four) Hours As Needed for Cough. 118 mL 0   • ondansetron ODT (Zofran ODT) 4 MG disintegrating tablet Place 1 tablet on the tongue Every 8 (Eight) Hours As Needed for Nausea or Vomiting. 30 tablet 0   • potassium chloride (K-DUR,KLOR-CON) 20 MEQ CR tablet Take 2 tablets by mouth Daily. 90 tablet 1   • ritonavir (NORVIR) 100 MG tablet Take 100 mg by mouth Every Night.     • sacubitril-valsartan (ENTRESTO) 24-26 MG  tablet Take 1 tablet by mouth Every 12 (Twelve) Hours. 60 tablet 0   • sertraline (ZOLOFT) 50 MG tablet Take 50 mg by mouth Daily.     • spironolactone (ALDACTONE) 25 MG tablet TAKE 1 2 (ONE HALF) TABLET BY MOUTH ONCE DAILY     • vitamin D (ERGOCALCIFEROL) 90566 units capsule capsule Take 50,000 Units by mouth Every 30 (Thirty) Days.     • warfarin (Coumadin) 2.5 MG tablet Take as directed - HOLD DOSE until INR checked in 2 days and follow further instruction. (Patient taking differently: 2.5 mg on Monday, 5mg on all other days) 90 tablet 0   • carvedilol (COREG) 3.125 MG tablet Take 1 tablet by mouth 2 (Two) Times a Day With Meals for 30 days. 60 tablet 0     No current facility-administered medications for this encounter.        Past Medical History:   Diagnosis Date   • AICD (automatic cardioverter/defibrillator) present 3/6/2020   • Asthma    • CHF (congestive heart failure) (CMS/[facility])    • Class 2 obesity in adult    • HIV (human immunodeficiency virus infection) (CMS/[facility])    • Hypertension    • LBBB (left bundle branch block)    • NICM (nonischemic cardiomyopathy) (CMS/[facility])     7/2020 EF 6% per echocardiogram; AICD present       Past Surgical History:   Procedure Laterality Date   • CARDIAC CATHETERIZATION     • CARDIAC DEFIBRILLATOR PLACEMENT     • FRACTURE SURGERY Left     left ankle       Physical Exam  Vitals signs and nursing note reviewed.   Constitutional:       General: She is not in acute distress.     Appearance: She is well-developed. She is not ill-appearing.   HENT:      Head: Normocephalic and atraumatic.   Eyes:      Conjunctiva/sclera: Conjunctivae normal.      Pupils: Pupils are equal, round, and reactive to light.   Neck:      Vascular: No JVD.   Cardiovascular:      Rate and Rhythm: Normal rate and regular rhythm.      Heart sounds: Normal heart sounds. No murmur. No friction rub. No gallop.    Pulmonary:      Effort: Pulmonary effort is normal. No respiratory distress.      Breath sounds:  "Normal breath sounds.   Abdominal:      General: Bowel sounds are normal. There is no distension.      Palpations: Abdomen is soft.   Musculoskeletal:         General: No swelling or deformity.   Skin:     General: Skin is warm and dry.      Capillary Refill: Capillary refill takes less than 2 seconds.   Neurological:      Mental Status: She is alert and oriented to person, place, and time. Mental status is at baseline.   Psychiatric:         Attention and Perception: Attention normal.         Mood and Affect: Mood normal. Affect is tearful.         Behavior: Behavior normal. Behavior is cooperative.          Review of Systems   Constitutional: Positive for fatigue. Negative for appetite change.   HENT: Negative for congestion and nosebleeds.    Eyes: Negative for visual disturbance.   Respiratory: Negative for cough, chest tightness and shortness of breath.    Cardiovascular: Negative for chest pain, palpitations and leg swelling.   Gastrointestinal: Negative for abdominal distention and blood in stool.   Genitourinary: Negative for enuresis and frequency.   Musculoskeletal: Negative for joint swelling and myalgias.   Neurological: Negative for dizziness, syncope, weakness, light-headedness, numbness and headaches.   Hematological: Does not bruise/bleed easily.   Psychiatric/Behavioral: Negative for decreased concentration and sleep disturbance.        OBJECTIVE:  /76 (BP Location: Left arm, Patient Position: Sitting, Cuff Size: Adult)   Pulse 67   Resp 16   Ht 167.6 cm (65.98\")   Wt 81.6 kg (180 lb)   SpO2 98%   BMI 29.07 kg/m²      Body mass index is 29.07 kg/m².  Wt Readings from Last 1 Encounters:   01/15/21 81.6 kg (180 lb)       Lab Review:  Renal Function: Estimated Creatinine Clearance: 76.5 mL/min (by C-G formula based on SCr of 1 mg/dL).    Lab Results   Component Value Date    PROBNP 22,479.0 (H) 12/12/2020       Results for orders placed during the hospital encounter of 07/07/20   Adult " Transthoracic Echo Complete W/ Cont if Necessary Per Protocol    Narrative · Calculated EF = 6.0%  · Left ventricular systolic function is severely decreased.  · The left ventricular cavity is severely dilated.  · Left ventricular wall thickness is consistent with a thin walled   ventricle.  · Left ventricular diastolic dysfunction (grade III) consistent with fixed   restricive pattern.  · Moderate-to-severe mitral valve regurgitation is present  · Moderate tricuspid valve regurgitation is present.  · Estimated right ventricular systolic pressure from tricuspid   regurgitation is moderately elevated (45-55 mmHg).  · Calculated right ventricular systolic pressure from tricuspid   regurgitation is 48 mmHg.  · The following left ventricular wall segments are hypokinetic: mid   anterior, apical anterior, mid anterolateral, apical lateral, mid   inferolateral, apical inferior, mid inferior, apical septal, mid   inferoseptal, apex hypokinetic and mid anteroseptal. The following left   ventricular wall segments are akinetic: basal anterior, basal   anterolateral, basal inferolateral, basal inferior, basal inferoseptal and   basal anteroseptal.            6 MINUTE WALK  Patient declined       Cardiac Procedures:  1. Cardiac catheterization [facility] 5/30/17       2.  R/[facility] 4/2019   Data deficit      ASSESSMENT:     Diagnosis Plan   1. Chronic systolic congestive heart failure (CMS/HCC)     2. NICM (nonischemic cardiomyopathy) (CMS/HCC)     3. Asymptomatic HIV infection (CMS/HCC)     4. Essential hypertension     5. Acute pulmonary embolism, unspecified pulmonary embolism type, unspecified whether acute cor pulmonale present (CMS/HCC)           PLAN OF CARE:  1.  HFrEF-most recent EF per echocardiogram 6%.  NYHA class IV.  Currently stable.  Evaluation with [facility] transplant team scheduled December 1.  Currently she is on Entresto, Lasix, carvedilol, ivabradine, spironolactone, and Jardiance.  Historically  she has been unable to tolerate metoprolol succinate as it made her very dizzy.      From a heart failure standpoint, she is stable.  She is currently awaiting stress testing at  pending Covid testing.  I would like her to continue her current GDMT, as well as her monitoring of her sodium and fluid intake and daily weights.  I would like to keep a close eye on her and see her every 6 to 8 weeks as she is high risk for decompensation.    Directions for when to call the clinic reviewed with the patient to include weight gain of 2 to 3 pounds in 24 hours, weight gain of 5 to 10 pounds within 7 days; worsening shortness of breath; worsening lower extremity edema or abdominal distention.    2.  Nonobstructive CAD-diffuse 30% LAD lesion noted on prior cardiac catheterization as above.  Stable, without angina.     3.  Nonischemic cardiomyopathy (dilated)-presence of AICD noted.  Most recent EF per echocardiogram is 6%.  Most recent AICD interrogation as above.  Currently on Entresto.  Thus far she is unable to reach target dose given her low normal blood pressure.    4.  NSVT-noted on prior device interrogation.  Currently on beta-blockade.    5.  HIV-history noted.    6.  Pulmonary embolism-now currently on warfarin.  Most recent INR is 3.1.  Followed by home health and primary cardiology team.    7.  Angioedema-appointment scheduled with immunologist on Monday.  There is no clear etiology for her recent episode of right-sided jaw/face/lip swelling with pruritus.  She states she had not taken any new medications or eaten any new foods.  She did not have any difficulty breathing with this episode.  I advised that she continue to be very watchful for any recurrence of this.      Continue current plan of care; follow-up in 6 to 8 weeks or sooner if needed with repeat BMP/BNP.        Thank you for allowing me to participate in the care of your patient,         SAMUEL Cisneros  [facility]  FAILURE  01/15/21  13:56 EST      **Lyric Disclaimer:**  Much of this encounter note is an electronic transcription/translation of spoken language to printed text. The electronic translation of spoken language may permit erroneous, or at times, nonsensical words or phrases to be inadvertently transcribed. Although I have reviewed the note for such errors, some may still exist.

## 2021-01-15 NOTE — OUTREACH NOTE
Medical Week 4 Survey      Responses   [facility] patient discharged from?  [place]   Does the patient have one of the following disease processes/diagnoses(primary or secondary)?  Other   Week 4 attempt successful?  Yes   Call start time  1415   Call end time  1417   Discharge diagnosis  Acute PE with RLL pulmonary infarct, Chronic CHF (EF 6%), Hemoptysis,    Meds reviewed with patient/caregiver?  Yes   Is the patient taking all medications as directed (includes completed medication regime)?  Yes   Has the patient kept scheduled appointments due by today?  Yes   Is the patient still receiving Home Health Services?  Yes   What is the patient's perception of their health status since discharge?  Improving   Week 4 Call Completed?  Yes   Would the patient like one additional call?  No   Graduated  Yes   Is the patient interested in additional calls from an ambulatory ?  NOTE:  applies to high risk patients requiring additional follow-up.  No   Did the patient feel the follow up calls were helpful during their recovery period?  Yes          Sera Bradford RN

## 2021-01-15 NOTE — TELEPHONE ENCOUNTER
Called patient to let her know that she can go to the  [facility] outpatient lab to have her COVID test. I informed her that the order is in the computer and she does not need an appt.  Pt is having a stress test at  next week and needs to have a COVID test prior.  Per , she can have it done here, but we need permission to fax the results to .   I obtained verbal consent from Pt today for the results to be faxed to  upon completion.  As per this note, I spoke with the Pt and she gave us permission to fax her COVID test results to .      Lisha Shine RN  [facility]

## 2021-01-15 NOTE — PROGRESS NOTES
[facility] [place] [facility]  SIRI Saez / 45 y.o. / female  01/15/2021    VITALS    There were no vitals taken for this visit.    BP Readings from Last 3 Encounters:   01/15/21 108/76   01/04/21 107/93   12/23/20 110/74     Wt Readings from Last 3 Encounters:   01/15/21 81.6 kg (180 lb)   12/23/20 85.6 kg (188 lb 12.8 oz)   12/17/20 88.1 kg (194 lb 3.2 oz)      There is no height or weight on file to calculate BMI.    CC:  Main reason(s) for today's visit: Follow-up (ER visit 1/4/2021 )      HPI:     This was an audio and video enabled telemedicine encounter.    Date of emergency department encounter: 1/4/2021   Emergency department: [facility]  Principle Dx: Angioedema   Secondary Dx:   History prior to ED visit:   Per ER Notes: Nina Saez is a 45 y.o. female who presents to the ED c/o chief complaint of swelling of her upper lip.  Patient reports that she no swelling of her upper lip last night.  Patient reports that this morning it was unchanged, has not worsened, has not improved.  Patient denies any other swelling, no swelling of her tongue, no swelling of her mouth or throat, no difficulty swallowing, no difficulty breathing, no nausea vomiting, no rash or itching.  Patient reports she had a similar episode approximately a week ago where her lower lip swelled and then resolved overnight.  Patient denies any history of other swelling, no history of angioedema in the past.  Patient reports only new medication is Coumadin which she was recently placed on for pulmonary embolism.  Patient denies any new over-the-counter medications.  Patient believes that the swelling is related to something that she ate although denies any unusual foods.    Evaluation/Treatment: Given IV steroids, benedryl, famotidine for symptoms. Discharged home PO Benadryl and steroids.     Course: No additional episodes.  He has appointment scheduled with allergist for early next week.  She  reports this was not the first time that this had happened, the week before, she also had some lower lip swelling but she did not seek out any care at that time.  She did not have any shortness of breath during these episodes.    She was also recently seen by another nurse practitioner in my practice for a hospital follow-up visit.  In December, she was diagnosed with pulmonary embolus and is currently on warfarin treatment, as managed by the Coumadin clinic.     She has appointment for stress test in [place] next week.     She has been to the HF clinic earlier today, currently stable.       Patient Care Team:  Darcie Ledbetter APRN as PCP - General (Internal Medicine)  Lisset Melton MD as Consulting Physician (Cardiology)  Andrew Watson MD as Consulting Physician (Urology)  ____________________________________________________________________    ASSESSMENT & PLAN:      No orders of the defined types were placed in this encounter.    No orders of the defined types were placed in this encounter.      Summary/Discussion:    This patient has consented to a telehealth visit via video. The visit was scheduled as a telehealth phone visit to comply with patient safety concerns in accordance with CDC recommendations.  All vitals recorded within this visit are reported by the patient.  I spent 30 minutes in total including but not limited to the 15 minutes spent in direct conversation with this patient.       1. Angioedema, subsequent encounter  No further episodes since the ER visit.  She did not have any shortness of breath with these episodes. Has appointment with allergist scheduled early next week.     2. Chronic systolic congestive heart failure (CMS/HCC)  Currently managed by cardiology.  She is on the transplant list at .  She is scheduled to have stress test next week.     3. Acute pulmonary embolism, unspecified pulmonary embolism type, unspecified whether acute cor pulmonale present  (CMS/HCC)  On warfarin, managed by cardiology.       Return in about 6 months (around 7/15/2021) for Next scheduled follow up.    Future Appointments   Date Time Provider Department Center   2/26/2021  1:00 PM [facility] FAIL [facility]   4/22/2021  1:15 PM Chayo Conde MD [facility] None     ____________________________________________________________________    REVIEW OF SYSTEMS    Review of Systems   HENT: Negative for facial swelling.    Respiratory: Negative for cough and shortness of breath.          PHYSICAL EXAMINATION    Physical Exam  Vitals signs and nursing note reviewed.   Constitutional:       Appearance: She is well-developed.   Neurological:      Mental Status: She is alert and oriented to person, place, and time. She is not disoriented.   Psychiatric:         Attention and Perception: She is attentive.         Speech: Speech normal.         Behavior: Behavior normal. Behavior is cooperative.         Thought Content: Thought content normal.         Judgment: Judgment normal.           REVIEWED DATA:    Labs:   Anticoagulation Visit on 01/14/2021   Component Date Value Ref Range Status   • INR 01/14/2021 3.00   Final   Anticoagulation Visit on 01/07/2021   Component Date Value Ref Range Status   • INR 01/07/2021 1.60   Final   Admission on 01/04/2021, Discharged on 01/04/2021   Component Date Value Ref Range Status   • Glucose 01/04/2021 98  65 - 99 mg/dL Final   • BUN 01/04/2021 10  6 - 20 mg/dL Final   • Creatinine 01/04/2021 1.00  0.57 - 1.00 mg/dL Final   • Sodium 01/04/2021 139  136 - 145 mmol/L Final   • Potassium 01/04/2021 4.8  3.5 - 5.2 mmol/L Final   • Chloride 01/04/2021 102  98 - 107 mmol/L Final   • CO2 01/04/2021 29.2* 22.0 - 29.0 mmol/L Final   • Calcium 01/04/2021 9.2  8.6 - 10.5 mg/dL Final   • Total Protein 01/04/2021 6.3  6.0 - 8.5 g/dL Final   • Albumin 01/04/2021 3.50  3.50 - 5.20 g/dL Final   • ALT (SGPT) 01/04/2021 21  1 - 33 U/L Final   • AST (SGOT)  01/04/2021 18  1 - 32 U/L Final   • Alkaline Phosphatase 01/04/2021 141* 39 - 117 U/L Final   • Total Bilirubin 01/04/2021 1.2  0.0 - 1.2 mg/dL Final   • eGFR   Amer 01/04/2021 73  >60 mL/min/1.73 Final   • Globulin 01/04/2021 2.8  gm/dL Final   • A/G Ratio 01/04/2021 1.3  g/dL Final   • BUN/Creatinine Ratio 01/04/2021 10.0  7.0 - 25.0 Final   • Anion Gap 01/04/2021 7.8  5.0 - 15.0 mmol/L Final   • Protime 01/04/2021 26.4* 11.7 - 14.2 Seconds Final   • INR 01/04/2021 2.46* 0.90 - 1.10 Final   • PTT 01/04/2021 31.8  22.7 - 35.4 seconds Final   • WBC 01/04/2021 7.60  3.40 - 10.80 10*3/mm3 Final   • RBC 01/04/2021 4.39  3.77 - 5.28 10*6/mm3 Final   • Hemoglobin 01/04/2021 13.3  12.0 - 15.9 g/dL Final   • Hematocrit 01/04/2021 41.2  34.0 - 46.6 % Final   • MCV 01/04/2021 93.8  79.0 - 97.0 fL Final   • MCH 01/04/2021 30.3  26.6 - 33.0 pg Final   • MCHC 01/04/2021 32.3  31.5 - 35.7 g/dL Final   • RDW 01/04/2021 13.5  12.3 - 15.4 % Final   • RDW-SD 01/04/2021 46.2  37.0 - 54.0 fl Final   • MPV 01/04/2021 11.2  6.0 - 12.0 fL Final   • Platelets 01/04/2021 248  140 - 450 10*3/mm3 Final   • Neutrophil % 01/04/2021 70.1  42.7 - 76.0 % Final   • Lymphocyte % 01/04/2021 22.2  19.6 - 45.3 % Final   • Monocyte % 01/04/2021 6.6  5.0 - 12.0 % Final   • Eosinophil % 01/04/2021 0.4  0.3 - 6.2 % Final   • Basophil % 01/04/2021 0.3  0.0 - 1.5 % Final   • Immature Grans % 01/04/2021 0.4  0.0 - 0.5 % Final   • Neutrophils, Absolute 01/04/2021 5.33  1.70 - 7.00 10*3/mm3 Final   • Lymphocytes, Absolute 01/04/2021 1.69  0.70 - 3.10 10*3/mm3 Final   • Monocytes, Absolute 01/04/2021 0.50  0.10 - 0.90 10*3/mm3 Final   • Eosinophils, Absolute 01/04/2021 0.03  0.00 - 0.40 10*3/mm3 Final   • Basophils, Absolute 01/04/2021 0.02  0.00 - 0.20 10*3/mm3 Final   • Immature Grans, Absolute 01/04/2021 0.03  0.00 - 0.05 10*3/mm3 Final   • nRBC 01/04/2021 0.1  0.0 - 0.2 /100 WBC Final         Imaging:   No results found.       Medical Tests:          Summary of old records / correspondence / consultant report:   ED encounter note re: issues addressed on HPI    Request outside records:       ALLERGIES  No Known Allergies     MEDICATIONS  Current Outpatient Medications on File Prior to Visit   Medication Sig   • acetaminophen (TYLENOL) 325 MG tablet Take 650 mg by mouth Every 6 (Six) Hours As Needed for Mild Pain .   • albuterol sulfate  (90 Base) MCG/ACT inhaler Inhale 2 puffs Every 4 (Four) Hours As Needed for Wheezing.   • carvedilol (COREG) 3.125 MG tablet Take 1 tablet by mouth 2 (Two) Times a Day With Meals for 30 days.   • darunavir ethanolate (PREZISTA) 800 MG tablet tablet Take 800 mg by mouth Every Night.   • diphenhydrAMINE (BENADRYL) 25 mg capsule Take 1 capsule by mouth Every 6 (Six) Hours As Needed for Itching (swelling).   • Empagliflozin (Jardiance) 10 MG tablet Take 10 mg by mouth Daily.   • Emtricitabine-Tenofovir AF (DESCOVY) 200-25 MG per tablet Take  by mouth Every Night.   • fluticasone (Flonase) 50 MCG/ACT nasal spray 2 sprays into the nostril(s) as directed by provider Daily.   • furosemide (LASIX) 80 MG tablet Take 1 tablet by mouth 2 (Two) Times a Day.   • guaiFENesin-codeine (GUAIFENESIN AC) 100-10 MG/5ML liquid Take 5 mL by mouth Every 4 (Four) Hours As Needed for Cough.   • ondansetron ODT (Zofran ODT) 4 MG disintegrating tablet Place 1 tablet on the tongue Every 8 (Eight) Hours As Needed for Nausea or Vomiting.   • potassium chloride (K-DUR,KLOR-CON) 20 MEQ CR tablet Take 2 tablets by mouth Daily.   • ritonavir (NORVIR) 100 MG tablet Take 100 mg by mouth Every Night.   • sacubitril-valsartan (ENTRESTO) 24-26 MG tablet Take 1 tablet by mouth Every 12 (Twelve) Hours.   • sertraline (ZOLOFT) 50 MG tablet Take 50 mg by mouth Daily.   • spironolactone (ALDACTONE) 25 MG tablet TAKE 1 2 (ONE HALF) TABLET BY MOUTH ONCE DAILY   • vitamin D (ERGOCALCIFEROL) 65843 units capsule capsule Take 50,000 Units by mouth Every 30 (Thirty)  Days.   • warfarin (Coumadin) 2.5 MG tablet Take as directed - HOLD DOSE until INR checked in 2 days and follow further instruction. (Patient taking differently: 2.5 mg on Monday, 5mg on all other days)     No current facility-administered medications on file prior to visit.        PFSH:     The following portions of the patient's history were reviewed and updated as appropriate: Allergies / Current Medications / Past Medical History / Surgical History / Social History / Family History    PROBLEM LIST   Patient Active Problem List   Diagnosis   • Asthma   • Essential hypertension   • HIV (human immunodeficiency virus infection) (CMS/[facility])   • Class 2 obesity in adult   • Exertional dyspnea   • Chronic systolic congestive heart failure (CMS/[facility])   • NICM (nonischemic cardiomyopathy) (CMS/[facility])   • Elevated LFTs   • Hypopotassemia   • AICD (automatic cardioverter/defibrillator) present   • Acute on chronic systolic congestive heart failure (CMS/[facility])   • Intractable vomiting with nausea   • Diarrhea following gastrointestinal surgery   • Acute pulmonary embolism (CMS/[facility])   • Hemoptysis   • Other acute pulmonary embolism, unspecified whether acute cor pulmonale present (CMS/[facility])   • Acute pulmonary embolism, unspecified pulmonary embolism type, unspecified whether acute cor pulmonale present (CMS/[facility])       PAST MEDICAL HISTORY  Past Medical History:   Diagnosis Date   • AICD (automatic cardioverter/defibrillator) present 3/6/2020   • Asthma    • CHF (congestive heart failure) (CMS/[facility])    • Class 2 obesity in adult    • HIV (human immunodeficiency virus infection) (CMS/[facility])    • Hypertension    • LBBB (left bundle branch block)    • NICM (nonischemic cardiomyopathy) (CMS/[facility])     7/2020 EF 6% per echocardiogram; AICD present       SURGICAL HISTORY  Past Surgical History:   Procedure Laterality Date   • CARDIAC CATHETERIZATION     • CARDIAC DEFIBRILLATOR PLACEMENT     • FRACTURE SURGERY Left     left ankle       SOCIAL  HISTORY  Social History     Socioeconomic History   • Marital status: Legally      Spouse name: Not on file   • Number of children: Not on file   • Years of education: Not on file   • Highest education level: Not on file   Tobacco Use   • Smoking status: Former Smoker     Quit date:      Years since quittin.0   • Smokeless tobacco: Never Used   Substance and Sexual Activity   • Alcohol use: Not Currently     Frequency: Never     Comment: holiday and special occ//caffeine use: 1 cup daily   • Drug use: Never   • Sexual activity: Defer       FAMILY HISTORY  Family History   Problem Relation Age of Onset   • Cancer Mother    • Breast cancer Mother    • Bone cancer Mother    • Hypertension Maternal Grandfather    • Hyperlipidemia Maternal Grandfather    • Diabetes Maternal Grandfather    • Prostate cancer Maternal Grandfather    • Ovarian cysts Daughter    • Breast cancer Maternal Grandmother    • Heart disease Maternal Grandmother    • No Known Problems Daughter    • No Known Problems Daughter          **Lyric Disclaimer:   Much of this encounter note is an electronic transcription/translation of spoken language to printed text. The electronic translation of spoken language may permit erroneous, or at times, nonsensical words or phrases to be inadvertently transcribed. Although I have reviewed the note for such errors, some may still exist.

## 2021-01-15 NOTE — PROGRESS NOTES
[facility] HEART FAILURE      Patient Name: Nina Saez  :1975  Age: 45 y.o.  Sex: female  Referring Provider: Lisset Melton MD   Primary Cardiologist: Dr. Melton  Encounter Provider: Zina Waldron, PharmD      Chief Complaint:   Chief Complaint   Patient presents with   • Congestive Heart Failure       HPI:  Patient presents for a follow-up appt in the HF clinic. PMH is significant for HFrEF (EF 6%), severe nonischemic cardiomyopathy, left bundle branch block, HTN, HIV, obesity, and asthma. She was seen at  since her last office visit for evaluation of possible LVAD/heart transplant. They started her on spironolactone and ivabradine at this time. Additionally, she was hospitalized and discharged on 20 due to a PE. She is now on warfarin. She did have an ED visit on 21 for lip swelling. She was given steroids and benadryl which helped this improve. She is seeing an allergist next week to see if there is an allergy component. She has continued to make diet changes, and is watching her sodium intake and is baking most foods at this time.      Current Regimen:  Heart Failure  Carvedilol 3.125 mg bid  Furosemide 80 mg bid  Entresto 24/26 mg bid  Spironolactone 25 mg (0.5 tablet) daily  Ivabradine 5 mg bid      Other CV Meds  Aspirin 81 mg daily      Medication Use:  Adherence: great, uses a pillbox  Past hx of medication use/intolerance: metoprolol (dizziness, nausea); higher dose of Entresto (hypotension)  Affordability: [facility] Medicare/Medicaid; no issues with cost      Diet:  Watching sodium intake and keeping <2000 mg/day. Baking most meats      Social History:  Tobacco use: former smoker (quit )  EtOH use: none  Illicit drug use: none  Exercise: minimal due to her disease; does walk around the house and wants to walk more; going to physical therapy      Immunization Status:  Pneumococcal: PCV13 (2017), PPSV23 due  Influenza: received at [facility] clinic       OBJECTIVE:    BP  "108/76 (BP Location: Left arm, Patient Position: Sitting, Cuff Size: Adult)   Pulse 67   Resp 16   Ht 167.6 cm (65.98\")   Wt 81.6 kg (180 lb)   SpO2 98%   BMI 29.07 kg/m²     Body mass index is 29.07 kg/m².  Wt Readings from Last 1 Encounters:   01/15/21 81.6 kg (180 lb)       Lab Review:  Renal Function: Estimated Creatinine Clearance: 76.5 mL/min (by C-G formula based on SCr of 1 mg/dL).    Lab Results   Component Value Date    PROBNP 22,479.0 (H) 12/12/2020       Results for orders placed during the hospital encounter of 07/07/20   Adult Transthoracic Echo Complete W/ Cont if Necessary Per Protocol    Narrative · Calculated EF = 6.0%  · Left ventricular systolic function is severely decreased.  · The left ventricular cavity is severely dilated.  · Left ventricular wall thickness is consistent with a thin walled   ventricle.  · Left ventricular diastolic dysfunction (grade III) consistent with fixed   restricive pattern.  · Moderate-to-severe mitral valve regurgitation is present  · Moderate tricuspid valve regurgitation is present.  · Estimated right ventricular systolic pressure from tricuspid   regurgitation is moderately elevated (45-55 mmHg).  · Calculated right ventricular systolic pressure from tricuspid   regurgitation is 48 mmHg.  · The following left ventricular wall segments are hypokinetic: mid   anterior, apical anterior, mid anterolateral, apical lateral, mid   inferolateral, apical inferior, mid inferior, apical septal, mid   inferoseptal, apex hypokinetic and mid anteroseptal. The following left   ventricular wall segments are akinetic: basal anterior, basal   anterolateral, basal inferolateral, basal inferior, basal inferoseptal and   basal anteroseptal.            ASSESSMENT/PLAN OF CARE:    1. HFrEF (EF 6%)  - Patient's symptoms continue to be stable. Limited room for medication titrations due to hypotension  - Continue carvedilol 3.125 mg twice daily  - Continue Entresto 24/26 mg twice " daily  - Continue furosemide 80 mg twice daily  - Continue Jardiance 10 mg daily  - Continue spironolactone 25 mg (0.5 tablet) daily  - Continue ivabradine 5 mg twice daily  - She is unable to tolerate higher doses of carvedilol and Entresto due to hypotension  - Advised patient to call clinic with 2-3 lb weight gain in 24 hrs or >5 lb weight gain in 1 week       15 min spent in direct patient care      Thank you for allowing me to participate in the care of your patient,         Zina Waldron [facility] HEART FAILURE  01/15/21  14:10 EST

## 2021-01-21 ENCOUNTER — ANTICOAGULATION VISIT (OUTPATIENT)
Dept: PHARMACY | Facility: HOSPITAL | Age: 46
End: 2021-01-21

## 2021-01-21 DIAGNOSIS — I26.99 OTHER ACUTE PULMONARY EMBOLISM, UNSPECIFIED WHETHER ACUTE COR PULMONALE PRESENT (HCC): ICD-10-CM

## 2021-01-21 DIAGNOSIS — I26.99 ACUTE PULMONARY EMBOLISM, UNSPECIFIED PULMONARY EMBOLISM TYPE, UNSPECIFIED WHETHER ACUTE COR PULMONALE PRESENT (HCC): ICD-10-CM

## 2021-01-21 LAB — INR PPP: 1.7

## 2021-01-21 NOTE — PROGRESS NOTES
Anticoagulation Clinic Progress Note    Anticoagulation Summary  As of 2021    INR goal:  2.0-3.0   TTR:  80.9 % (3.6 wk)   INR used for dosin.70 (2021)   Warfarin maintenance plan:  2.5 mg every Mon; 5 mg all other days   Weekly warfarin total:  32.5 mg   Plan last modified:  Iban Howe RPH (2020)   Next INR check:  2021   Target end date:  Indefinite    Indications    Other acute pulmonary embolism  unspecified whether acute cor pulmonale present (CMS/HCC) [I26.99]  Acute pulmonary embolism  unspecified pulmonary embolism type  unspecified whether acute cor pulmonale present (CMS/HCC) [I26.99]             Anticoagulation Episode Summary     INR check location:      Preferred lab:      Send INR reminders to:   [facility]  POOL    Comments:        Anticoagulation Care Providers     Provider Role Specialty Phone number    Ruben Kirby DO Referring Hospitalist [phone]    Lisset Melton MD Referring Cardiology [phone]          INR History:  Anticoagulation Monitoring 2021   INR 1.60 3.00 1.70   INR Date 2021   INR Goal 2.0-3.0 2.0-3.0 2.0-3.0   Trend Same Same Same   Last Week Total 32.5 mg 35 mg 27.5 mg   Next Week Total 35 mg 32.5 mg 37.5 mg   Sun 5 mg 5 mg 5 mg   Mon 2.5 mg 2.5 mg 5 mg ()   Tue 5 mg 5 mg 5 mg   Wed 5 mg 5 mg 5 mg   Thu 7.5 mg () 5 mg 7.5 mg ()   Fri 5 mg 5 mg 5 mg   Sat 5 mg 5 mg 5 mg   Visit Report - - -   Some recent data might be hidden       Plan:  1. INR is Subtherapeutic today- see above in Anticoagulation Summary.   Provided instructions to Alejandrina with [facility] to Increase their warfarin regimen- see above in Anticoagulation Summary.  2. Follow up in 1 week      Clover Escobar RPH

## 2021-01-22 ENCOUNTER — LAB (OUTPATIENT)
Dept: LAB | Facility: HOSPITAL | Age: 46
End: 2021-01-22

## 2021-01-22 DIAGNOSIS — Z01.818 PREPROCEDURAL EXAMINATION: ICD-10-CM

## 2021-01-22 PROCEDURE — C9803 HOPD COVID-19 SPEC COLLECT: HCPCS

## 2021-01-22 PROCEDURE — U0004 COV-19 TEST NON-CDC HGH THRU: HCPCS

## 2021-01-23 LAB — SARS-COV-2 RNA RESP QL NAA+PROBE: NOT DETECTED

## 2021-01-28 ENCOUNTER — ANTICOAGULATION VISIT (OUTPATIENT)
Dept: PHARMACY | Facility: HOSPITAL | Age: 46
End: 2021-01-28

## 2021-01-28 DIAGNOSIS — I26.99 ACUTE PULMONARY EMBOLISM, UNSPECIFIED PULMONARY EMBOLISM TYPE, UNSPECIFIED WHETHER ACUTE COR PULMONALE PRESENT (HCC): ICD-10-CM

## 2021-01-28 DIAGNOSIS — I26.99 OTHER ACUTE PULMONARY EMBOLISM, UNSPECIFIED WHETHER ACUTE COR PULMONALE PRESENT (HCC): ICD-10-CM

## 2021-01-28 LAB — INR PPP: 1.9

## 2021-01-28 RX ORDER — WARFARIN SODIUM 2.5 MG/1
TABLET ORAL
Qty: 90 TABLET | Refills: 0 | Status: SHIPPED | OUTPATIENT
Start: 2021-01-28 | End: 2021-03-12

## 2021-01-29 NOTE — PROGRESS NOTES
Anticoagulation Clinic Progress Note    Anticoagulation Summary  As of 2021    INR goal:  2.0-3.0   TTR:  63.2 % (1.1 mo)   INR used for dosin.90 (2021)   Warfarin maintenance plan:  5 mg every day   Weekly warfarin total:  35 mg   Plan last modified:  Sandy Miguel, [facility] (2021)   Next INR check:  2021   Target end date:  Indefinite    Indications    Other acute pulmonary embolism  unspecified whether acute cor pulmonale present (CMS/HCC) [I26.99]  Acute pulmonary embolism  unspecified pulmonary embolism type  unspecified whether acute cor pulmonale present (CMS/HCC) [I26.99]             Anticoagulation Episode Summary     INR check location:      Preferred lab:      [facility]    Comments:        Anticoagulation Care Providers     Provider Role Specialty Phone number    Ruben Kirby DO Referring Hospitalist [phone]    Lisset Melton MD Referring Cardiology [phone]          Clinic Interview:      INR History:  Anticoagulation Monitoring 2021   INR 3.00 1.70 1.90   INR Date 2021   INR Goal 2.0-3.0 2.0-3.0 2.0-3.0   Trend Same Same Up   Last Week Total 35 mg 27.5 mg 37.5 mg   Next Week Total 32.5 mg 37.5 mg 35 mg   Sun 5 mg 5 mg 5 mg   Mon 2.5 mg 5 mg () 5 mg   Tue 5 mg 5 mg 5 mg   Wed 5 mg 5 mg 5 mg   Thu 5 mg 7.5 mg () 5 mg   Fri 5 mg 5 mg 5 mg   Sat 5 mg 5 mg 5 mg   Visit Report - - -   Some recent data might be hidden       Plan:  1. INR is Subtherapeutic today- see above in Anticoagulation Summary.   Will instruct Nina Saez to Continue their warfarin regimen- see above in Anticoagulation Summary.  2. Follow up in 1 week  3. Pt has agreed to only be called if INR out of range. They have been instructed to call if any changes in medications, doses, concerns, etc. Patient expresses understanding and has no further questions at this time.    Sandy PHAM  [place], [facility]

## 2021-02-02 ENCOUNTER — TELEPHONE (OUTPATIENT)
Dept: INTERNAL MEDICINE | Age: 46
End: 2021-02-02

## 2021-02-02 DIAGNOSIS — I50.22 CHRONIC SYSTOLIC CONGESTIVE HEART FAILURE (HCC): ICD-10-CM

## 2021-02-02 DIAGNOSIS — I26.99 ACUTE PULMONARY EMBOLISM, UNSPECIFIED PULMONARY EMBOLISM TYPE, UNSPECIFIED WHETHER ACUTE COR PULMONALE PRESENT (HCC): Primary | ICD-10-CM

## 2021-02-02 DIAGNOSIS — J45.909 ASTHMA, UNSPECIFIED ASTHMA SEVERITY, UNSPECIFIED WHETHER COMPLICATED, UNSPECIFIED WHETHER PERSISTENT: ICD-10-CM

## 2021-02-02 NOTE — TELEPHONE ENCOUNTER
Contact patient.     It appears at discharge the patient was advised to stop taking the aspirin.  She should not be on this in combination with the warfarin.     I will place order for pulmonology referral.

## 2021-02-02 NOTE — TELEPHONE ENCOUNTER
Caller: Nina Saez    Relationship to patient: Self    Best call back number: [phone]    Patient is needing: Patient is taking warfarin and 1 Asprin a day and wants to know if that is ok to keep doing, Please reach out to patient and advise. She also needs a referral for the pulmonary doctor

## 2021-02-04 ENCOUNTER — ANTICOAGULATION VISIT (OUTPATIENT)
Dept: PHARMACY | Facility: HOSPITAL | Age: 46
End: 2021-02-04

## 2021-02-04 ENCOUNTER — TELEPHONE (OUTPATIENT)
Dept: PHARMACY | Facility: HOSPITAL | Age: 46
End: 2021-02-04

## 2021-02-04 DIAGNOSIS — I26.99 ACUTE PULMONARY EMBOLISM, UNSPECIFIED PULMONARY EMBOLISM TYPE, UNSPECIFIED WHETHER ACUTE COR PULMONALE PRESENT (HCC): ICD-10-CM

## 2021-02-04 DIAGNOSIS — I26.99 OTHER ACUTE PULMONARY EMBOLISM, UNSPECIFIED WHETHER ACUTE COR PULMONALE PRESENT (HCC): ICD-10-CM

## 2021-02-04 LAB — INR PPP: 1.7

## 2021-02-04 NOTE — TELEPHONE ENCOUNTER
I relayed your recommendation to Ms. Saez's [facility] nurse, Alejandrina, who agreed to instruct Ms. Saez accordingly. Thank you!

## 2021-02-04 NOTE — PROGRESS NOTES
Anticoagulation Clinic Progress Note    Anticoagulation Summary  As of 2021    INR goal:  2.0-3.0   TTR:  51.9 % (1.3 mo)   INR used for dosin.70 (2021)   Warfarin maintenance plan:  7.5 mg every Sun, Thu; 5 mg all other days   Weekly warfarin total:  40 mg   Plan last modified:  Chucho Mcdaniel RPH (2021)   Next INR check:  2021   Target end date:  Indefinite    Indications    Other acute pulmonary embolism  unspecified whether acute cor pulmonale present (CMS/HCC) [I26.99]  Acute pulmonary embolism  unspecified pulmonary embolism type  unspecified whether acute cor pulmonale present (CMS/HCC) [I26.99]             Anticoagulation Episode Summary     INR check location:      Preferred lab:      Send INR reminders to:   [facility]  POOL    Comments:        Anticoagulation Care Providers     Provider Role Specialty Phone number    Ruben Kirby DO Referring Hospitalist [phone]    Lisset Melton MD Referring Cardiology [phone]        Pertinent info:  Nurse indicates pt took 7.5 mg Thurs, 5 mg all other days.    INR History:  Anticoagulation Monitoring 2021   INR 1.70 1.90 1.70   INR Date 2021   INR Goal 2.0-3.0 2.0-3.0 2.0-3.0   Trend Same Up Up   Last Week Total 27.5 mg 37.5 mg 37.5 mg   Next Week Total 37.5 mg 35 mg 40 mg   Sun 5 mg 5 mg 7.5 mg   Mon 5 mg () 5 mg 5 mg   Tue 5 mg 5 mg 5 mg   Wed 5 mg 5 mg 5 mg   Thu 7.5 mg () 5 mg 7.5 mg   Fri 5 mg 5 mg 5 mg   Sat 5 mg 5 mg 5 mg   Visit Report - - -   Some recent data might be hidden       Plan:  1. INR is Subtherapeutic today- see above in Anticoagulation Summary.   Provided instructions to Alejandrina with [facility] to Increase their warfarin regimen- see above in Anticoagulation Summary.  2. Follow up in 1 week      Chucho Mcdaniel RPH

## 2021-02-11 ENCOUNTER — ANTICOAGULATION VISIT (OUTPATIENT)
Dept: PHARMACY | Facility: HOSPITAL | Age: 46
End: 2021-02-11

## 2021-02-11 DIAGNOSIS — I26.99 ACUTE PULMONARY EMBOLISM, UNSPECIFIED PULMONARY EMBOLISM TYPE, UNSPECIFIED WHETHER ACUTE COR PULMONALE PRESENT (HCC): ICD-10-CM

## 2021-02-11 DIAGNOSIS — I26.99 OTHER ACUTE PULMONARY EMBOLISM, UNSPECIFIED WHETHER ACUTE COR PULMONALE PRESENT (HCC): ICD-10-CM

## 2021-02-11 LAB — INR PPP: 2.8

## 2021-02-11 NOTE — PROGRESS NOTES
Anticoagulation Clinic Progress Note    Anticoagulation Summary  As of 2021    INR goal:  2.0-3.0   TTR:  55.1 % (1.5 mo)   INR used for dosin.80 (2021)   Warfarin maintenance plan:  7.5 mg every Sun, Thu; 5 mg all other days   Weekly warfarin total:  40 mg   No change documented:  Chucho Mcdaniel RPH   Plan last modified:  Chucho Mcdaniel RPH (2021)   Next INR check:  2021   Target end date:  Indefinite    Indications    Other acute pulmonary embolism  unspecified whether acute cor pulmonale present (CMS/HCC) [I26.99]  Acute pulmonary embolism  unspecified pulmonary embolism type  unspecified whether acute cor pulmonale present (CMS/HCC) [I26.99]             Anticoagulation Episode Summary     INR check location:      Preferred lab:      Send INR reminders to:  [facility] [place]  POOL    Comments:        Anticoagulation Care Providers     Provider Role Specialty Phone number    Ruben Kirby DO Referring Hospitalist [phone]    Lisset Melton MD Referring Cardiology [phone]          INR History:  Anticoagulation Monitoring 2021   INR 1.90 1.70 2.80   INR Date 2021   INR Goal 2.0-3.0 2.0-3.0 2.0-3.0   Trend Up Up Same   Last Week Total 37.5 mg 37.5 mg 40 mg   Next Week Total 35 mg 40 mg 40 mg   Sun 5 mg 7.5 mg 7.5 mg   Mon 5 mg 5 mg 5 mg   Tue 5 mg 5 mg 5 mg   Wed 5 mg 5 mg 5 mg   Thu 5 mg 7.5 mg 7.5 mg   Fri 5 mg 5 mg 5 mg   Sat 5 mg 5 mg 5 mg   Visit Report - - -   Some recent data might be hidden       Plan:  1. INR is Therapeutic today- see above in Anticoagulation Summary.   Provided instructions to Jah with [facility] to Continue their warfarin regimen- see above in Anticoagulation Summary.  2. Follow up in 1 week      Chucho Mcdaniel RPH

## 2021-02-16 ENCOUNTER — ANTICOAGULATION VISIT (OUTPATIENT)
Dept: PHARMACY | Facility: HOSPITAL | Age: 46
End: 2021-02-16

## 2021-02-16 DIAGNOSIS — I26.99 OTHER ACUTE PULMONARY EMBOLISM, UNSPECIFIED WHETHER ACUTE COR PULMONALE PRESENT (HCC): ICD-10-CM

## 2021-02-16 DIAGNOSIS — I26.99 ACUTE PULMONARY EMBOLISM, UNSPECIFIED PULMONARY EMBOLISM TYPE, UNSPECIFIED WHETHER ACUTE COR PULMONALE PRESENT (HCC): ICD-10-CM

## 2021-02-16 LAB — INR PPP: 2.3

## 2021-02-16 NOTE — PROGRESS NOTES
Anticoagulation Clinic Progress Note    Anticoagulation Summary  As of 2021    INR goal:  2.0-3.0   TTR:  59.5 % (1.7 mo)   INR used for dosin.30 (2021)   Warfarin maintenance plan:  7.5 mg every Sun, Thu; 5 mg all other days   Weekly warfarin total:  40 mg   No change documented:  Chucho Mcdaniel RPH   Plan last modified:  Chucho Mcdaniel RPH (2021)   Next INR check:  2021   Target end date:  Indefinite    Indications    Other acute pulmonary embolism  unspecified whether acute cor pulmonale present (CMS/HCC) [I26.99]  Acute pulmonary embolism  unspecified pulmonary embolism type  unspecified whether acute cor pulmonale present (CMS/HCC) [I26.99]             Anticoagulation Episode Summary     INR check location:      Preferred lab:      Send INR reminders to:  [facility]  POOL    Comments:        Anticoagulation Care Providers     Provider Role Specialty Phone number    Ruben Kirby DO Referring Hospitalist [phone]    Lisset Melton MD Referring Cardiology [phone]          INR History:  Anticoagulation Monitoring 2021   INR 1.70 2.80 2.30   INR Date 2021   INR Goal 2.0-3.0 2.0-3.0 2.0-3.0   Trend Up Same Same   Last Week Total 37.5 mg 40 mg 40 mg   Next Week Total 40 mg 40 mg 40 mg   Sun 7.5 mg 7.5 mg 7.5 mg   Mon 5 mg 5 mg 5 mg   Tue 5 mg 5 mg 5 mg   Wed 5 mg 5 mg 5 mg   Thu 7.5 mg 7.5 mg 7.5 mg   Fri 5 mg 5 mg 5 mg   Sat 5 mg 5 mg 5 mg   Visit Report - - -   Some recent data might be hidden       Plan:  1. INR is Therapeutic today- see above in Anticoagulation Summary.   Provided instructions to Rosa with [facility] to Continue their warfarin regimen- see above in Anticoagulation Summary.  2. Follow up in 1 week      Chucho Mcdaniel RPH

## 2021-02-17 ENCOUNTER — TELEPHONE (OUTPATIENT)
Dept: INTERNAL MEDICINE | Age: 46
End: 2021-02-17

## 2021-02-17 NOTE — TELEPHONE ENCOUNTER
Caller: STEPHANIE    Relationship: Home Health- [facility]    Best call back number: [phone]    What orders are you requesting (i.e. lab or imaging): CONTINUE HOME HEALTH    Additional notes: NURSE WANTS TO CONTINUE HOME CARE. STILL WATCHING PATIENT'S INR, ETC.

## 2021-02-19 ENCOUNTER — OFFICE VISIT (OUTPATIENT)
Dept: CARDIOLOGY | Facility: CLINIC | Age: 46
End: 2021-02-19

## 2021-02-19 ENCOUNTER — TELEPHONE (OUTPATIENT)
Dept: CARDIOLOGY | Facility: CLINIC | Age: 46
End: 2021-02-19

## 2021-02-19 VITALS
DIASTOLIC BLOOD PRESSURE: 82 MMHG | BODY MASS INDEX: 29.67 KG/M2 | SYSTOLIC BLOOD PRESSURE: 118 MMHG | WEIGHT: 184.6 LBS | HEIGHT: 66 IN | HEART RATE: 60 BPM

## 2021-02-19 DIAGNOSIS — Z01.810 ENCOUNTER FOR PRE-OPERATIVE CARDIOVASCULAR CLEARANCE: Primary | ICD-10-CM

## 2021-02-19 DIAGNOSIS — I44.7 LBBB (LEFT BUNDLE BRANCH BLOCK): ICD-10-CM

## 2021-02-19 DIAGNOSIS — I26.99 OTHER ACUTE PULMONARY EMBOLISM, UNSPECIFIED WHETHER ACUTE COR PULMONALE PRESENT (HCC): ICD-10-CM

## 2021-02-19 DIAGNOSIS — I10 ESSENTIAL HYPERTENSION: ICD-10-CM

## 2021-02-19 DIAGNOSIS — I50.22 CHRONIC SYSTOLIC CONGESTIVE HEART FAILURE (HCC): ICD-10-CM

## 2021-02-19 DIAGNOSIS — Z95.810 AICD (AUTOMATIC CARDIOVERTER/DEFIBRILLATOR) PRESENT: ICD-10-CM

## 2021-02-19 DIAGNOSIS — I42.8 NICM (NONISCHEMIC CARDIOMYOPATHY) (HCC): ICD-10-CM

## 2021-02-19 PROBLEM — R04.2 HEMOPTYSIS: Status: RESOLVED | Noted: 2020-12-13 | Resolved: 2021-02-19

## 2021-02-19 PROCEDURE — 99214 OFFICE O/P EST MOD 30 MIN: CPT | Performed by: NURSE PRACTITIONER

## 2021-02-19 PROCEDURE — 93000 ELECTROCARDIOGRAM COMPLETE: CPT | Performed by: NURSE PRACTITIONER

## 2021-02-19 RX ORDER — CARVEDILOL 3.12 MG/1
3.12 TABLET ORAL 2 TIMES DAILY WITH MEALS
COMMUNITY
End: 2021-03-17

## 2021-02-19 NOTE — PROGRESS NOTES
Date of Office Visit: 2021  Encounter Provider: SAMUEL Yee  Place of Service: [facility] CARDIOLOGY  Patient Name: Nina Saez  :1975  Primary Cardiologist: Dr. Lisset Melton     Chief Complaint   Patient presents with   • Pre-op Exam   • Cardiomyopathy   • Congestive Heart Failure   :     HPI: Nina Saez is a pleasant 45 y.o. female who presents today for perioperative cardiac risk assessment. I have reviewed prior notes/records and there are no changes, except for any new updates described below. I have also reviewed any information entered into the medical record by the patient or by ancillary staff.      She has a known history of HIV, essential hypertension, obesity, asthma, and history of illicit drug use.     In 2019, she was diagnosed with nonischemic cardiomyopathy and heart failure at [facility].  She was visiting family there. On 2019 cardiac catheterization showed normal coronary arteries, LVEDP 12, wedge pressure 13, PA pressure 28/15 with a mean of 19 mmHg, RA pressure 4, cardiac index 2.5 L/min/m² with a PVR of 1.2 Woods units.  She underwent single-chamber [employer] AICD implantation.     In 2020, she presented to the [facility] [place] ED with heart failure symptoms.  On 2020 repeat echocardiogram showed the following: severe left ventricular dilation, ejection fraction 6%, very thin left ventricular walls, grade 3 diastolic dysfunction, moderate to severe mitral insufficiency, moderate tricuspid insufficiency, RVSP 48 mmHg, and essentially global hypokinesis with akinesis at the bases.  Her medications were adjusted and she received IV diuretic therapy.  Post hospitalization she did not feel that the bumetanide was as effective as the furosemide.  ICD interrogation showed nonsustained ventricular tachycardia.       On 9/10/2020, she saw Dr. Jamar Azul in the office and he did not feel that she met  criteria for implantation of CRT device.  She has now been enrolled in our Heart Failure Clinic.    In December 2020, she was hospitalized for an acute PE with right lower lobe pulmonary infarct and was having hemoptysis.  She was placed on warfarin.     She presents today for perioperative cardiac risk assessment.  She explains that her left ankle/leg has screws and plates that need to be removed by Dr. Rocky Calvert.  She is having pain from the hardware.  She explains that she recently saw the  transplant clinic and both her Entresto and spironolactone were increased.  She also had a stress test (sounds like a metabolic stress test) and I will obtain those results.  She reports a cough.  She denies chest pain, shortness of air, palpitations, edema, dizziness, syncope, or bleeding.    I obtained the [facility] note date of service 128/21.  This was a telehealth appointment.  They noted the patient underwent a CPX which showed peak VO2 11.9 at RER greater than 1.05 while on Coreg.  Her CPX showed heart related limitations in her functional capacity, but the patient felt satisfied at her functional current level.    ADDENDUM 2/24/2021:  · I requested her office notes from [facility] from 2/1/2021 and she saw Dr. Calvert.  She had an open reduction internal fixation trimalleolar ankle fracture and underwent surgical repair in March 2020.  She has been having pain in the left ankle.  She has been recommended to undergo surgery to remove the hardware.    Past Medical History:   Diagnosis Date   • AICD (automatic cardioverter/defibrillator) present 3/6/2020   • Asthma    • CHF (congestive heart failure) (CMS/[facility])    • Class 2 obesity in adult    • HIV (human immunodeficiency virus infection) (CMS/[facility])    • Hypertension    • LBBB (left bundle branch block)    • NICM (nonischemic cardiomyopathy) (CMS/[facility])     7/2020 EF 6% per echocardiogram; AICD present       Past Surgical History:    Procedure Laterality Date   • CARDIAC CATHETERIZATION     • CARDIAC DEFIBRILLATOR PLACEMENT     • FRACTURE SURGERY Left     left ankle       Social History     Socioeconomic History   • Marital status: Legally      Spouse name: Not on file   • Number of children: Not on file   • Years of education: Not on file   • Highest education level: Not on file   Tobacco Use   • Smoking status: Former Smoker     Quit date:      Years since quittin.1   • Smokeless tobacco: Never Used   Substance and Sexual Activity   • Alcohol use: Not Currently     Frequency: Never     Comment: holiday and special occ//caffeine use: 1 cup daily, 1 soda occasionally   • Drug use: Never   • Sexual activity: Defer       Family History   Problem Relation Age of Onset   • Cancer Mother    • Breast cancer Mother    • Bone cancer Mother    • Hypertension Maternal Grandfather    • Hyperlipidemia Maternal Grandfather    • Diabetes Maternal Grandfather    • Prostate cancer Maternal Grandfather    • Ovarian cysts Daughter    • Breast cancer Maternal Grandmother    • Heart disease Maternal Grandmother    • No Known Problems Daughter    • No Known Problems Daughter        The following portion of the patient's history were reviewed and updated as appropriate: past medical history, past surgical history, past social history, past family history, allergies, current medications, and problem list.    Review of Systems   Constitution: Negative.   Cardiovascular: Negative.    Respiratory: Positive for cough.    Hematologic/Lymphatic: Negative.    Neurological: Negative.        No Known Allergies      Current Outpatient Medications:   •  acetaminophen (TYLENOL) 325 MG tablet, Take 650 mg by mouth Every 6 (Six) Hours As Needed for Mild Pain ., Disp: , Rfl:   •  albuterol sulfate  (90 Base) MCG/ACT inhaler, Inhale 2 puffs Every 4 (Four) Hours As Needed for Wheezing., Disp: , Rfl:   •  carvedilol (COREG) 3.125 MG tablet, Take 3.125 mg by  mouth 2 (Two) Times a Day With Meals., Disp: , Rfl:   •  Corlanor 5 MG tablet tablet, Take 5 mg by mouth 2 (Two) Times a Day With Meals., Disp: , Rfl:   •  darunavir ethanolate (PREZISTA) 800 MG tablet tablet, Take 800 mg by mouth Every Night., Disp: , Rfl:   •  diphenhydrAMINE (BENADRYL) 25 mg capsule, Take 1 capsule by mouth Every 6 (Six) Hours As Needed for Itching (swelling)., Disp: 20 capsule, Rfl: 0  •  Empagliflozin (Jardiance) 10 MG tablet, Take 10 mg by mouth Daily., Disp: 30 tablet, Rfl: 11  •  Emtricitabine-Tenofovir AF (DESCOVY) 200-25 MG per tablet, Take  by mouth Every Night., Disp: , Rfl:   •  fluticasone (Flonase) 50 MCG/ACT nasal spray, 2 sprays into the nostril(s) as directed by provider Daily., Disp: 1 bottle, Rfl: 2  •  furosemide (LASIX) 80 MG tablet, Take 1 tablet by mouth 2 (Two) Times a Day., Disp: 180 tablet, Rfl: 3  •  guaiFENesin-codeine (GUAIFENESIN AC) 100-10 MG/5ML liquid, Take 5 mL by mouth Every 4 (Four) Hours As Needed for Cough., Disp: 118 mL, Rfl: 0  •  ondansetron ODT (Zofran ODT) 4 MG disintegrating tablet, Place 1 tablet on the tongue Every 8 (Eight) Hours As Needed for Nausea or Vomiting., Disp: 30 tablet, Rfl: 0  •  potassium chloride (K-DUR,KLOR-CON) 20 MEQ CR tablet, Take 2 tablets by mouth Daily., Disp: 90 tablet, Rfl: 1  •  ritonavir (NORVIR) 100 MG tablet, Take 100 mg by mouth Every Night., Disp: , Rfl:   •  sacubitril-valsartan (ENTRESTO) 24-26 MG tablet, Take 1 tablet by mouth Every 12 (Twelve) Hours. (Patient taking differently: Take 1 tablet by mouth Every 12 (Twelve) Hours. Pt taking 41-59 dose BID), Disp: 60 tablet, Rfl: 0  •  sertraline (ZOLOFT) 50 MG tablet, Take 50 mg by mouth Daily., Disp: , Rfl:   •  spironolactone (ALDACTONE) 25 MG tablet, 25 mg Daily., Disp: , Rfl:   •  vitamin D (ERGOCALCIFEROL) 81990 units capsule capsule, Take 50,000 Units by mouth Every 30 (Thirty) Days., Disp: , Rfl:   •  warfarin (Coumadin) 2.5 MG tablet, Take 2 tablets (5mg) by mouth  "every day Or as directed by [facility], Disp: 90 tablet, Rfl: 0  •  carvedilol (COREG) 3.125 MG tablet, Take 1 tablet by mouth 2 (Two) Times a Day With Meals for 30 days., Disp: 60 tablet, Rfl: 0        Objective:     Vitals:    02/19/21 1146 02/19/21 1149   BP: 120/80 118/82   BP Location: Left arm Right arm   Pulse: 60    Weight: 83.7 kg (184 lb 9.6 oz)    Height: 167.6 cm (66\")      Body mass index is 29.8 kg/m².    PHYSICAL EXAM:    Vitals Reviewed.   General Appearance: No acute distress, well developed and well nourished.    Eyes: Conjunctiva and lids: No erythema, swelling, or discharge. Sclera non-icteric.   HENT: Atraumatic, normocephalic. External eyes, ears, and nose normal. No hearing loss noted. Mucous membranes normal. Lips not cyanotic. Neck supple with no tenderness.  Respiratory: No signs of respiratory distress. Respiration rhythm and depth normal.   Clear to auscultation. No rales, crackles, rhonchi, or wheezing auscultated.   Cardiovascular:  Jugular Venous Pressure: Normal  Heart Rate and Rhythm: Normal, Heart Sounds: Normal S1 and S2. No S3 or S4 noted.  Murmurs: No murmurs noted. No rubs, thrills, or gallops.   Lower Extremities: No edema noted.  Gastrointestinal:  Abdomen soft, non-distended, non-tender. Normal bowel sounds.    Musculoskeletal: Normal movement of extremities  Skin and Nails: General appearance normal. No pallor, cyanosis, diaphoresis. Skin temperature normal. No clubbing of fingernails.   Psychiatric: Patient alert and oriented to person, place, and time. Speech and behavior appropriate. Normal mood and affect.       ECG 12 Lead    Date/Time: 2/19/2021 11:43 AM  Performed by: Nilda Gallagher APRN  Authorized by: Nilda Gallagher APRN   Comparison: compared with previous ECG from 12/11/2020  Similar to previous ECG  Rhythm: sinus rhythm  Rate: normal  BPM: 60  Conduction: left bundle branch block  ST Segments: ST segments normal  T inversion: II, III, aVF, V4, V5 " and V6  QRS axis: normal  Other findings: non-specific ST-T wave changes    Clinical impression: abnormal EKG              Assessment:       Diagnosis Plan   1. Encounter for pre-operative cardiovascular clearance     2. Chronic systolic congestive heart failure (CMS/HCC)  Adult Transthoracic Echo Complete W/ Cont if Necessary Per Protocol   3. NICM (nonischemic cardiomyopathy) (CMS/HCC)  Adult Transthoracic Echo Complete W/ Cont if Necessary Per Protocol   4. LBBB (left bundle branch block)     5. AICD (automatic cardioverter/defibrillator) present     6. Essential hypertension     7. Other acute pulmonary embolism, unspecified whether acute cor pulmonale present (CMS/HCC)            Plan:       1.  Perioperative Cardiac Risk Assessment: She would like to undergo left ankle hardware removal by Dr. Rocky Calvert in the near future.  I have recommended a repeat echocardiogram and I will also need to obtain her recent testing from [facility].    2/3.  Nonischemic Cardiomyopathy and Chronic Systolic Heart Failure: NYHA class 2.  EF 6% per echocardiogram in July 2020 and AICD present.  She is on goal-directed medical therapy with metoprolol succinate, Corlin or, and spironolactone and sacubitril/valsartan.    She has been enrolled in our heart failure clinic here at [facility] and feels that that is going very well for her.  She also sees the [facility] transplant clinic.  We will repeat an echocardiogram.  She denies shortness of breath or edema.  She has a chronic cough.  She also feels that furosemide works better for her versus the bumetanide.     4. Left Bundle Branch Block: Chronic and unchanged.     5.  AICD: Continue with routine AICD checks.  We referred her to Dr. Jamar Azul for consideration of CRT device and he did not feel that she qualified.     6.  Hypertension: Blood pressure is well controlled.     7.  Pulmonary Embolism: Acute PI with right lower lobe pulmonary infarct in December 2020.  She  remains on warfarin.    8.  Further recommendations to follow after echocardiogram.    ADDENDUM 2/24/2021:  · I discussed the plan of care with Dr. Lisset Melton.  She would like to see the patient back in the office for further discussion in the next couple of weeks.  We will arrange that appointment.    As always, it has been a pleasure to participate in your patient's care. Thank you.       Sincerely,       SAMUEL Marquez  [facility] Cardiology      · COVID-19 Precautions - Patient was compliant in wearing a mask. When I saw the patient, I used appropriate personal protective equipment (PPE) including mask and eye shield (standard procedure).  Additionally, I used gown and gloves if indicated.  Hand hygiene was completed before and after seeing the patient.  · Dictated utilizing Dragon Dictation

## 2021-02-23 ENCOUNTER — HOSPITAL ENCOUNTER (OUTPATIENT)
Dept: CARDIOLOGY | Facility: HOSPITAL | Age: 46
Discharge: HOME | End: 2021-02-23
Admitting: NURSE PRACTITIONER

## 2021-02-23 ENCOUNTER — ANTICOAGULATION VISIT (OUTPATIENT)
Dept: PHARMACY | Facility: HOSPITAL | Age: 46
End: 2021-02-23

## 2021-02-23 VITALS
DIASTOLIC BLOOD PRESSURE: 76 MMHG | BODY MASS INDEX: 29.89 KG/M2 | WEIGHT: 186 LBS | HEART RATE: 57 BPM | SYSTOLIC BLOOD PRESSURE: 110 MMHG | OXYGEN SATURATION: 98 % | RESPIRATION RATE: 16 BRPM | HEIGHT: 66 IN

## 2021-02-23 DIAGNOSIS — I10 ESSENTIAL HYPERTENSION: ICD-10-CM

## 2021-02-23 DIAGNOSIS — I26.99 OTHER ACUTE PULMONARY EMBOLISM, UNSPECIFIED WHETHER ACUTE COR PULMONALE PRESENT (HCC): ICD-10-CM

## 2021-02-23 DIAGNOSIS — I50.22 CHRONIC SYSTOLIC CONGESTIVE HEART FAILURE (HCC): Primary | ICD-10-CM

## 2021-02-23 DIAGNOSIS — I42.8 NICM (NONISCHEMIC CARDIOMYOPATHY) (HCC): ICD-10-CM

## 2021-02-23 PROBLEM — Z98.890 DIARRHEA FOLLOWING GASTROINTESTINAL SURGERY: Status: RESOLVED | Noted: 2020-10-27 | Resolved: 2021-02-23

## 2021-02-23 PROBLEM — I50.23 ACUTE ON CHRONIC SYSTOLIC CONGESTIVE HEART FAILURE: Status: RESOLVED | Noted: 2020-10-27 | Resolved: 2021-02-23

## 2021-02-23 PROBLEM — E87.6 HYPOPOTASSEMIA: Status: RESOLVED | Noted: 2020-07-08 | Resolved: 2021-02-23

## 2021-02-23 PROBLEM — R19.7 DIARRHEA FOLLOWING GASTROINTESTINAL SURGERY: Status: RESOLVED | Noted: 2020-10-27 | Resolved: 2021-02-23

## 2021-02-23 PROBLEM — R79.89 ELEVATED LFTS: Status: RESOLVED | Noted: 2020-07-08 | Resolved: 2021-02-23

## 2021-02-23 PROBLEM — R11.2 INTRACTABLE VOMITING WITH NAUSEA: Status: RESOLVED | Noted: 2020-10-30 | Resolved: 2021-02-23

## 2021-02-23 LAB — INR PPP: 2

## 2021-02-23 PROCEDURE — 99213 OFFICE O/P EST LOW 20 MIN: CPT | Performed by: NURSE PRACTITIONER

## 2021-02-23 PROCEDURE — G0463 HOSPITAL OUTPT CLINIC VISIT: HCPCS

## 2021-02-23 NOTE — PROGRESS NOTES
[facility] HEART FAILURE      Patient Name: Nina Saez  :1975  Age: 45 y.o.  Sex: female  Referring Provider: Lisset Melton MD   Primary Cardiologist: Lisset Melton MD  Encounter Provider:  SAMUEL Cisneros      Chief Complaint:   Chief Complaint   Patient presents with   • Congestive Heart Failure         Congestive Heart Failure  Pertinent negatives include no chest pain, fatigue, palpitations or shortness of breath.    this 44-year-old female, known to this provider, comes to us today for further evaluation of her chronic systolic congestive heart failure.  Current diagnoses to include severe nonischemic cardiomyopathy, left bundle branch block, hypertension, HIV, obesity, and asthma.    Historically she had followed with Dr. Laina Boyd at [facility].  In spring 2019 she was visiting family in [place] and was taken emergently to [facility].  Echocardiogram and cardiac catheterization were performed at that point, carvedilol was changed to metoprolol.    In 2019 she presented through the emergency department at [facility] with chest pain and shortness of breath.  She was treated with IV diuresis, troponin was negative x2 and proBNP was elevated at 5151.  Entresto was started at that point given her severe dilated cardiomyopathy.  She was to follow with cardiology at [facility] at that time.    In 2020 she again presented to the emergency department with shortness of breath and cough x3 weeks.  BNP was elevated at 13,351.  Carvedilol was discontinued that admission given hypotension.  At subsequent outpatient appointment it was felt that Bumex was less effective than Lasix by the patient.  She complained of progressively worsening fatigue.  AICD, single-chamber [employer], interrogation 2020 revealed 10-16 beats of VT.    On September 10, 2020 she saw Dr. Jamar Azul MD, for consideration  of upgrade to biventricular device.  At that point he felt that her left bundle branch block was incomplete and she did not meet criteria for implantation of CRT-D.    She presented 10/27/[facility] to [facility] with complaints of acute on chronic shortness of breath that began approximately 1 week prior.  BNP was further elevated at 16,206 that point.  Pulmonary edema was noted on chest x-ray.  Spironolactone was started that admission.  Referral for evaluation by [facility] transplant services was made November 2, 2020.    Left and right cardiac catheterization at [facility] revealed no obstructive CAD with normal filling pressures.  Echocardiogram at that time revealed an EF less than 15% with global hypokinesis-information extracted from external medical record note.  Cardiac catheterization May 30, 2017 performed at [facility] yielded codominant system with normal left main, LAD with distal 30% stenosis, RCA normal, LVEDP 7 mmHg.    Echocardiogram July 9, 2020 revealed EF of 6%, grade 3 LV diastolic dysfunction, significant LV wall motion hypokinesis/akinesis, RVSP 45 to 55 mmHg secondary to moderate tricuspid valve regurgitation, moderate to severe MR noted.    Last remote device check 10/17/2020 revealed 1 episode of NSVT lasting 27 beats, normal function.  CMP 1/4/2021 revealed creatinine of 1.0, GFR 73, electrolytes stable.  TSH normal 10/28/2020.  Last BNP 10/27/2020 was 16,206.    Jardiance 10 mg daily was started on 11/13/2020.      She was admitted from 12/11/2020 to 12/17/2020 for acute pulmonary embolism with right lower lobe pulmonary infarct.  She was started on warfarin at that time.  Additionally, she has been started on Corlanor as well as spironolactone for her heart failure as well.  January 4, 2021 she was evaluated and treated in the emergency department for possible angioedema.  She is to follow-up with her PCP regarding this per ED note.      She recently  underwent stress test at , we have reached out for these results.  At most recent appointment with  Entresto and spironolactone were increased.    She is currently scheduled for repeat echocardiogram with Dr. Melton on March 8.  Her next appointment with  is a telehealth appointment on March 2.  She is doing much better at home and states that she has more energy than she has previously and she feels that her EF menstrually have improved.  Shortness of breath is improved as well.      The following portions of the patient's history were reviewed and updated as appropriate: allergies, current medications, past family history, past medical history, past social history, past surgical history and problem list.    Current Outpatient Medications   Medication Sig Dispense Refill   • acetaminophen (TYLENOL) 325 MG tablet Take 650 mg by mouth Every 6 (Six) Hours As Needed for Mild Pain .     • albuterol sulfate  (90 Base) MCG/ACT inhaler Inhale 2 puffs Every 4 (Four) Hours As Needed for Wheezing.     • carvedilol (COREG) 3.125 MG tablet Take 3.125 mg by mouth 2 (Two) Times a Day With Meals.     • Corlanor 5 MG tablet tablet Take 5 mg by mouth 2 (Two) Times a Day With Meals.     • darunavir ethanolate (PREZISTA) 800 MG tablet tablet Take 800 mg by mouth Every Night.     • diphenhydrAMINE (BENADRYL) 25 mg capsule Take 1 capsule by mouth Every 6 (Six) Hours As Needed for Itching (swelling). 20 capsule 0   • Empagliflozin (Jardiance) 10 MG tablet Take 10 mg by mouth Daily. 30 tablet 11   • Emtricitabine-Tenofovir AF (DESCOVY) 200-25 MG per tablet Take  by mouth Every Night.     • fluticasone (Flonase) 50 MCG/ACT nasal spray 2 sprays into the nostril(s) as directed by provider Daily. 1 bottle 2   • furosemide (LASIX) 80 MG tablet Take 1 tablet by mouth 2 (Two) Times a Day. 180 tablet 3   • guaiFENesin-codeine (GUAIFENESIN AC) 100-10 MG/5ML liquid Take 5 mL by mouth Every 4 (Four) Hours As Needed for Cough. 118 mL  0   • ondansetron ODT (Zofran ODT) 4 MG disintegrating tablet Place 1 tablet on the tongue Every 8 (Eight) Hours As Needed for Nausea or Vomiting. 30 tablet 0   • potassium chloride (K-DUR,KLOR-CON) 20 MEQ CR tablet Take 2 tablets by mouth Daily. 90 tablet 1   • ritonavir (NORVIR) 100 MG tablet Take 100 mg by mouth Every Night.     • sacubitril-valsartan (ENTRESTO) 49-51 MG tablet Take 1 tablet by mouth 2 (Two) Times a Day.     • sertraline (ZOLOFT) 50 MG tablet Take 50 mg by mouth Daily.     • spironolactone (ALDACTONE) 25 MG tablet 25 mg Daily.     • vitamin D (ERGOCALCIFEROL) 77286 units capsule capsule Take 50,000 Units by mouth Every 30 (Thirty) Days.     • warfarin (Coumadin) 2.5 MG tablet Take 2 tablets (5mg) by mouth every day Or as directed by [facility] 90 tablet 0   • carvedilol (COREG) 3.125 MG tablet Take 1 tablet by mouth 2 (Two) Times a Day With Meals for 30 days. 60 tablet 0     No current facility-administered medications for this encounter.        Past Medical History:   Diagnosis Date   • AICD (automatic cardioverter/defibrillator) present 3/6/2020   • Asthma    • CHF (congestive heart failure) (CMS/[facility])    • Class 2 obesity in adult    • HIV (human immunodeficiency virus infection) (CMS/[facility])    • Hypertension    • LBBB (left bundle branch block)    • NICM (nonischemic cardiomyopathy) (CMS/[facility])     7/2020 EF 6% per echocardiogram; AICD present       Past Surgical History:   Procedure Laterality Date   • CARDIAC CATHETERIZATION     • CARDIAC DEFIBRILLATOR PLACEMENT     • FRACTURE SURGERY Left     left ankle       Physical Exam  Vitals signs and nursing note reviewed.   Constitutional:       General: She is not in acute distress.     Appearance: She is well-developed. She is not ill-appearing.   HENT:      Head: Normocephalic and atraumatic.   Eyes:      Conjunctiva/sclera: Conjunctivae normal.      Pupils: Pupils are equal, round, and reactive to light.   Neck:      Vascular: No JVD.  "  Cardiovascular:      Rate and Rhythm: Normal rate and regular rhythm.      Heart sounds: Normal heart sounds. No murmur. No friction rub. No gallop.    Pulmonary:      Effort: Pulmonary effort is normal. No respiratory distress.      Breath sounds: Normal breath sounds.   Abdominal:      General: Bowel sounds are normal. There is no distension.      Palpations: Abdomen is soft.   Musculoskeletal:         General: No swelling or deformity.   Skin:     General: Skin is warm and dry.      Capillary Refill: Capillary refill takes less than 2 seconds.   Neurological:      Mental Status: She is alert and oriented to person, place, and time. Mental status is at baseline.   Psychiatric:         Attention and Perception: Attention normal.         Mood and Affect: Mood normal. Affect is tearful.         Behavior: Behavior normal. Behavior is cooperative.          Review of Systems   Constitutional: Negative for appetite change and fatigue.   HENT: Negative for congestion and nosebleeds.    Eyes: Negative for photophobia and visual disturbance.   Respiratory: Negative for cough, chest tightness and shortness of breath.    Cardiovascular: Negative for chest pain, palpitations and leg swelling.   Gastrointestinal: Negative for abdominal distention and blood in stool.   Endocrine: Negative for polyphagia and polyuria.   Genitourinary: Negative for enuresis and frequency.   Musculoskeletal: Negative for joint swelling and myalgias.   Skin: Negative for pallor and rash.   Neurological: Negative for dizziness, syncope, weakness, light-headedness, numbness and headaches.   Hematological: Does not bruise/bleed easily.   Psychiatric/Behavioral: Negative for decreased concentration and sleep disturbance.        OBJECTIVE:  /76 (BP Location: Left arm, Patient Position: Sitting, Cuff Size: Adult)   Pulse 57   Resp 16   Ht 167.6 cm (65.98\")   Wt 84.4 kg (186 lb)   SpO2 98%   BMI 30.04 kg/m²      Body mass index is 30.04 " kg/m².  Wt Readings from Last 1 Encounters:   02/23/21 84.4 kg (186 lb)       Lab Review:  Renal Function: CrCl cannot be calculated (Patient's most recent lab result is older than the maximum 30 days allowed.).    Lab Results   Component Value Date    PROBNP 22,479.0 (H) 12/12/2020       Results for orders placed during the hospital encounter of 07/07/20   Adult Transthoracic Echo Complete W/ Cont if Necessary Per Protocol    Narrative · Calculated EF = 6.0%  · Left ventricular systolic function is severely decreased.  · The left ventricular cavity is severely dilated.  · Left ventricular wall thickness is consistent with a thin walled   ventricle.  · Left ventricular diastolic dysfunction (grade III) consistent with fixed   restricive pattern.  · Moderate-to-severe mitral valve regurgitation is present  · Moderate tricuspid valve regurgitation is present.  · Estimated right ventricular systolic pressure from tricuspid   regurgitation is moderately elevated (45-55 mmHg).  · Calculated right ventricular systolic pressure from tricuspid   regurgitation is 48 mmHg.  · The following left ventricular wall segments are hypokinetic: mid   anterior, apical anterior, mid anterolateral, apical lateral, mid   inferolateral, apical inferior, mid inferior, apical septal, mid   inferoseptal, apex hypokinetic and mid anteroseptal. The following left   ventricular wall segments are akinetic: basal anterior, basal   anterolateral, basal inferolateral, basal inferior, basal inferoseptal and   basal anteroseptal.            6 MINUTE WALK  Patient declined       Cardiac Procedures:  1. Cardiac catheterization [facility] 5/30/17       2.  /[facility] 4/2019   Data deficit      ASSESSMENT:     Diagnosis Plan   1. Chronic systolic congestive heart failure (CMS/HCC)     2. Other acute pulmonary embolism, unspecified whether acute cor pulmonale present (CMS/HCC)     3. NICM (nonischemic cardiomyopathy) (CMS/[facility])     4.  Essential hypertension           PLAN OF CARE:  1.  HFrEF-most recent EF per echocardiogram 6%.  NYHA class IV.  Currently stable.  Evaluation with  transplant team scheduled December 1.  Currently she is on Entresto, Lasix, carvedilol, ivabradine, spironolactone, and Jardiance.  Historically she has been unable to tolerate metoprolol succinate as it made her very dizzy.      Stress testing recently performed at , data deficit.  We are waiting these records.  Her Entresto and spironolactone were both increased, lab work redrawn last week, we will retrieve these results as well.  I would like her to continue on her current guideline directed medical therapy.  She is currently scheduled March 8 for repeat echocardiogram with Dr. Melton, I am very curious to see what her EF is now.  I would like to continue to keep a close eye on her and see her back in 8 to 10 weeks.  We will recheck labs at that time.  She continues to do well with her sodium and fluid restriction, and is increasing her activity as she is now less fatigued than she was previously.    Directions for when to call the clinic reviewed with the patient to include weight gain of 2 to 3 pounds in 24 hours, weight gain of 5 to 10 pounds within 7 days; worsening shortness of breath; worsening lower extremity edema or abdominal distention.    2.  Nonobstructive CAD-diffuse 30% LAD lesion noted on prior cardiac catheterization as above.  Stable, without angina.      3.  Nonischemic cardiomyopathy (dilated)-presence of AICD noted.  Most recent EF per echocardiogram is 6%.  Most recent AICD interrogation as above.  Currently on Entresto, dose recently increased.  BP tolerating well.     4.  NSVT-noted on prior device interrogation.  Currently on beta-blockade.    5.  HIV-history noted.    6.  Pulmonary embolism-now currently on warfarin.  Followed by home health and primary cardiology team.        Obtain most recent lab work and note from ; follow-up in  8 to 10 weeks with repeat BMP/BNP or sooner if needed        Thank you for allowing me to participate in the care of your patient,         SAMUEL Cisneros  [facility] HEART FAILURE  02/23/21  13:56 EST      **Lyric Disclaimer:**  Much of this encounter note is an electronic transcription/translation of spoken language to printed text. The electronic translation of spoken language may permit erroneous, or at times, nonsensical words or phrases to be inadvertently transcribed. Although I have reviewed the note for such errors, some may still exist.

## 2021-02-23 NOTE — PROGRESS NOTES
Anticoagulation Clinic Progress Note    Anticoagulation Summary  As of 2021    INR goal:  2.0-3.0   TTR:  64.4 % (1.9 mo)   INR used for dosin.00 (2021)   Warfarin maintenance plan:  7.5 mg every Sun, Tue, Thu; 5 mg all other days   Weekly warfarin total:  42.5 mg   Plan last modified:  Chucho Mcdaniel RPH (2021)   Next INR check:  3/2/2021   Target end date:  Indefinite    Indications    Other acute pulmonary embolism  unspecified whether acute cor pulmonale present (CMS/HCC) [I26.99]  Acute pulmonary embolism  unspecified pulmonary embolism type  unspecified whether acute cor pulmonale present (CMS/HCC) (Resolved) [I26.99]             Anticoagulation Episode Summary     INR check location:      Preferred lab:      [facility]   [facility]  [facility]    Comments:        Anticoagulation Care Providers     Provider Role Specialty Phone number    Ruben Kirby DO Referring Hospitalist [phone]    Lisset Melton MD Referring Cardiology [phone]          INR History:  Anticoagulation Monitoring 2021   INR 2.80 2.30 2.00   INR Date 2021   INR Goal 2.0-3.0 2.0-3.0 2.0-3.0   Trend Same Same Up   Last Week Total 40 mg 40 mg 40 mg   Next Week Total 40 mg 40 mg 42.5 mg   Sun 7.5 mg 7.5 mg 7.5 mg   Mon 5 mg 5 mg 5 mg   Tue 5 mg 5 mg 7.5 mg   Wed 5 mg 5 mg 5 mg   Thu 7.5 mg 7.5 mg 7.5 mg   Fri 5 mg 5 mg 5 mg   Sat 5 mg 5 mg 5 mg   Visit Report - - -   Some recent data might be hidden       Plan:  1. INR is Therapeutic today- see above in Anticoagulation Summary.   Provided instructions to Daphne with [facility] to Increase their warfarin regimen- see above in Anticoagulation Summary.  2. Follow up in 1 week      Chucho Mcdaniel RPH

## 2021-02-23 NOTE — PROGRESS NOTES
[facility] HEART FAILURE      Patient Name: Nina Saez  :1975  Age: 45 y.o.  Sex: female  Referring Provider: Lisset Melton MD   Primary Cardiologist: Dr. Melton  Encounter Provider: Lia Rothman, KevinD      Chief Complaint:   Chief Complaint   Patient presents with   • Congestive Heart Failure       HPI:  Patient presents for a follow-up appt in the HF clinic. PMH is significant for HFrEF (EF 6%), severe nonischemic cardiomyopathy, left bundle branch block, HTN, HIV, obesity, and asthma. Patient feels good today and has no complaints of edema or SOB. She was recently seen at  and it was noted that her entresto and spironolactone doses were increased. We are currently waiting on the note from . Patient states her BP has been stable at home since the dose adjustments. She states she has more energy at home and is able to do more around the house. She has continued to make diet changes, and is watching her sodium intake.      Current Regimen:  Heart Failure  Carvedilol 3.125 mg bid  Furosemide 80 mg bid  Entresto as directed by  (will update once we receive records)  Spironolactone as directed by   Ivabradine 5 mg bid      Other CV Meds  Aspirin 81 mg daily      Medication Use:  Adherence: great, uses a pillbox  Past hx of medication use/intolerance: metoprolol (dizziness, nausea); higher dose of Entresto (hypotension)  Affordability: [facility] Medicare/Medicaid; no issues with cost      Diet:  Watching sodium intake and keeping <2000 mg/day. Baking most meats      Social History:  Tobacco use: former smoker (quit )  EtOH use: none  Illicit drug use: none  Exercise: minimal due to her disease; does walk around the house and wants to walk more; going to physical therapy      Immunization Status:  Pneumococcal: PCV13 (2017), PPSV23 due  Influenza: received at [facility] clinic       OBJECTIVE:    /76 (BP Location: Left arm, Patient Position: Sitting, Cuff Size: Adult)   Pulse 57    "Resp 16   Ht 167.6 cm (65.98\")   Wt 84.4 kg (186 lb)   SpO2 98%   BMI 30.04 kg/m²     Body mass index is 30.04 kg/m².  Wt Readings from Last 1 Encounters:   02/23/21 84.4 kg (186 lb)       Lab Review:  Renal Function: CrCl cannot be calculated (Patient's most recent lab result is older than the maximum 30 days allowed.).    Lab Results   Component Value Date    PROBNP 22,479.0 (H) 12/12/2020       Results for orders placed during the hospital encounter of 07/07/20   Adult Transthoracic Echo Complete W/ Cont if Necessary Per Protocol    Narrative · Calculated EF = 6.0%  · Left ventricular systolic function is severely decreased.  · The left ventricular cavity is severely dilated.  · Left ventricular wall thickness is consistent with a thin walled   ventricle.  · Left ventricular diastolic dysfunction (grade III) consistent with fixed   restricive pattern.  · Moderate-to-severe mitral valve regurgitation is present  · Moderate tricuspid valve regurgitation is present.  · Estimated right ventricular systolic pressure from tricuspid   regurgitation is moderately elevated (45-55 mmHg).  · Calculated right ventricular systolic pressure from tricuspid   regurgitation is 48 mmHg.  · The following left ventricular wall segments are hypokinetic: mid   anterior, apical anterior, mid anterolateral, apical lateral, mid   inferolateral, apical inferior, mid inferior, apical septal, mid   inferoseptal, apex hypokinetic and mid anteroseptal. The following left   ventricular wall segments are akinetic: basal anterior, basal   anterolateral, basal inferolateral, basal inferior, basal inferoseptal and   basal anteroseptal.            ASSESSMENT/PLAN OF CARE:    1. HFrEF (EF 6%)  - Patient's symptoms continue to be stable. Previously had limited room for medication titrations due to hypotension, however, at a recent  appointment her entresto and spironolactone were increased and patient states her BP has been stable at home.  - " Continue carvedilol 3.125 mg twice daily  - Continue increased dose of Entresto as directed by [facility]  - Continue furosemide 80 mg twice daily  - Continue Jardiance 10 mg daily  - Continue increased dose of spironolactone 25 mg as directed by [facility]  - Continue ivabradine 5 mg twice daily    -Waiting for records from [facility] then will update patients medication list.    - Advised patient to call clinic with 2-3 lb weight gain in 24 hrs or >5 lb weight gain in 1 week       15 min spent in direct patient care      Thank you for allowing me to participate in the care of your patient,         Lia Rothman, [facility] HEART FAILURE  02/23/21  14:10 EST

## 2021-03-02 ENCOUNTER — ANTICOAGULATION VISIT (OUTPATIENT)
Dept: PHARMACY | Facility: HOSPITAL | Age: 46
End: 2021-03-02

## 2021-03-02 DIAGNOSIS — I26.99 OTHER ACUTE PULMONARY EMBOLISM, UNSPECIFIED WHETHER ACUTE COR PULMONALE PRESENT (HCC): ICD-10-CM

## 2021-03-02 LAB — INR PPP: 1.7

## 2021-03-02 NOTE — PROGRESS NOTES
Anticoagulation Clinic Progress Note    Anticoagulation Summary  As of 3/2/2021    INR goal:  2.0-3.0   TTR:  57.4 % (2.2 mo)   INR used for dosin.70 (3/2/2021)   Warfarin maintenance plan:  5 mg every Mon, Wed, Fri; 7.5 mg all other days   Weekly warfarin total:  45 mg   Plan last modified:  Chucho Mcdaniel RPH (3/2/2021)   Next INR check:  3/9/2021   Target end date:  Indefinite    Indications    Other acute pulmonary embolism  unspecified whether acute cor pulmonale present (CMS/HCC) [I26.99]  Acute pulmonary embolism  unspecified pulmonary embolism type  unspecified whether acute cor pulmonale present (CMS/HCC) (Resolved) [I26.99]             Anticoagulation Episode Summary     INR check location:      Preferred lab:      Send INR reminders to:   [facility]  POOL    Comments:        Anticoagulation Care Providers     Provider Role Specialty Phone number    Ruben Kirby DO Referring Hospitalist [phone]    Lisset Melton MD Referring Cardiology [phone]        Pertinent info:  Reports missed dose yesterday.     INR History:  Anticoagulation Monitoring 2021 2021 3/2/2021   INR 2.30 2.00 1.70   INR Date 2021 2021 3/2/2021   INR Goal 2.0-3.0 2.0-3.0 2.0-3.0   Trend Same Up Up   Last Week Total 40 mg 40 mg 37.5 mg   Next Week Total 40 mg 42.5 mg 47.5 mg   Sun 7.5 mg 7.5 mg 7.5 mg   Mon 5 mg 5 mg 5 mg   Tue 5 mg 7.5 mg 10 mg (3/2)   Wed 5 mg 5 mg 5 mg   Thu 7.5 mg 7.5 mg 7.5 mg   Fri 5 mg 5 mg 5 mg   Sat 5 mg 5 mg 7.5 mg   Visit Report - - -   Some recent data might be hidden       Plan:  1. INR is Subtherapeutic today- see above in Anticoagulation Summary.   Provided instructions to Haley with [facility] to Increase their warfarin regimen- see above in Anticoagulation Summary.  2. Follow up in 1 week      Chucho Mcdaniel RPH

## 2021-03-04 ENCOUNTER — TRANSCRIBE ORDERS (OUTPATIENT)
Dept: ADMINISTRATIVE | Facility: HOSPITAL | Age: 46
End: 2021-03-04

## 2021-03-04 DIAGNOSIS — Z12.31 SCREENING MAMMOGRAM, ENCOUNTER FOR: Primary | ICD-10-CM

## 2021-03-08 ENCOUNTER — HOSPITAL ENCOUNTER (OUTPATIENT)
Dept: CARDIOLOGY | Facility: HOSPITAL | Age: 46
Discharge: HOME | End: 2021-03-08
Admitting: NURSE PRACTITIONER

## 2021-03-08 ENCOUNTER — TELEPHONE (OUTPATIENT)
Dept: CARDIOLOGY | Facility: CLINIC | Age: 46
End: 2021-03-08

## 2021-03-08 VITALS
HEART RATE: 56 BPM | SYSTOLIC BLOOD PRESSURE: 110 MMHG | HEIGHT: 66 IN | DIASTOLIC BLOOD PRESSURE: 57 MMHG | BODY MASS INDEX: 29.9 KG/M2 | WEIGHT: 186.07 LBS

## 2021-03-08 DIAGNOSIS — I42.8 NICM (NONISCHEMIC CARDIOMYOPATHY) (HCC): ICD-10-CM

## 2021-03-08 DIAGNOSIS — I50.22 CHRONIC SYSTOLIC CONGESTIVE HEART FAILURE (HCC): ICD-10-CM

## 2021-03-08 PROBLEM — I34.0 NONRHEUMATIC MITRAL VALVE REGURGITATION: Status: ACTIVE | Noted: 2021-03-08

## 2021-03-08 PROCEDURE — 93306 TTE W/DOPPLER COMPLETE: CPT | Performed by: INTERNAL MEDICINE

## 2021-03-08 PROCEDURE — 25010000002 PERFLUTREN (DEFINITY) 8.476 MG IN SODIUM CHLORIDE (PF) 0.9 % 10 ML INJECTION: Performed by: NURSE PRACTITIONER

## 2021-03-08 PROCEDURE — 93306 TTE W/DOPPLER COMPLETE: CPT

## 2021-03-08 RX ADMIN — PERFLUTREN 1.5 ML: 6.52 INJECTION, SUSPENSION INTRAVENOUS at 12:33

## 2021-03-08 NOTE — TELEPHONE ENCOUNTER
Echo Results:    Interpretation Summary    · The left ventricular cavity is severely dilated.  · Estimated left ventricular EF = 22% Left ventricular systolic function is severely decreased.  · Left ventricular diastolic function is consistent with (grade II w/high LAP) pseudonormalization.  · The left atrial cavity is severely dilated.  · There is mild, bileaflet mitral valve thickening present.  · Moderate mitral valve regurgitation is present.  · Moderate tricuspid valve regurgitation is present.  · Estimated right ventricular systolic pressure from tricuspid regurgitation is normal (<35 mmHg).          Patient informed about the results.  Her Entresto was recently increased.  She has very happy that her ejection fraction has improved.  She will follow-up with Dr. Melton on 3/17 at that time they will discuss perioperative cardiac risk assessment.

## 2021-03-09 ENCOUNTER — HOSPITAL ENCOUNTER (OUTPATIENT)
Dept: MAMMOGRAPHY | Facility: HOSPITAL | Age: 46
Discharge: HOME | End: 2021-03-09
Admitting: NURSE PRACTITIONER

## 2021-03-09 DIAGNOSIS — Z12.31 SCREENING MAMMOGRAM, ENCOUNTER FOR: ICD-10-CM

## 2021-03-09 LAB
ASCENDING AORTA: 2.8 CM
BH CV ECHO MEAS - ACS: 1.9 CM
BH CV ECHO MEAS - AO MAX PG (FULL): 3.8 MMHG
BH CV ECHO MEAS - AO MAX PG: 8.3 MMHG
BH CV ECHO MEAS - AO MEAN PG (FULL): 3 MMHG
BH CV ECHO MEAS - AO MEAN PG: 5 MMHG
BH CV ECHO MEAS - AO ROOT AREA (BSA CORRECTED): 1.6
BH CV ECHO MEAS - AO ROOT AREA: 7.5 CM^2
BH CV ECHO MEAS - AO ROOT DIAM: 3.1 CM
BH CV ECHO MEAS - AO V2 MAX: 144 CM/SEC
BH CV ECHO MEAS - AO V2 MEAN: 106 CM/SEC
BH CV ECHO MEAS - AO V2 VTI: 29.1 CM
BH CV ECHO MEAS - ASC AORTA: 2.8 CM
BH CV ECHO MEAS - AVA(I,A): 2.1 CM^2
BH CV ECHO MEAS - AVA(I,D): 2.1 CM^2
BH CV ECHO MEAS - AVA(V,A): 2.3 CM^2
BH CV ECHO MEAS - AVA(V,D): 2.3 CM^2
BH CV ECHO MEAS - BSA(HAYCOCK): 2 M^2
BH CV ECHO MEAS - BSA: 1.9 M^2
BH CV ECHO MEAS - BZI_BMI: 31 KILOGRAMS/M^2
BH CV ECHO MEAS - BZI_METRIC_HEIGHT: 165.1 CM
BH CV ECHO MEAS - BZI_METRIC_WEIGHT: 84.4 KG
BH CV ECHO MEAS - EDV(MOD-SP2): 495 ML
BH CV ECHO MEAS - EDV(MOD-SP4): 526 ML
BH CV ECHO MEAS - EDV(TEICH): 344.6 ML
BH CV ECHO MEAS - EF(CUBED): 41.3 %
BH CV ECHO MEAS - EF(MOD-BP): 22.7 %
BH CV ECHO MEAS - EF(MOD-SP2): 19.4 %
BH CV ECHO MEAS - EF(MOD-SP4): 22.4 %
BH CV ECHO MEAS - EF(TEICH): 32.9 %
BH CV ECHO MEAS - ESV(MOD-SP2): 399 ML
BH CV ECHO MEAS - ESV(MOD-SP4): 408 ML
BH CV ECHO MEAS - ESV(TEICH): 231.4 ML
BH CV ECHO MEAS - FS: 16.3 %
BH CV ECHO MEAS - IVS/LVPW: 0.53
BH CV ECHO MEAS - IVSD: 0.8 CM
BH CV ECHO MEAS - LAT PEAK E' VEL: 4.1 CM/SEC
BH CV ECHO MEAS - LV DIASTOLIC VOL/BSA (35-75): 274.2 ML/M^2
BH CV ECHO MEAS - LV MASS(C)D: 483.8 GRAMS
BH CV ECHO MEAS - LV MASS(C)DI: 252.2 GRAMS/M^2
BH CV ECHO MEAS - LV MAX PG: 4.5 MMHG
BH CV ECHO MEAS - LV MEAN PG: 2 MMHG
BH CV ECHO MEAS - LV SYSTOLIC VOL/BSA (12-30): 212.7 ML/M^2
BH CV ECHO MEAS - LV V1 MAX: 106 CM/SEC
BH CV ECHO MEAS - LV V1 MEAN: 61.7 CM/SEC
BH CV ECHO MEAS - LV V1 VTI: 19.2 CM
BH CV ECHO MEAS - LVIDD: 8 CM
BH CV ECHO MEAS - LVIDS: 6.7 CM
BH CV ECHO MEAS - LVLD AP2: 11.7 CM
BH CV ECHO MEAS - LVLD AP4: 10.6 CM
BH CV ECHO MEAS - LVLS AP2: 10.3 CM
BH CV ECHO MEAS - LVLS AP4: 9.8 CM
BH CV ECHO MEAS - LVOT AREA (M): 3.1 CM^2
BH CV ECHO MEAS - LVOT AREA: 3.1 CM^2
BH CV ECHO MEAS - LVOT DIAM: 2 CM
BH CV ECHO MEAS - LVPWD: 1.5 CM
BH CV ECHO MEAS - MED PEAK E' VEL: 3.9 CM/SEC
BH CV ECHO MEAS - MR MAX PG: 82.1 MMHG
BH CV ECHO MEAS - MR MAX VEL: 453 CM/SEC
BH CV ECHO MEAS - MV A MAX VEL: 104 CM/SEC
BH CV ECHO MEAS - MV DEC SLOPE: 223 CM/SEC^2
BH CV ECHO MEAS - MV DEC TIME: 0.26 SEC
BH CV ECHO MEAS - MV E MAX VEL: 75.9 CM/SEC
BH CV ECHO MEAS - MV E/A: 0.73
BH CV ECHO MEAS - MV MAX PG: 4.8 MMHG
BH CV ECHO MEAS - MV MEAN PG: 2 MMHG
BH CV ECHO MEAS - MV P1/2T MAX VEL: 50 CM/SEC
BH CV ECHO MEAS - MV P1/2T: 65.7 MSEC
BH CV ECHO MEAS - MV V2 MAX: 109 CM/SEC
BH CV ECHO MEAS - MV V2 MEAN: 56.9 CM/SEC
BH CV ECHO MEAS - MV V2 VTI: 30.7 CM
BH CV ECHO MEAS - MVA P1/2T LCG: 4.4 CM^2
BH CV ECHO MEAS - MVA(P1/2T): 3.3 CM^2
BH CV ECHO MEAS - MVA(VTI): 2 CM^2
BH CV ECHO MEAS - PA ACC TIME: 0.12 SEC
BH CV ECHO MEAS - PA MAX PG (FULL): 1.7 MMHG
BH CV ECHO MEAS - PA MAX PG: 3 MMHG
BH CV ECHO MEAS - PA PR(ACCEL): 26.8 MMHG
BH CV ECHO MEAS - PA V2 MAX: 86.9 CM/SEC
BH CV ECHO MEAS - PULM A REVS DUR: 0.13 SEC
BH CV ECHO MEAS - PULM A REVS VEL: 26.7 CM/SEC
BH CV ECHO MEAS - PULM DIAS VEL: 28.1 CM/SEC
BH CV ECHO MEAS - PULM S/D: 1.9
BH CV ECHO MEAS - PULM SYS VEL: 53.3 CM/SEC
BH CV ECHO MEAS - PVA(V,A): 2.1 CM^2
BH CV ECHO MEAS - PVA(V,D): 2.1 CM^2
BH CV ECHO MEAS - QP/QS: 0.63
BH CV ECHO MEAS - RAP SYSTOLE: 3 MMHG
BH CV ECHO MEAS - RV MAX PG: 1.3 MMHG
BH CV ECHO MEAS - RV MEAN PG: 1 MMHG
BH CV ECHO MEAS - RV V1 MAX: 56.8 CM/SEC
BH CV ECHO MEAS - RV V1 MEAN: 39.7 CM/SEC
BH CV ECHO MEAS - RV V1 VTI: 12.1 CM
BH CV ECHO MEAS - RVOT AREA: 3.1 CM^2
BH CV ECHO MEAS - RVOT DIAM: 2 CM
BH CV ECHO MEAS - RVSP: 30 MMHG
BH CV ECHO MEAS - SI(AO): 114.5 ML/M^2
BH CV ECHO MEAS - SI(CUBED): 110.1 ML/M^2
BH CV ECHO MEAS - SI(LVOT): 31.4 ML/M^2
BH CV ECHO MEAS - SI(MOD-SP2): 50.1 ML/M^2
BH CV ECHO MEAS - SI(MOD-SP4): 61.5 ML/M^2
BH CV ECHO MEAS - SI(TEICH): 59 ML/M^2
BH CV ECHO MEAS - SV(AO): 219.6 ML
BH CV ECHO MEAS - SV(CUBED): 211.2 ML
BH CV ECHO MEAS - SV(LVOT): 60.3 ML
BH CV ECHO MEAS - SV(MOD-SP2): 96 ML
BH CV ECHO MEAS - SV(MOD-SP4): 118 ML
BH CV ECHO MEAS - SV(RVOT): 38 ML
BH CV ECHO MEAS - SV(TEICH): 113.3 ML
BH CV ECHO MEAS - TAPSE (>1.6): 1.8 CM
BH CV ECHO MEAS - TR MAX VEL: 262 CM/SEC
BH CV ECHO MEASUREMENTS AVERAGE E/E' RATIO: 18.98
BH CV XLRA - RV BASE: 3 CM
BH CV XLRA - RV LENGTH: 6.9 CM
BH CV XLRA - RV MID: 2 CM
BH CV XLRA - TDI S': 8.3 CM/SEC
LEFT ATRIUM VOLUME INDEX: 71 ML/M2
LV EF 2D ECHO EST: 22 %
MAXIMAL PREDICTED HEART RATE: 175 BPM
SINUS: 2.8 CM
STJ: 2.4 CM
STRESS TARGET HR: 149 BPM

## 2021-03-09 PROCEDURE — 77063 BREAST TOMOSYNTHESIS BI: CPT

## 2021-03-09 PROCEDURE — 77067 SCR MAMMO BI INCL CAD: CPT

## 2021-03-10 ENCOUNTER — ANTICOAGULATION VISIT (OUTPATIENT)
Dept: PHARMACY | Facility: HOSPITAL | Age: 46
End: 2021-03-10

## 2021-03-10 DIAGNOSIS — I26.99 OTHER ACUTE PULMONARY EMBOLISM, UNSPECIFIED WHETHER ACUTE COR PULMONALE PRESENT (HCC): ICD-10-CM

## 2021-03-10 LAB — INR PPP: 1.9

## 2021-03-10 NOTE — PROGRESS NOTES
Anticoagulation Clinic Progress Note    Anticoagulation Summary  As of 3/10/2021    INR goal:  2.0-3.0   TTR:  51.1 % (2.4 mo)   INR used for dosin.90 (3/10/2021)   Warfarin maintenance plan:  5 mg every Mon, Fri; 7.5 mg all other days   Weekly warfarin total:  47.5 mg   Plan last modified:  Edy Marcos III, PharmD (3/10/2021)   Next INR check:  3/17/2021   Target end date:  Indefinite    Indications    Other acute pulmonary embolism  unspecified whether acute cor pulmonale present (CMS/HCC) [I26.99]  Acute pulmonary embolism  unspecified pulmonary embolism type  unspecified whether acute cor pulmonale present (CMS/HCC) (Resolved) [I26.99]             Anticoagulation Episode Summary     INR check location:      Preferred lab:      Send INR reminders to:  [facility]  POOL    Comments:        Anticoagulation Care Providers     Provider Role Specialty Phone number    Ruben Kirby DO Referring Hospitalist [phone]    Lisset Melton MD Referring Cardiology [phone]          Clinic Interview:  Patient Findings     Negatives:  Signs/symptoms of thrombosis, Signs/symptoms of bleeding,   Laboratory test error suspected, Change in health, Change in alcohol use,   Change in activity, Upcoming invasive procedure, Emergency department   visit, Upcoming dental procedure, Missed doses, Extra doses, Change in   medications, Change in diet/appetite, Hospital admission, Bruising, Other   complaints      Clinical Outcomes     Negatives:  Major bleeding event, Thromboembolic event,   Anticoagulation-related hospital admission, Anticoagulation-related ED   visit, Anticoagulation-related fatality        INR History:  Anticoagulation Monitoring 2021 3/2/2021 3/10/2021   INR 2.00 1.70 1.90   INR Date 2021 3/2/2021 3/10/2021   INR Goal 2.0-3.0 2.0-3.0 2.0-3.0   Trend Up Up Up   Last Week Total 40 mg 37.5 mg 45 mg   Next Week Total 42.5 mg 47.5 mg 47.5 mg   Sun 7.5 mg 7.5 mg 7.5 mg    Mon 5 mg 5 mg 5 mg   Tue 7.5 mg 10 mg (3/2) 7.5 mg   Wed 5 mg 5 mg 7.5 mg   Thu 7.5 mg 7.5 mg 7.5 mg   Fri 5 mg 5 mg 5 mg   Sat 5 mg 7.5 mg 7.5 mg   Visit Report - - -   Some recent data might be hidden       Plan:  1. INR is Subtherapeutic. Provided instructions to Haley with [facility] to increase their warfarin regimen - see above in Anticoagulation Summary.  2. Follow up in 1 week    Edy Marcos III, PharmD  PGY1 Pharmacy Resident

## 2021-03-11 DIAGNOSIS — R92.8 ABNORMAL MAMMOGRAM OF RIGHT BREAST: Primary | ICD-10-CM

## 2021-03-12 RX ORDER — WARFARIN SODIUM 2.5 MG/1
TABLET ORAL
Qty: 245 TABLET | Refills: 1 | Status: SHIPPED | OUTPATIENT
Start: 2021-03-12 | End: 2021-06-04 | Stop reason: SDUPTHER

## 2021-03-17 ENCOUNTER — ANTICOAGULATION VISIT (OUTPATIENT)
Dept: PHARMACY | Facility: HOSPITAL | Age: 46
End: 2021-03-17

## 2021-03-17 ENCOUNTER — OFFICE VISIT (OUTPATIENT)
Dept: CARDIOLOGY | Facility: CLINIC | Age: 46
End: 2021-03-17

## 2021-03-17 VITALS
HEART RATE: 53 BPM | DIASTOLIC BLOOD PRESSURE: 60 MMHG | BODY MASS INDEX: 30.73 KG/M2 | WEIGHT: 191.2 LBS | SYSTOLIC BLOOD PRESSURE: 120 MMHG | HEIGHT: 66 IN

## 2021-03-17 DIAGNOSIS — I36.1 NONRHEUMATIC TRICUSPID VALVE REGURGITATION: ICD-10-CM

## 2021-03-17 DIAGNOSIS — I34.0 NONRHEUMATIC MITRAL VALVE REGURGITATION: ICD-10-CM

## 2021-03-17 DIAGNOSIS — Z95.810 AICD (AUTOMATIC CARDIOVERTER/DEFIBRILLATOR) PRESENT: ICD-10-CM

## 2021-03-17 DIAGNOSIS — I51.89 GRADE II DIASTOLIC DYSFUNCTION: ICD-10-CM

## 2021-03-17 DIAGNOSIS — I10 ESSENTIAL HYPERTENSION: ICD-10-CM

## 2021-03-17 DIAGNOSIS — I50.22 CHRONIC SYSTOLIC CONGESTIVE HEART FAILURE (HCC): Primary | ICD-10-CM

## 2021-03-17 DIAGNOSIS — I26.99 OTHER ACUTE PULMONARY EMBOLISM, UNSPECIFIED WHETHER ACUTE COR PULMONALE PRESENT (HCC): ICD-10-CM

## 2021-03-17 DIAGNOSIS — I42.8 NICM (NONISCHEMIC CARDIOMYOPATHY) (HCC): ICD-10-CM

## 2021-03-17 LAB — INR PPP: 2.6

## 2021-03-17 PROCEDURE — 93000 ELECTROCARDIOGRAM COMPLETE: CPT | Performed by: INTERNAL MEDICINE

## 2021-03-17 PROCEDURE — 99214 OFFICE O/P EST MOD 30 MIN: CPT | Performed by: INTERNAL MEDICINE

## 2021-03-17 RX ORDER — SACUBITRIL AND VALSARTAN 97; 103 MG/1; MG/1
1 TABLET, FILM COATED ORAL 2 TIMES DAILY
Qty: 180 TABLET | Refills: 3 | Status: SHIPPED | OUTPATIENT
Start: 2021-03-17 | End: 2022-09-07

## 2021-03-17 NOTE — PROGRESS NOTES
Anticoagulation Clinic Progress Note    Anticoagulation Summary  As of 3/17/2021    INR goal:  2.0-3.0   TTR:  54.2 % (2.7 mo)   INR used for dosin.60 (3/17/2021)   Warfarin maintenance plan:  5 mg every Mon, Fri; 7.5 mg all other days   Weekly warfarin total:  47.5 mg   No change documented:  Chucho Mcdaniel RPH   Plan last modified:  Edy Marcos III, PharmD (3/10/2021)   Next INR check:  3/24/2021   Target end date:  Indefinite    Indications    Other acute pulmonary embolism  unspecified whether acute cor pulmonale present (CMS/HCC) [I26.99]  Acute pulmonary embolism  unspecified pulmonary embolism type  unspecified whether acute cor pulmonale present (CMS/HCC) (Resolved) [I26.99]             Anticoagulation Episode Summary     INR check location:      Preferred lab:      Send INR reminders to:   [facility]  POOL    Comments:        Anticoagulation Care Providers     Provider Role Specialty Phone number    Lisset Melton MD Referring Cardiology [phone]          INR History:  Anticoagulation Monitoring 3/2/2021 3/10/2021 3/17/2021   INR 1.70 1.90 2.60   INR Date 3/2/2021 3/10/2021 3/17/2021   INR Goal 2.0-3.0 2.0-3.0 2.0-3.0   Trend Up Up Same   Last Week Total 37.5 mg 45 mg 47.5 mg   Next Week Total 47.5 mg 47.5 mg 47.5 mg   Sun 7.5 mg 7.5 mg 7.5 mg   Mon 5 mg 5 mg 5 mg   Tue 10 mg (3/2) 7.5 mg 7.5 mg   Wed 5 mg 7.5 mg 7.5 mg   Thu 7.5 mg 7.5 mg 7.5 mg   Fri 5 mg 5 mg 5 mg   Sat 7.5 mg 7.5 mg 7.5 mg   Visit Report - - -   Some recent data might be hidden       Plan:  1. INR is Therapeutic today- see above in Anticoagulation Summary.   Provided instructions to Rosa with [facility] to Continue their warfarin regimen- see above in Anticoagulation Summary.  2. Follow up in 1 week      Chucho Mcdainel RPH

## 2021-03-17 NOTE — PROGRESS NOTES
Date of Office Visit: 2021  Encounter Provider: Lisset Melton MD  Place of Service: [facility] CARDIOLOGY  Patient Name: Nina Saez  :1975      Patient ID:  Nina Saez is a 45 y.o. female is here for  followup for CMP, CHF.         History of Present Illness       She has a history of HIV, essential hypertension, severe dilated nonischemic cardiomyopathy with chronic systolic congestive heart failure, obesity, history of illicit drug use and asthma.  She has an AICD.  It is a single-chamber [employer].     She was diagnosed with heart failure and cardiomyopathy in 2019 at [facility] when she was there visiting family.  An echocardiogram on cardiac catheterization which confirmed this diagnosis.  Her cardiac catheterization done 2019 showed normal coronary arteries, LVEDP 12, wedge pressure 13, PA pressure 28/15 with a mean of 19 mmHg, RA pressure 4, cardiac index 2.5 L/min/m² with a PVR of 1.2 Woods units.     She presented emergency on 20 with short windedness and cough for 3 weeks prior to presentation.  Her weight is been stable and she had no lower extremity edema but she had missed her cardiac medications and had been eating a lot of salt.  On arrival, she was found to be in congestive heart failure.  Echo done 2020 showed severe left ventricular dilation, ejection fraction 6%, very thin left ventricular walls, grade 3 diastolic dysfunction, moderate to severe mitral insufficiency, moderate tricuspid insufficiency, RVSP 48 mmHg.  There was essentially global hypokinesis with akinesis at the bases.  She was able to be dismissed on 2020.  While in the hospital, we did recommend consideration for upgrade to a biventricular AICD.  She also had some right flank pain during hospitalization and CT scan showed a 2 mm minimally obstructing stone in the right ureter.  Urology saw her and felt like she could pass  the stone without further intervention.    On 9/10/2020, she saw Dr. Jamar Azul in the office and he did not feel that she met criteria for implantation of CRT device.  She has now been enrolled in our Heart Failure Clinic.     In December 2020, she was hospitalized for an acute PE with right lower lobe pulmonary infarct and was having hemoptysis.  She was placed on warfarin.     She presents today for perioperative cardiac risk assessment.  She explains that her left ankle/leg has screws and plates that need to be removed by Dr. Rocky Calvert.  She is having pain from the hardware.  She explains that she recently saw the [facility] transplant clinic and both her Entresto and spironolactone were increased.  She also had a stress test (sounds like a metabolic stress test) and I will obtain those results.  She reports a cough.  She denies chest pain, shortness of air, palpitations, edema, dizziness, syncope, or bleeding.      Labs done 1/4/2021 show normal CMP, normal CBC.  She saw [facility] by telehealth date of service 1/28/21.  They noted the patient underwent a CPX which showed peak VO2 11.9 at RER greater than 1.05 while on Coreg.  Her CPX showed heart related limitations in her functional capacity, but the patient felt satisfied at her functional current level.    Echo done 3/8/2021 showed ejection fraction of 22% with severe left ventricular dilation, severe global hypokinesis, grade 2 diastolic dysfunction, severe left atrial enlargement moderate mitral and tricuspid insufficiency.     She has no orthopnea or PND.  She weighs her self and been stable.  She has a home health nurse named Haley who draws her labs.  She is to be seen at [facility] transplant tomorrow.  She has not had surgery with Dr. Rocky Calvert on her left ankle but still needs this.  She has no chest pain or pressure.  She has no exertional dyspnea.  She has no tachycardia, dizziness or syncope.  She is takes her medications as directed  without difficulty.    Past Medical History:   Diagnosis Date   • AICD (automatic cardioverter/defibrillator) present 3/6/2020   • Asthma    • CHF (congestive heart failure) (CMS/[facility])    • Class 2 obesity in adult    • Grade II diastolic dysfunction     Per echocardiogram 3/2021   • HIV (human immunodeficiency virus infection) (CMS/[facility])    • Hypertension    • LBBB (left bundle branch block)    • NICM (nonischemic cardiomyopathy) (CMS/[facility])     7/2020 EF 6% per echocardiogram; AICD present   • Nonrheumatic mitral valve regurgitation 3/8/2021    Moderate per echo 3/2021   • Nonrheumatic tricuspid valve regurgitation     Moderate per echocardiogram 3/2021         Past Surgical History:   Procedure Laterality Date   • CARDIAC CATHETERIZATION     • CARDIAC DEFIBRILLATOR PLACEMENT     • FRACTURE SURGERY Left     left ankle   • HYSTERECTOMY         Current Outpatient Medications on File Prior to Visit   Medication Sig Dispense Refill   • acetaminophen (TYLENOL) 325 MG tablet Take 650 mg by mouth Every 6 (Six) Hours As Needed for Mild Pain .     • albuterol sulfate  (90 Base) MCG/ACT inhaler Inhale 2 puffs Every 4 (Four) Hours As Needed for Wheezing.     • carvedilol (COREG) 3.125 MG tablet Take 3.125 mg by mouth 2 (Two) Times a Day With Meals.     • Corlanor 5 MG tablet tablet Take 5 mg by mouth 2 (Two) Times a Day With Meals.     • darunavir ethanolate (PREZISTA) 800 MG tablet tablet Take 800 mg by mouth Every Night.     • diphenhydrAMINE (BENADRYL) 25 mg capsule Take 1 capsule by mouth Every 6 (Six) Hours As Needed for Itching (swelling). 20 capsule 0   • Empagliflozin (Jardiance) 10 MG tablet Take 10 mg by mouth Daily. 30 tablet 11   • Emtricitabine-Tenofovir AF (DESCOVY) 200-25 MG per tablet Take  by mouth Every Night.     • fluticasone (Flonase) 50 MCG/ACT nasal spray 2 sprays into the nostril(s) as directed by provider Daily. 1 bottle 2   • furosemide (LASIX) 80 MG tablet Take 1 tablet by mouth 2 (Two) Times a  Day. 180 tablet 3   • guaiFENesin-codeine (GUAIFENESIN AC) 100-10 MG/5ML liquid Take 5 mL by mouth Every 4 (Four) Hours As Needed for Cough. 118 mL 0   • ondansetron ODT (Zofran ODT) 4 MG disintegrating tablet Place 1 tablet on the tongue Every 8 (Eight) Hours As Needed for Nausea or Vomiting. 30 tablet 0   • potassium chloride (K-DUR,KLOR-CON) 20 MEQ CR tablet Take 2 tablets by mouth Daily. 90 tablet 1   • ritonavir (NORVIR) 100 MG tablet Take 100 mg by mouth Every Night.     • sacubitril-valsartan (ENTRESTO) 49-51 MG tablet Take 1 tablet by mouth 2 (Two) Times a Day.     • sertraline (ZOLOFT) 50 MG tablet Take 50 mg by mouth Daily.     • spironolactone (ALDACTONE) 25 MG tablet 25 mg Daily.     • vitamin D (ERGOCALCIFEROL) 79968 units capsule capsule Take 50,000 Units by mouth Every 30 (Thirty) Days.     • warfarin (COUMADIN) 2.5 MG tablet Take 2 tablets (5 mg) by mouth on Mon and Fri, and take 3 tablets (7.5 mg) by mouth all other days or as directed. 245 tablet 1   • carvedilol (COREG) 3.125 MG tablet Take 1 tablet by mouth 2 (Two) Times a Day With Meals for 30 days. 60 tablet 0     No current facility-administered medications on file prior to visit.       Social History     Socioeconomic History   • Marital status: Legally      Spouse name: Not on file   • Number of children: Not on file   • Years of education: Not on file   • Highest education level: Not on file   Tobacco Use   • Smoking status: Former Smoker     Quit date:      Years since quittin.2   • Smokeless tobacco: Never Used   Substance and Sexual Activity   • Alcohol use: Not Currently     Comment: holiday and special occ//caffeine use: 1 cup daily, 1 soda occasionally   • Drug use: Never   • Sexual activity: Defer           ROS    Procedures    ECG 12 Lead    Date/Time: 3/17/2021 9:10 AM  Performed by: Lisset Melton MD  Authorized by: Lisset Melton MD   Comparison: compared with previous ECG   Similar to previous  "ECG  Rhythm: sinus rhythm  Conduction: left bundle branch block  T inversion: II, III, aVF and V6    Clinical impression: abnormal EKG                Objective:      Vitals:    03/17/21 0842   BP: 120/60   BP Location: Left arm   Pulse: 53   Weight: 86.7 kg (191 lb 3.2 oz)   Height: 167.6 cm (66\")     Body mass index is 30.86 kg/m².    Vitals reviewed.   Constitutional:       General: Not in acute distress.     Appearance: Well-developed. Not diaphoretic.   Eyes:      General: No scleral icterus.     Conjunctiva/sclera: Conjunctivae normal.   HENT:      Head: Normocephalic and atraumatic.   Neck:      Thyroid: No thyromegaly.      Vascular: No carotid bruit or JVD.      Lymphadenopathy: No cervical adenopathy.   Pulmonary:      Effort: Pulmonary effort is normal. No respiratory distress.      Breath sounds: Normal breath sounds. No wheezing. No rhonchi. No rales.   Chest:      Chest wall: Not tender to palpatation.   Cardiovascular:      Normal rate. Regular rhythm.      Murmurs: There is no murmur.      No gallop.   Pulses:     Intact distal pulses.   Edema:     Peripheral edema absent.   Abdominal:      General: Bowel sounds are normal. There is no distension or abdominal bruit.      Palpations: Abdomen is soft. There is no abdominal mass.      Tenderness: There is no abdominal tenderness.   Musculoskeletal:         General: No deformity.      Extremities: No clubbing present.     Cervical back: Neck supple. Skin:     General: Skin is warm and dry. There is no cyanosis.      Coloration: Skin is not pale.      Findings: No rash.   Neurological:      Mental Status: Alert and oriented to person, place, and time.      Cranial Nerves: No cranial nerve deficit.   Psychiatric:         Judgment: Judgment normal.         Lab Review:       Assessment:      Diagnosis Plan   1. Chronic systolic congestive heart failure (CMS/HCC)     2. NICM (nonischemic cardiomyopathy) (CMS/HCC)     3. Essential hypertension     4. " Nonrheumatic mitral valve regurgitation     5. Nonrheumatic tricuspid valve regurgitation     6. AICD (automatic cardioverter/defibrillator) present     7. Grade II diastolic dysfunction       1. Severe dilated nonischemic cardiomyopathy, ejection fraction 22%, AICD in place, chronic systolic heart failure.  She is well compensated at this time.  2. Essential hypertension, controlled.  3. HIV positive.  4. Diastolic dysfunction  5. Obesity  6. Asthma     Plan:       See Nilda in 6 weeks, continue current medications, she is fairly stable to undergo ankle surgery at this point though would prefer she had at [facility] we can keep an eye on her.  Will talk with Dr. Calvert.  No medication changes, will get records from [facility], does need laboratory values done.

## 2021-03-18 ENCOUNTER — TELEPHONE (OUTPATIENT)
Dept: CARDIOLOGY | Facility: CLINIC | Age: 46
End: 2021-03-18

## 2021-03-18 DIAGNOSIS — I42.8 NICM (NONISCHEMIC CARDIOMYOPATHY) (HCC): ICD-10-CM

## 2021-03-18 DIAGNOSIS — S82.892G CLOSED FRACTURE OF LEFT ANKLE WITH DELAYED HEALING, SUBSEQUENT ENCOUNTER: Primary | ICD-10-CM

## 2021-03-18 DIAGNOSIS — I50.22 CHRONIC SYSTOLIC CONGESTIVE HEART FAILURE (HCC): ICD-10-CM

## 2021-03-18 NOTE — TELEPHONE ENCOUNTER
I Called her and will try to find a surgeon at [facility] for her ankle. Want it done under a nerve block.  I'm referring her to sukh or rolo mendez.      rm

## 2021-03-24 ENCOUNTER — ANTICOAGULATION VISIT (OUTPATIENT)
Dept: PHARMACY | Facility: HOSPITAL | Age: 46
End: 2021-03-24

## 2021-03-24 DIAGNOSIS — I26.99 OTHER ACUTE PULMONARY EMBOLISM, UNSPECIFIED WHETHER ACUTE COR PULMONALE PRESENT (HCC): ICD-10-CM

## 2021-03-24 LAB — INR PPP: 3.9

## 2021-03-24 NOTE — PROGRESS NOTES
Anticoagulation Clinic Progress Note    Anticoagulation Summary  As of 3/24/2021    INR goal:  2.0-3.0   TTR:  52.3 % (2.9 mo)   INR used for dosing:  3.90 (3/24/2021)   Warfarin maintenance plan:  5 mg every Mon, Fri; 7.5 mg all other days   Weekly warfarin total:  47.5 mg   Plan last modified:  Edy Marcos III, PharmD (3/10/2021)   Next INR check:  3/31/2021   Target end date:  Indefinite    Indications    Other acute pulmonary embolism  unspecified whether acute cor pulmonale present (CMS/HCC) [I26.99]  Acute pulmonary embolism  unspecified pulmonary embolism type  unspecified whether acute cor pulmonale present (CMS/HCC) (Resolved) [I26.99]             Anticoagulation Episode Summary     INR check location:      Preferred lab:      Send INR reminders to:   [facility]  POOL    Comments:        Anticoagulation Care Providers     Provider Role Specialty Phone number    Lisset Melton MD Referring Cardiology [phone]          INR History:  Anticoagulation Monitoring 3/10/2021 3/17/2021 3/24/2021   INR 1.90 2.60 3.90   INR Date 3/10/2021 3/17/2021 3/24/2021   INR Goal 2.0-3.0 2.0-3.0 2.0-3.0   Trend Up Same Same   Last Week Total 45 mg 47.5 mg 47.5 mg   Next Week Total 47.5 mg 47.5 mg 42.5 mg   Sun 7.5 mg 7.5 mg 7.5 mg   Mon 5 mg 5 mg 5 mg   Tue 7.5 mg 7.5 mg 7.5 mg   Wed 7.5 mg 7.5 mg 2.5 mg (3/24)   Thu 7.5 mg 7.5 mg 7.5 mg   Fri 5 mg 5 mg 5 mg   Sat 7.5 mg 7.5 mg 7.5 mg   Visit Report - - -   Some recent data might be hidden       Plan:  1. INR is Supratherapeutic today- see above in Anticoagulation Summary.   Provided instructions to Rosa [phone] via  with [facility] to Change their warfarin regimen- see above in Anticoagulation Summary.  2. Follow up in 1 week      Clover Escobar RPH

## 2021-03-26 ENCOUNTER — APPOINTMENT (OUTPATIENT)
Dept: ULTRASOUND IMAGING | Facility: HOSPITAL | Age: 46
End: 2021-03-26

## 2021-03-26 ENCOUNTER — HOSPITAL ENCOUNTER (OUTPATIENT)
Dept: MAMMOGRAPHY | Facility: HOSPITAL | Age: 46
Discharge: HOME | End: 2021-03-26
Admitting: NURSE PRACTITIONER

## 2021-03-26 DIAGNOSIS — R92.8 ABNORMAL MAMMOGRAM OF RIGHT BREAST: ICD-10-CM

## 2021-03-26 PROCEDURE — 77065 DX MAMMO INCL CAD UNI: CPT

## 2021-03-26 PROCEDURE — G0279 TOMOSYNTHESIS, MAMMO: HCPCS

## 2021-03-31 ENCOUNTER — TELEPHONE (OUTPATIENT)
Dept: CARDIOLOGY | Facility: CLINIC | Age: 46
End: 2021-03-31

## 2021-03-31 ENCOUNTER — BULK ORDERING (OUTPATIENT)
Dept: CASE MANAGEMENT | Facility: OTHER | Age: 46
End: 2021-03-31

## 2021-03-31 DIAGNOSIS — Z23 IMMUNIZATION DUE: ICD-10-CM

## 2021-04-01 ENCOUNTER — ANTICOAGULATION VISIT (OUTPATIENT)
Dept: PHARMACY | Facility: HOSPITAL | Age: 46
End: 2021-04-01

## 2021-04-01 DIAGNOSIS — I26.99 OTHER ACUTE PULMONARY EMBOLISM, UNSPECIFIED WHETHER ACUTE COR PULMONALE PRESENT (HCC): ICD-10-CM

## 2021-04-01 LAB — INR PPP: 3.6

## 2021-04-01 NOTE — PROGRESS NOTES
Anticoagulation Clinic Progress Note    Anticoagulation Summary  As of 4/1/2021    INR goal:  2.0-3.0   TTR:  47.9 % (3.2 mo)   INR used for dosing:  3.60 (4/1/2021)   Warfarin maintenance plan:  7.5 mg every Sun, Tue, Thu; 5 mg all other days   Weekly warfarin total:  42.5 mg   Plan last modified:  Clover Escobar RPH (4/1/2021)   Next INR check:  4/8/2021   Target end date:  Indefinite    Indications    Other acute pulmonary embolism  unspecified whether acute cor pulmonale present (CMS/HCC) [I26.99]  Acute pulmonary embolism  unspecified pulmonary embolism type  unspecified whether acute cor pulmonale present (CMS/HCC) (Resolved) [I26.99]             Anticoagulation Episode Summary     INR check location:      Preferred lab:      [facility]    Comments:        Anticoagulation Care Providers     Provider Role Specialty Phone number    Lisset Melton MD Referring Cardiology [phone]          INR History:  Anticoagulation Monitoring 3/17/2021 3/24/2021 4/1/2021   INR 2.60 3.90 3.60   INR Date 3/17/2021 3/24/2021 4/1/2021   INR Goal 2.0-3.0 2.0-3.0 2.0-3.0   Trend Same Same Down   Last Week Total 47.5 mg 47.5 mg 47.5 mg   Next Week Total 47.5 mg 42.5 mg 37.5 mg   Sun 7.5 mg 7.5 mg 7.5 mg   Mon 5 mg 5 mg 5 mg   Tue 7.5 mg 7.5 mg 7.5 mg   Wed 7.5 mg 2.5 mg (3/24) 5 mg   Thu 7.5 mg 7.5 mg 2.5 mg (4/1)   Fri 5 mg 5 mg 5 mg   Sat 7.5 mg 7.5 mg 5 mg   Visit Report - - -   Some recent data might be hidden       Plan:  1. INR is Supratherapeutic today- see above in Anticoagulation Summary.   Provided instructions to Rosa with [facility] to Change their warfarin regimen- see above in Anticoagulation Summary.  2. Follow up in 1 week--RN will ask pt to call for clinic appt.        Clover Escobar RPH

## 2021-04-02 ENCOUNTER — TELEPHONE (OUTPATIENT)
Dept: PHARMACY | Facility: HOSPITAL | Age: 46
End: 2021-04-02

## 2021-04-08 ENCOUNTER — ANTICOAGULATION VISIT (OUTPATIENT)
Dept: PHARMACY | Facility: HOSPITAL | Age: 46
End: 2021-04-08

## 2021-04-08 DIAGNOSIS — I26.99 OTHER ACUTE PULMONARY EMBOLISM, UNSPECIFIED WHETHER ACUTE COR PULMONALE PRESENT (HCC): ICD-10-CM

## 2021-04-08 LAB
INR PPP: 3.6
INR PPP: 3.7

## 2021-04-08 PROCEDURE — G0249 PROVIDE INR TEST MATER/EQUIP: HCPCS

## 2021-04-08 PROCEDURE — G0248 DEMONSTRATE USE HOME INR MON: HCPCS

## 2021-04-08 NOTE — PROGRESS NOTES
Anticoagulation Clinic Progress Note - Initial Training for Home Testing    Anticoagulation Summary  As of 4/8/2021    INR goal:  2.0-3.0   TTR:  44.6 % (3.4 mo)   INR used for dosing:  3.60 (4/8/2021)   Warfarin maintenance plan:  5 mg every Mon, Wed, Fri; 7.5 mg all other days   Weekly warfarin total:  45 mg   Plan last modified:  Chucho Mcdaniel RPH (4/8/2021)   Next INR check:  4/15/2021   Target end date:  Indefinite    Indications    Other acute pulmonary embolism  unspecified whether acute cor pulmonale present (CMS/HCC) [I26.99]  Acute pulmonary embolism  unspecified pulmonary embolism type  unspecified whether acute cor pulmonale present (CMS/HCC) (Resolved) [I26.99]             Anticoagulation Episode Summary     INR check location:      Preferred lab:      Send INR reminders to:   NOMI BLANDON  POOL    Comments:  **Home Testing Program**      Anticoagulation Care Providers     Provider Role Specialty Phone number    Lisset Melton MD Referring Cardiology [phone]          Clinic Interview:  Patient Findings     Positives:  Extra doses    Negatives:  Signs/symptoms of thrombosis, Signs/symptoms of bleeding,   Laboratory test error suspected, Change in health, Change in alcohol use,   Change in activity, Upcoming invasive procedure, Emergency department   visit, Upcoming dental procedure, Missed doses, Change in medications,   Change in diet/appetite, Hospital admission, Bruising, Other complaints    Comments:  Reports taking 7.5 mg daily by mistake.      Clinical Outcomes     Negatives:  Major bleeding event, Thromboembolic event,   Anticoagulation-related hospital admission, Anticoagulation-related ED   visit, Anticoagulation-related fatality    Comments:  Reports taking 7.5 mg daily by mistake.        INR History:  Anticoagulation Monitoring 3/24/2021 4/1/2021 4/8/2021   INR 3.90 3.60 3.60   INR Date 3/24/2021 4/1/2021 4/8/2021   INR Goal 2.0-3.0 2.0-3.0 2.0-3.0   Trend Same Down Up    Last Week Total 47.5 mg 47.5 mg 47.5 mg   Next Week Total 42.5 mg 37.5 mg 42.5 mg   Sun 7.5 mg 7.5 mg 7.5 mg   Mon 5 mg 5 mg 5 mg   Tue 7.5 mg 7.5 mg 7.5 mg   Wed 2.5 mg (3/24) 5 mg 5 mg   Thu 7.5 mg 2.5 mg (4/1) 5 mg (4/8)   Fri 5 mg 5 mg 5 mg   Sat 7.5 mg 5 mg 7.5 mg   Visit Report - - -   Some recent data might be hidden       Equipment Loaned to Patient:   Joyhoundg-Sense - Serial Number: [id number]    Coag-Sense POC INR performed by clinic staff: 3.7  Coag-Sense POC INR performed by patient and/or caregiver: 3.6  Patient successfully completed self test on 1st attempt.    Education:  Nina Saez is newly starting home testing through the [facility] home testing service. she expressed consent to adhere to the Home INR Monitoring guidelines as prescribed by the [facility].      Home testing training was provided in clinic. The following items were discussed and demonstrated:  1. Explained to patient that any cost not covered by her insurance is the responsibility of the patient. It is the responsibility of Nina Saez to determine the cost of the service prior to proceeding.  2. Reviewed contents included within the Coag-Sense testing box including reference materials, meter, power cord, carrying case, and lancets. Discussed proper usage of all materials including turning on and setting up meter.  3. Instructed patient to get supplies out and ready prior to testing, including lancet, test strip, transfer tube, alcohol pad, bandage (optional). Instructed patient that if alcohol pads not available to wash hands with soap and water.  4. Reviewed use of test strips and process to confirm correct lot number and barcode number.   5. Reviewed how to use lancet including where on the finger to prick and recommendations for collecting the sample.  6. Instructed patient to contact [facility] after first failed attempt for further instruction in order to  avoid wasting supplies.  7. Reviewed how to dispose of all materials used during testing (i.e. sharps container or strong plastic container)  8. Explained that weekly testing is required, results should be reported to the [facility], and INR results will be addressed within one business day.  9. Instructed patient to call her referring physician if INR results require immediate attention and it is after clinic hours.  10. Informed patient that the meter is a loan rather than being owned by the patient. Should home testing services be discontinued for any reason, the meter must be returned to the [facility]. Repeated failure to comply with recommended testing intervals or non-adherence to recommendations will result in the patient being transitioned back to in-clinic visits.    Patient and/or caregiver successfully demonstrated correct use of Coag-Sense machine. All patient questions were answered satisfactorily.     Patient has agreed to only be called if INR is out of range - yes (except in the case when patient reports a change or concern that would affect warfarin management upon submitting her INR result).     Plan:  1. INR is Supratherapeutic today. Instructed Nina PHAM Saez to Change their warfarin regimen- see above in Anticoagulation Summary.  2. Follow-up with home test in 1 week. Follow-up in clinic at least once per year.   3. Coag-Sense machine was loaned to patient as above. Test strips (#6), lancets (#8), transfer tubes (#54) were provided.   4. Patient declines warfarin refills.  5. Verbal and printed information was provided. They have been instructed to call if any changes in medications, doses, concerns, etc. Patient expresses understanding and has no further questions at this time.    Chucho Mcdaniel [facility]

## 2021-04-12 ENCOUNTER — OFFICE VISIT (OUTPATIENT)
Dept: INTERNAL MEDICINE | Age: 46
End: 2021-04-12

## 2021-04-12 DIAGNOSIS — B20 HIV INFECTION, UNSPECIFIED SYMPTOM STATUS (HCC): ICD-10-CM

## 2021-04-12 DIAGNOSIS — Z71.85 VACCINE COUNSELING: Primary | ICD-10-CM

## 2021-04-12 DIAGNOSIS — I50.22 CHRONIC SYSTOLIC CONGESTIVE HEART FAILURE (HCC): ICD-10-CM

## 2021-04-12 PROCEDURE — 99441 PR PHYS/QHP TELEPHONE EVALUATION 5-10 MIN: CPT | Performed by: NURSE PRACTITIONER

## 2021-04-12 NOTE — PROGRESS NOTES
Chief Complaint  Immunizations (Counseling regarding COVID vaccine )    Subjective          Nina Saez presents to [facility]     You have chosen to receive care through a telephone visit. Do you consent to use a telephone visit for your medical care today? Yes    History of Present Illness     Patient wanting to discuss whether she is an appropriate candidate for Covid vaccine in which when she should receive.    Of note, she has seen allergist after our last office visit related to ER visit for angioedema.  Patient reports that it was determined that this came from a type of medication, and subsequently she has not had any further issues with this.  She has never had any abnormal reaction to vaccine in the past.    Objective   Vital Signs:   There were no vitals taken for this visit.    Physical Exam  Neurological:      Mental Status: She is alert and oriented to person, place, and time. She is not disoriented.   Psychiatric:         Attention and Perception: She is attentive.         Speech: Speech normal.         Behavior: Behavior is cooperative.         Thought Content: Thought content normal.         Judgment: Judgment normal.        Result Review :   The following data was reviewed by: SAMUEL Robins on 04/12/2021:  Common labs    Common Labsle 12/17/20 12/17/20 12/17/20 12/17/20 12/23/20 1/4/21 1/4/21    0254 0254 0929 0929  1245 1245   Glucose  112 (A)  162 (A) 114 (A)  98   BUN  21 (A)  19 13  10   Creatinine  1.04 (A)  0.88 0.96  1.00   eGFR  Am  69  84 76  73   Sodium  128 (A)  126 (A) 134 (A)  139   Potassium  5.4 (A) 4.9 4.5 4.4  4.8   Chloride  95 (A)  96 (A) 98  102   Calcium  8.8  8.5 (A) 8.9  9.2   Albumin  3.10 (A)  2.80 (A)   3.50   Total Bilirubin       1.2   Alkaline Phosphatase       141 (A)   AST (SGOT)       18   ALT (SGPT)       21   WBC 9.44     7.60    Hemoglobin 10.6 (A)     13.3    Hematocrit 32.2 (A)     41.2    Platelets 209     248     (A) Abnormal value                  Assessment and Plan      This patient has consented to a telehealth visit via phone. The visit was scheduled as a telehealth phone visit to comply with patient safety concerns in accordance with CDC recommendations.  All vitals recorded within this visit are reported by the patient.  I spent 10 minutes in total including but not limited to the 5 minutes spent in direct conversation with this patient.     Diagnoses and all orders for this visit:    1. Vaccine counseling (Primary)  Advised patient that considering all of her comorbid and serious chronic conditions, I would highly encourage her receiving the Covid vaccine.  Ultimately, it is up to her as to which one she chooses to receive, but discussed with patient that 2 dose vaccines currently available (Pfizer and Moderna) seem to have higher efficacy rates.   She is agreeable to setting up appointment this month for COVID vaccine here at [facility].     2. Chronic systolic congestive heart failure (CMS/HCC)    3. HIV infection, unspecified symptom status (CMS/HCC)      Follow Up   No follow-ups on file.  Patient was given instructions and counseling regarding her condition or for health maintenance advice. Please see specific information pulled into the AVS if appropriate.

## 2021-04-14 ENCOUNTER — HOSPITAL ENCOUNTER (OUTPATIENT)
Dept: CARDIOLOGY | Facility: HOSPITAL | Age: 46
Discharge: HOME | End: 2021-04-14

## 2021-04-14 ENCOUNTER — LAB (OUTPATIENT)
Dept: LAB | Facility: HOSPITAL | Age: 46
End: 2021-04-14

## 2021-04-14 ENCOUNTER — OFFICE VISIT (OUTPATIENT)
Dept: ORTHOPEDIC SURGERY | Facility: CLINIC | Age: 46
End: 2021-04-14

## 2021-04-14 VITALS
HEIGHT: 66 IN | DIASTOLIC BLOOD PRESSURE: 64 MMHG | OXYGEN SATURATION: 97 % | RESPIRATION RATE: 16 BRPM | BODY MASS INDEX: 31.5 KG/M2 | WEIGHT: 196 LBS | SYSTOLIC BLOOD PRESSURE: 110 MMHG | HEART RATE: 72 BPM

## 2021-04-14 VITALS — BODY MASS INDEX: 30.86 KG/M2 | WEIGHT: 192 LBS | HEIGHT: 66 IN | TEMPERATURE: 97.7 F

## 2021-04-14 DIAGNOSIS — Z98.890 HISTORY OF OPEN REDUCTION AND INTERNAL FIXATION (ORIF) PROCEDURE: Primary | ICD-10-CM

## 2021-04-14 DIAGNOSIS — S93.432S ANKLE SYNDESMOSIS DISRUPTION, LEFT, SEQUELA: ICD-10-CM

## 2021-04-14 DIAGNOSIS — I50.22 CHRONIC SYSTOLIC CONGESTIVE HEART FAILURE (HCC): ICD-10-CM

## 2021-04-14 DIAGNOSIS — I50.22 CHRONIC SYSTOLIC CONGESTIVE HEART FAILURE (HCC): Primary | ICD-10-CM

## 2021-04-14 DIAGNOSIS — I10 ESSENTIAL HYPERTENSION: ICD-10-CM

## 2021-04-14 DIAGNOSIS — S82.842S BIMALLEOLAR ANKLE FRACTURE, LEFT, SEQUELA: ICD-10-CM

## 2021-04-14 DIAGNOSIS — M76.62 TENDONITIS, ACHILLES, LEFT: ICD-10-CM

## 2021-04-14 DIAGNOSIS — I42.8 NICM (NONISCHEMIC CARDIOMYOPATHY) (HCC): ICD-10-CM

## 2021-04-14 DIAGNOSIS — I51.89 GRADE II DIASTOLIC DYSFUNCTION: ICD-10-CM

## 2021-04-14 PROBLEM — I26.99 OTHER ACUTE PULMONARY EMBOLISM, UNSPECIFIED WHETHER ACUTE COR PULMONALE PRESENT: Status: RESOLVED | Noted: 2020-12-17 | Resolved: 2021-04-14

## 2021-04-14 LAB
ALBUMIN SERPL-MCNC: 4.1 G/DL (ref 3.5–5.2)
ALBUMIN/GLOB SERPL: 1.5 G/DL
ALP SERPL-CCNC: 92 U/L (ref 39–117)
ALT SERPL W P-5'-P-CCNC: 10 U/L (ref 1–33)
ANION GAP SERPL CALCULATED.3IONS-SCNC: 9.2 MMOL/L (ref 5–15)
AST SERPL-CCNC: 18 U/L (ref 1–32)
BASOPHILS # BLD AUTO: 0.02 10*3/MM3 (ref 0–0.2)
BASOPHILS NFR BLD AUTO: 0.2 % (ref 0–1.5)
BILIRUB SERPL-MCNC: 0.4 MG/DL (ref 0–1.2)
BUN SERPL-MCNC: 17 MG/DL (ref 6–20)
BUN/CREAT SERPL: 21 (ref 7–25)
CALCIUM SPEC-SCNC: 8.9 MG/DL (ref 8.6–10.5)
CHLORIDE SERPL-SCNC: 103 MMOL/L (ref 98–107)
CO2 SERPL-SCNC: 26.8 MMOL/L (ref 22–29)
CREAT SERPL-MCNC: 0.81 MG/DL (ref 0.57–1)
DEPRECATED RDW RBC AUTO: 50.7 FL (ref 37–54)
EOSINOPHIL # BLD AUTO: 0.04 10*3/MM3 (ref 0–0.4)
EOSINOPHIL NFR BLD AUTO: 0.4 % (ref 0.3–6.2)
ERYTHROCYTE [DISTWIDTH] IN BLOOD BY AUTOMATED COUNT: 14.4 % (ref 12.3–15.4)
GFR SERPL CREATININE-BSD FRML MDRD: 93 ML/MIN/1.73
GLOBULIN UR ELPH-MCNC: 2.7 GM/DL
GLUCOSE SERPL-MCNC: 103 MG/DL (ref 65–99)
HCT VFR BLD AUTO: 41.3 % (ref 34–46.6)
HGB BLD-MCNC: 13.7 G/DL (ref 12–15.9)
IMM GRANULOCYTES # BLD AUTO: 0.02 10*3/MM3 (ref 0–0.05)
IMM GRANULOCYTES NFR BLD AUTO: 0.2 % (ref 0–0.5)
LYMPHOCYTES # BLD AUTO: 2.54 10*3/MM3 (ref 0.7–3.1)
LYMPHOCYTES NFR BLD AUTO: 27.5 % (ref 19.6–45.3)
MAGNESIUM SERPL-MCNC: 2.2 MG/DL (ref 1.6–2.6)
MCH RBC QN AUTO: 32.1 PG (ref 26.6–33)
MCHC RBC AUTO-ENTMCNC: 33.2 G/DL (ref 31.5–35.7)
MCV RBC AUTO: 96.7 FL (ref 79–97)
MONOCYTES # BLD AUTO: 0.62 10*3/MM3 (ref 0.1–0.9)
MONOCYTES NFR BLD AUTO: 6.7 % (ref 5–12)
NEUTROPHILS NFR BLD AUTO: 5.98 10*3/MM3 (ref 1.7–7)
NEUTROPHILS NFR BLD AUTO: 65 % (ref 42.7–76)
NRBC BLD AUTO-RTO: 0 /100 WBC (ref 0–0.2)
NT-PROBNP SERPL-MCNC: 2798 PG/ML (ref 0–450)
PLATELET # BLD AUTO: 145 10*3/MM3 (ref 140–450)
PMV BLD AUTO: 13 FL (ref 6–12)
POTASSIUM SERPL-SCNC: 4.2 MMOL/L (ref 3.5–5.2)
PROT SERPL-MCNC: 6.8 G/DL (ref 6–8.5)
RBC # BLD AUTO: 4.27 10*6/MM3 (ref 3.77–5.28)
SODIUM SERPL-SCNC: 139 MMOL/L (ref 136–145)
WBC # BLD AUTO: 9.22 10*3/MM3 (ref 3.4–10.8)

## 2021-04-14 PROCEDURE — G0463 HOSPITAL OUTPT CLINIC VISIT: HCPCS

## 2021-04-14 PROCEDURE — 99204 OFFICE O/P NEW MOD 45 MIN: CPT | Performed by: ORTHOPAEDIC SURGERY

## 2021-04-14 PROCEDURE — 99214 OFFICE O/P EST MOD 30 MIN: CPT | Performed by: NURSE PRACTITIONER

## 2021-04-14 PROCEDURE — 83880 ASSAY OF NATRIURETIC PEPTIDE: CPT

## 2021-04-14 PROCEDURE — 80053 COMPREHEN METABOLIC PANEL: CPT

## 2021-04-14 PROCEDURE — 85025 COMPLETE CBC W/AUTO DIFF WBC: CPT

## 2021-04-14 PROCEDURE — 83735 ASSAY OF MAGNESIUM: CPT

## 2021-04-14 PROCEDURE — 36415 COLL VENOUS BLD VENIPUNCTURE: CPT

## 2021-04-14 PROCEDURE — 73610 X-RAY EXAM OF ANKLE: CPT | Performed by: ORTHOPAEDIC SURGERY

## 2021-04-14 RX ORDER — CARVEDILOL 3.12 MG/1
3.12 TABLET ORAL 2 TIMES DAILY WITH MEALS
COMMUNITY
End: 2021-09-29

## 2021-04-14 NOTE — PROGRESS NOTES
\[facility] [place] [facility]\"" HEART FAILURE      Patient Name: Nina Saez  :1975  Age: 45 y.o.  Sex: female  Referring Provider: Lisset Melton MD   Primary Cardiologist: Lisset Melton MD  Encounter Provider:  SAMUEL Cisneros      Chief Complaint:   Chief Complaint   Patient presents with   • Congestive Heart Failure         Congestive Heart Failure  Pertinent negatives include no chest pain, fatigue, palpitations or shortness of breath.    this 44-year-old female, known to this provider, comes to us today for further evaluation of her chronic systolic congestive heart failure.  Current diagnoses to include severe nonischemic cardiomyopathy, left bundle branch block, hypertension, HIV, obesity, and asthma.    Historically she had followed with Dr. Laina Boyd at [facility].  In spring 2019 she was visiting family in [place] and was taken emergently to [facility].  Echocardiogram and cardiac catheterization were performed at that point, carvedilol was changed to metoprolol.    In 2019 she presented through the emergency department at [facility] with chest pain and shortness of breath.  She was treated with IV diuresis, troponin was negative x2 and proBNP was elevated at 5151.  Entresto was started at that point given her severe dilated cardiomyopathy.  She was to follow with cardiology at [facility] at that time.    In 2020 she again presented to the emergency department with shortness of breath and cough x3 weeks.  BNP was elevated at 13,351.  Carvedilol was discontinued that admission given hypotension.  At subsequent outpatient appointment it was felt that Bumex was less effective than Lasix by the patient.  She complained of progressively worsening fatigue.  AICD, single-chamber [employer], interrogation 2020 revealed 10-16 beats of VT.    On September 10, 2020 she saw Dr. Jamar Azul MD, for consideration  of upgrade to biventricular device.  At that point he felt that her left bundle branch block was incomplete and she did not meet criteria for implantation of CRT-D.    She presented 10/27/2020 to [facility] with complaints of acute on chronic shortness of breath that began approximately 1 week prior.  BNP was further elevated at 16,206 that point.  Pulmonary edema was noted on chest x-ray.  Spironolactone was started that admission.  Referral for evaluation by [facility] transplant services was made November 2, 2020.    Left and right cardiac catheterization at [facility] revealed no obstructive CAD with normal filling pressures.  Echocardiogram at that time revealed an EF less than 15% with global hypokinesis-information extracted from external medical record note.  Cardiac catheterization May 30, 2017 performed at [facility] yielded codominant system with normal left main, LAD with distal 30% stenosis, RCA normal, LVEDP 7 mmHg.    Echocardiogram July 9, 2020 revealed EF of 6%, grade 3 LV diastolic dysfunction, significant LV wall motion hypokinesis/akinesis, RVSP 45 to 55 mmHg secondary to moderate tricuspid valve regurgitation, moderate to severe MR noted.    Last remote device check 10/17/2020 revealed 1 episode of NSVT lasting 27 beats, normal function.  CMP 1/4/2021 revealed creatinine of 1.0, GFR 73, electrolytes stable.  TSH normal 10/28/2020.  Last BNP 10/27/2020 was 16,206.    Jardiance 10 mg daily was started on 11/13/2020.      She was admitted from 12/11/2020 to 12/17/2020 for acute pulmonary embolism with right lower lobe pulmonary infarct.  She was started on warfarin at that time.  Additionally, she has been started on Corlanor as well as spironolactone for her heart failure as well.  January 4, 2021 she was evaluated and treated in the emergency department for possible angioedema.  She is to follow-up with her PCP regarding this per ED note.      Entresto has  now been increased to  mg twice daily.  Repeat echocardiogram 3/8/2021 revealed improvement in ejection fraction from 6% to 22% with severe LV dilation, severe global hypokinesis and grade 2 LV diastolic dysfunction.    She is currently doing well at home.  She is a bit more active.  She has no active complaints today.      The following portions of the patient's history were reviewed and updated as appropriate: allergies, current medications, past family history, past medical history, past social history, past surgical history and problem list.    Current Outpatient Medications   Medication Sig Dispense Refill   • acetaminophen (TYLENOL) 325 MG tablet Take 650 mg by mouth Every 6 (Six) Hours As Needed for Mild Pain .     • albuterol sulfate  (90 Base) MCG/ACT inhaler Inhale 2 puffs Every 4 (Four) Hours As Needed for Wheezing.     • carvedilol (COREG) 3.125 MG tablet Take 3.125 mg by mouth 2 (Two) Times a Day With Meals.     • darunavir ethanolate (PREZISTA) 800 MG tablet tablet Take 800 mg by mouth Every Night.     • diphenhydrAMINE (BENADRYL) 25 mg capsule Take 1 capsule by mouth Every 6 (Six) Hours As Needed for Itching (swelling). 20 capsule 0   • Empagliflozin (Jardiance) 10 MG tablet Take 10 mg by mouth Daily. 30 tablet 11   • Emtricitabine-Tenofovir AF (DESCOVY) 200-25 MG per tablet Take  by mouth Every Night.     • fluticasone (Flonase) 50 MCG/ACT nasal spray 2 sprays into the nostril(s) as directed by provider Daily. 1 bottle 2   • furosemide (LASIX) 80 MG tablet Take 1 tablet by mouth 2 (Two) Times a Day. 180 tablet 3   • guaiFENesin-codeine (GUAIFENESIN AC) 100-10 MG/5ML liquid Take 5 mL by mouth Every 4 (Four) Hours As Needed for Cough. 118 mL 0   • ondansetron ODT (Zofran ODT) 4 MG disintegrating tablet Place 1 tablet on the tongue Every 8 (Eight) Hours As Needed for Nausea or Vomiting. 30 tablet 0   • potassium chloride (K-DUR,KLOR-CON) 20 MEQ CR tablet Take 2 tablets by mouth Daily. 90  tablet 1   • ritonavir (NORVIR) 100 MG tablet Take 100 mg by mouth Every Night.     • sacubitril-valsartan (Entresto)  MG tablet Take 1 tablet by mouth 2 (Two) Times a Day. 180 tablet 3   • sertraline (ZOLOFT) 50 MG tablet Take 50 mg by mouth Daily.     • spironolactone (ALDACTONE) 25 MG tablet 25 mg Daily.     • vitamin D (ERGOCALCIFEROL) 18992 units capsule capsule Take 50,000 Units by mouth Every 30 (Thirty) Days.     • warfarin (COUMADIN) 2.5 MG tablet Take 2 tablets (5 mg) by mouth on Mon and Fri, and take 3 tablets (7.5 mg) by mouth all other days or as directed. 245 tablet 1   • ivabradine HCl (Corlanor) 7.5 MG tablet tablet Take 1 tablet by mouth 2 (Two) Times a Day With Meals. 60 tablet 5     No current facility-administered medications for this encounter.       Past Medical History:   Diagnosis Date   • AICD (automatic cardioverter/defibrillator) present 3/6/2020   • Asthma    • CHF (congestive heart failure) (CMS/[facility])    • Class 2 obesity in adult    • Grade II diastolic dysfunction     Per echocardiogram 3/2021   • HIV (human immunodeficiency virus infection) (CMS/[facility])    • Hypertension    • LBBB (left bundle branch block)    • NICM (nonischemic cardiomyopathy) (CMS/[facility])     7/2020 EF 6% per echocardiogram; AICD present   • Nonrheumatic mitral valve regurgitation 3/8/2021    Moderate per echo 3/2021   • Nonrheumatic tricuspid valve regurgitation     Moderate per echocardiogram 3/2021       Past Surgical History:   Procedure Laterality Date   • CARDIAC CATHETERIZATION     • CARDIAC DEFIBRILLATOR PLACEMENT     • FRACTURE SURGERY Left     left ankle   • HYSTERECTOMY         Physical Exam  Vitals and nursing note reviewed.   Constitutional:       General: She is not in acute distress.     Appearance: She is well-developed. She is not ill-appearing.   HENT:      Head: Normocephalic and atraumatic.   Eyes:      Conjunctiva/sclera: Conjunctivae normal.      Pupils: Pupils are equal, round, and reactive to  "light.   Neck:      Vascular: No JVD.   Cardiovascular:      Rate and Rhythm: Normal rate and regular rhythm.      Heart sounds: Normal heart sounds. No murmur heard.   No friction rub. No gallop.    Pulmonary:      Effort: Pulmonary effort is normal. No respiratory distress.      Breath sounds: Normal breath sounds.   Abdominal:      General: Bowel sounds are normal. There is no distension.      Palpations: Abdomen is soft.   Musculoskeletal:         General: No swelling or deformity.   Skin:     General: Skin is warm and dry.      Capillary Refill: Capillary refill takes less than 2 seconds.   Neurological:      Mental Status: She is alert and oriented to person, place, and time. Mental status is at baseline.   Psychiatric:         Attention and Perception: Attention normal.         Mood and Affect: Mood normal. Affect is tearful.         Behavior: Behavior normal. Behavior is cooperative.          Review of Systems   Constitutional: Negative for appetite change and fatigue.   HENT: Negative for congestion and nosebleeds.    Eyes: Negative for photophobia and visual disturbance.   Respiratory: Negative for cough, chest tightness and shortness of breath.    Cardiovascular: Negative for chest pain, palpitations and leg swelling.   Gastrointestinal: Negative for abdominal distention and blood in stool.   Endocrine: Negative for polyphagia and polyuria.   Genitourinary: Negative for enuresis and frequency.   Musculoskeletal: Negative for joint swelling and myalgias.   Skin: Negative for pallor and rash.   Neurological: Negative for dizziness, syncope, weakness, light-headedness, numbness and headaches.   Hematological: Does not bruise/bleed easily.   Psychiatric/Behavioral: Negative for decreased concentration and sleep disturbance.        OBJECTIVE:  /64 (BP Location: Left arm, Patient Position: Sitting)   Pulse 72   Resp 16   Ht 167.6 cm (65.98\")   Wt 88.9 kg (196 lb)   SpO2 97%   BMI 31.65 kg/m²  "     Body mass index is 31.65 kg/m².  Wt Readings from Last 1 Encounters:   04/14/21 88.9 kg (196 lb)       Lab Review:  Renal Function: CrCl cannot be calculated (Patient's most recent lab result is older than the maximum 30 days allowed.).    Lab Results   Component Value Date    PROBNP 22,479.0 (H) 12/12/2020       Results for orders placed during the hospital encounter of 03/08/21    Adult Transthoracic Echo Complete W/ Cont if Necessary Per Protocol    Interpretation Summary  · The left ventricular cavity is severely dilated.  · Estimated left ventricular EF = 22% Left ventricular systolic function is severely decreased.  · Left ventricular diastolic function is consistent with (grade II w/high LAP) pseudonormalization.  · The left atrial cavity is severely dilated.  · There is mild, bileaflet mitral valve thickening present.  · Moderate mitral valve regurgitation is present.  · Moderate tricuspid valve regurgitation is present.  · Estimated right ventricular systolic pressure from tricuspid regurgitation is normal (<35 mmHg).        6 MINUTE WALK  Patient declined       Cardiac Procedures:  1. Cardiac catheterization [facility] 5/30/17       2.  /Critical access hospital 4/2019   Data deficit      ASSESSMENT:     Diagnosis Plan   1. Chronic systolic congestive heart failure (CMS/HCC)  Basic Metabolic Panel    BNP   2. Grade II diastolic dysfunction     3. NICM (nonischemic cardiomyopathy) (CMS/HCC)     4. Essential hypertension           PLAN OF CARE:  1.  HFrEF-NYHA class II most recent EF per echocardiogram 6%. Currently she is on Entresto, Lasix, carvedilol, ivabradine, spironolactone, and Jardiance.  Historically she has been unable to tolerate metoprolol succinate as it made her very dizzy.      She is now on target dosing of Entresto.  Heart rate is 72 in office today, I will increase her ivabradine to 7.5 mg.  She is currently scheduled to follow-up with SAMUEL Kohler with cardiology on April 28.   She is euvolemic on exam today.  Ejection fraction now improved to 22% per echocardiogram on 3/8/2021.  I would like her to continue following a low-sodium diet, continue weighing each morning, and to follow-up with us within 6 to 8 weeks.  I would like to check a BMP and BNP today.    Directions for when to call the clinic reviewed with the patient to include weight gain of 2 to 3 pounds in 24 hours, weight gain of 5 to 10 pounds within 7 days; worsening shortness of breath; worsening lower extremity edema or abdominal distention.    2.  Nonobstructive CAD-diffuse 30% LAD lesion noted on prior cardiac catheterization as above.  Stable, without angina.    3.  Nonischemic cardiomyopathy (dilated)-presence of AICD noted.  Most recent EF per echocardiogram is 6%.  Most recent AICD interrogation as above.  Now on target dosing of Entresto, blood pressure tolerating well.    4.  NSVT-noted on prior device interrogation.  Currently on beta-blockade.    5.  HIV-history noted.    6.  Pulmonary embolism-now currently on warfarin.  Followed by home health and primary cardiology team.        Increase Corlanor; BMP/BNP today; follow-up in 6 to 8 weeks or sooner if needed        Thank you for allowing me to participate in the care of your patient,         Kiley JIN Oliver, APRDEBRA  [facility] HEART FAILURE  04/14/21  13:56 EST      **Lyric Disclaimer:**  Much of this encounter note is an electronic transcription/translation of spoken language to printed text. The electronic translation of spoken language may permit erroneous, or at times, nonsensical words or phrases to be inadvertently transcribed. Although I have reviewed the note for such errors, some may still exist.

## 2021-04-14 NOTE — PROGRESS NOTES
New Patient Complaint      Patient: Nina Saez  YOB: 1975 45 y.o. female  Medical Record Number: [id number]    Chief Complaints: My ankle hurts    History of Present Illness: Patient underwent open reduction internal fixation of the left bimalleolar ankle fracture including syndesmotic fixation at [facility] by Dr. Calvert on 3/7/2020.    She saw him on 2/1/2021 and I have reviewed those notes.  At that time she had persistent complaints of pain and evidently surgery was recommended for removing the hardware    However she has significant cardiac history and I was contacted by Dr. Melton let me know that if patient does need to have surgery she would prefer to be done at [facility] where she can monitor patient    She has moderate aching pain in the left ankle some posterior laterally and medially as well as pain along the Achilles and into the heel with some occasional radiation over the anterior aspect of the ankle with some occasional feelings of numbness.  It does feel better when she wears her lace up brace.       She is seen today at the request of Dr. Melton who has requested my opinion regarding etiology and treatment of this condition    HPI    Allergies: No Known Allergies    Medications:   Current Outpatient Medications on File Prior to Visit   Medication Sig   • acetaminophen (TYLENOL) 325 MG tablet Take 650 mg by mouth Every 6 (Six) Hours As Needed for Mild Pain .   • albuterol sulfate  (90 Base) MCG/ACT inhaler Inhale 2 puffs Every 4 (Four) Hours As Needed for Wheezing.   • Corlanor 5 MG tablet tablet Take 5 mg by mouth 2 (Two) Times a Day With Meals.   • darunavir ethanolate (PREZISTA) 800 MG tablet tablet Take 800 mg by mouth Every Night.   • diphenhydrAMINE (BENADRYL) 25 mg capsule Take 1 capsule by mouth Every 6 (Six) Hours As Needed for Itching (swelling).   • Empagliflozin (Jardiance) 10 MG tablet Take 10 mg by mouth Daily.   •  Emtricitabine-Tenofovir AF (DESCOVY) 200-25 MG per tablet Take  by mouth Every Night.   • fluticasone (Flonase) 50 MCG/ACT nasal spray 2 sprays into the nostril(s) as directed by provider Daily.   • furosemide (LASIX) 80 MG tablet Take 1 tablet by mouth 2 (Two) Times a Day.   • guaiFENesin-codeine (GUAIFENESIN AC) 100-10 MG/5ML liquid Take 5 mL by mouth Every 4 (Four) Hours As Needed for Cough.   • ondansetron ODT (Zofran ODT) 4 MG disintegrating tablet Place 1 tablet on the tongue Every 8 (Eight) Hours As Needed for Nausea or Vomiting.   • potassium chloride (K-DUR,KLOR-CON) 20 MEQ CR tablet Take 2 tablets by mouth Daily.   • ritonavir (NORVIR) 100 MG tablet Take 100 mg by mouth Every Night.   • sacubitril-valsartan (Entresto)  MG tablet Take 1 tablet by mouth 2 (Two) Times a Day.   • sertraline (ZOLOFT) 50 MG tablet Take 50 mg by mouth Daily.   • spironolactone (ALDACTONE) 25 MG tablet 25 mg Daily.   • vitamin D (ERGOCALCIFEROL) 65781 units capsule capsule Take 50,000 Units by mouth Every 30 (Thirty) Days.   • warfarin (COUMADIN) 2.5 MG tablet Take 2 tablets (5 mg) by mouth on Mon and Fri, and take 3 tablets (7.5 mg) by mouth all other days or as directed.     No current facility-administered medications on file prior to visit.       Past Medical History:   Diagnosis Date   • AICD (automatic cardioverter/defibrillator) present 3/6/2020   • Asthma    • CHF (congestive heart failure) (CMS/[facility])    • Class 2 obesity in adult    • Grade II diastolic dysfunction     Per echocardiogram 3/2021   • HIV (human immunodeficiency virus infection) (CMS/[facility])    • Hypertension    • LBBB (left bundle branch block)    • NICM (nonischemic cardiomyopathy) (CMS/[facility])     7/2020 EF 6% per echocardiogram; AICD present   • Nonrheumatic mitral valve regurgitation 3/8/2021    Moderate per echo 3/2021   • Nonrheumatic tricuspid valve regurgitation     Moderate per echocardiogram 3/2021     Past Surgical History:   Procedure Laterality  "Date   • CARDIAC CATHETERIZATION     • CARDIAC DEFIBRILLATOR PLACEMENT     • FRACTURE SURGERY Left     left ankle   • HYSTERECTOMY       Social History     Occupational History   • Not on file   Tobacco Use   • Smoking status: Former Smoker     Quit date:      Years since quittin.2   • Smokeless tobacco: Never Used   Substance and Sexual Activity   • Alcohol use: Not Currently     Comment: holiday and special occ//caffeine use: 1 cup daily, 1 soda occasionally   • Drug use: Never   • Sexual activity: Defer      Social History     Social History Narrative   • Not on file     Family History   Problem Relation Age of Onset   • Cancer Mother    • Breast cancer Mother    • Bone cancer Mother    • Hypertension Maternal Grandfather    • Hyperlipidemia Maternal Grandfather    • Diabetes Maternal Grandfather    • Prostate cancer Maternal Grandfather    • Ovarian cysts Daughter    • Breast cancer Maternal Grandmother    • Heart disease Maternal Grandmother    • No Known Problems Daughter    • No Known Problems Daughter        Review of Systems: 14 point review of systems performed, positive pertinent findings identified in HPI. All remaining systems negative     Review of Systems      Physical Exam:   Vitals:    21 0838   Temp: 97.7 °F (36.5 °C)   Weight: 87.1 kg (192 lb)   Height: 167.6 cm (66\")   PainSc:   6     Physical Exam   Constitutional: pleasant, well developed   Eyes: sclera non icteric  Hearing : adequate for exam  Cardiovascular: palpable pulses in left foot, left calf/ thigh NT without sign of DVT  Respiratoy: breathing unlabored   Neurological: grossly sensate to LT throughout left LE  Psychiatric: oriented with normal mood and affect.   Lymphatic: No palpable popliteal lymphadenopathy left LE  Skin: intact throughout left leg/foot  Musculoskeletal: She has a nonantalgic gait.  Left ankle shows well-healed incisions without sign of infection.  There is mild discomfort along the inferolateral aspect " of the left ankle as well as some medially minimal discomfort with range of motion no exacerbation of pain of the syndesmosis with external rotation testing.  Mild discomfort on the posterior plantar aspect of the calcaneus along the Achilles but no palpable defect.  Physical Exam  Ortho Exam    Radiology: 3 views left ankle ordered evaluate alignment reviewed and no prior x-rays available for comparison there has been evidence of open reduction internal fixation of the lateral malleolus with a plate and one medial malleolar screw and a transsyndesmotic screw.  From what I can tell fractures appear to be healed there appears to be some loosening around the syndesmotic screw but and it appears that the head has broken from the syndesmosis screw.    I also reviewed paper copies of x-rays that she brought in from 2/1/2021 and this was evident on those x-rays as well.    Assessment/Plan: 1.  Status post ORIF left ankle including syndesmotic fixation with breakage of screw  2.  Left Achilles tendinitis and heel pain    We had a long discussion regarding treatment options and the broken screw would be extremely difficult to get out as it broke off at the head and not sure that removing her hardware would necessarily alleviate her pain.    We will have her continue with her lace up ankle brace and we will get a get a CT scan to evaluate the alignment of fractures make sure things are healed and alignment the syndesmosis and to see if the medial aspect of the screw is protruding at all we could potentially get a hold of it there.    Also been a try to get an MRI of her left ankle to evaluate the peroneal tendons and area of syndesmosis and to evaluate the Achilles and heel pain (she does have a defibrillator and not sure she can have an MRI and if she cannot she will let me know and we will order a bone scan).    We will see her back in 3 to 4 weeks with x-rays of her left ankle.

## 2021-04-14 NOTE — PROGRESS NOTES
"hospitals HEART FAILURE      Patient Name: Nina Saez  :1975  Age: 45 y.o.  Sex: female  Referring Provider: Lisset Melton MD   Primary Cardiologist: Dr. Melton  Encounter Provider: Zina Waldron, PharmD, BCACP      Chief Complaint:   Chief Complaint   Patient presents with   • Congestive Heart Failure       HPI:  Patient presents for a follow-up appt in the HF clinic. PMH is significant for HFrEF (EF now 22%), severe nonischemic cardiomyopathy, left bundle branch block, HTN, HIV, obesity, and asthma. She had a recent echo on 3/8/21 which showed her EF has improved from 6% to 22%. Her weight is up on clinic scale but she ate prior to her appt. She states her weights and symptoms have been stable at home. No questions/concerns with her medications. She states she has a telehealth visit with  sometime next month.      Current Regimen:  Heart Failure  Carvedilol 3.125 mg bid  Furosemide 80 mg bid  Entresto 97/103 mg bid  Spironolactone 25 mg daily  Ivabradine 5 mg bid  Jardiance 10 mg daily      Other CV Meds  Aspirin 81 mg daily  Warfarin as directed by med mgmt clinic  KCl 20 mEq (2 tablets) daily      Medication Use:  Adherence: great, uses a pillbox  Past hx of medication use/intolerance: metoprolol (dizziness, nausea)  Affordability: [facility] Medicare/Medicaid; no issues with cost      Diet:  Watching sodium intake and keeping <2000 mg/day. Baking most meats      Social History:  Tobacco use: former smoker (quit )  EtOH use: none  Illicit drug use: none  Exercise: improved since med changes - walking + household chores/activities      Immunization Status:  Pneumococcal: PCV13 (2017), PPSV23 (2021)  Influenza: received at [facility]       OBJECTIVE:    /64 (BP Location: Left arm, Patient Position: Sitting)   Pulse 72   Resp 16   Ht 167.6 cm (65.98\")   Wt 88.9 kg (196 lb)   SpO2 97%   BMI 31.65 kg/m²     Body mass index is 31.65 kg/m².  Wt Readings from Last 1 " Encounters:   04/14/21 88.9 kg (196 lb)       Lab Review:  Renal Function: CrCl cannot be calculated (Patient's most recent lab result is older than the maximum 30 days allowed.).    Lab Results   Component Value Date    PROBNP 22,479.0 (H) 12/12/2020       Results for orders placed during the hospital encounter of 03/08/21    Adult Transthoracic Echo Complete W/ Cont if Necessary Per Protocol    Interpretation Summary  · The left ventricular cavity is severely dilated.  · Estimated left ventricular EF = 22% Left ventricular systolic function is severely decreased.  · Left ventricular diastolic function is consistent with (grade II w/high LAP) pseudonormalization.  · The left atrial cavity is severely dilated.  · There is mild, bileaflet mitral valve thickening present.  · Moderate mitral valve regurgitation is present.  · Moderate tricuspid valve regurgitation is present.  · Estimated right ventricular systolic pressure from tricuspid regurgitation is normal (<35 mmHg).        ASSESSMENT/PLAN OF CARE:    1. HFrEF (EF 22%)  - Patient continues to do remarkably well and has no associated HF symptoms  - Given patient's HR in the 70s, increase ivabradine to 7.5 mg twice daily (max dose). Asked patient to monitor HR at home and goal is between 50-60 bpm  - Noted interaction between ivabradine and HIV meds - risk of increased concentration of ivabradine. As patient has tolerated this well at a dose of 5 mg bid, I think it is acceptable to increase to 7.5 mg bid. Patient's HR has been stable at home and in office and prior QTc on EKG was good. Will plan to have primary cardiology check EKG at appt in a couple of weeks   - Continue carvedilol 3.125 mg twice daily  - Continue Entresto 97/103 mg twice daily  - Continue furosemide 80 mg twice daily  - Continue Jardiance 10 mg daily  - Continue spironolactone 25 mg daily  - Advised patient to call clinic with 2-3 lb weight gain in 24 hrs or >5 lb weight gain in 1 week       15  min spent in direct patient care      Thank you for allowing me to participate in the care of your patient,         Zina Wald[facility] HEART FAILURE  04/14/21  14:10 EST

## 2021-04-15 LAB — INR PPP: 2.3

## 2021-04-16 ENCOUNTER — ANTICOAGULATION VISIT (OUTPATIENT)
Dept: PHARMACY | Facility: HOSPITAL | Age: 46
End: 2021-04-16

## 2021-04-16 NOTE — PROGRESS NOTES
"Anticoagulation Clinic Progress Note    Anticoagulation Summary  As of 2021    INR goal:  2.0-3.0   TTR:  44.9 % (3.6 mo)   INR used for dosin.30 (4/15/2021)   Warfarin maintenance plan:  5 mg every Mon, Wed, Fri; 7.5 mg all other days   Weekly warfarin total:  45 mg   No change documented:  Chucho Mcdaniel RPH   Plan last modified:  Chucho Mcdaniel RPH (2021)   Next INR check:  2021   Target end date:  Indefinite    Indications    Other acute pulmonary embolism  unspecified whether acute cor pulmonale present (CMS/HCC) (Resolved) [I26.99]  Acute pulmonary embolism  unspecified pulmonary embolism type  unspecified whether acute cor pulmonale present (CMS/HCC) (Resolved) [I26.99]             Anticoagulation Episode Summary     INR check location:      Preferred lab:      Send INR reminders to:  PRIYA BLANDON  POOL    Comments:  **Home Testing Program**      Anticoagulation Care Providers     Provider Role Specialty Phone number    Lisset Melton MD Referring Cardiology [phone]          Clinic Interview:  Patient Findings     Positives:  Other complaints    Negatives:  Signs/symptoms of thrombosis, Signs/symptoms of bleeding,   Laboratory test error suspected, Change in health, Change in alcohol use,   Change in activity, Upcoming invasive procedure, Emergency department   visit, Upcoming dental procedure, Missed doses, Extra doses, Change in   medications, Change in diet/appetite, Hospital admission, Bruising    Comments:  Failed one test (forgot to press \"test\"), but completed 2nd   attempt perfectly.      Clinical Outcomes     Negatives:  Major bleeding event, Thromboembolic event,   Anticoagulation-related hospital admission, Anticoagulation-related ED   visit, Anticoagulation-related fatality    Comments:  Failed one test (forgot to press \"test\"), but completed 2nd   attempt perfectly.        INR History:  Anticoagulation Monitoring 2021   INR 3.60 " 3.60 2.30   INR Date 4/1/2021 4/8/2021 4/15/2021   INR Goal 2.0-3.0 2.0-3.0 2.0-3.0   Trend Down Up Same   Last Week Total 47.5 mg 47.5 mg 45 mg   Next Week Total 37.5 mg 42.5 mg 45 mg   Sun 7.5 mg 7.5 mg 7.5 mg   Mon 5 mg 5 mg 5 mg   Tue 7.5 mg 7.5 mg 7.5 mg   Wed 5 mg 5 mg 5 mg   Thu 2.5 mg (4/1) 5 mg (4/8) -   Fri 5 mg 5 mg 5 mg   Sat 5 mg 7.5 mg 7.5 mg   Visit Report - - -   Some recent data might be hidden       Plan:  1. INR was Therapeutic yesterday- see above in Anticoagulation Summary.   Will instruct Nina Saez to Continue their warfarin regimen- see above in Anticoagulation Summary.  2. Follow up in 1 week  3. They have been instructed to call if any changes in medications, doses, concerns, etc. Patient expresses understanding and has no further questions at this time.    Chucho Mcdaniel [facility]

## 2021-04-20 ENCOUNTER — TELEPHONE (OUTPATIENT)
Dept: CARDIOLOGY | Facility: HOSPITAL | Age: 46
End: 2021-04-20

## 2021-04-20 NOTE — TELEPHONE ENCOUNTER
Called Pt to reschedule appt. on 5-25-21.  No answer.  Left VM message with clinic telephone number. Will await call back/try to call again.    Lisha Shine RN  [facility]   Infant's mother given discharge instructions.  She verbalized understanding.  No further questions.  Infant is adequate for discharge to home.

## 2021-04-22 ENCOUNTER — OFFICE VISIT (OUTPATIENT)
Dept: OBSTETRICS AND GYNECOLOGY | Age: 46
End: 2021-04-22

## 2021-04-22 VITALS
BODY MASS INDEX: 30.13 KG/M2 | WEIGHT: 192 LBS | DIASTOLIC BLOOD PRESSURE: 72 MMHG | HEIGHT: 67 IN | SYSTOLIC BLOOD PRESSURE: 130 MMHG

## 2021-04-22 DIAGNOSIS — Z11.51 ENCOUNTER FOR SCREENING FOR HUMAN PAPILLOMAVIRUS (HPV): ICD-10-CM

## 2021-04-22 DIAGNOSIS — Z12.11 SCREENING FOR COLON CANCER: ICD-10-CM

## 2021-04-22 DIAGNOSIS — Z01.419 WELL WOMAN EXAM WITH ROUTINE GYNECOLOGICAL EXAM: Primary | ICD-10-CM

## 2021-04-22 PROBLEM — I26.99 PULMONARY EMBOLI: Status: ACTIVE | Noted: 2021-04-22

## 2021-04-22 LAB — INR PPP: 2.5

## 2021-04-22 PROCEDURE — G0101 CA SCREEN;PELVIC/BREAST EXAM: HCPCS | Performed by: OBSTETRICS & GYNECOLOGY

## 2021-04-22 NOTE — PROGRESS NOTES
"Chief complaint: annual/ New gyn    Subjective   History of Present Illness    Nina Saez is a 45 y.o.  who presents for annual exam/ new gyn  Her menses are regular every 28-30 days, lasting 5 days, no menorrhagia or dysmenorrhea  She is not sexually active currently  H/o CHF w/ defibrilator in place, h/o PE 2020, on coumadin  She is unable to undergo colonoscopy and she was not medically cleared for anesthesia per cardiology  MMG 3/26/2021 wnl    Obstetric History:  OB History        5    Para   5    Term   5            AB        Living           SAB        TAB        Ectopic        Molar        Multiple        Live Births                   Menstrual History:     Patient's last menstrual period was 2021 (approximate).         Current contraception: abstinence  History of abnormal Pap smear: no  Received Gardasil immunization: no  Perform regular self breast exam: yes -    Family history of uterine or ovarian cancer: no  Family History of colon cancer: no  Family history of breast cancer: yes - mother    Mammogram: up to date.  Colonoscopy: recommended.  DEXA: not indicated.    Exercise: not active  Calcium/Vitamin D: adequate intake    The following portions of the patient's history were reviewed and updated as appropriate: allergies, current medications, past family history, past medical history, past social history, past surgical history and problem list.    Review of Systems    Pertinent items are noted in HPI.     Objective   Physical Exam    /72   Ht 168.9 cm (66.5\")   Wt 87.1 kg (192 lb)   LMP 2021 (Approximate) Comment: Partial hysterectomy  Breastfeeding No   BMI 30.53 kg/m²     General:   alert, appears stated age and cooperative   Neck:    Heart:    Lungs:    Abdomen:    Breast: inspection negative, no nipple discharge or bleeding, no masses or nodularity palpable   Vulva: normal, Bartholin's, Urethra, Chamois's normal   Vagina: normal mucosa   Cervix: " multiparous appearance, no bleeding following Pap and no cervical motion tenderness   Uterus: normal size, bulky, mobile, non-tender, normal shape and consistency   Adnexa: normal adnexa and no mass, fullness, tenderness   Rectal: not indicated     Assessment/Plan   Diagnoses and all orders for this visit:    1. Well woman exam with routine gynecological exam (Primary)  -     IGP, Aptima HPV, Rfx 16 / 18,45    2. Encounter for screening for human papillomavirus (HPV)  -     IGP, Aptima HPV, Rfx 16 / 18,45    3. Screening for colon cancer  -     Cologuard - Stool, Per Rectum; Future        Breast self exam technique reviewed and patient encouraged to perform self-exam monthly.  Discussed healthy lifestyle modifications.  Pap smear sent

## 2021-04-23 ENCOUNTER — ANTICOAGULATION VISIT (OUTPATIENT)
Dept: PHARMACY | Facility: HOSPITAL | Age: 46
End: 2021-04-23

## 2021-04-23 NOTE — PROGRESS NOTES
Anticoagulation Clinic Progress Note    Anticoagulation Summary  As of 2021    INR goal:  2.0-3.0   TTR:  48.3 % (3.9 mo)   INR used for dosin.50 (2021)   Warfarin maintenance plan:  5 mg every Mon, Wed, Fri; 7.5 mg all other days   Weekly warfarin total:  45 mg   No change documented:  Nabil Christiansen, PharmD   Plan last modified:  Chucho Mcdaniel RPH (2021)   Next INR check:  2021   Target end date:  Indefinite    Indications    Other acute pulmonary embolism  unspecified whether acute cor pulmonale present (CMS/HCC) (Resolved) [I26.99]  Acute pulmonary embolism  unspecified pulmonary embolism type  unspecified whether acute cor pulmonale present (CMS/HCC) (Resolved) [I26.99]             Anticoagulation Episode Summary     INR check location:      Preferred lab:      Send INR reminders to:   NOMI BLANDON  POOL    Comments:  **Home Testing Program**      Anticoagulation Care Providers     Provider Role Specialty Phone number    Lisset Melton MD Referring Cardiology [phone]        Clinic Interview:  Patient Findings     Negatives:  Signs/symptoms of thrombosis, Signs/symptoms of bleeding,   Laboratory test error suspected, Change in health, Change in alcohol use,   Change in activity, Upcoming invasive procedure, Emergency department   visit, Upcoming dental procedure, Missed doses, Extra doses, Change in   medications, Change in diet/appetite, Hospital admission, Bruising, Other   complaints      Clinical Outcomes     Negatives:  Major bleeding event, Thromboembolic event,   Anticoagulation-related hospital admission, Anticoagulation-related ED   visit, Anticoagulation-related fatality        INR History:  Anticoagulation Monitoring 2021   INR 3.60 2.30 2.50   INR Date 2021 4/15/2021 2021   INR Goal 2.0-3.0 2.0-3.0 2.0-3.0   Trend Up Same Same   Last Week Total 47.5 mg 45 mg 45 mg   Next Week Total 42.5 mg 45 mg 45 mg   Sun 7.5 mg 7.5  mg 7.5 mg   Mon 5 mg 5 mg 5 mg   Tue 7.5 mg 7.5 mg 7.5 mg   Wed 5 mg 5 mg 5 mg   Thu 5 mg (4/8) - -   Fri 5 mg 5 mg 5 mg   Sat 7.5 mg 7.5 mg 7.5 mg   Visit Report - - -   Some recent data might be hidden       Plan:  1. INR is Therapeutic today- see above in Anticoagulation Summary.   Will instruct Nina Saez to Continue their warfarin regimen- see above in Anticoagulation Summary.  2. Follow up in 1 week  3. They have been instructed to call if any changes in medications, doses, concerns, etc. Patient expresses understanding and has no further questions at this time.    Nabil Christiansen, PharmD

## 2021-04-24 LAB
CYTOLOGIST CVX/VAG CYTO: NORMAL
CYTOLOGY CVX/VAG DOC CYTO: NORMAL
CYTOLOGY CVX/VAG DOC THIN PREP: NORMAL
DX ICD CODE: NORMAL
HIV 1 & 2 AB SER-IMP: NORMAL
HPV I/H RISK 4 DNA CVX QL PROBE+SIG AMP: NEGATIVE
OTHER STN SPEC: NORMAL
STAT OF ADQ CVX/VAG CYTO-IMP: NORMAL

## 2021-04-28 ENCOUNTER — OFFICE VISIT (OUTPATIENT)
Dept: CARDIOLOGY | Facility: CLINIC | Age: 46
End: 2021-04-28

## 2021-04-28 VITALS
HEART RATE: 53 BPM | DIASTOLIC BLOOD PRESSURE: 80 MMHG | WEIGHT: 192 LBS | BODY MASS INDEX: 30.86 KG/M2 | HEIGHT: 66 IN | SYSTOLIC BLOOD PRESSURE: 118 MMHG

## 2021-04-28 DIAGNOSIS — Z91.89 AT RISK FOR PROLONGED QT INTERVAL SYNDROME: ICD-10-CM

## 2021-04-28 DIAGNOSIS — I42.8 NICM (NONISCHEMIC CARDIOMYOPATHY) (HCC): Primary | ICD-10-CM

## 2021-04-28 DIAGNOSIS — I10 ESSENTIAL HYPERTENSION: ICD-10-CM

## 2021-04-28 DIAGNOSIS — I50.22 CHRONIC SYSTOLIC CONGESTIVE HEART FAILURE (HCC): ICD-10-CM

## 2021-04-28 DIAGNOSIS — I26.99 PULMONARY EMBOLISM, OTHER, UNSPECIFIED CHRONICITY, UNSPECIFIED WHETHER ACUTE COR PULMONALE PRESENT (HCC): ICD-10-CM

## 2021-04-28 DIAGNOSIS — Z95.810 AICD (AUTOMATIC CARDIOVERTER/DEFIBRILLATOR) PRESENT: ICD-10-CM

## 2021-04-28 PROBLEM — R06.09 EXERTIONAL DYSPNEA: Status: RESOLVED | Noted: 2019-08-06 | Resolved: 2021-04-28

## 2021-04-28 PROCEDURE — 93000 ELECTROCARDIOGRAM COMPLETE: CPT | Performed by: NURSE PRACTITIONER

## 2021-04-28 PROCEDURE — 99214 OFFICE O/P EST MOD 30 MIN: CPT | Performed by: NURSE PRACTITIONER

## 2021-04-28 NOTE — PROGRESS NOTES
Date of Office Visit: 2021  Encounter Provider: SAMUEL Yee  Place of Service: [facility] CARDIOLOGY  Patient Name: Nina Saez  :1975  Primary Cardiologist: Dr. Lisset Melton     Chief Complaint   Patient presents with   • Cardiomyopathy   • Follow-up   :     HPI: Nina Saez is a pleasant 45 y.o. female who presents today for cardiac follow-up visit.  I reviewed her medical history. She has a known history of HIV, essential hypertension, obesity, asthma, and history of illicit drug use.     In 2019, she was diagnosed with nonischemic cardiomyopathy and heart failure at [facility].  She was visiting family there.  She underwent cardiac catheterization which showed normal coronary arteries and normal filling pressures.  She underwent single-chamber [employer] AICD implantation.     In 2020, she presented to the [facility] ED with heart failure symptoms.  Repeat echocardiogram showed the following: severe left ventricular dilation, ejection fraction 6%, very thin left ventricular walls, grade 3 diastolic dysfunction, moderate to severe mitral insufficiency, moderate tricuspid insufficiency, RVSP 48 mmHg, and essentially global hypokinesis with akinesis at the bases.   She was started on goal-directed medical therapy. ICD interrogation showed nonsustained ventricular tachycardia.  In 2020, she saw Dr. Jamar Azul in the office and he did not feel that she met criteria for implantation of CRT device.  She was enrolled in our heart failure clinic and she also follows at the [facility] transplant services.    In 2020, she was hospitalized for an acute PE with right lower lobe pulmonary infarct and was having hemoptysis.  She was placed on warfarin.     Between her heart failure clinic and the [facility] Transplant Services her medications have been titrated.  Most recently, her ivabradine  was increased to 7.5 mg daily.  In 2021, repeat echocardiogram showed improved EF of 22%, left ventricle severely dilated, grade 2 diastolic dysfunction, left atrial cavity severely dilated, mild mitral valve thickening, moderate mitral and tricuspid valve regurgitation.    She presents today for a follow-up visit.  Overall she says she is feeling great.  She is on the maximum dose of sacubitril/valsartan and tolerating the ivabradine 7.5 mg without any adverse effects.  She has had chronic headaches for years and I recommended follow-up with her PCP.  Her blood pressure and heart rates are normal.  She denies chest pain, shortness of air, PND, orthopnea, edema, palpitations, dizziness, syncope, or bleeding.  She remains very active.      Past Medical History:   Diagnosis Date   • AICD (automatic cardioverter/defibrillator) present 3/6/2020   • Asthma    • CHF (congestive heart failure) (CMS/[facility])    • Class 2 obesity in adult    • Grade II diastolic dysfunction     Per echocardiogram 3/2021   • HIV (human immunodeficiency virus infection) (CMS/[facility])    • Hypertension    • LBBB (left bundle branch block)    • NICM (nonischemic cardiomyopathy) (CMS/[facility])     2020 EF 6% per echocardiogram; AICD present   • Nonrheumatic mitral valve regurgitation 3/8/2021    Moderate per echo 3/2021   • Nonrheumatic tricuspid valve regurgitation     Moderate per echocardiogram 3/2021       Past Surgical History:   Procedure Laterality Date   • CARDIAC CATHETERIZATION     • CARDIAC DEFIBRILLATOR PLACEMENT     •  SECTION      one C/S   • FRACTURE SURGERY Left     left ankle   • TUBAL ABDOMINAL LIGATION         Social History     Socioeconomic History   • Marital status: Legally      Spouse name: Not on file   • Number of children: Not on file   • Years of education: Not on file   • Highest education level: Not on file   Tobacco Use   • Smoking status: Former Smoker     Quit date:      Years since quittin.3    • Smokeless tobacco: Never Used   Vaping Use   • Vaping Use: Never used   Substance and Sexual Activity   • Alcohol use: Not Currently     Comment: holiday and special occ//caffeine use: 1 cup daily, 1 soda occasionally   • Drug use: Never   • Sexual activity: Not Currently     Partners: Male       Family History   Problem Relation Age of Onset   • Cancer Mother    • Breast cancer Mother    • Bone cancer Mother    • Hypertension Maternal Grandfather    • Hyperlipidemia Maternal Grandfather    • Diabetes Maternal Grandfather    • Prostate cancer Maternal Grandfather    • Ovarian cysts Daughter    • Breast cancer Maternal Grandmother    • Heart disease Maternal Grandmother    • No Known Problems Daughter    • No Known Problems Daughter    • Breast cancer Paternal Grandmother    • Pancreatic cancer Paternal Grandmother    • Prostate cancer Paternal Uncle    • Ovarian cancer Neg Hx    • Uterine cancer Neg Hx    • Colon cancer Neg Hx        The following portion of the patient's history were reviewed and updated as appropriate: past medical history, past surgical history, past social history, past family history, allergies, current medications, and problem list.    Review of Systems   Constitutional: Negative.   Cardiovascular: Negative.    Respiratory: Negative for cough.    Hematologic/Lymphatic: Negative.    Neurological: Negative.        No Known Allergies      Current Outpatient Medications:   •  acetaminophen (TYLENOL) 325 MG tablet, Take 650 mg by mouth Every 6 (Six) Hours As Needed for Mild Pain ., Disp: , Rfl:   •  albuterol sulfate  (90 Base) MCG/ACT inhaler, Inhale 2 puffs Every 4 (Four) Hours As Needed for Wheezing., Disp: , Rfl:   •  carvedilol (COREG) 3.125 MG tablet, Take 3.125 mg by mouth 2 (Two) Times a Day With Meals., Disp: , Rfl:   •  darunavir ethanolate (PREZISTA) 800 MG tablet tablet, Take 800 mg by mouth Every Night., Disp: , Rfl:   •  diphenhydrAMINE (BENADRYL) 25 mg capsule, Take 1  capsule by mouth Every 6 (Six) Hours As Needed for Itching (swelling)., Disp: 20 capsule, Rfl: 0  •  Empagliflozin (Jardiance) 10 MG tablet, Take 10 mg by mouth Daily., Disp: 30 tablet, Rfl: 11  •  Emtricitabine-Tenofovir AF (DESCOVY) 200-25 MG per tablet, Take  by mouth Every Night., Disp: , Rfl:   •  fluticasone (Flonase) 50 MCG/ACT nasal spray, 2 sprays into the nostril(s) as directed by provider Daily., Disp: 1 bottle, Rfl: 2  •  furosemide (LASIX) 80 MG tablet, Take 1 tablet by mouth 2 (Two) Times a Day., Disp: 180 tablet, Rfl: 3  •  guaiFENesin-codeine (GUAIFENESIN AC) 100-10 MG/5ML liquid, Take 5 mL by mouth Every 4 (Four) Hours As Needed for Cough., Disp: 118 mL, Rfl: 0  •  ivabradine HCl (Corlanor) 7.5 MG tablet tablet, Take 1 tablet by mouth 2 (Two) Times a Day With Meals., Disp: 60 tablet, Rfl: 5  •  ondansetron ODT (Zofran ODT) 4 MG disintegrating tablet, Place 1 tablet on the tongue Every 8 (Eight) Hours As Needed for Nausea or Vomiting., Disp: 30 tablet, Rfl: 0  •  potassium chloride (K-DUR,KLOR-CON) 20 MEQ CR tablet, Take 2 tablets by mouth Daily., Disp: 90 tablet, Rfl: 1  •  ritonavir (NORVIR) 100 MG tablet, Take 100 mg by mouth Every Night., Disp: , Rfl:   •  sacubitril-valsartan (Entresto)  MG tablet, Take 1 tablet by mouth 2 (Two) Times a Day., Disp: 180 tablet, Rfl: 3  •  sertraline (ZOLOFT) 50 MG tablet, Take 50 mg by mouth Daily., Disp: , Rfl:   •  spironolactone (ALDACTONE) 25 MG tablet, 25 mg Daily., Disp: , Rfl:   •  vitamin D (ERGOCALCIFEROL) 81208 units capsule capsule, Take 50,000 Units by mouth Every 30 (Thirty) Days., Disp: , Rfl:   •  warfarin (COUMADIN) 2.5 MG tablet, Take 2 tablets (5 mg) by mouth on Mon and Fri, and take 3 tablets (7.5 mg) by mouth all other days or as directed., Disp: 245 tablet, Rfl: 1        Objective:     Vitals:    04/28/21 1228 04/28/21 1232   BP: 118/80 118/80   BP Location: Left arm Right arm   Pulse: 53    Weight: 87.1 kg (192 lb)    Height: 167.6 cm  "(66\")      Body mass index is 30.99 kg/m².    PHYSICAL EXAM:    Vitals Reviewed.   General Appearance: No acute distress, well developed and well nourished.    Eyes: Conjunctiva and lids: No erythema, swelling, or discharge. Sclera non-icteric.   HENT: Atraumatic, normocephalic. External eyes, ears, and nose normal. No hearing loss noted. Mucous membranes normal. Lips not cyanotic. Neck supple with no tenderness.  Respiratory: No signs of respiratory distress. Respiration rhythm and depth normal.   Clear to auscultation. No rales, crackles, rhonchi, or wheezing auscultated.   Cardiovascular:  Jugular Venous Pressure: Normal  Heart Rate and Rhythm: Normal, Heart Sounds: Normal S1 and S2. No S3 or S4 noted.  Murmurs: No murmurs noted. No rubs, thrills, or gallops.   Lower Extremities: No edema noted.  Gastrointestinal:  Abdomen soft, non-distended, non-tender. Normal bowel sounds.    Musculoskeletal: Normal movement of extremities  Skin and Nails: General appearance normal. No pallor, cyanosis, diaphoresis. Skin temperature normal. No clubbing of fingernails.   Psychiatric: Patient alert and oriented to person, place, and time. Speech and behavior appropriate. Normal mood and affect.       ECG 12 Lead    Date/Time: 4/28/2021 12:30 PM  Performed by: Nilda Gallagher APRN  Authorized by: Nilda Gallagher APRN   Comparison: compared with previous ECG from 3/17/2021  Similar to previous ECG  Rhythm: sinus rhythm  Rate: bradycardic  BPM: 53  Conduction: left bundle branch block  T inversion: II, III, aVF, V3, V5, V6 and V4  QRS axis: normal  Other findings: non-specific ST-T wave changes    Clinical impression: abnormal EKG              Assessment:       Diagnosis Plan   1. NICM (nonischemic cardiomyopathy) (CMS/HCC)     2. Chronic systolic congestive heart failure (CMS/HCC)     3. At risk for prolonged QT interval syndrome     4. AICD (automatic cardioverter/defibrillator) present     5. Essential hypertension     6. " Pulmonary embolism, other, unspecified chronicity, unspecified whether acute cor pulmonale present (CMS/[facility])            Plan:       1/2. Nonischemic Cardiomyopathy and Chronic Systolic Heart Failure: NYHA class II.  LVEF 6% per echocardiogram in July 2020.  EF improved to 22% on recent echocardiogram.  AICD present.  She is on goal-directed medical therapy with carvedilol, ivabradine, spironolactone and sacubitril/valsartan.  She is very well compensated today.  I do not think we could increase her carvedilol today because of her low resting heart rate.    3.  QT interval: I received a message from SAMUEL Ramirez about rechecking her QTC on today's EKG which is 494.  Her QT interval is less than the R to R interval and I think it stable.  Yane said that the antiviral medication in combination with ivabradine could cause QT prolongation.    4. Left Bundle Branch Block: Chronic and unchanged.     5.  AICD: Continue with routine AICD checks.  We referred her to Dr. Jamar Azul for consideration of CRT device and he did not feel that she qualified.     6.  Hypertension: Blood pressure is well controlled.     7.  Pulmonary Embolism: Acute pulmonary embolism with right lower lobe pulmonary infarct in December 2020.  She remains on warfarin.    8.  She will follow-up in our Heart Failure Clinic in May 2021.  She will be scheduled to see Dr. Melton in 3 months.    As always, it has been a pleasure to participate in your patient's care. Thank you.       Sincerely,       SAMUEL Marquez  [facility] Cardiology      · COVID-19 Precautions - Patient was compliant in wearing a mask. When I saw the patient, I used appropriate personal protective equipment (PPE) including mask and eye shield (standard procedure).  Additionally, I used gown and gloves if indicated.  Hand hygiene was completed before and after seeing the patient.  · Dictated utilizing Dragon Dictation

## 2021-04-30 ENCOUNTER — ANTICOAGULATION VISIT (OUTPATIENT)
Dept: PHARMACY | Facility: HOSPITAL | Age: 46
End: 2021-04-30

## 2021-04-30 LAB — INR PPP: 2.4

## 2021-04-30 NOTE — PROGRESS NOTES
Anticoagulation Clinic Progress Note    Anticoagulation Summary  As of 2021    INR goal:  2.0-3.0   TTR:  51.6 % (4.1 mo)   INR used for dosin.40 (2021)   Warfarin maintenance plan:  5 mg every Mon, Wed, Fri; 7.5 mg all other days   Weekly warfarin total:  45 mg   No change documented:  Chucho Mcdaniel RPH   Plan last modified:  Chucho Mcdaniel RPH (2021)   Next INR check:  2021   Target end date:  Indefinite    Indications    Other acute pulmonary embolism  unspecified whether acute cor pulmonale present (CMS/HCC) (Resolved) [I26.99]  Acute pulmonary embolism  unspecified pulmonary embolism type  unspecified whether acute cor pulmonale present (CMS/HCC) (Resolved) [I26.99]             Anticoagulation Episode Summary     INR check location:      Preferred lab:      Send INR reminders to:  PRIYA BLANDON  POOL    Comments:  **Home Testing Program**      Anticoagulation Care Providers     Provider Role Specialty Phone number    Lisset Melton MD Referring Cardiology [phone]          Clinic Interview:  Patient Findings     Negatives:  Signs/symptoms of thrombosis, Signs/symptoms of bleeding,   Laboratory test error suspected, Change in health, Change in alcohol use,   Change in activity, Upcoming invasive procedure, Emergency department   visit, Upcoming dental procedure, Missed doses, Extra doses, Change in   medications, Change in diet/appetite, Hospital admission, Bruising, Other   complaints    Comments:  Multiple failed tests today (>8.0) after not allowing alcohol   from swab to dry completely.      Clinical Outcomes     Negatives:  Major bleeding event, Thromboembolic event,   Anticoagulation-related hospital admission, Anticoagulation-related ED   visit, Anticoagulation-related fatality    Comments:  Multiple failed tests today (>8.0) after not allowing alcohol   from swab to dry completely.        INR History:  Anticoagulation Monitoring 2021    INR 2.30 2.50 2.40   INR Date 4/15/2021 4/22/2021 4/30/2021   INR Goal 2.0-3.0 2.0-3.0 2.0-3.0   Trend Same Same Same   Last Week Total 45 mg 45 mg 45 mg   Next Week Total 45 mg 45 mg 45 mg   Sun 7.5 mg 7.5 mg 7.5 mg   Mon 5 mg 5 mg 5 mg   Tue 7.5 mg 7.5 mg 7.5 mg   Wed 5 mg 5 mg 5 mg   Thu - - -   Fri 5 mg 5 mg 5 mg   Sat 7.5 mg 7.5 mg 7.5 mg   Visit Report - - -   Some recent data might be hidden       Plan:  1. INR is Therapeutic today- see above in Anticoagulation Summary.   Will instruct Nina Saez to Continue their warfarin regimen- see above in Anticoagulation Summary.  2. Follow up in 1 week  3. They have been instructed to call if any changes in medications, doses, concerns, etc. Patient expresses understanding and has no further questions at this time.    Chucho Mcdaniel [facility]

## 2021-05-03 ENCOUNTER — OFFICE VISIT (OUTPATIENT)
Dept: ORTHOPEDIC SURGERY | Facility: CLINIC | Age: 46
End: 2021-05-03

## 2021-05-03 ENCOUNTER — DOCUMENTATION (OUTPATIENT)
Dept: ORTHOPEDIC SURGERY | Facility: CLINIC | Age: 46
End: 2021-05-03

## 2021-05-03 DIAGNOSIS — R52 PAIN: Primary | ICD-10-CM

## 2021-05-03 PROCEDURE — 73610 X-RAY EXAM OF ANKLE: CPT | Performed by: ORTHOPAEDIC SURGERY

## 2021-05-03 NOTE — PROGRESS NOTES
Patient came for follow-up appointment today because it was scheduled but she is not yet had her MRI or CT which are not scheduled until 5/14/2021.  She not had any change in symptoms so we did not examine her today and she will not be charged for this visit and will schedule an appointment to see her back several days after her MRI and CT are complete.

## 2021-05-03 NOTE — PROGRESS NOTES
Patient presents to the office today for follow-up after CT and MRI but is not yet had them done.  However x-rays were done prior to me seeing her and she not had any change in symptoms so we did not do an unofficial visit until I see her back.    However x-rays were reviewed 3 views the left ankle which showed no change in alignment compared with previous x-rays with broken head from the syndesmosis screw but no gross syndesmotic widening or change in alignment compared with previous x-rays.

## 2021-05-04 ENCOUNTER — TELEPHONE (OUTPATIENT)
Dept: OBSTETRICS AND GYNECOLOGY | Age: 46
End: 2021-05-04

## 2021-05-04 NOTE — TELEPHONE ENCOUNTER
Spoke w/[employer]olog[employer] rep regarding outstanding order.  Letter was sent to pt on 04/26/21 explaining that, since she is not 50 yet, she would need to self-pay or have request sent to Medicare requesting payment.  Self-pay cost is $681.

## 2021-05-07 ENCOUNTER — ANTICOAGULATION VISIT (OUTPATIENT)
Dept: PHARMACY | Facility: HOSPITAL | Age: 46
End: 2021-05-07

## 2021-05-07 LAB — INR PPP: 2.2

## 2021-05-07 PROCEDURE — G0249 PROVIDE INR TEST MATER/EQUIP: HCPCS

## 2021-05-07 NOTE — PROGRESS NOTES
Anticoagulation Clinic Progress Note    Anticoagulation Summary  As of 2021    INR goal:  2.0-3.0   TTR:  54.2 % (4.4 mo)   INR used for dosin.20 (2021)   Warfarin maintenance plan:  5 mg every Mon, Wed, Fri; 7.5 mg all other days   Weekly warfarin total:  45 mg   No change documented:  Chucho Mcdaniel RPH   Plan last modified:  Chucho Mcdaniel RPH (2021)   Next INR check:  2021   Target end date:  Indefinite    Indications    Other acute pulmonary embolism  unspecified whether acute cor pulmonale present (CMS/HCC) (Resolved) [I26.99]  Acute pulmonary embolism  unspecified pulmonary embolism type  unspecified whether acute cor pulmonale present (CMS/HCC) (Resolved) [I26.99]             Anticoagulation Episode Summary     INR check location:      Preferred lab:      Send INR reminders to:  PRIYA BLANDON  POOL    Comments:  **Home Testing Program**      Anticoagulation Care Providers     Provider Role Specialty Phone number    Lisset Melton MD Referring Cardiology [phone]          Clinic Interview:  Patient Findings     Negatives:  Signs/symptoms of thrombosis, Signs/symptoms of bleeding,   Laboratory test error suspected, Change in health, Change in alcohol use,   Change in activity, Upcoming invasive procedure, Emergency department   visit, Upcoming dental procedure, Missed doses, Extra doses, Change in   medications, Change in diet/appetite, Hospital admission, Bruising, Other   complaints      Clinical Outcomes     Negatives:  Major bleeding event, Thromboembolic event,   Anticoagulation-related hospital admission, Anticoagulation-related ED   visit, Anticoagulation-related fatality        INR History:  Anticoagulation Monitoring 2021   INR 2.50 2.40 2.20   INR Date 2021   INR Goal 2.0-3.0 2.0-3.0 2.0-3.0   Trend Same Same Same   Last Week Total 45 mg 45 mg 45 mg   Next Week Total 45 mg 45 mg 45 mg   Sun 7.5 mg 7.5 mg 7.5 mg    Mon 5 mg 5 mg 5 mg   Tue 7.5 mg 7.5 mg 7.5 mg   Wed 5 mg 5 mg 5 mg   Thu - - 7.5 mg   Fri 5 mg 5 mg 5 mg   Sat 7.5 mg 7.5 mg 7.5 mg   Visit Report - - -   Some recent data might be hidden       Plan:  1. INR is Therapeutic today- see above in Anticoagulation Summary.   Will instruct Nina NATH Saez to Continue their warfarin regimen- see above in Anticoagulation Summary.  2. Follow up in 1 week   3. They have been instructed to call if any changes in medications, doses, concerns, etc. Patient expresses understanding and has no further questions at this time.    Chucho Mcdaniel [facility]

## 2021-05-13 ENCOUNTER — HOSPITAL ENCOUNTER (OUTPATIENT)
Dept: CT IMAGING | Facility: HOSPITAL | Age: 46
Discharge: HOME | End: 2021-05-13

## 2021-05-13 ENCOUNTER — HOSPITAL ENCOUNTER (OUTPATIENT)
Dept: MRI IMAGING | Facility: HOSPITAL | Age: 46
Discharge: HOME | End: 2021-05-13

## 2021-05-13 VITALS
RESPIRATION RATE: 18 BRPM | DIASTOLIC BLOOD PRESSURE: 69 MMHG | OXYGEN SATURATION: 99 % | HEART RATE: 61 BPM | SYSTOLIC BLOOD PRESSURE: 101 MMHG | TEMPERATURE: 96.6 F

## 2021-05-13 DIAGNOSIS — S93.432S ANKLE SYNDESMOSIS DISRUPTION, LEFT, SEQUELA: ICD-10-CM

## 2021-05-13 DIAGNOSIS — S82.842S BIMALLEOLAR ANKLE FRACTURE, LEFT, SEQUELA: ICD-10-CM

## 2021-05-13 DIAGNOSIS — Z98.890 HISTORY OF OPEN REDUCTION AND INTERNAL FIXATION (ORIF) PROCEDURE: ICD-10-CM

## 2021-05-13 PROCEDURE — 73700 CT LOWER EXTREMITY W/O DYE: CPT

## 2021-05-13 PROCEDURE — 73721 MRI JNT OF LWR EXTRE W/O DYE: CPT

## 2021-05-13 NOTE — NURSING NOTE
Pt arrived to Radiology triage [place] [facility] for monitoring of defibrillator during an MRI Left ankle..   Pt wearing a mask as well as this RN for any bedside care.

## 2021-05-14 ENCOUNTER — APPOINTMENT (OUTPATIENT)
Dept: CT IMAGING | Facility: HOSPITAL | Age: 46
End: 2021-05-14

## 2021-05-14 ENCOUNTER — ANTICOAGULATION VISIT (OUTPATIENT)
Dept: PHARMACY | Facility: HOSPITAL | Age: 46
End: 2021-05-14

## 2021-05-14 ENCOUNTER — APPOINTMENT (OUTPATIENT)
Dept: MRI IMAGING | Facility: HOSPITAL | Age: 46
End: 2021-05-14

## 2021-05-14 LAB — INR PPP: 1.8

## 2021-05-14 NOTE — PROGRESS NOTES
Anticoagulation Clinic Progress Note    Anticoagulation Summary  As of 2021    INR goal:  2.0-3.0   TTR:  54.0 % (4.6 mo)   INR used for dosin.80 (2021)   Warfarin maintenance plan:  5 mg every Mon, Wed, Fri; 7.5 mg all other days   Weekly warfarin total:  45 mg   Plan last modified:  Chucho Mcdaniel RPH (2021)   Next INR check:  2021   Target end date:  Indefinite    Indications    Other acute pulmonary embolism  unspecified whether acute cor pulmonale present (CMS/HCC) (Resolved) [I26.99]  Acute pulmonary embolism  unspecified pulmonary embolism type  unspecified whether acute cor pulmonale present (CMS/HCC) (Resolved) [I26.99]             Anticoagulation Episode Summary     INR check location:      Preferred lab:      Send INR reminders to:   NOMI MARAVILLA  POOL    Comments:  **Home Testing Program**      Anticoagulation Care Providers     Provider Role Specialty Phone number    Lisset Melton MD Referring Cardiology [phone]          Clinic Interview:  Patient Findings     Positives:  Missed doses    Negatives:  Signs/symptoms of thrombosis, Signs/symptoms of bleeding,   Laboratory test error suspected, Change in health, Change in alcohol use,   Change in activity, Upcoming invasive procedure, Emergency department   visit, Upcoming dental procedure, Extra doses, Change in medications,   Change in diet/appetite, Hospital admission, Bruising, Other complaints    Comments:  Missed dose 21.       Clinical Outcomes     Negatives:  Major bleeding event, Thromboembolic event,   Anticoagulation-related hospital admission, Anticoagulation-related ED   visit, Anticoagulation-related fatality    Comments:  Missed dose 21.         INR History:  Anticoagulation Monitoring 2021   INR 2.40 2.20 1.80   INR Date 2021   INR Goal 2.0-3.0 2.0-3.0 2.0-3.0   Trend Same Same Same   Last Week Total 45 mg 45 mg 40 mg   Next Week Total  45 mg 45 mg 47.5 mg   Sun 7.5 mg 7.5 mg 7.5 mg   Mon 5 mg 5 mg 5 mg   Tue 7.5 mg 7.5 mg 7.5 mg   Wed 5 mg 5 mg 5 mg   Thu - 7.5 mg 7.5 mg   Fri 5 mg 5 mg 7.5 mg (5/14)   Sat 7.5 mg 7.5 mg 7.5 mg   Visit Report - - -   Some recent data might be hidden       Plan:  1. INR is Subtherapeutic today- see above in Anticoagulation Summary.   Will instruct Nina Saez to Change their warfarin regimen- see above in Anticoagulation Summary.  2. Follow up in 1 week  3. They have been instructed to call if any changes in medications, doses, concerns, etc. Patient expresses understanding and has no further questions at this time.    Chucho Mcdaniel [facility]

## 2021-05-17 ENCOUNTER — TELEPHONE (OUTPATIENT)
Dept: ORTHOPEDICS | Facility: OTHER | Age: 46
End: 2021-05-17

## 2021-05-17 NOTE — TELEPHONE ENCOUNTER
Provider: DESIREE    Caller: PATIENT    Relationship to Patient: SELF    Phone Number: [phone]    Reason for Call: PATIENT HAS AN APPOINTMENT THIS AFTERNOON AT 2:20. SHE SAYS SHE HAS A FLAT TIRE AND CANNOT MAKE IT.    SHE SAYS THIS IS A EMERGENCY AND WANTS TO SEE IF MWM CAN CALL HER AT THE TIME OF HER APPT AND GIVE HER THE RESULTS OF HER MRI.    PLEASE ADVISE TO PATIENT

## 2021-05-17 NOTE — TELEPHONE ENCOUNTER
Spoke with patient she missed her appointment today because of a flat tire and says she is doing some pain in her ankle.  She is rescheduled for 5/20/2021 we will review treatment options at that time.  She appreciated the call

## 2021-05-17 NOTE — ADDENDUM NOTE
Addended by: MARI RAMÍREZ on: 5/17/2021 05:18 PM     Modules accepted: Level of Service, SmartSet

## 2021-05-19 ENCOUNTER — TELEPHONE (OUTPATIENT)
Dept: INTERNAL MEDICINE | Age: 46
End: 2021-05-19

## 2021-05-19 NOTE — TELEPHONE ENCOUNTER
SW pt and informed her we have no appts I could try to get her in tomorrow or she can go to the [facility] for Tx. Pt verbalized understanding. Pt has two specialist appts tomorrow and said if she's not better she'll try to get in Friday or go to [facility].

## 2021-05-19 NOTE — TELEPHONE ENCOUNTER
PATIENT CALLED TO SEE IF SHE COULD BE SEEN BY SAMUEL FAY FOR A SAME DAY APPOINTMENT BUT THERE WAS NO AVAILABILITY ON MY END. PATIENT HAS A SORE THROAT, CONGESTION, AND COUGH.    CALL BACK  [phone]

## 2021-05-21 LAB — INR PPP: 3

## 2021-05-24 ENCOUNTER — TELEPHONE (OUTPATIENT)
Dept: INTERNAL MEDICINE | Age: 46
End: 2021-05-24

## 2021-05-24 NOTE — TELEPHONE ENCOUNTER
This medication has never been prescribed by [facility], it was prescribed by another provider. And chart shows patient D/C'd it in 2020

## 2021-05-24 NOTE — TELEPHONE ENCOUNTER
Caller: Nina Saez    Relationship: Self    Best call back number: [phone]    Medication needed:   Requested Prescriptions     Pending Prescriptions Disp Refills   • sertraline (ZOLOFT) 50 MG tablet       Sig: Take 1 tablet by mouth Daily.       When do you need the refill by: ASAP     What additional details did the patient provide when requesting the medication: PATIENT IS OUT   Does the patient have less than a 3 day supply:  [x] Yes  [] No    What is the patient's preferred pharmacy: [address] ([facility] [place] - [address] [facility] [phone] [facility] [phone]

## 2021-05-28 LAB — INR PPP: 2

## 2021-06-04 ENCOUNTER — ANTICOAGULATION VISIT (OUTPATIENT)
Dept: PHARMACY | Facility: HOSPITAL | Age: 46
End: 2021-06-04

## 2021-06-04 LAB — INR PPP: 3.5

## 2021-06-04 PROCEDURE — G0249 PROVIDE INR TEST MATER/EQUIP: HCPCS

## 2021-06-04 RX ORDER — WARFARIN SODIUM 2.5 MG/1
TABLET ORAL
Qty: 235 TABLET | Refills: 1 | Status: SHIPPED | OUTPATIENT
Start: 2021-06-04 | End: 2021-11-22

## 2021-06-04 NOTE — PROGRESS NOTES
Anticoagulation Clinic Progress Note    Anticoagulation Summary  As of 6/4/2021    INR goal:  2.0-3.0   TTR:  57.8 % (5.3 mo)   INR used for dosing:  3.50 (6/4/2021)   Warfarin maintenance plan:  5 mg every Mon, Wed, Fri; 7.5 mg all other days   Weekly warfarin total:  45 mg   Plan last modified:  Chucho Mcdaniel RPH (4/8/2021)   Next INR check:  6/11/2021   Target end date:  Indefinite    Indications    Other acute pulmonary embolism  unspecified whether acute cor pulmonale present (CMS/HCC) (Resolved) [I26.99]  Acute pulmonary embolism  unspecified pulmonary embolism type  unspecified whether acute cor pulmonale present (CMS/HCC) (Resolved) [I26.99]             Anticoagulation Episode Summary     INR check location:      Preferred lab:      Send INR reminders to:   NOMI BLANDON  POOL    Comments:  **Home Testing Program**      Anticoagulation Care Providers     Provider Role Specialty Phone number    Lisset Melton MD Referring Cardiology [phone]          Clinic Interview:  Patient Findings     Positives:  Other complaints    Negatives:  Signs/symptoms of thrombosis, Signs/symptoms of bleeding,   Laboratory test error suspected, Change in health, Change in alcohol use,   Change in activity, Upcoming invasive procedure, Emergency department   visit, Upcoming dental procedure, Missed doses, Extra doses, Change in   medications, Change in diet/appetite, Hospital admission, Bruising    Comments:  Pt thought she did not need to submit her INR results the past   couple weeks because they were in range - INR 3.0 (5/21/21), INR 2.0   (5/28/21), INR 3.5 (6/4/21).       Clinical Outcomes     Negatives:  Major bleeding event, Thromboembolic event,   Anticoagulation-related hospital admission, Anticoagulation-related ED   visit, Anticoagulation-related fatality    Comments:  Pt thought she did not need to submit her INR results the past   couple weeks because they were in range - INR 3.0 (5/21/21), INR  2.0   (5/28/21), INR 3.5 (6/4/21).         INR History:  Anticoagulation Monitoring 5/7/2021 5/14/2021 6/4/2021 6/4/2021   INR 2.20 1.80 - 3.50   INR Date 5/7/2021 5/14/2021 - 6/4/2021   INR Goal 2.0-3.0 2.0-3.0 2.0-3.0 2.0-3.0   Trend Same Same - Same   Last Week Total 45 mg 40 mg - 45 mg   Next Week Total 45 mg 47.5 mg - 42.5 mg   Sun 7.5 mg 7.5 mg - 7.5 mg   Mon 5 mg 5 mg - 5 mg   Tue 7.5 mg 7.5 mg - 7.5 mg   Wed 5 mg 5 mg - 5 mg   Thu 7.5 mg 7.5 mg - 7.5 mg   Fri 5 mg 7.5 mg (5/14) - 2.5 mg (6/4)   Sat 7.5 mg 7.5 mg - 7.5 mg   Visit Report - - - -   Some recent data might be hidden       Plan:  1. INR is Supratherapeutic today- see above in Anticoagulation Summary.   Will instruct Nina Saez to Change their warfarin regimen- see above in Anticoagulation Summary.  2. Follow up in 1 week  3. Reminded pt the importance of submitting result every week even if in range.They have been instructed to call if any changes in medications, doses, concerns, etc. Patient expresses understanding and has no further questions at this time.    Chucho Mcdaniel [facility]

## 2021-06-11 ENCOUNTER — ANTICOAGULATION VISIT (OUTPATIENT)
Dept: PHARMACY | Facility: HOSPITAL | Age: 46
End: 2021-06-11

## 2021-06-11 LAB — INR PPP: 2.3

## 2021-06-11 NOTE — PROGRESS NOTES
Anticoagulation Clinic Progress Note    Anticoagulation Summary  As of 2021    INR goal:  2.0-3.0   TTR:  57.9 % (5.5 mo)   INR used for dosin.30 (2021)   Warfarin maintenance plan:  5 mg every Mon, Wed, Fri; 7.5 mg all other days   Weekly warfarin total:  45 mg   No change documented:  Chucho Mcdaniel RPH   Plan last modified:  Chucho Mcdaniel RPH (2021)   Next INR check:  2021   Target end date:  Indefinite    Indications    Other acute pulmonary embolism  unspecified whether acute cor pulmonale present (CMS/HCC) (Resolved) [I26.99]  Acute pulmonary embolism  unspecified pulmonary embolism type  unspecified whether acute cor pulmonale present (CMS/HCC) (Resolved) [I26.99]             Anticoagulation Episode Summary     INR check location:      Preferred lab:      Send INR reminders to:  PRIYA BLANDON  POOL    Comments:  **Home Testing Program**      Anticoagulation Care Providers     Provider Role Specialty Phone number    Lisset Melton MD Referring Cardiology [phone]          Clinic Interview:  Patient Findings     Negatives:  Signs/symptoms of thrombosis, Signs/symptoms of bleeding,   Laboratory test error suspected, Change in health, Change in alcohol use,   Change in activity, Upcoming invasive procedure, Emergency department   visit, Upcoming dental procedure, Missed doses, Extra doses, Change in   medications, Change in diet/appetite, Hospital admission, Bruising, Other   complaints      Clinical Outcomes     Negatives:  Major bleeding event, Thromboembolic event,   Anticoagulation-related hospital admission, Anticoagulation-related ED   visit, Anticoagulation-related fatality        INR History:  Anticoagulation Monitoring 2021   INR 1.80 - 3.50 2.30   INR Date 2021 - 2021   INR Goal 2.0-3.0 2.0-3.0 2.0-3.0 2.0-3.0   Trend Same - Same Same   Last Week Total 40 mg - 45 mg 42.5 mg   Next Week Total 47.5 mg - 42.5  mg 45 mg   Sun 7.5 mg - 7.5 mg 7.5 mg   Mon 5 mg - 5 mg 5 mg   Tue 7.5 mg - 7.5 mg 7.5 mg   Wed 5 mg - 5 mg 5 mg   Thu 7.5 mg - 7.5 mg 7.5 mg   Fri 7.5 mg (5/14) - 2.5 mg (6/4) 5 mg   Sat 7.5 mg - 7.5 mg 7.5 mg   Visit Report - - - -   Some recent data might be hidden       Plan:  1. INR is Therapeutic today- see above in Anticoagulation Summary.   Will instruct Nina PHAM Saez to Continue their warfarin regimen- see above in Anticoagulation Summary.  2. Follow up in 1 week  3. They have been instructed to call if any changes in medications, doses, concerns, etc. Patient expresses understanding and has no further questions at this time.    Chucho Mcdaniel [facility]

## 2021-06-18 LAB — INR PPP: 3.5

## 2021-06-21 ENCOUNTER — ANTICOAGULATION VISIT (OUTPATIENT)
Dept: PHARMACY | Facility: HOSPITAL | Age: 46
End: 2021-06-21

## 2021-06-21 NOTE — PROGRESS NOTES
Anticoagulation Clinic Progress Note    Anticoagulation Summary  As of 6/21/2021    INR goal:  2.0-3.0   TTR:  57.9 % (5.8 mo)   INR used for dosing:  3.50 (6/18/2021)   Warfarin maintenance plan:  5 mg every Mon, Wed, Fri; 7.5 mg all other days   Weekly warfarin total:  45 mg   Plan last modified:  Chucho Mcdaniel RPH (4/8/2021)   Next INR check:  6/25/2021   Target end date:  Indefinite    Indications    Other acute pulmonary embolism  unspecified whether acute cor pulmonale present (CMS/HCC) (Resolved) [I26.99]  Acute pulmonary embolism  unspecified pulmonary embolism type  unspecified whether acute cor pulmonale present (CMS/HCC) (Resolved) [I26.99]             Anticoagulation Episode Summary     INR check location:      Preferred lab:      Send INR reminders to:   NOMI BLANDON  POOL    Comments:  **Home Testing Program**      Anticoagulation Care Providers     Provider Role Specialty Phone number    Lisset Melton MD Referring Cardiology [phone]          Clinic Interview:  Patient Findings     Positives:  Change in diet/appetite    Negatives:  Signs/symptoms of thrombosis, Signs/symptoms of bleeding,   Laboratory test error suspected, Change in health, Change in alcohol use,   Change in activity, Upcoming invasive procedure, Emergency department   visit, Upcoming dental procedure, Missed doses, Extra doses, Change in   medications, Hospital admission, Bruising, Other complaints    Comments:  Reports much less vit k than usual over past week; plans to   resume usual intake.       Clinical Outcomes     Negatives:  Major bleeding event, Thromboembolic event,   Anticoagulation-related hospital admission, Anticoagulation-related ED   visit, Anticoagulation-related fatality    Comments:  Reports much less vit k than usual over past week; plans to   resume usual intake.         INR History:  Anticoagulation Monitoring 6/4/2021 6/11/2021 6/21/2021   INR 3.50 2.30 3.50   INR Date 6/4/2021 6/11/2021  6/18/2021   INR Goal 2.0-3.0 2.0-3.0 2.0-3.0   Trend Same Same Same   Last Week Total 45 mg 42.5 mg 45 mg   Next Week Total 42.5 mg 45 mg 42.5 mg   Sun 7.5 mg 7.5 mg -   Mon 5 mg 5 mg 2.5 mg (6/21)   Tue 7.5 mg 7.5 mg 7.5 mg   Wed 5 mg 5 mg 5 mg   Thu 7.5 mg 7.5 mg 7.5 mg   Fri 2.5 mg (6/4) 5 mg -   Sat 7.5 mg 7.5 mg -   Visit Report - - -   Some recent data might be hidden       Plan:  1. INR was Supratherapeutic 6/18/21 - see above in Anticoagulation Summary.   Will instruct Nina Saez to Change their warfarin regimen- see above in Anticoagulation Summary.  2. Follow up in 4 days  3. They have been instructed to call if any changes in medications, doses, concerns, etc. Patient expresses understanding and has no further questions at this time.    Chucho Mcdaniel [facility]

## 2021-06-25 ENCOUNTER — ANTICOAGULATION VISIT (OUTPATIENT)
Dept: PHARMACY | Facility: HOSPITAL | Age: 46
End: 2021-06-25

## 2021-06-25 LAB — INR PPP: 2

## 2021-06-25 NOTE — PROGRESS NOTES
Anticoagulation Clinic Progress Note    Anticoagulation Summary  As of 2021    INR goal:  2.0-3.0   TTR:  58.2 % (6 mo)   INR used for dosin.00 (2021)   Warfarin maintenance plan:  5 mg every Mon, Wed, Fri; 7.5 mg all other days   Weekly warfarin total:  45 mg   No change documented:  Chucho Mcdaniel RPH   Plan last modified:  Chucho Mcdaniel RPH (2021)   Next INR check:  2021   Target end date:  Indefinite    Indications    Other acute pulmonary embolism  unspecified whether acute cor pulmonale present (CMS/HCC) (Resolved) [I26.99]  Acute pulmonary embolism  unspecified pulmonary embolism type  unspecified whether acute cor pulmonale present (CMS/HCC) (Resolved) [I26.99]             Anticoagulation Episode Summary     INR check location:      Preferred lab:      Send INR reminders to:  PRIYA BLANDON  POOL    Comments:  **Home Testing Program**      Anticoagulation Care Providers     Provider Role Specialty Phone number    Lisset Melton MD Referring Cardiology [phone]          Clinic Interview:  Patient Findings     Negatives:  Signs/symptoms of thrombosis, Signs/symptoms of bleeding,   Laboratory test error suspected, Change in health, Change in alcohol use,   Change in activity, Upcoming invasive procedure, Emergency department   visit, Upcoming dental procedure, Missed doses, Extra doses, Change in   medications, Change in diet/appetite, Hospital admission, Bruising, Other   complaints      Clinical Outcomes     Negatives:  Major bleeding event, Thromboembolic event,   Anticoagulation-related hospital admission, Anticoagulation-related ED   visit, Anticoagulation-related fatality        INR History:  Anticoagulation Monitoring 2021   INR 2.30 3.50 2.00   INR Date 2021   INR Goal 2.0-3.0 2.0-3.0 2.0-3.0   Trend Same Same Same   Last Week Total 42.5 mg 45 mg 42.5 mg   Next Week Total 45 mg 42.5 mg 45 mg   Sun 7.5 mg - 7.5  mg   Mon 5 mg 2.5 mg (6/21) 5 mg   Tue 7.5 mg 7.5 mg 7.5 mg   Wed 5 mg 5 mg 5 mg   Thu 7.5 mg 7.5 mg 7.5 mg   Fri 5 mg - 5 mg   Sat 7.5 mg - 7.5 mg   Visit Report - - -   Some recent data might be hidden       Plan:  1. INR is Therapeutic today- see above in Anticoagulation Summary.   Will instruct Nina Saez to Continue their warfarin regimen- see above in Anticoagulation Summary.  2. Follow up in 1 week  3. They have been instructed to call if any changes in medications, doses, concerns, etc. Patient expresses understanding and has no further questions at this time.    Chucho Mcdaniel [facility]

## 2021-07-09 ENCOUNTER — ANTICOAGULATION VISIT (OUTPATIENT)
Dept: PHARMACY | Facility: HOSPITAL | Age: 46
End: 2021-07-09

## 2021-07-09 LAB — INR PPP: 1.1

## 2021-07-09 PROCEDURE — G0249 PROVIDE INR TEST MATER/EQUIP: HCPCS

## 2021-07-09 NOTE — PROGRESS NOTES
Anticoagulation Clinic Progress Note    Anticoagulation Summary  As of 2021    INR goal:  2.0-3.0   TTR:  54.0 % (6.5 mo)   INR used for dosin.10 (2021)   Warfarin maintenance plan:  5 mg every Mon, Wed, Fri; 7.5 mg all other days   Weekly warfarin total:  45 mg   Plan last modified:  Chucho Mcdaniel RPH (2021)   Next INR check:  2021   Target end date:  Indefinite    Indications    Other acute pulmonary embolism  unspecified whether acute cor pulmonale present (CMS/HCC) (Resolved) [I26.99]  Acute pulmonary embolism  unspecified pulmonary embolism type  unspecified whether acute cor pulmonale present (CMS/HCC) (Resolved) [I26.99]             Anticoagulation Episode Summary     INR check location:      Preferred lab:      Send INR reminders to:   NOMI BLANDON  POOL    Comments:  **Home Testing Program**      Anticoagulation Care Providers     Provider Role Specialty Phone number    Lisset Melton MD Referring Cardiology [phone]          Clinic Interview:  Patient Findings     Positives:  Change in diet/appetite, Other complaints    Negatives:  Signs/symptoms of thrombosis, Signs/symptoms of bleeding,   Laboratory test error suspected, Change in health, Change in alcohol use,   Change in activity, Upcoming invasive procedure, Emergency department   visit, Upcoming dental procedure, Missed doses, Extra doses, Change in   medications, Hospital admission, Bruising    Comments:  Reports she was out of town last week and forgot her home   testing machine. Denies any missed doses. Reports much more vit k than   usual over holiday weekend (cooked greens, broccoli casserole).       Clinical Outcomes     Negatives:  Major bleeding event, Thromboembolic event,   Anticoagulation-related hospital admission, Anticoagulation-related ED   visit, Anticoagulation-related fatality    Comments:  Reports she was out of town last week and forgot her home   testing machine. Denies any missed  doses. Reports much more vit k than   usual over holiday weekend (cooked greens, broccoli casserole).         INR History:  Anticoagulation Monitoring 6/21/2021 6/25/2021 7/9/2021   INR 3.50 2.00 1.10   INR Date 6/18/2021 6/25/2021 7/9/2021   INR Goal 2.0-3.0 2.0-3.0 2.0-3.0   Trend Same Same Same   Last Week Total 45 mg 42.5 mg 45 mg   Next Week Total 42.5 mg 45 mg 52.5 mg   Sun - 7.5 mg 7.5 mg   Mon 2.5 mg (6/21) 5 mg 5 mg   Tue 7.5 mg 7.5 mg 7.5 mg   Wed 5 mg 5 mg 5 mg   Thu 7.5 mg 7.5 mg 7.5 mg   Fri - 5 mg 10 mg (7/9)   Sat - 7.5 mg 10 mg (7/10)   Visit Report - - -   Some recent data might be hidden       Plan:  1. INR is Subtherapeutic today- see above in Anticoagulation Summary.   Will instruct Nina Saez to Change their warfarin regimen- see above in Anticoagulation Summary.  2. Follow up in 1 week  3. They have been instructed to call if any changes in medications, doses, concerns, etc. Patient expresses understanding and has no further questions at this time.    Chucho Mcdaniel [facility]

## 2021-07-12 RX ORDER — FUROSEMIDE 80 MG
TABLET ORAL
Qty: 180 TABLET | Refills: 3 | Status: SHIPPED | OUTPATIENT
Start: 2021-07-12 | End: 2021-07-14 | Stop reason: DRUGHIGH

## 2021-07-13 ENCOUNTER — TELEPHONE (OUTPATIENT)
Dept: CARDIOLOGY | Facility: HOSPITAL | Age: 46
End: 2021-07-13

## 2021-07-13 NOTE — TELEPHONE ENCOUNTER
Pt called this AM requesting an appt.  She says that she is feeling more short of breath and thinks she needs to come in.  I offered her an appt. today, but she said she could not come today.  She asked if she could come in tomorrow morning.  Appt scheduled tomorrow at 09:30.  Understanding and agreement verbalized.    Lisha Shine RN  [facility]

## 2021-07-14 ENCOUNTER — HOSPITAL ENCOUNTER (OUTPATIENT)
Dept: CARDIOLOGY | Facility: HOSPITAL | Age: 46
Discharge: HOME | End: 2021-07-14
Admitting: NURSE PRACTITIONER

## 2021-07-14 VITALS
DIASTOLIC BLOOD PRESSURE: 78 MMHG | OXYGEN SATURATION: 96 % | RESPIRATION RATE: 16 BRPM | WEIGHT: 201 LBS | BODY MASS INDEX: 32.3 KG/M2 | SYSTOLIC BLOOD PRESSURE: 110 MMHG | HEART RATE: 62 BPM | HEIGHT: 66 IN

## 2021-07-14 DIAGNOSIS — Z91.89 AT INCREASED RISK FOR CARDIOVASCULAR DISEASE: ICD-10-CM

## 2021-07-14 DIAGNOSIS — E66.9 OBESITY (BMI 30-39.9): ICD-10-CM

## 2021-07-14 DIAGNOSIS — R79.9 ABNORMAL FINDING OF BLOOD CHEMISTRY, UNSPECIFIED: ICD-10-CM

## 2021-07-14 DIAGNOSIS — E66.9 CLASS 2 OBESITY WITH BODY MASS INDEX (BMI) OF 39.0 TO 39.9 IN ADULT, UNSPECIFIED OBESITY TYPE, UNSPECIFIED WHETHER SERIOUS COMORBIDITY PRESENT: ICD-10-CM

## 2021-07-14 DIAGNOSIS — I50.43 ACUTE ON CHRONIC COMBINED SYSTOLIC AND DIASTOLIC CHF (CONGESTIVE HEART FAILURE) (HCC): ICD-10-CM

## 2021-07-14 DIAGNOSIS — I42.8 NICM (NONISCHEMIC CARDIOMYOPATHY) (HCC): ICD-10-CM

## 2021-07-14 DIAGNOSIS — R73.03 PREDIABETES: ICD-10-CM

## 2021-07-14 DIAGNOSIS — Z91.89 CARDIOVASCULAR EVENT RISK: ICD-10-CM

## 2021-07-14 DIAGNOSIS — I10 ESSENTIAL HYPERTENSION: ICD-10-CM

## 2021-07-14 DIAGNOSIS — I50.22 CHRONIC SYSTOLIC CONGESTIVE HEART FAILURE (HCC): Primary | ICD-10-CM

## 2021-07-14 LAB
ANION GAP SERPL CALCULATED.3IONS-SCNC: 10 MMOL/L (ref 5–15)
BUN SERPL-MCNC: 17 MG/DL (ref 6–20)
BUN/CREAT SERPL: 16.7 (ref 7–25)
CALCIUM SPEC-SCNC: 8.7 MG/DL (ref 8.6–10.5)
CHLORIDE SERPL-SCNC: 103 MMOL/L (ref 98–107)
CHOLEST SERPL-MCNC: 192 MG/DL (ref 0–200)
CO2 SERPL-SCNC: 23 MMOL/L (ref 22–29)
CREAT SERPL-MCNC: 1.02 MG/DL (ref 0.57–1)
GFR SERPL CREATININE-BSD FRML MDRD: 71 ML/MIN/1.73
GLUCOSE SERPL-MCNC: 104 MG/DL (ref 65–99)
HBA1C MFR BLD: 4.92 % (ref 4.8–5.6)
HDLC SERPL-MCNC: 39 MG/DL (ref 40–60)
LDLC SERPL CALC-MCNC: 133 MG/DL (ref 0–100)
LDLC/HDLC SERPL: 3.36 {RATIO}
NT-PROBNP SERPL-MCNC: 1159 PG/ML (ref 0–450)
POTASSIUM SERPL-SCNC: 4.1 MMOL/L (ref 3.5–5.2)
SODIUM SERPL-SCNC: 136 MMOL/L (ref 136–145)
TRIGL SERPL-MCNC: 109 MG/DL (ref 0–150)
VLDLC SERPL-MCNC: 20 MG/DL (ref 5–40)

## 2021-07-14 PROCEDURE — 80061 LIPID PANEL: CPT

## 2021-07-14 PROCEDURE — 80048 BASIC METABOLIC PNL TOTAL CA: CPT

## 2021-07-14 PROCEDURE — 96374 THER/PROPH/DIAG INJ IV PUSH: CPT

## 2021-07-14 PROCEDURE — 83036 HEMOGLOBIN GLYCOSYLATED A1C: CPT

## 2021-07-14 PROCEDURE — 25010000002 FUROSEMIDE PER 20 MG: Performed by: NURSE PRACTITIONER

## 2021-07-14 PROCEDURE — 83880 ASSAY OF NATRIURETIC PEPTIDE: CPT

## 2021-07-14 PROCEDURE — 99215 OFFICE O/P EST HI 40 MIN: CPT | Performed by: NURSE PRACTITIONER

## 2021-07-14 RX ORDER — ASPIRIN 81 MG/1
81 TABLET, CHEWABLE ORAL DAILY
COMMUNITY
End: 2021-07-30

## 2021-07-14 RX ORDER — FUROSEMIDE 20 MG/1
100 TABLET ORAL
COMMUNITY
Start: 2021-07-13 | End: 2021-07-21 | Stop reason: ALTCHOICE

## 2021-07-14 RX ORDER — FUROSEMIDE 10 MG/ML
100 INJECTION INTRAMUSCULAR; INTRAVENOUS ONCE
Status: COMPLETED | OUTPATIENT
Start: 2021-07-14 | End: 2021-07-14

## 2021-07-14 RX ORDER — ATORVASTATIN CALCIUM 10 MG/1
10 TABLET, FILM COATED ORAL DAILY
Qty: 30 TABLET | Refills: 11 | Status: SHIPPED | OUTPATIENT
Start: 2021-07-14 | End: 2022-05-27 | Stop reason: SDUPTHER

## 2021-07-14 RX ADMIN — FUROSEMIDE 100 MG: 10 INJECTION, SOLUTION INTRAMUSCULAR; INTRAVENOUS at 10:26

## 2021-07-14 NOTE — PROGRESS NOTES
[facility] HEART FAILURE      Patient Name: Nina Saez  :1975  Age: 45 y.o.  Sex: female  Referring Provider: Nilda Gallagher APRN   Primary Cardiologist: Lisset Melton MD  Encounter Provider:  SAMUEL Cisneros      Chief Complaint:   Chief Complaint   Patient presents with   • Congestive Heart Failure         Congestive Heart Failure  Associated symptoms include fatigue and unexpected weight change. Pertinent negatives include no chest pain, palpitations or shortness of breath.    this 44-year-old female, known to this provider, comes to us today for further evaluation of her chronic systolic congestive heart failure.  Current diagnoses to include severe nonischemic cardiomyopathy, left bundle branch block, hypertension, HIV, obesity, and asthma.    Historically she had followed with Dr. Laina Boyd at [facility].  In spring 2019 she was visiting family in [place] and was taken emergently to [facility].  Echocardiogram and cardiac catheterization were performed at that point, carvedilol was changed to metoprolol.    In 2019 she presented through the emergency department at [facility] with chest pain and shortness of breath.  She was treated with IV diuresis, troponin was negative x2 and proBNP was elevated at 5151.  Entresto was started at that point given her severe dilated cardiomyopathy.  She was to follow with cardiology at [facility] at that time.    In 2020 she again presented to the emergency department with shortness of breath and cough x3 weeks.  BNP was elevated at 13,351.  Carvedilol was discontinued that admission given hypotension.  At subsequent outpatient appointment it was felt that Bumex was less effective than Lasix by the patient.  She complained of progressively worsening fatigue.  AICD, single-chamber [employer], interrogation 2020 revealed 10-16 beats of VT.    On September 10,  2020 she saw Dr. Jamar Azul MD, for consideration of upgrade to biventricular device.  At that point he felt that her left bundle branch block was incomplete and she did not meet criteria for implantation of CRT-D.    She presented 10/27/2020 to [facility] with complaints of acute on chronic shortness of breath that began approximately 1 week prior.  BNP was further elevated at 16,206 that point.  Pulmonary edema was noted on chest x-ray.  Spironolactone was started that admission.  Referral for evaluation by [facility] services was made November 2, 2020.    Left and right cardiac catheterization at [facility] revealed no obstructive CAD with normal filling pressures.  Echocardiogram at that time revealed an EF less than 15% with global hypokinesis-information extracted from external medical record note.  Cardiac catheterization May 30, 2017 performed at [facility] yielded codominant system with normal left main, LAD with distal 30% stenosis, RCA normal, LVEDP 7 mmHg.    Echocardiogram July 9, 2020 revealed EF of 6%, grade 3 LV diastolic dysfunction, significant LV wall motion hypokinesis/akinesis, RVSP 45 to 55 mmHg secondary to moderate tricuspid valve regurgitation, moderate to severe MR noted.    Last remote device check 10/17/2020 revealed 1 episode of NSVT lasting 27 beats, normal function.  CMP 1/4/2021 revealed creatinine of 1.0, GFR 73, electrolytes stable.  TSH normal 10/28/2020.  Last BNP 10/27/2020 was 16,206.    Jardiance 10 mg daily was started on 11/13/2020.      She was admitted from 12/11/2020 to 12/17/2020 for acute pulmonary embolism with right lower lobe pulmonary infarct.  She was started on warfarin at that time.  Additionally, she has been started on Corlanor as well as spironolactone for her heart failure as well.  January 4, 2021 she was evaluated and treated in the emergency department for possible angioedema.  She is to follow-up with  her PCP regarding this per ED note.      Entresto has now been increased to  mg twice daily.  Repeat echocardiogram 3/8/2021 revealed improvement in ejection fraction from 6% to 22% with severe LV dilation, severe global hypokinesis and grade 2 LV diastolic dysfunction.    The patient is currently volume overloaded in the setting of consuming very large quantities of sodium orally for greater than one month.  She was seen at  yesterday, and oral lasix was increased for weight gain of 10 lbs.  She states she eats a considerable amount of hot sauce and potato chips every day, as well as canned produce and cold cuts.       The following portions of the patient's history were reviewed and updated as appropriate: allergies, current medications, past family history, past medical history, past social history, past surgical history and problem list.    Current Outpatient Medications   Medication Sig Dispense Refill   • acetaminophen (TYLENOL) 325 MG tablet Take 650 mg by mouth Every 6 (Six) Hours As Needed for Mild Pain .     • albuterol sulfate  (90 Base) MCG/ACT inhaler Inhale 2 puffs Every 4 (Four) Hours As Needed for Wheezing.     • aspirin 81 MG chewable tablet Chew 81 mg Daily.     • carvedilol (COREG) 3.125 MG tablet Take 3.125 mg by mouth 2 (Two) Times a Day With Meals.     • darunavir ethanolate (PREZISTA) 800 MG tablet tablet Take 800 mg by mouth Every Night.     • diphenhydrAMINE (BENADRYL) 25 mg capsule Take 1 capsule by mouth Every 6 (Six) Hours As Needed for Itching (swelling). 20 capsule 0   • Empagliflozin (Jardiance) 10 MG tablet Take 10 mg by mouth Daily. 30 tablet 11   • Emtricitabine-Tenofovir AF (DESCOVY) 200-25 MG per tablet Take  by mouth Every Night.     • fluticasone (Flonase) 50 MCG/ACT nasal spray 2 sprays into the nostril(s) as directed by provider Daily. 1 bottle 2   • furosemide (LASIX) 20 MG tablet Take 100 mg by mouth.     • guaiFENesin-codeine (GUAIFENESIN AC) 100-10 MG/5ML  liquid Take 5 mL by mouth Every 4 (Four) Hours As Needed for Cough. 118 mL 0   • ivabradine HCl (Corlanor) 7.5 MG tablet tablet Take 1 tablet by mouth 2 (Two) Times a Day With Meals. 60 tablet 5   • ondansetron ODT (Zofran ODT) 4 MG disintegrating tablet Place 1 tablet on the tongue Every 8 (Eight) Hours As Needed for Nausea or Vomiting. 30 tablet 0   • potassium chloride (K-DUR,KLOR-CON) 20 MEQ CR tablet Take 2 tablets by mouth Daily. (Patient taking differently: Take 20 mEq by mouth Daily.) 90 tablet 1   • ritonavir (NORVIR) 100 MG tablet Take 100 mg by mouth Every Night.     • sacubitril-valsartan (Entresto)  MG tablet Take 1 tablet by mouth 2 (Two) Times a Day. 180 tablet 3   • sertraline (ZOLOFT) 50 MG tablet Take 50 mg by mouth Daily.     • spironolactone (ALDACTONE) 25 MG tablet 25 mg Daily.     • vitamin D (ERGOCALCIFEROL) 00949 units capsule capsule Take 50,000 Units by mouth Every 30 (Thirty) Days.     • warfarin (COUMADIN) 2.5 MG tablet Take 2 tablets (5 mg) by mouth on Mon, Wed, and Fri, and take 3 tablets (7.5 mg) by mouth all other days or as directed. 235 tablet 1     Current Facility-Administered Medications   Medication Dose Route Frequency Provider Last Rate Last Admin   • furosemide (LASIX) injection 100 mg  100 mg Intravenous Once Kiley Boyd APRN           Past Medical History:   Diagnosis Date   • AICD (automatic cardioverter/defibrillator) present 3/6/2020   • Asthma    • CHF (congestive heart failure) (CMS/[facility])    • Class 2 obesity in adult    • Grade II diastolic dysfunction     Per echocardiogram 3/2021   • HIV (human immunodeficiency virus infection) (CMS/[facility])    • Hypertension    • LBBB (left bundle branch block)    • NICM (nonischemic cardiomyopathy) (CMS/[facility])     7/2020 EF 6% per echocardiogram; AICD present   • Nonrheumatic mitral valve regurgitation 3/8/2021    Moderate per echo 3/2021   • Nonrheumatic tricuspid valve regurgitation     Moderate per echocardiogram 3/2021        Past Surgical History:   Procedure Laterality Date   • CARDIAC CATHETERIZATION     • CARDIAC DEFIBRILLATOR PLACEMENT     •  SECTION      one C/S   • FRACTURE SURGERY Left     left ankle   • TUBAL ABDOMINAL LIGATION         Physical Exam  Vitals and nursing note reviewed.   Constitutional:       General: She is not in acute distress.     Appearance: She is well-developed. She is ill-appearing.   HENT:      Head: Normocephalic and atraumatic.   Eyes:      Conjunctiva/sclera: Conjunctivae normal.      Pupils: Pupils are equal, round, and reactive to light.   Neck:      Vascular: No JVD.   Cardiovascular:      Rate and Rhythm: Normal rate and regular rhythm.      Heart sounds: Normal heart sounds. No murmur heard.   No friction rub. No gallop.    Pulmonary:      Effort: Pulmonary effort is normal. No respiratory distress.      Breath sounds: Normal breath sounds.   Abdominal:      General: Bowel sounds are normal. There is distension.      Palpations: Abdomen is soft.   Musculoskeletal:         General: No swelling or deformity.   Skin:     General: Skin is warm and dry.      Capillary Refill: Capillary refill takes less than 2 seconds.   Neurological:      Mental Status: She is alert and oriented to person, place, and time. Mental status is at baseline.   Psychiatric:         Attention and Perception: Attention normal.         Mood and Affect: Mood normal. Affect is tearful.         Behavior: Behavior normal. Behavior is cooperative.          Review of Systems   Constitutional: Positive for fatigue and unexpected weight change. Negative for appetite change.   HENT: Negative for congestion and nosebleeds.    Eyes: Negative for photophobia and visual disturbance.   Respiratory: Negative for cough, chest tightness and shortness of breath.    Cardiovascular: Negative for chest pain, palpitations and leg swelling.   Gastrointestinal: Positive for abdominal distention. Negative for blood in stool.   Endocrine:  "Negative for polyphagia and polyuria.   Genitourinary: Negative for enuresis and frequency.   Musculoskeletal: Negative for joint swelling and myalgias.   Skin: Negative for pallor and rash.   Neurological: Negative for dizziness, syncope, weakness, light-headedness, numbness and headaches.   Hematological: Does not bruise/bleed easily.   Psychiatric/Behavioral: Negative for decreased concentration and sleep disturbance.        OBJECTIVE:  /74 (BP Location: Left arm, Patient Position: Sitting)   Pulse 58   Resp 18   Ht 167.6 cm (65.98\")   Wt 91.2 kg (201 lb)   SpO2 97%   BMI 32.46 kg/m²      Body mass index is 32.46 kg/m².  Wt Readings from Last 1 Encounters:   07/14/21 91.2 kg (201 lb)       Lab Review:  Renal Function: CrCl cannot be calculated (Patient's most recent lab result is older than the maximum 30 days allowed.).    Lab Results   Component Value Date    PROBNP 2,798.0 (H) 04/14/2021       Results for orders placed during the hospital encounter of 03/08/21    Adult Transthoracic Echo Complete W/ Cont if Necessary Per Protocol    Interpretation Summary  · The left ventricular cavity is severely dilated.  · Estimated left ventricular EF = 22% Left ventricular systolic function is severely decreased.  · Left ventricular diastolic function is consistent with (grade II w/high LAP) pseudonormalization.  · The left atrial cavity is severely dilated.  · There is mild, bileaflet mitral valve thickening present.  · Moderate mitral valve regurgitation is present.  · Moderate tricuspid valve regurgitation is present.  · Estimated right ventricular systolic pressure from tricuspid regurgitation is normal (<35 mmHg).        6 MINUTE WALK  Patient declined       Cardiac Procedures:  1. Cardiac catheterization [facility] 5/30/17       2.  R/C [facility] 4/2019   Data deficit      ASSESSMENT:     Diagnosis Plan   1. Chronic systolic congestive heart failure (CMS/HCC)     2. Essential hypertension   "   3. Obesity (BMI 30-39.9)     4. Cardiovascular event risk  Hemoglobin A1c    Lipid Panel   5. NICM (nonischemic cardiomyopathy) (CMS/[facility])     6. At increased risk for cardiovascular disease     7. Abnormal finding of blood chemistry, unspecified      8. Acute on chronic combined systolic and diastolic CHF (congestive heart failure) (CMS/[facility])  Basic Metabolic Panel    BNP   9. Class 2 obesity with body mass index (BMI) of 39.0 to 39.9 in adult, unspecified obesity type, unspecified whether serious comorbidity present  Hemoglobin A1c    Lipid Panel   10. Prediabetes   Hemoglobin A1c         PLAN OF CARE:  1.  HFrEF-NYHA functional class II-III.  Most recent EF per echocardiogram 6%. Currently she is on Entresto, Lasix, carvedilol, ivabradine, spironolactone, and Jardiance.  Historically she has been unable to tolerate metoprolol succinate as it made her very dizzy.      Her weight is up 10 lbs, and she has been eating a large quantity of sodium the past month per self-report.  She eats a significant amount of [employer] with low sodium [employer] potato chips each day.  She also frequently consumes cold cuts, canned produce, and [employer].  I reviewed her diet with her extensively, and pulled up photos of nutrition facts for her most often consumed foods.  I suspect she is taking in at least 8,000-9,000 mg of sodium daily. I have instructed her to cut out hot sauce and potato chips, and to keep a log of her diet 3 of the next 7 days inclusive of sodium content and number of servings consumed, and bring this back to her follow up appt next week.  I will also recheck a BMP, lipid panel, BNP, and HgbA1C today and replace her AM dose of oral lasix with 100 mg of IV lasix today.     Directions for when to call the clinic reviewed with the patient to include weight gain of 2 to 3 pounds in 24 hours, weight gain of 5 to 10 pounds within 7 days; worsening shortness of breath; worsening lower extremity edema or abdominal  distention.    2.  Nonobstructive CAD-diffuse 30% LAD lesion noted on prior cardiac catheterization as above.  Stable, denies angina.    3.  Nonischemic cardiomyopathy (dilated)-presence of AICD noted.  Most recent EF per echocardiogram is 6%.  Most recent AICD interrogation as above.  Now on target dosing of Entresto, she continues to tolerate this well.    4.  NSVT-noted on prior device interrogation.  Currently on beta-blockade.    5.  HIV-history noted.    6.  Pulmonary embolism-now currently on warfarin.  Followed by home health and primary cardiology team.        BMP/BNP/lipid panel/HgbA1C; IV lasix 100 mg x1; follow up in one week with dietary log or sooner if needed        Thank you for allowing me to participate in the care of your patient,    Addendum: Lab results reviewed with the patient.  I advised that her HDL is a bit low and her LDL is greater than her goal of 100, so I would like to start her on 10 mg of atorvastatin daily.  I reviewed the medication and the dose with the pharmacist who reviewed possible interactions with her HIV medications, doses not to exceed 20 mg daily.  We will recheck a lipid panel in 3 months.     SAMUEL Cisneros  [facility] HEART FAILURE  07/14/21  13:56 EST      **Lyric Disclaimer:**  Much of this encounter note is an electronic transcription/translation of spoken language to printed text. The electronic translation of spoken language may permit erroneous, or at times, nonsensical words or phrases to be inadvertently transcribed. Although I have reviewed the note for such errors, some may still exist.

## 2021-07-14 NOTE — PROGRESS NOTES
[facility] HEART FAILURE      Patient Name: Nina Saez  :1975  Age: 45 y.o.  Sex: female  Referring Provider: Nilda Gallagher APRN   Primary Cardiologist: Dr. Melton  Encounter Provider: Zina Waldron, PharmD, BCACP      Chief Complaint: Patient presents with increase cough, fatigue and abdominal distension.       HPI:  Patient called yesterday with complaints of returning HF symptoms. Previously  stable and asymptomatic with current HF GDMT. Patient last seen in clinic on 21. Patient also being seen at [facility] for continued transplant workup. PMH is significant for HFrEF (EF now 22%), severe nonischemic cardiomyopathy, left bundle branch block, HTN, HIV, obesity, and asthma. She had a recent echo on 3/8/21 which showed her EF has improved from 6% to 22%. Her weight is up, however patient reports being on her menstrual cycle and poor diet for the past month. Patient was seen yesterday at [facility] and furosemide dose was increased from 80 mg BID to 100 mg BID, however patient has not started increased dose yet.     Current Regimen:  Heart Failure  Carvedilol 3.125 mg bid  Furosemide 80 mg bid  Entresto 97/103 mg bid  Spironolactone 25 mg daily  Ivabradine 7.5 mg bid  Jardiance 10 mg daily      Other CV Meds  Aspirin 81 mg daily  Warfarin as directed by med mgmt clinic  KCl 20 mEq (2 tablets) daily      Medication Use:  Adherence: great, uses a pillbox  Past hx of medication use/intolerance: metoprolol (dizziness, nausea); bumetanide (reports less effective than lasix)   [facility]; no issues with cost      Diet:   Increase in sodium intake; using '[employer] Hot sauce on everything'. Eating more 'low-sodium' potato chips and tortilla shells for tacos. Increase in soda consumption. Patient expressed confusion on how to eat vegetables with being on warfarin and also being instructed not to eat 'greens'.      Social History:  Tobacco use: former smoker (quit  "2015)  EtOH use: none  Illicit drug use: none.   Exercise: improved since med changes - walking + household chores/activities      Immunization Status:  Pneumococcal: PCV13 (4/2017), PPSV23 (1/2021)  Influenza: received at [facility]       OBJECTIVE:    /74 (BP Location: Left arm, Patient Position: Sitting)   Pulse 58   Resp 18   Ht 167.6 cm (65.98\")   Wt 91.2 kg (201 lb)   SpO2 97%   BMI 32.46 kg/m²     Body mass index is 32.46 kg/m².  Wt Readings from Last 1 Encounters:   07/14/21 91.2 kg (201 lb)       Lab Review:  Renal Function: CrCl cannot be calculated (Patient's most recent lab result is older than the maximum 30 days allowed.).    Lab Results   Component Value Date    PROBNP 2,798.0 (H) 04/14/2021       Results for orders placed during the hospital encounter of 03/08/21    Adult Transthoracic Echo Complete W/ Cont if Necessary Per Protocol    Interpretation Summary  · The left ventricular cavity is severely dilated.  · Estimated left ventricular EF = 22% Left ventricular systolic function is severely decreased.  · Left ventricular diastolic function is consistent with (grade II w/high LAP) pseudonormalization.  · The left atrial cavity is severely dilated.  · There is mild, bileaflet mitral valve thickening present.  · Moderate mitral valve regurgitation is present.  · Moderate tricuspid valve regurgitation is present.  · Estimated right ventricular systolic pressure from tricuspid regurgitation is normal (<35 mmHg).        ASSESSMENT/PLAN OF CARE:    1. HFrEF (EF 22%)  - Patient previously doing well and with no associated HF symptoms but presents with cough, increased fatigue and abdomen distention.   - IV furosemide 100 mg given in clinic today. Plan to start new increased dose of furosemide 100 mg BID this evening (IK physician increased dose at patients appointment yesterday)   - Noted interaction between ivabradine and HIV meds - risk of increased concentration of ivabradine. Patient " continues to tolerate previous ivabradine increase to 7.5 mg bid.   - Continue carvedilol 3.125 mg twice daily  - Continue Entresto 97/103 mg twice daily  - Continue Jardiance 10 mg daily  - Continue spironolactone 25 mg daily  - Advised patient to call clinic with 2-3 lb weight gain in 24 hrs or >5 lb weight gain in 1 week   - Patient provided low-salt cook book and asked to complete a 3 day food journal to review diet in-depth at f/u appointment in 1 week        15 min spent in direct patient care      Thank you for allowing me to participate in the care of your patient,    Halle Pitt, PharmD   PGY1 Pharmacy Resident   [facility] HEART FAILURE  07/14/21  14:10 EST

## 2021-07-16 ENCOUNTER — ANTICOAGULATION VISIT (OUTPATIENT)
Dept: PHARMACY | Facility: HOSPITAL | Age: 46
End: 2021-07-16

## 2021-07-16 LAB — INR PPP: 1.9

## 2021-07-16 NOTE — PROGRESS NOTES
Anticoagulation Clinic Progress Note    Anticoagulation Summary  As of 2021    INR goal:  2.0-3.0   TTR:  52.1 % (6.7 mo)   INR used for dosin.90 (2021)   Warfarin maintenance plan:  5 mg every Mon, Wed, Fri; 7.5 mg all other days   Weekly warfarin total:  45 mg   Plan last modified:  Chucho Mcdaniel RPH (2021)   Next INR check:  2021   Target end date:  Indefinite    Indications    Other acute pulmonary embolism  unspecified whether acute cor pulmonale present (CMS/HCC) (Resolved) [I26.99]  Acute pulmonary embolism  unspecified pulmonary embolism type  unspecified whether acute cor pulmonale present (CMS/HCC) (Resolved) [I26.99]             Anticoagulation Episode Summary     INR check location:      Preferred lab:      Send INR reminders to:   [facility] [employer]    Comments:  **Home Testing Program**      Anticoagulation Care Providers     Provider Role Specialty Phone number    Lisset Melton MD Referring Cardiology [phone]          Clinic Interview:  Patient Findings     Negatives:  Signs/symptoms of thrombosis, Signs/symptoms of bleeding,   Laboratory test error suspected, Change in health, Change in alcohol use,   Change in activity, Upcoming invasive procedure, Emergency department   visit, Upcoming dental procedure, Missed doses, Extra doses, Change in   medications, Change in diet/appetite, Hospital admission, Bruising, Other   complaints      Clinical Outcomes     Negatives:  Major bleeding event, Thromboembolic event,   Anticoagulation-related hospital admission, Anticoagulation-related ED   visit, Anticoagulation-related fatality        INR History:  Anticoagulation Monitoring 2021   INR 2.00 1.10 1.90   INR Date 2021   INR Goal 2.0-3.0 2.0-3.0 2.0-3.0   Trend Same Same Same   Last Week Total 42.5 mg 45 mg 52.5 mg   Next Week Total 45 mg 52.5 mg 47.5 mg   Sun 7.5 mg 7.5 mg 7.5 mg   Mon 5 mg 5 mg 5 mg   Tue 7.5 mg  7.5 mg 7.5 mg   Wed 5 mg 5 mg 5 mg   Thu 7.5 mg 7.5 mg 7.5 mg   Fri 5 mg 10 mg (7/9) 7.5 mg (7/16)   Sat 7.5 mg 10 mg (7/10) 7.5 mg   Visit Report - - -   Some recent data might be hidden       Plan:  1. INR is Subtherapeutic today- see above in Anticoagulation Summary.   Will instruct Nina Saez to Change their warfarin regimen- see above in Anticoagulation Summary.  2. Follow up in 1 week  3. They have been instructed to call if any changes in medications, doses, concerns, etc. Patient expresses understanding and has no further questions at this time.    Chucho Mcdaniel RP

## 2021-07-20 RX ORDER — POTASSIUM CHLORIDE 20 MEQ/1
TABLET, EXTENDED RELEASE ORAL
Qty: 90 TABLET | Refills: 11 | Status: SHIPPED | OUTPATIENT
Start: 2021-07-20 | End: 2022-12-07

## 2021-07-21 ENCOUNTER — TELEPHONE (OUTPATIENT)
Dept: CARDIOLOGY | Facility: CLINIC | Age: 46
End: 2021-07-21

## 2021-07-21 ENCOUNTER — IMMUNIZATION (OUTPATIENT)
Dept: VACCINE CLINIC | Facility: HOSPITAL | Age: 46
End: 2021-07-21

## 2021-07-21 ENCOUNTER — HOSPITAL ENCOUNTER (OUTPATIENT)
Dept: CARDIOLOGY | Facility: HOSPITAL | Age: 46
Discharge: HOME | End: 2021-07-21

## 2021-07-21 VITALS
HEIGHT: 66 IN | WEIGHT: 202 LBS | HEART RATE: 52 BPM | BODY MASS INDEX: 32.47 KG/M2 | SYSTOLIC BLOOD PRESSURE: 122 MMHG | DIASTOLIC BLOOD PRESSURE: 74 MMHG | RESPIRATION RATE: 16 BRPM | OXYGEN SATURATION: 95 %

## 2021-07-21 DIAGNOSIS — I51.89 GRADE II DIASTOLIC DYSFUNCTION: ICD-10-CM

## 2021-07-21 DIAGNOSIS — I26.99 PULMONARY EMBOLISM, OTHER, UNSPECIFIED CHRONICITY, UNSPECIFIED WHETHER ACUTE COR PULMONALE PRESENT (HCC): ICD-10-CM

## 2021-07-21 DIAGNOSIS — I42.8 NICM (NONISCHEMIC CARDIOMYOPATHY) (HCC): ICD-10-CM

## 2021-07-21 DIAGNOSIS — I50.43 ACUTE ON CHRONIC COMBINED SYSTOLIC AND DIASTOLIC CHF (CONGESTIVE HEART FAILURE) (HCC): Primary | ICD-10-CM

## 2021-07-21 DIAGNOSIS — B20 HIV INFECTION, UNSPECIFIED SYMPTOM STATUS (HCC): ICD-10-CM

## 2021-07-21 DIAGNOSIS — I10 ESSENTIAL HYPERTENSION: ICD-10-CM

## 2021-07-21 LAB
ANION GAP SERPL CALCULATED.3IONS-SCNC: 9.4 MMOL/L (ref 5–15)
BUN SERPL-MCNC: 24 MG/DL (ref 6–20)
BUN/CREAT SERPL: 21.8 (ref 7–25)
CALCIUM SPEC-SCNC: 8.3 MG/DL (ref 8.6–10.5)
CHLORIDE SERPL-SCNC: 100 MMOL/L (ref 98–107)
CO2 SERPL-SCNC: 21.6 MMOL/L (ref 22–29)
CREAT SERPL-MCNC: 1.1 MG/DL (ref 0.57–1)
GFR SERPL CREATININE-BSD FRML MDRD: 65 ML/MIN/1.73
GLUCOSE SERPL-MCNC: 109 MG/DL (ref 65–99)
POTASSIUM SERPL-SCNC: 4.3 MMOL/L (ref 3.5–5.2)
SODIUM SERPL-SCNC: 131 MMOL/L (ref 136–145)

## 2021-07-21 PROCEDURE — 0001A: CPT | Performed by: INTERNAL MEDICINE

## 2021-07-21 PROCEDURE — 99214 OFFICE O/P EST MOD 30 MIN: CPT | Performed by: NURSE PRACTITIONER

## 2021-07-21 PROCEDURE — G0463 HOSPITAL OUTPT CLINIC VISIT: HCPCS

## 2021-07-21 PROCEDURE — 91300 HC SARSCOV02 VAC 30MCG/0.3ML IM: CPT | Performed by: INTERNAL MEDICINE

## 2021-07-21 PROCEDURE — 80048 BASIC METABOLIC PNL TOTAL CA: CPT

## 2021-07-21 RX ORDER — TORSEMIDE 20 MG/1
20 TABLET ORAL SEE ADMIN INSTRUCTIONS
Qty: 150 TABLET | Refills: 1 | Status: SHIPPED | OUTPATIENT
Start: 2021-07-21 | End: 2021-09-24 | Stop reason: SDUPTHER

## 2021-07-21 NOTE — PROGRESS NOTES
[facility] HEART FAILURE      Patient Name: Nina Saez  :1975  Age: 45 y.o.  Sex: female  Referring Provider: Lisset Melton MD   Primary Cardiologist: Lisset Melton MD  Encounter Provider:  SAMUEL Cisneros      Chief Complaint:   Chief Complaint   Patient presents with   • Congestive Heart Failure         Congestive Heart Failure  Associated symptoms include fatigue. Pertinent negatives include no chest pain, palpitations, shortness of breath or unexpected weight change.    this 44-year-old female, known to this provider, comes to us today for further evaluation of her chronic systolic congestive heart failure.  Current diagnoses to include severe nonischemic cardiomyopathy, left bundle branch block, hypertension, HIV, obesity, and asthma.    Historically she had followed with Dr. Laina Boyd at [facility].  In spring 2019 she was visiting family in [place] and was taken emergently to [facility].  Echocardiogram and cardiac catheterization were performed at that point, carvedilol was changed to metoprolol.    In 2019 she presented through the emergency department at [facility] with chest pain and shortness of breath.  She was treated with IV diuresis, troponin was negative x2 and proBNP was elevated at 5151.  Entresto was started at that point given her severe dilated cardiomyopathy.  She was to follow with cardiology at [facility] at that time.    In 2020 she again presented to the emergency department with shortness of breath and cough x3 weeks.  BNP was elevated at 13,351.  Carvedilol was discontinued that admission given hypotension.  At subsequent outpatient appointment it was felt that Bumex was less effective than Lasix by the patient.  She complained of progressively worsening fatigue.  AICD, single-chamber [employer], interrogation 2020 revealed 10-16 beats of VT.    On September 10,  2020 she saw Dr. Jamar Azul MD, for consideration of upgrade to biventricular device.  At that point he felt that her left bundle branch block was incomplete and she did not meet criteria for implantation of CRT-D.    She presented 10/27/2020 to [facility] with complaints of acute on chronic shortness of breath that began approximately 1 week prior.  BNP was further elevated at 16,206 that point.  Pulmonary edema was noted on chest x-ray.  Spironolactone was started that admission.  Referral for evaluation by [facility] transplant services was made November 2, 2020.    Left and right cardiac catheterization at [facility] revealed no obstructive CAD with normal filling pressures.  Echocardiogram at that time revealed an EF less than 15% with global hypokinesis-information extracted from external medical record note.  Cardiac catheterization May 30, 2017 performed at [facility] yielded codominant system with normal left main, LAD with distal 30% stenosis, RCA normal, LVEDP 7 mmHg.    Echocardiogram July 9, 2020 revealed EF of 6%, grade 3 LV diastolic dysfunction, significant LV wall motion hypokinesis/akinesis, RVSP 45 to 55 mmHg secondary to moderate tricuspid valve regurgitation, moderate to severe MR noted.    Last remote device check 10/17/2020 revealed 1 episode of NSVT lasting 27 beats, normal function.  CMP 1/4/2021 revealed creatinine of 1.0, GFR 73, electrolytes stable.  TSH normal 10/28/2020.  Last BNP 10/27/2020 was 16,206.    Jardiance 10 mg daily was started on 11/13/2020.      She was admitted from 12/11/2020 to 12/17/2020 for acute pulmonary embolism with right lower lobe pulmonary infarct.  She was started on warfarin at that time.  Additionally, she has been started on Corlanor as well as spironolactone for her heart failure as well.  January 4, 2021 she was evaluated and treated in the emergency department for possible angioedema.  She is to follow-up with  26-Jul-2018 "her PCP regarding this per ED note.      Entresto has now been increased to  mg twice daily.  Repeat echocardiogram 3/8/2021 revealed improvement in ejection fraction from 6% to 22% with severe LV dilation, severe global hypokinesis and grade 2 LV diastolic dysfunction.    Despite reducing her sodium intake significantly she remains volume overloaded.  She does not feel like she is urinating as well as she used to on her furosemide.  Her weight remains up, but she continues to not have any shortness of breath.  She states that she feels her heart is \"beating hard.\"      The following portions of the patient's history were reviewed and updated as appropriate: allergies, current medications, past family history, past medical history, past social history, past surgical history and problem list.    Current Outpatient Medications   Medication Sig Dispense Refill   • acetaminophen (TYLENOL) 325 MG tablet Take 650 mg by mouth Every 6 (Six) Hours As Needed for Mild Pain .     • albuterol sulfate  (90 Base) MCG/ACT inhaler Inhale 2 puffs Every 4 (Four) Hours As Needed for Wheezing.     • aspirin 81 MG chewable tablet Chew 81 mg Daily.     • atorvastatin (LIPITOR) 10 MG tablet Take 1 tablet by mouth Daily. 30 tablet 11   • carvedilol (COREG) 3.125 MG tablet Take 3.125 mg by mouth 2 (Two) Times a Day With Meals.     • darunavir ethanolate (PREZISTA) 800 MG tablet tablet Take 800 mg by mouth Every Night.     • diphenhydrAMINE (BENADRYL) 25 mg capsule Take 1 capsule by mouth Every 6 (Six) Hours As Needed for Itching (swelling). 20 capsule 0   • Empagliflozin (Jardiance) 10 MG tablet Take 10 mg by mouth Daily. 30 tablet 11   • Emtricitabine-Tenofovir AF (DESCOVY) 200-25 MG per tablet Take  by mouth Every Night.     • fluticasone (Flonase) 50 MCG/ACT nasal spray 2 sprays into the nostril(s) as directed by provider Daily. 1 bottle 2   • guaiFENesin-codeine (GUAIFENESIN AC) 100-10 MG/5ML liquid Take 5 mL by mouth Every " 4 (Four) Hours As Needed for Cough. 118 mL 0   • ivabradine HCl (Corlanor) 7.5 MG tablet tablet Take 1 tablet by mouth 2 (Two) Times a Day With Meals. 60 tablet 5   • ondansetron ODT (Zofran ODT) 4 MG disintegrating tablet Place 1 tablet on the tongue Every 8 (Eight) Hours As Needed for Nausea or Vomiting. 30 tablet 0   • potassium chloride (K-DUR,KLOR-CON) 20 MEQ CR tablet Take 3 tablets by mouth once daily 90 tablet 11   • ritonavir (NORVIR) 100 MG tablet Take 100 mg by mouth Every Night.     • sacubitril-valsartan (Entresto)  MG tablet Take 1 tablet by mouth 2 (Two) Times a Day. 180 tablet 3   • sertraline (ZOLOFT) 50 MG tablet Take 50 mg by mouth Daily.     • spironolactone (ALDACTONE) 25 MG tablet 25 mg Daily.     • vitamin D (ERGOCALCIFEROL) 22086 units capsule capsule Take 50,000 Units by mouth Every 30 (Thirty) Days.     • warfarin (COUMADIN) 2.5 MG tablet Take 2 tablets (5 mg) by mouth on Mon, Wed, and Fri, and take 3 tablets (7.5 mg) by mouth all other days or as directed. 235 tablet 1   • torsemide (DEMADEX) 20 MG tablet Take 1 tablet by mouth See Admin Instructions. Take 60 mg (3 tablets) po every morning and 40 mg (2 tablets) po every afternoon 150 tablet 1     No current facility-administered medications for this encounter.       Past Medical History:   Diagnosis Date   • AICD (automatic cardioverter/defibrillator) present 3/6/2020   • Asthma    • CHF (congestive heart failure) (CMS/[facility])    • Class 2 obesity in adult    • Grade II diastolic dysfunction     Per echocardiogram 3/2021   • HIV (human immunodeficiency virus infection) (CMS/[facility])    • Hypertension    • LBBB (left bundle branch block)    • NICM (nonischemic cardiomyopathy) (CMS/[facility])     7/2020 EF 6% per echocardiogram; AICD present   • Nonrheumatic mitral valve regurgitation 3/8/2021    Moderate per echo 3/2021   • Nonrheumatic tricuspid valve regurgitation     Moderate per echocardiogram 3/2021       Past Surgical History:   Procedure  Laterality Date   • CARDIAC CATHETERIZATION     • CARDIAC DEFIBRILLATOR PLACEMENT     •  SECTION      one C/S   • FRACTURE SURGERY Left     left ankle   • TUBAL ABDOMINAL LIGATION         Physical Exam  Vitals and nursing note reviewed.   Constitutional:       General: She is not in acute distress.     Appearance: She is well-developed. She is not ill-appearing.   HENT:      Head: Normocephalic and atraumatic.   Eyes:      Conjunctiva/sclera: Conjunctivae normal.      Pupils: Pupils are equal, round, and reactive to light.   Neck:      Vascular: No JVD.   Cardiovascular:      Rate and Rhythm: Normal rate and regular rhythm.      Heart sounds: Normal heart sounds. No murmur heard.   No friction rub. No gallop.    Pulmonary:      Effort: Pulmonary effort is normal. No respiratory distress.      Breath sounds: Normal breath sounds.   Abdominal:      General: Bowel sounds are normal. There is distension.      Palpations: Abdomen is soft.   Musculoskeletal:         General: No swelling or deformity.   Skin:     General: Skin is warm and dry.      Capillary Refill: Capillary refill takes less than 2 seconds.   Neurological:      Mental Status: She is alert and oriented to person, place, and time. Mental status is at baseline.   Psychiatric:         Attention and Perception: Attention normal.         Mood and Affect: Mood normal. Affect is tearful.         Behavior: Behavior normal. Behavior is cooperative.          Review of Systems   Constitutional: Positive for fatigue. Negative for appetite change and unexpected weight change.   HENT: Negative for congestion and nosebleeds.    Eyes: Negative for photophobia and visual disturbance.   Respiratory: Negative for cough, chest tightness and shortness of breath.    Cardiovascular: Negative for chest pain, palpitations and leg swelling.   Gastrointestinal: Positive for abdominal distention. Negative for blood in stool.   Endocrine: Negative for polyphagia and polyuria.  "  Genitourinary: Negative for enuresis and frequency.   Musculoskeletal: Negative for joint swelling and myalgias.   Skin: Negative for pallor and rash.   Neurological: Negative for dizziness, syncope, weakness, light-headedness, numbness and headaches.   Hematological: Does not bruise/bleed easily.   Psychiatric/Behavioral: Negative for decreased concentration and sleep disturbance.        OBJECTIVE:  /74 (BP Location: Left arm, Patient Position: Sitting)   Pulse 52   Resp 16   Ht 167.7 cm (66.02\")   Wt 91.6 kg (202 lb)   SpO2 95%   BMI 32.58 kg/m²      Body mass index is 32.58 kg/m².  Wt Readings from Last 1 Encounters:   07/21/21 91.6 kg (202 lb)       Lab Review:  Renal Function: Estimated Creatinine Clearance: 79.5 mL/min (A) (by C-G formula based on SCr of 1.02 mg/dL (H)).    Lab Results   Component Value Date    PROBNP 1,159.0 (H) 07/14/2021       Results for orders placed during the hospital encounter of 03/08/21    Adult Transthoracic Echo Complete W/ Cont if Necessary Per Protocol    Interpretation Summary  · The left ventricular cavity is severely dilated.  · Estimated left ventricular EF = 22% Left ventricular systolic function is severely decreased.  · Left ventricular diastolic function is consistent with (grade II w/high LAP) pseudonormalization.  · The left atrial cavity is severely dilated.  · There is mild, bileaflet mitral valve thickening present.  · Moderate mitral valve regurgitation is present.  · Moderate tricuspid valve regurgitation is present.  · Estimated right ventricular systolic pressure from tricuspid regurgitation is normal (<35 mmHg).        6 MINUTE WALK  Patient declined       Cardiac Procedures:  1. Cardiac catheterization [facility] 5/30/17       2.  R/C [facility] 4/2019   Data deficit      ASSESSMENT:     Diagnosis Plan   1. Acute on chronic combined systolic and diastolic CHF (congestive heart failure) (CMS/[facility])  Basic Metabolic Panel   2. Pulmonary " embolism, other, unspecified chronicity, unspecified whether acute cor pulmonale present (CMS/[facility])     3. Grade II diastolic dysfunction     4. NICM (nonischemic cardiomyopathy) (CMS/[facility])     5. HIV infection, unspecified symptom status (CMS/[facility])     6. Essential hypertension           PLAN OF CARE:  1.  HFrEF-NYHA functional class II-III.  Most recent EF per echocardiogram 6%. Currently she is on Entresto, Lasix, carvedilol, ivabradine, spironolactone, and Jardiance.  Historically she has been unable to tolerate metoprolol succinate as it made her very dizzy.      She remains volume overloaded, but her status is not worsening.  She states she feels she is not urinating as much on furosemide as she used to.  She has cleaned up the sodium in her diet considerably.  I will switch her from furosemide to torsemide 60 mg in the morning and 40 mg in the afternoon.  I like to see her back next week.  I will check a BMP today as her Lasix was recently increased.    Directions for when to call the clinic reviewed with the patient to include weight gain of 2 to 3 pounds in 24 hours, weight gain of 5 to 10 pounds within 7 days; worsening shortness of breath; worsening lower extremity edema or abdominal distention.    2.  Nonobstructive CAD-diffuse 30% LAD lesion noted on prior cardiac catheterization as above.  Stable, without angina..    3.  Nonischemic cardiomyopathy (dilated)-presence of AICD noted.  EF per echocardiogram was 6%, now improved to 22% per echocardiogram as of 3/8/2021.  Most recent AICD interrogation as above.  Now on target dosing of Entresto, she continues to tolerate this well.    4.  NSVT-noted on prior device interrogation.  Currently on beta-blockade.    5.  HIV-history noted.    6.  Pulmonary embolism-now currently on warfarin.  Followed by home health and primary cardiology team.        BMP today; stop Lasix; start torsemide 60 mg in the morning, 40 mg in the afternoon; follow-up next week with repeat  BMP or sooner if needed        Thank you for allowing me to participate in the care of your patient,         Kiley SAMUEL Olvera  [facility] HEART FAILURE  07/21/21  13:56 EST      **Lyric Disclaimer:**  Much of this encounter note is an electronic transcription/translation of spoken language to printed text. The electronic translation of spoken language may permit erroneous, or at times, nonsensical words or phrases to be inadvertently transcribed. Although I have reviewed the note for such errors, some may still exist.

## 2021-07-21 NOTE — PROGRESS NOTES
[facility] HEART FAILURE      Patient Name: Nina Saez  :1975  Age: 45 y.o.  Sex: female  Referring Provider: Lisset Melton MD   Primary Cardiologist: Dr. Melton  Encounter Provider: Halle Pitt, PharmD      Chief Complaint: Patient presents for follow up to HF clinic after requiring IV diuresis at last visit.       HPI:  Patient presents today with no new HF complaints, however has experienced heart palpitations at rest, and endorsed her furosemide is no longer causing her to urinate. At last visit patient received IV diuresis, and was instructed to increase furosemide dose. However today patient's weight is still up and abdomen remains distended.     PMH is significant for HFrEF (EF now 22%), severe nonischemic cardiomyopathy, left bundle branch block, HTN, HIV, obesity, and asthma. She had a recent echo on 3/8/21 which showed her EF has improved from 6% to 22%.  Patient also being seen at [facility] for continued transplant workup.     Of note, patient expressed hesitation about receiving the COVID-19 vaccine at today's visit.    Current Regimen:  Heart Failure  Carvedilol 3.125 mg bid  Furosemide 100 mg bid  Entresto 97/103 mg bid  Spironolactone 25 mg daily  Ivabradine 7.5 mg bid  Jardiance 10 mg daily      Other CV Meds  Aspirin 81 mg daily  Warfarin as directed by med mgmt clinic  KCl 20 mEq (2 tablets) daily      Medication Use:  Adherence: great, uses a pillbox  Past hx of medication use/intolerance: metoprolol (dizziness, nausea); bumetanide (reports less effective than lasix)   Affordability: [facility] Medicare/Medicaid; no issues with cost      Diet: provided pt with low-sodium cookbook at last visit. Identified use of hot sauce likely a major contribution to salt intake. Advised patient to avoid more than 2000 mg salt/day.   Today patient endorsed has been better with salt intake and only using tobasco sauce at this time.     Social History:  Tobacco use: former smoker (quit  "2015)  EtOH use: none  Illicit drug use: none.   Exercise: improved since med changes - walking + household chores/activities      Immunization Status:  Pneumococcal: PCV13 (4/2017), PPSV23 (1/2021)  Influenza: received at [facility] - obtains annually  COVID-19: pt has not received due to concerns for ADRs. Counseled on risk and benefits, pt endorses planning to receive today.       OBJECTIVE:    /74 (BP Location: Left arm, Patient Position: Sitting)   Pulse 52   Resp 16   Ht 167.7 cm (66.02\")   Wt 91.6 kg (202 lb)   SpO2 95%   BMI 32.58 kg/m²     Body mass index is 32.58 kg/m².  Wt Readings from Last 1 Encounters:   07/21/21 91.6 kg (202 lb)       Lab Review:  Renal Function: Estimated Creatinine Clearance: 79.5 mL/min (A) (by C-G formula based on SCr of 1.02 mg/dL (H)).    Lab Results   Component Value Date    PROBNP 1,159.0 (H) 07/14/2021         ASSESSMENT/PLAN OF CARE:    1. HFrEF (EF 22%)  · Patient previously doing well and with no associated HF symptoms but presents with cough, increased fatigue and abdomen distention.   · Continue:   · Carvedilol 3.125 mg bid  · Entresto 97/103 mg bid  · Spironolactone 25 mg daily  · Ivabradine 7.5 mg bid - Noted interaction between ivabradine and HIV meds - risk of increased concentration of ivabradine. Patient continues to tolerate, HR 52.   · Jardiance 10 mg daily  · Stop:   · Furosemide 100 mg BID -  Patient remained fluid overloaded, suspect tolerance to furosemide  · Start:   · Torsemide 60 mg (3 tablets) po QAM and 40 mg (2 tablets) po QPM -  Counseled on MOA/dosing/directions/ADRs/Monitoring with patient, addressed all questions. Explained increased oral bioavailability and should expect an increase in urination. F/u in a week, if no improvement may consider the use of metolazone if kidney function stable.   · Advised patient to call clinic with 2-3 lb weight gain in 24 hrs or >5 lb weight gain in 1 week   · Advised patient to consume < 2000 mg / day " and < 50-60 oz of fluid a day   · Addressed concerns about COVID-19 vaccine and patient planned to receive today after visit. Due to multiple co-morbidities, believe the benefit of being vaccinated outweighs the risk.        15 min spent in direct patient care      Thank you for allowing me to participate in the care of your patient,    Halle Pitt, PharmD   PGY1 Pharmacy Resident   [facility] HEART FAILURE  07/21/21  14:10 EST

## 2021-07-21 NOTE — TELEPHONE ENCOUNTER
Lab results reviewed the patient.  I advised that her sodium level is a bit low, and she states she has been drinking quite a bit of water.  She states she will start measuring her fluid intake and stay at 50 ounces daily.  We will recheck labs at her follow up next week. She also let me know that she went after her appointment to get her COVID shot.  I commended her for this.   All questions and concerns addressed at this time.  Understanding and agreement verbalized.    SAMUEL Cisneros

## 2021-07-23 ENCOUNTER — ANTICOAGULATION VISIT (OUTPATIENT)
Dept: PHARMACY | Facility: HOSPITAL | Age: 46
End: 2021-07-23

## 2021-07-23 ENCOUNTER — TELEPHONE (OUTPATIENT)
Dept: CARDIOLOGY | Facility: CLINIC | Age: 46
End: 2021-07-23

## 2021-07-23 LAB — INR PPP: 3.5

## 2021-07-23 NOTE — PROGRESS NOTES
Anticoagulation Clinic Progress Note    Anticoagulation Summary  As of 7/23/2021    INR goal:  2.0-3.0   TTR:  52.5 % (6.9 mo)   INR used for dosing:  3.50 (7/23/2021)   Warfarin maintenance plan:  5 mg every Mon, Wed, Fri; 7.5 mg all other days   Weekly warfarin total:  45 mg   Plan last modified:  Chucho Mcdaniel, PharmD (4/8/2021)   Next INR check:  8/11/2021   Target end date:  Indefinite    Indications    Other acute pulmonary embolism  unspecified whether acute cor pulmonale present (CMS/HCC) (Resolved) [I26.99]  Acute pulmonary embolism  unspecified pulmonary embolism type  unspecified whether acute cor pulmonale present (CMS/HCC) (Resolved) [I26.99]             Anticoagulation Episode Summary     INR check location:      Preferred lab:      Send INR reminders to:   [facility]    Comments:  **Home Testing Program**      Anticoagulation Care Providers     Provider Role Specialty Phone number    Lisset Melton MD Referring Cardiology [phone]          Clinic Interview:  Patient Findings     Positives:  Change in medications    Negatives:  Signs/symptoms of thrombosis, Signs/symptoms of bleeding,   Laboratory test error suspected, Change in health, Change in alcohol use,   Change in activity, Upcoming invasive procedure, Emergency department   visit, Upcoming dental procedure, Missed doses, Extra doses, Change in   diet/appetite, Hospital admission, Bruising, Other complaints    Comments:  Reports 1st COVID vaccine 7/21/21; next scheduled for 8/11/21.         Clinical Outcomes     Negatives:  Major bleeding event, Thromboembolic event,   Anticoagulation-related hospital admission, Anticoagulation-related ED   visit, Anticoagulation-related fatality    Comments:  Reports 1st COVID vaccine 7/21/21; next scheduled for 8/11/21.           INR History:  Anticoagulation Monitoring 7/9/2021 7/16/2021 7/23/2021   INR 1.10 1.90 3.50   INR Date 7/9/2021 7/16/2021 7/23/2021   INR Goal 2.0-3.0  2.0-3.0 2.0-3.0   Trend Same Same Same   Last Week Total 45 mg 52.5 mg 47.5 mg   Next Week Total 52.5 mg 47.5 mg 42.5 mg   Sun 7.5 mg 7.5 mg 7.5 mg   Mon 5 mg 5 mg 5 mg   Tue 7.5 mg 7.5 mg 7.5 mg   Wed 5 mg 5 mg 5 mg   Thu 7.5 mg 7.5 mg 7.5 mg   Fri 10 mg (7/9) 7.5 mg (7/16) 2.5 mg (7/23); Otherwise 5 mg   Sat 10 mg (7/10) 7.5 mg 7.5 mg   Visit Report - - -   Some recent data might be hidden       Plan:  1. INR is Supratherapeutic today- see above in Anticoagulation Summary.   Will instruct Nina Saez to Change their warfarin regimen- see above in Anticoagulation Summary.  2. Follow up in 1 week  3. They have been instructed to call if any changes in medications, doses, concerns, etc. Patient expresses understanding and has no further questions at this time.    Chucho Mcdaniel, PharmD

## 2021-07-23 NOTE — TELEPHONE ENCOUNTER
The patient called stating that her systolic blood pressure is 75.  She is asymptomatic with no complaints of dizziness, weakness, lethargy, or vision changes.  I advised that she drink a glass of water and elevate her feet.  She is already had her spironolactone today, but I asked that she hold her carvedilol and Entresto doses this morning.  She has not yet picked up her torsemide.  I gave her parameters for over the weekend that she may take her torsemide if her systolic pressure is between 101 120 mmHg as she has been volume overloaded for several days.  If her systolic pressure is less than 120 mmHg she is to hold her carvedilol and Entresto, however if it is 120 or greater she may take this in addition to her torsemide.  All questions and concerns addressed at this time, understanding and agreement verbalized.    SAMUEL Cisneros

## 2021-07-26 ENCOUNTER — TELEPHONE (OUTPATIENT)
Dept: CARDIOLOGY | Facility: HOSPITAL | Age: 46
End: 2021-07-26

## 2021-07-26 NOTE — TELEPHONE ENCOUNTER
Called and spoke with patient to reschedule her appt. [facility] staff have a meeting during her appt time. Rescheduled patient for 7/29 at 1:30 pm.      Kevin EscobedoD, BCACP  [facility] Failure [facility]  Phone: [phone]

## 2021-07-27 PROCEDURE — 93295 DEV INTERROG REMOTE 1/2/MLT: CPT | Performed by: INTERNAL MEDICINE

## 2021-07-27 PROCEDURE — 93296 REM INTERROG EVL PM/IDS: CPT | Performed by: INTERNAL MEDICINE

## 2021-07-28 DIAGNOSIS — I50.43 ACUTE ON CHRONIC COMBINED SYSTOLIC AND DIASTOLIC CHF (CONGESTIVE HEART FAILURE) (HCC): Primary | ICD-10-CM

## 2021-07-29 ENCOUNTER — HOSPITAL ENCOUNTER (OUTPATIENT)
Dept: CARDIOLOGY | Facility: HOSPITAL | Age: 46
Discharge: HOME | End: 2021-07-29

## 2021-07-30 ENCOUNTER — LAB (OUTPATIENT)
Dept: LAB | Facility: HOSPITAL | Age: 46
End: 2021-07-30

## 2021-07-30 ENCOUNTER — TELEPHONE (OUTPATIENT)
Dept: CARDIOLOGY | Facility: CLINIC | Age: 46
End: 2021-07-30

## 2021-07-30 ENCOUNTER — ANTICOAGULATION VISIT (OUTPATIENT)
Dept: PHARMACY | Facility: HOSPITAL | Age: 46
End: 2021-07-30

## 2021-07-30 ENCOUNTER — HOSPITAL ENCOUNTER (OUTPATIENT)
Dept: CARDIOLOGY | Facility: HOSPITAL | Age: 46
Discharge: HOME | End: 2021-07-30

## 2021-07-30 VITALS
OXYGEN SATURATION: 96 % | HEART RATE: 62 BPM | SYSTOLIC BLOOD PRESSURE: 134 MMHG | DIASTOLIC BLOOD PRESSURE: 72 MMHG | WEIGHT: 203.4 LBS | BODY MASS INDEX: 32.69 KG/M2 | HEIGHT: 66 IN

## 2021-07-30 DIAGNOSIS — I42.8 NICM (NONISCHEMIC CARDIOMYOPATHY) (HCC): ICD-10-CM

## 2021-07-30 DIAGNOSIS — B20 HIV INFECTION, UNSPECIFIED SYMPTOM STATUS (HCC): ICD-10-CM

## 2021-07-30 DIAGNOSIS — I10 ESSENTIAL HYPERTENSION: ICD-10-CM

## 2021-07-30 DIAGNOSIS — I50.20 HFREF (HEART FAILURE WITH REDUCED EJECTION FRACTION) (HCC): Primary | ICD-10-CM

## 2021-07-30 DIAGNOSIS — I51.89 GRADE II DIASTOLIC DYSFUNCTION: ICD-10-CM

## 2021-07-30 DIAGNOSIS — I50.43 ACUTE ON CHRONIC COMBINED SYSTOLIC AND DIASTOLIC CHF (CONGESTIVE HEART FAILURE) (HCC): ICD-10-CM

## 2021-07-30 PROBLEM — I50.22 CHRONIC SYSTOLIC CONGESTIVE HEART FAILURE (HCC): Status: RESOLVED | Noted: 2019-08-06 | Resolved: 2021-07-30

## 2021-07-30 LAB
ANION GAP SERPL CALCULATED.3IONS-SCNC: 9.2 MMOL/L (ref 5–15)
BUN SERPL-MCNC: 21 MG/DL (ref 6–20)
BUN/CREAT SERPL: 24.1 (ref 7–25)
CALCIUM SPEC-SCNC: 8.9 MG/DL (ref 8.6–10.5)
CHLORIDE SERPL-SCNC: 101 MMOL/L (ref 98–107)
CO2 SERPL-SCNC: 23.8 MMOL/L (ref 22–29)
CREAT SERPL-MCNC: 0.87 MG/DL (ref 0.57–1)
GFR SERPL CREATININE-BSD FRML MDRD: 85 ML/MIN/1.73
GLUCOSE SERPL-MCNC: 96 MG/DL (ref 65–99)
INR PPP: 1.5
NT-PROBNP SERPL-MCNC: 1548 PG/ML (ref 0–450)
POTASSIUM SERPL-SCNC: 4.5 MMOL/L (ref 3.5–5.2)
SODIUM SERPL-SCNC: 134 MMOL/L (ref 136–145)

## 2021-07-30 PROCEDURE — 99214 OFFICE O/P EST MOD 30 MIN: CPT | Performed by: NURSE PRACTITIONER

## 2021-07-30 PROCEDURE — 36415 COLL VENOUS BLD VENIPUNCTURE: CPT

## 2021-07-30 PROCEDURE — G0463 HOSPITAL OUTPT CLINIC VISIT: HCPCS

## 2021-07-30 PROCEDURE — 83880 ASSAY OF NATRIURETIC PEPTIDE: CPT

## 2021-07-30 PROCEDURE — 80048 BASIC METABOLIC PNL TOTAL CA: CPT

## 2021-07-30 NOTE — PROGRESS NOTES
"Anticoagulation Clinic Progress Note    Anticoagulation Summary  As of 2021    INR goal:  2.0-3.0   TTR:  52.4 % (7.2 mo)   INR used for dosin.50 (2021)   Warfarin maintenance plan:  7.5 mg every day   Weekly warfarin total:  52.5 mg   Plan last modified:  Chucho Mcdaniel, PharmD (2021)   Next INR check:  8/3/2021   Target end date:  Indefinite    Indications    Other acute pulmonary embolism  unspecified whether acute cor pulmonale present (CMS/HCC) (Resolved) [I26.99]  Acute pulmonary embolism  unspecified pulmonary embolism type  unspecified whether acute cor pulmonale present (CMS/HCC) (Resolved) [I26.99]             Anticoagulation Episode Summary     INR check location:      Preferred lab:      Send INR reminders to:  [facility]    Comments:  **Home Testing Program**      Anticoagulation Care Providers     Provider Role Specialty Phone number    Lisset Melton MD Referring Cardiology [phone]          Clinic Interview:  Patient Findings     Positives:  Change in diet/appetite    Negatives:  Signs/symptoms of thrombosis, Signs/symptoms of bleeding,   Laboratory test error suspected, Change in health, Change in alcohol use,   Change in activity, Upcoming invasive procedure, Emergency department   visit, Upcoming dental procedure, Missed doses, Extra doses, Change in   medications, Hospital admission, Bruising, Other complaints    Comments:  Reports started eating \"greens\" consistently nightly this week   after discussing with the [facility] that she does not have to   avoid greens as long as she is consistent, and she wishes to continue   current daily consumption.       Clinical Outcomes     Negatives:  Major bleeding event, Thromboembolic event,   Anticoagulation-related hospital admission, Anticoagulation-related ED   visit, Anticoagulation-related fatality    Comments:  Reports started eating \"greens\" consistently nightly this week   after discussing " with the [facility] that she does not have to   avoid greens as long as she is consistent, and she wishes to continue   current daily consumption.         INR History:  Anticoagulation Monitoring 7/16/2021 7/23/2021 7/30/2021   INR 1.90 3.50 1.50   INR Date 7/16/2021 7/23/2021 7/30/2021   INR Goal 2.0-3.0 2.0-3.0 2.0-3.0   Trend Same Same Up   Last Week Total 52.5 mg 47.5 mg 42.5 mg   Next Week Total 47.5 mg 42.5 mg 55 mg   Sun 7.5 mg 7.5 mg 7.5 mg   Mon 5 mg 5 mg 7.5 mg   Tue 7.5 mg 7.5 mg -   Wed 5 mg 5 mg -   Thu 7.5 mg 7.5 mg -   Fri 7.5 mg (7/16) 2.5 mg (7/23) 10 mg (7/30)   Sat 7.5 mg 7.5 mg 7.5 mg   Visit Report - - -   Some recent data might be hidden       Plan:  1. INR is Subtherapeutic today- see above in Anticoagulation Summary.   Will instruct Nina NATH Nadja to Increase their warfarin regimen- see above in Anticoagulation Summary.  2. Follow up in 4 days  3. They have been instructed to call if any changes in medications, doses, concerns, etc. Patient expresses understanding and has no further questions at this time.    Chucho Mcdaniel, PharmD

## 2021-07-30 NOTE — TELEPHONE ENCOUNTER
Lab results reviewed the patient.  No change to current plan of care.  All questions and concerns addressed at this time.  Understanding and agreement verbalized.    SAMUEL Cisneros

## 2021-08-02 ENCOUNTER — OFFICE VISIT (OUTPATIENT)
Dept: INTERNAL MEDICINE | Age: 46
End: 2021-08-02

## 2021-08-02 VITALS
BODY MASS INDEX: 32.47 KG/M2 | HEART RATE: 70 BPM | SYSTOLIC BLOOD PRESSURE: 122 MMHG | HEIGHT: 66 IN | DIASTOLIC BLOOD PRESSURE: 80 MMHG | WEIGHT: 202 LBS | TEMPERATURE: 97.8 F | OXYGEN SATURATION: 98 %

## 2021-08-02 DIAGNOSIS — F41.9 ANXIETY: Primary | ICD-10-CM

## 2021-08-02 DIAGNOSIS — I50.22 CHRONIC SYSTOLIC CONGESTIVE HEART FAILURE (HCC): ICD-10-CM

## 2021-08-02 PROCEDURE — 99213 OFFICE O/P EST LOW 20 MIN: CPT | Performed by: NURSE PRACTITIONER

## 2021-08-02 RX ORDER — NITROGLYCERIN 0.4 MG/1
0.4 TABLET SUBLINGUAL
Qty: 30 TABLET | Refills: 5 | Status: SHIPPED | OUTPATIENT
Start: 2021-08-02 | End: 2022-09-30 | Stop reason: SDUPTHER

## 2021-08-02 RX ORDER — NITROGLYCERIN 0.4 MG/1
0.4 TABLET SUBLINGUAL
COMMUNITY
End: 2021-08-02 | Stop reason: SDUPTHER

## 2021-08-02 NOTE — PROGRESS NOTES
"Chief Complaint  Anxiety    Subjective          Nina Saez presents to [facility]  Anxiety  Presents for follow-up visit. Symptoms include decreased concentration, excessive worry, irritability and nervous/anxious behavior. Patient reports no depressed mood or suicidal ideas. Primary symptoms comment: headaches. The severity of symptoms is moderate.     Compliance with medications: Previously on sertraline 50mg, reports has been off of this for about 2 months because she ran out of previous prescription.        Objective   Vital Signs:   /80   Pulse 70   Temp 97.8 °F (36.6 °C) (Temporal)   Ht 167.6 cm (65.98\")   Wt 91.6 kg (202 lb)   SpO2 98%   BMI 32.62 kg/m²     Physical Exam  Vitals and nursing note reviewed.   Constitutional:       General: She is not in acute distress.     Appearance: She is well-developed. She is not ill-appearing.   Cardiovascular:      Rate and Rhythm: Normal rate and regular rhythm.      Heart sounds: Normal heart sounds, S1 normal and S2 normal. No murmur heard.     Pulmonary:      Effort: Pulmonary effort is normal.      Breath sounds: Normal breath sounds. No decreased breath sounds, wheezing, rhonchi or rales.   Skin:     General: Skin is warm and dry.   Neurological:      Mental Status: She is alert and oriented to person, place, and time.   Psychiatric:         Speech: Speech normal.         Behavior: Behavior normal. Behavior is cooperative.         Thought Content: Thought content normal.         Judgment: Judgment normal.        Result Review :   The following data was reviewed by: SAMUEL Robins on 08/02/2021:  Common labs    Common Labsle 7/14/21 7/14/21 7/14/21 7/21/21 7/30/21    1029 1029 1029     Glucose  104 (A)  109 (A) 96   BUN  17  24 (A) 21 (A)   Creatinine  1.02 (A)  1.10 (A) 0.87   eGFR African Am  71  65 85   Sodium  136  131 (A) 134 (A)   Potassium  4.1  4.3 4.5   Chloride  103  100 101   Calcium  8.7  8.3 (A) 8.9 "   Total Cholesterol   192     Triglycerides   109     HDL Cholesterol   39 (A)     LDL Cholesterol    133 (A)     Hemoglobin A1C 4.92       (A) Abnormal value                   Assessment and Plan {CC Problem List  Visit Diagnosis   ROS  Review (Popup)  Health Maintenance  Quality  BestPractice  Medications  SmartSets  SnapShot Encounters  Media :23}   Diagnoses and all orders for this visit:    1. Anxiety (Primary)  She was previously stable on sertraline 50 mg daily.  We will refill this today and take over management.    -     sertraline (Zoloft) 50 MG tablet; Take 1 tablet by mouth Daily.  Dispense: 90 tablet; Refill: 3    2. Chronic systolic congestive heart failure (CMS/HCC)  Needs refill, left prescription bottle in [place].    -     nitroglycerin (NITROSTAT) 0.4 MG SL tablet; Place 1 tablet under the tongue Every 5 (Five) Minutes As Needed for Chest Pain. Take no more than 3 doses in 15 minutes.  Dispense: 30 tablet; Refill: 5      Follow Up   Return in about 6 months (around 2/2/2022) for Next scheduled follow up.  Patient was given instructions and counseling regarding her condition or for health maintenance advice. Please see specific information pulled into the AVS if appropriate.

## 2021-08-03 ENCOUNTER — ANTICOAGULATION VISIT (OUTPATIENT)
Dept: PHARMACY | Facility: HOSPITAL | Age: 46
End: 2021-08-03

## 2021-08-03 LAB — INR PPP: 1.9

## 2021-08-03 NOTE — PROGRESS NOTES
Anticoagulation Clinic Progress Note    Anticoagulation Summary  As of 8/3/2021    INR goal:  2.0-3.0   TTR:  51.5 % (7.3 mo)   INR used for dosin.90 (8/3/2021)   Warfarin maintenance plan:  7.5 mg every day   Weekly warfarin total:  52.5 mg   Plan last modified:  Chucho Mcdaniel, PharmD (2021)   Next INR check:  2021   Target end date:  Indefinite    Indications    Other acute pulmonary embolism  unspecified whether acute cor pulmonale present (CMS/HCC) (Resolved) [I26.99]  Acute pulmonary embolism  unspecified pulmonary embolism type  unspecified whether acute cor pulmonale present (CMS/HCC) (Resolved) [I26.99]             Anticoagulation Episode Summary     INR check location:      Preferred lab:      Send INR reminders to:  [facility]    Comments:  **Home Testing Program**      Anticoagulation Care Providers     Provider Role Specialty Phone number    Lisset Melton MD Referring Cardiology [phone]          Clinic Interview:  Patient Findings     Positives:  Change in diet/appetite    Negatives:  Signs/symptoms of thrombosis, Signs/symptoms of bleeding,   Laboratory test error suspected, Change in health, Change in alcohol use,   Change in activity, Upcoming invasive procedure, Emergency department   visit, Upcoming dental procedure, Missed doses, Extra doses, Change in   medications, Hospital admission, Bruising, Other complaints    Comments:  Continues with higher vit k foods in diet.       Clinical Outcomes     Negatives:  Major bleeding event, Thromboembolic event,   Anticoagulation-related hospital admission, Anticoagulation-related ED   visit, Anticoagulation-related fatality    Comments:  Continues with higher vit k foods in diet.         INR History:  Anticoagulation Monitoring 2021 2021 8/3/2021   INR 3.50 1.50 1.90   INR Date 2021 2021 8/3/2021   INR Goal 2.0-3.0 2.0-3.0 2.0-3.0   Trend Same Up Same   Last Week Total 47.5 mg 42.5 mg 52.5 mg    Next Week Total 42.5 mg 55 mg 55 mg   Sun 7.5 mg 7.5 mg -   Mon 5 mg 7.5 mg -   Tue 7.5 mg - 10 mg (8/3)   Wed 5 mg - 7.5 mg   Thu 7.5 mg - 7.5 mg   Fri 2.5 mg (7/23) 10 mg (7/30) -   Sat 7.5 mg 7.5 mg -   Visit Report - - -   Some recent data might be hidden       Plan:  1. INR is Subtherapeutic today- see above in Anticoagulation Summary.   Will instruct Nina Saez to Change their warfarin regimen- see above in Anticoagulation Summary.  2. Follow up in 3 days  3. They have been instructed to call if any changes in medications, doses, concerns, etc. Patient expresses understanding and has no further questions at this time.    Chucho Mcdaniel, PharmD

## 2021-08-06 ENCOUNTER — ANTICOAGULATION VISIT (OUTPATIENT)
Dept: PHARMACY | Facility: HOSPITAL | Age: 46
End: 2021-08-06

## 2021-08-06 LAB — INR PPP: 3.1

## 2021-08-06 PROCEDURE — G0249 PROVIDE INR TEST MATER/EQUIP: HCPCS

## 2021-08-06 NOTE — PROGRESS NOTES
Anticoagulation Clinic Progress Note    Anticoagulation Summary  As of 8/6/2021    INR goal:  2.0-3.0   TTR:  51.9 % (7.4 mo)   INR used for dosing:  3.10 (8/6/2021)   Warfarin maintenance plan:  7.5 mg every day   Weekly warfarin total:  52.5 mg   Plan last modified:  Chucho Mcdaniel, PharmD (7/30/2021)   Next INR check:  8/13/2021   Target end date:  Indefinite    Indications    Other acute pulmonary embolism  unspecified whether acute cor pulmonale present (CMS/HCC) (Resolved) [I26.99]  Acute pulmonary embolism  unspecified pulmonary embolism type  unspecified whether acute cor pulmonale present (CMS/HCC) (Resolved) [I26.99]             Anticoagulation Episode Summary     INR check location:      Preferred lab:      Send INR reminders to:  [facility]    Comments:  **Home Testing Program**      Anticoagulation Care Providers     Provider Role Specialty Phone number    Lisset Melton MD Referring Cardiology [phone]          Clinic Interview:  Patient Findings     Negatives:  Signs/symptoms of thrombosis, Signs/symptoms of bleeding,   Laboratory test error suspected, Change in health, Change in alcohol use,   Change in activity, Upcoming invasive procedure, Emergency department   visit, Upcoming dental procedure, Missed doses, Extra doses, Change in   medications, Change in diet/appetite, Hospital admission, Bruising, Other   complaints      Clinical Outcomes     Negatives:  Major bleeding event, Thromboembolic event,   Anticoagulation-related hospital admission, Anticoagulation-related ED   visit, Anticoagulation-related fatality        INR History:  Anticoagulation Monitoring 7/30/2021 8/3/2021 8/6/2021   INR 1.50 1.90 3.10   INR Date 7/30/2021 8/3/2021 8/6/2021   INR Goal 2.0-3.0 2.0-3.0 2.0-3.0   Trend Up Same Same   Last Week Total 42.5 mg 52.5 mg 57.5 mg   Next Week Total 55 mg 55 mg 50 mg   Sun 7.5 mg - 7.5 mg   Mon 7.5 mg - 7.5 mg   Tue - 10 mg (8/3) 7.5 mg   Wed - 7.5 mg 7.5 mg    Thu - 7.5 mg 7.5 mg   Fri 10 mg (7/30) - 5 mg (8/6)   Sat 7.5 mg - 7.5 mg   Visit Report - - -   Some recent data might be hidden       Plan:  1. INR is Supratherapeutic today- see above in Anticoagulation Summary.   Will instruct Nina NATH Nadja to Change their warfarin regimen (5 mg today, then trial 7.5 mg daily) - see above in Anticoagulation Summary.  2. Follow up in 1 week  3. They have been instructed to call if any changes in medications, doses, concerns, etc. Patient expresses understanding and has no further questions at this time.    Chucho Mcdaniel, PharmD

## 2021-08-11 ENCOUNTER — OFFICE VISIT (OUTPATIENT)
Dept: ORTHOPEDIC SURGERY | Facility: CLINIC | Age: 46
End: 2021-08-11

## 2021-08-11 ENCOUNTER — IMMUNIZATION (OUTPATIENT)
Dept: VACCINE CLINIC | Facility: HOSPITAL | Age: 46
End: 2021-08-11

## 2021-08-11 VITALS — HEIGHT: 67 IN | WEIGHT: 198 LBS | TEMPERATURE: 96.8 F | BODY MASS INDEX: 31.08 KG/M2

## 2021-08-11 DIAGNOSIS — S82.842S BIMALLEOLAR ANKLE FRACTURE, LEFT, SEQUELA: ICD-10-CM

## 2021-08-11 DIAGNOSIS — S93.432S ANKLE SYNDESMOSIS DISRUPTION, LEFT, SEQUELA: ICD-10-CM

## 2021-08-11 DIAGNOSIS — R52 PAIN: Primary | ICD-10-CM

## 2021-08-11 DIAGNOSIS — Z98.890 HISTORY OF OPEN REDUCTION AND INTERNAL FIXATION (ORIF) PROCEDURE: ICD-10-CM

## 2021-08-11 PROCEDURE — 91300 HC SARSCOV02 VAC 30MCG/0.3ML IM: CPT | Performed by: INTERNAL MEDICINE

## 2021-08-11 PROCEDURE — 73610 X-RAY EXAM OF ANKLE: CPT | Performed by: ORTHOPAEDIC SURGERY

## 2021-08-11 PROCEDURE — 0002A: CPT | Performed by: INTERNAL MEDICINE

## 2021-08-11 PROCEDURE — 99214 OFFICE O/P EST MOD 30 MIN: CPT | Performed by: ORTHOPAEDIC SURGERY

## 2021-08-11 RX ORDER — DICLOFENAC SODIUM 20 MG/G
2 SOLUTION TOPICAL 2 TIMES DAILY
Qty: 112 G | Refills: 12 | Status: SHIPPED | OUTPATIENT
Start: 2021-08-11 | End: 2021-09-02

## 2021-08-11 NOTE — PROGRESS NOTES
Ankle Follow Up      Patient: Nina Saez    YOB: 1975 45 y.o. female    Chief Complaints: Ankle pain    History of Present Illness:Patient underwent open reduction internal fixation of the left bimalleolar ankle fracture including syndesmotic fixation at [facility] by Dr. Calvert on 3/7/2020.     She saw him on 2/1/2021 and I have reviewed those notes.  At that time she had persistent complaints of pain and evidently surgery was recommended for removing the hardware     However she has significant cardiac history and I was contacted by Dr. Melton let me know that if patient does need to have surgery she would prefer to be done at [facility] where she can monitor patient.      I saw her for this initially on 4/14/2021     At that time she had moderate aching pain in the left ankle some posterior laterally and medially as well as pain along the Achilles and into the heel with some occasional radiation over the anterior aspect of the ankle with some occasional feelings of numbness.  It  did feel better when she wears her lace up brace.        She was fitted with an ASO brace and sent for MRI and CT scan but when she followed up in the office on 5/3/2021 had not yet had them done and was unable to show up for her next appointment on 5/17/2021 because of flat tire.    She is seen back today with persistent complaints of pain mainly over the lateral aspect of the ankle as well as some medially and anteromedially with some things of pain going up her leg.  HPI    ROS: ankle pain  Past Medical History:   Diagnosis Date   • AICD (automatic cardioverter/defibrillator) present 3/6/2020   • Asthma    • CHF (congestive heart failure) (CMS/[facility])    • Class 2 obesity in adult    • Grade II diastolic dysfunction     Per echocardiogram 3/2021   • HIV (human immunodeficiency virus infection) (CMS/[facility])    • Hypertension    • LBBB (left bundle branch block)    • NICM (nonischemic cardiomyopathy)  "(CMS/[facility])     7/2020 EF 6% per echocardiogram; AICD present   • Nonrheumatic mitral valve regurgitation 3/8/2021    Moderate per echo 3/2021   • Nonrheumatic tricuspid valve regurgitation     Moderate per echocardiogram 3/2021       Physical Exam:   Vitals:    08/11/21 0916   Temp: 96.8 °F (36 °C)   Weight: 89.8 kg (198 lb)   Height: 168.9 cm (66.5\")   PainSc:   7   PainLoc: Foot     Well developed with normal mood.  On exam she is ambulating without assistive device.  Left ankle shows well-healed incisions there is mild discomfort over the medial hardware as well as lateral hardware and mild discomfort of the anteromedial ankle.  There was some discomfort anterolaterally with questionable anterior drawer but no gross instability.      Radiology: 3 views of the left ankle ordered evaluate pain and alignment reviewed and compared with prior x-rays from 5/3/2021.  There remains a broken screw at the syndesmosis otherwise hardware remains intact without change in alignment fractures appear to be healed.  Some mild arthritic change over the medial aspect of the ankle.    CT scan films and report of the left ankle from 5/13/2021 reviewed which shows syndesmotic screw is broken just deep to the level of the fibular plate with lucency at the interface of the fibula with the syndesmosis suggested motion but no lucency around the syndesmotic screw in the tibia.    Fibular fracture has healed without deformity as has the medial malleolus fracture.  Are a few subcortical cysts deep to the articular surface of the medial malleolus.    MRI films and report of the left ankle dated 5/13/2021 reviewed which show the previous fixation and marrow signal is normal except for 12 to 13 mm diameter of marrow edema around small subcortical cyst deep to the articular cortex of the medial malleolus    Cartilage otherwise unremarkable no joint effusion or intra-articular body.  Peroneal tendons are normal.    ATFL appears chronically torn " but the CFL and deltoid are intact as are the syndesmotic ligaments but there is some soft tissue edema anteriorly along the distal syndesmotic space.      Assessment/Plan: 1.  Status post ORIF left ankle with broken syndesmotic screw  2.  Left ankle painful medial and lateral hardware  3.  Left ankle chronic ATFL tear  4.  Left ankle distal syndesmotic space soft tissue edema anteriorly  5.  Left ankle medial malleolar subcortical cystic change    Had a long discussion regarding treatment options and the medial screw and lateral plate would be relatively easy to take out but the syndesmotic screw could be quite difficult and would require try finding which I described to her.    Would also probably do an ankle scope at that time to make sure the syndesmosis area was clear the interface but would not necessarily open it back up anterolaterally.  Could also evaluate her medial malleolar area     We could potentially also do an anterolateral ligamentous repair.    She says she really does not want to have anything done from a surgical standpoint as she is afraid that her heart could not handle it and she is somewhat fragile from a cardiac standpoint.    She also declined injection today.    What we have decided on is to replace her ASO brace as this has give her some support and have her apply Pennsaid to the areas twice daily (she cannot take oral anti-inflammatories due to her cardiac condition    She is going to see her cardiologist in 4 to 5 weeks and will discuss safety of surgery with her at that time and we will see her back in 6 weeks with x-ray of her left ankle and determine treatment course going forward.

## 2021-08-13 ENCOUNTER — ANTICOAGULATION VISIT (OUTPATIENT)
Dept: PHARMACY | Facility: HOSPITAL | Age: 46
End: 2021-08-13

## 2021-08-13 LAB — INR PPP: 5.2

## 2021-08-13 NOTE — PROGRESS NOTES
Anticoagulation Clinic Progress Note    Anticoagulation Summary  As of 2021    INR goal:  2.0-3.0   TTR:  50.3 % (7.6 mo)   INR used for dosin.20 (2021)   Warfarin maintenance plan:  7.5 mg every day   Weekly warfarin total:  52.5 mg   Plan last modified:  Chucho Mcdaniel, PharmD (2021)   Next INR check:  2021   Target end date:  Indefinite    Indications    Other acute pulmonary embolism  unspecified whether acute cor pulmonale present (CMS/HCC) (Resolved) [I26.99]  Acute pulmonary embolism  unspecified pulmonary embolism type  unspecified whether acute cor pulmonale present (CMS/HCC) (Resolved) [I26.99]             Anticoagulation Episode Summary     INR check location:      Preferred lab:      Send INR reminders to:  [facility]    Comments:  **Home Testing Program**      Anticoagulation Care Providers     Provider Role Specialty Phone number    Lisset Melton MD Referring Cardiology [phone]          Clinic Interview:  Patient Findings     Positives:  Change in medications, Change in diet/appetite, Other   complaints    Negatives:  Signs/symptoms of thrombosis, Signs/symptoms of bleeding,   Laboratory test error suspected, Change in health, Change in alcohol use,   Change in activity, Upcoming invasive procedure, Emergency department   visit, Upcoming dental procedure, Missed doses, Extra doses, Hospital   admission, Bruising    Comments:  Reports 2nd COVID vaccine 2 days ago. Reports she has felt   poorly the last couple days and has eaten very little.      Clinical Outcomes     Negatives:  Major bleeding event, Thromboembolic event,   Anticoagulation-related hospital admission, Anticoagulation-related ED   visit, Anticoagulation-related fatality    Comments:  Reports 2nd COVID vaccine 2 days ago. Reports she has felt   poorly the last couple days and has eaten very little.        INR History:  Anticoagulation Monitoring 8/3/2021 2021 2021   INR 1.90  3.10 5.20   INR Date 8/3/2021 8/6/2021 8/13/2021   INR Goal 2.0-3.0 2.0-3.0 2.0-3.0   Trend Same Same Same   Last Week Total 52.5 mg 57.5 mg 50 mg   Next Week Total 55 mg 50 mg 40 mg   Sun - 7.5 mg 7.5 mg   Mon - 7.5 mg -   Tue 10 mg (8/3) 7.5 mg -   Wed 7.5 mg 7.5 mg -   Thu 7.5 mg 7.5 mg -   Fri - 5 mg (8/6) Hold (8/13)   Sat - 7.5 mg 2.5 mg (8/14)   Visit Report - - -   Some recent data might be hidden       Plan:  1. INR is Supratherapeutic today- see above in Anticoagulation Summary.   Will instruct Nina PHAM Saez to Change their warfarin regimen- see above in Anticoagulation Summary.  2. Follow up in 3 days  3. They have been instructed to call if any changes in medications, doses, concerns, etc. To seek immediate medical attention if s/sx of bleeding develop or fall occurs. Patient expresses understanding and has no further questions at this time.    Chucho Mcdaniel, PharmD

## 2021-08-18 ENCOUNTER — TELEPHONE (OUTPATIENT)
Dept: INTERNAL MEDICINE | Age: 46
End: 2021-08-18

## 2021-08-18 NOTE — TELEPHONE ENCOUNTER
PATIENT CALLED HAS BEEN EXPERIENCING REALLY BAD HEADACHES SINCE TAKING 2ND SHOT OF PFIZER COVID VACCINE (08/11).  HAS TRIED TYLENOL, MUCINEX AND NOTHING HAS HELPED,     PLEASE ADVISE    [phone]

## 2021-08-20 ENCOUNTER — ANTICOAGULATION VISIT (OUTPATIENT)
Dept: PHARMACY | Facility: HOSPITAL | Age: 46
End: 2021-08-20

## 2021-08-20 LAB — INR PPP: 1.4

## 2021-08-20 NOTE — PROGRESS NOTES
Anticoagulation Clinic Progress Note    Anticoagulation Summary  As of 2021    INR goal:  2.0-3.0   TTR:  49.6 % (7.9 mo)   INR used for dosin.40 (2021)   Warfarin maintenance plan:  7.5 mg every day   Weekly warfarin total:  52.5 mg   Plan last modified:  Chucho Mcdaniel, PharmD (2021)   Next INR check:  2021   Target end date:  Indefinite    Indications    Other acute pulmonary embolism  unspecified whether acute cor pulmonale present (CMS/HCC) (Resolved) [I26.99]  Acute pulmonary embolism  unspecified pulmonary embolism type  unspecified whether acute cor pulmonale present (CMS/HCC) (Resolved) [I26.99]             Anticoagulation Episode Summary     INR check location:      Preferred lab:      Send INR reminders to:  [facility]    Comments:  **Home Testing Program**      Anticoagulation Care Providers     Provider Role Specialty Phone number    Lisset Melton MD Referring Cardiology [phone]          Clinic Interview:  Patient Findings     Positives:  Change in health, Change in medications, Change in   diet/appetite    Negatives:  Signs/symptoms of thrombosis, Signs/symptoms of bleeding,   Laboratory test error suspected, Change in alcohol use, Change in   activity, Upcoming invasive procedure, Emergency department visit,   Upcoming dental procedure, Missed doses, Extra doses, Hospital admission,   Bruising, Other complaints    Comments:  Wili has continued to feel poorly, especially with a   headache, which she has been managing with APAP prn. Reports her appetite   has improved, and she has resumed high vit k intake.       Clinical Outcomes     Negatives:  Major bleeding event, Thromboembolic event,   Anticoagulation-related hospital admission, Anticoagulation-related ED   visit, Anticoagulation-related fatality    Comments:  Wili has continued to feel poorly, especially with a   headache, which she has been managing with APAP prn. Reports her appetite    has improved, and she has resumed high vit k intake.         INR History:  Anticoagulation Monitoring 8/6/2021 8/13/2021 8/20/2021   INR 3.10 5.20 1.40   INR Date 8/6/2021 8/13/2021 8/20/2021   INR Goal 2.0-3.0 2.0-3.0 2.0-3.0   Trend Same Same Same   Last Week Total 57.5 mg 50 mg 40 mg   Next Week Total 50 mg 40 mg 55 mg   Sun 7.5 mg 7.5 mg 7.5 mg   Mon 7.5 mg - 7.5 mg   Tue 7.5 mg - -   Wed 7.5 mg - -   Thu 7.5 mg - -   Fri 5 mg (8/6) Hold (8/13) 10 mg (8/20)   Sat 7.5 mg 2.5 mg (8/14) 7.5 mg   Visit Report - - -   Some recent data might be hidden       Plan:  1. INR is Subtherapeutic today- see above in Anticoagulation Summary.   Will instruct Nina Saez to Change their warfarin regimen- see above in Anticoagulation Summary.  2. Follow up in 4 days  3. They have been instructed to call if any changes in medications, doses, concerns, etc. Patient expresses understanding and has no further questions at this time.    Chucho Mcdaniel, PharmD

## 2021-08-27 ENCOUNTER — TELEPHONE (OUTPATIENT)
Dept: CARDIOLOGY | Facility: CLINIC | Age: 46
End: 2021-08-27

## 2021-08-27 ENCOUNTER — ANTICOAGULATION VISIT (OUTPATIENT)
Dept: PHARMACY | Facility: HOSPITAL | Age: 46
End: 2021-08-27

## 2021-08-27 ENCOUNTER — APPOINTMENT (OUTPATIENT)
Dept: CARDIOLOGY | Facility: HOSPITAL | Age: 46
End: 2021-08-27

## 2021-08-27 LAB — INR PPP: 2.8

## 2021-08-27 NOTE — TELEPHONE ENCOUNTER
I called the patient to check on her as she has missed her appointment, and this is unlike her.  She was asleep, and will call back to reschedule once she wakes up.  Will await return call.     Kiley Boyd, APRN     [de-identified] : Patient is alert, oriented and in no acute distress. Affect and general appearance are normal and patient is able to answer questions appropriately. On the left, the scars are soft and supple. He is able to make a tight fist. The ring finger has full extension of the MCP joint, PIP joint has 40 degree flexion contracture, DIP sits at about 20 degrees of flexion. Able to flex and touch the ring finger to the thenar eminence but it does not tuck in tightly. Full passive motion. Protected sensation on the ulnar aspect of the ring finger. \par \par Neurologic: Median, ulnar, and radial motor and sensory are intact. \par Skin: No cyanosis, clubbing, edema or rashes. \par Vascular: Radial pulses intact. \par Lymphatic: No streaking or epitrochlear adenopathy. \par

## 2021-08-27 NOTE — PROGRESS NOTES
Anticoagulation Clinic Progress Note    Anticoagulation Summary  As of 2021    INR goal:  2.0-3.0   TTR:  49.8 % (8.1 mo)   INR used for dosin.80 (2021)   Warfarin maintenance plan:  7.5 mg every day   Weekly warfarin total:  52.5 mg   No change documented:  Chucho Mcdaniel PharmD   Plan last modified:  Chucho Mcdaniel PharmD (2021)   Next INR check:  9/3/2021   Target end date:  Indefinite    Indications    Other acute pulmonary embolism  unspecified whether acute cor pulmonale present (CMS/HCC) (Resolved) [I26.99]  Acute pulmonary embolism  unspecified pulmonary embolism type  unspecified whether acute cor pulmonale present (CMS/HCC) (Resolved) [I26.99]             Anticoagulation Episode Summary     INR check location:      Preferred lab:      Send INR reminders to:   [facility]    Comments:  **Home Testing Program**      Anticoagulation Care Providers     Provider Role Specialty Phone number    Lisset Melton MD Referring Cardiology [phone]          Clinic Interview:  Patient Findings     Negatives:  Signs/symptoms of thrombosis, Signs/symptoms of bleeding,   Laboratory test error suspected, Change in health, Change in alcohol use,   Change in activity, Upcoming invasive procedure, Emergency department   visit, Upcoming dental procedure, Missed doses, Extra doses, Change in   medications, Change in diet/appetite, Hospital admission, Bruising, Other   complaints    Comments:  Reports feeling much improved and eating well.       Clinical Outcomes     Negatives:  Major bleeding event, Thromboembolic event,   Anticoagulation-related hospital admission, Anticoagulation-related ED   visit, Anticoagulation-related fatality    Comments:  Reports feeling much improved and eating well.         INR History:  Anticoagulation Monitoring 2021   INR 5.20 1.40 2.80   INR Date 2021   INR Goal 2.0-3.0 2.0-3.0 2.0-3.0   Trend Same Same  Same   Last Week Total 50 mg 40 mg 55 mg   Next Week Total 40 mg 55 mg 52.5 mg   Sun 7.5 mg 7.5 mg 7.5 mg   Mon - 7.5 mg 7.5 mg   Tue - - 7.5 mg   Wed - - 7.5 mg   Thu - - 7.5 mg   Fri Hold (8/13) 10 mg (8/20) 7.5 mg   Sat 2.5 mg (8/14) 7.5 mg 7.5 mg   Visit Report - - -   Some recent data might be hidden       Plan:  1. INR is Therapeutic today- see above in Anticoagulation Summary.   Will instruct Nina Saez to Continue their warfarin regimen- see above in Anticoagulation Summary.  2. Follow up in 1 week  3. They have been instructed to call if any changes in medications, doses, concerns, etc. Patient expresses understanding and has no further questions at this time.    Chucho Mcdaniel, PharmD

## 2021-09-02 ENCOUNTER — TELEPHONE (OUTPATIENT)
Dept: ORTHOPEDIC SURGERY | Facility: CLINIC | Age: 46
End: 2021-09-02

## 2021-09-03 ENCOUNTER — TELEPHONE (OUTPATIENT)
Dept: CARDIOLOGY | Facility: CLINIC | Age: 46
End: 2021-09-03

## 2021-09-03 ENCOUNTER — HOSPITAL ENCOUNTER (OUTPATIENT)
Dept: CARDIOLOGY | Facility: HOSPITAL | Age: 46
Discharge: HOME | End: 2021-09-03
Admitting: NURSE PRACTITIONER

## 2021-09-03 ENCOUNTER — ANTICOAGULATION VISIT (OUTPATIENT)
Dept: PHARMACY | Facility: HOSPITAL | Age: 46
End: 2021-09-03

## 2021-09-03 VITALS
WEIGHT: 208 LBS | HEART RATE: 60 BPM | SYSTOLIC BLOOD PRESSURE: 110 MMHG | HEIGHT: 66 IN | DIASTOLIC BLOOD PRESSURE: 64 MMHG | OXYGEN SATURATION: 96 % | RESPIRATION RATE: 16 BRPM | BODY MASS INDEX: 33.43 KG/M2

## 2021-09-03 DIAGNOSIS — I51.89 GRADE II DIASTOLIC DYSFUNCTION: ICD-10-CM

## 2021-09-03 DIAGNOSIS — I26.99 PULMONARY EMBOLISM, OTHER, UNSPECIFIED CHRONICITY, UNSPECIFIED WHETHER ACUTE COR PULMONALE PRESENT (HCC): ICD-10-CM

## 2021-09-03 DIAGNOSIS — I10 ESSENTIAL HYPERTENSION: ICD-10-CM

## 2021-09-03 DIAGNOSIS — I42.8 NICM (NONISCHEMIC CARDIOMYOPATHY) (HCC): ICD-10-CM

## 2021-09-03 DIAGNOSIS — I50.20 HFREF (HEART FAILURE WITH REDUCED EJECTION FRACTION) (HCC): Primary | ICD-10-CM

## 2021-09-03 LAB
ANION GAP SERPL CALCULATED.3IONS-SCNC: 11.9 MMOL/L (ref 5–15)
BUN SERPL-MCNC: 25 MG/DL (ref 6–20)
BUN/CREAT SERPL: 24.8 (ref 7–25)
CALCIUM SPEC-SCNC: 9 MG/DL (ref 8.6–10.5)
CHLORIDE SERPL-SCNC: 103 MMOL/L (ref 98–107)
CHOLEST SERPL-MCNC: 129 MG/DL (ref 0–200)
CO2 SERPL-SCNC: 21.1 MMOL/L (ref 22–29)
CREAT SERPL-MCNC: 1.01 MG/DL (ref 0.57–1)
GFR SERPL CREATININE-BSD FRML MDRD: 72 ML/MIN/1.73
GLUCOSE SERPL-MCNC: 118 MG/DL (ref 65–99)
HDLC SERPL-MCNC: 38 MG/DL (ref 40–60)
INR PPP: 3.3
LDLC SERPL CALC-MCNC: 74 MG/DL (ref 0–100)
LDLC/HDLC SERPL: 1.92 {RATIO}
NT-PROBNP SERPL-MCNC: 1281 PG/ML (ref 0–450)
POTASSIUM SERPL-SCNC: 3.9 MMOL/L (ref 3.5–5.2)
SODIUM SERPL-SCNC: 136 MMOL/L (ref 136–145)
TRIGL SERPL-MCNC: 90 MG/DL (ref 0–150)
VLDLC SERPL-MCNC: 17 MG/DL (ref 5–40)

## 2021-09-03 PROCEDURE — G0463 HOSPITAL OUTPT CLINIC VISIT: HCPCS

## 2021-09-03 PROCEDURE — G0249 PROVIDE INR TEST MATER/EQUIP: HCPCS

## 2021-09-03 PROCEDURE — 80061 LIPID PANEL: CPT

## 2021-09-03 PROCEDURE — 80048 BASIC METABOLIC PNL TOTAL CA: CPT

## 2021-09-03 PROCEDURE — 99214 OFFICE O/P EST MOD 30 MIN: CPT | Performed by: NURSE PRACTITIONER

## 2021-09-03 PROCEDURE — 83880 ASSAY OF NATRIURETIC PEPTIDE: CPT

## 2021-09-03 NOTE — PROGRESS NOTES
[facility] HEART FAILURE      Patient Name: Nina Saez  :1975  Age: 45 y.o.  Sex: female  Referring Provider: Lisset Melton MD   Primary Cardiologist: Lisset Melton MD  Encounter Provider:  SAMUEL Cisneros      Chief Complaint:   Chief Complaint   Patient presents with   • Congestive Heart Failure   HFrEF      Congestive Heart Failure  Associated symptoms include fatigue. Pertinent negatives include no chest pain, palpitations, shortness of breath or unexpected weight change.    this 44-year-old female, known to this provider, comes to us today for further evaluation of her chronic systolic congestive heart failure.  Current diagnoses to include severe nonischemic cardiomyopathy, left bundle branch block, hypertension, HIV, obesity, and asthma.    Historically she had followed with Dr. Laina Boyd at [facility].  In spring 2019 she was visiting family in [place] and was taken emergently to [facility].  Echocardiogram and cardiac catheterization were performed at that point, carvedilol was changed to metoprolol.    In 2019 she presented through the emergency department at [facility] with chest pain and shortness of breath.  She was treated with IV diuresis, troponin was negative x2 and proBNP was elevated at 5151.  Entresto was started at that point given her severe dilated cardiomyopathy.  She was to follow with cardiology at [facility] at that time.    In 2020 she again presented to the emergency department with shortness of breath and cough x3 weeks.  BNP was elevated at 13,351.  Carvedilol was discontinued that admission given hypotension.  At subsequent outpatient appointment it was felt that Bumex was less effective than Lasix by the patient.  She complained of progressively worsening fatigue.  AICD, single-chamber [employer], interrogation 2020 revealed 10-16 beats of VT.    On September  10, 2020 she saw Dr. Jamar Azul MD, for consideration of upgrade to biventricular device.  At that point he felt that her left bundle branch block was incomplete and she did not meet criteria for implantation of CRT-D.    She presented 10/27/2020 to [facility] with complaints of acute on chronic shortness of breath that began approximately 1 week prior.  BNP was further elevated at 16,206 that point.  Pulmonary edema was noted on chest x-ray.  Spironolactone was started that admission.  Referral for evaluation by [facility] transplant services was made November 2, 2020.    Left and right cardiac catheterization at [facility] revealed no obstructive CAD with normal filling pressures.  Echocardiogram at that time revealed an EF less than 15% with global hypokinesis-information extracted from external medical record note.  Cardiac catheterization May 30, 2017 performed at [facility] yielded codominant system with normal left main, LAD with distal 30% stenosis, RCA normal, LVEDP 7 mmHg.    Echocardiogram July 9, 2020 revealed EF of 6%, grade 3 LV diastolic dysfunction, significant LV wall motion hypokinesis/akinesis, RVSP 45 to 55 mmHg secondary to moderate tricuspid valve regurgitation, moderate to severe MR noted.    Last remote device check 10/17/2020 revealed 1 episode of NSVT lasting 27 beats, normal function.  CMP 1/4/2021 revealed creatinine of 1.0, GFR 73, electrolytes stable.  TSH normal 10/28/2020.  Last BNP 10/27/2020 was 16,206.    Jardiance 10 mg daily was started on 11/13/2020.      She was admitted from 12/11/2020 to 12/17/2020 for acute pulmonary embolism with right lower lobe pulmonary infarct.  She was started on warfarin at that time.  Additionally, she has been started on Corlanor as well as spironolactone for her heart failure as well.  January 4, 2021 she was evaluated and treated in the emergency department for possible angioedema.  She is to follow-up  with her PCP regarding this per ED note.      Entresto has now been increased to  mg twice daily.  Repeat echocardiogram 3/8/2021 revealed improvement in ejection fraction from 6% to 22% with severe LV dilation, severe global hypokinesis and grade 2 LV diastolic dysfunction.    She had an appointment with infectious disease at the [facility] this morning.  She currently is feeling well at home, and states that she has been able to go up and down her stairs without trouble.  Her most recent appointment with  on July 13 she had a discussion regarding getting on the VAD versus transplant list, but at that time she wanted to hold off and see what the medications could do for her.      The following portions of the patient's history were reviewed and updated as appropriate: allergies, current medications, past family history, past medical history, past social history, past surgical history and problem list.    Current Outpatient Medications   Medication Sig Dispense Refill   • acetaminophen (TYLENOL) 325 MG tablet Take 650 mg by mouth Every 6 (Six) Hours As Needed for Mild Pain .     • albuterol sulfate  (90 Base) MCG/ACT inhaler Inhale 2 puffs Every 4 (Four) Hours As Needed for Wheezing.     • atorvastatin (LIPITOR) 10 MG tablet Take 1 tablet by mouth Daily. 30 tablet 11   • carvedilol (COREG) 3.125 MG tablet Take 3.125 mg by mouth 2 (Two) Times a Day With Meals.     • darunavir ethanolate (PREZISTA) 800 MG tablet tablet Take 800 mg by mouth Every Night.     • Diclofenac Sodium (VOLTAREN) 1 % gel gel Apply 4 g topically to the appropriate area as directed 2 (Two) Times a Day. Use per pkg insert 100 g 2   • diphenhydrAMINE (BENADRYL) 25 mg capsule Take 1 capsule by mouth Every 6 (Six) Hours As Needed for Itching (swelling). 20 capsule 0   • Empagliflozin (Jardiance) 10 MG tablet Take 10 mg by mouth Daily. 30 tablet 11   • Emtricitabine-Tenofovir AF (DESCOVY) 200-25 MG per tablet Take  by  mouth Every Night.     • fluticasone (Flonase) 50 MCG/ACT nasal spray 2 sprays into the nostril(s) as directed by provider Daily. 1 bottle 2   • ivabradine HCl (Corlanor) 7.5 MG tablet tablet Take 1 tablet by mouth 2 (Two) Times a Day With Meals. 60 tablet 5   • nitroglycerin (NITROSTAT) 0.4 MG SL tablet Place 1 tablet under the tongue Every 5 (Five) Minutes As Needed for Chest Pain. Take no more than 3 doses in 15 minutes. 30 tablet 5   • ondansetron ODT (Zofran ODT) 4 MG disintegrating tablet Place 1 tablet on the tongue Every 8 (Eight) Hours As Needed for Nausea or Vomiting. 30 tablet 0   • potassium chloride (K-DUR,KLOR-CON) 20 MEQ CR tablet Take 3 tablets by mouth once daily 90 tablet 11   • ritonavir (NORVIR) 100 MG tablet Take 100 mg by mouth Every Night.     • sacubitril-valsartan (Entresto)  MG tablet Take 1 tablet by mouth 2 (Two) Times a Day. 180 tablet 3   • sertraline (Zoloft) 50 MG tablet Take 1 tablet by mouth Daily. 90 tablet 3   • spironolactone (ALDACTONE) 25 MG tablet 25 mg Daily.     • torsemide (DEMADEX) 20 MG tablet Take 1 tablet by mouth See Admin Instructions. Take 60 mg (3 tablets) po every morning and 40 mg (2 tablets) po every afternoon 150 tablet 1   • vitamin D (ERGOCALCIFEROL) 05023 units capsule capsule Take 50,000 Units by mouth Every 30 (Thirty) Days.     • warfarin (COUMADIN) 2.5 MG tablet Take 2 tablets (5 mg) by mouth on Mon, Wed, and Fri, and take 3 tablets (7.5 mg) by mouth all other days or as directed. 235 tablet 1     No current facility-administered medications for this encounter.       Past Medical History:   Diagnosis Date   • AICD (automatic cardioverter/defibrillator) present 3/6/2020   • Asthma    • CHF (congestive heart failure) (CMS/[facility])    • Class 2 obesity in adult    • Grade II diastolic dysfunction     Per echocardiogram 3/2021   • HIV (human immunodeficiency virus infection) (CMS/[facility])    • Hypertension    • LBBB (left bundle branch block)    • NICM  (nonischemic cardiomyopathy) (CMS/HCC)     2020 EF 6% per echocardiogram; AICD present   • Nonrheumatic mitral valve regurgitation 3/8/2021    Moderate per echo 3/2021   • Nonrheumatic tricuspid valve regurgitation     Moderate per echocardiogram 3/2021       Past Surgical History:   Procedure Laterality Date   • CARDIAC CATHETERIZATION     • CARDIAC DEFIBRILLATOR PLACEMENT     •  SECTION      one C/S   • FRACTURE SURGERY Left     left ankle   • TUBAL ABDOMINAL LIGATION         Physical Exam  Vitals and nursing note reviewed.   Constitutional:       General: She is not in acute distress.     Appearance: She is well-developed. She is not ill-appearing.   HENT:      Head: Normocephalic and atraumatic.   Eyes:      Conjunctiva/sclera: Conjunctivae normal.      Pupils: Pupils are equal, round, and reactive to light.   Neck:      Vascular: No JVD.   Cardiovascular:      Rate and Rhythm: Normal rate and regular rhythm.      Heart sounds: Normal heart sounds. No murmur heard.   No friction rub. No gallop.    Pulmonary:      Effort: Pulmonary effort is normal. No respiratory distress.      Breath sounds: Normal breath sounds.   Abdominal:      General: Bowel sounds are normal. There is no distension.      Palpations: Abdomen is soft.   Musculoskeletal:         General: No swelling or deformity.   Skin:     General: Skin is warm and dry.      Capillary Refill: Capillary refill takes less than 2 seconds.   Neurological:      Mental Status: She is alert and oriented to person, place, and time. Mental status is at baseline.   Psychiatric:         Attention and Perception: Attention normal.         Mood and Affect: Mood normal. Affect is tearful.         Behavior: Behavior normal. Behavior is cooperative.          Review of Systems   Constitutional: Positive for fatigue. Negative for appetite change and unexpected weight change.   HENT: Negative for congestion and nosebleeds.    Eyes: Negative for photophobia and  "visual disturbance.   Respiratory: Negative for cough, chest tightness and shortness of breath.    Cardiovascular: Negative for chest pain, palpitations and leg swelling.   Gastrointestinal: Negative for abdominal distention and blood in stool.   Endocrine: Negative for polyphagia and polyuria.   Genitourinary: Positive for frequency. Negative for enuresis.   Musculoskeletal: Negative for joint swelling and myalgias.   Skin: Negative for pallor and rash.   Neurological: Negative for dizziness, syncope, weakness, light-headedness, numbness and headaches.   Hematological: Does not bruise/bleed easily.   Psychiatric/Behavioral: Negative for decreased concentration and sleep disturbance.        OBJECTIVE:  /64 (BP Location: Right arm, Patient Position: Sitting)   Pulse 60   Resp 16   Ht 168.9 cm (66.5\")   Wt 94.3 kg (208 lb)   SpO2 96%   BMI 33.07 kg/m²      Body mass index is 33.07 kg/m².  Wt Readings from Last 1 Encounters:   09/03/21 94.3 kg (208 lb)       Lab Review:  Renal Function: Estimated Creatinine Clearance: 82.2 mL/min (A) (by C-G formula based on SCr of 1.01 mg/dL (H)).    Lab Results   Component Value Date    PROBNP 1,281.0 (H) 09/03/2021       Results for orders placed during the hospital encounter of 03/08/21    Adult Transthoracic Echo Complete W/ Cont if Necessary Per Protocol    Interpretation Summary  · The left ventricular cavity is severely dilated.  · Estimated left ventricular EF = 22% Left ventricular systolic function is severely decreased.  · Left ventricular diastolic function is consistent with (grade II w/high LAP) pseudonormalization.  · The left atrial cavity is severely dilated.  · There is mild, bileaflet mitral valve thickening present.  · Moderate mitral valve regurgitation is present.  · Moderate tricuspid valve regurgitation is present.  · Estimated right ventricular systolic pressure from tricuspid regurgitation is normal (<35 mmHg).        6 MINUTE WALK  Patient " declined       Cardiac Procedures:  1. Cardiac catheterization [facility] 5/30/17       2.  R/C [facility] 4/2019   Data deficit      ASSESSMENT:     Diagnosis Plan   1. HFrEF (heart failure with reduced ejection fraction) (CMS/[facility])  Basic Metabolic Panel    BNP    Lipid Panel   2. Essential hypertension  Lipid Panel   3. Grade II diastolic dysfunction     4. NICM (nonischemic cardiomyopathy) (CMS/[facility])     5. Pulmonary embolism, other, unspecified chronicity, unspecified whether acute cor pulmonale present (CMS/[facility])           PLAN OF CARE:  1.  HFrEF-NYHA functional class II.  Most recent EF per echocardiogram 22%, up from 6%. Currently she is on Entresto, torsemide, carvedilol, ivabradine, spironolactone, and Jardiance.  Historically she has been unable to tolerate metoprolol succinate as it made her very dizzy.      We had an extensive discussion today regarding her plan of care.  To this point, she has been hesitant to go through with the heart transplant evaluation as she feels so much better on her medications.  However, despite this, her ejection fraction is still only 22%.  Granted, that is a significant improvement, but still very poor.  We also discussed the importance of an advanced directive, as well as potentially selecting one or more healthcare surrogates amongst her daughters in the event she is unable to make her own medical decisions.  I have provided her with lots of written materials regarding advanced directive, and she would like to get this filled out and on file.  We will make sure jose alberto is available at her next appointment to get this squared away.  Additionally, she would like to move forward with the evaluation for the heart transplant and is going to address this with her team at  at her upcoming appointment.    I like to get a BMP/BNP today.  Unfortunately, her lipid panel is likely to be inaccurate as we later discovered she had had chicken nuggets on the way here.  We  will recheck this later, I advised that I will put in the order and she can come any morning that she has been fasting to the lab and have that drawn.    Directions for when to call the clinic reviewed with the patient to include weight gain of 2 to 3 pounds in 24 hours, weight gain of 5 to 10 pounds within 7 days; worsening shortness of breath; worsening lower extremity edema or abdominal distention.    2.  Nonobstructive CAD-diffuse 30% LAD lesion noted on prior cardiac catheterization as above.  Stable, denies angina.    3.  Nonischemic cardiomyopathy (dilated)-presence of AICD noted.  EF per echocardiogram was 6%, now improved to 22% per echocardiogram as of 3/8/2021.  Most recent AICD interrogation as above.  Now on target dosing of Entresto, she continues to tolerate this well.    4.  NSVT-noted on prior device interrogation.  Currently on beta-blockade.    5.  HIV-history noted.    6.  Pulmonary embolism-remains on on warfarin.  Followed by home health and primary cardiology team.    7.  Hyponatremia-resolved    BMP/BNP; continue current GDMT; follow-up in 4 weeks with myself with jose alberto present for advanced directive signing; follow-up with         Thank you for allowing me to participate in the care of your patient,         Kiley SAMUEL Olvera  [facility] HEART FAILURE  09/03/21  13:56 EST      **Lyric Disclaimer:**  Much of this encounter note is an electronic transcription/translation of spoken language to printed text. The electronic translation of spoken language may permit erroneous, or at times, nonsensical words or phrases to be inadvertently transcribed. Although I have reviewed the note for such errors, some may still exist.

## 2021-09-03 NOTE — PROGRESS NOTES
"[facility] HEART FAILURE      Patient Name: Nina Saez  :1975  Age: 45 y.o.  Sex: female  Referring Provider: Lisset Melton MD   Primary Cardiologist: Dr. Melton  Encounter Provider: Zina Waldron, PharmD, BCACP      HPI:  Patient presents for follow up to HF clinic, with no new HF complaints. PMH is significant for HFrEF (EF now 22%), severe nonischemic cardiomyopathy, left bundle branch block, HTN, HIV, obesity, and asthma. Patient has been feeling well since last visit. She is not having any medication questions or concerns. APRN did discuss with her today about completing work-up with [facility] for possible VAD/transplant.       Current Regimen:  Heart Failure  Carvedilol 3.125 mg bid  Torsemide 20 mg (3 tablets) po QAM and 20 mg (2 tablets) po QPM   Entresto 97/103 mg bid  Spironolactone 25 mg daily  Ivabradine 7.5 mg bid  Jardiance 10 mg daily      Other CV Meds  Aspirin 81 mg daily  Warfarin as directed by med mgmt clinic  KCl 20 mEq (3 tablets) daily      Medication Use:  Adherence: great, uses a pillbox  Past hx of medication use/intolerance: metoprolol (dizziness, nausea); bumetanide (reports less effective than lasix)   Affordability: [facility] Medicare/Medicaid; no issues with cost      Diet:   Patient is currently monitoring her sodium and staying at goal of 2000 mg/day or less       Social History:  Tobacco use: former smoker (quit )  EtOH use: none  Illicit drug use: none.   Exercise: improved since med changes - walking + household chores/activities      Immunization Status:  Pneumococcal: PCV13 (2017), PPSV23 (2021)  Influenza: receives [facility] - obtains annually  COVID-19: 21 and 21      OBJECTIVE:    /64 (BP Location: Right arm, Patient Position: Sitting)   Pulse 60   Resp 16   Ht 168.9 cm (66.5\")   Wt 94.3 kg (208 lb)   SpO2 96%   BMI 33.07 kg/m²     Body mass index is 33.07 kg/m².  Wt Readings from Last 1 Encounters:   21 94.3 kg (208 lb) "       Lab Review:  Renal Function: CrCl cannot be calculated (Patient's most recent lab result is older than the maximum 30 days allowed.).    Lab Results   Component Value Date    PROBNP 1,548.0 (H) 07/30/2021         ASSESSMENT/PLAN OF CARE:    1. HFrEF (EF 22%)  - Patient presents today stable overall with no new complaints   - Continue carvedilol 3.125 mg bid. Unable to up-titrate given HR between 50-60  - ContinueEntresto 97/103 mg bid  - Continue spironolactone 25 mg daily  - Continue ivabradine 7.5 mg twice daily  - Continue Jardiance 10 mg daily  - Continue torsemide 20 mg (3 tablets) every morning and 20 mg (2 tablets) every evening  - Advised patient to call clinic with 2-3 lb weight gain in 24 hrs or >5 lb weight gain in 1 week         10 min spent in direct patient care        Thank you for allowing me to participate in the care of your patient,        Zina Waldron, PharmD, BCACP  \[facility]\"" Heart Failure Clinic  Phone: [phone]

## 2021-09-03 NOTE — PROGRESS NOTES
Anticoagulation Clinic Progress Note    Anticoagulation Summary  As of 9/3/2021    INR goal:  2.0-3.0   TTR:  49.5 % (8.3 mo)   INR used for dosing:  3.30 (9/3/2021)   Warfarin maintenance plan:  7.5 mg every day   Weekly warfarin total:  52.5 mg   No change documented:  Chucho Mcdaniel PharmD   Plan last modified:  Chucho Mcdaniel PharmD (7/30/2021)   Next INR check:  9/10/2021   Target end date:  Indefinite    Indications    Other acute pulmonary embolism  unspecified whether acute cor pulmonale present (CMS/HCC) (Resolved) [I26.99]  Acute pulmonary embolism  unspecified pulmonary embolism type  unspecified whether acute cor pulmonale present (CMS/HCC) (Resolved) [I26.99]             Anticoagulation Episode Summary     INR check location:      Preferred lab:      Send INR reminders to:  [facility]    Comments:  **Home Testing Program**      Anticoagulation Care Providers     Provider Role Specialty Phone number    Lisset Melton MD Referring Cardiology [phone]          Clinic Interview:  Patient Findings     Positives:  Change in diet/appetite    Negatives:  Signs/symptoms of thrombosis, Signs/symptoms of bleeding,   Laboratory test error suspected, Change in health, Change in alcohol use,   Change in activity, Upcoming invasive procedure, Emergency department   visit, Upcoming dental procedure, Missed doses, Extra doses, Change in   medications, Hospital admission, Bruising, Other complaints    Comments:  Reports she has not had any greens this week (less vit k than   usual); plans to resume usual intake.       Clinical Outcomes     Negatives:  Major bleeding event, Thromboembolic event,   Anticoagulation-related hospital admission, Anticoagulation-related ED   visit, Anticoagulation-related fatality    Comments:  Reports she has not had any greens this week (less vit k than   usual); plans to resume usual intake.         INR History:  Anticoagulation Monitoring 8/20/2021 8/27/2021  9/3/2021   INR 1.40 2.80 3.30   INR Date 8/20/2021 8/27/2021 9/3/2021   INR Goal 2.0-3.0 2.0-3.0 2.0-3.0   Trend Same Same Same   Last Week Total 40 mg 55 mg 52.5 mg   Next Week Total 55 mg 52.5 mg 52.5 mg   Sun 7.5 mg 7.5 mg 7.5 mg   Mon 7.5 mg 7.5 mg 7.5 mg   Tue - 7.5 mg 7.5 mg   Wed - 7.5 mg 7.5 mg   Thu - 7.5 mg 7.5 mg   Fri 10 mg (8/20) 7.5 mg 7.5 mg   Sat 7.5 mg 7.5 mg 7.5 mg   Visit Report - - -   Some recent data might be hidden       Plan:  1. INR is Supratherapeutic today- see above in Anticoagulation Summary. Anticipate her return to high vit k intake throughout the week will help to improve her INR.   Will instruct Nina Saez to Continue their warfarin regimen- see above in Anticoagulation Summary.  2. Follow up in 1 week  3. They have been instructed to call if any changes in medications, doses, concerns, etc. Patient expresses understanding and has no further questions at this time.    Chucho Mcdaniel, PharmD

## 2021-09-10 ENCOUNTER — ANTICOAGULATION VISIT (OUTPATIENT)
Dept: PHARMACY | Facility: HOSPITAL | Age: 46
End: 2021-09-10

## 2021-09-10 LAB — INR PPP: 1.6

## 2021-09-10 NOTE — PROGRESS NOTES
Anticoagulation Clinic Progress Note    Anticoagulation Summary  As of 9/10/2021    INR goal:  2.0-3.0   TTR:  49.8 % (8.6 mo)   INR used for dosin.60 (9/10/2021)   Warfarin maintenance plan:  7.5 mg every day   Weekly warfarin total:  52.5 mg   Plan last modified:  Chucho Mcdaniel, PharmD (2021)   Next INR check:  2021   Target end date:  Indefinite    Indications    Other acute pulmonary embolism  unspecified whether acute cor pulmonale present (CMS/HCC) (Resolved) [I26.99]  Acute pulmonary embolism  unspecified pulmonary embolism type  unspecified whether acute cor pulmonale present (CMS/HCC) (Resolved) [I26.99]             Anticoagulation Episode Summary     INR check location:      Preferred lab:      Send INR reminders to:  [facility]    Comments:  **Home Testing Program**      Anticoagulation Care Providers     Provider Role Specialty Phone number    Lisset Melton MD Referring Cardiology [phone]          Clinic Interview:  Patient Findings     Positives:  Missed doses    Negatives:  Signs/symptoms of thrombosis, Signs/symptoms of bleeding,   Laboratory test error suspected, Change in health, Change in alcohol use,   Change in activity, Upcoming invasive procedure, Emergency department   visit, Upcoming dental procedure, Extra doses, Change in medications,   Change in diet/appetite, Hospital admission, Bruising, Other complaints    Comments:  Reports missed dose of warfarin 2 days ago.       Clinical Outcomes     Negatives:  Major bleeding event, Thromboembolic event,   Anticoagulation-related hospital admission, Anticoagulation-related ED   visit, Anticoagulation-related fatality    Comments:  Reports missed dose of warfarin 2 days ago.         INR History:  Anticoagulation Monitoring 2021 9/3/2021 9/10/2021   INR 2.80 3.30 1.60   INR Date 2021 9/3/2021 9/10/2021   INR Goal 2.0-3.0 2.0-3.0 2.0-3.0   Trend Same Same Same   Last Week Total 55 mg 52.5 mg 45  mg   Next Week Total 52.5 mg 52.5 mg 55 mg   Sun 7.5 mg 7.5 mg 7.5 mg   Mon 7.5 mg 7.5 mg 7.5 mg   Tue 7.5 mg 7.5 mg 7.5 mg   Wed 7.5 mg 7.5 mg 7.5 mg   Thu 7.5 mg 7.5 mg 7.5 mg   Fri 7.5 mg 7.5 mg 10 mg (9/10)   Sat 7.5 mg 7.5 mg 7.5 mg   Visit Report - - -   Some recent data might be hidden       Plan:  1. INR is Subtherapeutic today- see above in Anticoagulation Summary.   Will instruct Nina PHAM Saez to Change their warfarin regimen- see above in Anticoagulation Summary.  2. Follow up in 1 week  3. They have been instructed to call if any changes in medications, doses, concerns, etc. Patient expresses understanding and has no further questions at this time.    Chucho Mcdaniel, PharmD

## 2021-09-17 ENCOUNTER — ANTICOAGULATION VISIT (OUTPATIENT)
Dept: PHARMACY | Facility: HOSPITAL | Age: 46
End: 2021-09-17

## 2021-09-17 LAB — INR PPP: 2.7

## 2021-09-17 NOTE — PROGRESS NOTES
Anticoagulation Clinic Progress Note    Anticoagulation Summary  As of 2021    INR goal:  2.0-3.0   TTR:  50.2 % (8.8 mo)   INR used for dosin.70 (2021)   Warfarin maintenance plan:  7.5 mg every day   Weekly warfarin total:  52.5 mg   No change documented:  Chucho Mcdaniel PharmD   Plan last modified:  Chucho Mcdaniel PharmD (2021)   Next INR check:  2021   Target end date:  Indefinite    Indications    Other acute pulmonary embolism  unspecified whether acute cor pulmonale present (CMS/HCC) (Resolved) [I26.99]  Acute pulmonary embolism  unspecified pulmonary embolism type  unspecified whether acute cor pulmonale present (CMS/HCC) (Resolved) [I26.99]             Anticoagulation Episode Summary     INR check location:      Preferred lab:      Send INR reminders to:   [facility]    Comments:  **Home Testing Program**      Anticoagulation Care Providers     Provider Role Specialty Phone number    Lisset Melton MD Referring Cardiology [phone]          Clinic Interview:  Patient Findings     Negatives:  Signs/symptoms of thrombosis, Signs/symptoms of bleeding,   Laboratory test error suspected, Change in health, Change in alcohol use,   Change in activity, Upcoming invasive procedure, Emergency department   visit, Upcoming dental procedure, Missed doses, Extra doses, Change in   medications, Change in diet/appetite, Hospital admission, Bruising, Other   complaints      Clinical Outcomes     Negatives:  Major bleeding event, Thromboembolic event,   Anticoagulation-related hospital admission, Anticoagulation-related ED   visit, Anticoagulation-related fatality        INR History:  Anticoagulation Monitoring 9/3/2021 9/10/2021 2021   INR 3.30 1.60 2.70   INR Date 9/3/2021 9/10/2021 2021   INR Goal 2.0-3.0 2.0-3.0 2.0-3.0   Trend Same Same Same   Last Week Total 52.5 mg 45 mg 55 mg   Next Week Total 52.5 mg 55 mg 52.5 mg   Sun 7.5 mg 7.5 mg 7.5 mg   Mon 7.5 mg  7.5 mg 7.5 mg   Tue 7.5 mg 7.5 mg 7.5 mg   Wed 7.5 mg 7.5 mg 7.5 mg   Thu 7.5 mg 7.5 mg 7.5 mg   Fri 7.5 mg 10 mg (9/10) 7.5 mg   Sat 7.5 mg 7.5 mg 7.5 mg   Visit Report - - -   Some recent data might be hidden       Plan:  1. INR is Therapeutic today- see above in Anticoagulation Summary.   Will instruct Nina Saez to Continue their warfarin regimen- see above in Anticoagulation Summary.  2. Follow up in 1 week  3. They have been instructed to call if any changes in medications, doses, concerns, etc. Patient expresses understanding and has no further questions at this time.    Chucho Mcdaniel, PharmD

## 2021-09-24 ENCOUNTER — ANTICOAGULATION VISIT (OUTPATIENT)
Dept: PHARMACY | Facility: HOSPITAL | Age: 46
End: 2021-09-24

## 2021-09-24 LAB — INR PPP: 3

## 2021-09-24 RX ORDER — TORSEMIDE 20 MG/1
20 TABLET ORAL SEE ADMIN INSTRUCTIONS
Qty: 150 TABLET | Refills: 1 | Status: SHIPPED | OUTPATIENT
Start: 2021-09-24 | End: 2021-11-22 | Stop reason: SDUPTHER

## 2021-09-24 NOTE — PROGRESS NOTES
Anticoagulation Clinic Progress Note    Anticoagulation Summary  As of 9/24/2021    INR goal:  2.0-3.0   TTR:  51.4 % (9 mo)   INR used for dosing:  3.00 (9/24/2021)   Warfarin maintenance plan:  7.5 mg every day   Weekly warfarin total:  52.5 mg   No change documented:  Zina De La Rosa, CAYDEN   Plan last modified:  Chucho Mcdaniel, PharmD (7/30/2021)   Next INR check:  10/1/2021   Target end date:  Indefinite    Indications    Other acute pulmonary embolism  unspecified whether acute cor pulmonale present (HCC) (Resolved) [I26.99]  Acute pulmonary embolism  unspecified pulmonary embolism type  unspecified whether acute cor pulmonale present (HCC) (Resolved) [I26.99]             Anticoagulation Episode Summary     INR check location:      Preferred lab:      Send INR reminders to:   [facility]    Comments:  **Home Testing Program**      Anticoagulation Care Providers     Provider Role Specialty Phone number    Lisset Melton MD Referring Cardiology [phone]          Clinic Interview:  No pertinent clinical findings have been reported.    INR History:  Anticoagulation Monitoring 9/10/2021 9/17/2021 9/24/2021   INR 1.60 2.70 3.00   INR Date 9/10/2021 9/17/2021 9/24/2021   INR Goal 2.0-3.0 2.0-3.0 2.0-3.0   Trend Same Same Same   Last Week Total 45 mg 55 mg 52.5 mg   Next Week Total 55 mg 52.5 mg 52.5 mg   Sun 7.5 mg 7.5 mg 7.5 mg   Mon 7.5 mg 7.5 mg 7.5 mg   Tue 7.5 mg 7.5 mg 7.5 mg   Wed 7.5 mg 7.5 mg 7.5 mg   Thu 7.5 mg 7.5 mg 7.5 mg   Fri 10 mg (9/10) 7.5 mg 7.5 mg   Sat 7.5 mg 7.5 mg 7.5 mg   Visit Report - - -   Some recent data might be hidden       Plan:  1. INR is therapeutic today- see above in Anticoagulation Summary.    Nina Saez to continue their warfarin regimen- see above in Anticoagulation Summary.  2. Follow up in 1 week  3. Pt has agreed to only be called if INR out of range. They have been instructed to call if any changes in medications, doses, concerns, etc. Patient  expresses understanding and has no further questions at this time.    Zina De La Rosa, RP

## 2021-09-29 ENCOUNTER — OFFICE VISIT (OUTPATIENT)
Dept: CARDIOLOGY | Facility: CLINIC | Age: 46
End: 2021-09-29

## 2021-09-29 VITALS
HEART RATE: 64 BPM | SYSTOLIC BLOOD PRESSURE: 118 MMHG | HEIGHT: 67 IN | BODY MASS INDEX: 32.3 KG/M2 | WEIGHT: 205.8 LBS | DIASTOLIC BLOOD PRESSURE: 60 MMHG

## 2021-09-29 DIAGNOSIS — I50.20 HFREF (HEART FAILURE WITH REDUCED EJECTION FRACTION) (HCC): Primary | ICD-10-CM

## 2021-09-29 DIAGNOSIS — B20 HIV INFECTION, UNSPECIFIED SYMPTOM STATUS (HCC): ICD-10-CM

## 2021-09-29 DIAGNOSIS — Z95.810 AICD (AUTOMATIC CARDIOVERTER/DEFIBRILLATOR) PRESENT: ICD-10-CM

## 2021-09-29 DIAGNOSIS — I51.89 GRADE II DIASTOLIC DYSFUNCTION: ICD-10-CM

## 2021-09-29 DIAGNOSIS — I10 ESSENTIAL HYPERTENSION: ICD-10-CM

## 2021-09-29 PROCEDURE — 93000 ELECTROCARDIOGRAM COMPLETE: CPT | Performed by: INTERNAL MEDICINE

## 2021-09-29 PROCEDURE — 99214 OFFICE O/P EST MOD 30 MIN: CPT | Performed by: INTERNAL MEDICINE

## 2021-09-29 RX ORDER — CARVEDILOL 3.12 MG/1
3.12 TABLET ORAL 2 TIMES DAILY WITH MEALS
Qty: 270 TABLET | Refills: 3 | Status: SHIPPED | COMMUNITY
Start: 2021-09-29 | End: 2021-11-04

## 2021-09-29 NOTE — PROGRESS NOTES
Date of Office Visit: 2021  Encounter Provider: Lisset Melton MD  Place of Service: [facility] CARDIOLOGY  Patient Name: Nina Saez  :1975      Patient ID:  Nina Saez is a 45 y.o. female is here for  followup for dilated nonischemic cardiomyopathy with chronic HFrEF.        History of Present Illness       She has a history of HIV, essential hypertension, severe dilated nonischemic cardiomyopathy with chronic systolic congestive heart failure, obesity, history of illicit drug use and asthma.  She has an AICD.  It is a single-chamber [employer].     She was diagnosed with heart failure and cardiomyopathy in 2019 at [facility] when she was there visiting family.  An echocardiogram on cardiac catheterization which confirmed this diagnosis.  Her cardiac catheterization done 2019 showed normal coronary arteries, LVEDP 12, wedge pressure 13, PA pressure 28/15 with a mean of 19 mmHg, RA pressure 4, cardiac index 2.5 L/min/m² with a PVR of 1.2 Woods units.     She presented emergency on 20 with short windedness and cough for 3 weeks prior to presentation.  Her weight is been stable and she had no lower extremity edema but she had missed her cardiac medications and had been eating a lot of salt.  On arrival, she was found to be in congestive heart failure.  Echo done 2020 showed severe left ventricular dilation, ejection fraction 6%, very thin left ventricular walls, grade 3 diastolic dysfunction, moderate to severe mitral insufficiency, moderate tricuspid insufficiency, RVSP 48 mmHg.  There was essentially global hypokinesis with akinesis at the bases.  She was able to be dismissed on 2020.  While in the hospital, we did recommend consideration for upgrade to a biventricular AICD.  She also had some right flank pain during hospitalization and CT scan showed a 2 mm minimally obstructing stone in the right ureter.   Urology saw her and felt like she could pass the stone without further intervention.     On 9/10/2020, she saw Dr. Jamar Azul in the office and he did not feel that she met criteria for implantation of CRT device.  She has now been enrolled in our Heart Failure Clinic.     In December 2020, she was hospitalized for an acute PE with right lower lobe pulmonary infarct and was having hemoptysis.  She was placed on warfarin.     She presents today for perioperative cardiac risk assessment.  She explains that her left ankle/leg has screws and plates that need to be removed by Dr. Rocky Calvert.  She is having pain from the hardware.  She explains that she recently saw the  transplant clinic and both her Entresto and spironolactone were increased.  She also had a stress test (sounds like a metabolic stress test) and I will obtain those results.  She reports a cough.  She denies chest pain, shortness of air, palpitations, edema, dizziness, syncope, or bleeding.     She saw [facility] by telehealth date of service 1/28/21.  They noted the patient underwent a CPX which showed peak VO2 11.9 at RER greater than 1.05 while on Coreg.  Her CPX showed heart related limitations in her functional capacity, but the patient felt satisfied at her functional current level.     Echo done 3/8/2021 showed ejection fraction of 22% with severe left ventricular dilation, severe global hypokinesis, grade 2 diastolic dysfunction, severe left atrial enlargement moderate mitral and tricuspid insufficiency.      She has not had surgery with Dr. Rocky Calvert on her left ankle but still needs this.      Labs on 9/3/2021 show proBNP 1281, glucose 118, otherwise unremarkable CMP, total cholesterol 129, HDL 38, LDL 74, VLDL 17.  I reviewed records from  cardiology, Dr. Hernandez.  No medication changes were made.   cardiology team felt that she was stable.  They did offer future LVAD or heart transplantation but she did not feel her  quality of life really required that at this point.  She is followed at [facility] for her HIV and I reviewed the records from there.  No recent medication changes have been made and they just reviewed and recommended continued adherence to her medication regimen for HIV as well as limiting her risks.    She has no orthopnea or PND.  She has no edema.  Her weight is stable.  She has no exertional chest pain.  She has no dizziness or syncope.  She does not feel her heart racing or skipping.  She has had no fevers, chills or cough.  She has no vomiting blood or blood in stool.  She is taking her medications as directed without difficulty.    Past Medical History:   Diagnosis Date   • AICD (automatic cardioverter/defibrillator) present 3/6/2020   • Asthma    • CHF (congestive heart failure) (CMS/[facility])    • Class 2 obesity in adult    • Grade II diastolic dysfunction     Per echocardiogram 3/2021   • HIV (human immunodeficiency virus infection) (CMS/[facility])    • Hypertension    • LBBB (left bundle branch block)    • NICM (nonischemic cardiomyopathy) (CMS/[facility])     2020 EF 6% per echocardiogram; AICD present   • Nonrheumatic mitral valve regurgitation 3/8/2021    Moderate per echo 3/2021   • Nonrheumatic tricuspid valve regurgitation     Moderate per echocardiogram 3/2021         Past Surgical History:   Procedure Laterality Date   • CARDIAC CATHETERIZATION     • CARDIAC DEFIBRILLATOR PLACEMENT     •  SECTION      one C/S   • FRACTURE SURGERY Left     left ankle   • TUBAL ABDOMINAL LIGATION         Current Outpatient Medications on File Prior to Visit   Medication Sig Dispense Refill   • acetaminophen (TYLENOL) 325 MG tablet Take 650 mg by mouth Every 6 (Six) Hours As Needed for Mild Pain .     • albuterol sulfate  (90 Base) MCG/ACT inhaler Inhale 2 puffs Every 4 (Four) Hours As Needed for Wheezing.     • atorvastatin (LIPITOR) 10 MG tablet Take 1 tablet by mouth Daily. 30 tablet 11   • carvedilol (COREG) 3.125  MG tablet Take 3.125 mg by mouth 2 (Two) Times a Day With Meals.     • darunavir ethanolate (PREZISTA) 800 MG tablet tablet Take 800 mg by mouth Every Night.     • Diclofenac Sodium (VOLTAREN) 1 % gel gel Apply 4 g topically to the appropriate area as directed 2 (Two) Times a Day. Use per pkg insert 100 g 2   • diphenhydrAMINE (BENADRYL) 25 mg capsule Take 1 capsule by mouth Every 6 (Six) Hours As Needed for Itching (swelling). 20 capsule 0   • Empagliflozin (Jardiance) 10 MG tablet Take 10 mg by mouth Daily. 30 tablet 11   • Emtricitabine-Tenofovir AF (DESCOVY) 200-25 MG per tablet Take  by mouth Every Night.     • fluticasone (Flonase) 50 MCG/ACT nasal spray 2 sprays into the nostril(s) as directed by provider Daily. 1 bottle 2   • ivabradine HCl (Corlanor) 7.5 MG tablet tablet Take 1 tablet by mouth 2 (Two) Times a Day With Meals. 60 tablet 5   • nitroglycerin (NITROSTAT) 0.4 MG SL tablet Place 1 tablet under the tongue Every 5 (Five) Minutes As Needed for Chest Pain. Take no more than 3 doses in 15 minutes. 30 tablet 5   • ondansetron ODT (Zofran ODT) 4 MG disintegrating tablet Place 1 tablet on the tongue Every 8 (Eight) Hours As Needed for Nausea or Vomiting. 30 tablet 0   • potassium chloride (K-DUR,KLOR-CON) 20 MEQ CR tablet Take 3 tablets by mouth once daily 90 tablet 11   • ritonavir (NORVIR) 100 MG tablet Take 100 mg by mouth Every Night.     • sacubitril-valsartan (Entresto)  MG tablet Take 1 tablet by mouth 2 (Two) Times a Day. 180 tablet 3   • sertraline (Zoloft) 50 MG tablet Take 1 tablet by mouth Daily. 90 tablet 3   • spironolactone (ALDACTONE) 25 MG tablet 25 mg Daily.     • torsemide (DEMADEX) 20 MG tablet Take 1 tablet by mouth See Admin Instructions. Take 60 mg (3 tablets) po every morning and 40 mg (2 tablets) po every afternoon 150 tablet 1   • vitamin D (ERGOCALCIFEROL) 66939 units capsule capsule Take 50,000 Units by mouth Every 30 (Thirty) Days.     • warfarin (COUMADIN) 2.5 MG tablet  "Take 2 tablets (5 mg) by mouth on Mon, Wed, and Fri, and take 3 tablets (7.5 mg) by mouth all other days or as directed. 235 tablet 1     No current facility-administered medications on file prior to visit.       Social History     Socioeconomic History   • Marital status: Legally      Spouse name: Not on file   • Number of children: Not on file   • Years of education: Not on file   • Highest education level: Not on file   Tobacco Use   • Smoking status: Former Smoker     Quit date:      Years since quittin.7   • Smokeless tobacco: Never Used   Vaping Use   • Vaping Use: Never used   Substance and Sexual Activity   • Alcohol use: Not Currently     Comment: holiday and special occ//caffeine use: 1 cup daily, 1 soda occasionally   • Drug use: Never   • Sexual activity: Not Currently     Partners: Male           ROS    Procedures    ECG 12 Lead    Date/Time: 2021 9:56 AM  Performed by: Lisset Melton MD  Authorized by: Lisset Melton MD   Comparison: compared with previous ECG   Similar to previous ECG  Rhythm: sinus rhythm  Ectopy: unifocal PVCs  Conduction: left bundle branch block    Clinical impression: abnormal EKG                Objective:      Vitals:    21 0941 21 0942   BP: 120/60 118/60   BP Location: Left arm Right arm   Pulse: 64    Weight: 93.4 kg (205 lb 12.8 oz)    Height: 168.9 cm (66.5\")      Body mass index is 32.72 kg/m².    Vitals reviewed.   Constitutional:       General: Not in acute distress.     Appearance: Well-developed. Not diaphoretic.   Eyes:      General: No scleral icterus.     Conjunctiva/sclera: Conjunctivae normal.   HENT:      Head: Normocephalic and atraumatic.   Neck:      Thyroid: No thyromegaly.      Vascular: No carotid bruit or JVD.      Lymphadenopathy: No cervical adenopathy.   Pulmonary:      Effort: Pulmonary effort is normal. No respiratory distress.      Breath sounds: Normal breath sounds. No wheezing. No rhonchi. No rales. "   Chest:      Chest wall: Not tender to palpatation.   Cardiovascular:      Normal rate. Regular rhythm.      Murmurs: There is no murmur.      No gallop.   Pulses:     Intact distal pulses.   Edema:     Peripheral edema absent.   Abdominal:      General: Bowel sounds are normal. There is no distension or abdominal bruit.      Palpations: Abdomen is soft. There is no abdominal mass.      Tenderness: There is no abdominal tenderness.   Musculoskeletal:         General: No deformity.      Extremities: No clubbing present.     Cervical back: Neck supple. Skin:     General: Skin is warm and dry. There is no cyanosis.      Coloration: Skin is not pale.      Findings: No rash.   Neurological:      Mental Status: Alert and oriented to person, place, and time.      Cranial Nerves: No cranial nerve deficit.   Psychiatric:         Judgment: Judgment normal.         Lab Review:       Assessment:      Diagnosis Plan   1. HFrEF (heart failure with reduced ejection fraction) (CMS/[facility])     2. Grade II diastolic dysfunction     3. Essential hypertension     4. AICD (automatic cardioverter/defibrillator) present     5. HIV infection, unspecified symptom status ([facility])         1. Severe dilated nonischemic cardiomyopathy, ejection fraction 22%, AICD in place, chronic HFrEF.   Her cardiomyopathy is likely multifactorial related to the old illicit drug use history, HIV, possible poorly controlled HTN in the past.  No decompensated heart failure at this time.  2. Essential hypertension, controlled.  3. HIV positive.  4. Diastolic dysfunction  5. Obesity  6. Asthma  7. LBBB.      Plan:       I would like to push her blood pressure as low as she can tolerate in order to facilitate as much recovery of her ventricle as possible.  Increase her carvedilol to 3.125 mg in the morning and 6.25 in the evening, watch for dizziness and fatigue.  No other medication changes.  Remain on Jardiance, Corlanor, Entresto, spironolactone and torsemide.

## 2021-10-04 ENCOUNTER — ANTICOAGULATION VISIT (OUTPATIENT)
Dept: PHARMACY | Facility: HOSPITAL | Age: 46
End: 2021-10-04

## 2021-10-04 LAB — INR PPP: 2.9

## 2021-10-04 PROCEDURE — G0249 PROVIDE INR TEST MATER/EQUIP: HCPCS

## 2021-10-04 NOTE — PROGRESS NOTES
Anticoagulation Clinic Progress Note    Anticoagulation Summary  As of 10/4/2021    INR goal:  2.0-3.0   TTR:  53.2 % (9.4 mo)   INR used for dosin.90 (10/4/2021)   Warfarin maintenance plan:  7.5 mg every day   Weekly warfarin total:  52.5 mg   No change documented:  Zina De La Rosa, CAYDEN   Plan last modified:  Chucho Mcdaniel, PharmD (2021)   Next INR check:  10/11/2021   Target end date:  Indefinite    Indications    Other acute pulmonary embolism  unspecified whether acute cor pulmonale present (HCC) (Resolved) [I26.99]  Acute pulmonary embolism  unspecified pulmonary embolism type  unspecified whether acute cor pulmonale present (HCC) (Resolved) [I26.99]             Anticoagulation Episode Summary     INR check location:      Preferred lab:      Send INR reminders to:   [facility]    Comments:  **Home Testing Program**      Anticoagulation Care Providers     Provider Role Specialty Phone number    Lisset Melton MD Referring Cardiology [phone]          Clinic Interview:  No pertinent clinical findings have been reported.    INR History:  Anticoagulation Monitoring 2021 2021 10/4/2021   INR 2.70 3.00 2.90   INR Date 2021 2021 10/4/2021   INR Goal 2.0-3.0 2.0-3.0 2.0-3.0   Trend Same Same Same   Last Week Total 55 mg 52.5 mg 52.5 mg   Next Week Total 52.5 mg 52.5 mg 52.5 mg   Sun 7.5 mg 7.5 mg 7.5 mg   Mon 7.5 mg 7.5 mg 7.5 mg   Tue 7.5 mg 7.5 mg 7.5 mg   Wed 7.5 mg 7.5 mg 7.5 mg   Thu 7.5 mg 7.5 mg 7.5 mg   Fri 7.5 mg 7.5 mg 7.5 mg   Sat 7.5 mg 7.5 mg 7.5 mg   Visit Report - - -   Some recent data might be hidden       Plan:  1. INR is therapeutic today- see above in Anticoagulation Summary.    Nina Saez to continue their warfarin regimen- see above in Anticoagulation Summary.  2. Follow up in 1 week  3. Pt has agreed to only be called if INR out of range. They have been instructed to call if any changes in medications, doses, concerns, etc. Patient  expresses understanding and has no further questions at this time.    Zina De La Rosa, RP

## 2021-10-15 ENCOUNTER — ANTICOAGULATION VISIT (OUTPATIENT)
Dept: PHARMACY | Facility: HOSPITAL | Age: 46
End: 2021-10-15

## 2021-10-15 LAB — INR PPP: 2.1

## 2021-10-15 NOTE — PROGRESS NOTES
Anticoagulation Clinic Progress Note    Anticoagulation Summary  As of 10/15/2021    INR goal:  2.0-3.0   TTR:  54.9 % (9.7 mo)   INR used for dosin.10 (10/15/2021)   Warfarin maintenance plan:  7.5 mg every day   Weekly warfarin total:  52.5 mg   No change documented:  Zina De La Rosa, CAYDEN   Plan last modified:  Chucho Mcdaniel, PharmD (2021)   Next INR check:  10/22/2021   Target end date:  Indefinite    Indications    Other acute pulmonary embolism  unspecified whether acute cor pulmonale present (HCC) (Resolved) [I26.99]  Acute pulmonary embolism  unspecified pulmonary embolism type  unspecified whether acute cor pulmonale present (HCC) (Resolved) [I26.99]             Anticoagulation Episode Summary     INR check location:      Preferred lab:      Send INR reminders to:   [facility]    Comments:  **Home Testing Program**      Anticoagulation Care Providers     Provider Role Specialty Phone number    Lisset Melton MD Referring Cardiology [phone]          Clinic Interview:  No pertinent clinical findings have been reported.    INR History:  Anticoagulation Monitoring 2021 10/4/2021 10/15/2021   INR 3.00 2.90 2.10   INR Date 2021 10/4/2021 10/15/2021   INR Goal 2.0-3.0 2.0-3.0 2.0-3.0   Trend Same Same Same   Last Week Total 52.5 mg 52.5 mg 52.5 mg   Next Week Total 52.5 mg 52.5 mg 52.5 mg   Sun 7.5 mg 7.5 mg 7.5 mg   Mon 7.5 mg 7.5 mg 7.5 mg   Tue 7.5 mg 7.5 mg 7.5 mg   Wed 7.5 mg 7.5 mg 7.5 mg   Thu 7.5 mg 7.5 mg 7.5 mg   Fri 7.5 mg 7.5 mg 7.5 mg   Sat 7.5 mg 7.5 mg 7.5 mg   Visit Report - - -   Some recent data might be hidden       Plan:  1. INR is therapeutic today- see above in Anticoagulation Summary.    Nina Saez to continue their warfarin regimen- see above in Anticoagulation Summary.  2. Follow up in 1 week  3. They have been instructed to call if any changes in medications, doses, concerns, etc. Patient expresses understanding and has no further  questions at this time.    Zina De La Rosa, RP

## 2021-10-25 ENCOUNTER — ANTICOAGULATION VISIT (OUTPATIENT)
Dept: PHARMACY | Facility: HOSPITAL | Age: 46
End: 2021-10-25

## 2021-10-25 LAB — INR PPP: 2.9

## 2021-10-25 NOTE — PROGRESS NOTES
Anticoagulation Clinic Progress Note    Anticoagulation Summary  As of 10/25/2021    INR goal:  2.0-3.0   TTR:  56.4 % (10.1 mo)   INR used for dosin.90 (10/25/2021)   Warfarin maintenance plan:  7.5 mg every day   Weekly warfarin total:  52.5 mg   No change documented:  Zina De La Rosa, CAYDEN   Plan last modified:  Chucho Mcdaniel, PharmD (2021)   Next INR check:  2021   Target end date:  Indefinite    Indications    Other acute pulmonary embolism  unspecified whether acute cor pulmonale present (HCC) (Resolved) [I26.99]  Acute pulmonary embolism  unspecified pulmonary embolism type  unspecified whether acute cor pulmonale present (HCC) (Resolved) [I26.99]             Anticoagulation Episode Summary     INR check location:      Preferred lab:      Send INR reminders to:   [facility]    Comments:  **Home Testing Program**      Anticoagulation Care Providers     Provider Role Specialty Phone number    Lisset Melton MD Referring Cardiology [phone]          Clinic Interview:  No pertinent clinical findings have been reported.    INR History:  Anticoagulation Monitoring 10/4/2021 10/15/2021 10/25/2021   INR 2.90 2.10 2.90   INR Date 10/4/2021 10/15/2021 10/25/2021   INR Goal 2.0-3.0 2.0-3.0 2.0-3.0   Trend Same Same Same   Last Week Total 52.5 mg 52.5 mg 52.5 mg   Next Week Total 52.5 mg 52.5 mg 52.5 mg   Sun 7.5 mg 7.5 mg 7.5 mg   Mon 7.5 mg 7.5 mg 7.5 mg   Tue 7.5 mg 7.5 mg 7.5 mg   Wed 7.5 mg 7.5 mg 7.5 mg   Thu 7.5 mg 7.5 mg 7.5 mg   Fri 7.5 mg 7.5 mg 7.5 mg   Sat 7.5 mg 7.5 mg 7.5 mg   Visit Report - - -   Some recent data might be hidden       Plan:  1. INR is therapeutic today- see above in Anticoagulation Summary.    Nina Saez to continue their warfarin regimen- see above in Anticoagulation Summary.  2. Follow up in 1 week (patient going back to Friday testing)   3. They have been instructed to call if any changes in medications, doses, concerns, etc. Patient  expresses understanding and has no further questions at this time.    Zina De La Rosa, RP

## 2021-10-26 PROCEDURE — 93295 DEV INTERROG REMOTE 1/2/MLT: CPT | Performed by: INTERNAL MEDICINE

## 2021-10-26 PROCEDURE — 93296 REM INTERROG EVL PM/IDS: CPT | Performed by: INTERNAL MEDICINE

## 2021-11-01 ENCOUNTER — APPOINTMENT (OUTPATIENT)
Dept: CARDIOLOGY | Facility: HOSPITAL | Age: 46
End: 2021-11-01

## 2021-11-01 ENCOUNTER — ANTICOAGULATION VISIT (OUTPATIENT)
Dept: PHARMACY | Facility: HOSPITAL | Age: 46
End: 2021-11-01

## 2021-11-01 LAB — INR PPP: 4.5

## 2021-11-01 NOTE — PROGRESS NOTES
Anticoagulation Clinic Progress Note    Anticoagulation Summary  As of 2021    INR goal:  2.0-3.0   TTR:  55.3 % (10.3 mo)   INR used for dosin.50 (2021)   Warfarin maintenance plan:  7.5 mg every day   Weekly warfarin total:  52.5 mg   Plan last modified:  Chucho Mcdaniel, PharmD (2021)   Next INR check:  2021   Target end date:  Indefinite    Indications    Other acute pulmonary embolism  unspecified whether acute cor pulmonale present (HCC) (Resolved) [I26.99]  Acute pulmonary embolism  unspecified pulmonary embolism type  unspecified whether acute cor pulmonale present (HCC) (Resolved) [I26.99]             Anticoagulation Episode Summary     INR check location:      Preferred lab:      Send INR reminders to:  [facility]    Comments:  **Home Testing Program**      Anticoagulation Care Providers     Provider Role Specialty Phone number    Lisset Melton MD Referring Cardiology [phone]          Clinic Interview:  Patient Findings     Positives:  Change in alcohol use    Negatives:  Signs/symptoms of thrombosis, Signs/symptoms of bleeding,   Laboratory test error suspected, Change in health, Change in activity,   Upcoming invasive procedure, Emergency department visit, Upcoming dental   procedure, Missed doses, Extra doses, Change in medications, Change in   diet/appetite, Hospital admission, Bruising, Other complaints    Comments:  Increased ETOH over the weekend (~ 16-24 ounces of a mixed   drink)       Clinical Outcomes     Negatives:  Major bleeding event, Thromboembolic event,   Anticoagulation-related hospital admission, Anticoagulation-related ED   visit, Anticoagulation-related fatality    Comments:  Increased ETOH over the weekend (~ 16-24 ounces of a mixed   drink)         INR History:  Anticoagulation Monitoring 10/15/2021 10/25/2021 2021   INR 2.10 2.90 4.50   INR Date 10/15/2021 10/25/2021 2021   INR Goal 2.0-3.0 2.0-3.0 2.0-3.0   Trend  Same Same Same   Last Week Total 52.5 mg 52.5 mg 52.5 mg   Next Week Total 52.5 mg 52.5 mg 45 mg   Sun 7.5 mg 7.5 mg 7.5 mg   Mon 7.5 mg 7.5 mg Hold (11/1)   Tue 7.5 mg 7.5 mg 7.5 mg   Wed 7.5 mg 7.5 mg 7.5 mg   Thu 7.5 mg 7.5 mg 7.5 mg   Fri 7.5 mg 7.5 mg 7.5 mg   Sat 7.5 mg 7.5 mg 7.5 mg   Visit Report - - -   Some recent data might be hidden       Plan:  1. INR is Supratherapeutic today- see above in Anticoagulation Summary.   Will instruct Nina Saez to Change their warfarin regimen- see above in Anticoagulation Summary.  2. Follow up in 1 week  3. They have been instructed to call if any changes in medications, doses, concerns, etc. Patient expresses understanding and has no further questions at this time.    Zina De La Rosa [facility]

## 2021-11-04 ENCOUNTER — HOSPITAL ENCOUNTER (OUTPATIENT)
Dept: CARDIOLOGY | Facility: HOSPITAL | Age: 46
Discharge: HOME | End: 2021-11-04
Admitting: NURSE PRACTITIONER

## 2021-11-04 VITALS
HEART RATE: 58 BPM | HEIGHT: 66 IN | WEIGHT: 204 LBS | DIASTOLIC BLOOD PRESSURE: 64 MMHG | BODY MASS INDEX: 32.78 KG/M2 | OXYGEN SATURATION: 97 % | SYSTOLIC BLOOD PRESSURE: 90 MMHG

## 2021-11-04 DIAGNOSIS — I50.20 HFREF (HEART FAILURE WITH REDUCED EJECTION FRACTION) (HCC): ICD-10-CM

## 2021-11-04 DIAGNOSIS — I51.89 GRADE II DIASTOLIC DYSFUNCTION: Primary | ICD-10-CM

## 2021-11-04 DIAGNOSIS — I10 ESSENTIAL HYPERTENSION: ICD-10-CM

## 2021-11-04 DIAGNOSIS — B20 HIV INFECTION, UNSPECIFIED SYMPTOM STATUS (HCC): ICD-10-CM

## 2021-11-04 DIAGNOSIS — J45.909 ASTHMA, UNSPECIFIED ASTHMA SEVERITY, UNSPECIFIED WHETHER COMPLICATED, UNSPECIFIED WHETHER PERSISTENT: ICD-10-CM

## 2021-11-04 DIAGNOSIS — I42.8 NICM (NONISCHEMIC CARDIOMYOPATHY) (HCC): ICD-10-CM

## 2021-11-04 PROBLEM — Z95.810 AICD (AUTOMATIC CARDIOVERTER/DEFIBRILLATOR) PRESENT: Status: RESOLVED | Noted: 2020-03-06 | Resolved: 2021-11-04

## 2021-11-04 PROBLEM — Z98.890 HISTORY OF OPEN REDUCTION AND INTERNAL FIXATION (ORIF) PROCEDURE: Status: RESOLVED | Noted: 2021-04-14 | Resolved: 2021-11-04

## 2021-11-04 PROBLEM — S93.432S: Status: RESOLVED | Noted: 2021-04-14 | Resolved: 2021-11-04

## 2021-11-04 PROBLEM — S82.842S: Status: RESOLVED | Noted: 2021-04-14 | Resolved: 2021-11-04

## 2021-11-04 PROBLEM — M76.62 TENDONITIS, ACHILLES, LEFT: Status: RESOLVED | Noted: 2021-04-14 | Resolved: 2021-11-04

## 2021-11-04 LAB
ANION GAP SERPL CALCULATED.3IONS-SCNC: 9.1 MMOL/L (ref 5–15)
BASOPHILS # BLD AUTO: 0.03 10*3/MM3 (ref 0–0.2)
BASOPHILS NFR BLD AUTO: 0.3 % (ref 0–1.5)
BUN SERPL-MCNC: 23 MG/DL (ref 6–20)
BUN/CREAT SERPL: 20.7 (ref 7–25)
CALCIUM SPEC-SCNC: 9.2 MG/DL (ref 8.6–10.5)
CHLORIDE SERPL-SCNC: 100 MMOL/L (ref 98–107)
CO2 SERPL-SCNC: 27.9 MMOL/L (ref 22–29)
CREAT SERPL-MCNC: 1.11 MG/DL (ref 0.57–1)
DEPRECATED RDW RBC AUTO: 43.7 FL (ref 37–54)
EOSINOPHIL # BLD AUTO: 0.06 10*3/MM3 (ref 0–0.4)
EOSINOPHIL NFR BLD AUTO: 0.5 % (ref 0.3–6.2)
ERYTHROCYTE [DISTWIDTH] IN BLOOD BY AUTOMATED COUNT: 12 % (ref 12.3–15.4)
GFR SERPL CREATININE-BSD FRML MDRD: 64 ML/MIN/1.73
GLUCOSE SERPL-MCNC: 112 MG/DL (ref 65–99)
HCT VFR BLD AUTO: 43.1 % (ref 34–46.6)
HGB BLD-MCNC: 14.2 G/DL (ref 12–15.9)
IMM GRANULOCYTES # BLD AUTO: 0.04 10*3/MM3 (ref 0–0.05)
IMM GRANULOCYTES NFR BLD AUTO: 0.4 % (ref 0–0.5)
LYMPHOCYTES # BLD AUTO: 2.49 10*3/MM3 (ref 0.7–3.1)
LYMPHOCYTES NFR BLD AUTO: 21.9 % (ref 19.6–45.3)
MCH RBC QN AUTO: 32.9 PG (ref 26.6–33)
MCHC RBC AUTO-ENTMCNC: 32.9 G/DL (ref 31.5–35.7)
MCV RBC AUTO: 99.8 FL (ref 79–97)
MONOCYTES # BLD AUTO: 0.91 10*3/MM3 (ref 0.1–0.9)
MONOCYTES NFR BLD AUTO: 8 % (ref 5–12)
NEUTROPHILS NFR BLD AUTO: 68.9 % (ref 42.7–76)
NEUTROPHILS NFR BLD AUTO: 7.84 10*3/MM3 (ref 1.7–7)
NRBC BLD AUTO-RTO: 0.1 /100 WBC (ref 0–0.2)
NT-PROBNP SERPL-MCNC: 1001 PG/ML (ref 0–450)
PLATELET # BLD AUTO: 159 10*3/MM3 (ref 140–450)
PMV BLD AUTO: 12.8 FL (ref 6–12)
POTASSIUM SERPL-SCNC: 4 MMOL/L (ref 3.5–5.2)
RBC # BLD AUTO: 4.32 10*6/MM3 (ref 3.77–5.28)
SODIUM SERPL-SCNC: 137 MMOL/L (ref 136–145)
WBC # BLD AUTO: 11.37 10*3/MM3 (ref 3.4–10.8)

## 2021-11-04 PROCEDURE — G0463 HOSPITAL OUTPT CLINIC VISIT: HCPCS

## 2021-11-04 PROCEDURE — 83880 ASSAY OF NATRIURETIC PEPTIDE: CPT | Performed by: NURSE PRACTITIONER

## 2021-11-04 PROCEDURE — 85025 COMPLETE CBC W/AUTO DIFF WBC: CPT | Performed by: NURSE PRACTITIONER

## 2021-11-04 PROCEDURE — 80048 BASIC METABOLIC PNL TOTAL CA: CPT | Performed by: NURSE PRACTITIONER

## 2021-11-04 PROCEDURE — 99214 OFFICE O/P EST MOD 30 MIN: CPT | Performed by: NURSE PRACTITIONER

## 2021-11-04 RX ORDER — ZOLPIDEM TARTRATE 5 MG/1
TABLET ORAL
COMMUNITY
Start: 2021-10-21 | End: 2022-02-02

## 2021-11-04 RX ORDER — CARVEDILOL 3.12 MG/1
TABLET ORAL SEE ADMIN INSTRUCTIONS
COMMUNITY
End: 2022-02-08 | Stop reason: SDUPTHER

## 2021-11-04 NOTE — PROGRESS NOTES
[facility] HEART FAILURE      Patient Name: Nina Saez  :1975  Age: 45 y.o.  Sex: female  Referring Provider: Lisset Melton MD   Primary Cardiologist: Lisset Melton MD  Encounter Provider:  SAMUEL Cisneros      Chief Complaint:   Chief Complaint   Patient presents with   • Congestive Heart Failure   HFrEF      Congestive Heart Failure  Associated symptoms include fatigue. Pertinent negatives include no chest pain, palpitations, shortness of breath or unexpected weight change.    this 44-year-old female, known to this provider, comes to us today for further evaluation of her chronic systolic congestive heart failure.  Current diagnoses to include severe nonischemic cardiomyopathy, left bundle branch block, hypertension, HIV, obesity, and asthma.    Historically she had followed with Dr. Laina Boyd at [facility].  In spring 2019 she was visiting family in [place] and was taken emerg[facility].  Echocardiogram and cardiac catheterization were performed at that point, carvedilol was changed to metoprolol.    In 2019 she presented through the emergency department at [facility] with chest pain and shortness of breath.  She was treated with IV diuresis, troponin was negative x2 and proBNP was elevated at 5151.  Entresto was started at that point given her severe dilated cardiomyopathy.  She was to follow with cardiology at [facility] at that time.    In 2020 she again presented to the emergency department with shortness of breath and cough x3 weeks.  BNP was elevated at 13,351.  Carvedilol was discontinued that admission given hypotension.  At subsequent outpatient appointment it was felt that Bumex was less effective than Lasix by the patient.  She complained of progressively worsening fatigue.  AICD, single-chamber [employer], interrogation 2020 revealed 10-16 beats of VT.    On September  10, 2020 she saw Dr. Jamar Azul MD, for consideration of upgrade to biventricular device.  At that point he felt that her left bundle branch block was incomplete and she did not meet criteria for implantation of CRT-D.    She presented 10/27/2020 to [facility] with complaints of acute on chronic shortness of breath that began approximately 1 week prior.  BNP was further elevated at 16,206 that point.  Pulmonary edema was noted on chest x-ray.  Spironolactone was started that admission.  Referral for evaluation by [facility] transplant services was made November 2, 2020.    Left and right cardiac catheterization at [facility] revealed no obstructive CAD with normal filling pressures.  Echocardiogram at that time revealed an EF less than 15% with global hypokinesis-information extracted from external medical record note.  Cardiac catheterization May 30, 2017 performed at [facility] yielded codominant system with normal left main, LAD with distal 30% stenosis, RCA normal, LVEDP 7 mmHg.    Echocardiogram July 9, 2020 revealed EF of 6%, grade 3 LV diastolic dysfunction, significant LV wall motion hypokinesis/akinesis, RVSP 45 to 55 mmHg secondary to moderate tricuspid valve regurgitation, moderate to severe MR noted.    Last remote device check 10/17/2020 revealed 1 episode of NSVT lasting 27 beats, normal function.  CMP 1/4/2021 revealed creatinine of 1.0, GFR 73, electrolytes stable.  TSH normal 10/28/2020.  Last BNP 10/27/2020 was 16,206.    Jardiance 10 mg daily was started on 11/13/2020.      She was admitted from 12/11/2020 to 12/17/2020 for acute pulmonary embolism with right lower lobe pulmonary infarct.  She was started on warfarin at that time.  Additionally, she has been started on Corlanor as well as spironolactone for her heart failure as well.  January 4, 2021 she was evaluated and treated in the emergency department for possible angioedema.  She is to follow-up  with her PCP regarding this per ED note.      Entresto has now been increased to  mg twice daily.  Repeat echocardiogram 3/8/2021 revealed improvement in ejection fraction from 6% to 22% with severe LV dilation, severe global hypokinesis and grade 2 LV diastolic dysfunction.    She has elected at this time not to proceed with transplant work-up just yet.  She follows up with  on November 16.  She recently saw Dr. Melton and her carvedilol was increased to 50 mg at night with a goal of SBP of .    She is currently doing well at home.  She intended to bring her advanced directive documentation with her today to get this filed and submitted, but she forgot it at home.  Her chief complaint at this time is fatigue.      The following portions of the patient's history were reviewed and updated as appropriate: allergies, current medications, past family history, past medical history, past social history, past surgical history and problem list.    Current Outpatient Medications   Medication Sig Dispense Refill   • acetaminophen (TYLENOL) 325 MG tablet Take 650 mg by mouth Every 6 (Six) Hours As Needed for Mild Pain .     • albuterol sulfate  (90 Base) MCG/ACT inhaler Inhale 2 puffs Every 4 (Four) Hours As Needed for Wheezing.     • atorvastatin (LIPITOR) 10 MG tablet Take 1 tablet by mouth Daily. 30 tablet 11   • carvedilol (COREG) 3.125 MG tablet Take 1 tablet by mouth 2 (Two) Times a Day With Meals. 1 tab in am and 2 tabs in pm 270 tablet 3   • darunavir ethanolate (PREZISTA) 800 MG tablet tablet Take 800 mg by mouth Every Night.     • Diclofenac Sodium (VOLTAREN) 1 % gel gel Apply 4 g topically to the appropriate area as directed 2 (Two) Times a Day. Use per pkg insert 100 g 2   • diphenhydrAMINE (BENADRYL) 25 mg capsule Take 1 capsule by mouth Every 6 (Six) Hours As Needed for Itching (swelling). 20 capsule 0   • Empagliflozin (Jardiance) 10 MG tablet Take 10 mg by mouth Daily. 30 tablet 11   •  Emtricitabine-Tenofovir AF (DESCOVY) 200-25 MG per tablet Take  by mouth Every Night.     • fluticasone (Flonase) 50 MCG/ACT nasal spray 2 sprays into the nostril(s) as directed by provider Daily. 1 bottle 2   • ivabradine HCl (Corlanor) 7.5 MG tablet tablet Take 1 tablet by mouth 2 (Two) Times a Day With Meals. 60 tablet 5   • nitroglycerin (NITROSTAT) 0.4 MG SL tablet Place 1 tablet under the tongue Every 5 (Five) Minutes As Needed for Chest Pain. Take no more than 3 doses in 15 minutes. 30 tablet 5   • ondansetron ODT (Zofran ODT) 4 MG disintegrating tablet Place 1 tablet on the tongue Every 8 (Eight) Hours As Needed for Nausea or Vomiting. 30 tablet 0   • potassium chloride (K-DUR,KLOR-CON) 20 MEQ CR tablet Take 3 tablets by mouth once daily 90 tablet 11   • ritonavir (NORVIR) 100 MG tablet Take 100 mg by mouth Every Night.     • sacubitril-valsartan (Entresto)  MG tablet Take 1 tablet by mouth 2 (Two) Times a Day. 180 tablet 3   • sertraline (Zoloft) 50 MG tablet Take 1 tablet by mouth Daily. 90 tablet 3   • spironolactone (ALDACTONE) 25 MG tablet 25 mg Daily.     • torsemide (DEMADEX) 20 MG tablet Take 1 tablet by mouth See Admin Instructions. Take 60 mg (3 tablets) po every morning and 40 mg (2 tablets) po every afternoon 150 tablet 1   • vitamin D (ERGOCALCIFEROL) 99533 units capsule capsule Take 50,000 Units by mouth Every 30 (Thirty) Days.     • warfarin (COUMADIN) 2.5 MG tablet Take 2 tablets (5 mg) by mouth on Mon, Wed, and Fri, and take 3 tablets (7.5 mg) by mouth all other days or as directed. 235 tablet 1   • zolpidem (AMBIEN) 5 MG tablet        No current facility-administered medications for this encounter.       Past Medical History:   Diagnosis Date   • AICD (automatic cardioverter/defibrillator) present 3/6/2020   • Asthma    • CHF (congestive heart failure) (HCC)    • Class 2 obesity in adult    • Grade II diastolic dysfunction     Per echocardiogram 3/2021   • HIV (human immunodeficiency  virus infection) ([facility])    • Hypertension    • LBBB (left bundle branch block)    • NICM (nonischemic cardiomyopathy) ([facility])     2020 EF 6% per echocardiogram; AICD present   • Nonrheumatic mitral valve regurgitation 3/8/2021    Moderate per echo 3/2021   • Nonrheumatic tricuspid valve regurgitation     Moderate per echocardiogram 3/2021       Past Surgical History:   Procedure Laterality Date   • CARDIAC CATHETERIZATION     • CARDIAC DEFIBRILLATOR PLACEMENT     •  SECTION      one C/S   • FRACTURE SURGERY Left     left ankle   • TUBAL ABDOMINAL LIGATION         Physical Exam  Vitals and nursing note reviewed.   Constitutional:       General: She is not in acute distress.     Appearance: She is well-developed. She is not ill-appearing.   HENT:      Head: Normocephalic and atraumatic.   Eyes:      Conjunctiva/sclera: Conjunctivae normal.      Pupils: Pupils are equal, round, and reactive to light.   Neck:      Vascular: No JVD.   Cardiovascular:      Rate and Rhythm: Normal rate and regular rhythm.      Heart sounds: Normal heart sounds. No murmur heard.  No friction rub. No gallop.    Pulmonary:      Effort: Pulmonary effort is normal. No respiratory distress.      Breath sounds: Normal breath sounds.   Abdominal:      General: Bowel sounds are normal. There is no distension.      Palpations: Abdomen is soft.   Musculoskeletal:         General: No swelling or deformity.   Skin:     General: Skin is warm and dry.      Capillary Refill: Capillary refill takes less than 2 seconds.   Neurological:      Mental Status: She is alert and oriented to person, place, and time. Mental status is at baseline.   Psychiatric:         Attention and Perception: Attention normal.         Mood and Affect: Mood normal. Affect is tearful.         Behavior: Behavior normal. Behavior is cooperative.          Review of Systems   Constitutional: Positive for fatigue. Negative for appetite change and unexpected weight change.   HENT:  "Negative for congestion and nosebleeds.    Eyes: Negative for photophobia and visual disturbance.   Respiratory: Negative for cough, chest tightness and shortness of breath.    Cardiovascular: Negative for chest pain, palpitations and leg swelling.   Gastrointestinal: Negative for abdominal distention and blood in stool.   Endocrine: Negative for polyphagia and polyuria.   Genitourinary: Positive for frequency. Negative for enuresis.   Musculoskeletal: Negative for joint swelling and myalgias.   Skin: Negative for pallor and rash.   Neurological: Negative for dizziness, syncope, weakness, light-headedness, numbness and headaches.   Hematological: Does not bruise/bleed easily.   Psychiatric/Behavioral: Negative for decreased concentration and sleep disturbance.        OBJECTIVE:  BP 90/64 (BP Location: Right arm, Patient Position: Sitting)   Pulse 58   Ht 168.9 cm (66.5\")   Wt 92.5 kg (204 lb)   SpO2 97%   BMI 32.44 kg/m²      Body mass index is 32.44 kg/m².  Wt Readings from Last 1 Encounters:   11/04/21 92.5 kg (204 lb)       Lab Review:  Renal Function: CrCl cannot be calculated (Patient's most recent lab result is older than the maximum 30 days allowed.).    Lab Results   Component Value Date    PROBNP 1,281.0 (H) 09/03/2021       Results for orders placed during the hospital encounter of 03/08/21    Adult Transthoracic Echo Complete W/ Cont if Necessary Per Protocol    Interpretation Summary  · The left ventricular cavity is severely dilated.  · Estimated left ventricular EF = 22% Left ventricular systolic function is severely decreased.  · Left ventricular diastolic function is consistent with (grade II w/high LAP) pseudonormalization.  · The left atrial cavity is severely dilated.  · There is mild, bileaflet mitral valve thickening present.  · Moderate mitral valve regurgitation is present.  · Moderate tricuspid valve regurgitation is present.  · Estimated right ventricular systolic pressure from " tricuspid regurgitation is normal (<35 mmHg).        6 MINUTE WALK  Patient declined       Cardiac Procedures:  1. Cardiac catheterization [facility] 5/30/17       2.  /[facility] 4/2019   Data deficit      ASSESSMENT:     Diagnosis Plan   1. Grade II diastolic dysfunction  Basic Metabolic Panel    proBNP    Basic Metabolic Panel    Basic Metabolic Panel    proBNP    proBNP   2. Essential hypertension  CBC & Differential    CBC & Differential    CBC & Differential   3. HFrEF (heart failure with reduced ejection fraction) ([facility])     4. NICM (nonischemic cardiomyopathy) ([facility])     5. HIV infection, unspecified symptom status ([facility])     6. Asthma, unspecified asthma severity, unspecified whether complicated, unspecified whether persistent           PLAN OF CARE:  1.  HFrEF-NYHA functional class II.  Most recent EF per echocardiogram 22%, up from 6%. Currently she is on Entresto, torsemide, carvedilol, ivabradine, spironolactone, and Jardiance.  Historically she has been unable to tolerate metoprolol succinate as it made her very dizzy.      She is currently euvolemic on exam.  Is tolerating the increased dose of carvedilol well.  She continues to follow a low-sodium diet of 2000 mg daily or less, fluid restriction of 50 to 60 ounces daily, as well as adherence with daily weights.  She would like to move forward with getting her advanced directive filed, but unfortunately she forgot her paperwork today.  She will call back to reschedule and we will ensure that we have a notary here that day so that she can get this signed and filed.  I will check labs today as below.    Directions for when to call the clinic reviewed with the patient to include weight gain of 2 to 3 pounds in 24 hours, weight gain of 5 to 10 pounds within 7 days; worsening shortness of breath; worsening lower extremity edema or abdominal distention.    2.  Nonobstructive CAD-diffuse 30% LAD lesion noted on prior cardiac catheterization as  above.  Stable, denies angina.    3.  Nonischemic cardiomyopathy (dilated)-presence of AICD noted.  EF per echocardiogram was 6%, now improved to 22% per echocardiogram as of 3/8/2021.  Most recent AICD interrogation as above.  Now on target dosing of Entresto, she continues to tolerate this well.    4.  NSVT-noted on prior device interrogation.  Currently on beta-blockade.    5.  HIV-history noted.    6.  Pulmonary embolism-remains on on warfarin.  Followed by home health and primary cardiology team.    7.  Hyponatremia-resolved    CBC/CMP/BNP; continue current GDMT; patient to call to schedule follow-up appointment and we will need to coordinate having a notary here in the office at that time.      Thank you for allowing me to participate in the care of your patient,         Kiley JIN SAMUEL Boyd  [facility] HEART FAILURE  11/04/21  13:56 EST      **Lyric Disclaimer:**  Much of this encounter note is an electronic transcription/translation of spoken language to printed text. The electronic translation of spoken language may permit erroneous, or at times, nonsensical words or phrases to be inadvertently transcribed. Although I have reviewed the note for such errors, some may still exist.

## 2021-11-04 NOTE — PROGRESS NOTES
"[facility]      Patient Name: Nina Saez  :1975  Age: 45 y.o.  Sex: female  Referring Provider: Lisset Melton MD   Primary Cardiologist: Dr. Melton  Encounter Provider: Zina Waldron, PharmD, BCACP      HPI:  Patient presents for follow up to HF clinic, with no new HF complaints. PMH is significant for HFrEF (EF now 22%), severe nonischemic cardiomyopathy, left bundle branch block, HTN, HIV, obesity, and asthma. Patient has been feeling well since last visit. She did see Dr. Melton at the end of 2021 and she increased her Coreg to 3.125 mg qam and 6.25 mg qpm. Patient has been tolerating this well and she does check her BP/HR at home. She has not had any dizziness/lightheadedness.       Current Regimen:  Heart Failure  Carvedilol 3.125 mg QAM and 3.125 (2 tablets) QPM  Torsemide 20 mg (3 tablets) po QAM and 20 mg (2 tablets) po QPM   Entresto 97/103 mg bid  Spironolactone 25 mg daily  Ivabradine 7.5 mg bid  Jardiance 10 mg daily      Other CV Meds  Aspirin 81 mg daily  Warfarin as directed by med mgmt clinic  KCl 20 mEq (3 tablets) daily      Medication Use:  Adherence: great, uses a pillbox  Past hx of medication use/intolerance: metoprolol (dizziness, nausea); bumetanide (reports less effective than lasix)   Affordability: [facility] Medicare/Medicaid; no issues with cost      Diet:   Patient is currently monitoring her sodium and staying at goal of 2000 mg/day or less       Social History:  Tobacco use: former smoker (quit )  EtOH use: none  Illicit drug use: none.   Exercise: improved since med changes - walking + household chores/activities      Immunization Status:  Pneumococcal: PCV13 (2017), PPSV23 (2021)  Influenza: receives at [facility] - obtains annually  COVID-19: 21 and 21      OBJECTIVE:    BP 90/64 (BP Location: Right arm, Patient Position: Sitting)   Pulse 58   Ht 168.9 cm (66.5\")   Wt 92.5 kg (204 lb)   SpO2 97%  "  BMI 32.44 kg/m²     Body mass index is 32.44 kg/m².  Wt Readings from Last 1 Encounters:   11/04/21 92.5 kg (204 lb)       Lab Review:  Renal Function: CrCl cannot be calculated (Patient's most recent lab result is older than the maximum 30 days allowed.).    Lab Results   Component Value Date    PROBNP 1,281.0 (H) 09/03/2021         ASSESSMENT/PLAN OF CARE:    1. HFrEF (EF 22%)  - Patient presents today stable overall with no new complaints   - Continue carvedilol 3.125 mg every morning and 3.125 mg (2 tablets) every evening  - Continue Entresto 97/103 mg bid  - Continue spironolactone 25 mg daily  - Continue ivabradine 7.5 mg twice daily  - Continue Jardiance 10 mg daily  - Continue torsemide 20 mg (3 tablets) every morning and 20 mg (2 tablets) every evening  - Advised patient to call clinic with 2-3 lb weight gain in 24 hrs or >5 lb weight gain in 1 week         Thank you for allowing me to participate in the care of your patient,        Zina Waldron, PharmD, BCACP  [facility]  Phone: [phone]

## 2021-11-05 ENCOUNTER — TELEPHONE (OUTPATIENT)
Dept: CARDIOLOGY | Facility: HOSPITAL | Age: 46
End: 2021-11-05

## 2021-11-05 NOTE — TELEPHONE ENCOUNTER
----- Message from SAMUEL Cisneros sent at 11/4/2021 12:47 PM EDT -----  Chuyita,Please let Ms. Hunter know that her white count is a tiny bit elevated, and I want her to follow-up with the doctor who manages her HIV.  Otherwise, her labs look good.  No change to her current heart failure plan of care.Thanks,Yane  ----- Message -----  From: Lab, Background User  Sent: 11/4/2021  11:56 AM EDT  To: SAMUEL Cisneros

## 2021-11-08 ENCOUNTER — ANTICOAGULATION VISIT (OUTPATIENT)
Dept: PHARMACY | Facility: HOSPITAL | Age: 46
End: 2021-11-08

## 2021-11-08 LAB — INR PPP: 2.6

## 2021-11-08 PROCEDURE — G0249 PROVIDE INR TEST MATER/EQUIP: HCPCS

## 2021-11-08 NOTE — PROGRESS NOTES
Anticoagulation Clinic Progress Note    Anticoagulation Summary  As of 2021    INR goal:  2.0-3.0   TTR:  54.5 % (10.5 mo)   INR used for dosin.60 (2021)   Warfarin maintenance plan:  7.5 mg every day   Weekly warfarin total:  52.5 mg   No change documented:  Chucho Mcdaniel PharmD   Plan last modified:  Chucho Mcdaniel PharmD (2021)   Next INR check:  11/15/2021   Target end date:  Indefinite    Indications    Other acute pulmonary embolism  unspecified whether acute cor pulmonale present (HCC) (Resolved) [I26.99]  Acute pulmonary embolism  unspecified pulmonary embolism type  unspecified whether acute cor pulmonale present (HCC) (Resolved) [I26.99]             Anticoagulation Episode Summary     INR check location:      Preferred lab:      Send INR reminders to:   [facility]    Comments:  **Home Testing Program**      Anticoagulation Care Providers     Provider Role Specialty Phone number    Lisset Melton MD Referring Cardiology [phone]          Clinic Interview:  Patient Findings     Negatives:  Signs/symptoms of thrombosis, Signs/symptoms of bleeding,   Laboratory test error suspected, Change in health, Change in alcohol use,   Change in activity, Upcoming invasive procedure, Emergency department   visit, Upcoming dental procedure, Missed doses, Extra doses, Change in   medications, Change in diet/appetite, Hospital admission, Bruising, Other   complaints      Clinical Outcomes     Negatives:  Major bleeding event, Thromboembolic event,   Anticoagulation-related hospital admission, Anticoagulation-related ED   visit, Anticoagulation-related fatality        INR History:  Anticoagulation Monitoring 10/25/2021 2021 2021   INR 2.90 4.50 2.60   INR Date 10/25/2021 2021 2021   INR Goal 2.0-3.0 2.0-3.0 2.0-3.0   Trend Same Same Same   Last Week Total 52.5 mg 52.5 mg 45 mg   Next Week Total 52.5 mg 45 mg 52.5 mg   Sun 7.5 mg 7.5 mg 7.5 mg   Mon 7.5 mg  Hold (11/1) 7.5 mg   Tue 7.5 mg 7.5 mg 7.5 mg   Wed 7.5 mg 7.5 mg 7.5 mg   Thu 7.5 mg 7.5 mg 7.5 mg   Fri 7.5 mg 7.5 mg 7.5 mg   Sat 7.5 mg 7.5 mg 7.5 mg   Visit Report - - -   Some recent data might be hidden       Plan:  1. INR is Therapeutic today- see above in Anticoagulation Summary.   Will instruct Nina Saez to Continue their warfarin regimen- see above in Anticoagulation Summary.  2. Follow up in 1 week  3. They have been instructed to call if any changes in medications, doses, concerns, etc. Patient expresses understanding and has no further questions at this time.    Chucho Mcdaniel, PharmD

## 2021-11-15 ENCOUNTER — ANTICOAGULATION VISIT (OUTPATIENT)
Dept: PHARMACY | Facility: HOSPITAL | Age: 46
End: 2021-11-15

## 2021-11-15 LAB — INR PPP: 3.6

## 2021-11-15 NOTE — PROGRESS NOTES
Anticoagulation Clinic Progress Note    Anticoagulation Summary  As of 11/15/2021    INR goal:  2.0-3.0   TTR:  54.2 % (10.8 mo)   INR used for dosing:  3.60 (11/15/2021)   Warfarin maintenance plan:  7.5 mg every day   Weekly warfarin total:  52.5 mg   Plan last modified:  Chucho Mcdaniel, PharmD (7/30/2021)   Next INR check:  11/18/2021   Target end date:  Indefinite    Indications    Other acute pulmonary embolism  unspecified whether acute cor pulmonale present (HCC) (Resolved) [I26.99]  Acute pulmonary embolism  unspecified pulmonary embolism type  unspecified whether acute cor pulmonale present (HCC) (Resolved) [I26.99]             Anticoagulation Episode Summary     INR check location:      Preferred lab:      Send INR reminders to:  [facility]    Comments:  **Home Testing Program**      Anticoagulation Care Providers     Provider Role Specialty Phone number    Lisset Melton MD Referring Cardiology [phone]          Clinic Interview:  Patient Findings     Positives:  Change in health, Change in medications    Negatives:  Signs/symptoms of thrombosis, Signs/symptoms of bleeding,   Laboratory test error suspected, Change in alcohol use, Change in   activity, Upcoming invasive procedure, Emergency department visit,   Upcoming dental procedure, Missed doses, Extra doses, Change in   diet/appetite, Hospital admission, Bruising, Other complaints    Comments:  Reports cold sx the past 2-3 days (nasal congestion, cough;   denies fever or change in appetite). Reports Mucinex and APAP prn for   symptom management.       Clinical Outcomes     Negatives:  Major bleeding event, Thromboembolic event,   Anticoagulation-related hospital admission, Anticoagulation-related ED   visit, Anticoagulation-related fatality    Comments:  Reports cold sx the past 2-3 days (nasal congestion, cough;   denies fever or change in appetite). Reports Mucinex and APAP prn for   symptom management.         INR  History:  Anticoagulation Monitoring 11/1/2021 11/8/2021 11/15/2021 11/15/2021   INR 4.50 2.60 - 3.60   INR Date 11/1/2021 11/8/2021 - 11/15/2021   INR Goal 2.0-3.0 2.0-3.0 2.0-3.0 2.0-3.0   Trend Same Same - Same   Last Week Total 52.5 mg 45 mg - 52.5 mg   Next Week Total 45 mg 52.5 mg - 47.5 mg   Sun 7.5 mg 7.5 mg - -   Mon Hold (11/1) 7.5 mg - 2.5 mg (11/15)   Tue 7.5 mg 7.5 mg - 7.5 mg   Wed 7.5 mg 7.5 mg - 7.5 mg   Thu 7.5 mg 7.5 mg - -   Fri 7.5 mg 7.5 mg - -   Sat 7.5 mg 7.5 mg - -   Visit Report - - - -   Some recent data might be hidden       Plan:  1. INR is Supratherapeutic today- see above in Anticoagulation Summary.   Will instruct Nina Saez to Change their warfarin regimen- see above in Anticoagulation Summary.  2. Follow up in 3 days to ensure INR does not remain elevated while experience cold symptoms.  3. They have been instructed to call if any changes in medications, doses, concerns, etc. Patient expresses understanding and has no further questions at this time.    Chucho Mcdaniel, PharmD

## 2021-11-18 ENCOUNTER — ANTICOAGULATION VISIT (OUTPATIENT)
Dept: PHARMACY | Facility: HOSPITAL | Age: 46
End: 2021-11-18

## 2021-11-18 LAB — INR PPP: 3.3

## 2021-11-18 NOTE — PROGRESS NOTES
Anticoagulation Clinic Progress Note    Anticoagulation Summary  As of 11/18/2021    INR goal:  2.0-3.0   TTR:  53.7 % (10.9 mo)   INR used for dosing:  3.30 (11/18/2021)   Warfarin maintenance plan:  7.5 mg every day   Weekly warfarin total:  52.5 mg   Plan last modified:  Chucho Mcdaniel, PharmD (7/30/2021)   Next INR check:  11/22/2021   Target end date:  Indefinite    Indications    Other acute pulmonary embolism  unspecified whether acute cor pulmonale present (HCC) (Resolved) [I26.99]  Acute pulmonary embolism  unspecified pulmonary embolism type  unspecified whether acute cor pulmonale present (HCC) (Resolved) [I26.99]             Anticoagulation Episode Summary     INR check location:      Preferred lab:      Send INR reminders to:  [facility]    Comments:  **Home Testing Program**      Anticoagulation Care Providers     Provider Role Specialty Phone number    Lisset Melton MD Referring Cardiology [phone]          Clinic Interview:  Patient Findings     Negatives:  Signs/symptoms of thrombosis, Signs/symptoms of bleeding,   Laboratory test error suspected, Change in health, Change in alcohol use,   Change in activity, Upcoming invasive procedure, Emergency department   visit, Upcoming dental procedure, Missed doses, Extra doses, Change in   medications, Change in diet/appetite, Hospital admission, Bruising, Other   complaints    Comments:  Reports cold symptoms are improving.      Clinical Outcomes     Negatives:  Major bleeding event, Thromboembolic event,   Anticoagulation-related hospital admission, Anticoagulation-related ED   visit, Anticoagulation-related fatality    Comments:  Reports cold symptoms are improving.        INR History:  Anticoagulation Monitoring 11/8/2021 11/15/2021 11/15/2021 11/18/2021   INR 2.60 - 3.60 3.30   INR Date 11/8/2021 - 11/15/2021 11/18/2021   INR Goal 2.0-3.0 2.0-3.0 2.0-3.0 2.0-3.0   Trend Same - Same Same   Last Week Total 45 mg - 52.5 mg  47.5 mg   Next Week Total 52.5 mg - 47.5 mg 47.5 mg   Sun 7.5 mg - - 7.5 mg   Mon 7.5 mg - 2.5 mg (11/15) -   Tue 7.5 mg - 7.5 mg -   Wed 7.5 mg - 7.5 mg -   Thu 7.5 mg - - 2.5 mg (11/18)   Fri 7.5 mg - - 7.5 mg   Sat 7.5 mg - - 7.5 mg   Visit Report - - - -   Some recent data might be hidden       Plan:  1. INR is Supratherapeutic today- see above in Anticoagulation Summary.   Will instruct Nina PHAM Saez to Change their warfarin regimen- see above in Anticoagulation Summary.  2. Follow up in 4 days  3. They have been instructed to call if any changes in medications, doses, concerns, etc. Patient expresses understanding and has no further questions at this time.    Chucho Mcdaniel, PharmD   Spontaneous, unlabored and symmetrical

## 2021-11-22 ENCOUNTER — TELEPHONE (OUTPATIENT)
Dept: CARDIOLOGY | Facility: HOSPITAL | Age: 46
End: 2021-11-22

## 2021-11-22 ENCOUNTER — ANTICOAGULATION VISIT (OUTPATIENT)
Dept: PHARMACY | Facility: HOSPITAL | Age: 46
End: 2021-11-22

## 2021-11-22 LAB — INR PPP: 2.4

## 2021-11-22 RX ORDER — WARFARIN SODIUM 2.5 MG/1
TABLET ORAL
Qty: 270 TABLET | Refills: 0 | Status: SHIPPED | OUTPATIENT
Start: 2021-11-22 | End: 2022-03-09

## 2021-11-22 RX ORDER — TORSEMIDE 20 MG/1
20 TABLET ORAL SEE ADMIN INSTRUCTIONS
Qty: 150 TABLET | Refills: 1 | Status: SHIPPED | OUTPATIENT
Start: 2021-11-22 | End: 2022-01-31 | Stop reason: SDUPTHER

## 2021-11-22 NOTE — TELEPHONE ENCOUNTER
Pt called about her dose of torsemide.  She currently takes Torsemide 20 mg (3 tablets AM and 2 tablets PM.  She is wanting to know if she can increase her dose to 3 tablets in the PM as well because she said with Thanksgiving coming up and her birthday last week, she has not been eating the best and feels like she wants to prevent any extra swelling.  I asked her if her weight is up now and she said no.  She said she does have some lower extremity edema and shortness of breath, but it is not any worse than her usual. I asked her if she feels like she is holding on to extra fluid and she said she just feels tired.  I told her I will let Yane/Dr. Morgan know and give her a call back.  Understanding and agreement verbalized.      Lisha Melendrez RN  [facility]      Addendum:  11/22/21 13:07  I called Pt and let her know that I spoke with Zina (our clinic pharmacist) and she said it would be ok to take an extra torsemide tablet in the evening if she has been eating more sodium than usual and is having increased swelling, shortness of air, and weight gain.  However, she should not increase her dose permanently. Also, if she is finding that she is taking an extra tablet frequently, then she needs to come in so we can check  her labs.  Advised her to please call us for weight gain > 2 pounds in 24 hrs or 5 lbs in 1 week, worsening shortness of breath or swelling.  All questions and concerns addressed. Understanding and agreement verbalized.

## 2021-11-22 NOTE — PROGRESS NOTES
Anticoagulation Clinic Progress Note    Anticoagulation Summary  As of 2021    INR goal:  2.0-3.0   TTR:  53.9 % (11 mo)   INR used for dosin.40 (2021)   Warfarin maintenance plan:  7.5 mg every day   Weekly warfarin total:  52.5 mg   No change documented:  Chucho Mcdaniel PharmD   Plan last modified:  Chucho Mcdaniel PharmD (2021)   Next INR check:  2021   Target end date:  Indefinite    Indications    Other acute pulmonary embolism  unspecified whether acute cor pulmonale present (HCC) (Resolved) [I26.99]  Acute pulmonary embolism  unspecified pulmonary embolism type  unspecified whether acute cor pulmonale present (HCC) (Resolved) [I26.99]             Anticoagulation Episode Summary     INR check location:      Preferred lab:      Send INR reminders to:   [facility]    Comments:  **Home Testing Program**      Anticoagulation Care Providers     Provider Role Specialty Phone number    Lisset Melton MD Referring Cardiology [phone]          Clinic Interview:  Patient Findings     Negatives:  Signs/symptoms of thrombosis, Signs/symptoms of bleeding,   Laboratory test error suspected, Change in health, Change in alcohol use,   Change in activity, Upcoming invasive procedure, Emergency department   visit, Upcoming dental procedure, Missed doses, Extra doses, Change in   medications, Change in diet/appetite, Hospital admission, Bruising, Other   complaints    Comments:  Reports feeling well and eating well. No longer experiencing   cold symptoms.       Clinical Outcomes     Negatives:  Major bleeding event, Thromboembolic event,   Anticoagulation-related hospital admission, Anticoagulation-related ED   visit, Anticoagulation-related fatality    Comments:  Reports feeling well and eating well. No longer experiencing   cold symptoms.         INR History:  Anticoagulation Monitoring 11/15/2021 2021 2021   INR 3.60 3.30 2.40   INR Date 11/15/2021 2021  11/22/2021   INR Goal 2.0-3.0 2.0-3.0 2.0-3.0   Trend Same Same Same   Last Week Total 52.5 mg 47.5 mg 42.5 mg   Next Week Total 47.5 mg 47.5 mg 52.5 mg   Sun - 7.5 mg 7.5 mg   Mon 2.5 mg (11/15) - 7.5 mg   Tue 7.5 mg - 7.5 mg   Wed 7.5 mg - 7.5 mg   Thu - 2.5 mg (11/18) 7.5 mg   Fri - 7.5 mg 7.5 mg   Sat - 7.5 mg 7.5 mg   Visit Report - - -   Some recent data might be hidden       Plan:  1. INR is Therapeutic today- see above in Anticoagulation Summary.   Will instruct Nina Saez to Continue their warfarin regimen- see above in Anticoagulation Summary.  2. Follow up in 1 week  3. They have been instructed to call if any changes in medications, doses, concerns, etc. Patient expresses understanding and has no further questions at this time.    Chucho Mcdaniel, PharmD

## 2021-11-26 ENCOUNTER — TRANSCRIBE ORDERS (OUTPATIENT)
Dept: SLEEP MEDICINE | Facility: HOSPITAL | Age: 46
End: 2021-11-26

## 2021-11-26 DIAGNOSIS — Z01.818 OTHER SPECIFIED PRE-OPERATIVE EXAMINATION: Primary | ICD-10-CM

## 2021-11-29 ENCOUNTER — ANTICOAGULATION VISIT (OUTPATIENT)
Dept: PHARMACY | Facility: HOSPITAL | Age: 46
End: 2021-11-29

## 2021-11-29 LAB — INR PPP: 2.7

## 2021-11-29 NOTE — PROGRESS NOTES
Anticoagulation Clinic Progress Note    Anticoagulation Summary  As of 2021    INR goal:  2.0-3.0   TTR:  54.8 % (11.2 mo)   INR used for dosin.70 (2021)   Warfarin maintenance plan:  7.5 mg every day   Weekly warfarin total:  52.5 mg   No change documented:  Chucho Mcdaniel PharmD   Plan last modified:  Chucho Mcdaniel PharmD (2021)   Next INR check:  2021   Target end date:  Indefinite    Indications    Other acute pulmonary embolism  unspecified whether acute cor pulmonale present (HCC) (Resolved) [I26.99]  Acute pulmonary embolism  unspecified pulmonary embolism type  unspecified whether acute cor pulmonale present (HCC) (Resolved) [I26.99]             Anticoagulation Episode Summary     INR check location:      Preferred lab:      Send INR reminders to:   [facility]    Comments:  **Home Testing Program**      Anticoagulation Care Providers     Provider Role Specialty Phone number    Lisset Melton MD Referring Cardiology [phone]          Clinic Interview:  Patient Findings     Negatives:  Signs/symptoms of thrombosis, Signs/symptoms of bleeding,   Laboratory test error suspected, Change in health, Change in alcohol use,   Change in activity, Upcoming invasive procedure, Emergency department   visit, Upcoming dental procedure, Missed doses, Extra doses, Change in   medications, Change in diet/appetite, Hospital admission, Bruising, Other   complaints      Clinical Outcomes     Negatives:  Major bleeding event, Thromboembolic event,   Anticoagulation-related hospital admission, Anticoagulation-related ED   visit, Anticoagulation-related fatality        INR History:  Anticoagulation Monitoring 2021   INR 3.30 2.40 2.70   INR Date 2021   INR Goal 2.0-3.0 2.0-3.0 2.0-3.0   Trend Same Same Same   Last Week Total 47.5 mg 42.5 mg 52.5 mg   Next Week Total 47.5 mg 52.5 mg 52.5 mg   Sun 7.5 mg 7.5 mg 7.5 mg   Mon  - 7.5 mg 7.5 mg   Tue - 7.5 mg 7.5 mg   Wed - 7.5 mg 7.5 mg   Thu 2.5 mg (11/18) 7.5 mg 7.5 mg   Fri 7.5 mg 7.5 mg 7.5 mg   Sat 7.5 mg 7.5 mg 7.5 mg   Visit Report - - -   Some recent data might be hidden       Plan:  1. INR is Therapeutic today- see above in Anticoagulation Summary.   Will instruct Nina Saez to Continue their warfarin regimen- see above in Anticoagulation Summary.  2. Follow up in 1 week  3. They have been instructed to call if any changes in medications, doses, concerns, etc. Patient expresses understanding and has no further questions at this time.    Chucho Mcdaniel, PharmD

## 2021-11-30 ENCOUNTER — TRANSCRIBE ORDERS (OUTPATIENT)
Dept: SLEEP MEDICINE | Facility: HOSPITAL | Age: 46
End: 2021-11-30

## 2021-11-30 DIAGNOSIS — G47.33 OSA (OBSTRUCTIVE SLEEP APNEA): Primary | ICD-10-CM

## 2021-12-01 NOTE — TELEPHONE ENCOUNTER
Pt called requesting  Refill of Jardiance. Advised Pt that I will submit refill request.All questions and concerns addressed. Understanding and agreement verbalized.      Lisha Melendrez RN  [facility]

## 2021-12-06 ENCOUNTER — APPOINTMENT (OUTPATIENT)
Dept: PHARMACY | Facility: HOSPITAL | Age: 46
End: 2021-12-06

## 2021-12-06 ENCOUNTER — ANTICOAGULATION VISIT (OUTPATIENT)
Dept: PHARMACY | Facility: HOSPITAL | Age: 46
End: 2021-12-06

## 2021-12-06 LAB — INR PPP: 3.3

## 2021-12-06 NOTE — PROGRESS NOTES
Anticoagulation Clinic Progress Note    Anticoagulation Summary  As of 12/6/2021    INR goal:  2.0-3.0   TTR:  54.7 % (11.5 mo)   INR used for dosing:  3.30 (12/6/2021)   Warfarin maintenance plan:  7.5 mg every day   Weekly warfarin total:  52.5 mg   Plan last modified:  Chucho Mcdaniel, PharmD (7/30/2021)   Next INR check:  12/13/2021   Priority:  High   Target end date:  Indefinite    Indications    Other acute pulmonary embolism  unspecified whether acute cor pulmonale present (HCC) (Resolved) [I26.99]  Acute pulmonary embolism  unspecified pulmonary embolism type  unspecified whether acute cor pulmonale present (HCC) (Resolved) [I26.99]             Anticoagulation Episode Summary     INR check location:      Preferred lab:      Send INR reminders to:  [facility]    Comments:  **Home Testing Program**      Anticoagulation Care Providers     Provider Role Specialty Phone number    Lisset Melton MD Referring Cardiology [phone]          Clinic Interview:  Patient Findings     Negatives:  Signs/symptoms of thrombosis, Signs/symptoms of bleeding,   Laboratory test error suspected, Change in health, Change in alcohol use,   Change in activity, Upcoming invasive procedure, Emergency department   visit, Upcoming dental procedure, Missed doses, Extra doses, Change in   medications, Change in diet/appetite, Hospital admission, Bruising, Other   complaints      Clinical Outcomes     Negatives:  Major bleeding event, Thromboembolic event,   Anticoagulation-related hospital admission, Anticoagulation-related ED   visit, Anticoagulation-related fatality        INR History:  Anticoagulation Monitoring 11/22/2021 11/29/2021 12/6/2021   INR 2.40 2.70 3.30   INR Date 11/22/2021 11/29/2021 12/6/2021   INR Goal 2.0-3.0 2.0-3.0 2.0-3.0   Trend Same Same Same   Last Week Total 42.5 mg 52.5 mg 52.5 mg   Next Week Total 52.5 mg 52.5 mg 50 mg   Sun 7.5 mg 7.5 mg 7.5 mg   Mon 7.5 mg 7.5 mg 5 mg (12/6)   Tue  7.5 mg 7.5 mg 7.5 mg   Wed 7.5 mg 7.5 mg 7.5 mg   Thu 7.5 mg 7.5 mg 7.5 mg   Fri 7.5 mg 7.5 mg 7.5 mg   Sat 7.5 mg 7.5 mg 7.5 mg   Visit Report - - -   Some recent data might be hidden       Plan:  1. INR is Supratherapeutic today- see above in Anticoagulation Summary.   Will instruct Nina Saez to Change their warfarin regimen- see above in Anticoagulation Summary. Take partial dose of 5 mg tonight and resume normal dose until next INR check.  2. Follow up in 1 week  3. They have been instructed to call if any changes in medications, doses, concerns, etc. Patient expresses understanding and has no further questions at this time.    Alva Breen, PharmD

## 2021-12-08 ENCOUNTER — HOSPITAL ENCOUNTER (OUTPATIENT)
Dept: SLEEP MEDICINE | Facility: HOSPITAL | Age: 46
End: 2021-12-08

## 2021-12-13 ENCOUNTER — ANTICOAGULATION VISIT (OUTPATIENT)
Dept: PHARMACY | Facility: HOSPITAL | Age: 46
End: 2021-12-13

## 2021-12-13 LAB — INR PPP: 3.2

## 2021-12-13 NOTE — PROGRESS NOTES
Anticoagulation Clinic Progress Note    Anticoagulation Summary  As of 12/13/2021    INR goal:  2.0-3.0   TTR:  53.6 % (11.7 mo)   INR used for dosing:  3.20 (12/13/2021)   Warfarin maintenance plan:  7.5 mg every day   Weekly warfarin total:  52.5 mg   No change documented:  Zina De La Rosa, CAYDEN   Plan last modified:  Chucho Mcdaniel, PharmD (7/30/2021)   Next INR check:  12/16/2021   Priority:  High   Target end date:  Indefinite    Indications    Other acute pulmonary embolism  unspecified whether acute cor pulmonale present (HCC) (Resolved) [I26.99]  Acute pulmonary embolism  unspecified pulmonary embolism type  unspecified whether acute cor pulmonale present (HCC) (Resolved) [I26.99]             Anticoagulation Episode Summary     INR check location:      Preferred lab:      Send INR reminders to:   [facility]    Comments:  **Home Testing Program**      Anticoagulation Care Providers     Provider Role Specialty Phone number    Lisset Melton MD Referring Cardiology [phone]          Clinic Interview:  Patient Findings     Positives:  Other complaints    Negatives:  Signs/symptoms of thrombosis, Signs/symptoms of bleeding,   Laboratory test error suspected, Change in health, Change in alcohol use,   Change in activity, Upcoming invasive procedure, Emergency department   visit, Upcoming dental procedure, Missed doses, Extra doses, Change in   medications, Change in diet/appetite, Hospital admission, Bruising    Comments:  Patient accidentally wiped finger off with alcohol after she   pricked it for the blood test and she would like to retest prior to making   dose adjustments       Clinical Outcomes     Negatives:  Major bleeding event, Thromboembolic event,   Anticoagulation-related hospital admission, Anticoagulation-related ED   visit, Anticoagulation-related fatality    Comments:         INR History:  Anticoagulation Monitoring 11/29/2021 12/6/2021 12/13/2021   INR 2.70 3.30 3.20    INR Date 11/29/2021 12/6/2021 12/13/2021   INR Goal 2.0-3.0 2.0-3.0 2.0-3.0   Trend Same Same Same   Last Week Total 52.5 mg 52.5 mg 50 mg   Next Week Total 52.5 mg 50 mg 52.5 mg   Sun 7.5 mg 7.5 mg -   Mon 7.5 mg 5 mg (12/6) 7.5 mg   Tue 7.5 mg 7.5 mg 7.5 mg   Wed 7.5 mg 7.5 mg 7.5 mg   Thu 7.5 mg 7.5 mg -   Fri 7.5 mg 7.5 mg -   Sat 7.5 mg 7.5 mg -   Visit Report - - -   Some recent data might be hidden       Plan:  1. INR is Supratherapeutic today- see above in Anticoagulation Summary.   Will instruct Nina Saez to Continue their warfarin regimen- see above in Anticoagulation Summary.Patient accidentally wiped finger off with alcohol after she   pricked it for the blood test and she would like to retest prior to making   dose adjustments    2. Follow up later this week. Will retest by at least Thursday   3.  They have been instructed to call if any changes in medications, doses, concerns, etc. Patient expresses understanding and has no further questions at this time.    Zina De La Rosa [facility]

## 2021-12-15 ENCOUNTER — OFFICE VISIT (OUTPATIENT)
Dept: CARDIOLOGY | Facility: CLINIC | Age: 46
End: 2021-12-15

## 2021-12-15 ENCOUNTER — CLINICAL SUPPORT NO REQUIREMENTS (OUTPATIENT)
Dept: CARDIOLOGY | Facility: CLINIC | Age: 46
End: 2021-12-15

## 2021-12-15 ENCOUNTER — HOSPITAL ENCOUNTER (OUTPATIENT)
Dept: CARDIOLOGY | Facility: HOSPITAL | Age: 46
Discharge: HOME | End: 2021-12-15
Admitting: INTERNAL MEDICINE

## 2021-12-15 VITALS
WEIGHT: 207.4 LBS | SYSTOLIC BLOOD PRESSURE: 114 MMHG | HEIGHT: 66 IN | DIASTOLIC BLOOD PRESSURE: 60 MMHG | BODY MASS INDEX: 33.33 KG/M2 | HEART RATE: 56 BPM

## 2021-12-15 VITALS
BODY MASS INDEX: 32.78 KG/M2 | HEART RATE: 64 BPM | SYSTOLIC BLOOD PRESSURE: 118 MMHG | WEIGHT: 204 LBS | DIASTOLIC BLOOD PRESSURE: 60 MMHG | HEIGHT: 66 IN

## 2021-12-15 DIAGNOSIS — I36.1 NONRHEUMATIC TRICUSPID VALVE REGURGITATION: ICD-10-CM

## 2021-12-15 DIAGNOSIS — I50.20 HFREF (HEART FAILURE WITH REDUCED EJECTION FRACTION) (HCC): ICD-10-CM

## 2021-12-15 DIAGNOSIS — I51.89 GRADE II DIASTOLIC DYSFUNCTION: ICD-10-CM

## 2021-12-15 DIAGNOSIS — I10 ESSENTIAL HYPERTENSION: ICD-10-CM

## 2021-12-15 DIAGNOSIS — I42.8 NICM (NONISCHEMIC CARDIOMYOPATHY) (HCC): Primary | ICD-10-CM

## 2021-12-15 DIAGNOSIS — I34.0 NONRHEUMATIC MITRAL VALVE REGURGITATION: ICD-10-CM

## 2021-12-15 DIAGNOSIS — I50.20 HFREF (HEART FAILURE WITH REDUCED EJECTION FRACTION) (HCC): Primary | ICD-10-CM

## 2021-12-15 PROCEDURE — 93306 TTE W/DOPPLER COMPLETE: CPT

## 2021-12-15 PROCEDURE — 93282 PRGRMG EVAL IMPLANTABLE DFB: CPT | Performed by: INTERNAL MEDICINE

## 2021-12-15 PROCEDURE — 93000 ELECTROCARDIOGRAM COMPLETE: CPT | Performed by: INTERNAL MEDICINE

## 2021-12-15 PROCEDURE — 93306 TTE W/DOPPLER COMPLETE: CPT | Performed by: INTERNAL MEDICINE

## 2021-12-15 PROCEDURE — 99214 OFFICE O/P EST MOD 30 MIN: CPT | Performed by: INTERNAL MEDICINE

## 2021-12-15 PROCEDURE — 25010000002 PERFLUTREN (DEFINITY) 8.476 MG IN SODIUM CHLORIDE (PF) 0.9 % 10 ML INJECTION: Performed by: INTERNAL MEDICINE

## 2021-12-15 RX ADMIN — PERFLUTREN 2 ML: 6.52 INJECTION, SUSPENSION INTRAVENOUS at 13:00

## 2021-12-15 NOTE — PROGRESS NOTES
Date of Office Visit: 12/15/2021  Encounter Provider: Lisset Melton MD  Place of Service: [facility] CARDIOLOGY  Patient Name: Nina Saez  :1975      Patient ID:  Nnia Saez is a 46 y.o. female is here for  followup for nonischemic cardiomyopathy.        History of Present Illness      She has a history of HIV, essential hypertension, severe dilated nonischemic cardiomyopathy with chronic systolic congestive heart failure, obesity, history of illicit drug use and asthma.  She has an AICD.  It is a single-chamber [employer].     She was diagnosed with heart failure and cardiomyopathy in 2019 at [facility] when she was there visiting family.  An echocardiogram on cardiac catheterization which confirmed this diagnosis.  Her cardiac catheterization done 2019 showed normal coronary arteries, LVEDP 12, wedge pressure 13, PA pressure 28/15 with a mean of 19 mmHg, RA pressure 4, cardiac index 2.5 L/min/m² with a PVR of 1.2 Woods units.     She presented emergency on 20 with short windedness and cough for 3 weeks prior to presentation.  Her weight is been stable and she had no lower extremity edema but she had missed her cardiac medications and had been eating a lot of salt.  On arrival, she was found to be in congestive heart failure.  Echo done 2020 showed severe left ventricular dilation, ejection fraction 6%, very thin left ventricular walls, grade 3 diastolic dysfunction, moderate to severe mitral insufficiency, moderate tricuspid insufficiency, RVSP 48 mmHg.  There was essentially global hypokinesis with akinesis at the bases.  She was able to be dismissed on 2020.  While in the hospital, we did recommend consideration for upgrade to a biventricular AICD.  She also had some right flank pain during hospitalization and CT scan showed a 2 mm minimally obstructing stone in the right ureter.  Urology saw her and felt like  she could pass the stone without further intervention.     On 9/10/2020, she saw Dr. Jamar Azul in the office and he did not feel that she met criteria for implantation of CRT device.  She has now been enrolled in our Heart Failure Clinic.     In December 2020, she was hospitalized for an acute PE with right lower lobe pulmonary infarct and was having hemoptysis.  She was placed on warfarin.     She presents today for perioperative cardiac risk assessment.  She explains that her left ankle/leg has screws and plates that need to be removed by Dr. Rocky Calvert.  She is having pain from the hardware.  She explains that she recently saw the  transplant clinic and both her Entresto and spironolactone were increased.  She also had a stress test (sounds like a metabolic stress test) and I will obtain those results.  She reports a cough.  She denies chest pain, shortness of air, palpitations, edema, dizziness, syncope, or bleeding.     She saw [facility] by telehealth date of service 1/28/21.  They noted the patient underwent a CPX which showed peak VO2 11.9 at RER greater than 1.05 while on Coreg.  Her CPX showed heart related limitations in her functional capacity, but the patient felt satisfied at her functional current level.     Echo done 3/8/2021 showed ejection fraction of 22% with severe left ventricular dilation, severe global hypokinesis, grade 2 diastolic dysfunction, severe left atrial enlargement moderate mitral and tricuspid insufficiency.      She has not had surgery with Dr. Rocky Calvert on her left ankle but still needs this.       I reviewed records from  cardiology, Dr. Hernandez from 11/16/2021 visit.  No medication changes were made.   cardiology team felt that she was stable.  They did offer future LVAD or heart transplantation but she did not feel her quality of life really required that at this point.  She is followed at [facility] for her HIV and I reviewed the records from there.  No  recent medication changes have been made and they just reviewed and recommended continued adherence to her medication regimen for HIV as well as limiting her risks.     She was seen in the heart failure clinic by Yane Boyd on 11/4/2021.  She was fatigued but otherwise doing well.  Labs done 11/4/2021 showed proBNP 1001, glucose 112, creatinine 1.11, otherwise normal BMP, white count 1.37, otherwise normal CBC.    Device interrogation done today shows normal VVI ICD function, no changes made.  Echo done today shows severe left ventricular dilation with ejection fraction 21-25%, global hypokinesis, grade 2 diastolic dysfunction, normal RVSP and normal valvular structure and function.  Right ventricular systolic function is also mildly to moderately reduced.  When compared with prior echocardiogram, there is less valvular insufficiency.    She has no orthopnea or PND.  She has no exertional dyspnea.  She does not feel her heart racing or skipping.  She has no dizziness.  She has no nausea or vomiting.  She is very thirsty.  She takes her medications as directed without difficulty.  She has gained weight due to probably drinking soda and not exercising.  This does not seem like it is water weight because she said she is not having any swelling.  Also it has not been sudden.      Past Medical History:   Diagnosis Date   • AICD (automatic cardioverter/defibrillator) present 3/6/2020   • Asthma    • CHF (congestive heart failure) ([facility])    • Class 2 obesity in adult    • Grade II diastolic dysfunction     Per echocardiogram 3/2021   • HIV (human immunodeficiency virus infection) ([facility])    • Hypertension    • LBBB (left bundle branch block)    • NICM (nonischemic cardiomyopathy) ([facility])     7/2020 EF 6% per echocardiogram; AICD present   • Nonrheumatic mitral valve regurgitation 3/8/2021    Moderate per echo 3/2021   • Nonrheumatic tricuspid valve regurgitation     Moderate per echocardiogram 3/2021         Past Surgical  History:   Procedure Laterality Date   • CARDIAC CATHETERIZATION     • CARDIAC DEFIBRILLATOR PLACEMENT     •  SECTION      one C/S   • FRACTURE SURGERY Left     left ankle   • TUBAL ABDOMINAL LIGATION         Current Outpatient Medications on File Prior to Visit   Medication Sig Dispense Refill   • acetaminophen (TYLENOL) 325 MG tablet Take 650 mg by mouth Every 6 (Six) Hours As Needed for Mild Pain .     • albuterol sulfate  (90 Base) MCG/ACT inhaler Inhale 2 puffs Every 4 (Four) Hours As Needed for Wheezing.     • atorvastatin (LIPITOR) 10 MG tablet Take 1 tablet by mouth Daily. 30 tablet 11   • carvedilol (COREG) 3.125 MG tablet See Admin Instructions. Take 3.125 mg (1 tablet) every morning and 6.25 mg (2 tablets) every evening     • darunavir ethanolate (PREZISTA) 800 MG tablet tablet Take 800 mg by mouth Every Night.     • Diclofenac Sodium (VOLTAREN) 1 % gel gel Apply 4 g topically to the appropriate area as directed 2 (Two) Times a Day. Use per pkg insert 100 g 2   • diphenhydrAMINE (BENADRYL) 25 mg capsule Take 1 capsule by mouth Every 6 (Six) Hours As Needed for Itching (swelling). 20 capsule 0   • empagliflozin (Jardiance) 10 MG tablet tablet Take 1 tablet by mouth Daily. 30 tablet 11   • Emtricitabine-Tenofovir AF (DESCOVY) 200-25 MG per tablet Take  by mouth Every Night.     • fluticasone (Flonase) 50 MCG/ACT nasal spray 2 sprays into the nostril(s) as directed by provider Daily. 1 bottle 2   • ivabradine HCl (Corlanor) 7.5 MG tablet tablet Take 1 tablet by mouth 2 (Two) Times a Day With Meals. 60 tablet 5   • nitroglycerin (NITROSTAT) 0.4 MG SL tablet Place 1 tablet under the tongue Every 5 (Five) Minutes As Needed for Chest Pain. Take no more than 3 doses in 15 minutes. 30 tablet 5   • ondansetron ODT (Zofran ODT) 4 MG disintegrating tablet Place 1 tablet on the tongue Every 8 (Eight) Hours As Needed for Nausea or Vomiting. 30 tablet 0   • potassium chloride (K-DUR,KLOR-CON) 20 MEQ CR  tablet Take 3 tablets by mouth once daily 90 tablet 11   • ritonavir (NORVIR) 100 MG tablet Take 100 mg by mouth Every Night.     • sacubitril-valsartan (Entresto)  MG tablet Take 1 tablet by mouth 2 (Two) Times a Day. 180 tablet 3   • sertraline (Zoloft) 50 MG tablet Take 1 tablet by mouth Daily. 90 tablet 3   • spironolactone (ALDACTONE) 25 MG tablet 25 mg Daily.     • torsemide (DEMADEX) 20 MG tablet Take 1 tablet by mouth See Admin Instructions. Take 60 mg (3 tablets) po every morning and 40 mg (2 tablets) po every afternoon 150 tablet 1   • vitamin D (ERGOCALCIFEROL) 15871 units capsule capsule Take 50,000 Units by mouth Every 30 (Thirty) Days.     • warfarin (COUMADIN) 2.5 MG tablet TAKE 3 TABLETS (7.5MG) BY MOUTH DAILY  OR AS DIRECTED 270 tablet 0   • zolpidem (AMBIEN) 5 MG tablet        No current facility-administered medications on file prior to visit.       Social History     Socioeconomic History   • Marital status: Legally    Tobacco Use   • Smoking status: Former Smoker     Quit date:      Years since quittin.9   • Smokeless tobacco: Never Used   Vaping Use   • Vaping Use: Never used   Substance and Sexual Activity   • Alcohol use: Not Currently     Comment: holiday and special occ//caffeine use: 1 cup daily, 1 soda occasionally   • Drug use: Never   • Sexual activity: Not Currently     Partners: Male           ROS    Procedures    ECG 12 Lead    Date/Time: 12/15/2021 1:25 PM  Performed by: Lisset Melton MD  Authorized by: Lisset Melton MD   Comparison: compared with previous ECG   Similar to previous ECG  Rhythm: sinus rhythm  Conduction: non-specific intraventricular conduction delay  T inversion: II, III, aVF, V3, V4, V5 and V6    Clinical impression: abnormal EKG                Objective:    There were no vitals filed for this visit.  There is no height or weight on file to calculate BMI.    Vitals reviewed.   Constitutional:       General: Not in acute  distress.     Appearance: Well-developed. Not diaphoretic.   Eyes:      General: No scleral icterus.     Conjunctiva/sclera: Conjunctivae normal.   HENT:      Head: Normocephalic and atraumatic.   Neck:      Thyroid: No thyromegaly.      Vascular: No carotid bruit or JVD.      Lymphadenopathy: No cervical adenopathy.   Pulmonary:      Effort: Pulmonary effort is normal. No respiratory distress.      Breath sounds: Normal breath sounds. No wheezing. No rhonchi. No rales.   Chest:      Chest wall: Not tender to palpatation.   Cardiovascular:      Normal rate. Regular rhythm.      Murmurs: There is no murmur.      No gallop.   Pulses:     Intact distal pulses.   Edema:     Peripheral edema absent.   Abdominal:      General: Bowel sounds are normal. There is no distension or abdominal bruit.      Palpations: Abdomen is soft. There is no abdominal mass.      Tenderness: There is no abdominal tenderness.   Musculoskeletal:         General: No deformity.      Extremities: No clubbing present.     Cervical back: Neck supple. Skin:     General: Skin is warm and dry. There is no cyanosis.      Coloration: Skin is not pale.      Findings: No rash.   Neurological:      Mental Status: Alert and oriented to person, place, and time.      Cranial Nerves: No cranial nerve deficit.   Psychiatric:         Judgment: Judgment normal.         Lab Review:       Assessment:      Diagnosis Plan   1. NICM (nonischemic cardiomyopathy) ([facility])     2. HFrEF (heart failure with reduced ejection fraction) ([facility])     3. Grade II diastolic dysfunction     4. Essential hypertension     5. Nonrheumatic mitral valve regurgitation     6. Nonrheumatic tricuspid valve regurgitation       1. Severe dilated nonischemic cardiomyopathy, ejection fraction 22%, AICD in place, chronic HFrEF.   Her cardiomyopathy is likely multifactorial related to the old illicit drug use history, HIV, possible poorly controlled HTN in the past.  No decompensated heart failure at  this time.  2. Essential hypertension, controlled.  3. HIV positive.  4. Diastolic dysfunction  5. Obesity  6. Asthma  7. LBBB.        Plan:       See back in 3 months, decrease torsemide to 40 mg in the morning 40 mg in the evening to see if this helps her thirst.  No other testing at this time.  I really do think she is making progress and she is not in any acute heart failure at this time.  In addition her echo demonstrates that she has an improvement in her mitral tricuspid insufficiency as well as normal-sized IVC with collapse indicating no volume overload.  I think she is doing pretty well.  She says she is going to try to exercise and work on her weight.

## 2021-12-16 LAB
AORTIC ARCH: 2.5 CM
ASCENDING AORTA: 3 CM
BH CV ECHO MEAS - ACS: 1.9 CM
BH CV ECHO MEAS - AO ARCH DIAM (PROXIMAL TRANS.): 2.5 CM
BH CV ECHO MEAS - AO MAX PG (FULL): 2 MMHG
BH CV ECHO MEAS - AO MAX PG: 7.5 MMHG
BH CV ECHO MEAS - AO MEAN PG (FULL): 1.4 MMHG
BH CV ECHO MEAS - AO MEAN PG: 4.5 MMHG
BH CV ECHO MEAS - AO ROOT AREA (BSA CORRECTED): 1.5
BH CV ECHO MEAS - AO ROOT AREA: 7 CM^2
BH CV ECHO MEAS - AO ROOT DIAM: 3 CM
BH CV ECHO MEAS - AO V2 MAX: 137.1 CM/SEC
BH CV ECHO MEAS - AO V2 MEAN: 101.1 CM/SEC
BH CV ECHO MEAS - AO V2 VTI: 24.8 CM
BH CV ECHO MEAS - ASC AORTA: 3 CM
BH CV ECHO MEAS - AVA(I,A): 2.5 CM^2
BH CV ECHO MEAS - AVA(I,D): 2.5 CM^2
BH CV ECHO MEAS - AVA(V,A): 2.8 CM^2
BH CV ECHO MEAS - AVA(V,D): 2.8 CM^2
BH CV ECHO MEAS - BSA(HAYCOCK): 2.1 M^2
BH CV ECHO MEAS - BSA: 2 M^2
BH CV ECHO MEAS - BZI_BMI: 32.9 KILOGRAMS/M^2
BH CV ECHO MEAS - BZI_METRIC_HEIGHT: 167.6 CM
BH CV ECHO MEAS - BZI_METRIC_WEIGHT: 92.5 KG
BH CV ECHO MEAS - DESC AORTA: 1.9 CM
BH CV ECHO MEAS - EDV(CUBED): 260 ML
BH CV ECHO MEAS - EDV(MOD-SP2): 293 ML
BH CV ECHO MEAS - EDV(MOD-SP4): 298 ML
BH CV ECHO MEAS - EDV(TEICH): 207.3 ML
BH CV ECHO MEAS - EF(CUBED): 49 %
BH CV ECHO MEAS - EF(MOD-BP): 35.7 %
BH CV ECHO MEAS - EF(MOD-SP2): 35.2 %
BH CV ECHO MEAS - EF(MOD-SP4): 36.2 %
BH CV ECHO MEAS - EF(TEICH): 40.2 %
BH CV ECHO MEAS - ESV(CUBED): 132.7 ML
BH CV ECHO MEAS - ESV(MOD-SP2): 190 ML
BH CV ECHO MEAS - ESV(MOD-SP4): 190 ML
BH CV ECHO MEAS - ESV(TEICH): 123.8 ML
BH CV ECHO MEAS - FS: 20.1 %
BH CV ECHO MEAS - IVS/LVPW: 0.62
BH CV ECHO MEAS - IVSD: 0.82 CM
BH CV ECHO MEAS - LAT PEAK E' VEL: 4.7 CM/SEC
BH CV ECHO MEAS - LV DIASTOLIC VOL/BSA (35-75): 147.7 ML/M^2
BH CV ECHO MEAS - LV MASS(C)D: 303.4 GRAMS
BH CV ECHO MEAS - LV MASS(C)DI: 150.4 GRAMS/M^2
BH CV ECHO MEAS - LV MAX PG: 5.6 MMHG
BH CV ECHO MEAS - LV MEAN PG: 3.1 MMHG
BH CV ECHO MEAS - LV SYSTOLIC VOL/BSA (12-30): 94.2 ML/M^2
BH CV ECHO MEAS - LV V1 MAX: 117.9 CM/SEC
BH CV ECHO MEAS - LV V1 MEAN: 81.3 CM/SEC
BH CV ECHO MEAS - LV V1 VTI: 19.5 CM
BH CV ECHO MEAS - LVIDD: 6.4 CM
BH CV ECHO MEAS - LVIDS: 5.1 CM
BH CV ECHO MEAS - LVLD AP2: 9.7 CM
BH CV ECHO MEAS - LVLD AP4: 9.9 CM
BH CV ECHO MEAS - LVLS AP2: 9.2 CM
BH CV ECHO MEAS - LVLS AP4: 9.3 CM
BH CV ECHO MEAS - LVOT AREA (M): 3.1 CM^2
BH CV ECHO MEAS - LVOT AREA: 3.2 CM^2
BH CV ECHO MEAS - LVOT DIAM: 2 CM
BH CV ECHO MEAS - LVPWD: 1.3 CM
BH CV ECHO MEAS - MED PEAK E' VEL: 4.4 CM/SEC
BH CV ECHO MEAS - MR MAX PG: 91.7 MMHG
BH CV ECHO MEAS - MR MAX VEL: 478.8 CM/SEC
BH CV ECHO MEAS - MV A DUR: 0.14 SEC
BH CV ECHO MEAS - MV A MAX VEL: 73.6 CM/SEC
BH CV ECHO MEAS - MV DEC SLOPE: 304.2 CM/SEC^2
BH CV ECHO MEAS - MV DEC TIME: 0.29 SEC
BH CV ECHO MEAS - MV E MAX VEL: 66 CM/SEC
BH CV ECHO MEAS - MV E/A: 0.9
BH CV ECHO MEAS - MV MAX PG: 2.8 MMHG
BH CV ECHO MEAS - MV MEAN PG: 1.5 MMHG
BH CV ECHO MEAS - MV P1/2T MAX VEL: 82.8 CM/SEC
BH CV ECHO MEAS - MV P1/2T: 79.7 MSEC
BH CV ECHO MEAS - MV V2 MAX: 84.2 CM/SEC
BH CV ECHO MEAS - MV V2 MEAN: 58.1 CM/SEC
BH CV ECHO MEAS - MV V2 VTI: 35.5 CM
BH CV ECHO MEAS - MVA P1/2T LCG: 2.7 CM^2
BH CV ECHO MEAS - MVA(P1/2T): 2.8 CM^2
BH CV ECHO MEAS - MVA(VTI): 1.8 CM^2
BH CV ECHO MEAS - PA ACC TIME: 0.17 SEC
BH CV ECHO MEAS - PA MAX PG (FULL): 1.5 MMHG
BH CV ECHO MEAS - PA MAX PG: 2.6 MMHG
BH CV ECHO MEAS - PA PR(ACCEL): 0.57 MMHG
BH CV ECHO MEAS - PA V2 MAX: 80.1 CM/SEC
BH CV ECHO MEAS - PULM A REVS DUR: 0.13 SEC
BH CV ECHO MEAS - PULM A REVS VEL: 82.7 CM/SEC
BH CV ECHO MEAS - PULM DIAS VEL: 37.4 CM/SEC
BH CV ECHO MEAS - PULM S/D: 1.2
BH CV ECHO MEAS - PULM SYS VEL: 45.3 CM/SEC
BH CV ECHO MEAS - RAP SYSTOLE: 3 MMHG
BH CV ECHO MEAS - RV MAX PG: 1.1 MMHG
BH CV ECHO MEAS - RV MEAN PG: 0.55 MMHG
BH CV ECHO MEAS - RV V1 MAX: 51.4 CM/SEC
BH CV ECHO MEAS - RV V1 MEAN: 35 CM/SEC
BH CV ECHO MEAS - RV V1 VTI: 10.9 CM
BH CV ECHO MEAS - RVSP: 19 MMHG
BH CV ECHO MEAS - SI(AO): 86.6 ML/M^2
BH CV ECHO MEAS - SI(CUBED): 63.1 ML/M^2
BH CV ECHO MEAS - SI(LVOT): 31.2 ML/M^2
BH CV ECHO MEAS - SI(MOD-SP2): 51.1 ML/M^2
BH CV ECHO MEAS - SI(MOD-SP4): 53.5 ML/M^2
BH CV ECHO MEAS - SI(TEICH): 41.3 ML/M^2
BH CV ECHO MEAS - SUP REN AO DIAM: 1.9 CM
BH CV ECHO MEAS - SV(AO): 174.7 ML
BH CV ECHO MEAS - SV(CUBED): 127.3 ML
BH CV ECHO MEAS - SV(LVOT): 63 ML
BH CV ECHO MEAS - SV(MOD-SP2): 103 ML
BH CV ECHO MEAS - SV(MOD-SP4): 108 ML
BH CV ECHO MEAS - SV(TEICH): 83.4 ML
BH CV ECHO MEAS - TAPSE (>1.6): 1.8 CM
BH CV ECHO MEAS - TR MAX VEL: 202.2 CM/SEC
BH CV ECHO MEASUREMENTS AVERAGE E/E' RATIO: 14.51
BH CV XLRA - RV BASE: 2.6 CM
BH CV XLRA - RV LENGTH: 6.9 CM
BH CV XLRA - RV MID: 2 CM
BH CV XLRA - TDI S': 8.2 CM/SEC
LEFT ATRIUM VOLUME INDEX: 18 ML/M2
MAXIMAL PREDICTED HEART RATE: 174 BPM
SINUS: 2.9 CM
STJ: 2.4 CM
STRESS TARGET HR: 148 BPM

## 2021-12-27 ENCOUNTER — ANTICOAGULATION VISIT (OUTPATIENT)
Dept: PHARMACY | Facility: HOSPITAL | Age: 46
End: 2021-12-27

## 2021-12-27 LAB — INR PPP: 3

## 2021-12-27 NOTE — PROGRESS NOTES
Anticoagulation Clinic Progress Note    Anticoagulation Summary  As of 12/27/2021    INR goal:  2.0-3.0   TTR:  51.6 % (1 y)   INR used for dosing:  3.00 (12/27/2021)   Warfarin maintenance plan:  7.5 mg every day   Weekly warfarin total:  52.5 mg   No change documented:  Chucho Mcdaniel PharmD   Plan last modified:  Chucho Mcdaniel PharmD (7/30/2021)   Next INR check:  1/3/2022   Priority:  High   Target end date:  Indefinite    Indications    Other acute pulmonary embolism  unspecified whether acute cor pulmonale present (HCC) (Resolved) [I26.99]  Acute pulmonary embolism  unspecified pulmonary embolism type  unspecified whether acute cor pulmonale present (HCC) (Resolved) [I26.99]             Anticoagulation Episode Summary     INR check location:      Preferred lab:      Send INR reminders to:   [facility]    Comments:  **Home Testing Program**      Anticoagulation Care Providers     Provider Role Specialty Phone number    Lisset Melton MD Referring Cardiology [phone]          Clinic Interview:  Patient Findings     Negatives:  Signs/symptoms of thrombosis, Signs/symptoms of bleeding,   Laboratory test error suspected, Change in health, Change in alcohol use,   Change in activity, Upcoming invasive procedure, Emergency department   visit, Upcoming dental procedure, Missed doses, Extra doses, Change in   medications, Change in diet/appetite, Hospital admission, Bruising, Other   complaints      Clinical Outcomes     Negatives:  Major bleeding event, Thromboembolic event,   Anticoagulation-related hospital admission, Anticoagulation-related ED   visit, Anticoagulation-related fatality        INR History:  Anticoagulation Monitoring 12/6/2021 12/13/2021 12/27/2021   INR 3.30 3.20 3.00   INR Date 12/6/2021 12/13/2021 12/27/2021   INR Goal 2.0-3.0 2.0-3.0 2.0-3.0   Trend Same Same Same   Last Week Total 52.5 mg 50 mg 52.5 mg   Next Week Total 50 mg 52.5 mg 52.5 mg   Sun 7.5 mg - 7.5 mg    Mon 5 mg (12/6) 7.5 mg 7.5 mg   Tue 7.5 mg 7.5 mg 7.5 mg   Wed 7.5 mg 7.5 mg 7.5 mg   Thu 7.5 mg - 7.5 mg   Fri 7.5 mg - 7.5 mg   Sat 7.5 mg - 7.5 mg   Visit Report - - -   Some recent data might be hidden       Plan:  1. INR is Therapeutic today- see above in Anticoagulation Summary.   Will instruct Nina Saez to Continue their warfarin regimen- see above in Anticoagulation Summary.  2. Follow up in 1 week  3. They have been instructed to call if any changes in medications, doses, concerns, etc. Patient expresses understanding and has no further questions at this time.    Chucho Mcdaniel, PharmD

## 2022-01-03 ENCOUNTER — ANTICOAGULATION VISIT (OUTPATIENT)
Dept: PHARMACY | Facility: HOSPITAL | Age: 47
End: 2022-01-03

## 2022-01-03 LAB — INR PPP: 2.9

## 2022-01-03 NOTE — PROGRESS NOTES
Anticoagulation Clinic Progress Note    Anticoagulation Summary  As of 1/3/2022    INR goal:  2.0-3.0   TTR:  52.5 % (1 y)   INR used for dosin.90 (1/3/2022)   Warfarin maintenance plan:  7.5 mg every day   Weekly warfarin total:  52.5 mg   No change documented:  Zina De La Rosa, CAYDEN   Plan last modified:  Chucho Mcdaniel, PharmD (2021)   Next INR check:  1/10/2022   Priority:  High   Target end date:  Indefinite    Indications    Other acute pulmonary embolism  unspecified whether acute cor pulmonale present (HCC) (Resolved) [I26.99]  Acute pulmonary embolism  unspecified pulmonary embolism type  unspecified whether acute cor pulmonale present (HCC) (Resolved) [I26.99]             Anticoagulation Episode Summary     INR check location:      Preferred lab:      Send INR reminders to:   [facility]    Comments:  **Home Testing Program**      Anticoagulation Care Providers     Provider Role Specialty Phone number    Lisset Melton MD Referring Cardiology [phone]          Clinic Interview:  No pertinent clinical findings have been reported.    INR History:  Anticoagulation Monitoring 2021 2021 1/3/2022   INR 3.20 3.00 2.90   INR Date 2021 2021 1/3/2022   INR Goal 2.0-3.0 2.0-3.0 2.0-3.0   Trend Same Same Same   Last Week Total 50 mg 52.5 mg 52.5 mg   Next Week Total 52.5 mg 52.5 mg 52.5 mg   Sun - 7.5 mg 7.5 mg   Mon 7.5 mg 7.5 mg 7.5 mg   Tue 7.5 mg 7.5 mg 7.5 mg   Wed 7.5 mg 7.5 mg 7.5 mg   Thu - 7.5 mg 7.5 mg   Fri - 7.5 mg 7.5 mg   Sat - 7.5 mg 7.5 mg   Visit Report - - -   Some recent data might be hidden       Plan:  1. INR is therapeutic today- see above in Anticoagulation Summary.    Nina Saez to continue their warfarin regimen- see above in Anticoagulation Summary.  2. Follow up in 1 week  3. Pt has agreed to only be called if INR out of range. They have been instructed to call if any changes in medications, doses, concerns, etc. Patient  expresses understanding and has no further questions at this time.    Zina De La Rosa, RP

## 2022-01-04 ENCOUNTER — TELEPHONE (OUTPATIENT)
Dept: CARDIOLOGY | Facility: HOSPITAL | Age: 47
End: 2022-01-04

## 2022-01-04 NOTE — TELEPHONE ENCOUNTER
Pt called to make an appt. She said she is having runny nose, cough, and headache.  She did a home test that was negative,but needs to retest in 24 hrs and is wondering where she can get a test.  I told her I will see what is available here at the hospital and call her back.    All questions and concerns addressed. Understanding and agreement verbalized.      Lisha Melendrez RN  [facility]         Addendum:  Called Pt back to let her know that since she is having COVID-like symptoms, we will need to reschedule her appt. tomorrow. For testing, she can call some of the local pharmacies to get a home testing kit or go to the urgent care.  All questions and concerns addressed. Understanding and agreement verbalized.      Lisha Melendrez RN  [facility]

## 2022-01-17 ENCOUNTER — ANTICOAGULATION VISIT (OUTPATIENT)
Dept: PHARMACY | Facility: HOSPITAL | Age: 47
End: 2022-01-17

## 2022-01-17 LAB — INR PPP: 4.6

## 2022-01-17 PROCEDURE — G0249 PROVIDE INR TEST MATER/EQUIP: HCPCS

## 2022-01-17 NOTE — PROGRESS NOTES
Anticoagulation Clinic Progress Note    Anticoagulation Summary  As of 2022    INR goal:  2.0-3.0   TTR:  50.8 % (1.1 y)   INR used for dosin.60 (2022)   Warfarin maintenance plan:  7.5 mg every day   Weekly warfarin total:  52.5 mg   Plan last modified:  Chucho Mcdaniel, PharmD (2021)   Next INR check:  2022   Priority:  High   Target end date:  Indefinite    Indications    Other acute pulmonary embolism  unspecified whether acute cor pulmonale present (HCC) (Resolved) [I26.99]  Acute pulmonary embolism  unspecified pulmonary embolism type  unspecified whether acute cor pulmonale present (HCC) (Resolved) [I26.99]             Anticoagulation Episode Summary     INR check location:      Preferred lab:      Send INR reminders to:  [facility] TEST POOL    Comments:  **Home Testing Program**      Anticoagulation Care Providers     Provider Role Specialty Phone number    Lisset Melton MD Referring Cardiology [phone]          Clinic Interview:  Patient Findings     Positives:  Change in health    Negatives:  Signs/symptoms of thrombosis, Signs/symptoms of bleeding,   Laboratory test error suspected, Change in alcohol use, Change in   activity, Upcoming invasive procedure, Emergency department visit,   Upcoming dental procedure, Missed doses, Extra doses, Change in   medications, Change in diet/appetite, Hospital admission, Bruising, Other   complaints    Comments:  COVID positive as of 22- endorses headache, congestion,   cough, and nausea. She is using tylenol for symptom control       Clinical Outcomes     Negatives:  Major bleeding event, Thromboembolic event,   Anticoagulation-related hospital admission, Anticoagulation-related ED   visit, Anticoagulation-related fatality    Comments:  COVID positive as of 22- endorses headache, congestion,   cough, and nausea. She is using tylenol for symptom control         INR History:  Anticoagulation Monitoring 2021  1/3/2022 1/17/2022   INR 3.00 2.90 4.60   INR Date 12/27/2021 1/3/2022 1/17/2022   INR Goal 2.0-3.0 2.0-3.0 2.0-3.0   Trend Same Same Same   Last Week Total 52.5 mg 52.5 mg 52.5 mg   Next Week Total 52.5 mg 52.5 mg 40 mg   Sun 7.5 mg 7.5 mg -   Mon 7.5 mg 7.5 mg Hold (1/17)   Tue 7.5 mg 7.5 mg 2.5 mg (1/18)   Wed 7.5 mg 7.5 mg 7.5 mg   Thu 7.5 mg 7.5 mg 7.5 mg   Fri 7.5 mg 7.5 mg -   Sat 7.5 mg 7.5 mg -   Visit Report - - -   Some recent data might be hidden       Plan:  1. INR is Supratherapeutic today- see above in Anticoagulation Summary.   Will instruct Nina Saez to Change their warfarin regimen- see above in Anticoagulation Summary.  2. Follow up in 4 days   3. They have been instructed to call if any changes in medications, doses, concerns, etc. Patient expresses understanding and has no further questions at this time.    Zina De La Rosa [facility]

## 2022-01-24 ENCOUNTER — ANTICOAGULATION VISIT (OUTPATIENT)
Dept: PHARMACY | Facility: HOSPITAL | Age: 47
End: 2022-01-24

## 2022-01-24 LAB — INR PPP: 3

## 2022-01-24 NOTE — PROGRESS NOTES
Anticoagulation Clinic Progress Note    Anticoagulation Summary  As of 1/24/2022    INR goal:  2.0-3.0   TTR:  49.9 % (1.1 y)   INR used for dosing:  3.00 (1/24/2022)   Warfarin maintenance plan:  7.5 mg every day   Weekly warfarin total:  52.5 mg   No change documented:  Zina De La Rosa, CAYDEN   Plan last modified:  Chucho Mcdaniel, PharmD (7/30/2021)   Next INR check:  1/31/2022   Priority:  High   Target end date:  Indefinite    Indications    Other acute pulmonary embolism  unspecified whether acute cor pulmonale present (HCC) (Resolved) [I26.99]  Acute pulmonary embolism  unspecified pulmonary embolism type  unspecified whether acute cor pulmonale present (HCC) (Resolved) [I26.99]             Anticoagulation Episode Summary     INR check location:      Preferred lab:      Send INR reminders to:   [facility]    Comments:  **Home Testing Program**      Anticoagulation Care Providers     Provider Role Specialty Phone number    Lisset Melton MD Referring Cardiology [phone]          Clinic Interview:  Patient Findings     Negatives:  Signs/symptoms of thrombosis, Signs/symptoms of bleeding,   Laboratory test error suspected, Change in health, Change in alcohol use,   Change in activity, Upcoming invasive procedure, Emergency department   visit, Upcoming dental procedure, Missed doses, Extra doses, Change in   medications, Change in diet/appetite, Hospital admission, Bruising, Other   complaints    Comments:  Feeling better since having covid       Clinical Outcomes     Negatives:  Major bleeding event, Thromboembolic event,   Anticoagulation-related hospital admission, Anticoagulation-related ED   visit, Anticoagulation-related fatality    Comments:  Feeling better since having covid         INR History:  Anticoagulation Monitoring 1/3/2022 1/17/2022 1/24/2022   INR 2.90 4.60 3.00   INR Date 1/3/2022 1/17/2022 1/24/2022   INR Goal 2.0-3.0 2.0-3.0 2.0-3.0   Trend Same Same Same   Last Week  Total 52.5 mg 52.5 mg 40 mg   Next Week Total 52.5 mg 40 mg 52.5 mg   Sun 7.5 mg - 7.5 mg   Mon 7.5 mg Hold (1/17) 7.5 mg   Tue 7.5 mg 2.5 mg (1/18) 7.5 mg   Wed 7.5 mg 7.5 mg 7.5 mg   Thu 7.5 mg 7.5 mg 7.5 mg   Fri 7.5 mg - 7.5 mg   Sat 7.5 mg - 7.5 mg   Visit Report - - -   Some recent data might be hidden       Plan:  1. INR is Therapeutic today- see above in Anticoagulation Summary.   Will instruct Nina Staffordrell to resume their warfarin regimen since feeling better- see above in Anticoagulation Summary.  2. Follow up in 1 week  3.  They have been instructed to call if any changes in medications, doses, concerns, etc. Patient expresses understanding and has no further questions at this time.    Zina De La Rosa [facility]

## 2022-01-25 PROCEDURE — 93296 REM INTERROG EVL PM/IDS: CPT | Performed by: INTERNAL MEDICINE

## 2022-01-25 PROCEDURE — 93295 DEV INTERROG REMOTE 1/2/MLT: CPT | Performed by: INTERNAL MEDICINE

## 2022-01-28 ENCOUNTER — TELEPHONE (OUTPATIENT)
Dept: CARDIOLOGY | Facility: HOSPITAL | Age: 47
End: 2022-01-28

## 2022-01-31 ENCOUNTER — ANTICOAGULATION VISIT (OUTPATIENT)
Dept: PHARMACY | Facility: HOSPITAL | Age: 47
End: 2022-01-31

## 2022-01-31 LAB — INR PPP: 3.6

## 2022-01-31 RX ORDER — TORSEMIDE 20 MG/1
20 TABLET ORAL SEE ADMIN INSTRUCTIONS
Qty: 150 TABLET | Refills: 0 | Status: SHIPPED | OUTPATIENT
Start: 2022-01-31 | End: 2022-02-25 | Stop reason: SDUPTHER

## 2022-02-01 NOTE — PROGRESS NOTES
Anticoagulation Clinic Progress Note    Anticoagulation Summary  As of 1/31/2022    INR goal:  2.0-3.0   TTR:  49.0 % (1.1 y)   INR used for dosing:  3.60 (1/31/2022)   Warfarin maintenance plan:  7.5 mg every day   Weekly warfarin total:  52.5 mg   Plan last modified:  Chucho Mcdaniel, PharmD (7/30/2021)   Next INR check:  2/7/2022   Priority:  High   Target end date:  Indefinite    Indications    Other acute pulmonary embolism  unspecified whether acute cor pulmonale present (HCC) (Resolved) [I26.99]  Acute pulmonary embolism  unspecified pulmonary embolism type  unspecified whether acute cor pulmonale present (HCC) (Resolved) [I26.99]             Anticoagulation Episode Summary     INR check location:      Preferred lab:      Send INR reminders to:  [facility] TEST [facility]    Comments:  **Home Testing Program**      Anticoagulation Care Providers     Provider Role Specialty Phone number    Lisset Melton MD Referring Cardiology [phone]          Clinic Interview:  Patient Findings     Negatives:  Signs/symptoms of thrombosis, Signs/symptoms of bleeding,   Laboratory test error suspected, Change in health, Change in alcohol use,   Change in activity, Upcoming invasive procedure, Emergency department   visit, Upcoming dental procedure, Missed doses, Extra doses, Change in   medications, Change in diet/appetite, Hospital admission, Bruising, Other   complaints      Clinical Outcomes     Negatives:  Major bleeding event, Thromboembolic event,   Anticoagulation-related hospital admission, Anticoagulation-related ED   visit, Anticoagulation-related fatality        INR History:  Anticoagulation Monitoring 1/17/2022 1/24/2022 1/31/2022   INR 4.60 3.00 3.60   INR Date 1/17/2022 1/24/2022 1/31/2022   INR Goal 2.0-3.0 2.0-3.0 2.0-3.0   Trend Same Same Same   Last Week Total 52.5 mg 40 mg 52.5 mg   Next Week Total 40 mg 52.5 mg 47.5 mg   Sun - 7.5 mg 7.5 mg   Mon Hold (1/17) 7.5 mg 7.5 mg   Tue 2.5 mg (1/18)  7.5 mg 2.5 mg (2/1)   Wed 7.5 mg 7.5 mg 7.5 mg   Thu 7.5 mg 7.5 mg 7.5 mg   Fri - 7.5 mg 7.5 mg   Sat - 7.5 mg 7.5 mg   Visit Report - - -   Some recent data might be hidden       Plan:  1. INR is Supratherapeutic today- see above in Anticoagulation Summary. Unsuccessful reaching by phone until 2/1/22.   Will instruct Nina Saez to Change their warfarin regimen- see above in Anticoagulation Summary.  2. Follow up in 1 week  3. They have been instructed to call if any changes in medications, doses, concerns, etc. Patient expresses understanding and has no further questions at this time.    Chucho Mcdaniel, PharmD

## 2022-02-02 ENCOUNTER — TELEPHONE (OUTPATIENT)
Dept: CARDIOLOGY | Facility: HOSPITAL | Age: 47
End: 2022-02-02

## 2022-02-02 ENCOUNTER — HOSPITAL ENCOUNTER (OUTPATIENT)
Dept: CARDIOLOGY | Facility: HOSPITAL | Age: 47
Discharge: HOME | End: 2022-02-02
Admitting: NURSE PRACTITIONER

## 2022-02-02 VITALS
BODY MASS INDEX: 33.27 KG/M2 | HEART RATE: 67 BPM | WEIGHT: 207 LBS | HEIGHT: 66 IN | OXYGEN SATURATION: 97 % | DIASTOLIC BLOOD PRESSURE: 80 MMHG | SYSTOLIC BLOOD PRESSURE: 124 MMHG

## 2022-02-02 DIAGNOSIS — R79.9 ABNORMAL FINDING OF BLOOD CHEMISTRY, UNSPECIFIED: ICD-10-CM

## 2022-02-02 DIAGNOSIS — I51.89 GRADE II DIASTOLIC DYSFUNCTION: Primary | ICD-10-CM

## 2022-02-02 DIAGNOSIS — I42.8 NICM (NONISCHEMIC CARDIOMYOPATHY): ICD-10-CM

## 2022-02-02 DIAGNOSIS — I50.20 HFREF (HEART FAILURE WITH REDUCED EJECTION FRACTION): ICD-10-CM

## 2022-02-02 DIAGNOSIS — I10 ESSENTIAL HYPERTENSION: ICD-10-CM

## 2022-02-02 LAB
ALBUMIN SERPL-MCNC: 4.4 G/DL (ref 3.5–5.2)
ALBUMIN/GLOB SERPL: 1.3 G/DL
ALP SERPL-CCNC: 129 U/L (ref 39–117)
ALT SERPL W P-5'-P-CCNC: 17 U/L (ref 1–33)
ANION GAP SERPL CALCULATED.3IONS-SCNC: 11 MMOL/L (ref 5–15)
AST SERPL-CCNC: 17 U/L (ref 1–32)
BASOPHILS # BLD AUTO: 0.02 10*3/MM3 (ref 0–0.2)
BASOPHILS NFR BLD AUTO: 0.2 % (ref 0–1.5)
BILIRUB SERPL-MCNC: 0.4 MG/DL (ref 0–1.2)
BUN SERPL-MCNC: 17 MG/DL (ref 6–20)
BUN/CREAT SERPL: 16 (ref 7–25)
CALCIUM SPEC-SCNC: 9.4 MG/DL (ref 8.6–10.5)
CHLORIDE SERPL-SCNC: 101 MMOL/L (ref 98–107)
CO2 SERPL-SCNC: 24 MMOL/L (ref 22–29)
CREAT SERPL-MCNC: 1.06 MG/DL (ref 0.57–1)
DEPRECATED RDW RBC AUTO: 46.8 FL (ref 37–54)
EOSINOPHIL # BLD AUTO: 0.03 10*3/MM3 (ref 0–0.4)
EOSINOPHIL NFR BLD AUTO: 0.4 % (ref 0.3–6.2)
ERYTHROCYTE [DISTWIDTH] IN BLOOD BY AUTOMATED COUNT: 12.2 % (ref 12.3–15.4)
GFR SERPL CREATININE-BSD FRML MDRD: 68 ML/MIN/1.73
GLOBULIN UR ELPH-MCNC: 3.4 GM/DL
GLUCOSE SERPL-MCNC: 124 MG/DL (ref 65–99)
HBA1C MFR BLD: 5.1 % (ref 4.8–5.6)
HCT VFR BLD AUTO: 45 % (ref 34–46.6)
HGB BLD-MCNC: 14.8 G/DL (ref 12–15.9)
IMM GRANULOCYTES # BLD AUTO: 0.03 10*3/MM3 (ref 0–0.05)
IMM GRANULOCYTES NFR BLD AUTO: 0.4 % (ref 0–0.5)
LYMPHOCYTES # BLD AUTO: 2.04 10*3/MM3 (ref 0.7–3.1)
LYMPHOCYTES NFR BLD AUTO: 24.2 % (ref 19.6–45.3)
MCH RBC QN AUTO: 33.5 PG (ref 26.6–33)
MCHC RBC AUTO-ENTMCNC: 32.9 G/DL (ref 31.5–35.7)
MCV RBC AUTO: 101.8 FL (ref 79–97)
MONOCYTES # BLD AUTO: 0.78 10*3/MM3 (ref 0.1–0.9)
MONOCYTES NFR BLD AUTO: 9.3 % (ref 5–12)
NEUTROPHILS NFR BLD AUTO: 5.53 10*3/MM3 (ref 1.7–7)
NEUTROPHILS NFR BLD AUTO: 65.5 % (ref 42.7–76)
NRBC BLD AUTO-RTO: 0 /100 WBC (ref 0–0.2)
NT-PROBNP SERPL-MCNC: 1002 PG/ML (ref 0–450)
PLATELET # BLD AUTO: 176 10*3/MM3 (ref 140–450)
PMV BLD AUTO: 12.8 FL (ref 6–12)
POTASSIUM SERPL-SCNC: 3.7 MMOL/L (ref 3.5–5.2)
PROT SERPL-MCNC: 7.8 G/DL (ref 6–8.5)
RBC # BLD AUTO: 4.42 10*6/MM3 (ref 3.77–5.28)
SODIUM SERPL-SCNC: 136 MMOL/L (ref 136–145)
WBC NRBC COR # BLD: 8.43 10*3/MM3 (ref 3.4–10.8)

## 2022-02-02 PROCEDURE — 80053 COMPREHEN METABOLIC PANEL: CPT

## 2022-02-02 PROCEDURE — 99214 OFFICE O/P EST MOD 30 MIN: CPT | Performed by: NURSE PRACTITIONER

## 2022-02-02 PROCEDURE — G0463 HOSPITAL OUTPT CLINIC VISIT: HCPCS

## 2022-02-02 PROCEDURE — 85025 COMPLETE CBC W/AUTO DIFF WBC: CPT

## 2022-02-02 PROCEDURE — 83036 HEMOGLOBIN GLYCOSYLATED A1C: CPT

## 2022-02-02 PROCEDURE — 83880 ASSAY OF NATRIURETIC PEPTIDE: CPT

## 2022-02-02 RX ORDER — SPIRONOLACTONE 25 MG/1
25 TABLET ORAL DAILY
COMMUNITY
Start: 2021-11-16 | End: 2022-02-02 | Stop reason: SDUPTHER

## 2022-02-02 NOTE — PROGRESS NOTES
[facility] HEART FAILURE      Patient Name: Nina Saez  :1975  Age: 46 y.o.  Sex: female  Referring Provider: Lisset Melton MD   Primary Cardiologist: Lisset Melton MD  Encounter Provider:  SAMUEL Cisneros      Chief Complaint:   Chief Complaint   Patient presents with   • Congestive Heart Failure         Congestive Heart Failure  Associated symptoms include fatigue. Pertinent negatives include no chest pain, palpitations, shortness of breath or unexpected weight change.    this 46-year-old female, known to this provider, comes to us today for further evaluation of her chronic systolic heart failure.  Current diagnoses to include severe nonischemic cardiomyopathy, left bundle branch block, hypertension, HIV, obesity, and asthma.    Historically she had followed with Dr. Laina Boyd at [facility].  In spring 2019 she was visiting family in [place] and was taken emergently to [facility].  Echocardiogram and cardiac catheterization were performed at that point, carvedilol was changed to metoprolol.    In 2019 she presented through the emergency department at [facility] with chest pain and shortness of breath.  She was treated with IV diuresis, troponin was negative x2 and proBNP was elevated at 5151.  Entresto was started at that point given her severe dilated cardiomyopathy.  She was to follow with cardiology at [facility] at that time.    In 2020 she again presented to the emergency department with shortness of breath and cough x3 weeks.  BNP was elevated at 13,351.  Carvedilol was discontinued that admission given hypotension.  At subsequent outpatient appointment it was felt that Bumex was less effective than Lasix by the patient.  She complained of progressively worsening fatigue.  AICD, single-chamber [employer], interrogation 2020 revealed 10-16 beats of VT.    On September 10, 2020 she  saw Dr. Jamar Azul MD, for consideration of upgrade to biventricular device.  At that point he felt that her left bundle branch block was incomplete and she did not meet criteria for implantation of CRT-D.    She presented 10/27/2020 to [facility] with complaints of acute on chronic shortness of breath that began approximately 1 week prior.  BNP was further elevated at 16,206 that point.  Pulmonary edema was noted on chest x-ray.  Spironolactone was started that admission.  Referral for evaluation by [facility] transplant services was made November 2, 2020.    Left and right cardiac catheterization at [facility] revealed no obstructive CAD with normal filling pressures.  Echocardiogram at that time revealed an EF less than 15% with global hypokinesis-information extracted from external medical record note.  Cardiac catheterization May 30, 2017 performed at [facility] yielded codominant system with normal left main, LAD with distal 30% stenosis, RCA normal, LVEDP 7 mmHg.    Echocardiogram July 9, 2020 revealed EF of 6%, grade 3 LV diastolic dysfunction, significant LV wall motion hypokinesis/akinesis, RVSP 45 to 55 mmHg secondary to moderate tricuspid valve regurgitation, moderate to severe MR noted.    Jardiance 10 mg daily was started on 11/13/2020.      She was admitted from 12/11/2020 to 12/17/2020 for acute pulmonary embolism with right lower lobe pulmonary infarct.  She was started on warfarin at that time.  Additionally, she has been started on Corlanor as well as spironolactone for her heart failure.  January 4, 2021 she was evaluated and treated in the emergency department for possible angioedema.      Entresto has now been increased to  mg twice daily.  Repeat echocardiogram 3/8/2021 revealed improvement in ejection fraction from 6% to 22% with severe LV dilation, severe global hypokinesis and grade 2 LV diastolic dysfunction.    She has elected at this  time not to proceed with transplant work-up just yet.  She follows up with  on November 16.  She recently saw Dr. Melton and her carvedilol was increased to 50 mg at night with a goal of SBP of .    She is currently doing well at home after having been diagnosed with Covid approximately 4 weeks ago.  She has no active complaints at this time and is trying to remain as active as she can while staying home to avoid reinfection.      The following portions of the patient's history were reviewed and updated as appropriate: allergies, current medications, past family history, past medical history, past social history, past surgical history and problem list.    Current Outpatient Medications   Medication Sig Dispense Refill   • acetaminophen (TYLENOL) 325 MG tablet Take 650 mg by mouth Every 6 (Six) Hours As Needed for Mild Pain .     • albuterol sulfate  (90 Base) MCG/ACT inhaler Inhale 2 puffs Every 4 (Four) Hours As Needed for Wheezing.     • atorvastatin (LIPITOR) 10 MG tablet Take 1 tablet by mouth Daily. 30 tablet 11   • carvedilol (COREG) 3.125 MG tablet See Admin Instructions. Take 3.125 mg (1 tablet) every morning and 6.25 mg (2 tablets) every evening     • darunavir ethanolate (PREZISTA) 800 MG tablet tablet Take 800 mg by mouth Every Night.     • Diclofenac Sodium (VOLTAREN) 1 % gel gel Apply 4 g topically to the appropriate area as directed 2 (Two) Times a Day. Use per pkg insert 100 g 2   • diphenhydrAMINE (BENADRYL) 25 mg capsule Take 1 capsule by mouth Every 6 (Six) Hours As Needed for Itching (swelling). 20 capsule 0   • empagliflozin (Jardiance) 10 MG tablet tablet Take 1 tablet by mouth Daily. 30 tablet 11   • Emtricitabine-Tenofovir AF (DESCOVY) 200-25 MG per tablet Take  by mouth Every Night.     • fluticasone (Flonase) 50 MCG/ACT nasal spray 2 sprays into the nostril(s) as directed by provider Daily. 1 bottle 2   • ivabradine HCl (Corlanor) 7.5 MG tablet tablet Take 1 tablet by mouth  2 (Two) Times a Day With Meals. 60 tablet 5   • nitroglycerin (NITROSTAT) 0.4 MG SL tablet Place 1 tablet under the tongue Every 5 (Five) Minutes As Needed for Chest Pain. Take no more than 3 doses in 15 minutes. 30 tablet 5   • ondansetron ODT (Zofran ODT) 4 MG disintegrating tablet Place 1 tablet on the tongue Every 8 (Eight) Hours As Needed for Nausea or Vomiting. 30 tablet 0   • potassium chloride (K-DUR,KLOR-CON) 20 MEQ CR tablet Take 3 tablets by mouth once daily 90 tablet 11   • ritonavir (NORVIR) 100 MG tablet Take 100 mg by mouth Every Night.     • sacubitril-valsartan (Entresto)  MG tablet Take 1 tablet by mouth 2 (Two) Times a Day. 180 tablet 3   • sertraline (Zoloft) 50 MG tablet Take 1 tablet by mouth Daily. 90 tablet 3   • spironolactone (ALDACTONE) 25 MG tablet 25 mg Daily.     • torsemide (DEMADEX) 20 MG tablet Take 1 tablet by mouth See Admin Instructions. Take 60 mg (3 tablets) po every morning and 40 mg (2 tablets) po every afternoon 150 tablet 0   • vitamin D (ERGOCALCIFEROL) 57498 units capsule capsule Take 50,000 Units by mouth Every 30 (Thirty) Days.     • warfarin (COUMADIN) 2.5 MG tablet TAKE 3 TABLETS (7.5MG) BY MOUTH DAILY  OR AS DIRECTED 270 tablet 0     No current facility-administered medications for this encounter.       Past Medical History:   Diagnosis Date   • AICD (automatic cardioverter/defibrillator) present 3/6/2020   • Asthma    • CHF (congestive heart failure) ([facility])    • Class 2 obesity in adult    • Grade II diastolic dysfunction     Per echocardiogram 3/2021   • HIV (human immunodeficiency virus infection) ([facility])    • Hypertension    • LBBB (left bundle branch block)    • NICM (nonischemic cardiomyopathy) ([facility])     7/2020 EF 6% per echocardiogram; AICD present   • Nonrheumatic mitral valve regurgitation 3/8/2021    Moderate per echo 3/2021   • Nonrheumatic tricuspid valve regurgitation     Moderate per echocardiogram 3/2021       Past Surgical History:   Procedure  Laterality Date   • CARDIAC CATHETERIZATION     • CARDIAC DEFIBRILLATOR PLACEMENT     •  SECTION      one C/S   • FRACTURE SURGERY Left     left ankle   • TUBAL ABDOMINAL LIGATION         Physical Exam  Vitals and nursing note reviewed.   Constitutional:       General: She is not in acute distress.     Appearance: She is well-developed. She is not ill-appearing.   HENT:      Head: Normocephalic and atraumatic.   Eyes:      Conjunctiva/sclera: Conjunctivae normal.      Pupils: Pupils are equal, round, and reactive to light.   Neck:      Vascular: No JVD.   Cardiovascular:      Rate and Rhythm: Normal rate and regular rhythm.      Heart sounds: Normal heart sounds. No murmur heard.  No friction rub. No gallop.    Pulmonary:      Effort: Pulmonary effort is normal. No respiratory distress.      Breath sounds: Normal breath sounds.   Abdominal:      General: Bowel sounds are normal. There is no distension.      Palpations: Abdomen is soft.   Musculoskeletal:         General: No swelling or deformity.   Skin:     General: Skin is warm and dry.      Capillary Refill: Capillary refill takes less than 2 seconds.   Neurological:      Mental Status: She is alert and oriented to person, place, and time. Mental status is at baseline.   Psychiatric:         Attention and Perception: Attention normal.         Mood and Affect: Mood normal. Affect is tearful.         Behavior: Behavior normal. Behavior is cooperative.          Review of Systems   Constitutional: Positive for fatigue. Negative for appetite change and unexpected weight change.   HENT: Negative for congestion and nosebleeds.    Eyes: Negative for photophobia and visual disturbance.   Respiratory: Negative for cough, chest tightness and shortness of breath.    Cardiovascular: Negative for chest pain, palpitations and leg swelling.   Gastrointestinal: Negative for abdominal distention and blood in stool.   Endocrine: Negative for polyphagia and polyuria.  "  Genitourinary: Positive for frequency. Negative for enuresis.   Musculoskeletal: Negative for joint swelling and myalgias.   Skin: Negative for pallor and rash.   Neurological: Negative for dizziness, syncope, weakness, light-headedness, numbness and headaches.   Hematological: Does not bruise/bleed easily.   Psychiatric/Behavioral: Negative for decreased concentration and sleep disturbance.        OBJECTIVE:  /80 (BP Location: Right arm, Patient Position: Sitting)   Pulse 67   Ht 167.6 cm (65.98\")   Wt 93.9 kg (207 lb)   SpO2 97%   BMI 33.43 kg/m²      Body mass index is 33.43 kg/m².  Wt Readings from Last 1 Encounters:   02/02/22 93.9 kg (207 lb)       Lab Review:  Renal Function: CrCl cannot be calculated (Patient's most recent lab result is older than the maximum 30 days allowed.).    Lab Results   Component Value Date    PROBNP 1,001.0 (H) 11/04/2021       Results for orders placed during the hospital encounter of 12/15/21    Adult Transthoracic Echo Complete W/ Cont if Necessary Per Protocol    Interpretation Summary  · Estimated right ventricular systolic pressure from tricuspid regurgitation is normal (<35 mmHg).  · Mildly reduced right ventricular systolic function noted.  · Left ventricular diastolic function is consistent with (grade II w/high LAP) pseudonormalization.  · The left ventricular cavity is severely dilated.  · Estimated left ventricular EF was in disagreement with the calculated left ventricular EF. Left ventricular ejection fraction appears to be 21 - 25%. Left ventricular systolic function is severely decreased.  · There is left ventricular global hypokinesis noted.        6 MINUTE WALK  Patient declined       Cardiac Procedures:  1. Cardiac catheterization [facility] 5/30/17       2.  R/[facility] 4/2019   Data deficit      ASSESSMENT:     Diagnosis Plan   1. Grade II diastolic dysfunction     2. HFrEF (heart failure with reduced ejection fraction) ([facility])  CBC & " Differential    BNP    Comprehensive Metabolic Panel    Hemoglobin A1c    Lipid Panel   3. Abnormal finding of blood chemistry, unspecified   Hemoglobin A1c   4. NICM (nonischemic cardiomyopathy) (HCC)     5. Essential hypertension           PLAN OF CARE:  1.  HFrEF-NYHA functional class II.  Most recent EF per echocardiogram 22%, up from 6%. Currently she is on Entresto, torsemide, carvedilol, ivabradine, spironolactone, and Jardiance.  Historically she has been unable to tolerate metoprolol succinate as it made her very dizzy.      She remains euvolemic on exam.  I would like to continue her current GDMT.  She is scheduled to see Dr. COCHRAN at  in 2 weeks time.  She is also following with Dr. Melton every 3 months.  I would like to check labs today as below.  She is to remain on her low-sodium diet of 2000 mg daily with which she is very compliant, fluid restriction of 1500 mL daily, as well as remain adherent with daily weights.    Of note, we discussed when she should get her booster shot given her most recent positive diagnosis of COVID-19 4 weeks ago.  I advised that the current recommendation is 3 months after infection.    Directions for when to call the clinic reviewed with the patient to include weight gain of 2 to 3 pounds in 24 hours, weight gain of 5 to 10 pounds within 7 days; worsening shortness of breath; worsening lower extremity edema or abdominal distention.    2.  Nonobstructive CAD-diffuse 30% LAD lesion noted on prior cardiac catheterization as above.  Stable, remains without angina.    3.  Nonischemic cardiomyopathy (dilated)-presence of AICD noted.  EF per echocardiogram was 6%, now improved to 22% per echocardiogram as of 3/8/2021.  Most recent AICD interrogation as above.  Now on target dosing of Entresto, she continues to tolerate this well.    4.  NSVT-noted on prior device interrogation.  Currently on beta-blockade.    5.  HIV-history noted.  Followed at [facility].    6.  Pulmonary  embolism-remains on on warfarin.  Followed by home health and primary cardiology team.    7.  Hyponatremia-resolved.    CBC/CMP/BNP/hemoglobin A1c/lipid panel; continue current GDMT; follow-up in 3 months or sooner if needed      Thank you for allowing me to participate in the care of your patient,         Kiley Boyd APRDEBRA  [facility] HEART FAILURE  02/02/22  13:56 EST      **Lyric Disclaimer:**  Much of this encounter note is an electronic transcription/translation of spoken language to printed text. The electronic translation of spoken language may permit erroneous, or at times, nonsensical words or phrases to be inadvertently transcribed. Although I have reviewed the note for such errors, some may still exist.

## 2022-02-02 NOTE — TELEPHONE ENCOUNTER
----- Message from SAMUEL Cisneros sent at 2/2/2022  2:43 PM EST -----  Please let the patient know that her labs are stable.  No change to current plan of care.ThanksYane  ----- Message -----  From: Lab, Background User  Sent: 2/2/2022   2:14 PM EST  To: [facility] Heart Fail Clinic Clinical Pool

## 2022-02-07 ENCOUNTER — ANTICOAGULATION VISIT (OUTPATIENT)
Dept: PHARMACY | Facility: HOSPITAL | Age: 47
End: 2022-02-07

## 2022-02-07 ENCOUNTER — TRANSCRIBE ORDERS (OUTPATIENT)
Dept: ADMINISTRATIVE | Facility: HOSPITAL | Age: 47
End: 2022-02-07

## 2022-02-07 DIAGNOSIS — Z12.31 SCREENING MAMMOGRAM FOR BREAST CANCER: ICD-10-CM

## 2022-02-07 LAB — INR PPP: 4.6

## 2022-02-07 NOTE — PROGRESS NOTES
Anticoagulation Clinic Progress Note    Anticoagulation Summary  As of 2022    INR goal:  2.0-3.0   TTR:  48.2 % (1.1 y)   INR used for dosin.60 (2022)   Warfarin maintenance plan:  5 mg every Fri; 7.5 mg all other days   Weekly warfarin total:  50 mg   Plan last modified:  Chucho Mcdaniel, PharmD (2022)   Next INR check:  2022   Priority:  High   Target end date:  Indefinite    Indications    Other acute pulmonary embolism  unspecified whether acute cor pulmonale present (HCC) (Resolved) [I26.99]  Acute pulmonary embolism  unspecified pulmonary embolism type  unspecified whether acute cor pulmonale present (HCC) (Resolved) [I26.99]             Anticoagulation Episode Summary     INR check location:      Preferred lab:      Send INR reminders to:  [facility] HOME TEST POOL    Comments:  **Home Testing Program**      Anticoagulation Care Providers     Provider Role Specialty Phone number    Lisset Melton MD Referring Cardiology [phone]          Clinic Interview:  Patient Findings     Positives:  Change in diet/appetite    Negatives:  Signs/symptoms of thrombosis, Signs/symptoms of bleeding,   Laboratory test error suspected, Change in health, Change in alcohol use,   Change in activity, Upcoming invasive procedure, Emergency department   visit, Upcoming dental procedure, Missed doses, Extra doses, Change in   medications, Hospital admission, Bruising, Other complaints    Comments:  Reports she inconsistently will drink cranberry juice   products, which she has done this past week.       Clinical Outcomes     Negatives:  Major bleeding event, Thromboembolic event,   Anticoagulation-related hospital admission, Anticoagulation-related ED   visit, Anticoagulation-related fatality    Comments:  Reports she inconsistently will drink cranberry juice   products, which she has done this past week.         INR History:  Anticoagulation Monitoring 2022   INR 3.00 3.60  4.60   INR Date 1/24/2022 1/31/2022 2/7/2022   INR Goal 2.0-3.0 2.0-3.0 2.0-3.0   Trend Same Same Down   Last Week Total 40 mg 52.5 mg 47.5 mg   Next Week Total 52.5 mg 47.5 mg 42.5 mg   Sun 7.5 mg 7.5 mg 7.5 mg   Mon 7.5 mg 7.5 mg Hold (2/7)   Tue 7.5 mg 2.5 mg (2/1) 7.5 mg   Wed 7.5 mg 7.5 mg 7.5 mg   Thu 7.5 mg 7.5 mg 7.5 mg   Fri 7.5 mg 7.5 mg 5 mg   Sat 7.5 mg 7.5 mg 7.5 mg   Visit Report - - -   Some recent data might be hidden       Plan:  1. INR is Supratherapeutic today- see above in Anticoagulation Summary.   Will instruct Nina Saez to Change their warfarin regimen (HOLD today, then decrease to 5 mg Fri, 7.5 mg all other days)- see above in Anticoagulation Summary. She is agreeable to removing cranberry juice from diet.  2. Follow up in 1 week  3. They have been instructed to call if any changes in medications, doses, concerns, etc. Patient expresses understanding and has no further questions at this time.    Chucho Mcdaniel, PharmD

## 2022-02-08 RX ORDER — CARVEDILOL 3.12 MG/1
3.12 TABLET ORAL SEE ADMIN INSTRUCTIONS
Qty: 90 TABLET | Refills: 5 | Status: SHIPPED | OUTPATIENT
Start: 2022-02-08 | End: 2022-07-21

## 2022-02-08 NOTE — TELEPHONE ENCOUNTER
Caller: Nina Saez    Relationship: Self    Best call back number: [phone]   Requested Prescriptions:   Requested Prescriptions     Pending Prescriptions Disp Refills   • carvedilol (COREG) 3.125 MG tablet       Sig: See Admin Instructions. Take 3.125 mg (1 tablet) every morning and 6.25 mg (2 tablets) every evening        Pharmacy where request should be sent: [address] ([facility] [place] - [address] [facility] [phone] [facility] [phone] FX     Additional details provided by patient: OUT  Does the patient have less than a 3 day supply:  [x] Yes  [] No    Gerardo Baker   02/08/22 10:22 EST

## 2022-02-21 ENCOUNTER — ANTICOAGULATION VISIT (OUTPATIENT)
Dept: PHARMACY | Facility: HOSPITAL | Age: 47
End: 2022-02-21

## 2022-02-21 LAB — INR PPP: 2.9

## 2022-02-21 PROCEDURE — G0249 PROVIDE INR TEST MATER/EQUIP: HCPCS

## 2022-02-21 NOTE — PROGRESS NOTES
Anticoagulation Clinic Progress Note    Anticoagulation Summary  As of 2022    INR goal:  2.0-3.0   TTR:  46.8 % (1.2 y)   INR used for dosin.90 (2022)   Warfarin maintenance plan:  5 mg every Fri; 7.5 mg all other days   Weekly warfarin total:  50 mg   No change documented:  Zina De La Rosa RPH   Plan last modified:  Chucho Mcdaniel, PharmD (2022)   Next INR check:  2022   Priority:  High   Target end date:  Indefinite    Indications    Other acute pulmonary embolism  unspecified whether acute cor pulmonale present (HCC) (Resolved) [I26.99]  Acute pulmonary embolism  unspecified pulmonary embolism type  unspecified whether acute cor pulmonale present (HCC) (Resolved) [I26.99]             Anticoagulation Episode Summary     INR check location:      Preferred lab:      Send INR reminders to:   [facility]    Comments:  **Home Testing Program**      Anticoagulation Care Providers     Provider Role Specialty Phone number    Lisset Melton MD Referring Cardiology [phone]          Clinic Interview:  No pertinent clinical findings have been reported.    INR History:  Anticoagulation Monitoring 2022   INR 3.60 4.60 2.90   INR Date 2022   INR Goal 2.0-3.0 2.0-3.0 2.0-3.0   Trend Same Down Same   Last Week Total 52.5 mg 47.5 mg 50 mg   Next Week Total 47.5 mg 42.5 mg 50 mg   Sun 7.5 mg 7.5 mg 7.5 mg   Mon 7.5 mg Hold () 7.5 mg   Tue 2.5 mg () 7.5 mg 7.5 mg   Wed 7.5 mg 7.5 mg 7.5 mg   Thu 7.5 mg 7.5 mg 7.5 mg   Fri 7.5 mg 5 mg 5 mg   Sat 7.5 mg 7.5 mg 7.5 mg   Visit Report - - -   Some recent data might be hidden       Plan:  1. INR is therapeutic today- see above in Anticoagulation Summary.    Nina Saez to continue their warfarin regimen- see above in Anticoagulation Summary.  2. Follow up in 1 week  3. They have been instructed to call if any changes in medications, doses, concerns, etc. Patient expresses  Received report from MAITE Tamez. Patient is resting comfortably with no signs of acute distress. Patient updated on care plan. Will continue to monitor.    understanding and has no further questions at this time.    Zina De La Rosa, RP

## 2022-02-25 RX ORDER — TORSEMIDE 20 MG/1
20 TABLET ORAL SEE ADMIN INSTRUCTIONS
Qty: 150 TABLET | Refills: 1 | Status: SHIPPED | OUTPATIENT
Start: 2022-02-25 | End: 2022-04-14 | Stop reason: SDUPTHER

## 2022-02-25 NOTE — TELEPHONE ENCOUNTER
Last clinic visit: 2/2/22  Next clinic visit: 5/2/22    Lisha Melendrez RN  [facility]

## 2022-02-28 ENCOUNTER — ANTICOAGULATION VISIT (OUTPATIENT)
Dept: PHARMACY | Facility: HOSPITAL | Age: 47
End: 2022-02-28

## 2022-02-28 LAB — INR PPP: 2.1

## 2022-02-28 NOTE — PROGRESS NOTES
Anticoagulation Clinic Progress Note    Anticoagulation Summary  As of 2022    INR goal:  2.0-3.0   TTR:  47.6 % (1.2 y)   INR used for dosin.10 (2022)   Warfarin maintenance plan:  5 mg every Fri; 7.5 mg all other days   Weekly warfarin total:  50 mg   No change documented:  Zina De La Rosa RPH   Plan last modified:  Chucho Mcdaniel, PharmD (2022)   Next INR check:  3/7/2022   Priority:  High   Target end date:  Indefinite    Indications    Other acute pulmonary embolism  unspecified whether acute cor pulmonale present (HCC) (Resolved) [I26.99]  Acute pulmonary embolism  unspecified pulmonary embolism type  unspecified whether acute cor pulmonale present (HCC) (Resolved) [I26.99]             Anticoagulation Episode Summary     INR check location:      Preferred lab:      Send INR reminders to:   [facility]    Comments:  **Home Testing Program**      Anticoagulation Care Providers     Provider Role Specialty Phone number    Lisset Melton MD Referring Cardiology [phone]          Clinic Interview:  No pertinent clinical findings have been reported.    INR History:  Anticoagulation Monitoring 2022   INR 4.60 2.90 2.10   INR Date 2022   INR Goal 2.0-3.0 2.0-3.0 2.0-3.0   Trend Down Same Same   Last Week Total 47.5 mg 50 mg 50 mg   Next Week Total 42.5 mg 50 mg 50 mg   Sun 7.5 mg 7.5 mg 7.5 mg   Mon Hold () 7.5 mg 7.5 mg   Tue 7.5 mg 7.5 mg 7.5 mg   Wed 7.5 mg 7.5 mg 7.5 mg   Thu 7.5 mg 7.5 mg 7.5 mg   Fri 5 mg 5 mg 5 mg   Sat 7.5 mg 7.5 mg 7.5 mg   Visit Report - - -   Some recent data might be hidden       Plan:  1. INR is therapeutic today- see above in Anticoagulation Summary.    Nina Saez to continue their warfarin regimen- see above in Anticoagulation Summary.  2. Follow up in 1 week  3. Pt has agreed to only be called if INR out of range. They have been instructed to call if any changes in medications, doses,  concerns, etc. Patient expresses understanding and has no further questions at this time.    Zina De La Rosa, [facility]   Performed

## 2022-03-07 ENCOUNTER — ANTICOAGULATION VISIT (OUTPATIENT)
Dept: PHARMACY | Facility: HOSPITAL | Age: 47
End: 2022-03-07

## 2022-03-07 LAB — INR PPP: 2.3

## 2022-03-07 NOTE — PROGRESS NOTES
Anticoagulation Clinic Progress Note    Anticoagulation Summary  As of 3/7/2022    INR goal:  2.0-3.0   TTR:  48.6 % (1.2 y)   INR used for dosin.30 (3/7/2022)   Warfarin maintenance plan:  5 mg every Fri; 7.5 mg all other days   Weekly warfarin total:  50 mg   No change documented:  Znia De La Rosa RPH   Plan last modified:  Chucho Mcdaniel, PharmD (2022)   Next INR check:  3/14/2022   Priority:  High   Target end date:  Indefinite    Indications    Other acute pulmonary embolism  unspecified whether acute cor pulmonale present (HCC) (Resolved) [I26.99]  Acute pulmonary embolism  unspecified pulmonary embolism type  unspecified whether acute cor pulmonale present (HCC) (Resolved) [I26.99]             Anticoagulation Episode Summary     INR check location:      Preferred lab:      Send INR reminders to:   [facility]    Comments:  **Home Testing Program**      Anticoagulation Care Providers     Provider Role Specialty Phone number    Lisset Melton MD Referring Cardiology [phone]          Clinic Interview:  No pertinent clinical findings have been reported.    INR History:  Anticoagulation Monitoring 2022 2022 3/7/2022   INR 2.90 2.10 2.30   INR Date 2022 2022 3/7/2022   INR Goal 2.0-3.0 2.0-3.0 2.0-3.0   Trend Same Same Same   Last Week Total 50 mg 50 mg 50 mg   Next Week Total 50 mg 50 mg 50 mg   Sun 7.5 mg 7.5 mg 7.5 mg   Mon 7.5 mg 7.5 mg 7.5 mg   Tue 7.5 mg 7.5 mg 7.5 mg   Wed 7.5 mg 7.5 mg 7.5 mg   Thu 7.5 mg 7.5 mg 7.5 mg   Fri 5 mg 5 mg 5 mg   Sat 7.5 mg 7.5 mg 7.5 mg   Visit Report - - -   Some recent data might be hidden       Plan:  1. INR is therapeutic today- see above in Anticoagulation Summary.    Nina Saez to continue their warfarin regimen- see above in Anticoagulation Summary.  2. Follow up in 1 week  3. Pt has agreed to only be called if INR out of range. They have been instructed to call if any changes in medications, doses, concerns,  etc. Patient expresses understanding and has no further questions at this time.    Zina De La Rosa, [facility]

## 2022-03-09 RX ORDER — WARFARIN SODIUM 2.5 MG/1
TABLET ORAL
Qty: 270 TABLET | Refills: 0 | Status: SHIPPED | OUTPATIENT
Start: 2022-03-09 | End: 2022-06-06

## 2022-03-14 ENCOUNTER — ANTICOAGULATION VISIT (OUTPATIENT)
Dept: PHARMACY | Facility: HOSPITAL | Age: 47
End: 2022-03-14

## 2022-03-14 LAB — INR PPP: 2.5

## 2022-03-14 NOTE — PROGRESS NOTES
Anticoagulation Clinic Progress Note    Anticoagulation Summary  As of 3/14/2022    INR goal:  2.0-3.0   TTR:  49.5 % (1.2 y)   INR used for dosin.50 (3/14/2022)   Warfarin maintenance plan:  5 mg every Fri; 7.5 mg all other days   Weekly warfarin total:  50 mg   No change documented:  Chucho Mcdaniel PharmD   Plan last modified:  Chucho Mcdaniel PharmD (2022)   Next INR check:  3/21/2022   Priority:  High   Target end date:  Indefinite    Indications    Other acute pulmonary embolism  unspecified whether acute cor pulmonale present (HCC) (Resolved) [I26.99]  Acute pulmonary embolism  unspecified pulmonary embolism type  unspecified whether acute cor pulmonale present (HCC) (Resolved) [I26.99]             Anticoagulation Episode Summary     INR check location:      Preferred lab:      Send INR reminders to:   [facility]    Comments:  **Home Testing Program**      Anticoagulation Care Providers     Provider Role Specialty Phone number    Lisset Melton MD Referring Cardiology [phone]          Clinic Interview:  No pertinent clinical findings have been reported.    INR History:  Anticoagulation Monitoring 2022 3/7/2022 3/14/2022   INR 2.10 2.30 2.50   INR Date 2022 3/7/2022 3/14/2022   INR Goal 2.0-3.0 2.0-3.0 2.0-3.0   Trend Same Same Same   Last Week Total 50 mg 50 mg 50 mg   Next Week Total 50 mg 50 mg 50 mg   Sun 7.5 mg 7.5 mg 7.5 mg   Mon 7.5 mg 7.5 mg 7.5 mg   Tue 7.5 mg 7.5 mg 7.5 mg   Wed 7.5 mg 7.5 mg 7.5 mg   Thu 7.5 mg 7.5 mg 7.5 mg   Fri 5 mg 5 mg 5 mg   Sat 7.5 mg 7.5 mg 7.5 mg   Visit Report - - -   Some recent data might be hidden       Plan:  1. INR is therapeutic today- see above in Anticoagulation Summary.    Nina Saez to continue their warfarin regimen- see above in Anticoagulation Summary.  2. Follow up in 1 week  3. Pt has agreed to only be called if INR out of range. They have been instructed to call if any changes in medications, doses,  concerns, etc. Patient expresses understanding and has no further questions at this time.    Chucho Mcdaniel, PharmD

## 2022-03-21 ENCOUNTER — ANTICOAGULATION VISIT (OUTPATIENT)
Dept: PHARMACY | Facility: HOSPITAL | Age: 47
End: 2022-03-21

## 2022-03-21 LAB — INR PPP: 1.4

## 2022-03-21 PROCEDURE — G0249 PROVIDE INR TEST MATER/EQUIP: HCPCS

## 2022-03-21 NOTE — PROGRESS NOTES
Anticoagulation Clinic Progress Note    Anticoagulation Summary  As of 3/21/2022    INR goal:  2.0-3.0   TTR:  49.4 % (1.2 y)   INR used for dosin.40 (3/21/2022)   Warfarin maintenance plan:  5 mg every Fri; 7.5 mg all other days   Weekly warfarin total:  50 mg   Plan last modified:  Chucho Mcdaniel, PharmD (2022)   Next INR check:  3/28/2022   Priority:  High   Target end date:  Indefinite    Indications    Other acute pulmonary embolism  unspecified whether acute cor pulmonale present (HCC) (Resolved) [I26.99]  Acute pulmonary embolism  unspecified pulmonary embolism type  unspecified whether acute cor pulmonale present (HCC) (Resolved) [I26.99]             Anticoagulation Episode Summary     INR check location:      Preferred lab:      Send INR reminders to:  [facility]    Comments:  **Home Testing Program**      Anticoagulation Care Providers     Provider Role Specialty Phone number    Lisset Melton MD Referring Cardiology [phone]          Clinic Interview:  Patient Findings     Positives:  Change in diet/appetite    Negatives:  Signs/symptoms of thrombosis, Signs/symptoms of bleeding,   Laboratory test error suspected, Change in health, Change in alcohol use,   Change in activity, Upcoming invasive procedure, Emergency department   visit, Upcoming dental procedure, Missed doses, Extra doses, Change in   medications, Hospital admission, Bruising, Other complaints    Comments:  Reports more vit k than usual.       Clinical Outcomes     Negatives:  Major bleeding event, Thromboembolic event,   Anticoagulation-related hospital admission, Anticoagulation-related ED   visit, Anticoagulation-related fatality    Comments:  Reports more vit k than usual.         INR History:  Anticoagulation Monitoring 3/7/2022 3/14/2022 3/21/2022   INR 2.30 2.50 1.40   INR Date 3/7/2022 3/14/2022 3/21/2022   INR Goal 2.0-3.0 2.0-3.0 2.0-3.0   Trend Same Same Same   Last Week Total 50 mg 50 mg 50  mg   Next Week Total 50 mg 50 mg 52.5 mg   Sun 7.5 mg 7.5 mg 7.5 mg   Mon 7.5 mg 7.5 mg 10 mg (3/21)   Tue 7.5 mg 7.5 mg 7.5 mg   Wed 7.5 mg 7.5 mg 7.5 mg   Thu 7.5 mg 7.5 mg 7.5 mg   Fri 5 mg 5 mg 5 mg   Sat 7.5 mg 7.5 mg 7.5 mg   Visit Report - - -   Some recent data might be hidden       Plan:  1. INR is Subtherapeutic today- see above in Anticoagulation Summary.   Will instruct Nina Saez to Change their warfarin regimen- see above in Anticoagulation Summary.  2. Follow up in 1 week  3. They have been instructed to call if any changes in medications, doses, concerns, etc. Patient expresses understanding and has no further questions at this time.    Chucho Mcdaniel, PharmD

## 2022-03-24 ENCOUNTER — OFFICE VISIT (OUTPATIENT)
Dept: CARDIOLOGY | Facility: CLINIC | Age: 47
End: 2022-03-24

## 2022-03-24 VITALS
HEIGHT: 66 IN | WEIGHT: 212 LBS | DIASTOLIC BLOOD PRESSURE: 78 MMHG | BODY MASS INDEX: 34.07 KG/M2 | SYSTOLIC BLOOD PRESSURE: 110 MMHG | HEART RATE: 55 BPM | OXYGEN SATURATION: 96 %

## 2022-03-24 DIAGNOSIS — B20 HIV INFECTION, UNSPECIFIED SYMPTOM STATUS: ICD-10-CM

## 2022-03-24 DIAGNOSIS — Z86.711 HX OF PULMONARY EMBOLUS: ICD-10-CM

## 2022-03-24 DIAGNOSIS — I50.20 HFREF (HEART FAILURE WITH REDUCED EJECTION FRACTION): Primary | ICD-10-CM

## 2022-03-24 DIAGNOSIS — I42.8 NICM (NONISCHEMIC CARDIOMYOPATHY): ICD-10-CM

## 2022-03-24 DIAGNOSIS — I36.1 NONRHEUMATIC TRICUSPID VALVE REGURGITATION: ICD-10-CM

## 2022-03-24 DIAGNOSIS — I51.89 GRADE II DIASTOLIC DYSFUNCTION: ICD-10-CM

## 2022-03-24 DIAGNOSIS — I10 ESSENTIAL HYPERTENSION: ICD-10-CM

## 2022-03-24 DIAGNOSIS — I34.0 NONRHEUMATIC MITRAL VALVE REGURGITATION: ICD-10-CM

## 2022-03-24 PROCEDURE — 93000 ELECTROCARDIOGRAM COMPLETE: CPT | Performed by: INTERNAL MEDICINE

## 2022-03-24 PROCEDURE — 99214 OFFICE O/P EST MOD 30 MIN: CPT | Performed by: INTERNAL MEDICINE

## 2022-03-24 RX ORDER — DARUNAVIR, COBICISTAT, EMTRICITABINE, AND TENOFOVIR ALAFENAMIDE 800; 150; 200; 10 MG/1; MG/1; MG/1; MG/1
1 TABLET, FILM COATED ORAL DAILY
COMMUNITY

## 2022-03-24 NOTE — PROGRESS NOTES
Date of Office Visit: 2022  Encounter Provider: Lisset Melton MD  Place of Service: [facility] CARDIOLOGY  Patient Name: Nina Saez  :1975      Patient ID:  Nina Saez is a 46 y.o. female is here for  followup for CMP.         History of Present Illness      She has a history of HIV, essential hypertension, severe dilated nonischemic cardiomyopathy with chronic systolic congestive heart failure, obesity, history of illicit drug use and asthma.  She has an AICD.  It is a single-chamber [employer].     She was diagnosed with heart failure and cardiomyopathy in 2019 at [facility] when she was there visiting family.  An echocardiogram on cardiac catheterization which confirmed this diagnosis.  Her cardiac catheterization done 2019 showed normal coronary arteries, LVEDP 12, wedge pressure 13, PA pressure 28/15 with a mean of 19 mmHg, RA pressure 4, cardiac index 2.5 L/min/m² with a PVR of 1.2 [place] units.     She presented emergency on 20 with short windedness and cough for 3 weeks prior to presentation.  Her weight is been stable and she had no lower extremity edema but she had missed her cardiac medications and had been eating a lot of salt.  On arrival, she was found to be in congestive heart failure.  Echo done 2020 showed severe left ventricular dilation, ejection fraction 6%, very thin left ventricular walls, grade 3 diastolic dysfunction, moderate to severe mitral insufficiency, moderate tricuspid insufficiency, RVSP 48 mmHg.  There was essentially global hypokinesis with akinesis at the bases.  She was able to be dismissed on 2020.  While in the hospital, we did recommend consideration for upgrade to a biventricular AICD.  She also had some right flank pain during hospitalization and CT scan showed a 2 mm minimally obstructing stone in the right ureter.  Urology saw her and felt like she could pass the  stone without further intervention.     On 9/10/2020, she saw Dr. Jamar Azul in the office and he did not feel that she met criteria for implantation of CRT device.  She has now been enrolled in our Heart Failure Clinic.     In December 2020, she was hospitalized for an acute PE with right lower lobe pulmonary infarct and was having hemoptysis.  She was placed on warfarin.     She presents today for perioperative cardiac risk assessment.  She explains that her left ankle/leg has screws and plates that need to be removed by Dr. Rocky Calvert.  She is having pain from the hardware.  She explains that she recently saw the  transplant clinic and both her Entresto and spironolactone were increased.  She also had a stress test (sounds like a metabolic stress test) and I will obtain those results.  She reports a cough.  She denies chest pain, shortness of air, palpitations, edema, dizziness, syncope, or bleeding.     She saw  healthcare by telehealth date of service 1/28/21.  They noted the patient underwent a CPX which showed peak VO2 11.9 at RER greater than 1.05 while on Coreg.  Her CPX showed heart related limitations in her functional capacity, but the patient felt satisfied at her functional current level.     Echo done 3/8/2021 showed ejection fraction of 22% with severe left ventricular dilation, severe global hypokinesis, grade 2 diastolic dysfunction, severe left atrial enlargement moderate mitral and tricuspid insufficiency.      She has not had surgery with Dr. Rocky Calvert on her left ankle but still needs this.       I reviewed records from  cardiology, Dr. Hernandez from 11/16/2021 visit.  They did offer future LVAD or heart transplantation but she did not feel her quality of life really required that at this point.  She is followed at [facility] for her HIV and I reviewed the records from there.  She was seen in the heart failure clinic by Yane Boyd on 11/4/2021.       Echo done 12/15/2021  shows severe left ventricular dilation with ejection fraction 21-25%, global hypokinesis, grade 2 diastolic dysfunction, normal RVSP and normal valvular structure and function.  Right ventricular systolic function is also mildly to moderately reduced.  When compared with prior echocardiogram, there is less valvular insufficiency.     Labs done 2/2/2022 show proBNP 1002, glucose 124, creatinine 1.06, otherwise normal CMP, hemoglobin 12.2, otherwise normal CBC.  She has been having more headaches since changing her HIV medications.  She basically has a combination tablet with her darunavir, ritonavir, emtricitabine and tenofovir.  Since then, she has had daily headaches which lasts all day.  She has been using Tylenol extra strength 2 tablets 3 times a day to be added to headaches.  She does not feel her heart racing or skipping and she is had no dizziness or syncope.  She has no orthopnea or PND.  She has no exertional chest tightness or pressure.  She has no exertional dyspnea.  Her energy levels been fairly stable.  She is exercising 30 minutes daily with a pedal exerciser.  Her weight has been stable but slowly increasing and she has no lower extremity edema.  She has mostly cut out soda and is really watching her sodium, she would like to lose weight.  She has abdominal obesity.  She was evaluated by Dr. Hernandez at  heart failure 2/17/2022.  No changes were made.  She saw Yane Boyd in heart failure and no changes were made but she did check labs.  She did have COVID-19 January 2022.    Past Medical History:   Diagnosis Date   • AICD (automatic cardioverter/defibrillator) present 3/6/2020   • Asthma    • CHF (congestive heart failure) ([facility])    • Class 2 obesity in adult    • Grade II diastolic dysfunction     Per echocardiogram 3/2021   • HIV (human immunodeficiency virus infection) ([facility])    • Hypertension    • LBBB (left bundle branch block)    • NICM (nonischemic cardiomyopathy) ([facility])     7/2020 EF 6% per  echocardiogram; AICD present   • Nonrheumatic mitral valve regurgitation 3/8/2021    Moderate per echo 3/2021   • Nonrheumatic tricuspid valve regurgitation     Moderate per echocardiogram 3/2021         Past Surgical History:   Procedure Laterality Date   • CARDIAC CATHETERIZATION     • CARDIAC DEFIBRILLATOR PLACEMENT     •  SECTION      one C/S   • FRACTURE SURGERY Left     left ankle   • TUBAL ABDOMINAL LIGATION         Current Outpatient Medications on File Prior to Visit   Medication Sig Dispense Refill   • acetaminophen (TYLENOL) 325 MG tablet Take 650 mg by mouth Every 6 (Six) Hours As Needed for Mild Pain .     • albuterol sulfate  (90 Base) MCG/ACT inhaler Inhale 2 puffs Every 4 (Four) Hours As Needed for Wheezing.     • atorvastatin (LIPITOR) 10 MG tablet Take 1 tablet by mouth Daily. 30 tablet 11   • carvedilol (COREG) 3.125 MG tablet Take 1 tablet by mouth See Admin Instructions. Take 3.125 mg (1 tablet) every morning and 6.25 mg (2 tablets) every evening 90 tablet 5   • Wcfeb-Urcxj-Ehrribsi-TenofAF (Symtuza) [phone] MG per tablet Take 1 tablet by mouth Daily.     • Diclofenac Sodium (VOLTAREN) 1 % gel gel Apply 4 g topically to the appropriate area as directed 2 (Two) Times a Day. Use per pkg insert 100 g 2   • diphenhydrAMINE (BENADRYL) 25 mg capsule Take 1 capsule by mouth Every 6 (Six) Hours As Needed for Itching (swelling). 20 capsule 0   • empagliflozin (Jardiance) 10 MG tablet tablet Take 1 tablet by mouth Daily. 30 tablet 11   • fluticasone (Flonase) 50 MCG/ACT nasal spray 2 sprays into the nostril(s) as directed by provider Daily. 1 bottle 2   • ivabradine HCl (Corlanor) 7.5 MG tablet tablet Take 1 tablet by mouth 2 (Two) Times a Day With Meals. 60 tablet 5   • nitroglycerin (NITROSTAT) 0.4 MG SL tablet Place 1 tablet under the tongue Every 5 (Five) Minutes As Needed for Chest Pain. Take no more than 3 doses in 15 minutes. 30 tablet 5   • potassium chloride (K-DUR,KLOR-CON)  20 MEQ CR tablet Take 3 tablets by mouth once daily 90 tablet 11   • sacubitril-valsartan (Entresto)  MG tablet Take 1 tablet by mouth 2 (Two) Times a Day. 180 tablet 3   • sertraline (Zoloft) 50 MG tablet Take 1 tablet by mouth Daily. 90 tablet 3   • spironolactone (ALDACTONE) 25 MG tablet 25 mg Daily.     • torsemide (DEMADEX) 20 MG tablet Take 1 tablet by mouth See Admin Instructions. Take 60 mg (3 tablets) po every morning and 40 mg (2 tablets) po every afternoon 150 tablet 1   • vitamin D (ERGOCALCIFEROL) 63034 units capsule capsule Take 50,000 Units by mouth Every 30 (Thirty) Days.     • warfarin (COUMADIN) 2.5 MG tablet Take 5mg (2 tablets) on ; Take 7.5mg (3 tablets) all other days OR as directed by the [facility]. 270 tablet 0   • [DISCONTINUED] darunavir ethanolate (PREZISTA) 800 MG tablet tablet Take 800 mg by mouth Every Night.     • [DISCONTINUED] Emtricitabine-Tenofovir AF (DESCOVY) 200-25 MG per tablet Take  by mouth Every Night.     • [DISCONTINUED] ondansetron ODT (Zofran ODT) 4 MG disintegrating tablet Place 1 tablet on the tongue Every 8 (Eight) Hours As Needed for Nausea or Vomiting. 30 tablet 0   • [DISCONTINUED] ritonavir (NORVIR) 100 MG tablet Take 100 mg by mouth Every Night.       No current facility-administered medications on file prior to visit.       Social History     Socioeconomic History   • Marital status: Legally    Tobacco Use   • Smoking status: Former Smoker     Quit date:      Years since quittin.2   • Smokeless tobacco: Never Used   Vaping Use   • Vaping Use: Never used   Substance and Sexual Activity   • Alcohol use: Not Currently     Comment: holiday and special occ//caffeine use: Occ   • Drug use: Never   • Sexual activity: Not Currently     Partners: Male           ROS    Procedures    ECG 12 Lead    Date/Time: 3/24/2022 2:34 PM  Performed by: Lisset Melton MD  Authorized by: Lisset Melton MD   Comparison: compared  "with previous ECG   Similar to previous ECG  Rhythm: sinus rhythm  T inversion: II, III, aVF, V3, V4, V5 and V6    Clinical impression: abnormal EKG                Objective:      Vitals:    03/24/22 1429   BP: 110/78   Pulse: 55   SpO2: 96%   Weight: 96.2 kg (212 lb)   Height: 167.6 cm (66\")     Body mass index is 34.22 kg/m².    Vitals reviewed.   Constitutional:       General: Not in acute distress.     Appearance: Well-developed. Not diaphoretic.   Eyes:      General: No scleral icterus.     Conjunctiva/sclera: Conjunctivae normal.   HENT:      Head: Normocephalic and atraumatic.   Neck:      Thyroid: No thyromegaly.      Vascular: No carotid bruit or JVD.      Lymphadenopathy: No cervical adenopathy.   Pulmonary:      Effort: Pulmonary effort is normal. No respiratory distress.      Breath sounds: Normal breath sounds. No wheezing. No rhonchi. No rales.   Chest:      Chest wall: Not tender to palpatation.   Cardiovascular:      Normal rate. Regular rhythm.      Murmurs: There is no murmur.      No gallop.   Pulses:     Intact distal pulses.   Edema:     Peripheral edema absent.   Abdominal:      General: Bowel sounds are normal. There is no distension or abdominal bruit.      Palpations: Abdomen is soft. There is no abdominal mass.      Tenderness: There is no abdominal tenderness.   Musculoskeletal:         General: No deformity.      Extremities: No clubbing present.     Cervical back: Neck supple. Skin:     General: Skin is warm and dry. There is no cyanosis.      Coloration: Skin is not pale.      Findings: No rash.   Neurological:      Mental Status: Alert and oriented to person, place, and time.      Cranial Nerves: No cranial nerve deficit.   Psychiatric:         Judgment: Judgment normal.         Lab Review:       Assessment:      Diagnosis Plan   1. HFrEF (heart failure with reduced ejection fraction) (HCC)     2. Grade II diastolic dysfunction     3. Essential hypertension     4. NICM (nonischemic " cardiomyopathy) (HCC)     5. Nonrheumatic mitral valve regurgitation     6. Nonrheumatic tricuspid valve regurgitation         1. Severe dilated nonischemic cardiomyopathy, ejection fraction 22%, AICD in place, chronic HFrEF.   Her cardiomyopathy is likely multifactorial related to the old illicit drug use history, HIV, possible poorly controlled HTN in the past.  No decompensated heart failure at this time.  2. Essential hypertension, controlled.  3. HIV positive.  Followed at [facility], well treated.  4. Diastolic dysfunction  5. Obesity  6. Asthma  7. LBBB.   8. History of pulmonary embolism on warfarin.  Diagnosed December 2020.  Etiology unknown.        Plan:       See back in 6 months.  No medication changes.  May use occasional Excedrin for headaches.    Is still on warfarin - not sure that she needs this, will send for hematology evaluation for increased clotting risk due to h/o idiopathic pulmonary embolism.  Would like to be able to stop warfarin if possible.

## 2022-03-28 ENCOUNTER — ANTICOAGULATION VISIT (OUTPATIENT)
Dept: PHARMACY | Facility: HOSPITAL | Age: 47
End: 2022-03-28

## 2022-03-28 LAB — INR PPP: 3.2

## 2022-03-28 NOTE — PROGRESS NOTES
Anticoagulation Clinic Progress Note    Anticoagulation Summary  As of 3/28/2022    INR goal:  2.0-3.0   TTR:  49.4 % (1.2 y)   INR used for dosing:  3.20 (3/28/2022)   Warfarin maintenance plan:  5 mg every Fri; 7.5 mg all other days   Weekly warfarin total:  50 mg   Plan last modified:  Chucho Mcdaniel, PharmD (2/7/2022)   Next INR check:  4/4/2022   Priority:  High   Target end date:  Indefinite    Indications    Other acute pulmonary embolism  unspecified whether acute cor pulmonale present (HCC) (Resolved) [I26.99]  Acute pulmonary embolism  unspecified pulmonary embolism type  unspecified whether acute cor pulmonale present (HCC) (Resolved) [I26.99]             Anticoagulation Episode Summary     INR check location:      Preferred lab:      Send INR reminders to:  [facility]    Comments:  **Home Testing Program**      Anticoagulation Care Providers     Provider Role Specialty Phone number    Lisset Melton MD Referring Cardiology [phone]          Clinic Interview:  Patient Findings     Positives:  Change in diet/appetite    Negatives:  Signs/symptoms of thrombosis, Signs/symptoms of bleeding,   Laboratory test error suspected, Change in health, Change in alcohol use,   Change in activity, Upcoming invasive procedure, Emergency department   visit, Upcoming dental procedure, Missed doses, Extra doses, Change in   medications, Hospital admission, Bruising, Other complaints    Comments:  Increase in broccoli and asparagus       Clinical Outcomes     Negatives:  Major bleeding event, Thromboembolic event,   Anticoagulation-related hospital admission, Anticoagulation-related ED   visit, Anticoagulation-related fatality    Comments:  Increase in broccoli and asparagus         INR History:  Anticoagulation Monitoring 3/14/2022 3/21/2022 3/28/2022   INR 2.50 1.40 3.20   INR Date 3/14/2022 3/21/2022 3/28/2022   INR Goal 2.0-3.0 2.0-3.0 2.0-3.0   Trend Same Same Same   Last Week Total 50 mg  50 mg 52.5 mg   Next Week Total 50 mg 52.5 mg 47.5 mg   Sun 7.5 mg 7.5 mg 7.5 mg   Mon 7.5 mg 10 mg (3/21) 5 mg (3/28)   Tue 7.5 mg 7.5 mg 7.5 mg   Wed 7.5 mg 7.5 mg 7.5 mg   Thu 7.5 mg 7.5 mg 7.5 mg   Fri 5 mg 5 mg 5 mg   Sat 7.5 mg 7.5 mg 7.5 mg   Visit Report - - -   Some recent data might be hidden       Plan:  1. INR is Supratherapeutic today- see above in Anticoagulation Summary.   Will instruct Nina PHAM aSez to Change their warfarin regimen- see above in Anticoagulation Summary.  2. Follow up in 1 week  3. They have been instructed to call if any changes in medications, doses, concerns, etc. Patient expresses understanding and has no further questions at this time.    Zina De La Rosa [facility]

## 2022-04-11 ENCOUNTER — TELEMEDICINE (OUTPATIENT)
Dept: INTERNAL MEDICINE | Age: 47
End: 2022-04-11

## 2022-04-11 ENCOUNTER — ANTICOAGULATION VISIT (OUTPATIENT)
Dept: PHARMACY | Facility: HOSPITAL | Age: 47
End: 2022-04-11

## 2022-04-11 DIAGNOSIS — J45.20 MILD INTERMITTENT ASTHMA WITHOUT COMPLICATION: Primary | ICD-10-CM

## 2022-04-11 DIAGNOSIS — B20 HIV INFECTION, UNSPECIFIED SYMPTOM STATUS: ICD-10-CM

## 2022-04-11 DIAGNOSIS — I10 ESSENTIAL HYPERTENSION: ICD-10-CM

## 2022-04-11 DIAGNOSIS — R09.81 SINUS CONGESTION: ICD-10-CM

## 2022-04-11 DIAGNOSIS — R05.9 COUGH: ICD-10-CM

## 2022-04-11 DIAGNOSIS — F41.9 ANXIETY: ICD-10-CM

## 2022-04-11 DIAGNOSIS — I50.20 HFREF (HEART FAILURE WITH REDUCED EJECTION FRACTION): ICD-10-CM

## 2022-04-11 LAB — INR PPP: 2.4

## 2022-04-11 PROCEDURE — 99215 OFFICE O/P EST HI 40 MIN: CPT | Performed by: NURSE PRACTITIONER

## 2022-04-11 RX ORDER — ALBUTEROL SULFATE 90 UG/1
2 AEROSOL, METERED RESPIRATORY (INHALATION) EVERY 4 HOURS PRN
Qty: 18 G | Refills: 5 | Status: SHIPPED | OUTPATIENT
Start: 2022-04-11 | End: 2022-10-20

## 2022-04-11 RX ORDER — AMOXICILLIN 875 MG/1
875 TABLET, COATED ORAL 2 TIMES DAILY
Qty: 20 TABLET | Refills: 0 | Status: SHIPPED | OUTPATIENT
Start: 2022-04-11 | End: 2022-04-21

## 2022-04-11 RX ORDER — GUAIFENESIN AND CODEINE PHOSPHATE 100; 10 MG/5ML; MG/5ML
5 SOLUTION ORAL 3 TIMES DAILY PRN
Qty: 118 ML | Refills: 0 | Status: SHIPPED | OUTPATIENT
Start: 2022-04-11 | End: 2022-09-12

## 2022-04-11 NOTE — PROGRESS NOTES
[facility]  SAMUEL West / 46 y.o. / female  04/11/2022      CC:  Main reason(s) for today's visit: TELEHEALTH ENCOUNTER: Establish Care, Cough, Asthma, Congestive Heart Failure, HIV Positive/AIDS, and Hypertension      HPI:     THIS WAS A MYCHART TELEHEALTH ENCOUNTER NECESSITATED BY CURRENT COVID-19 CRISIS.      You have chosen to receive care through a telehealth visit.  Do you consent to use a video/audio connection for your medical care today? Yes    Patient presents to establish care with new provider in office.  Previous patient of Darcie BAKER.     Followed by Dr. Calvert podiatry with history of left ankle leg screws and plates.  Was referred to Dr. Munoz by cardiology.  Unable to remove screws without general anesthesia which patient is postponing at this time.     Followed by Dr. Melton cardiology last seen March 24, 2022.  History of HIV, hypertension, severe dilated nonischemic cardiomyopathy with chronic systolic congestive heart failure, exacerbated by obesity, history of illicit drug use and asthma.  She does have an AICD in place.  Single-chamber [employer].  First diagnosed with heart failure and cardiomyopathy in April 2019 at [facility] while visiting family.  Cardiac cath April 2019 showed normal coronary arteries, LVEDP 12, wedge pressure of 13, PA pressure 18/25 with a mean of 19 mmHg, RA pressure 4, cardiac index 2.5 L/min per millimeter square with PVR 1.2.  Emergency room evaluation July 2020 with shortness of breath and cough for 3 weeks prior.  Congestive heart failure noted on echo with severe left ventricular dilation, EF of 6%, thin left ventricular walls, grade 3 diastolic dysfunction, moderate to severe mitral insufficiency, moderate tricuspid insufficiency.  At that time recommended upgrade to biventricular AICD.  During hospitalization she had CT showing right kidney stones.  She is now followed by Dr. Watson urology  was able to pass the stones without intervention.  Seen by Dr. Azul September 2020 and did not meet criteria for implantation of CRT device.  Enrolled in heart failure clinic, last seen by Yane Boyd. December 2020 hospitalized for acute pulmonary embolism of right lower lobe and had hemoptysis.  Placed on warfarin.    She is also now seeing [facility] and Entresto and spironolactone were increased at that time.  Stress test which sounds like a metabolic at that time as well.  [facility] January 28, 2021 patient underwent CPX which showed peak VO2 11.9 if RER greater than 1.05 on Coreg.  Updated echo March 2021 showed EF of 22% with severe left ventricular dilation, grade 2 diastolic dysfunction, severe left atrial enlargement, moderate mitral and tricuspid insufficiency.  At this time  cardiology, Dr. Hernandez offered heart transplant in the future but did not believe it was required at this point.  Updated echo December 2021 showed EF of 21 to 25%.  When compared there is less valvular insufficiency.  Continued grade 2 diastolic dysfunction.  Labs from February 2022 with BMP 1002, glucose 124, creatinine 1.06.  Hemoglobin 12.2.    She is currently followed by [facility] for HIV, more headache since changing HIV medication.  Currently on Symtuza [phone].    History of COVID-19 in January 2022.    Updates from  heart failure February 2022 and recent heart failure evaluation with no changes.    Recently referred to hematology oncology to determine ability to discontinue warfarin with history of PE.     Scheduled visit in the near future with OB/GYN for routine well woman exam, Dr. Rocha.     Today she complains of productive cough, sinus pressure and pain along with congestion over 1 week.  No improvement with DayQuil, over-the-counter Mucinex and cough suppressants and allergy medications.  No exposure to COVID-19 that she knows of.  She has had first 2 COVID-19 vaccines.  No  shortness of breath.  States that her oxygen saturation is 96 which is normally baseline 97%.  Heart rate 56.  Blood pressure 126/80.  INR currently 2.4.  210.8 pounds with no significant increase with heart failure.  Mild intermittent asthma needs refill on albuterol inhaler.    Anxiety: Controlled with sertraline 50 mg daily.  No panic attack, no suicidal thoughts or thoughts of self-harm.    Environmental allergies: Controlled with Flonase daily.    Hypertension: Controlled with carvedilol.     She is using Jardiance for heart failure rather than diabetes.  Blood sugar normal range.    40 minutes were spent in reviewing history, assessment and plan, documentation    Patient Care Team:  Zach Durand APRN as PCP - General (Internal Medicine)  Lisset Melton MD as Consulting Physician (Cardiology)  Andrew aWtson MD as Consulting Physician (Urology)  Rocky Calvert DPM as Consulting Physician (Podiatry)  Lisset Melton MD as Referring Physician (Cardiology)  Chaim Hernandez MD (Internal Medicine)  Yane Byod APRN as Nurse Practitioner (Nurse Practitioner)  Frannie Rocha MD as Consulting Physician (Gynecology)  Evelyn Santillan MD as Consulting Physician (Hematology and Oncology)    ASSESSMENT & PLAN:     Diagnosis Plan   1. Mild intermittent asthma without complication     2. Anxiety  sertraline (Zoloft) 50 MG tablet   3. HIV infection, unspecified symptom status ([facility])     4. Cough  guaiFENesin-codeine (GUAIFENESIN AC) 100-10 MG/5ML liquid   5. Sinus congestion     6. HFrEF (heart failure with reduced ejection fraction) ([facility])     7. Essential hypertension           Summary/Discussion:  · Patient will come by the office tomorrow morning for COVID-19 PCR, flu AB and strep today.  Immunocompromise we will send in amoxicillin as she has been sick for 1 week.  · Continue  including heart failure, cardiology, [facility] transplant, hematology oncology and  OB.  · Continue all current medications for hypertension and anxiety.  · Albuterol with current cough and mild intermittent asthma.  · Recommend follow-up in 2 months for chronic medical with lab updates at that time.      Time spent: 40 minutes    Next Appointment with me: Visit date not found    Return in about 2 months (around 6/11/2022) for Next scheduled follow up.    ____________________________________________________________________    MEDICATIONS  Current Outpatient Medications   Medication Sig Dispense Refill   • acetaminophen (TYLENOL) 325 MG tablet Take 650 mg by mouth Every 6 (Six) Hours As Needed for Mild Pain .     • albuterol sulfate  (90 Base) MCG/ACT inhaler Inhale 2 puffs Every 4 (Four) Hours As Needed for Wheezing. 18 g 5   • atorvastatin (LIPITOR) 10 MG tablet Take 1 tablet by mouth Daily. 30 tablet 11   • carvedilol (COREG) 3.125 MG tablet Take 1 tablet by mouth See Admin Instructions. Take 3.125 mg (1 tablet) every morning and 6.25 mg (2 tablets) every evening 90 tablet 5   • Hkfxs-Hydpk-Fzlixksp-TenofAF (Symtuza) [phone] MG per tablet Take 1 tablet by mouth Daily.     • Diclofenac Sodium (VOLTAREN) 1 % gel gel Apply 4 g topically to the appropriate area as directed 2 (Two) Times a Day. Use per pkg insert 100 g 2   • empagliflozin (Jardiance) 10 MG tablet tablet Take 1 tablet by mouth Daily. 30 tablet 11   • fluticasone (Flonase) 50 MCG/ACT nasal spray 2 sprays into the nostril(s) as directed by provider Daily. 1 bottle 2   • ivabradine HCl (Corlanor) 7.5 MG tablet tablet Take 1 tablet by mouth 2 (Two) Times a Day With Meals. 60 tablet 5   • nitroglycerin (NITROSTAT) 0.4 MG SL tablet Place 1 tablet under the tongue Every 5 (Five) Minutes As Needed for Chest Pain. Take no more than 3 doses in 15 minutes. 30 tablet 5   • potassium chloride (K-DUR,KLOR-CON) 20 MEQ CR tablet Take 3 tablets by mouth once daily 90 tablet 11   • sacubitril-valsartan (Entresto)  MG tablet Take 1  tablet by mouth 2 (Two) Times a Day. 180 tablet 3   • sertraline (Zoloft) 50 MG tablet Take 1 tablet by mouth Daily. 90 tablet 3   • spironolactone (ALDACTONE) 25 MG tablet 25 mg Daily.     • torsemide (DEMADEX) 20 MG tablet Take 1 tablet by mouth See Admin Instructions. Take 60 mg (3 tablets) po every morning and 40 mg (2 tablets) po every afternoon 150 tablet 1   • vitamin D (ERGOCALCIFEROL) 01946 units capsule capsule Take 50,000 Units by mouth Every 30 (Thirty) Days.     • warfarin (COUMADIN) 2.5 MG tablet Take 5mg (2 tablets) on Fridays; Take 7.5mg (3 tablets) all other days OR as directed by the [facility]. 270 tablet 0   • amoxicillin (AMOXIL) 875 MG tablet Take 1 tablet by mouth 2 (Two) Times a Day for 10 days. 20 tablet 0   • diphenhydrAMINE (BENADRYL) 25 mg capsule Take 1 capsule by mouth Every 6 (Six) Hours As Needed for Itching (swelling). 20 capsule 0   • guaiFENesin-codeine (GUAIFENESIN AC) 100-10 MG/5ML liquid Take 5 mL by mouth 3 (Three) Times a Day As Needed for Cough. 118 mL 0     No current facility-administered medications for this visit.          ____________________________________________________________________      REVIEW OF SYSTEMS    Review of Systems  Constitutional neg except per HPI   ENT sinus pressure and pain, sore throat   Resp cough   CV neg      PHYSICAL EXAMINATION  There were no vitals taken for this visit.   No acute distress.  Alert with normal thought and judgment.       REVIEWED DATA:    Labs:   Lab Results   Component Value Date    GLUCOSE 124 (H) 02/02/2022    BUN 17 02/02/2022    CREATININE 1.06 (H) 02/02/2022    EGFRIFAFRI 68 02/02/2022    BCR 16.0 02/02/2022    K 3.7 02/02/2022    CO2 24.0 02/02/2022    CALCIUM 9.4 02/02/2022    ALBUMIN 4.40 02/02/2022    LABIL2 1.2 03/07/2020    AST 17 02/02/2022    ALT 17 02/02/2022     Lab Results   Component Value Date    WBC 8.43 02/02/2022    HGB 14.8 02/02/2022    HCT 45.0 02/02/2022    .8 (H) 02/02/2022    PLT  176 02/02/2022     Lab Results   Component Value Date    TSH 2.330 10/28/2020    K8CCJLW 5.38 07/29/2020     Lab Results   Component Value Date    CHOL 129 09/03/2021    TRIG 90 09/03/2021    HDL 38 (L) 09/03/2021    LDL 74 09/03/2021     Lab Results   Component Value Date    HGBA1C 5.10 02/02/2022         Imaging:         Medical Tests:         Summary of old records / correspondence / consultant report:          Request outside records:         ALLERGIES  Allergies   Allergen Reactions   • Lisinopril Cough        PFSH:     The following portions of the patient's history were reviewed and updated as appropriate: Allergies / Current Medications / Past Medical History / Surgical History / Social History / Family History    PROBLEM LIST   Patient Active Problem List   Diagnosis   • Asthma   • Essential hypertension   • HIV (human immunodeficiency virus infection) ([facility])   • Class 2 obesity in adult   • NICM (nonischemic cardiomyopathy) ([facility])   • Nonrheumatic mitral valve regurgitation   • Nonrheumatic tricuspid valve regurgitation   • Grade II diastolic dysfunction   • Pulmonary emboli ([facility])   • HFrEF (heart failure with reduced ejection fraction) ([facility])       PAST MEDICAL HISTORY  Past Medical History:   Diagnosis Date   • AICD (automatic cardioverter/defibrillator) present 3/6/2020   • Asthma    • CHF (congestive heart failure) ([facility])    • Class 2 obesity in adult    • Grade II diastolic dysfunction     Per echocardiogram 3/2021   • HIV (human immunodeficiency virus infection) ([facility])    • Hypertension    • LBBB (left bundle branch block)    • NICM (nonischemic cardiomyopathy) ([facility])     7/2020 EF 6% per echocardiogram; AICD present   • Nonrheumatic mitral valve regurgitation 3/8/2021    Moderate per echo 3/2021   • Nonrheumatic tricuspid valve regurgitation     Moderate per echocardiogram 3/2021       SURGICAL HISTORY  Past Surgical History:   Procedure Laterality Date   • CARDIAC CATHETERIZATION     • CARDIAC  DEFIBRILLATOR PLACEMENT     •  SECTION      one C/S   • FRACTURE SURGERY Left     left ankle   • TUBAL ABDOMINAL LIGATION         SOCIAL HISTORY  Social History     Socioeconomic History   • Marital status: Legally    Tobacco Use   • Smoking status: Former Smoker     Quit date:      Years since quittin.2   • Smokeless tobacco: Never Used   Vaping Use   • Vaping Use: Never used   Substance and Sexual Activity   • Alcohol use: Not Currently     Comment: holiday and special occ//caffeine use: Occ   • Drug use: Never   • Sexual activity: Not Currently     Partners: Male           **Lyric Disclaimer:   Much of this encounter note is an electronic transcription/translation of spoken language to printed text. The electronic translation of spoken language may permit erroneous, or at times, nonsensical words or phrases to be inadvertently transcribed. Although I have reviewed the note for such errors, some may still exist.

## 2022-04-11 NOTE — PROGRESS NOTES
Anticoagulation Clinic Progress Note    Anticoagulation Summary  As of 2022    INR goal:  2.0-3.0   TTR:  50.3 % (1.3 y)   INR used for dosin.40 (2022)   Warfarin maintenance plan:  5 mg every Fri; 7.5 mg all other days   Weekly warfarin total:  50 mg   No change documented:  Lia Rothman PharmD   Plan last modified:  Chucho Mcdaniel PharmD (2022)   Next INR check:  2022   Priority:  High   Target end date:  Indefinite    Indications    Other acute pulmonary embolism  unspecified whether acute cor pulmonale present (HCC) (Resolved) [I26.99]  Acute pulmonary embolism  unspecified pulmonary embolism type  unspecified whether acute cor pulmonale present (HCC) (Resolved) [I26.99]             Anticoagulation Episode Summary     INR check location:      Preferred lab:      Send INR reminders to:  [facility] TEST PO[facility]    Comments:  **Home Testing Program**      Anticoagulation Care Providers     Provider Role Specialty Phone number    Lisset Melton MD Referring Cardiology [phone]          Clinic Interview:  Patient Findings     Negatives:  Signs/symptoms of thrombosis, Signs/symptoms of bleeding,   Laboratory test error suspected, Change in health, Change in alcohol use,   Change in activity, Upcoming invasive procedure, Emergency department   visit, Upcoming dental procedure, Missed doses, Extra doses, Change in   medications, Change in diet/appetite, Hospital admission, Bruising, Other   complaints      Clinical Outcomes     Negatives:  Major bleeding event, Thromboembolic event,   Anticoagulation-related hospital admission, Anticoagulation-related ED   visit, Anticoagulation-related fatality        INR History:  Anticoagulation Monitoring 3/21/2022 3/28/2022 2022   INR 1.40 3.20 2.40   INR Date 3/21/2022 3/28/2022 2022   INR Goal 2.0-3.0 2.0-3.0 2.0-3.0   Trend Same Same Same   Last Week Total 50 mg 52.5 mg 50 mg   Next Week Total 52.5 mg 47.5 mg 50 mg   Sun 7.5  mg 7.5 mg 7.5 mg   Mon 10 mg (3/21) 5 mg (3/28) 7.5 mg   Tue 7.5 mg 7.5 mg 7.5 mg   Wed 7.5 mg 7.5 mg 7.5 mg   Thu 7.5 mg 7.5 mg 7.5 mg   Fri 5 mg 5 mg 5 mg   Sat 7.5 mg 7.5 mg 7.5 mg   Visit Report - - -   Some recent data might be hidden       Plan:  1. INR is Therapeutic today- see above in Anticoagulation Summary.   Will instruct Nina Saez to Continue their warfarin regimen- see above in Anticoagulation Summary.  2. Follow up in 1 week  3. They have been instructed to call if any changes in medications, doses, concerns, etc. Patient expresses understanding and has no further questions at this time.    Lia Rothman, PharmD

## 2022-04-12 ENCOUNTER — CLINICAL SUPPORT (OUTPATIENT)
Dept: INTERNAL MEDICINE | Age: 47
End: 2022-04-12

## 2022-04-12 DIAGNOSIS — R09.81 SINUS CONGESTION: Primary | ICD-10-CM

## 2022-04-12 DIAGNOSIS — R05.9 COUGH: Primary | ICD-10-CM

## 2022-04-12 DIAGNOSIS — J02.9 SORE THROAT: ICD-10-CM

## 2022-04-12 LAB
EXPIRATION DATE: NORMAL
EXPIRATION DATE: NORMAL
FLUAV AG UPPER RESP QL IA.RAPID: NOT DETECTED
FLUBV AG UPPER RESP QL IA.RAPID: NOT DETECTED
INTERNAL CONTROL: NORMAL
INTERNAL CONTROL: NORMAL
Lab: NORMAL
Lab: NORMAL
S PYO AG THROAT QL: NEGATIVE
SARS-COV-2 AG UPPER RESP QL IA.RAPID: NOT DETECTED

## 2022-04-12 PROCEDURE — 87428 SARSCOV & INF VIR A&B AG IA: CPT | Performed by: NURSE PRACTITIONER

## 2022-04-12 PROCEDURE — 87880 STREP A ASSAY W/OPTIC: CPT | Performed by: NURSE PRACTITIONER

## 2022-04-13 LAB
LABCORP SARS-COV-2, NAA 2 DAY TAT: NORMAL
SARS-COV-2 RNA RESP QL NAA+PROBE: NOT DETECTED

## 2022-04-14 RX ORDER — TORSEMIDE 20 MG/1
20 TABLET ORAL SEE ADMIN INSTRUCTIONS
Qty: 150 TABLET | Refills: 1 | Status: SHIPPED | OUTPATIENT
Start: 2022-04-14 | End: 2022-06-14 | Stop reason: SDUPTHER

## 2022-04-14 NOTE — TELEPHONE ENCOUNTER
Pt called to schedule next follow up visit.  She also needs refills of Torsemide and Corlanor.  appt. scheduled next Thurs.  Refills sent to pharmacy as per request.    LOV: 2/2/22  Follow-up visit: 4/21/22    Lisha Melendrez RN  [facility]

## 2022-04-18 ENCOUNTER — ANTICOAGULATION VISIT (OUTPATIENT)
Dept: PHARMACY | Facility: HOSPITAL | Age: 47
End: 2022-04-18

## 2022-04-18 LAB — INR PPP: 2.7

## 2022-04-18 NOTE — PROGRESS NOTES
Anticoagulation Clinic Progress Note    Anticoagulation Summary  As of 2022    INR goal:  2.0-3.0   TTR:  50.9 % (1.3 y)   INR used for dosin.70 (2022)   Warfarin maintenance plan:  5 mg every Fri; 7.5 mg all other days   Weekly warfarin total:  50 mg   No change documented:  Haley Lancaster RPH   Plan last modified:  Chucho Mcdaniel, PharmD (2022)   Next INR check:  2022   Priority:  High   Target end date:  Indefinite    Indications    Other acute pulmonary embolism  unspecified whether acute cor pulmonale present (HCC) (Resolved) [I26.99]  Acute pulmonary embolism  unspecified pulmonary embolism type  unspecified whether acute cor pulmonale present (HCC) (Resolved) [I26.99]             Anticoagulation Episode Summary     INR check location:      Preferred lab:      Send INR reminders to:   [facility] TEST PO[facility]    Comments:  **Home Testing Program**      Anticoagulation Care Providers     Provider Role Specialty Phone number    Lisset Melton MD Referring Cardiology [phone]          Clinic Interview:  Patient Findings     Negatives:  Signs/symptoms of thrombosis, Signs/symptoms of bleeding,   Laboratory test error suspected, Change in health, Change in alcohol use,   Change in activity, Upcoming invasive procedure, Emergency department   visit, Upcoming dental procedure, Missed doses, Extra doses, Change in   medications, Change in diet/appetite, Hospital admission, Bruising, Other   complaints    Comments:  Reports no changes in diet and no new medications.       Clinical Outcomes     Negatives:  Major bleeding event, Thromboembolic event,   Anticoagulation-related hospital admission, Anticoagulation-related ED   visit, Anticoagulation-related fatality    Comments:  Reports no changes in diet and no new medications.         INR History:  Anticoagulation Monitoring 3/28/2022 2022 2022   INR 3.20 2.40 2.70   INR Date 3/28/2022 2022 2022   INR Goal  2.0-3.0 2.0-3.0 2.0-3.0   Trend Same Same Same   Last Week Total 52.5 mg 50 mg 50 mg   Next Week Total 47.5 mg 50 mg 50 mg   Sun 7.5 mg 7.5 mg 7.5 mg   Mon 5 mg (3/28) 7.5 mg 7.5 mg   Tue 7.5 mg 7.5 mg 7.5 mg   Wed 7.5 mg 7.5 mg 7.5 mg   Thu 7.5 mg 7.5 mg 7.5 mg   Fri 5 mg 5 mg 5 mg   Sat 7.5 mg 7.5 mg 7.5 mg   Visit Report - - -   Some recent data might be hidden       Plan:  1. INR is Therapeutic today- see above in Anticoagulation Summary.   Will instruct Nina Saez to Continue their warfarin regimen- see above in Anticoagulation Summary.  2. Follow up in 1 weeks  3. They have been instructed to call if any changes in medications, doses, concerns, etc. Patient expresses understanding and has no further questions at this time.    Haley Lancaster RP

## 2022-04-22 ENCOUNTER — HOSPITAL ENCOUNTER (OUTPATIENT)
Dept: CARDIOLOGY | Facility: HOSPITAL | Age: 47
Discharge: HOME | End: 2022-04-22
Admitting: NURSE PRACTITIONER

## 2022-04-22 ENCOUNTER — TELEPHONE (OUTPATIENT)
Dept: CARDIOLOGY | Facility: CLINIC | Age: 47
End: 2022-04-22

## 2022-04-22 VITALS
DIASTOLIC BLOOD PRESSURE: 80 MMHG | HEART RATE: 51 BPM | WEIGHT: 214.2 LBS | RESPIRATION RATE: 20 BRPM | BODY MASS INDEX: 34.42 KG/M2 | HEIGHT: 66 IN | SYSTOLIC BLOOD PRESSURE: 138 MMHG | OXYGEN SATURATION: 96 %

## 2022-04-22 DIAGNOSIS — J45.20 MILD INTERMITTENT ASTHMA WITHOUT COMPLICATION: ICD-10-CM

## 2022-04-22 DIAGNOSIS — I51.89 GRADE II DIASTOLIC DYSFUNCTION: ICD-10-CM

## 2022-04-22 DIAGNOSIS — B20 HIV INFECTION, UNSPECIFIED SYMPTOM STATUS: ICD-10-CM

## 2022-04-22 DIAGNOSIS — R79.9 ABNORMAL FINDING OF BLOOD CHEMISTRY, UNSPECIFIED: ICD-10-CM

## 2022-04-22 DIAGNOSIS — I10 ESSENTIAL HYPERTENSION: ICD-10-CM

## 2022-04-22 DIAGNOSIS — I26.99 PULMONARY EMBOLISM, OTHER, UNSPECIFIED CHRONICITY, UNSPECIFIED WHETHER ACUTE COR PULMONALE PRESENT: ICD-10-CM

## 2022-04-22 DIAGNOSIS — I50.20 HFREF (HEART FAILURE WITH REDUCED EJECTION FRACTION): Primary | ICD-10-CM

## 2022-04-22 DIAGNOSIS — I42.8 NICM (NONISCHEMIC CARDIOMYOPATHY): ICD-10-CM

## 2022-04-22 LAB
ALBUMIN SERPL-MCNC: 4.2 G/DL (ref 3.5–5.2)
ALBUMIN/GLOB SERPL: 1.4 G/DL
ALP SERPL-CCNC: 113 U/L (ref 39–117)
ALT SERPL W P-5'-P-CCNC: 28 U/L (ref 1–33)
ANION GAP SERPL CALCULATED.3IONS-SCNC: 13.3 MMOL/L (ref 5–15)
AST SERPL-CCNC: 23 U/L (ref 1–32)
BASOPHILS # BLD AUTO: 0.02 10*3/MM3 (ref 0–0.2)
BASOPHILS NFR BLD AUTO: 0.3 % (ref 0–1.5)
BILIRUB SERPL-MCNC: 0.5 MG/DL (ref 0–1.2)
BUN SERPL-MCNC: 16 MG/DL (ref 6–20)
BUN/CREAT SERPL: 13.7 (ref 7–25)
CALCIUM SPEC-SCNC: 9 MG/DL (ref 8.6–10.5)
CHLORIDE SERPL-SCNC: 98 MMOL/L (ref 98–107)
CHOLEST SERPL-MCNC: 145 MG/DL (ref 0–200)
CO2 SERPL-SCNC: 24.7 MMOL/L (ref 22–29)
CREAT SERPL-MCNC: 1.17 MG/DL (ref 0.57–1)
DEPRECATED RDW RBC AUTO: 43.7 FL (ref 37–54)
EGFRCR SERPLBLD CKD-EPI 2021: 58.4 ML/MIN/1.73
EOSINOPHIL # BLD AUTO: 0.02 10*3/MM3 (ref 0–0.4)
EOSINOPHIL NFR BLD AUTO: 0.3 % (ref 0.3–6.2)
ERYTHROCYTE [DISTWIDTH] IN BLOOD BY AUTOMATED COUNT: 11.9 % (ref 12.3–15.4)
FERRITIN SERPL-MCNC: 74 NG/ML (ref 13–150)
GLOBULIN UR ELPH-MCNC: 2.9 GM/DL
GLUCOSE SERPL-MCNC: 106 MG/DL (ref 65–99)
HBA1C MFR BLD: 5.5 % (ref 4.8–5.6)
HCT VFR BLD AUTO: 39.3 % (ref 34–46.6)
HDLC SERPL-MCNC: 40 MG/DL (ref 40–60)
HGB BLD-MCNC: 12.9 G/DL (ref 12–15.9)
IMM GRANULOCYTES # BLD AUTO: 0.01 10*3/MM3 (ref 0–0.05)
IMM GRANULOCYTES NFR BLD AUTO: 0.1 % (ref 0–0.5)
IRON 24H UR-MRATE: 131 MCG/DL (ref 37–145)
IRON SATN MFR SERPL: 28 % (ref 20–50)
LDLC SERPL CALC-MCNC: 81 MG/DL (ref 0–100)
LDLC/HDLC SERPL: 1.95 {RATIO}
LYMPHOCYTES # BLD AUTO: 1.86 10*3/MM3 (ref 0.7–3.1)
LYMPHOCYTES NFR BLD AUTO: 25.9 % (ref 19.6–45.3)
MCH RBC QN AUTO: 32.9 PG (ref 26.6–33)
MCHC RBC AUTO-ENTMCNC: 32.8 G/DL (ref 31.5–35.7)
MCV RBC AUTO: 100.3 FL (ref 79–97)
MONOCYTES # BLD AUTO: 0.77 10*3/MM3 (ref 0.1–0.9)
MONOCYTES NFR BLD AUTO: 10.7 % (ref 5–12)
NEUTROPHILS NFR BLD AUTO: 4.5 10*3/MM3 (ref 1.7–7)
NEUTROPHILS NFR BLD AUTO: 62.7 % (ref 42.7–76)
NRBC BLD AUTO-RTO: 0 /100 WBC (ref 0–0.2)
NT-PROBNP SERPL-MCNC: 375 PG/ML (ref 0–450)
PLATELET # BLD AUTO: 178 10*3/MM3 (ref 140–450)
PMV BLD AUTO: 12.3 FL (ref 6–12)
POTASSIUM SERPL-SCNC: 4 MMOL/L (ref 3.5–5.2)
PROT SERPL-MCNC: 7.1 G/DL (ref 6–8.5)
RBC # BLD AUTO: 3.92 10*6/MM3 (ref 3.77–5.28)
SODIUM SERPL-SCNC: 136 MMOL/L (ref 136–145)
TIBC SERPL-MCNC: 465 MCG/DL (ref 298–536)
TRANSFERRIN SERPL-MCNC: 312 MG/DL (ref 200–360)
TRIGL SERPL-MCNC: 136 MG/DL (ref 0–150)
VLDLC SERPL-MCNC: 24 MG/DL (ref 5–40)
WBC NRBC COR # BLD: 7.18 10*3/MM3 (ref 3.4–10.8)

## 2022-04-22 PROCEDURE — 83036 HEMOGLOBIN GLYCOSYLATED A1C: CPT

## 2022-04-22 PROCEDURE — G0463 HOSPITAL OUTPT CLINIC VISIT: HCPCS

## 2022-04-22 PROCEDURE — 85025 COMPLETE CBC W/AUTO DIFF WBC: CPT

## 2022-04-22 PROCEDURE — 99214 OFFICE O/P EST MOD 30 MIN: CPT | Performed by: NURSE PRACTITIONER

## 2022-04-22 PROCEDURE — 80053 COMPREHEN METABOLIC PANEL: CPT

## 2022-04-22 PROCEDURE — 84466 ASSAY OF TRANSFERRIN: CPT

## 2022-04-22 PROCEDURE — 80061 LIPID PANEL: CPT

## 2022-04-22 PROCEDURE — 82728 ASSAY OF FERRITIN: CPT

## 2022-04-22 PROCEDURE — 83880 ASSAY OF NATRIURETIC PEPTIDE: CPT

## 2022-04-22 PROCEDURE — 83540 ASSAY OF IRON: CPT

## 2022-04-22 NOTE — PROGRESS NOTES
[facility] [place] [facility]      Patient Name: Nina Saez  :1975  Age: 46 y.o.  Sex: female  Referring Provider: Lisset Melton MD   Primary Cardiologist: Lisset Melton MD  Encounter Provider:  SAMUEL Cisneros      Chief Complaint:   Chief Complaint   Patient presents with   • Congestive Heart Failure         Congestive Heart Failure  Associated symptoms include fatigue. Pertinent negatives include no chest pain, palpitations, shortness of breath or unexpected weight change.    this 46-year-old female, known to this provider, comes to us today for further evaluation of her chronic systolic heart failure.  Current diagnoses to include severe nonischemic cardiomyopathy, left bundle branch block, hypertension, HIV, obesity, and asthma.    Historically she had followed with Dr. Laina Boyd at [facility].  In spring 2019 she was visiting family in [place] and was taken emergently to [facility].  Echocardiogram and cardiac catheterization were performed at that point, carvedilol was changed to metoprolol.    In 2019 she presented through the emergency department at [facility] with chest pain and shortness of breath.  She was treated with IV diuresis, troponin was negative x2 and proBNP was elevated at 5151.  Entresto was started at that point given her severe dilated cardiomyopathy.  She was to follow with cardiology at [facility] at that time.    In 2020 she again presented to the emergency department with shortness of breath and cough x3 weeks.  BNP was elevated at 13,351.  Carvedilol was discontinued that admission given hypotension.  At subsequent outpatient appointment it was felt that Bumex was less effective than Lasix by the patient.  She complained of progressively worsening fatigue.  AICD, single-chamber [employer], interrogation 2020 revealed 10-16 beats of VT.    On September 10, 2020 she  saw Dr. Jamar Azul MD, for consideration of upgrade to biventricular device.  At that point he felt that her left bundle branch block was incomplete and she did not meet criteria for implantation of CRT-D.    She presented 10/27/2020 to [facility] with complaints of acute on chronic shortness of breath that began approximately 1 week prior.  BNP was further elevated at 16,206 that point.  Pulmonary edema was noted on chest x-ray.  Spironolactone was started that admission.  Referral for evaluation by [facility] services was made November 2, 2020.    Left and right cardiac catheterization at [facility] revealed no obstructive CAD with normal filling pressures.  Echocardiogram at that time revealed an EF less than 15% with global hypokinesis-information extracted from external medical record note.  Cardiac catheterization May 30, 2017 performed at [facility] yielded codominant system with normal left main, LAD with distal 30% stenosis, RCA normal, LVEDP 7 mmHg.    Echocardiogram July 9, 2020 revealed EF of 6%, grade 3 LV diastolic dysfunction, significant LV wall motion hypokinesis/akinesis, RVSP 45 to 55 mmHg secondary to moderate tricuspid valve regurgitation, moderate to severe MR noted.    Jardiance 10 mg daily was started on 11/13/2020.      She was admitted from 12/11/2020 to 12/17/2020 for acute pulmonary embolism with right lower lobe pulmonary infarct.  She was started on warfarin at that time.  Additionally, she has been started on Corlanor as well as spironolactone for her heart failure.  January 4, 2021 she was evaluated and treated in the emergency department for possible angioedema.      Entresto has now been increased to  mg twice daily.  Repeat echocardiogram 3/8/2021 revealed improvement in ejection fraction from 6% to 22% with severe LV dilation, severe global hypokinesis and grade 2 LV diastolic dysfunction.    She has elected at this  time not to proceed with transplant work-up just yet.  She follows up with  on November 16.  She recently saw Dr. Melton and her carvedilol was increased to 50 mg at night with a goal of SBP of .    Her HIV medications were changed recently and per chart review she has been having more headaches since then.  She has been exercising daily for 30 minutes.  She is currently complaining of a slow insidious weight gain that she has tried to combat with exercise, many different diets, watching her sodium intake religiously and strictly.      The following portions of the patient's history were reviewed and updated as appropriate: allergies, current medications, past family history, past medical history, past social history, past surgical history and problem list.    Current Outpatient Medications   Medication Sig Dispense Refill   • acetaminophen (TYLENOL) 325 MG tablet Take 650 mg by mouth Every 6 (Six) Hours As Needed for Mild Pain .     • albuterol sulfate  (90 Base) MCG/ACT inhaler Inhale 2 puffs Every 4 (Four) Hours As Needed for Wheezing. 18 g 5   • atorvastatin (LIPITOR) 10 MG tablet Take 1 tablet by mouth Daily. 30 tablet 11   • carvedilol (COREG) 3.125 MG tablet Take 1 tablet by mouth See Admin Instructions. Take 3.125 mg (1 tablet) every morning and 6.25 mg (2 tablets) every evening 90 tablet 5   • Findl-Yzavn-Deeoxxtl-TenofAF (Symtuza) 141-469-419-10 MG per tablet Take 1 tablet by mouth Daily.     • Diclofenac Sodium (VOLTAREN) 1 % gel gel Apply 4 g topically to the appropriate area as directed 2 (Two) Times a Day. Use per pkg insert 100 g 2   • diphenhydrAMINE (BENADRYL) 25 mg capsule Take 1 capsule by mouth Every 6 (Six) Hours As Needed for Itching (swelling). 20 capsule 0   • empagliflozin (Jardiance) 10 MG tablet tablet Take 1 tablet by mouth Daily. 30 tablet 11   • fluticasone (Flonase) 50 MCG/ACT nasal spray 2 sprays into the nostril(s) as directed by provider Daily. 1 bottle 2   •  guaiFENesin-codeine (GUAIFENESIN AC) 100-10 MG/5ML liquid Take 5 mL by mouth 3 (Three) Times a Day As Needed for Cough. 118 mL 0   • ivabradine HCl (Corlanor) 7.5 MG tablet tablet Take 1 tablet by mouth 2 (Two) Times a Day With Meals. 60 tablet 5   • nitroglycerin (NITROSTAT) 0.4 MG SL tablet Place 1 tablet under the tongue Every 5 (Five) Minutes As Needed for Chest Pain. Take no more than 3 doses in 15 minutes. 30 tablet 5   • potassium chloride (K-DUR,KLOR-CON) 20 MEQ CR tablet Take 3 tablets by mouth once daily 90 tablet 11   • sacubitril-valsartan (Entresto)  MG tablet Take 1 tablet by mouth 2 (Two) Times a Day. 180 tablet 3   • sertraline (Zoloft) 50 MG tablet Take 1 tablet by mouth Daily. 90 tablet 3   • spironolactone (ALDACTONE) 25 MG tablet 25 mg Daily.     • torsemide (DEMADEX) 20 MG tablet Take 1 tablet by mouth See Admin Instructions. Take 60 mg (3 tablets) po every morning and 40 mg (2 tablets) po every afternoon 150 tablet 1   • vitamin D (ERGOCALCIFEROL) 85064 units capsule capsule Take 50,000 Units by mouth Every 30 (Thirty) Days.     • warfarin (COUMADIN) 2.5 MG tablet Take 5mg (2 tablets) on Fridays; Take 7.5mg (3 tablets) all other days OR as directed by the [facility]. 270 tablet 0     No current facility-administered medications for this encounter.       Past Medical History:   Diagnosis Date   • AICD (automatic cardioverter/defibrillator) present 3/6/2020   • Asthma    • CHF (congestive heart failure) ([facility])    • Class 2 obesity in adult    • Grade II diastolic dysfunction     Per echocardiogram 3/2021   • HIV (human immunodeficiency virus infection) ([facility])    • Hypertension    • LBBB (left bundle branch block)    • NICM (nonischemic cardiomyopathy) ([facility])     7/2020 EF 6% per echocardiogram; AICD present   • Nonrheumatic mitral valve regurgitation 3/8/2021    Moderate per echo 3/2021   • Nonrheumatic tricuspid valve regurgitation     Moderate per echocardiogram 3/2021       Past  Surgical History:   Procedure Laterality Date   • CARDIAC CATHETERIZATION     • CARDIAC DEFIBRILLATOR PLACEMENT     •  SECTION      one C/S   • FRACTURE SURGERY Left     left ankle   • TUBAL ABDOMINAL LIGATION         Physical Exam  Vitals and nursing note reviewed.   Constitutional:       General: She is not in acute distress.     Appearance: She is well-developed. She is not ill-appearing.   HENT:      Head: Normocephalic and atraumatic.   Eyes:      Conjunctiva/sclera: Conjunctivae normal.      Pupils: Pupils are equal, round, and reactive to light.   Neck:      Vascular: No JVD.   Cardiovascular:      Rate and Rhythm: Normal rate and regular rhythm.      Heart sounds: Normal heart sounds. No murmur heard.    No friction rub. No gallop.   Pulmonary:      Effort: Pulmonary effort is normal. No respiratory distress.      Breath sounds: Normal breath sounds.   Abdominal:      General: Bowel sounds are normal. There is no distension.      Palpations: Abdomen is soft.   Musculoskeletal:         General: No swelling or deformity.   Skin:     General: Skin is warm and dry.      Capillary Refill: Capillary refill takes less than 2 seconds.   Neurological:      Mental Status: She is alert and oriented to person, place, and time. Mental status is at baseline.   Psychiatric:         Attention and Perception: Attention normal.         Mood and Affect: Mood normal. Affect is tearful.         Behavior: Behavior normal. Behavior is cooperative.          Review of Systems   Constitutional: Positive for fatigue. Negative for appetite change and unexpected weight change.   HENT: Negative for congestion and nosebleeds.    Eyes: Negative for photophobia and visual disturbance.   Respiratory: Negative for cough, chest tightness and shortness of breath.    Cardiovascular: Negative for chest pain, palpitations and leg swelling.   Gastrointestinal: Negative for abdominal distention and blood in stool.   Endocrine: Negative for  "polyphagia and polyuria.   Genitourinary: Positive for frequency. Negative for enuresis.   Musculoskeletal: Negative for joint swelling and myalgias.   Skin: Negative for pallor and rash.   Neurological: Negative for dizziness, syncope, weakness, light-headedness, numbness and headaches.   Hematological: Does not bruise/bleed easily.   Psychiatric/Behavioral: Negative for decreased concentration and sleep disturbance.        OBJECTIVE:  /80 (BP Location: Left arm, Patient Position: Sitting)   Pulse 51   Resp 20   Ht 167.6 cm (65.98\")   Wt 97.2 kg (214 lb 3.2 oz)   SpO2 96%   BMI 34.59 kg/m²      Body mass index is 34.59 kg/m².  Wt Readings from Last 1 Encounters:   04/22/22 97.2 kg (214 lb 3.2 oz)       Lab Review:  Renal Function: CrCl cannot be calculated (Patient's most recent lab result is older than the maximum 30 days allowed.).    Lab Results   Component Value Date    PROBNP 1,002.0 (H) 02/02/2022       Results for orders placed during the hospital encounter of 12/15/21    Adult Transthoracic Echo Complete W/ Cont if Necessary Per Protocol    Interpretation Summary  · Estimated right ventricular systolic pressure from tricuspid regurgitation is normal (<35 mmHg).  · Mildly reduced right ventricular systolic function noted.  · Left ventricular diastolic function is consistent with (grade II w/high LAP) pseudonormalization.  · The left ventricular cavity is severely dilated.  · Estimated left ventricular EF was in disagreement with the calculated left ventricular EF. Left ventricular ejection fraction appears to be 21 - 25%. Left ventricular systolic function is severely decreased.  · There is left ventricular global hypokinesis noted.        6 MINUTE WALK  Patient declined       Cardiac Procedures:  1. Cardiac catheterization [facility] 5/30/17       2.  R/[facility] 4/2019   Data deficit      ASSESSMENT:     Diagnosis Plan   1. HFrEF (heart failure with reduced ejection fraction) " ([facility])  CBC & Differential    Comprehensive Metabolic Panel    BNP    Iron Profile    Ferritin    Lipid Panel    Hemoglobin A1c   2. Abnormal finding of blood chemistry, unspecified   Hemoglobin A1c   3. Pulmonary embolism, other, unspecified chronicity, unspecified whether acute cor pulmonale present ([facility])     4. Grade II diastolic dysfunction     5. NICM (nonischemic cardiomyopathy) ([facility])     6. HIV infection, unspecified symptom status ([facility])     7. Essential hypertension     8. Mild intermittent asthma without complication           PLAN OF CARE:  1.  HFrEF-NYHA functional class II.  Most recent EF per echocardiogram 22%, up from 6%. Currently she is on Entresto, torsemide, carvedilol, ivabradine, spironolactone, and Jardiance.  Historically she has been unable to tolerate metoprolol succinate as it made her very dizzy.      She remains euvolemic on exam and I would like to continue her current GDMT.  She is scheduled to see  May 19 next.  I am curious if there is any room for use of an GLP-1 in her instance with her heart failure in terms of her weight loss.  I am going to speak with Dr. Morgan and our pharmacist about this to get their thoughts on the matter as well as Dr. Melton.  I would like her to continue with her exercising, low-sodium diet of 2000 mg daily or less, as well as her fluid restriction of 60 ounces daily and her adherence with daily weights.  Labs today as below.    Directions for when to call the clinic reviewed with the patient to include weight gain of 2 to 3 pounds in 24 hours, weight gain of 5 to 10 pounds within 7 days; worsening shortness of breath; worsening lower extremity edema or abdominal distention.    2.  Nonobstructive CAD-diffuse 30% LAD lesion noted on prior cardiac catheterization as above.  Stable, remains without angina    3.  Nonischemic cardiomyopathy (dilated)-presence of AICD noted.  EF per echocardiogram was 6%, now improved to 22% per echocardiogram as of  3/8/2021.  Most recent AICD interrogation as above.  Now on target dosing of Entresto, she continues to tolerate this well.    4.  NSVT-noted on prior device interrogation.  Currently on beta-blockade.    5.  HIV-history noted.  Followed at [facility].    6.  Pulmonary embolism-remains on on warfarin.  Followed by home health and primary cardiology team.    7.  Hyponatremia- resolved    CBC/CMP/BNP/lipid/hemoglobin A1c/iron profile; follow-up in 3 weeks or sooner if needed      Thank you for allowing me to participate in the care of your patient,         Kiley Boyd SAMUEL  [facility] [place] HEART FAILURE  04/22/22  13:56 EST      **Lyric Disclaimer:**  Much of this encounter note is an electronic transcription/translation of spoken language to printed text. The electronic translation of spoken language may permit erroneous, or at times, nonsensical words or phrases to be inadvertently transcribed. Although I have reviewed the note for such errors, some may still exist.

## 2022-04-22 NOTE — ADDENDUM NOTE
Encounter addended by: Lisha Melendrez RN on: 4/22/2022 2:50 PM   Actions taken: Charge Capture section accepted

## 2022-04-25 ENCOUNTER — ANTICOAGULATION VISIT (OUTPATIENT)
Dept: PHARMACY | Facility: HOSPITAL | Age: 47
End: 2022-04-25

## 2022-04-25 LAB — INR PPP: 3

## 2022-04-25 PROCEDURE — G0249 PROVIDE INR TEST MATER/EQUIP: HCPCS

## 2022-04-25 NOTE — PROGRESS NOTES
Anticoagulation Clinic Progress Note    Anticoagulation Summary  As of 4/25/2022    INR goal:  2.0-3.0   TTR:  51.7 % (1.3 y)   INR used for dosing:  3.00 (4/25/2022)   Warfarin maintenance plan:  5 mg every Fri; 7.5 mg all other days   Weekly warfarin total:  50 mg   No change documented:  Zina De La Rosa RPH   Plan last modified:  Chucho Mcdaniel, PharmD (2/7/2022)   Next INR check:  5/2/2022   Priority:  High   Target end date:  Indefinite    Indications    Other acute pulmonary embolism  unspecified whether acute cor pulmonale present (HCC) (Resolved) [I26.99]  Acute pulmonary embolism  unspecified pulmonary embolism type  unspecified whether acute cor pulmonale present (HCC) (Resolved) [I26.99]             Anticoagulation Episode Summary     INR check location:      Preferred lab:      Send INR reminders to:   [facility]    Comments:  **Home Testing Program**      Anticoagulation Care Providers     Provider Role Specialty Phone number    Lisset Melton MD Referring Cardiology [phone]          Clinic Interview:  No pertinent clinical findings have been reported.    INR History:  Anticoagulation Monitoring 4/11/2022 4/18/2022 4/25/2022   INR 2.40 2.70 3.00   INR Date 4/11/2022 4/18/2022 4/25/2022   INR Goal 2.0-3.0 2.0-3.0 2.0-3.0   Trend Same Same Same   Last Week Total 50 mg 50 mg 50 mg   Next Week Total 50 mg 50 mg 50 mg   Sun 7.5 mg 7.5 mg 7.5 mg   Mon 7.5 mg 7.5 mg 7.5 mg   Tue 7.5 mg 7.5 mg 7.5 mg   Wed 7.5 mg 7.5 mg 7.5 mg   Thu 7.5 mg 7.5 mg 7.5 mg   Fri 5 mg 5 mg 5 mg   Sat 7.5 mg 7.5 mg 7.5 mg   Visit Report - - -   Some recent data might be hidden       Plan:  1. INR is therapeutic today- see above in Anticoagulation Summary.    Nina Saez to continue their warfarin regimen- see above in Anticoagulation Summary.  2. Follow up in 1 week  3. Pt has agreed to only be called if INR out of range. They have been instructed to call if any changes in medications, doses,  concerns, etc. Patient expresses understanding and has no further questions at this time.    Zina De La Rosa, [facility]

## 2022-04-28 ENCOUNTER — LAB (OUTPATIENT)
Dept: LAB | Facility: HOSPITAL | Age: 47
End: 2022-04-28

## 2022-04-28 ENCOUNTER — CONSULT (OUTPATIENT)
Dept: ONCOLOGY | Facility: CLINIC | Age: 47
End: 2022-04-28

## 2022-04-28 VITALS
HEART RATE: 68 BPM | SYSTOLIC BLOOD PRESSURE: 115 MMHG | DIASTOLIC BLOOD PRESSURE: 80 MMHG | WEIGHT: 214.3 LBS | BODY MASS INDEX: 33.63 KG/M2 | OXYGEN SATURATION: 96 % | RESPIRATION RATE: 16 BRPM | HEIGHT: 67 IN | TEMPERATURE: 96.9 F

## 2022-04-28 DIAGNOSIS — Z86.711 HX OF PULMONARY EMBOLUS: Primary | ICD-10-CM

## 2022-04-28 DIAGNOSIS — Z79.01 CHRONIC ANTICOAGULATION: ICD-10-CM

## 2022-04-28 DIAGNOSIS — I26.99 PULMONARY EMBOLISM, OTHER, UNSPECIFIED CHRONICITY, UNSPECIFIED WHETHER ACUTE COR PULMONALE PRESENT: Primary | ICD-10-CM

## 2022-04-28 LAB
BASOPHILS # BLD AUTO: 0.04 10*3/MM3 (ref 0–0.2)
BASOPHILS NFR BLD AUTO: 0.4 % (ref 0–1.5)
DEPRECATED RDW RBC AUTO: 43.8 FL (ref 37–54)
EOSINOPHIL # BLD AUTO: 0.05 10*3/MM3 (ref 0–0.4)
EOSINOPHIL NFR BLD AUTO: 0.5 % (ref 0.3–6.2)
ERYTHROCYTE [DISTWIDTH] IN BLOOD BY AUTOMATED COUNT: 12.3 % (ref 12.3–15.4)
HCT VFR BLD AUTO: 41.4 % (ref 34–46.6)
HGB BLD-MCNC: 14.1 G/DL (ref 12–15.9)
IMM GRANULOCYTES # BLD AUTO: 0.07 10*3/MM3 (ref 0–0.05)
IMM GRANULOCYTES NFR BLD AUTO: 0.7 % (ref 0–0.5)
LYMPHOCYTES # BLD AUTO: 2.65 10*3/MM3 (ref 0.7–3.1)
LYMPHOCYTES NFR BLD AUTO: 28.1 % (ref 19.6–45.3)
MCH RBC QN AUTO: 32.7 PG (ref 26.6–33)
MCHC RBC AUTO-ENTMCNC: 34.1 G/DL (ref 31.5–35.7)
MCV RBC AUTO: 96.1 FL (ref 79–97)
MONOCYTES # BLD AUTO: 0.79 10*3/MM3 (ref 0.1–0.9)
MONOCYTES NFR BLD AUTO: 8.4 % (ref 5–12)
NEUTROPHILS NFR BLD AUTO: 5.84 10*3/MM3 (ref 1.7–7)
NEUTROPHILS NFR BLD AUTO: 61.9 % (ref 42.7–76)
NRBC BLD AUTO-RTO: 0 /100 WBC (ref 0–0.2)
PLATELET # BLD AUTO: 130 10*3/MM3 (ref 140–450)
PMV BLD AUTO: 12.6 FL (ref 6–12)
RBC # BLD AUTO: 4.31 10*6/MM3 (ref 3.77–5.28)
WBC NRBC COR # BLD: 9.44 10*3/MM3 (ref 3.4–10.8)

## 2022-04-28 PROCEDURE — 85025 COMPLETE CBC W/AUTO DIFF WBC: CPT

## 2022-04-28 PROCEDURE — 36415 COLL VENOUS BLD VENIPUNCTURE: CPT

## 2022-04-28 PROCEDURE — 99204 OFFICE O/P NEW MOD 45 MIN: CPT | Performed by: INTERNAL MEDICINE

## 2022-04-28 NOTE — PROGRESS NOTES
Subjective     REASON FOR CONSULTATION:  Provide an opinion on any further workup or treatment on:    Pulmonary embolism                       REQUESTING PHYSICIAN: Lisset Melton MD    RECORDS OBTAINED: Records of the patients history including those obtained from the referring provider were reviewed and summarized in detail.    HISTORY OF PRESENT ILLNESS:    Nina Saez is a 46 y.o. patient who was referred for evaluation of pulmonary embolism.  Patient has HIV and she is currently on treatment with Symtuza.  She has essential hypertension, severe dilated nonischemic cardiomyopathy with chronic systolic congestive heart failure.  She has AICD.  She is obese.  She had a fracture in the left ankle on 3/6/2020.  She was hospitalized at [place] and had surgery on 3/7/2020.  As result of the surgery and due to the COVID-19 pandemic, her activity significantly declined.  In November 2020, she started having recurrent cough.  Her symptoms worsened and she presented to the ER on 12/11/2020 and was diagnosed with pulmonary embolism.  She was placed on IV heparin.  She was transitioned to warfarin.  She is taking warfarin regularly.  She is not having problems with bleeding.  She checks her INR once weekly.    Patient recently saw her cardiologist who referred her to our office for evaluation of the need to continue anticoagulation.     REVIEW OF SYSTEMS:  Review of Systems   Constitutional: Positive for unexpected weight change. Negative for fatigue and fever.   HENT: Negative for nosebleeds and voice change.    Eyes: Negative for visual disturbance.   Respiratory: Negative for cough and shortness of breath.    Cardiovascular: Negative for chest pain and leg swelling.   Gastrointestinal: Negative for abdominal pain, blood in stool, constipation, diarrhea, nausea and vomiting.   Genitourinary: Negative for frequency and hematuria.   Musculoskeletal: Negative for back pain and joint swelling.   Skin: Negative for  rash.   Neurological: Negative for dizziness and headaches.   Hematological: Negative for adenopathy. Does not bruise/bleed easily.   Psychiatric/Behavioral: Negative for dysphoric mood. The patient is not nervous/anxious.        Past Medical History:   Diagnosis Date   • AICD (automatic cardioverter/defibrillator) present 2020   • Asthma    • CHF (congestive heart failure) ([facility])    • Class 2 obesity in adult    • Grade II diastolic dysfunction     Per echocardiogram 3/2021   • H/O Closed trimalleolar fracture    • HIV (human immunodeficiency virus infection) ([facility])    • Hypertension    • LBBB (left bundle branch block)    • NICM (nonischemic cardiomyopathy) ([facility])     2020 EF 6% per echocardiogram; AICD present   • Nonrheumatic mitral valve regurgitation 2021    Moderate per echo 3/2021   • Nonrheumatic tricuspid valve regurgitation     Moderate per echocardiogram 3/2021     Past Surgical History:   Procedure Laterality Date   • CARDIAC CATHETERIZATION     • CARDIAC DEFIBRILLATOR PLACEMENT     •  SECTION      one C/S   • FRACTURE SURGERY Left     left ankle   • TUBAL ABDOMINAL LIGATION         Social History     Socioeconomic History   • Marital status: Legally    Tobacco Use   • Smoking status: Former Smoker     Packs/day: 0.50     Years: 20.00     Pack years: 10.00     Quit date:      Years since quittin.3   • Smokeless tobacco: Never Used   Vaping Use   • Vaping Use: Never used   Substance and Sexual Activity   • Alcohol use: Not Currently     Comment: holiday and special occ//caffeine use: Occ   • Drug use: Never   • Sexual activity: Not Currently     Partners: Male       Family History   Problem Relation Age of Onset   • Cancer Mother    • Breast cancer Mother    • Bone cancer Mother    • Ovarian cysts Daughter    • No Known Problems Daughter    • No Known Problems Daughter    • Prostate cancer Paternal Uncle    • Diabetes Maternal Grandmother    • Breast cancer  Maternal Grandmother    • Heart disease Maternal Grandmother    • Hypertension Maternal Grandfather    • Hyperlipidemia Maternal Grandfather    • Diabetes Maternal Grandfather    • Prostate cancer Maternal Grandfather    • Breast cancer Paternal Grandmother    • Pancreatic cancer Paternal Grandmother    • Ovarian cancer Neg Hx    • Uterine cancer Neg Hx    • Colon cancer Neg Hx         MEDICATIONS:    Current Outpatient Medications:   •  acetaminophen (TYLENOL) 325 MG tablet, Take 650 mg by mouth Every 6 (Six) Hours As Needed for Mild Pain ., Disp: , Rfl:   •  albuterol sulfate  (90 Base) MCG/ACT inhaler, Inhale 2 puffs Every 4 (Four) Hours As Needed for Wheezing., Disp: 18 g, Rfl: 5  •  atorvastatin (LIPITOR) 10 MG tablet, Take 1 tablet by mouth Daily., Disp: 30 tablet, Rfl: 11  •  carvedilol (COREG) 3.125 MG tablet, Take 1 tablet by mouth See Admin Instructions. Take 3.125 mg (1 tablet) every morning and 6.25 mg (2 tablets) every evening, Disp: 90 tablet, Rfl: 5  •  Gfpco-Bvdit-Kiqdzgcr-TenofAF (Symtuza) [phone] MG per tablet, Take 1 tablet by mouth Daily., Disp: , Rfl:   •  Diclofenac Sodium (VOLTAREN) 1 % gel gel, Apply 4 g topically to the appropriate area as directed 2 (Two) Times a Day. Use per pkg insert, Disp: 100 g, Rfl: 2  •  diphenhydrAMINE (BENADRYL) 25 mg capsule, Take 1 capsule by mouth Every 6 (Six) Hours As Needed for Itching (swelling)., Disp: 20 capsule, Rfl: 0  •  empagliflozin (Jardiance) 10 MG tablet tablet, Take 1 tablet by mouth Daily., Disp: 30 tablet, Rfl: 11  •  fluticasone (Flonase) 50 MCG/ACT nasal spray, 2 sprays into the nostril(s) as directed by provider Daily., Disp: 1 bottle, Rfl: 2  •  guaiFENesin-codeine (GUAIFENESIN AC) 100-10 MG/5ML liquid, Take 5 mL by mouth 3 (Three) Times a Day As Needed for Cough., Disp: 118 mL, Rfl: 0  •  ivabradine HCl (Corlanor) 7.5 MG tablet tablet, Take 1 tablet by mouth 2 (Two) Times a Day With Meals., Disp: 60 tablet, Rfl: 5  •   "nitroglycerin (NITROSTAT) 0.4 MG SL tablet, Place 1 tablet under the tongue Every 5 (Five) Minutes As Needed for Chest Pain. Take no more than 3 doses in 15 minutes., Disp: 30 tablet, Rfl: 5  •  potassium chloride (K-DUR,KLOR-CON) 20 MEQ CR tablet, Take 3 tablets by mouth once daily, Disp: 90 tablet, Rfl: 11  •  sacubitril-valsartan (Entresto)  MG tablet, Take 1 tablet by mouth 2 (Two) Times a Day., Disp: 180 tablet, Rfl: 3  •  sertraline (Zoloft) 50 MG tablet, Take 1 tablet by mouth Daily., Disp: 90 tablet, Rfl: 3  •  spironolactone (ALDACTONE) 25 MG tablet, 25 mg Daily., Disp: , Rfl:   •  torsemide (DEMADEX) 20 MG tablet, Take 1 tablet by mouth See Admin Instructions. Take 60 mg (3 tablets) po every morning and 40 mg (2 tablets) po every afternoon, Disp: 150 tablet, Rfl: 1  •  vitamin D (ERGOCALCIFEROL) 28864 units capsule capsule, Take 50,000 Units by mouth Every 30 (Thirty) Days., Disp: , Rfl:   •  warfarin (COUMADIN) 2.5 MG tablet, Take 5mg (2 tablets) on Fridays; Take 7.5mg (3 tablets) all other days OR as directed by the [facility]., Disp: 270 tablet, Rfl: 0     ALLERGIES:  Allergies   Allergen Reactions   • Lisinopril Cough        Objective   VITAL SIGNS:  Vitals:    04/28/22 1400   BP: 115/80   Pulse: 68   Resp: 16   Temp: 96.9 °F (36.1 °C)   TempSrc: Temporal   SpO2: 96%   Weight: 97.2 kg (214 lb 4.8 oz)   Height: 171 cm (67.32\")  Comment: new ht   PainSc: 0-No pain     Wt Readings from Last 3 Encounters:   04/28/22 97.2 kg (214 lb 4.8 oz)   04/22/22 97.2 kg (214 lb 3.2 oz)   03/24/22 96.2 kg (212 lb)     PHYSICAL EXAMINATION  GENERAL:  The patient appears in good general condition, not in acute distress.  SKIN: No skin rashes. No ecchymosis.  HEAD:  Normocephalic.  EYES:  No Jaundice. No Pallor.   NECK:  Supple with Good ROM. No Masses.  CHEST: Normal respiratory effort.  Defibrillator in the left upper chest.  CARDIAC:  No edema.  EXTREMITIES: No size difference between the " legs.  NEUROLOGICAL:  No Focal neurological deficits.     RESULT REVIEW:   Results from last 7 days   Lab Units 04/28/22  1345 04/22/22  1406   WBC 10*3/mm3 9.44 7.18   NEUTROS ABS 10*3/mm3 5.84 4.50   HEMOGLOBIN g/dL 14.1 12.9   HEMATOCRIT % 41.4 39.3   PLATELETS 10*3/mm3 130* 178     Results from last 7 days   Lab Units 04/22/22  1406   SODIUM mmol/L 136   POTASSIUM mmol/L 4.0   CHLORIDE mmol/L 98   CO2 mmol/L 24.7   BUN mg/dL 16   CREATININE mg/dL 1.17*   CALCIUM mg/dL 9.0   ALBUMIN g/dL 4.20   BILIRUBIN mg/dL 0.5   ALK PHOS U/L 113   ALT (SGPT) U/L 28   AST (SGOT) U/L 23     Results from last 7 days   Lab Units 04/25/22  0000   INR  3.00     CT angiogram on 12/11/2020:  There are peripheral, acute pulmonary thromboemboli in right  upper and right lower lobe pulmonary artery branches. That along the  anterolateral right lower lobe appears occlusive and there is  parenchymal change suggesting pulmonary infarction along the  anterolateral right lower lobe. No other embolism is identified. There  is massive dilatation of the left cardiac chambers as was seen on  previous CT. There is no enlargement of the right heart chambers.     Venous Doppler bilateral lower extremities on 12/12/2020:  · Normal bilateral lower extremity venous duplex scan.    Assessment/Plan   *History of pulmonary embolism diagnosed on 12/11/2020.  The pulmonary emboli were located in the right upper and lower pulmonary artery branches.  There were changes of pulmonary infarction.  No enlargement of the right heart chambers.    The pulmonary embolism developed as the patient was very inactive.  She attributes this to left ankle fracture that required surgery on 3/7/2020.  Following the surgery, her activity declined.  In addition, her activity declined due to COVID-19 pandemic restrictions.    Patient was hospitalized on 12/11/2020 and was placed on heparin.  Venous Doppler showed no evidence of DVT.  She was transitioned to warfarin.  She is  monitoring her PT/INR weekly and tries to keep it in the therapeutic range.    Her cardiologist requested evaluation regarding the need for continued anticoagulation.  Due to the patient's cardia disease, she remains inactive.  She is considered at increased risk for recurrent thrombosis.    *Patient is going to need a colonoscopy in the near future.  I recommended stopping warfarin 5 days before the procedure and starting back the night of the procedure.  She does not need bridging with Lovenox during this period.  I asked her to have clearance from the cardiologist regarding this.    PLAN:    1.  I recommended continuing anticoagulation indefinitely as long as she is not having problem with bleeding.  2.  Regarding switching to a DOAC like Eliquis or Xarelto, there is significant drug drug interaction with her anti-HIV treatment.  I explained this to her and I recommended continuing warfarin since there is less interaction with warfarin.    I did not schedule her for a follow-up visit.  We will be available to see her in the future if the need arises.      Evelyn Santillan MD  04/28/22

## 2022-04-29 ENCOUNTER — HOSPITAL ENCOUNTER (OUTPATIENT)
Dept: MAMMOGRAPHY | Facility: HOSPITAL | Age: 47
Discharge: HOME | End: 2022-04-29
Admitting: NURSE PRACTITIONER

## 2022-04-29 DIAGNOSIS — Z12.31 SCREENING MAMMOGRAM FOR BREAST CANCER: ICD-10-CM

## 2022-04-29 PROCEDURE — 77067 SCR MAMMO BI INCL CAD: CPT

## 2022-04-29 PROCEDURE — 77063 BREAST TOMOSYNTHESIS BI: CPT

## 2022-05-02 ENCOUNTER — ANTICOAGULATION VISIT (OUTPATIENT)
Dept: PHARMACY | Facility: HOSPITAL | Age: 47
End: 2022-05-02

## 2022-05-02 LAB — INR PPP: 3

## 2022-05-02 NOTE — PROGRESS NOTES
Anticoagulation Clinic Progress Note    Anticoagulation Summary  As of 5/2/2022    INR goal:  2.0-3.0   TTR:  52.4 % (1.3 y)   INR used for dosing:  3.00 (5/2/2022)   Warfarin maintenance plan:  5 mg every Fri; 7.5 mg all other days   Weekly warfarin total:  50 mg   No change documented:  Haley Lancaster RPH   Plan last modified:  Chucho Mcdaniel, PharmD (2/7/2022)   Next INR check:  5/9/2022   Priority:  High   Target end date:  Indefinite    Indications    Other acute pulmonary embolism  unspecified whether acute cor pulmonale present (HCC) (Resolved) [I26.99]  Acute pulmonary embolism  unspecified pulmonary embolism type  unspecified whether acute cor pulmonale present (HCC) (Resolved) [I26.99]             Anticoagulation Episode Summary     INR check location:      Preferred lab:      Send INR reminders to:   [facility]    Comments:  **Home Testing Program**      Anticoagulation Care Providers     Provider Role Specialty Phone number    Lisset Melton MD Referring Cardiology [phone]          Clinic Interview:  Patient Findings     Negatives:  Signs/symptoms of thrombosis, Signs/symptoms of bleeding,   Laboratory test error suspected, Change in health, Change in alcohol use,   Change in activity, Upcoming invasive procedure, Emergency department   visit, Upcoming dental procedure, Missed doses, Extra doses, Change in   medications, Change in diet/appetite, Hospital admission, Bruising, Other   complaints    Comments:  Needs testing supplies       Clinical Outcomes     Negatives:  Major bleeding event, Thromboembolic event,   Anticoagulation-related hospital admission, Anticoagulation-related ED   visit, Anticoagulation-related fatality    Comments:  Needs testing supplies         INR History:  Anticoagulation Monitoring 4/18/2022 4/25/2022 5/2/2022   INR 2.70 3.00 3.00   INR Date 4/18/2022 4/25/2022 5/2/2022   INR Goal 2.0-3.0 2.0-3.0 2.0-3.0   Trend Same Same Same   Last Week Total  50 mg 50 mg 50 mg   Next Week Total 50 mg 50 mg 50 mg   Sun 7.5 mg 7.5 mg 7.5 mg   Mon 7.5 mg 7.5 mg 7.5 mg   Tue 7.5 mg 7.5 mg 7.5 mg   Wed 7.5 mg 7.5 mg 7.5 mg   Thu 7.5 mg 7.5 mg 7.5 mg   Fri 5 mg 5 mg 5 mg   Sat 7.5 mg 7.5 mg 7.5 mg   Visit Report - - -   Some recent data might be hidden       Plan:  1. INR is Therapeutic today- see above in Anticoagulation Summary.   Will instruct Nina Saez to Continue their warfarin regimen- see above in Anticoagulation Summary.  2. Follow up in 1 weeks  3. They have been instructed to call if any changes in medications, doses, concerns, etc. Patient expresses understanding and has no further questions at this time.    Haley Lancaster [facility]

## 2022-05-09 ENCOUNTER — ANTICOAGULATION VISIT (OUTPATIENT)
Dept: PHARMACY | Facility: HOSPITAL | Age: 47
End: 2022-05-09

## 2022-05-09 LAB — INR PPP: 2.2

## 2022-05-09 NOTE — PROGRESS NOTES
Anticoagulation Clinic Progress Note    Anticoagulation Summary  As of 2022    INR goal:  2.0-3.0   TTR:  53.0 % (1.4 y)   INR used for dosin.20 (2022)   Warfarin maintenance plan:  5 mg every Fri; 7.5 mg all other days   Weekly warfarin total:  50 mg   No change documented:  Zina De La Rosa RPH   Plan last modified:  Chucho Mcdaniel, PharmD (2022)   Next INR check:  2022   Priority:  High   Target end date:  Indefinite    Indications    Other acute pulmonary embolism  unspecified whether acute cor pulmonale present (HCC) (Resolved) [I26.99]  Acute pulmonary embolism  unspecified pulmonary embolism type  unspecified whether acute cor pulmonale present (HCC) (Resolved) [I26.99]             Anticoagulation Episode Summary     INR check location:      Preferred lab:      Send INR reminders to:   [facility]    Comments:  **Home Testing Program**      Anticoagulation Care Providers     Provider Role Specialty Phone number    Lisset Melton MD Referring Cardiology [phone]          Clinic Interview:  No pertinent clinical findings have been reported.    INR History:  Anticoagulation Monitoring 2022   INR 3.00 3.00 2.20   INR Date 2022   INR Goal 2.0-3.0 2.0-3.0 2.0-3.0   Trend Same Same Same   Last Week Total 50 mg 50 mg 50 mg   Next Week Total 50 mg 50 mg 50 mg   Sun 7.5 mg 7.5 mg 7.5 mg   Mon 7.5 mg 7.5 mg 7.5 mg   Tue 7.5 mg 7.5 mg 7.5 mg   Wed 7.5 mg 7.5 mg 7.5 mg   Thu 7.5 mg 7.5 mg 7.5 mg   Fri 5 mg 5 mg 5 mg   Sat 7.5 mg 7.5 mg 7.5 mg   Visit Report - - -   Some recent data might be hidden       Plan:  1. INR is therapeutic today- see above in Anticoagulation Summary.    Nina Saez to continue their warfarin regimen- see above in Anticoagulation Summary.  2. Follow up in 1 week  3. Pt has agreed to only be called if INR out of range. They have been instructed to call if any changes in medications, doses, concerns,  etc. Patient expresses understanding and has no further questions at this time.    Zina De La Rosa, [facility]

## 2022-05-11 ENCOUNTER — OFFICE VISIT (OUTPATIENT)
Dept: OBSTETRICS AND GYNECOLOGY | Age: 47
End: 2022-05-11

## 2022-05-11 VITALS
HEIGHT: 67 IN | BODY MASS INDEX: 33.24 KG/M2 | WEIGHT: 211.8 LBS | DIASTOLIC BLOOD PRESSURE: 82 MMHG | SYSTOLIC BLOOD PRESSURE: 120 MMHG

## 2022-05-11 DIAGNOSIS — Z21 HIV POSITIVE: ICD-10-CM

## 2022-05-11 DIAGNOSIS — Z01.419 WELL WOMAN EXAM WITH ROUTINE GYNECOLOGICAL EXAM: Primary | ICD-10-CM

## 2022-05-11 DIAGNOSIS — Z01.419 ENCOUNTER FOR GYNECOLOGICAL EXAMINATION: ICD-10-CM

## 2022-05-11 LAB
B-HCG UR QL: NEGATIVE
EXPIRATION DATE: NORMAL
INTERNAL NEGATIVE CONTROL: NEGATIVE
INTERNAL POSITIVE CONTROL: POSITIVE
Lab: NORMAL

## 2022-05-11 PROCEDURE — 99396 PREV VISIT EST AGE 40-64: CPT | Performed by: STUDENT IN AN ORGANIZED HEALTH CARE EDUCATION/TRAINING PROGRAM

## 2022-05-11 PROCEDURE — 81025 URINE PREGNANCY TEST: CPT | Performed by: STUDENT IN AN ORGANIZED HEALTH CARE EDUCATION/TRAINING PROGRAM

## 2022-05-11 NOTE — PROGRESS NOTES
[facility]   Obstetrics and Gynecology   Routine Annual Visit    2022    Patient: Nina Saez          MR#:[id number]    History of Present Illness    Chief Complaint   Patient presents with   • Gynecologic Exam     Annual exam, Last Pap 2021 NEG, HPV NEG, Last Mammogram 2022 normal, Pt has not had a period since        46 y.o. female  who presents for annual exam.  H/o HIV controlled with Symtuza.  Also has h/o essential hypertension, severe dilated nonischemic cardiomyopathy with chronic systolic congestive heart failure (defibrillator in place), and provoked PE after surgery 2020.  Now takes warfarin.  Managed by Heme/Onc.    Last menses 3/26.  Generally has regular menses.  Has been experiencing hot flashes.    Studies reviewed:  IGP, Aptima HPV, Rfx 16 / 18,45 (2021 15:32) - NIL, HPV neg, remote h/o abnormal pap  Mammo Screening Digital Tomosynthesis Bilateral With CAD (2022 14:31) - normal, repeat 1 yr  Not cleared by Cardiology for anesthesia so has not had colonoscopy, PCP trying to get cologard covered by Medicare    Obstetric History:  OB History        5    Para   5    Term   5            AB        Living   5       SAB        IAB        Ectopic        Molar        Multiple        Live Births   5               Menstrual History:     Patient's last menstrual period was 2022 (exact date).       Sexual History:   Not sexually active, declines STD testing      Social History:   Disabled so not working    ________________________________________  Patient Active Problem List   Diagnosis   • Asthma   • Essential hypertension   • HIV (human immunodeficiency virus infection) ([facility])   • Class 2 obesity in adult   • NICM (nonischemic cardiomyopathy) ([facility])   • Nonrheumatic mitral valve regurgitation   • Nonrheumatic tricuspid valve regurgitation   • Grade II diastolic dysfunction   • Pulmonary emboli ([facility])   • HFrEF (heart failure  with reduced ejection fraction) ([facility])     Past Medical History:   Diagnosis Date   • AICD (automatic cardioverter/defibrillator) present 2020   • Asthma    • CHF (congestive heart failure) ([facility])    • Class 2 obesity in adult    • Grade II diastolic dysfunction     Per echocardiogram 3/2021   • H/O Closed trimalleolar fracture    • HIV (human immunodeficiency virus infection) ([facility])    • Hypertension    • LBBB (left bundle branch block)    • NICM (nonischemic cardiomyopathy) ([facility])     2020 EF 6% per echocardiogram; AICD present   • Nonrheumatic mitral valve regurgitation 2021    Moderate per echo 3/2021   • Nonrheumatic tricuspid valve regurgitation     Moderate per echocardiogram 3/2021     Past Surgical History:   Procedure Laterality Date   • CARDIAC CATHETERIZATION     • CARDIAC DEFIBRILLATOR PLACEMENT     •  SECTION      one C/S   • FRACTURE SURGERY Left     left ankle   • OOPHORECTOMY      h/o teratoma   • TUBAL ABDOMINAL LIGATION       Social History     Tobacco Use   Smoking Status Former Smoker   • Packs/day: 0.50   • Years: 20.00   • Pack years: 10.00   • Quit date:    • Years since quittin.3   Smokeless Tobacco Never Used     Family History   Problem Relation Age of Onset   • Cancer Mother    • Breast cancer Mother    • Bone cancer Mother    • Ovarian cysts Daughter    • No Known Problems Daughter    • No Known Problems Daughter    • Prostate cancer Paternal Uncle    • Diabetes Maternal Grandmother    • Breast cancer Maternal Grandmother    • Heart disease Maternal Grandmother    • Hypertension Maternal Grandfather    • Hyperlipidemia Maternal Grandfather    • Diabetes Maternal Grandfather    • Prostate cancer Maternal Grandfather    • Breast cancer Paternal Grandmother    • Pancreatic cancer Paternal Grandmother    • Ovarian cancer Neg Hx    • Uterine cancer Neg Hx    • Colon cancer Neg Hx      Prior to Admission medications    Medication Sig Start Date End Date Taking?  Authorizing Provider   acetaminophen (TYLENOL) 325 MG tablet Take 650 mg by mouth Every 6 (Six) Hours As Needed for Mild Pain .   Yes ProviderGerard MD   albuterol sulfate  (90 Base) MCG/ACT inhaler Inhale 2 puffs Every 4 (Four) Hours As Needed for Wheezing. 4/11/22  Yes Zach Durand APRN   atorvastatin (LIPITOR) 10 MG tablet Take 1 tablet by mouth Daily. 7/14/21  Yes Kiley Boyd APRN   carvedilol (COREG) 3.125 MG tablet Take 1 tablet by mouth See Admin Instructions. Take 3.125 mg (1 tablet) every morning and 6.25 mg (2 tablets) every evening 2/8/22  Yes Zach Durand APRN   Eldxr-Qeiah-Dwcewvms-TenofAF (Symtuza) 148-477-774-10 MG per tablet Take 1 tablet by mouth Daily.   Yes ProviderGerard MD   Diclofenac Sodium (VOLTAREN) 1 % gel gel Apply 4 g topically to the appropriate area as directed 2 (Two) Times a Day. Use per pkg insert 9/2/21  Yes Livan Munoz MD   diphenhydrAMINE (BENADRYL) 25 mg capsule Take 1 capsule by mouth Every 6 (Six) Hours As Needed for Itching (swelling). 1/4/21  Yes Live Broussard MD   empagliflozin (Jardiance) 10 MG tablet tablet Take 1 tablet by mouth Daily. 12/1/21  Yes Kiley Boyd APRN   fluticasone (Flonase) 50 MCG/ACT nasal spray 2 sprays into the nostril(s) as directed by provider Daily. 10/23/20  Yes Darcie Ledbetter APRN   guaiFENesin-codeine (GUAIFENESIN AC) 100-10 MG/5ML liquid Take 5 mL by mouth 3 (Three) Times a Day As Needed for Cough. 4/11/22  Yes Zach Durand APRN   ivabradine HCl (Corlanor) 7.5 MG tablet tablet Take 1 tablet by mouth 2 (Two) Times a Day With Meals. 4/14/22  Yes Kiley Boyd APRN   nitroglycerin (NITROSTAT) 0.4 MG SL tablet Place 1 tablet under the tongue Every 5 (Five) Minutes As Needed for Chest Pain. Take no more than 3 doses in 15 minutes. 8/2/21  Yes Darcie Ledbetter APRN   potassium chloride (K-DUR,KLOR-CON) 20 MEQ CR tablet Take 3 tablets by mouth once daily 7/20/21  Yes Nilda Gallagher APRN  "  sacubitril-valsartan (Entresto)  MG tablet Take 1 tablet by mouth 2 (Two) Times a Day. 3/17/21  Yes Lisset Melton MD   sertraline (Zoloft) 50 MG tablet Take 1 tablet by mouth Daily. 4/11/22  Yes Zach Durand APRN   spironolactone (ALDACTONE) 25 MG tablet 25 mg Daily. 12/3/20  Yes Gerard Lyn MD   torsemide (DEMADEX) 20 MG tablet Take 1 tablet by mouth See Admin Instructions. Take 60 mg (3 tablets) po every morning and 40 mg (2 tablets) po every afternoon 4/14/22  Yes Kiley Boyd APRN   vitamin D (ERGOCALCIFEROL) 69442 units capsule capsule Take 50,000 Units by mouth Every 30 (Thirty) Days.   Yes ProviderGerard MD   warfarin (COUMADIN) 2.5 MG tablet Take 5mg (2 tablets) on Fridays; Take 7.5mg (3 tablets) all other days OR as directed by the [facility]. 3/9/22  Yes Lisset Melton MD     ________________________________________    Current contraception: abstinence  History of abnormal Pap smear: no  Family history of uterine or ovarian cancer: no  Family History of colon cancer/colon polyps: no  History of abnormal mammogram: no    The following portions of the patient's history were reviewed and updated as appropriate: allergies, current medications, past family history, past medical history, past social history, past surgical history and problem list.    Review of Systems   All other systems reviewed and are negative.           Objective     /82   Ht 171 cm (67.32\")   Wt 96.1 kg (211 lb 12.8 oz)   LMP 03/26/2022 (Exact Date)   Breastfeeding No   BMI 32.86 kg/m²    BP Readings from Last 3 Encounters:   05/11/22 120/82   04/28/22 115/80   04/22/22 138/80      Wt Readings from Last 3 Encounters:   05/11/22 96.1 kg (211 lb 12.8 oz)   04/28/22 97.2 kg (214 lb 4.8 oz)   04/22/22 97.2 kg (214 lb 3.2 oz)        BMI: Estimated body mass index is 32.86 kg/m² as calculated from the following:    Height as of this encounter: 171 cm (67.32\").    Weight as of " this encounter: 96.1 kg (211 lb 12.8 oz).    Physical Exam  Vitals and nursing note reviewed.   Constitutional:       General: She is not in acute distress.     Appearance: Normal appearance.   HENT:      Head: Normocephalic and atraumatic.   Eyes:      Extraocular Movements: Extraocular movements intact.   Cardiovascular:      Rate and Rhythm: Normal rate and regular rhythm.      Pulses: Normal pulses.      Heart sounds: No murmur heard.  Pulmonary:      Effort: Pulmonary effort is normal. No respiratory distress.      Breath sounds: Normal breath sounds.   Chest:   Breasts:      Right: Normal. No mass, nipple discharge, skin change, tenderness or axillary adenopathy.      Left: Normal. No mass, nipple discharge, skin change, tenderness or axillary adenopathy.       Abdominal:      General: There is no distension.      Palpations: Abdomen is soft. There is no mass.      Tenderness: There is no abdominal tenderness.   Genitourinary:     General: Normal vulva.      Labia:         Right: No rash or lesion.         Left: No rash or lesion.       Urethra: No prolapse, urethral swelling or urethral lesion.      Vagina: Normal.      Cervix: Normal.      Uterus: Normal.       Adnexa: Right adnexa normal and left adnexa normal.      Comments: Bladder: no masses or tenderness  Perineum/Anus: no masses, lesions, or skin changes  Musculoskeletal:         General: No swelling. Normal range of motion.      Cervical back: Normal range of motion.   Lymphadenopathy:      Upper Body:      Right upper body: No axillary adenopathy.      Left upper body: No axillary adenopathy.   Skin:     General: Skin is warm and dry.   Neurological:      General: No focal deficit present.      Mental Status: She is alert and oriented to person, place, and time.   Psychiatric:         Mood and Affect: Mood normal.         Behavior: Behavior normal.         As part of wellness and prevention, the following topics were discussed with the  patient:  Encouraged self breast exam  Physical activity and regular exercised encouraged.   Injury prevention discussed.  Healthy weight discussed.  Nutrition discussed.  Substance abuse/misuse discussed.  Sexual behavior/safe practices discussed.   Sexual transmitted disease prevention   Mental health discussed.           Assessment:  Diagnoses and all orders for this visit:    1. Well woman exam with routine gynecological exam (Primary)  -     IGP, Aptima HPV, Rfx 16 / 18,45    2. Encounter for gynecological examination  -     POC Pregnancy, Urine  -     IGP, Aptima HPV, Rfx 16 / 18,45    3. HIV positive (HCC)  -     IGP, Aptima HPV, Rfx 16 / 18,45    - Breast and pelvic exam normal  - Pap today, will repeat cotesting every 3 year due to HIV  - Declined STD screen  - Mammo up-to-date  - Needs colonoscopy by 50 years old.  Cannot be cleared for anesthesia by cardiology so PCP trying to get Cologuard covered.  We will discuss next year if still not complete.      Plan:  Return in about 1 year (around 5/11/2023) for Annual w/ mammo.      Frannie Rocha MD  5/11/2022 11:17 EDT

## 2022-05-13 ENCOUNTER — APPOINTMENT (OUTPATIENT)
Dept: CARDIOLOGY | Facility: HOSPITAL | Age: 47
End: 2022-05-13

## 2022-05-16 ENCOUNTER — ANTICOAGULATION VISIT (OUTPATIENT)
Dept: PHARMACY | Facility: HOSPITAL | Age: 47
End: 2022-05-16

## 2022-05-16 LAB
CYTOLOGIST CVX/VAG CYTO: NORMAL
CYTOLOGY CVX/VAG DOC CYTO: NORMAL
CYTOLOGY CVX/VAG DOC THIN PREP: NORMAL
DX ICD CODE: NORMAL
HIV 1 & 2 AB SER-IMP: NORMAL
HPV I/H RISK 4 DNA CVX QL PROBE+SIG AMP: NEGATIVE
INR PPP: 1.5
OTHER STN SPEC: NORMAL
STAT OF ADQ CVX/VAG CYTO-IMP: NORMAL

## 2022-05-16 NOTE — PROGRESS NOTES
Anticoagulation Clinic Progress Note    Anticoagulation Summary  As of 2022    INR goal:  2.0-3.0   TTR:  52.6 % (1.4 y)   INR used for dosin.50 (2022)   Warfarin maintenance plan:  5 mg every Fri; 7.5 mg all other days   Weekly warfarin total:  50 mg   Plan last modified:  Chucho Mcdaniel, PharmD (2022)   Next INR check:  2022   Priority:  High   Target end date:  Indefinite    Indications    Other acute pulmonary embolism  unspecified whether acute cor pulmonale present (HCC) (Resolved) [I26.99]  Acute pulmonary embolism  unspecified pulmonary embolism type  unspecified whether acute cor pulmonale present (HCC) (Resolved) [I26.99]             Anticoagulation Episode Summary     INR check location:      Preferred lab:      Send INR reminders to:  [facility]    Comments:  **Home Testing Program**      Anticoagulation Care Providers     Provider Role Specialty Phone number    Lisset Melton MD Referring Cardiology [phone]          Clinic Interview:  Patient Findings     Positives:  Change in diet/appetite    Comments:  Patient is making smoothies with lots of spinach, kiwi and   other fruits (ngative for grapefruit, cranberry or pomegranate). Is adding   protein powder as well. Would like to continue this meal plan.       Clinical Outcomes     Comments:  Patient is making smoothies with lots of spinach, kiwi and   other fruits (ngative for grapefruit, cranberry or pomegranate). Is adding   protein powder as well. Would like to continue this meal plan.         INR History:  Anticoagulation Monitoring 2022   INR 3.00 2.20 1.50   INR Date 2022   INR Goal 2.0-3.0 2.0-3.0 2.0-3.0   Trend Same Same Same   Last Week Total 50 mg 50 mg 50 mg   Next Week Total 50 mg 50 mg 52.5 mg   Sun 7.5 mg 7.5 mg -   Mon 7.5 mg 7.5 mg 10 mg ()   Tue 7.5 mg 7.5 mg 7.5 mg   Wed 7.5 mg 7.5 mg 7.5 mg   Thu 7.5 mg 7.5 mg 7.5 mg   Fri 5 mg 5 mg  -   Sat 7.5 mg 7.5 mg -   Visit Report - - -   Some recent data might be hidden       Plan:  1. INR is Subtherapeutic today- see above in Anticoagulation Summary.   Will instruct Nina Saez to Increase their warfarin regimen- see above in Anticoagulation Summary.  2. Follow up in 4days.   3. They have been instructed to call if any changes in medications, doses, concerns, etc. Patient expresses understanding and has no further questions at this time.    Haley Lancaster, PharmD

## 2022-05-17 ENCOUNTER — HOSPITAL ENCOUNTER (OUTPATIENT)
Dept: CARDIOLOGY | Facility: HOSPITAL | Age: 47
Discharge: HOME | End: 2022-05-17
Admitting: NURSE PRACTITIONER

## 2022-05-17 VITALS
SYSTOLIC BLOOD PRESSURE: 104 MMHG | HEART RATE: 69 BPM | DIASTOLIC BLOOD PRESSURE: 68 MMHG | HEIGHT: 67 IN | BODY MASS INDEX: 33.53 KG/M2 | WEIGHT: 213.6 LBS | OXYGEN SATURATION: 96 %

## 2022-05-17 DIAGNOSIS — E66.01 CLASS 2 SEVERE OBESITY WITH SERIOUS COMORBIDITY IN ADULT, UNSPECIFIED BMI, UNSPECIFIED OBESITY TYPE: ICD-10-CM

## 2022-05-17 DIAGNOSIS — I51.89 GRADE II DIASTOLIC DYSFUNCTION: ICD-10-CM

## 2022-05-17 DIAGNOSIS — I10 ESSENTIAL HYPERTENSION: ICD-10-CM

## 2022-05-17 DIAGNOSIS — I50.20 HFREF (HEART FAILURE WITH REDUCED EJECTION FRACTION): Primary | ICD-10-CM

## 2022-05-17 DIAGNOSIS — I26.99 PULMONARY EMBOLISM, OTHER, UNSPECIFIED CHRONICITY, UNSPECIFIED WHETHER ACUTE COR PULMONALE PRESENT: ICD-10-CM

## 2022-05-17 DIAGNOSIS — B20 HIV INFECTION, UNSPECIFIED SYMPTOM STATUS: ICD-10-CM

## 2022-05-17 DIAGNOSIS — I42.8 NICM (NONISCHEMIC CARDIOMYOPATHY): ICD-10-CM

## 2022-05-17 LAB
ANION GAP SERPL CALCULATED.3IONS-SCNC: 11.4 MMOL/L (ref 5–15)
BASOPHILS # BLD AUTO: 0.03 10*3/MM3 (ref 0–0.2)
BASOPHILS NFR BLD AUTO: 0.3 % (ref 0–1.5)
BUN SERPL-MCNC: 15 MG/DL (ref 6–20)
BUN/CREAT SERPL: 15.2 (ref 7–25)
CALCIUM SPEC-SCNC: 9.5 MG/DL (ref 8.6–10.5)
CHLORIDE SERPL-SCNC: 98 MMOL/L (ref 98–107)
CO2 SERPL-SCNC: 26.6 MMOL/L (ref 22–29)
CORTIS SERPL-MCNC: 11.1 MCG/DL
CREAT SERPL-MCNC: 0.99 MG/DL (ref 0.57–1)
DEPRECATED RDW RBC AUTO: 43.8 FL (ref 37–54)
EGFRCR SERPLBLD CKD-EPI 2021: 71.4 ML/MIN/1.73
EOSINOPHIL # BLD AUTO: 0.04 10*3/MM3 (ref 0–0.4)
EOSINOPHIL NFR BLD AUTO: 0.5 % (ref 0.3–6.2)
ERYTHROCYTE [DISTWIDTH] IN BLOOD BY AUTOMATED COUNT: 12.1 % (ref 12.3–15.4)
GLUCOSE SERPL-MCNC: 98 MG/DL (ref 65–99)
HCT VFR BLD AUTO: 41.9 % (ref 34–46.6)
HGB BLD-MCNC: 13.8 G/DL (ref 12–15.9)
IMM GRANULOCYTES # BLD AUTO: 0.02 10*3/MM3 (ref 0–0.05)
IMM GRANULOCYTES NFR BLD AUTO: 0.2 % (ref 0–0.5)
LYMPHOCYTES # BLD AUTO: 1.95 10*3/MM3 (ref 0.7–3.1)
LYMPHOCYTES NFR BLD AUTO: 22.3 % (ref 19.6–45.3)
MCH RBC QN AUTO: 32.4 PG (ref 26.6–33)
MCHC RBC AUTO-ENTMCNC: 32.9 G/DL (ref 31.5–35.7)
MCV RBC AUTO: 98.4 FL (ref 79–97)
MONOCYTES # BLD AUTO: 0.79 10*3/MM3 (ref 0.1–0.9)
MONOCYTES NFR BLD AUTO: 9 % (ref 5–12)
NEUTROPHILS NFR BLD AUTO: 5.9 10*3/MM3 (ref 1.7–7)
NEUTROPHILS NFR BLD AUTO: 67.7 % (ref 42.7–76)
NRBC BLD AUTO-RTO: 0 /100 WBC (ref 0–0.2)
PLATELET # BLD AUTO: 179 10*3/MM3 (ref 140–450)
PMV BLD AUTO: 12.3 FL (ref 6–12)
POTASSIUM SERPL-SCNC: 3.7 MMOL/L (ref 3.5–5.2)
RBC # BLD AUTO: 4.26 10*6/MM3 (ref 3.77–5.28)
SODIUM SERPL-SCNC: 136 MMOL/L (ref 136–145)
WBC NRBC COR # BLD: 8.73 10*3/MM3 (ref 3.4–10.8)

## 2022-05-17 PROCEDURE — 99214 OFFICE O/P EST MOD 30 MIN: CPT | Performed by: NURSE PRACTITIONER

## 2022-05-17 PROCEDURE — 80048 BASIC METABOLIC PNL TOTAL CA: CPT

## 2022-05-17 PROCEDURE — 82533 TOTAL CORTISOL: CPT

## 2022-05-17 PROCEDURE — 85025 COMPLETE CBC W/AUTO DIFF WBC: CPT

## 2022-05-17 PROCEDURE — G0463 HOSPITAL OUTPT CLINIC VISIT: HCPCS

## 2022-05-17 NOTE — PROGRESS NOTES
[facility] HEART FAILURE      Patient Name: Nina Saez  :1975  Age: 46 y.o.  Sex: female  Referring Provider: Lisset Melton MD   Primary Cardiologist: Lisset Melton MD  Encounter Provider:  SAMUEL Cisneros      Chief Complaint:   Chief Complaint   Patient presents with   • Congestive Heart Failure         Congestive Heart Failure  Associated symptoms include fatigue. Pertinent negatives include no chest pain, palpitations, shortness of breath or unexpected weight change.    this 46-year-old female, known to this provider, comes to us today for further evaluation of her chronic systolic heart failure.  Current diagnoses to include severe nonischemic cardiomyopathy, left bundle branch block, hypertension, HIV, obesity, and asthma.    Historically she had followed with Dr. Laina Boyd at [facility].  In spring 2019 she was visiting family in [place] and was taken emergently to [facility].  Echocardiogram and cardiac catheterization were performed at that point, carvedilol was changed to metoprolol.    In 2019 she presented through the emergency department at [facility] with chest pain and shortness of breath.  She was treated with IV diuresis, troponin was negative x2 and proBNP was elevated at 5151.  Entresto was started at that point given her severe dilated cardiomyopathy.  She was to follow with cardiology at [facility] at that time.    In 2020 she again presented to the emergency department with shortness of breath and cough x3 weeks.  BNP was elevated at 13,351.  Carvedilol was discontinued that admission given hypotension.  At subsequent outpatient appointment it was felt that Bumex was less effective than Lasix by the patient.  She complained of progressively worsening fatigue.  AICD, single-chamber [employer], interrogation 2020 revealed 10-16 beats of VT.    On September 10, 2020 she  saw Dr. Jamar Azul MD, for consideration of upgrade to biventricular device.  At that point he felt that her left bundle branch block was incomplete and she did not meet criteria for implantation of CRT-D.    She presented 10/27/2020 to [facility] with complaints of acute on chronic shortness of breath that began approximately 1 week prior.  BNP was further elevated at 16,206 that point.  Pulmonary edema was noted on chest x-ray.  Spironolactone was started that admission.  Referral for evaluation by [facility] services was made November 2, 2020.    [facility] with normal filling pressures.  Echocardiogram at that time revealed an EF less than 15% with global hypokinesis-information extracted from external medical record note.  Cardiac catheterization May 30, 2017 performed at [facility] yielded codominant system with normal left main, LAD with distal 30% stenosis, RCA normal, LVEDP 7 mmHg.    Echocardiogram July 9, 2020 revealed EF of 6%, grade 3 LV diastolic dysfunction, significant LV wall motion hypokinesis/akinesis, RVSP 45 to 55 mmHg secondary to moderate tricuspid valve regurgitation, moderate to severe MR noted.    Jardiance 10 mg daily was started on 11/13/2020.      She was admitted from 12/11/2020 to 12/17/2020 for acute pulmonary embolism with right lower lobe pulmonary infarct.  She was started on warfarin at that time.  Additionally, she has been started on Corlanor as well as spironolactone for her heart failure.  January 4, 2021 she was evaluated and treated in the emergency department for possible angioedema.      Entresto has now been increased to  mg twice daily.  Repeat echocardiogram 3/8/2021 revealed improvement in ejection fraction from 6% to 22% with severe LV dilation, severe global hypokinesis and grade 2 LV diastolic dysfunction.    She has elected at this  time not to proceed with transplant work-up just yet.  She follows up with  on November 16.  She recently saw Dr. Melton and her carvedilol was increased to 50 mg at night with a goal of SBP of .    Despite stringent exercise and dieting she has continued to gain weight.  This is her chief complaint today.  Otherwise, she is feeling relatively well from her heart failure.      The following portions of the patient's history were reviewed and updated as appropriate: allergies, current medications, past family history, past medical history, past social history, past surgical history and problem list.    Current Outpatient Medications   Medication Sig Dispense Refill   • acetaminophen (TYLENOL) 325 MG tablet Take 650 mg by mouth Every 6 (Six) Hours As Needed for Mild Pain .     • albuterol sulfate  (90 Base) MCG/ACT inhaler Inhale 2 puffs Every 4 (Four) Hours As Needed for Wheezing. 18 g 5   • atorvastatin (LIPITOR) 10 MG tablet Take 1 tablet by mouth Daily. 30 tablet 11   • carvedilol (COREG) 3.125 MG tablet Take 1 tablet by mouth See Admin Instructions. Take 3.125 mg (1 tablet) every morning and 6.25 mg (2 tablets) every evening 90 tablet 5   • Ieupq-Dpcrh-Olfpeteh-TenofAF (Symtuza) [phone] MG per tablet Take 1 tablet by mouth Daily.     • Diclofenac Sodium (VOLTAREN) 1 % gel gel Apply 4 g topically to the appropriate area as directed 2 (Two) Times a Day. Use per pkg insert 100 g 2   • diphenhydrAMINE (BENADRYL) 25 mg capsule Take 1 capsule by mouth Every 6 (Six) Hours As Needed for Itching (swelling). 20 capsule 0   • empagliflozin (Jardiance) 10 MG tablet tablet Take 1 tablet by mouth Daily. 30 tablet 11   • fluticasone (Flonase) 50 MCG/ACT nasal spray 2 sprays into the nostril(s) as directed by provider Daily. 1 bottle 2   • guaiFENesin-codeine (GUAIFENESIN AC) 100-10 MG/5ML liquid Take 5 mL by mouth 3 (Three) Times a Day As Needed for Cough. 118 mL 0   • ivabradine HCl (Corlanor) 7.5 MG  tablet tablet Take 1 tablet by mouth 2 (Two) Times a Day With Meals. 60 tablet 5   • nitroglycerin (NITROSTAT) 0.4 MG SL tablet Place 1 tablet under the tongue Every 5 (Five) Minutes As Needed for Chest Pain. Take no more than 3 doses in 15 minutes. 30 tablet 5   • potassium chloride (K-DUR,KLOR-CON) 20 MEQ CR tablet Take 3 tablets by mouth once daily 90 tablet 11   • sacubitril-valsartan (Entresto)  MG tablet Take 1 tablet by mouth 2 (Two) Times a Day. 180 tablet 3   • sertraline (Zoloft) 50 MG tablet Take 1 tablet by mouth Daily. 90 tablet 3   • spironolactone (ALDACTONE) 25 MG tablet 25 mg Daily.     • torsemide (DEMADEX) 20 MG tablet Take 1 tablet by mouth See Admin Instructions. Take 60 mg (3 tablets) po every morning and 40 mg (2 tablets) po every afternoon 150 tablet 1   • vitamin D (ERGOCALCIFEROL) 75286 units capsule capsule Take 50,000 Units by mouth Every 30 (Thirty) Days.     • warfarin (COUMADIN) 2.5 MG tablet Take 5mg (2 tablets) on Fridays; Take 7.5mg (3 tablets) all other days OR as directed by the [facility]. 270 tablet 0     No current facility-administered medications for this encounter.       Past Medical History:   Diagnosis Date   • AICD (automatic cardioverter/defibrillator) present 03/06/2020   • Asthma    • CHF (congestive heart failure) (formerly [facility])    • Class 2 obesity in adult    • Diabetes mellitus (formerly [facility])    • Grade II diastolic dysfunction     Per echocardiogram 3/2021   • H/O Closed trimalleolar fracture    • HIV (human immunodeficiency virus infection) (formerly [facility])    • Hypertension    • LBBB (left bundle branch block)    • NICM (nonischemic cardiomyopathy) (formerly [facility])     7/2020 EF 6% per echocardiogram; AICD present   • Nonrheumatic mitral valve regurgitation 03/08/2021    Moderate per echo 3/2021   • Nonrheumatic tricuspid valve regurgitation     Moderate per echocardiogram 3/2021       Past Surgical History:   Procedure Laterality Date   • CARDIAC CATHETERIZATION     • CARDIAC  DEFIBRILLATOR PLACEMENT     •  SECTION      one C/S   • FRACTURE SURGERY Left 2020    left ankle   • OOPHORECTOMY      h/o teratoma   • TUBAL ABDOMINAL LIGATION         Physical Exam  Vitals and nursing note reviewed.   Constitutional:       General: She is not in acute distress.     Appearance: She is well-developed. She is not ill-appearing.   HENT:      Head: Normocephalic and atraumatic.   Eyes:      Conjunctiva/sclera: Conjunctivae normal.      Pupils: Pupils are equal, round, and reactive to light.   Neck:      Vascular: No JVD.   Cardiovascular:      Rate and Rhythm: Normal rate and regular rhythm.      Heart sounds: Normal heart sounds. No murmur heard.    No friction rub. No gallop.   Pulmonary:      Effort: Pulmonary effort is normal. No respiratory distress.      Breath sounds: Normal breath sounds.   Abdominal:      General: Bowel sounds are normal. There is no distension.      Palpations: Abdomen is soft.   Musculoskeletal:         General: No swelling or deformity.   Skin:     General: Skin is warm and dry.      Capillary Refill: Capillary refill takes less than 2 seconds.   Neurological:      Mental Status: She is alert and oriented to person, place, and time. Mental status is at baseline.   Psychiatric:         Attention and Perception: Attention normal.         Mood and Affect: Mood normal. Affect is tearful.         Behavior: Behavior normal. Behavior is cooperative.          Review of Systems   Constitutional: Positive for fatigue. Negative for appetite change and unexpected weight change.   HENT: Negative for congestion and nosebleeds.    Eyes: Negative for photophobia and visual disturbance.   Respiratory: Negative for cough, chest tightness and shortness of breath.    Cardiovascular: Negative for chest pain, palpitations and leg swelling.   Gastrointestinal: Negative for abdominal distention and blood in stool.   Endocrine: Negative for polyphagia and polyuria.   Genitourinary: Positive  "for frequency. Negative for enuresis.   Musculoskeletal: Negative for joint swelling and myalgias.   Skin: Negative for pallor and rash.   Neurological: Negative for dizziness, syncope, weakness, light-headedness, numbness and headaches.   Hematological: Does not bruise/bleed easily.   Psychiatric/Behavioral: Negative for decreased concentration and sleep disturbance.        OBJECTIVE:  /68 (BP Location: Left arm, Patient Position: Sitting)   Pulse 69   Ht 171 cm (67.32\")   Wt 96.9 kg (213 lb 9.6 oz)   SpO2 96%   BMI 33.13 kg/m²      Body mass index is 33.13 kg/m².  Wt Readings from Last 1 Encounters:   05/17/22 96.9 kg (213 lb 9.6 oz)       Lab Review:  Renal Function: Estimated Creatinine Clearance: 72.2 mL/min (A) (by C-G formula based on SCr of 1.17 mg/dL (H)).    Lab Results   Component Value Date    PROBNP 375.0 04/22/2022       Results for orders placed during the hospital encounter of 12/15/21    Adult Transthoracic Echo Complete W/ Cont if Necessary Per Protocol    Interpretation Summary  · Estimated right ventricular systolic pressure from tricuspid regurgitation is normal (<35 mmHg).  · Mildly reduced right ventricular systolic function noted.  · Left ventricular diastolic function is consistent with (grade II w/high LAP) pseudonormalization.  · The left ventricular cavity is severely dilated.  · Estimated left ventricular EF was in disagreement with the calculated left ventricular EF. Left ventricular ejection fraction appears to be 21 - 25%. Left ventricular systolic function is severely decreased.  · There is left ventricular global hypokinesis noted.        6 MINUTE WALK  Patient declined       Cardiac Procedures:  1. Cardiac catheterization [facility] 5/30/17       2.  [facility]   Data deficit      ASSESSMENT:     Diagnosis Plan   1. HFrEF (heart failure with reduced ejection fraction) ([facility])  CBC & Differential    Basic Metabolic Panel   2. Class 2 severe obesity " with serious comorbidity in adult, unspecified BMI, unspecified obesity type ([facility])  Cortisol   3. Pulmonary embolism, other, unspecified chronicity, unspecified whether acute cor pulmonale present ([facility])     4. Grade II diastolic dysfunction     5. NICM (nonischemic cardiomyopathy) ([facility])     6. HIV infection, unspecified symptom status ([facility])     7. Essential hypertension           PLAN OF CARE:  1.  HFrEF-NYHA functional class II.  Most recent EF per echocardiogram 22%, up from 6%. Currently she is on Entresto, torsemide, carvedilol, ivabradine, spironolactone, and Jardiance.  Historically she has been unable to tolerate metoprolol succinate as it made her very dizzy.      She remains euvolemic on exam.  I do think that better weight control could help improve her overall cardiovascular health as well as her heart failure.  See dictation below.  I would like to check labs today as below.  She is to continue following a low-sodium diet of 2000 mg daily or less, fluid restriction of 1500 mL daily, as well as remain adherent with daily weights.    Directions for when to call the clinic reviewed with the patient to include weight gain of 2 to 3 pounds in 24 hours, weight gain of 5 to 10 pounds within 7 days; worsening shortness of breath; worsening lower extremity edema or abdominal distention.    2.  Nonobstructive CAD-diffuse 30% LAD lesion noted on prior cardiac catheterization as above.  Stable, denies angina.    3.  Nonischemic cardiomyopathy (dilated)-presence of AICD noted.  EF per echocardiogram was 6%, now improved to 22% per echocardiogram as of 3/8/2021.  Most recent AICD interrogation as above.  Now on target dosing of Entresto, she continues to tolerate this well.    4.  NSVT-noted on prior device interrogation.  Currently on beta-blockade.    5.  HIV-history noted.  Followed at [facility].    6.  Pulmonary embolism-remains on on warfarin.  Followed by home health and primary cardiology team.    7.  Obesity-BMI  continuing to trend up and is now 33.  Pharmacist and I both discussed potentially starting Saxenda with her, however, I note that she does also now have a buffalo hump on exam which has not always been present.  I would also like to check a cortisol level.        BMP/BNP/cortisol level; follow-up in 4 weeks or sooner if needed; continue current GDMT        Thank you for allowing me to participate in the care of your patient,         Kiley JIN SAMUEL Boyd  [facility] HEART FAILURE  05/17/22  13:56 EST      **Lyric Disclaimer:**  Much of this encounter note is an electronic transcription/translation of spoken language to printed text. The electronic translation of spoken language may permit erroneous, or at times, nonsensical words or phrases to be inadvertently transcribed. Although I have reviewed the note for such errors, some may still exist.

## 2022-05-17 NOTE — PROGRESS NOTES
Please call patient to inform her Pap smear is normal and HPV negative. I recommend repeating in 3 years based on the latest guidelines for HIV positive patients.  Thank you!    Frannie Rocha MD  9/29/2021  10:14 EDT

## 2022-05-18 ENCOUNTER — TELEPHONE (OUTPATIENT)
Dept: CARDIOLOGY | Facility: HOSPITAL | Age: 47
End: 2022-05-18

## 2022-05-18 NOTE — TELEPHONE ENCOUNTER
----- Message from SAMUEL Cisneros sent at 5/17/2022  3:18 PM EDT -----  Please let her know that her labs actually look really good.  If she wants to start Saxenda for weight loss just let us know we will get this sent in for her.    Yane Cortez  ----- Message -----  From: Lab, Background User  Sent: 5/17/2022   1:18 PM EDT  To: [facility] Heart Fail [facility] Clinical Pool

## 2022-05-18 NOTE — TELEPHONE ENCOUNTER
Called and informed pt of lab results per Yane BAKER..  Pt verbalized understanding and would like to have Saxenda sent in

## 2022-05-20 ENCOUNTER — ANTICOAGULATION VISIT (OUTPATIENT)
Dept: PHARMACY | Facility: HOSPITAL | Age: 47
End: 2022-05-20

## 2022-05-20 LAB — INR PPP: 2

## 2022-05-20 NOTE — PROGRESS NOTES
Anticoagulation Clinic Progress Note    Anticoagulation Summary  As of 2022    INR goal:  2.0-3.0   TTR:  52.3 % (1.4 y)   INR used for dosin.00 (2022)   Warfarin maintenance plan:  7.5 mg every day   Weekly warfarin total:  52.5 mg   Plan last modified:  Zina De La Rosa RPH (2022)   Next INR check:  2022   Priority:  High   Target end date:  Indefinite    Indications    Other acute pulmonary embolism  unspecified whether acute cor pulmonale present (HCC) (Resolved) [I26.99]  Acute pulmonary embolism  unspecified pulmonary embolism type  unspecified whether acute cor pulmonale present (HCC) (Resolved) [I26.99]             Anticoagulation Episode Summary     INR check location:      Preferred lab:      Send INR reminders to:  [facility] TEST PO[facility]    Comments:  **Home Testing Program**      Anticoagulation Care Providers     Provider Role Specialty Phone number    Lisset Melton MD Referring Cardiology [phone]          Clinic Interview:  Patient Findings     Negatives:  Signs/symptoms of thrombosis, Signs/symptoms of bleeding,   Laboratory test error suspected, Change in health, Change in alcohol use,   Change in activity, Upcoming invasive procedure, Emergency department   visit, Upcoming dental procedure, Missed doses, Extra doses, Change in   medications, Change in diet/appetite, Hospital admission, Bruising, Other   complaints      Clinical Outcomes     Negatives:  Major bleeding event, Thromboembolic event,   Anticoagulation-related hospital admission, Anticoagulation-related ED   visit, Anticoagulation-related fatality        INR History:  Anticoagulation Monitoring 2022   INR 2.20 1.50 2.00   INR Date 2022   INR Goal 2.0-3.0 2.0-3.0 2.0-3.0   Trend Same Same Up   Last Week Total 50 mg 50 mg 52.5 mg   Next Week Total 50 mg 52.5 mg 52.5 mg   Sun 7.5 mg - 7.5 mg   Mon 7.5 mg 10 mg () 7.5 mg   Tue 7.5 mg 7.5 mg 7.5  mg   Wed 7.5 mg 7.5 mg 7.5 mg   Thu 7.5 mg 7.5 mg 7.5 mg   Fri 5 mg - 7.5 mg   Sat 7.5 mg - 7.5 mg   Visit Report - - -   Some recent data might be hidden       Plan:  1. INR is Therapeutic today- see above in Anticoagulation Summary.   Will instruct Nina PHAM Saez to Increase their warfarin regimen- see above in Anticoagulation Summary.  2. Follow up in 1 weeks  3. They have been instructed to call if any changes in medications, doses, concerns, etc. Patient expresses understanding and has no further questions at this time.    Zina De La Rosa [facility]

## 2022-05-23 ENCOUNTER — ANTICOAGULATION VISIT (OUTPATIENT)
Dept: PHARMACY | Facility: HOSPITAL | Age: 47
End: 2022-05-23

## 2022-05-23 LAB — INR PPP: 2.4

## 2022-05-23 PROCEDURE — G0249 PROVIDE INR TEST MATER/EQUIP: HCPCS

## 2022-05-23 NOTE — PROGRESS NOTES
Anticoagulation Clinic Progress Note    Anticoagulation Summary  As of 2022    INR goal:  2.0-3.0   TTR:  52.6 % (1.4 y)   INR used for dosin.40 (2022)   Warfarin maintenance plan:  7.5 mg every day   Weekly warfarin total:  52.5 mg   No change documented:  Zina De La Rosa RPH   Plan last modified:  Zina De La Rosa RPH (2022)   Next INR check:  2022   Priority:  High   Target end date:  Indefinite    Indications    Other acute pulmonary embolism  unspecified whether acute cor pulmonale present (HCC) (Resolved) [I26.99]  Acute pulmonary embolism  unspecified pulmonary embolism type  unspecified whether acute cor pulmonale present (HCC) (Resolved) [I26.99]             Anticoagulation Episode Summary     INR check location:      Preferred lab:      Send INR reminders to:   [facility] TEST [facility]    Comments:  **Home Testing Program**      Anticoagulation Care Providers     Provider Role Specialty Phone number    Lisset Melton MD Referring Cardiology [phone]          Clinic Interview:  No pertinent clinical findings have been reported.    INR History:  Anticoagulation Monitoring 2022   INR 1.50 2.00 2.40   INR Date 2022   INR Goal 2.0-3.0 2.0-3.0 2.0-3.0   Trend Same Up Same   Last Week Total 50 mg 52.5 mg 55 mg   Next Week Total 52.5 mg 52.5 mg 52.5 mg   Sun - 7.5 mg 7.5 mg   Mon 10 mg () 7.5 mg 7.5 mg   Tue 7.5 mg 7.5 mg 7.5 mg   Wed 7.5 mg 7.5 mg 7.5 mg   Thu 7.5 mg 7.5 mg 7.5 mg   Fri - 7.5 mg 7.5 mg   Sat - 7.5 mg 7.5 mg   Visit Report - - -   Some recent data might be hidden       Plan:  1. INR is therapeutic today- see above in Anticoagulation Summary.    Nina Saez to continue their warfarin regimen- see above in Anticoagulation Summary.  2. Follow up in 1 week  3. They have been instructed to call if any changes in medications, doses, concerns, etc. Patient expresses understanding and has no further  questions at this time.    Zina De La Rosa, RP

## 2022-05-24 ENCOUNTER — OFFICE VISIT (OUTPATIENT)
Dept: INTERNAL MEDICINE | Age: 47
End: 2022-05-24

## 2022-05-24 ENCOUNTER — TELEPHONE (OUTPATIENT)
Dept: INTERNAL MEDICINE | Age: 47
End: 2022-05-24

## 2022-05-24 VITALS
OXYGEN SATURATION: 97 % | DIASTOLIC BLOOD PRESSURE: 55 MMHG | SYSTOLIC BLOOD PRESSURE: 90 MMHG | TEMPERATURE: 97.9 F | BODY MASS INDEX: 33.43 KG/M2 | HEART RATE: 50 BPM | WEIGHT: 213 LBS | HEIGHT: 67 IN

## 2022-05-24 DIAGNOSIS — G43.511 INTRACTABLE PERSISTENT MIGRAINE AURA WITHOUT CEREBRAL INFARCTION AND WITH STATUS MIGRAINOSUS: Primary | ICD-10-CM

## 2022-05-24 DIAGNOSIS — F19.11 HISTORY OF DRUG ABUSE: ICD-10-CM

## 2022-05-24 PROCEDURE — 99213 OFFICE O/P EST LOW 20 MIN: CPT | Performed by: NURSE PRACTITIONER

## 2022-05-24 RX ORDER — SUMATRIPTAN 50 MG/1
25 TABLET, FILM COATED ORAL ONCE AS NEEDED
Qty: 15 TABLET | Refills: 0 | Status: SHIPPED | OUTPATIENT
Start: 2022-05-24 | End: 2022-09-12

## 2022-05-24 NOTE — TELEPHONE ENCOUNTER
Caller: Nina Saez     Relationship:SELF     Best call back number: [phone]     What is your medical concern? PATIENT CALLING STATING THAT HER MIGRAINES ARE GETTING MORE INTENSE WITH PAIN SHE STATED IT WILL BE IN CERTAIN PLACES SHE WOULD LIKE TO KNOW IF THERE IS SOMETHING SHE CAN DO OR BE REFERRED TO SOMEONE TO SEE IF SOMETHING ELSE GOING ON SHE STATED SHE WAS WOKE UP FROM HER SLEEP WITH NAUSEA     How long has this issue been going on? FOR A COUPLE MONTHS     Is your provider already aware of this issue? YES    Have you been treated for this issue? NO

## 2022-05-24 NOTE — PROGRESS NOTES
"    I N T E R N A L  M E D I C I N E  Clover Gamez, APRN    ENCOUNTER DATE:  05/24/2022    Nina Saez / 46 y.o. / female      CHIEF COMPLAINT / REASON FOR OFFICE VISIT     Headache (Since March nonstop. OTC Meds don't help)      ASSESSMENT & PLAN     Diagnoses and all orders for this visit:    1. Intractable persistent migraine aura without cerebral infarction and with status migrainosus (Primary)       - taking tylenol with out relief   Other orders  -     SUMAtriptan (Imitrex) 50 MG tablet; Take 0.5 tablets by mouth 1 (One) Time As Needed for Migraine for up to 30 doses. Take one tablet at onset of headache. May repeat dose one time in 2 hours if headache not relieved.  Dispense: 15 tablet; Refill: 0  2. History of drug abuse (HCC)       - Discussion of stopping Marijuana and switching to CBD oil for pain due to side effects       SUMMARY/DISCUSSION  • Follow up with Cardiology, Dr Melton as scheduled  • Follow up with Orthopedic, Dr Livan Munoz as scheduled  • Follow up Dr ANNABEL Rocha, OB/GYN as scheduled  • Follow up SAMUEL West as scheduled    Return in about 23 days (around 6/16/2022) for Next scheduled follow up.      VITAL SIGNS     Visit Vitals  BP 90/55   Pulse 50   Temp 97.9 °F (36.6 °C)   Ht 171 cm (67.32\")   Wt 96.6 kg (213 lb)   SpO2 97%   BMI 33.04 kg/m²           BP Readings from Last 3 Encounters:   05/24/22 90/55   05/17/22 104/68   05/11/22 120/82     Wt Readings from Last 3 Encounters:   05/24/22 96.6 kg (213 lb)   05/17/22 96.9 kg (213 lb 9.6 oz)   05/11/22 96.1 kg (211 lb 12.8 oz)     Body mass index is 33.04 kg/m².    Blood pressure readings recorded on patient flowsheet:  No flowsheet data found.          MEDICATIONS AT THE TIME OF OFFICE VISIT     Current Outpatient Medications on File Prior to Visit   Medication Sig Dispense Refill   • acetaminophen (TYLENOL) 325 MG tablet Take 650 mg by mouth Every 6 (Six) Hours As Needed for Mild Pain .     • albuterol sulfate  (90 " Base) MCG/ACT inhaler Inhale 2 puffs Every 4 (Four) Hours As Needed for Wheezing. 18 g 5   • atorvastatin (LIPITOR) 10 MG tablet Take 1 tablet by mouth Daily. 30 tablet 11   • carvedilol (COREG) 3.125 MG tablet Take 1 tablet by mouth See Admin Instructions. Take 3.125 mg (1 tablet) every morning and 6.25 mg (2 tablets) every evening 90 tablet 5   • Njcbw-Sjrma-Mxixadyg-TenofAF (Symtuza) 788-799-398-10 MG per tablet Take 1 tablet by mouth Daily.     • Diclofenac Sodium (VOLTAREN) 1 % gel gel Apply 4 g topically to the appropriate area as directed 2 (Two) Times a Day. Use per pkg insert 100 g 2   • diphenhydrAMINE (BENADRYL) 25 mg capsule Take 1 capsule by mouth Every 6 (Six) Hours As Needed for Itching (swelling). 20 capsule 0   • empagliflozin (Jardiance) 10 MG tablet tablet Take 1 tablet by mouth Daily. 30 tablet 11   • fluticasone (Flonase) 50 MCG/ACT nasal spray 2 sprays into the nostril(s) as directed by provider Daily. 1 bottle 2   • guaiFENesin-codeine (GUAIFENESIN AC) 100-10 MG/5ML liquid Take 5 mL by mouth 3 (Three) Times a Day As Needed for Cough. 118 mL 0   • ivabradine HCl (Corlanor) 7.5 MG tablet tablet Take 1 tablet by mouth 2 (Two) Times a Day With Meals. 60 tablet 5   • nitroglycerin (NITROSTAT) 0.4 MG SL tablet Place 1 tablet under the tongue Every 5 (Five) Minutes As Needed for Chest Pain. Take no more than 3 doses in 15 minutes. 30 tablet 5   • potassium chloride (K-DUR,KLOR-CON) 20 MEQ CR tablet Take 3 tablets by mouth once daily 90 tablet 11   • sacubitril-valsartan (Entresto)  MG tablet Take 1 tablet by mouth 2 (Two) Times a Day. 180 tablet 3   • sertraline (Zoloft) 50 MG tablet Take 1 tablet by mouth Daily. 90 tablet 3   • spironolactone (ALDACTONE) 25 MG tablet 25 mg Daily.     • torsemide (DEMADEX) 20 MG tablet Take 1 tablet by mouth See Admin Instructions. Take 60 mg (3 tablets) po every morning and 40 mg (2 tablets) po every afternoon 150 tablet 1   • vitamin D (ERGOCALCIFEROL) 51981  units capsule capsule Take 50,000 Units by mouth Every 30 (Thirty) Days.     • warfarin (COUMADIN) 2.5 MG tablet Take 5mg (2 tablets) on Fridays; Take 7.5mg (3 tablets) all other days OR as directed by the [facility]. 270 tablet 0   • Insulin Pen Needle 32G X 4 MM misc Use one pen needle once daily with Saxenda 30 each 0   • Liraglutide (SAXENDA) 18 MG/3ML injection pen Inject 0.6 mg under the skin into the appropriate area as directed Daily for 7 days, THEN 1.2 mg Daily for 7 days, THEN 1.8 mg Daily for 7 days, THEN 2.4 mg Daily for 7 days. 15 mL 1     No current facility-administered medications on file prior to visit.        HISTORY OF PRESENT ILLNESS     46 year old female being seen for chronic headaches unrelieved.   Migraines: Positive for aura, nausea, vomiting, occipital and parietal pain, right neck discomfort.  Negative for visual changes.  Discussion of medications and agreeable to try Sumatriptan and follow. Discussion of referral to Neurology if not improved- agreeable.     Patient Care Team:  Zach Durand APRN as PCP - General (Internal Medicine)  Lisset Melton MD as Consulting Physician (Cardiology)  Andrew Watson MD as Consulting Physician (Urology)  Rocky Calvert DPM as Consulting Physician (Podiatry)  Lisset Melton MD as Referring Physician (Cardiology)  Chaim Hernandez MD (Internal Medicine)  Yane Boyd APRN as Nurse Practitioner (Nurse Practitioner)  Frannie Rocha MD as Consulting Physician (Gynecology)  Evelyn Santillan MD as Consulting Physician (Hematology and Oncology)    REVIEW OF SYSTEMS     Review of Systems   Constitutional: Negative.    Eyes: Negative.    Respiratory: Negative.    Cardiovascular: Negative.    Endocrine: Negative.    Genitourinary: Negative.    Musculoskeletal: Negative.    Skin: Negative.    Neurological: Positive for headaches.   Psychiatric/Behavioral: Negative.           PHYSICAL EXAMINATION      Physical Exam  HENT:      Head: Normocephalic and atraumatic.      Right Ear: Tympanic membrane and ear canal normal.      Left Ear: Tympanic membrane and ear canal normal.      Nose: Nose normal.      Mouth/Throat:      Mouth: Mucous membranes are moist.   Eyes:      Pupils: Pupils are equal, round, and reactive to light.   Cardiovascular:      Rate and Rhythm: Normal rate and regular rhythm.      Pulses: Normal pulses.      Heart sounds: Normal heart sounds.   Pulmonary:      Effort: Pulmonary effort is normal.      Breath sounds: Normal breath sounds.   Musculoskeletal:         General: Normal range of motion.      Cervical back: Normal range of motion. Tenderness present.   Skin:     General: Skin is warm and dry.   Neurological:      Mental Status: She is alert and oriented to person, place, and time.   Psychiatric:         Mood and Affect: Mood normal.           REVIEWED DATA     Labs:     Lab Results   Component Value Date     05/17/2022    K 3.7 05/17/2022    CALCIUM 9.5 05/17/2022    AST 23 04/22/2022    ALT 28 04/22/2022    BUN 15 05/17/2022    CREATININE 0.99 05/17/2022    CREATININE 1.17 (H) 04/22/2022    CREATININE 1.06 (H) 02/02/2022    EGFRIFAFRI 68 02/02/2022       Lab Results   Component Value Date    HGBA1C 5.50 04/22/2022    HGBA1C 5.10 02/02/2022    HGBA1C 4.92 07/14/2021       Lab Results   Component Value Date    LDL 81 04/22/2022    LDL 74 09/03/2021    HDL 40 04/22/2022    TRIG 136 04/22/2022       Lab Results   Component Value Date    TSH 2.330 10/28/2020    TSH 2.850 07/29/2020       Lab Results   Component Value Date    WBC 8.73 05/17/2022    HGB 13.8 05/17/2022     05/17/2022       No results found for: MICROALBUR        Imaging:           Medical Tests:           Summary of old records / correspondence / consultant report:           Request outside records:             *Examiner was wearing mask and eye protection during the entire duration of the visit. Patient was  masked the entire time. Minimum social distance of 6 ft maintained entire visit except if physical contact was necessary as documented.       Template created by SAMUEL Dela Cruz

## 2022-05-26 ENCOUNTER — TELEPHONE (OUTPATIENT)
Dept: CARDIOLOGY | Facility: HOSPITAL | Age: 47
End: 2022-05-26

## 2022-05-26 NOTE — TELEPHONE ENCOUNTER
Called and let patient know her Saxenda will be ready sometime this afternoon. Asked once she picks it up to call us and we can schedule an appt so we can do her first injection together.    ----- Message from Chelsea Hayden MA sent at 5/25/2022  9:56 AM EDT -----  Regarding: RE: Garret  Called pharmacy said rx went through no charge. Will have in stock tomorrow.   Thanks  chelsea   ----- Message -----  From: Zina Waldron, PharmD  Sent: 5/24/2022   8:24 AM EDT  To: Chelsea Hayden MA  Subject: Garret                                          Would you please call patient's pharmacy and see if Saxenda goes through? I called her insurance yesterday and it is approved (although Cover My Meds will say its denied).     Thanks!  Zina

## 2022-05-27 RX ORDER — ATORVASTATIN CALCIUM 10 MG/1
10 TABLET, FILM COATED ORAL DAILY
Qty: 30 TABLET | Refills: 11 | Status: SHIPPED | OUTPATIENT
Start: 2022-05-27 | End: 2023-03-28

## 2022-06-06 ENCOUNTER — TELEPHONE (OUTPATIENT)
Dept: CARDIOLOGY | Facility: HOSPITAL | Age: 47
End: 2022-06-06

## 2022-06-06 ENCOUNTER — ANTICOAGULATION VISIT (OUTPATIENT)
Dept: PHARMACY | Facility: HOSPITAL | Age: 47
End: 2022-06-06

## 2022-06-06 LAB — INR PPP: 4.3

## 2022-06-06 RX ORDER — WARFARIN SODIUM 2.5 MG/1
TABLET ORAL
Qty: 270 TABLET | Refills: 0 | Status: SHIPPED | OUTPATIENT
Start: 2022-06-06 | End: 2022-08-31

## 2022-06-06 NOTE — PROGRESS NOTES
"Anticoagulation Clinic Progress Note    Anticoagulation Summary  As of 2022    INR goal:  2.0-3.0   TTR:  52.0 % (1.4 y)   INR used for dosin.30 (2022)   Warfarin maintenance plan:  7.5 mg every day   Weekly warfarin total:  52.5 mg   Plan last modified:  Zina De La Rosa RPH (2022)   Next INR check:  6/10/2022   Priority:  High   Target end date:  Indefinite    Indications    Other acute pulmonary embolism  unspecified whether acute cor pulmonale present (HCC) (Resolved) [I26.99]  Acute pulmonary embolism  unspecified pulmonary embolism type  unspecified whether acute cor pulmonale present (HCC) (Resolved) [I26.99]             Anticoagulation Episode Summary     INR check location:      Preferred lab:      Send INR reminders to:  [facility] TEST [facility]    Comments:  **Home Testing Program**      Anticoagulation Care Providers     Provider Role Specialty Phone number    Lisset Melton MD Referring Cardiology [phone]          Clinic Interview:  Patient Findings     Positives:  Change in health    Negatives:  Signs/symptoms of thrombosis, Signs/symptoms of bleeding,   Laboratory test error suspected, Change in alcohol use, Change in   activity, Upcoming invasive procedure, Emergency department visit,   Upcoming dental procedure, Missed doses, Extra doses, Change in   medications, Change in diet/appetite, Hospital admission, Bruising, Other   complaints    Comments:  Reports she has a \"summer cold\" and is taking mucinex and   theraflu to treat.       Clinical Outcomes     Negatives:  Major bleeding event, Thromboembolic event,   Anticoagulation-related hospital admission, Anticoagulation-related ED   visit, Anticoagulation-related fatality    Comments:  Reports she has a \"summer cold\" and is taking mucinex and   theraflu to treat.         INR History:  Anticoagulation Monitoring 2022   INR 2.00 2.40 4.30   INR Date 2022   INR Goal " 2.0-3.0 2.0-3.0 2.0-3.0   Trend Up Same Same   Last Week Total 52.5 mg 55 mg 52.5 mg   Next Week Total 52.5 mg 52.5 mg 45 mg   Sun 7.5 mg 7.5 mg -   Mon 7.5 mg 7.5 mg Hold (6/6)   Tue 7.5 mg 7.5 mg 7.5 mg   Wed 7.5 mg 7.5 mg 7.5 mg   Thu 7.5 mg 7.5 mg 7.5 mg   Fri 7.5 mg 7.5 mg -   Sat 7.5 mg 7.5 mg -   Visit Report - - -   Some recent data might be hidden       Plan:  1. INR is Supratherapeutic today- see above in Anticoagulation Summary.   Will instruct Nina Saez to Change their warfarin regimen- see above in Anticoagulation Summary.  HOLD today, then resume previous dosing regimen.  2. Follow up Friday, 6/10.   3. They have been instructed to call if any changes in medications, doses, concerns, etc. Patient expresses understanding and has no further questions at this time.    Haley Lancaster, PharmD

## 2022-06-06 NOTE — TELEPHONE ENCOUNTER
Called patient to schedule appoint for first Saxenda injection. Patient said she has been battling a cold and she will call to schedule as soon as she feels better.    Thanks,  Shae HUANG [facility]      ----- Message from Zina Waldron PharmD sent at 6/6/2022  9:45 AM EDT -----  Regarding: First Saxenda Injection  Can you please call patient and see if she wants to come in for her first Saxenda injection? We had talked about doing this at her last clinic visit.     ThanksZina

## 2022-06-10 ENCOUNTER — ANTICOAGULATION VISIT (OUTPATIENT)
Dept: PHARMACY | Facility: HOSPITAL | Age: 47
End: 2022-06-10

## 2022-06-10 LAB — INR PPP: 2.6

## 2022-06-10 NOTE — PROGRESS NOTES
Anticoagulation Clinic Progress Note    Anticoagulation Summary  As of 6/10/2022    INR goal:  2.0-3.0   TTR:  51.9 % (1.5 y)   INR used for dosin.60 (6/10/2022)   Warfarin maintenance plan:  7.5 mg every day   Weekly warfarin total:  52.5 mg   No change documented:  Zina De La Rosa RPH   Plan last modified:  Zina De La Rosa RPH (2022)   Next INR check:  2022   Priority:  High   Target end date:  Indefinite    Indications    Other acute pulmonary embolism  unspecified whether acute cor pulmonale present (HCC) (Resolved) [I26.99]  Acute pulmonary embolism  unspecified pulmonary embolism type  unspecified whether acute cor pulmonale present (HCC) (Resolved) [I26.99]             Anticoagulation Episode Summary     INR check location:      Preferred lab:      Send INR reminders to:   [employer] [facility]    Comments:  **Home Testing Program**      Anticoagulation Care Providers     Provider Role Specialty Phone number    Lisset Melton MD Referring Cardiology [phone]          Clinic Interview:  Patient Findings     Negatives:  Signs/symptoms of thrombosis, Signs/symptoms of bleeding,   Laboratory test error suspected, Change in health, Change in alcohol use,   Change in activity, Upcoming invasive procedure, Emergency department   visit, Upcoming dental procedure, Missed doses, Extra doses, Change in   medications, Change in diet/appetite, Hospital admission, Bruising, Other   complaints      Clinical Outcomes     Negatives:  Major bleeding event, Thromboembolic event,   Anticoagulation-related hospital admission, Anticoagulation-related ED   visit, Anticoagulation-related fatality        INR History:  Anticoagulation Monitoring 2022 2022 6/10/2022   INR 2.40 4.30 2.60   INR Date 2022 2022 6/10/2022   INR Goal 2.0-3.0 2.0-3.0 2.0-3.0   Trend Same Same Same   Last Week Total 55 mg 52.5 mg 45 mg   Next Week Total 52.5 mg 45 mg 52.5 mg   Sun 7.5 mg - 7.5 mg   Mon 7.5  Left message #1 for patient to call back regarding PSA results.    mg Hold (6/6) 7.5 mg   Tue 7.5 mg 7.5 mg 7.5 mg   Wed 7.5 mg 7.5 mg 7.5 mg   Thu 7.5 mg 7.5 mg 7.5 mg   Fri 7.5 mg - 7.5 mg   Sat 7.5 mg - 7.5 mg   Visit Report - - -   Some recent data might be hidden       Plan:  1. INR is Therapeutic today- see above in Anticoagulation Summary.   Will instruct Nina Saez to Continue their warfarin regimen- see above in Anticoagulation Summary.  2. Follow up in 1 week  3. They have been instructed to call if any changes in medications, doses, concerns, etc. Patient expresses understanding and has no further questions at this time.    Zina De La Rosa [facility]

## 2022-06-14 ENCOUNTER — APPOINTMENT (OUTPATIENT)
Dept: CARDIOLOGY | Facility: HOSPITAL | Age: 47
End: 2022-06-14

## 2022-06-14 RX ORDER — TORSEMIDE 20 MG/1
20 TABLET ORAL SEE ADMIN INSTRUCTIONS
Qty: 150 TABLET | Refills: 0 | Status: SHIPPED | OUTPATIENT
Start: 2022-06-14 | End: 2022-08-01

## 2022-06-14 NOTE — TELEPHONE ENCOUNTER
LOV: 5/17/22  BMP: 5/17/22  Follow up scheduled  6/14/22, but Pt had to reschedule due to weather.  She will call back to reschedule.    Lisha Melendrez RN  [facility]

## 2022-06-16 ENCOUNTER — OFFICE VISIT (OUTPATIENT)
Dept: ORTHOPEDIC SURGERY | Facility: CLINIC | Age: 47
End: 2022-06-16

## 2022-06-16 VITALS — WEIGHT: 213 LBS | TEMPERATURE: 97.1 F | HEIGHT: 67 IN | BODY MASS INDEX: 33.43 KG/M2

## 2022-06-16 DIAGNOSIS — R52 PAIN: ICD-10-CM

## 2022-06-16 DIAGNOSIS — Z98.890 HISTORY OF OPEN REDUCTION AND INTERNAL FIXATION (ORIF) PROCEDURE: ICD-10-CM

## 2022-06-16 DIAGNOSIS — M76.822 TIBIAL TENDONITIS, POSTERIOR, LEFT: Primary | ICD-10-CM

## 2022-06-16 PROCEDURE — 99214 OFFICE O/P EST MOD 30 MIN: CPT | Performed by: ORTHOPAEDIC SURGERY

## 2022-06-16 PROCEDURE — 73610 X-RAY EXAM OF ANKLE: CPT | Performed by: ORTHOPAEDIC SURGERY

## 2022-06-16 PROCEDURE — 73630 X-RAY EXAM OF FOOT: CPT | Performed by: ORTHOPAEDIC SURGERY

## 2022-06-16 NOTE — PROGRESS NOTES
Ankle Follow Up      Patient: Nina Saez    YOB: 1975 46 y.o. female    Chief Complaints: Ankle pain    History of Present Illness:Patient underwent open reduction internal fixation of the left bimalleolar ankle fracture including syndesmotic fixation at [facility] by Dr. Calvert on 3/7/2020.     She saw him on 2/1/2021 and I have reviewed those notes.  At that time she had persistent complaints of pain and evidently surgery was recommended for removing the hardware     However she has significant cardiac history and I was contacted by Dr. Melton let me know that if patient does need to have surgery she would prefer to be done at [facility] where she can monitor patient.       I saw her for this initially on 4/14/2021     At that time she had moderate aching pain in the left ankle some posterior laterally and medially as well as pain along the Achilles and into the heel with some occasional radiation over the anterior aspect of the ankle with some occasional feelings of numbness.  It  did feel better when she wears her lace up brace.        She was fitted with an ASO brace and sent for MRI and CT scan but when she followed up in the office on 5/3/2021 had not yet had them done and was unable to show up for her next appointment on 5/17/2021 because of flat tire.     She was seen on 8/11/2021 with persistent complaints of pain mainly over the lateral aspect of the ankle as well as some medially and anteromedially with some feelings of pain going up her leg.    We discussed treatment at that time including removal of her medial screw and lateral plate and the broken syndesmotic screw would be quite difficult to remove and could require trephining also with possible ankle scope to make sure the syndesmosis area was clear but would not necessarily open it up anterolaterally and could evaluate her medial malleolar area.    She did not want to have anything done a surgical standpoint at  "that time was afraid that her heart could not handle it as she has significant decreased cardiac ejection and is followed by cardiology for that.    We decided to replace her ASO brace and prescribed Pennsaid to apply the area twice daily.  She was due to see her cardiologist for 5 weeks from that and discuss safety of surgery with her at that time.    She has not been seen back until today.  She says she has seen cardiology but did not specifically address whether not she could have surgery.    She reports intermittent pain in the ankle mainly in the inferomedial aspect of the left hindfoot and really not having any further pain over the lateral aspect of her ankle.  She has been using her ASO brace to any appreciable extent but does use compression hose as well as topical Voltaren which helps some.      ROS: ankle pain  Past Medical History:   Diagnosis Date   • AICD (automatic cardioverter/defibrillator) present 03/06/2020   • Asthma    • CHF (congestive heart failure) ([facility])    • Class 2 obesity in adult    • Diabetes mellitus ([facility])    • Fracture, foot 3/20    Broke   • Grade II diastolic dysfunction     Per echocardiogram 3/2021   • H/O Closed trimalleolar fracture    • HIV (human immunodeficiency virus infection) ([facility])    • Hypertension    • LBBB (left bundle branch block)    • NICM (nonischemic cardiomyopathy) ([facility])     7/2020 EF 6% per echocardiogram; AICD present   • Nonrheumatic mitral valve regurgitation 03/08/2021    Moderate per echo 3/2021   • Nonrheumatic tricuspid valve regurgitation     Moderate per echocardiogram 3/2021       Physical Exam:   Vitals:    06/16/22 1018   Temp: 97.1 °F (36.2 °C)   TempSrc: Temporal   Weight: 96.6 kg (213 lb)   Height: 170.2 cm (67\")   PainSc:   7   PainLoc: Foot     Well developed with normal mood.  On exam she has mild discomfort to palpation along the inferomedial aspect of the left hindfoot along the posterior tib tendon more so than the anteromedial ankle and " really without focal tenderness over the lateral ankle along her hardware or with external rotation testing.  She had a grossly stable ligamentous exam but with some guarding.      Radiology: 3 views of the right ankle including lateral view of the foot and 2 views of the left foot ordered evaluate pain and alignment reviewed and compared to prior x-rays of the left ankle.  There remains plate and screw fixation of the fibula with broken syndesmotic screw is without change compared to previous x-rays.  I do not see any obvious fracture of the midfoot or forefoot.      MRI films and report of the left ankle dated 5/13/2021 reviewed which show the previous fixation and marrow signal is normal except for 12 to 13 mm diameter of marrow edema around small subcortical cyst deep to the articular cortex of the medial malleolus     Cartilage otherwise unremarkable no joint effusion or intra-articular body.  Peroneal tendons are normal.     ATFL appears chronically torn but the CFL and deltoid are intact as are the syndesmotic ligaments but there is some soft tissue edema anteriorly along the distal syndesmotic space    Assessment/Plan: 1.  Status post ORIF left ankle with broken syndesmotic screw  2.  Left ankle painful medial and lateral hardware  3.  Left ankle chronic ATFL tear  4.  Left ankle distal syndesmotic space soft tissue edema anteriorly  5.  Left ankle medial malleolar subcortical cystic change  6.  Exacerbation medial ankle pain with possible posterior tib tendon pathology.    We discussed treatment options and she declined a PT TD brace but will use an ASO brace on this.    We will get a new MRI of her left ankle to evaluate for any posterior tib tendon pathology and degree of any exacerbation of medial ankle cystic change.    We will see her back in 4 to 6 weeks to review MRI and assess progress and determine treatment course going forward.    In the interim she got a try to check with cardiology as to  whether or not she can even have surgery.

## 2022-06-24 ENCOUNTER — APPOINTMENT (OUTPATIENT)
Dept: PHARMACY | Facility: HOSPITAL | Age: 47
End: 2022-06-24

## 2022-06-24 ENCOUNTER — ANTICOAGULATION VISIT (OUTPATIENT)
Dept: PHARMACY | Facility: HOSPITAL | Age: 47
End: 2022-06-24

## 2022-06-24 LAB — INR PPP: 2.4

## 2022-06-24 NOTE — PROGRESS NOTES
Anticoagulation Clinic Progress Note    Anticoagulation Summary  As of 2022    INR goal:  2.0-3.0   TTR:  53.2 % (1.5 y)   INR used for dosin.40 (2022)   Warfarin maintenance plan:  7.5 mg every day   Weekly warfarin total:  52.5 mg   No change documented:  Chucho Mcdaniel, PharmD   Plan last modified:  Zina De La Rosa RPH (2022)   Next INR check:  2022   Priority:  High   Target end date:  Indefinite    Indications    Other acute pulmonary embolism  unspecified whether acute cor pulmonale present (HCC) (Resolved) [I26.99]  Acute pulmonary embolism  unspecified pulmonary embolism type  unspecified whether acute cor pulmonale present (HCC) (Resolved) [I26.99]             Anticoagulation Episode Summary     INR check location:      Preferred lab:      Send INR reminders to:   [employer] [employer] [facility] [employer]    Comments:  **Home Testing Program**      Anticoagulation Care Providers     Provider Role Specialty Phone number    Lisset Melton MD Referring Cardiology [phone]          Clinic Interview:  Patient Findings     Negatives:  Signs/symptoms of thrombosis, Signs/symptoms of bleeding,   Laboratory test error suspected, Change in health, Change in alcohol use,   Change in activity, Upcoming invasive procedure, Emergency department   visit, Upcoming dental procedure, Missed doses, Extra doses, Change in   medications, Change in diet/appetite, Hospital admission, Bruising, Other   complaints      Clinical Outcomes     Negatives:  Major bleeding event, Thromboembolic event,   Anticoagulation-related hospital admission, Anticoagulation-related ED   visit, Anticoagulation-related fatality        INR History:  Anticoagulation Monitoring 2022 6/10/2022 2022   INR 4.30 2.60 2.40   INR Date 2022 6/10/2022 2022   INR Goal 2.0-3.0 2.0-3.0 2.0-3.0   Trend Same Same Same   Last Week Total 52.5 mg 45 mg 52.5 mg   Next Week Total 45 mg 52.5 mg 52.5 mg   Sun - 7.5 mg 7.5 mg   Mon  Hold (6/6) 7.5 mg 7.5 mg   Tue 7.5 mg 7.5 mg 7.5 mg   Wed 7.5 mg 7.5 mg 7.5 mg   Thu 7.5 mg 7.5 mg 7.5 mg   Fri - 7.5 mg 7.5 mg   Sat - 7.5 mg 7.5 mg   Visit Report - - -   Some recent data might be hidden       Plan:  1. INR is Therapeutic today- see above in Anticoagulation Summary.   Will instruct Nina Saez to Continue their warfarin regimen- see above in Anticoagulation Summary.  2. Follow up in 1 week  3. They have been instructed to call if any changes in medications, doses, concerns, etc. Patient expresses understanding and has no further questions at this time.    Chucho Mcdaniel, PharmD

## 2022-07-11 ENCOUNTER — TELEPHONE (OUTPATIENT)
Dept: PHARMACY | Facility: HOSPITAL | Age: 47
End: 2022-07-11

## 2022-07-11 ENCOUNTER — ANTICOAGULATION VISIT (OUTPATIENT)
Dept: PHARMACY | Facility: HOSPITAL | Age: 47
End: 2022-07-11

## 2022-07-11 LAB — INR PPP: 3.1

## 2022-07-11 PROCEDURE — G0249 PROVIDE INR TEST MATER/EQUIP: HCPCS

## 2022-07-11 NOTE — PROGRESS NOTES
Anticoagulation Clinic Progress Note    Anticoagulation Summary  As of 7/11/2022    INR goal:  2.0-3.0   TTR:  54.0 % (1.5 y)   INR used for dosing:  3.10 (7/11/2022)   Warfarin maintenance plan:  7.5 mg every day   Weekly warfarin total:  52.5 mg   No change documented:  Haley Lancaster, PharmD   Plan last modified:  Zina De La Rosa RPH (5/20/2022)   Next INR check:  7/18/2022   Priority:  High   Target end date:  Indefinite    Indications    Other acute pulmonary embolism  unspecified whether acute cor pulmonale present (HCC) (Resolved) [I26.99]  Acute pulmonary embolism  unspecified pulmonary embolism type  unspecified whether acute cor pulmonale present (HCC) (Resolved) [I26.99]             Anticoagulation Episode Summary     INR check location:      Preferred lab:      Send INR reminders to:   [facility]    Comments:  **Home Testing Program**      Anticoagulation Care Providers     Provider Role Specialty Phone number    Lisset Melton MD Referring Cardiology [phone]          Clinic Interview:  Patient Findings     Negatives:  Signs/symptoms of thrombosis, Signs/symptoms of bleeding,   Laboratory test error suspected, Change in health, Change in alcohol use,   Change in activity, Upcoming invasive procedure, Emergency department   visit, Upcoming dental procedure, Missed doses, Extra doses, Change in   medications, Change in diet/appetite, Hospital admission, Bruising, Other   complaints      Clinical Outcomes     Negatives:  Major bleeding event, Thromboembolic event,   Anticoagulation-related hospital admission, Anticoagulation-related ED   visit, Anticoagulation-related fatality        INR History:  Anticoagulation Monitoring 6/10/2022 6/24/2022 7/11/2022   INR 2.60 2.40 3.10   INR Date 6/10/2022 6/24/2022 7/11/2022   INR Goal 2.0-3.0 2.0-3.0 2.0-3.0   Trend Same Same Same   Last Week Total 45 mg 52.5 mg 52.5 mg   Next Week Total 52.5 mg 52.5 mg 52.5 mg   Sun 7.5 mg 7.5 mg 7.5  mg   Mon 7.5 mg 7.5 mg 7.5 mg   Tue 7.5 mg 7.5 mg 7.5 mg   Wed 7.5 mg 7.5 mg 7.5 mg   Thu 7.5 mg 7.5 mg 7.5 mg   Fri 7.5 mg 7.5 mg 7.5 mg   Sat 7.5 mg 7.5 mg 7.5 mg   Visit Report - - -   Some recent data might be hidden       Plan:  1. INR is Supratherapeutic today- see above in Anticoagulation Summary.   Will instruct Nina Saez to Continue their warfarin regimen- see above in Anticoagulation Summary.  2. Follow up in 1 weeks  3. They have been instructed to call if any changes in medications, doses, concerns, etc. Patient expresses understanding and has no further questions at this time.    Haley Lancaster, PharmD

## 2022-07-18 ENCOUNTER — ANTICOAGULATION VISIT (OUTPATIENT)
Dept: PHARMACY | Facility: HOSPITAL | Age: 47
End: 2022-07-18

## 2022-07-18 LAB — INR PPP: 3

## 2022-07-18 NOTE — PROGRESS NOTES
Anticoagulation Clinic Progress Note    Anticoagulation Summary  As of 7/18/2022    INR goal:  2.0-3.0   TTR:  53.5 % (1.6 y)   INR used for dosing:  3.00 (7/18/2022)   Warfarin maintenance plan:  7.5 mg every day   Weekly warfarin total:  52.5 mg   No change documented:  Zina De La Rosa RPH   Plan last modified:  Zina De La Rosa RPH (5/20/2022)   Next INR check:  7/25/2022   Priority:  High   Target end date:  Indefinite    Indications    Other acute pulmonary embolism  unspecified whether acute cor pulmonale present (HCC) (Resolved) [I26.99]  Acute pulmonary embolism  unspecified pulmonary embolism type  unspecified whether acute cor pulmonale present (HCC) (Resolved) [I26.99]             Anticoagulation Episode Summary     INR check location:      Preferred lab:      Send INR reminders to:   [facility]    Comments:  **Home Testing Program**      Anticoagulation Care Providers     Provider Role Specialty Phone number    Lisset Melton MD Referring Cardiology [phone]          Clinic Interview:  No pertinent clinical findings have been reported.    INR History:  Anticoagulation Monitoring 6/24/2022 7/11/2022 7/18/2022   INR 2.40 3.10 3.00   INR Date 6/24/2022 7/11/2022 7/18/2022   INR Goal 2.0-3.0 2.0-3.0 2.0-3.0   Trend Same Same Same   Last Week Total 52.5 mg 52.5 mg 52.5 mg   Next Week Total 52.5 mg 52.5 mg 52.5 mg   Sun 7.5 mg 7.5 mg 7.5 mg   Mon 7.5 mg 7.5 mg 7.5 mg   Tue 7.5 mg 7.5 mg 7.5 mg   Wed 7.5 mg 7.5 mg 7.5 mg   Thu 7.5 mg 7.5 mg 7.5 mg   Fri 7.5 mg 7.5 mg 7.5 mg   Sat 7.5 mg 7.5 mg 7.5 mg   Visit Report - - -   Some recent data might be hidden       Plan:  1. INR is therapeutic today- see above in Anticoagulation Summary.    Nina Saez to continue their warfarin regimen- see above in Anticoagulation Summary.  2. Follow up in 1 week  3. Pt has agreed to only be called if INR out of range. They have been instructed to call if any changes in medications, doses,  concerns, etc. Patient expresses understanding and has no further questions at this time.    Zina De La Rosa, [facility]

## 2022-07-20 ENCOUNTER — TELEPHONE (OUTPATIENT)
Dept: CARDIOLOGY | Facility: HOSPITAL | Age: 47
End: 2022-07-20

## 2022-07-20 ENCOUNTER — HOSPITAL ENCOUNTER (OUTPATIENT)
Dept: CARDIOLOGY | Facility: HOSPITAL | Age: 47
Discharge: HOME | End: 2022-07-20
Admitting: NURSE PRACTITIONER

## 2022-07-20 VITALS
BODY MASS INDEX: 34.21 KG/M2 | HEIGHT: 67 IN | WEIGHT: 218 LBS | SYSTOLIC BLOOD PRESSURE: 129 MMHG | HEART RATE: 61 BPM | DIASTOLIC BLOOD PRESSURE: 64 MMHG | OXYGEN SATURATION: 96 %

## 2022-07-20 DIAGNOSIS — I51.89 GRADE II DIASTOLIC DYSFUNCTION: ICD-10-CM

## 2022-07-20 DIAGNOSIS — I42.8 NICM (NONISCHEMIC CARDIOMYOPATHY): ICD-10-CM

## 2022-07-20 DIAGNOSIS — I26.99 PULMONARY EMBOLISM, OTHER, UNSPECIFIED CHRONICITY, UNSPECIFIED WHETHER ACUTE COR PULMONALE PRESENT: ICD-10-CM

## 2022-07-20 DIAGNOSIS — I50.20 HFREF (HEART FAILURE WITH REDUCED EJECTION FRACTION): Primary | ICD-10-CM

## 2022-07-20 DIAGNOSIS — I10 ESSENTIAL HYPERTENSION: ICD-10-CM

## 2022-07-20 DIAGNOSIS — E66.01 CLASS 2 SEVERE OBESITY WITH SERIOUS COMORBIDITY IN ADULT, UNSPECIFIED BMI, UNSPECIFIED OBESITY TYPE: ICD-10-CM

## 2022-07-20 LAB
ANION GAP SERPL CALCULATED.3IONS-SCNC: 11 MMOL/L (ref 5–15)
BUN SERPL-MCNC: 18 MG/DL (ref 6–20)
BUN/CREAT SERPL: 19.8 (ref 7–25)
CALCIUM SPEC-SCNC: 9.2 MG/DL (ref 8.6–10.5)
CHLORIDE SERPL-SCNC: 96 MMOL/L (ref 98–107)
CO2 SERPL-SCNC: 28 MMOL/L (ref 22–29)
CREAT SERPL-MCNC: 0.91 MG/DL (ref 0.57–1)
EGFRCR SERPLBLD CKD-EPI 2021: 79 ML/MIN/1.73
GLUCOSE SERPL-MCNC: 87 MG/DL (ref 65–99)
NT-PROBNP SERPL-MCNC: 232 PG/ML (ref 0–450)
POTASSIUM SERPL-SCNC: 3.9 MMOL/L (ref 3.5–5.2)
SODIUM SERPL-SCNC: 135 MMOL/L (ref 136–145)

## 2022-07-20 PROCEDURE — 99214 OFFICE O/P EST MOD 30 MIN: CPT | Performed by: NURSE PRACTITIONER

## 2022-07-20 PROCEDURE — 83880 ASSAY OF NATRIURETIC PEPTIDE: CPT

## 2022-07-20 PROCEDURE — 80048 BASIC METABOLIC PNL TOTAL CA: CPT

## 2022-07-20 PROCEDURE — G0463 HOSPITAL OUTPT CLINIC VISIT: HCPCS

## 2022-07-20 RX ORDER — LIRAGLUTIDE 6 MG/ML
INJECTION, SOLUTION SUBCUTANEOUS
COMMUNITY
Start: 2022-07-10 | End: 2022-08-24

## 2022-07-20 NOTE — TELEPHONE ENCOUNTER
----- Message from SAMUEL Cisneros sent at 7/20/2022  2:54 PM EDT -----  Please let Harper know that it looks like she is actually not holding onto any fluid right now.  I do not want to make any changes to her current plan of care.    Thanks,  Yane  ----- Message -----  From: Lab, Background User  Sent: 7/20/2022  12:42 PM EDT  To: SAMUEL Cisneros

## 2022-07-20 NOTE — ADDENDUM NOTE
Encounter addended by: Lisha Melendrez RN on: 7/20/2022 12:25 PM   Actions taken: Charge Capture section accepted

## 2022-07-20 NOTE — ADDENDUM NOTE
Encounter addended by: Kiley Boyd, APRN on: 7/20/2022 12:53 PM   Actions taken: Order list changed, Diagnosis association updated

## 2022-07-20 NOTE — PROGRESS NOTES
[facility] HEART FAILURE      Patient Name: Nina Saez  :1975  Age: 46 y.o.  Sex: female  Referring Provider: Lisset Melton MD   Primary Cardiologist: Lisset Melton MD  Encounter Provider:  SAMUEL Cisneros      Chief Complaint:   Chief Complaint   Patient presents with   • Congestive Heart Failure         Congestive Heart Failure  Associated symptoms include fatigue and shortness of breath. Pertinent negatives include no chest pain, palpitations or unexpected weight change.    this 46-year-old female, known to this provider, comes to us today for further evaluation of her chronic systolic heart failure.  Current diagnoses to include severe nonischemic cardiomyopathy, left bundle branch block, hypertension, HIV, obesity, and asthma.    Historically she had followed with Dr. Laina Boyd at [facility].  In spring 2019 she was visiting family in [place] and was taken emergently to [facility].  Echocardiogram and cardiac catheterization were performed at that point, carvedilol was changed to metoprolol.    In 2019 she presented through the emergency department at [facility] with chest pain and shortness of breath.  She was treated with IV diuresis, troponin was negative x2 and proBNP was elevated at 5151.  Entresto was started at that point given her severe dilated cardiomyopathy.  She was to follow with cardiology at [facility] at that time.    In 2020 she again presented to the emergency department with shortness of breath and cough x3 weeks.  BNP was elevated at 13,351.  Carvedilol was discontinued that admission given hypotension.  At subsequent outpatient appointment it was felt that Bumex was less effective than Lasix by the patient.  She complained of progressively worsening fatigue.  AICD, single-chamber [employer], interrogation 2020 revealed 10-16 beats of VT.    On September 10, 2020 she  saw Dr. Jamar Azul MD, for consideration of upgrade to biventricular device.  At that point he felt that her left bundle branch block was incomplete and she did not meet criteria for implantation of CRT-D.    She presented 10/27/2020 to [facility] with complaints of acute on chronic shortness of breath that began approximately 1 week prior.  BNP was further elevated at 16,206 that point.  Pulmonary edema was noted on chest x-ray.  Spironolactone was started that admission.  Referral for evaluation by [facility] transplant services was made November 2, 2020.    Left and right cardiac catheterization at [facility] revealed no obstructive CAD with normal filling pressures.  Echocardiogram at that time revealed an EF less than 15% with global hypokinesis-information extracted from external medical record note.  Cardiac catheterization May 30, 2017 performed at [facility] yielded codominant system with normal left main, LAD with distal 30% stenosis, RCA normal, LVEDP 7 mmHg.    Echocardiogram July 9, 2020 revealed EF of 6%, grade 3 LV diastolic dysfunction, significant LV wall motion hypokinesis/akinesis, RVSP 45 to 55 mmHg secondary to moderate tricuspid valve regurgitation, moderate to severe MR noted.    Jardiance 10 mg daily was started on 11/13/2020.      She was admitted from 12/11/2020 to 12/17/2020 for acute pulmonary embolism with right lower lobe pulmonary infarct.  She was started on warfarin at that time.  Additionally, she has been started on Corlanor as well as spironolactone for her heart failure.  January 4, 2021 she was evaluated and treated in the emergency department for possible angioedema.      Entresto has now been increased to  mg twice daily.  Repeat echocardiogram 3/8/2021 revealed improvement in ejection fraction from 6% to 22% with severe LV dilation, severe global hypokinesis and grade 2 LV diastolic dysfunction.    She has elected at this  time not to proceed with transplant work-up just yet.  She follows up with  on November 16.  She recently saw Dr. Melton and her carvedilol was increased to 50 mg at night with a goal of SBP of .    She presents today for instruction on how to administer her Saxenda.  She has gained 5 pounds over the last month and states that she has been eating quite a bit more than normal.  Her grandkids have recently spent the last several weeks with her, and the last 1 went home today.  She does feel that she is holding onto a little bit of fluid with some increased shortness of breath, but no lower extremity edema.      The following portions of the patient's history were reviewed and updated as appropriate: allergies, current medications, past family history, past medical history, past social history, past surgical history and problem list.    Current Outpatient Medications   Medication Sig Dispense Refill   • acetaminophen (TYLENOL) 325 MG tablet Take 650 mg by mouth Every 6 (Six) Hours As Needed for Mild Pain .     • albuterol sulfate  (90 Base) MCG/ACT inhaler Inhale 2 puffs Every 4 (Four) Hours As Needed for Wheezing. 18 g 5   • atorvastatin (LIPITOR) 10 MG tablet Take 1 tablet by mouth Daily. 30 tablet 11   • carvedilol (COREG) 3.125 MG tablet Take 1 tablet by mouth See Admin Instructions. Take 3.125 mg (1 tablet) every morning and 6.25 mg (2 tablets) every evening 90 tablet 5   • Xrwmq-Abywy-Pgidknki-TenofAF (Symtuza) 582-335-234-10 MG per tablet Take 1 tablet by mouth Daily.     • Diclofenac Sodium (VOLTAREN) 1 % gel gel Apply 4 g topically to the appropriate area as directed 2 (Two) Times a Day. Use per pkg insert 100 g 2   • diphenhydrAMINE (BENADRYL) 25 mg capsule Take 1 capsule by mouth Every 6 (Six) Hours As Needed for Itching (swelling). 20 capsule 0   • empagliflozin (Jardiance) 10 MG tablet tablet Take 1 tablet by mouth Daily. 30 tablet 11   • fluticasone (Flonase) 50 MCG/ACT nasal spray 2  sprays into the nostril(s) as directed by provider Daily. 1 bottle 2   • guaiFENesin-codeine (GUAIFENESIN AC) 100-10 MG/5ML liquid Take 5 mL by mouth 3 (Three) Times a Day As Needed for Cough. 118 mL 0   • Insulin Pen Needle 32G X 4 MM misc Use one pen needle once daily with Saxenda 30 each 0   • ivabradine HCl (Corlanor) 7.5 MG tablet tablet Take 1 tablet by mouth 2 (Two) Times a Day With Meals. 60 tablet 5   • nitroglycerin (NITROSTAT) 0.4 MG SL tablet Place 1 tablet under the tongue Every 5 (Five) Minutes As Needed for Chest Pain. Take no more than 3 doses in 15 minutes. 30 tablet 5   • potassium chloride (K-DUR,KLOR-CON) 20 MEQ CR tablet Take 3 tablets by mouth once daily 90 tablet 11   • sacubitril-valsartan (Entresto)  MG tablet Take 1 tablet by mouth 2 (Two) Times a Day. 180 tablet 3   • sertraline (Zoloft) 50 MG tablet Take 1 tablet by mouth Daily. 90 tablet 3   • spironolactone (ALDACTONE) 25 MG tablet 25 mg Daily.     • SUMAtriptan (Imitrex) 50 MG tablet Take 0.5 tablets by mouth 1 (One) Time As Needed for Migraine for up to 30 doses. Take one tablet at onset of headache. May repeat dose one time in 2 hours if headache not relieved. 15 tablet 0   • torsemide (DEMADEX) 20 MG tablet Take 1 tablet by mouth See Admin Instructions. Take 60 mg (3 tablets) po every morning and 40 mg (2 tablets) po every afternoon 150 tablet 0   • vitamin D (ERGOCALCIFEROL) 96788 units capsule capsule Take 50,000 Units by mouth Every 30 (Thirty) Days.     • warfarin (COUMADIN) 2.5 MG tablet TAKE 3 TABLETS (7.5MG) BY MOUTH EVERYDAY OR AS DIRECTED BY THE MED MANAGEMENT CLINIC 270 tablet 0   • Saxenda 18 MG/3ML injection pen INJECT 0.6MG UNDER THE SKIN INTO APPROPRIATE AREA AS DIRECTED DAILY FOR 7 DAYS, THEN 1.2MG DAILY FOR 7 DAYS, THEN 1.8MG DAILY FOR 7 DAYS, THEN 2.4MG DAILY FOR 7 DAYS       No current facility-administered medications for this encounter.       Past Medical History:   Diagnosis Date   • AICD (automatic  cardioverter/defibrillator) present 2020   • Asthma    • CHF (congestive heart failure) ([facility])    • Class 2 obesity in adult    • Diabetes mellitus ([facility])    • Fracture, foot 3/20    Broke   • Grade II diastolic dysfunction     Per echocardiogram 3/2021   • H/O Closed trimalleolar fracture    • HIV (human immunodeficiency virus infection) ([facility])    • Hypertension    • LBBB (left bundle branch block)    • NICM (nonischemic cardiomyopathy) ([facility])     2020 EF 6% per echocardiogram; AICD present   • Nonrheumatic mitral valve regurgitation 2021    Moderate per echo 3/2021   • Nonrheumatic tricuspid valve regurgitation     Moderate per echocardiogram 3/2021       Past Surgical History:   Procedure Laterality Date   • ANKLE OPEN REDUCTION INTERNAL FIXATION  3/7/20   • CARDIAC CATHETERIZATION     • CARDIAC DEFIBRILLATOR PLACEMENT     •  SECTION      one C/S   • FRACTURE SURGERY Left     left ankle   • OOPHORECTOMY      h/o teratoma   • TUBAL ABDOMINAL LIGATION         Physical Exam  Vitals and nursing note reviewed.   Constitutional:       General: She is not in acute distress.     Appearance: She is well-developed. She is not ill-appearing.   HENT:      Head: Normocephalic and atraumatic.   Eyes:      Conjunctiva/sclera: Conjunctivae normal.      Pupils: Pupils are equal, round, and reactive to light.   Neck:      Vascular: No JVD.   Cardiovascular:      Rate and Rhythm: Normal rate and regular rhythm.      Heart sounds: Normal heart sounds. No murmur heard.    No friction rub. No gallop.   Pulmonary:      Effort: Pulmonary effort is normal. No respiratory distress.      Breath sounds: Normal breath sounds.   Abdominal:      General: Bowel sounds are normal. There is no distension.      Palpations: Abdomen is soft.   Musculoskeletal:         General: No swelling or deformity.   Skin:     General: Skin is warm and dry.      Capillary Refill: Capillary refill takes less than 2 seconds.   Neurological:  "     Mental Status: She is alert and oriented to person, place, and time. Mental status is at baseline.   Psychiatric:         Attention and Perception: Attention normal.         Mood and Affect: Mood normal. Affect is tearful.         Behavior: Behavior normal. Behavior is cooperative.          Review of Systems   Constitutional: Positive for fatigue. Negative for appetite change and unexpected weight change.   HENT: Negative for congestion and nosebleeds.    Eyes: Negative for photophobia and visual disturbance.   Respiratory: Positive for shortness of breath. Negative for cough and chest tightness.    Cardiovascular: Negative for chest pain, palpitations and leg swelling.   Gastrointestinal: Negative for abdominal distention and blood in stool.   Endocrine: Negative for polyphagia and polyuria.   Genitourinary: Positive for frequency. Negative for enuresis.   Musculoskeletal: Negative for joint swelling and myalgias.   Skin: Negative for pallor and rash.   Neurological: Negative for dizziness, syncope, weakness, light-headedness, numbness and headaches.   Hematological: Does not bruise/bleed easily.   Psychiatric/Behavioral: Negative for decreased concentration and sleep disturbance.        OBJECTIVE:  /64 (BP Location: Left arm, Patient Position: Sitting)   Pulse 61   Ht 170.2 cm (67.01\")   Wt 98.9 kg (218 lb)   SpO2 96%   BMI 34.14 kg/m²      Body mass index is 34.14 kg/m².  Wt Readings from Last 1 Encounters:   07/20/22 98.9 kg (218 lb)       Lab Review:  Renal Function: CrCl cannot be calculated (Patient's most recent lab result is older than the maximum 30 days allowed.).    Lab Results   Component Value Date    PROBNP 375.0 04/22/2022       Results for orders placed during the hospital encounter of 12/15/21    Adult Transthoracic Echo Complete W/ Cont if Necessary Per Protocol    Interpretation Summary  · Estimated right ventricular systolic pressure from tricuspid regurgitation is normal (<35 " mmHg).  · Mildly reduced right ventricular systolic function noted.  · Left ventricular diastolic function is consistent with (grade II w/high LAP) pseudonormalization.  · The left ventricular cavity is severely dilated.  · Estimated left ventricular EF was in disagreement with the calculated left ventricular EF. Left ventricular ejection fraction appears to be 21 - 25%. Left ventricular systolic function is severely decreased.  · There is left ventricular global hypokinesis noted.        6 MINUTE WALK  Patient declined       Cardiac Procedures:  1. Cardiac catheterization [facility] 5/30/17       2.  R/C [facility] 4/2019   Data deficit      ASSESSMENT:     Diagnosis Plan   1. HFrEF (heart failure with reduced ejection fraction) ([facility])     2. Pulmonary embolism, other, unspecified chronicity, unspecified whether acute cor pulmonale present ([facility])     3. Grade II diastolic dysfunction     4. NICM (nonischemic cardiomyopathy) ([facility])     5. Essential hypertension     6. Class 2 severe obesity with serious comorbidity in adult, unspecified BMI, unspecified obesity type ([facility])           PLAN OF CARE:  1.  HFrEF-NYHA functional class II.  Most recent EF per echocardiogram 22%, up from 6%. Currently she is on Entresto, torsemide, carvedilol, ivabradine, spironolactone, and Jardiance.  Historically she has been unable to tolerate metoprolol succinate as it made her very dizzy.      She feels that she is holding onto a bit of fluid, so I Luzmaria check a BMP and BNP today.  I would like her to continue a strict low-sodium diet of 2000 mg daily or less, fluid restriction of 1500 mL daily, as well as remain adherent to daily weights.  Diuretic adjustment dependent upon lab results.  She has had a rather slow and insidious weight gain and states that she has been taking in more calories than normal, so could be that her symptomatology is simply due to caloric weight gain.    Directions for when to call the clinic reviewed  with the patient to include weight gain of 2 to 3 pounds in 24 hours, weight gain of 5 to 10 pounds within 7 days; worsening shortness of breath; worsening lower extremity edema or abdominal distention.    2.  Nonobstructive CAD-diffuse 30% LAD lesion noted on prior cardiac catheterization as above.  Stable, denies angina.    3.  Nonischemic cardiomyopathy (dilated)-presence of AICD noted.  EF per echocardiogram was 6%, now improved to 22% per echocardiogram as of 3/8/2021.  Most recent AICD interrogation as above.  Now on target dosing of Entresto, she continues to tolerate this well.    4.  NSVT-noted on prior device interrogation.  Currently on beta-blockade.    5.  HIV-history noted.  Followed at [facility].    6.  Pulmonary embolism-remains on on warfarin.  Followed by home health and primary cardiology team.    7.  Obesity- Saxenda to be initiated today.  BMI now 34.14 kg/m².        BMP/BNP; start Saxenda; follow-up in 1 to 2 weeks or sooner if needed        Thank you for allowing me to participate in the care of your patient,         Kiley SAMUEL Olvera  [facility] HEART FAILURE  07/20/22  13:56 EST      **Lyric Disclaimer:**  Much of this encounter note is an electronic transcription/translation of spoken language to printed text. The electronic translation of spoken language may permit erroneous, or at times, nonsensical words or phrases to be inadvertently transcribed. Although I have reviewed the note for such errors, some may still exist.

## 2022-07-21 RX ORDER — CARVEDILOL 3.12 MG/1
TABLET ORAL
Qty: 30 TABLET | Refills: 0 | Status: SHIPPED | OUTPATIENT
Start: 2022-07-21 | End: 2022-08-01

## 2022-07-25 ENCOUNTER — TELEPHONE (OUTPATIENT)
Dept: CARDIOLOGY | Facility: HOSPITAL | Age: 47
End: 2022-07-25

## 2022-07-25 ENCOUNTER — ANTICOAGULATION VISIT (OUTPATIENT)
Dept: PHARMACY | Facility: HOSPITAL | Age: 47
End: 2022-07-25

## 2022-07-25 LAB — INR PPP: 3.5

## 2022-07-25 NOTE — PROGRESS NOTES
Anticoagulation Clinic Progress Note    Anticoagulation Summary  As of 7/25/2022    INR goal:  2.0-3.0   TTR:  52.8 % (1.6 y)   INR used for dosing:  3.50 (7/25/2022)   Warfarin maintenance plan:  7.5 mg every day   Weekly warfarin total:  52.5 mg   Plan last modified:  Zina De La Rosa RPH (5/20/2022)   Next INR check:  8/1/2022   Priority:  High   Target end date:  Indefinite    Indications    Other acute pulmonary embolism  unspecified whether acute cor pulmonale present (HCC) (Resolved) [I26.99]  Acute pulmonary embolism  unspecified pulmonary embolism type  unspecified whether acute cor pulmonale present (HCC) (Resolved) [I26.99]             Anticoagulation Episode Summary     INR check location:      Preferred lab:      Send INR reminders to:  [facility] HOME TEST POOL    Comments:  **Home Testing Program**      Anticoagulation Care Providers     Provider Role Specialty Phone number    Lisset Melton MD Referring Cardiology [phone]          Clinic Interview:  Patient Findings     Positives:  Change in diet/appetite    Negatives:  Signs/symptoms of thrombosis, Signs/symptoms of bleeding,   Laboratory test error suspected, Change in health, Change in alcohol use,   Change in activity, Upcoming invasive procedure, Emergency department   visit, Upcoming dental procedure, Missed doses, Extra doses, Change in   medications, Hospital admission, Bruising, Other complaints    Comments:  Reports drinking denzel-peach V8 juice last week.       Clinical Outcomes     Negatives:  Major bleeding event, Thromboembolic event,   Anticoagulation-related hospital admission, Anticoagulation-related ED   visit, Anticoagulation-related fatality    Comments:  Reports drinking denzel-peach V8 juice last week.         INR History:  Anticoagulation Monitoring 7/11/2022 7/18/2022 7/25/2022   INR 3.10 3.00 3.50   INR Date 7/11/2022 7/18/2022 7/25/2022   INR Goal 2.0-3.0 2.0-3.0 2.0-3.0   Trend Same Same Same   Last Week  Total 52.5 mg 52.5 mg 52.5 mg   Next Week Total 52.5 mg 52.5 mg 45 mg   Sun 7.5 mg 7.5 mg 7.5 mg   Mon 7.5 mg 7.5 mg Hold (7/25)   Tue 7.5 mg 7.5 mg 7.5 mg   Wed 7.5 mg 7.5 mg 7.5 mg   Thu 7.5 mg 7.5 mg 7.5 mg   Fri 7.5 mg 7.5 mg 7.5 mg   Sat 7.5 mg 7.5 mg 7.5 mg   Visit Report - - -   Some recent data might be hidden       Plan:   1. INR is Supratherapeutic today- see above in Anticoagulation Summary.   Will instruct Nina PHAM Saez to Change their warfarin regimen- see above in Anticoagulation Summary.  Hold today, then resume previous weekly dosing.   2. Follow up in 1 weeks  3.  They have been instructed to call if any changes in medications, doses, concerns, etc. Patient expresses understanding and has no further questions at this time.    Haley Lancaster, PharmD

## 2022-07-25 NOTE — TELEPHONE ENCOUNTER
Called and spoke to patient regarding questions. She states she has been doing well with the Saxenda injections. Slight nausea but tolerable. She asked if this medication could impact her kidneys. I told her it does not, unless she had significant vomiting/diarrhea which would make her dehydrated. She is also finishing up week 1 of 0.6 mg daily dosing. She asked about the next dose and I advised her to go up to 1.2 mg daily for 1 week. No further questions at this time. She plans to schedule a f/u appt with us.    Zina Waldron, PharmD, BCACP  [facility]  Phone: [phone]      ----- Message from Lizbet Arora sent at 7/25/2022 12:55 PM EDT -----  Patient called. She would like to talk to you, she stated she has a couple questions.     [phone]    Thanks,  Shae HUANG [facility]

## 2022-08-01 ENCOUNTER — ANTICOAGULATION VISIT (OUTPATIENT)
Dept: PHARMACY | Facility: HOSPITAL | Age: 47
End: 2022-08-01

## 2022-08-01 ENCOUNTER — TELEPHONE (OUTPATIENT)
Dept: ORTHOPEDIC SURGERY | Facility: CLINIC | Age: 47
End: 2022-08-01

## 2022-08-01 ENCOUNTER — TELEPHONE (OUTPATIENT)
Dept: CARDIOLOGY | Facility: HOSPITAL | Age: 47
End: 2022-08-01

## 2022-08-01 DIAGNOSIS — I50.22 CHRONIC SYSTOLIC CONGESTIVE HEART FAILURE: ICD-10-CM

## 2022-08-01 LAB — INR PPP: 1.7

## 2022-08-01 RX ORDER — TORSEMIDE 20 MG/1
TABLET ORAL
Qty: 150 TABLET | Refills: 0 | Status: SHIPPED | OUTPATIENT
Start: 2022-08-01 | End: 2022-08-31 | Stop reason: SDUPTHER

## 2022-08-01 RX ORDER — CARVEDILOL 3.12 MG/1
TABLET ORAL
Qty: 90 TABLET | Refills: 1 | Status: SHIPPED | OUTPATIENT
Start: 2022-08-01 | End: 2022-09-21

## 2022-08-01 NOTE — PROGRESS NOTES
Anticoagulation Clinic Progress Note    Anticoagulation Summary  As of 2022    INR goal:  2.0-3.0   TTR:  52.8 % (1.6 y)   INR used for dosin.70 (2022)   Warfarin maintenance plan:  7.5 mg every day   Weekly warfarin total:  52.5 mg   Plan last modified:  Zina De La Rosa RPH (2022)   Next INR check:  2022   Priority:  High   Target end date:  Indefinite    Indications    Other acute pulmonary embolism  unspecified whether acute cor pulmonale present (HCC) (Resolved) [I26.99]  Acute pulmonary embolism  unspecified pulmonary embolism type  unspecified whether acute cor pulmonale present (HCC) (Resolved) [I26.99]             Anticoagulation Episode Summary     INR check location:      Preferred lab:      Send INR reminders to:  [facility]    Comments:  **Home Testing Program**      Anticoagulation Care Providers     Provider Role Specialty Phone number    Lisset Melton MD Referring Cardiology [phone]          Clinic Interview:  Patient Findings     Positives:  Change in diet/appetite    Negatives:  Signs/symptoms of thrombosis, Signs/symptoms of bleeding,   Laboratory test error suspected, Change in health, Change in alcohol use,   Change in activity, Upcoming invasive procedure, Emergency department   visit, Upcoming dental procedure, Missed doses, Extra doses, Change in   medications, Hospital admission, Bruising, Other complaints    Comments:  patient ate 3 days of liver, doing 30 day diet, she wants to   trial 1 grapefruit per week      Clinical Outcomes     Negatives:  Major bleeding event, Thromboembolic event,   Anticoagulation-related hospital admission, Anticoagulation-related ED   visit, Anticoagulation-related fatality    Comments:  patient ate 3 days of liver, doing 30 day diet, she wants to   trial 1 grapefruit per week        INR History:  Anticoagulation Monitoring 2022   INR 3.00 3.50 1.70   INR Date 2022  8/1/2022   INR Goal 2.0-3.0 2.0-3.0 2.0-3.0   Trend Same Same Same   Last Week Total 52.5 mg 52.5 mg 45 mg   Next Week Total 52.5 mg 45 mg 55 mg   Sun 7.5 mg 7.5 mg 7.5 mg   Mon 7.5 mg Hold (7/25) 10 mg (8/1)   Tue 7.5 mg 7.5 mg 7.5 mg   Wed 7.5 mg 7.5 mg 7.5 mg   Thu 7.5 mg 7.5 mg 7.5 mg   Fri 7.5 mg 7.5 mg 7.5 mg   Sat 7.5 mg 7.5 mg 7.5 mg   Visit Report - - -   Some recent data might be hidden       Plan:  1. INR is Subtherapeutic today- see above in Anticoagulation Summary.   Will instruct Nina Saez to Increase their warfarin regimen- see above in Anticoagulation Summary.  2. Follow up in 1 week  3. They have been instructed to call if any changes in medications, doses, concerns, etc. Patient expresses understanding and has no further questions at this time.    Zina De La Rosa, [facility]

## 2022-08-01 NOTE — TELEPHONE ENCOUNTER
Dr. Alexander Talamantes didn't show up for scheduled MRI.  Patient doesn't want to do it at this time.  She has a pacemaker and she doesn't feel comfortable at this time getting the MRI. She has a follow up with you on 8/4/22 and wants to keep that appt.

## 2022-08-01 NOTE — TELEPHONE ENCOUNTER
Patient called to report that she is completely out of Torsemide (she has been out since Friday). Please send in a refill.    Thanks,  Shae HUANG [facility]

## 2022-08-02 ENCOUNTER — OFFICE VISIT (OUTPATIENT)
Dept: INTERNAL MEDICINE | Age: 47
End: 2022-08-02

## 2022-08-02 VITALS
OXYGEN SATURATION: 98 % | HEIGHT: 67 IN | BODY MASS INDEX: 34.12 KG/M2 | DIASTOLIC BLOOD PRESSURE: 62 MMHG | HEART RATE: 56 BPM | TEMPERATURE: 96.9 F | WEIGHT: 217.4 LBS | SYSTOLIC BLOOD PRESSURE: 106 MMHG

## 2022-08-02 DIAGNOSIS — R06.83 SNORING: ICD-10-CM

## 2022-08-02 DIAGNOSIS — G89.29 CHRONIC NONINTRACTABLE HEADACHE, UNSPECIFIED HEADACHE TYPE: Primary | ICD-10-CM

## 2022-08-02 DIAGNOSIS — R51.9 CHRONIC NONINTRACTABLE HEADACHE, UNSPECIFIED HEADACHE TYPE: Primary | ICD-10-CM

## 2022-08-02 DIAGNOSIS — R05.3 CHRONIC COUGH: ICD-10-CM

## 2022-08-02 DIAGNOSIS — I27.82 CHRONIC PULMONARY EMBOLISM, UNSPECIFIED PULMONARY EMBOLISM TYPE, UNSPECIFIED WHETHER ACUTE COR PULMONALE PRESENT: ICD-10-CM

## 2022-08-02 DIAGNOSIS — R94.2 ABNORMAL PULMONARY FUNCTION TEST: ICD-10-CM

## 2022-08-02 PROCEDURE — 99214 OFFICE O/P EST MOD 30 MIN: CPT | Performed by: NURSE PRACTITIONER

## 2022-08-02 PROCEDURE — 91305 COVID-19 (PFIZER) 12+ YRS: CPT | Performed by: NURSE PRACTITIONER

## 2022-08-02 PROCEDURE — 0051A COVID-19 (PFIZER) 12+ YRS: CPT | Performed by: NURSE PRACTITIONER

## 2022-08-02 NOTE — PROGRESS NOTES
"    I N T E R N A L  M E D I C I N E  ANGEL LEPE, APRN      ENCOUNTER DATE:  08/02/2022    Nina Saez / 46 y.o. / female      CHIEF COMPLAINT / REASON FOR OFFICE VISIT     Headache (On going on for x2 yrs, recently worsened) and Cough (Phlegm, clear, x1 month )      ASSESSMENT & PLAN     1. Chronic nonintractable headache, unspecified headache type  - MRI Brain With & Without Contrast  - Ambulatory Referral to Neurology    2. Chronic cough  - XR Chest PA & Lateral    3. Abnormal pulmonary function test  - XR Chest PA & Lateral    4. Chronic pulmonary embolism, unspecified pulmonary embolism type, unspecified whether acute cor pulmonale present (HCC)    5. Snoring    Orders Placed This Encounter   Procedures   • MRI Brain With & Without Contrast   • XR Chest PA & Lateral   • COVID-19 Vaccine (Pfizer) Gray Cap   • Ambulatory Referral to Neurology     No orders of the defined types were placed in this encounter.      SUMMARY/DISCUSSION  • Patient is now agreeable to return to pulmonary with follow-up now scheduled August 9 at 8 AM.  Highly suspicious that sleep apnea is contributing to chronic headache which is worse in the morning, patient apprehensive regarding potential for brain lesion, proceed with MRI order, MRI will need to be approved by cardiology, recently approved June 2022 for MRI of the ankle   • Recommend follow-up in 1 month, earlier if needed     Next Appointment with me: Visit date not found    No follow-ups on file.      VITAL SIGNS     Visit Vitals  /62 (Cuff Size: Large Adult)   Pulse 56   Temp 96.9 °F (36.1 °C) (Temporal)   Ht 170.2 cm (67\")   Wt 98.6 kg (217 lb 6.4 oz)   SpO2 98%   BMI 34.05 kg/m²     Wt Readings from Last 3 Encounters:   08/02/22 98.6 kg (217 lb 6.4 oz)   07/20/22 98.9 kg (218 lb)   06/16/22 96.6 kg (213 lb)     Body mass index is 34.05 kg/m².      MEDICATIONS AT THE TIME OF OFFICE VISIT     Current Outpatient Medications on File Prior to Visit   Medication Sig   • " acetaminophen (TYLENOL) 325 MG tablet Take 650 mg by mouth Every 6 (Six) Hours As Needed for Mild Pain .   • albuterol sulfate  (90 Base) MCG/ACT inhaler Inhale 2 puffs Every 4 (Four) Hours As Needed for Wheezing.   • atorvastatin (LIPITOR) 10 MG tablet Take 1 tablet by mouth Daily.   • carvedilol (COREG) 3.125 MG tablet TAKE 1 TABLET BY MOUTH IN THE MORNING AND 2 TABLETS IN THE EVENING   • Rbqvt-Ubmll-Qrdwcpzo-TenofAF (Symtuza) [phone] MG per tablet Take 1 tablet by mouth Daily.   • Diclofenac Sodium (VOLTAREN) 1 % gel gel Apply 4 g topically to the appropriate area as directed 2 (Two) Times a Day. Use per pkg insert   • diphenhydrAMINE (BENADRYL) 25 mg capsule Take 1 capsule by mouth Every 6 (Six) Hours As Needed for Itching (swelling).   • empagliflozin (Jardiance) 10 MG tablet tablet Take 1 tablet by mouth Daily.   • fluticasone (Flonase) 50 MCG/ACT nasal spray 2 sprays into the nostril(s) as directed by provider Daily.   • Insulin Pen Needle 32G X 4 MM misc Use one pen needle once daily with Saxenda   • ivabradine HCl (Corlanor) 7.5 MG tablet tablet Take 1 tablet by mouth 2 (Two) Times a Day With Meals.   • nitroglycerin (NITROSTAT) 0.4 MG SL tablet Place 1 tablet under the tongue Every 5 (Five) Minutes As Needed for Chest Pain. Take no more than 3 doses in 15 minutes.   • potassium chloride (K-DUR,KLOR-CON) 20 MEQ CR tablet Take 3 tablets by mouth once daily   • sacubitril-valsartan (Entresto)  MG tablet Take 1 tablet by mouth 2 (Two) Times a Day.   • Saxenda 18 MG/3ML injection pen INJECT 0.6MG UNDER THE SKIN INTO APPROPRIATE AREA AS DIRECTED DAILY FOR 7 DAYS, THEN 1.2MG DAILY FOR 7 DAYS, THEN 1.8MG DAILY FOR 7 DAYS, THEN 2.4MG DAILY FOR 7 DAYS   • sertraline (Zoloft) 50 MG tablet Take 1 tablet by mouth Daily.   • spironolactone (ALDACTONE) 25 MG tablet 25 mg Daily.   • torsemide (DEMADEX) 20 MG tablet TAKE 3 TABLETS BY MOUTH EVERY MORNING AND 2 TABLETS BY MOUTH EVERY AFTERNOON   • vitamin  D (ERGOCALCIFEROL) 37679 units capsule capsule Take 50,000 Units by mouth Every 30 (Thirty) Days.   • warfarin (COUMADIN) 2.5 MG tablet TAKE 3 TABLETS (7.5MG) BY MOUTH EVERYDAY OR AS DIRECTED BY THE MED MANAGEMENT CLINIC   • guaiFENesin-codeine (GUAIFENESIN AC) 100-10 MG/5ML liquid Take 5 mL by mouth 3 (Three) Times a Day As Needed for Cough.   • SUMAtriptan (Imitrex) 50 MG tablet Take 0.5 tablets by mouth 1 (One) Time As Needed for Migraine for up to 30 doses. Take one tablet at onset of headache. May repeat dose one time in 2 hours if headache not relieved.     No current facility-administered medications on file prior to visit.         HISTORY OF PRESENT ILLNESS     Chronic right temporal occipital headache occurring over the last 2 years, worse in the morning.  No aura, dizziness, nausea or vomiting.  Excedrin, triptan with no improvement.  No significant anemia on last labs, electrolyte disturbances.  No weakness, confusion.  Normal gait.  She was supposed to get a sleep apnea test by pulmonary but did not follow-up.  Snoring, abnormal pulmonary function test chronic cough, to note she does have chronic pulmonary embolism and is on anticoagulation.  No shortness of breath below her baseline, history of chronic heart failure, seen in the transplant clinic.  No recent viral illness, no fever.  Cough has been occurring since April with no improvement with over-the-counter remedies.  Occasional clear postnasal drip and phlegm.  Last chest imaging in December 2020 which showed acute PE at that time.     Review of Systems      Constitutional neg except per HPI   Resp chronic cough   CV neg  Neuro headache     PHYSICAL EXAMINATION     Physical Exam  Constitutional  No distress  Eyes PERRLA  Cardiovascular Rate  normal . Rhythm: regular . Heart sounds:  normal  Pulmonary/Chest  Effort normal. Breath sounds:  normal  Neuro gait, no weakness, normal gait   Psychiatric  Alert. Judgment and thought content normal. Mood  normal     REVIEWED DATA     Labs:   Lab Results   Component Value Date    TSH 2.330 10/28/2020    Y9NMYKL 5.38 07/29/2020     Lab Results   Component Value Date    WBC 8.73 05/17/2022    HGB 13.8 05/17/2022    HCT 41.9 05/17/2022    MCV 98.4 (H) 05/17/2022     05/17/2022     Lab Results   Component Value Date    GLUCOSE 87 07/20/2022    BUN 18 07/20/2022    CREATININE 0.91 07/20/2022    EGFRIFAFRI 68 02/02/2022    BCR 19.8 07/20/2022    K 3.9 07/20/2022    CO2 28.0 07/20/2022    CALCIUM 9.2 07/20/2022    ALBUMIN 4.20 04/22/2022    LABIL2 1.2 03/07/2020    AST 23 04/22/2022    ALT 28 04/22/2022         Imaging:           Medical Tests:             Summary of old records / correspondence / consultant report:           Request outside records:           *Examiner was wearing medical surgical mask, face shield and exam gloves during the entire duration of the visit. Patient was masked the entire time.   Minimum social distance of 6 ft maintained entire visit except if physical contact was necessary as documented.     Dictated utilizing Dragon dictation

## 2022-08-03 ENCOUNTER — APPOINTMENT (OUTPATIENT)
Dept: CARDIOLOGY | Facility: HOSPITAL | Age: 47
End: 2022-08-03

## 2022-08-08 ENCOUNTER — ANTICOAGULATION VISIT (OUTPATIENT)
Dept: PHARMACY | Facility: HOSPITAL | Age: 47
End: 2022-08-08

## 2022-08-08 LAB — INR PPP: 2.3

## 2022-08-08 PROCEDURE — G0249 PROVIDE INR TEST MATER/EQUIP: HCPCS

## 2022-08-08 NOTE — PROGRESS NOTES
Anticoagulation Clinic Progress Note    Anticoagulation Summary  As of 2022    INR goal:  2.0-3.0   TTR:  52.8 % (1.6 y)   INR used for dosin.30 (2022)   Warfarin maintenance plan:  7.5 mg every day   Weekly warfarin total:  52.5 mg   No change documented:  Haley Lancaster, John   Plan last modified:  Zina De La Rosa RPH (2022)   Next INR check:  8/15/2022   Priority:  High   Target end date:  Indefinite    Indications    Other acute pulmonary embolism  unspecified whether acute cor pulmonale present (HCC) (Resolved) [I26.99]  Acute pulmonary embolism  unspecified pulmonary embolism type  unspecified whether acute cor pulmonale present (HCC) (Resolved) [I26.99]             Anticoagulation Episode Summary     INR check location:      Preferred lab:      Send INR reminders to:   [facility]    Comments:  **Home Testing Program**      Anticoagulation Care Providers     Provider Role Specialty Phone number    Lisset Melton MD Referring Cardiology [phone]          Clinic Interview:  Patient Findings     Positives:  Change in diet/appetite    Negatives:  Signs/symptoms of thrombosis, Signs/symptoms of bleeding,   Laboratory test error suspected, Change in health, Change in alcohol use,   Change in activity, Upcoming invasive procedure, Emergency department   visit, Upcoming dental procedure, Missed doses, Extra doses, Change in   medications, Hospital admission, Bruising, Other complaints    Comments:  Reports she has balanced out her spinach intake --now if she   doesn't drink the smoothies, she puts the spinach in her eggs.  Also eats   grapefruit twice a week.       Clinical Outcomes     Negatives:  Major bleeding event, Thromboembolic event,   Anticoagulation-related hospital admission, Anticoagulation-related ED   visit, Anticoagulation-related fatality    Comments:  Reports she has balanced out her spinach intake --now if she   doesn't drink the smoothies, she puts  the spinach in her eggs.  Also eats   grapefruit twice a week.         INR History:  Anticoagulation Monitoring 7/25/2022 8/1/2022 8/8/2022   INR 3.50 1.70 2.30   INR Date 7/25/2022 8/1/2022 8/8/2022   INR Goal 2.0-3.0 2.0-3.0 2.0-3.0   Trend Same Same Same   Last Week Total 52.5 mg 45 mg 55 mg   Next Week Total 45 mg 55 mg 52.5 mg   Sun 7.5 mg 7.5 mg 7.5 mg   Mon Hold (7/25) 10 mg (8/1) 7.5 mg   Tue 7.5 mg 7.5 mg 7.5 mg   Wed 7.5 mg 7.5 mg 7.5 mg   Thu 7.5 mg 7.5 mg 7.5 mg   Fri 7.5 mg 7.5 mg 7.5 mg   Sat 7.5 mg 7.5 mg 7.5 mg   Visit Report - - -   Some recent data might be hidden       Plan:  1. INR is Therapeutic today- see above in Anticoagulation Summary.   Will instruct Nina Saez to Continue their warfarin regimen- see above in Anticoagulation Summary.  2. Follow up in 1 weeks  3. They have been instructed to call if any changes in medications, doses, concerns, etc. Patient expresses understanding and has no further questions at this time.  4. Advised her about multiple drug interactions with grapefruit and reminded her to be cautious about those.    Haley Lancaster, PharmD

## 2022-08-12 ENCOUNTER — APPOINTMENT (OUTPATIENT)
Dept: CARDIOLOGY | Facility: HOSPITAL | Age: 47
End: 2022-08-12

## 2022-08-12 ENCOUNTER — TELEPHONE (OUTPATIENT)
Dept: CARDIOLOGY | Facility: HOSPITAL | Age: 47
End: 2022-08-12

## 2022-08-12 NOTE — TELEPHONE ENCOUNTER
Called and spoke to patient. She is currently taking 2.4 mg daily, continues to do well. Her weight is down to 211 lbs. She asked how she knew when she needed to change pens and I advised her the dial on the pen will not turn if it cannot administer the dose. At this point, she can get a new pen. She will start on the 3 mg daily dose next week and advised her to continue this from then on out. She verbalized understanding and had no further questions at this time.    Zina Waldron, PharmD, BCACP  [facility]  Phone: [phone]

## 2022-08-12 NOTE — TELEPHONE ENCOUNTER
----- Message from Lizbet Arora sent at 8/12/2022  1:04 PM EDT -----  Patient called to report she just woke up and will not make it to her appointment.  She does however have a question for you about her shots.   She would like you to call her, [phone]    Thanks,  Shae HUANG [facility]

## 2022-08-19 ENCOUNTER — ANTICOAGULATION VISIT (OUTPATIENT)
Dept: PHARMACY | Facility: HOSPITAL | Age: 47
End: 2022-08-19

## 2022-08-19 LAB — INR PPP: 2.3

## 2022-08-19 NOTE — PROGRESS NOTES
Anticoagulation Clinic Progress Note    Anticoagulation Summary  As of 2022    INR goal:  2.0-3.0   TTR:  53.6 % (1.6 y)   INR used for dosin.30 (2022)   Warfarin maintenance plan:  7.5 mg every day   Weekly warfarin total:  52.5 mg   No change documented:  Zina De La Rosa RPH   Plan last modified:  Zina De La Rosa RPH (2022)   Next INR check:  2022   Priority:  High   Target end date:  Indefinite    Indications    Other acute pulmonary embolism  unspecified whether acute cor pulmonale present (HCC) (Resolved) [I26.99]  Acute pulmonary embolism  unspecified pulmonary embolism type  unspecified whether acute cor pulmonale present (HCC) (Resolved) [I26.99]             Anticoagulation Episode Summary     INR check location:      Preferred lab:      Send INR reminders to:   [facility]    Comments:  **Home Testing Program**      Anticoagulation Care Providers     Provider Role Specialty Phone number    Lisset Melton MD Referring Cardiology [phone]          Clinic Interview:  No pertinent clinical findings have been reported.    INR History:  Anticoagulation Monitoring 2022   INR 1.70 2.30 2.30   INR Date 2022   INR Goal 2.0-3.0 2.0-3.0 2.0-3.0   Trend Same Same Same   Last Week Total 45 mg 55 mg 52.5 mg   Next Week Total 55 mg 52.5 mg 52.5 mg   Sun 7.5 mg 7.5 mg 7.5 mg   Mon 10 mg () 7.5 mg 7.5 mg   Tue 7.5 mg 7.5 mg 7.5 mg   Wed 7.5 mg 7.5 mg 7.5 mg   Thu 7.5 mg 7.5 mg 7.5 mg   Fri 7.5 mg 7.5 mg 7.5 mg   Sat 7.5 mg 7.5 mg 7.5 mg   Visit Report - - -   Some recent data might be hidden       Plan:  1. INR is therapeutic today- see above in Anticoagulation Summary.    Nina Saez to continue their warfarin regimen- see above in Anticoagulation Summary.  2. Follow up in 1 week  3. Pt has agreed to only be called if INR out of range. They have been instructed to call if any changes in medications, doses, concerns,  etc. Patient expresses understanding and has no further questions at this time.    Zina De La Rosa, [facility]

## 2022-08-23 RX ORDER — LIRAGLUTIDE 6 MG/ML
INJECTION, SOLUTION SUBCUTANEOUS
Status: CANCELLED | OUTPATIENT
Start: 2022-08-23

## 2022-08-23 RX ORDER — PEN NEEDLE, DIABETIC 32GX 5/32"
NEEDLE, DISPOSABLE MISCELLANEOUS
Qty: 100 EACH | Refills: 0 | Status: SHIPPED | OUTPATIENT
Start: 2022-08-23 | End: 2022-11-28

## 2022-08-24 RX ORDER — LIRAGLUTIDE 6 MG/ML
3 INJECTION, SOLUTION SUBCUTANEOUS DAILY
Qty: 15 ML | Refills: 2 | Status: SHIPPED | OUTPATIENT
Start: 2022-08-24

## 2022-08-29 ENCOUNTER — ANTICOAGULATION VISIT (OUTPATIENT)
Dept: PHARMACY | Facility: HOSPITAL | Age: 47
End: 2022-08-29

## 2022-08-29 LAB — INR PPP: 2.6

## 2022-08-29 NOTE — PROGRESS NOTES
Anticoagulation Clinic Progress Note    Anticoagulation Summary  As of 2022    INR goal:  2.0-3.0   TTR:  54.4 % (1.7 y)   INR used for dosin.60 (2022)   Warfarin maintenance plan:  7.5 mg every day   Weekly warfarin total:  52.5 mg   No change documented:  Zina De La Rosa RPH   Plan last modified:  Zina De La Rosa RPH (2022)   Next INR check:  2022   Priority:  High   Target end date:  Indefinite    Indications    Other acute pulmonary embolism  unspecified whether acute cor pulmonale present (HCC) (Resolved) [I26.99]  Acute pulmonary embolism  unspecified pulmonary embolism type  unspecified whether acute cor pulmonale present (HCC) (Resolved) [I26.99]             Anticoagulation Episode Summary     INR check location:      Preferred lab:      Send INR reminders to:   [facility]    Comments:  **Home Testing Program**      Anticoagulation Care Providers     Provider Role Specialty Phone number    Lisset Melton MD Referring Cardiology [phone]          Clinic Interview:  No pertinent clinical findings have been reported.    INR History:  Anticoagulation Monitoring 2022   INR 2.30 2.30 2.60   INR Date 2022   INR Goal 2.0-3.0 2.0-3.0 2.0-3.0   Trend Same Same Same   Last Week Total 55 mg 52.5 mg 52.5 mg   Next Week Total 52.5 mg 52.5 mg 52.5 mg   Sun 7.5 mg 7.5 mg 7.5 mg   Mon 7.5 mg 7.5 mg 7.5 mg   Tue 7.5 mg 7.5 mg 7.5 mg   Wed 7.5 mg 7.5 mg 7.5 mg   Thu 7.5 mg 7.5 mg 7.5 mg   Fri 7.5 mg 7.5 mg 7.5 mg   Sat 7.5 mg 7.5 mg 7.5 mg   Visit Report - - -   Some recent data might be hidden       Plan:  1. INR is therapeutic today- see above in Anticoagulation Summary.    Nina Saez to continue their warfarin regimen- see above in Anticoagulation Summary.  2. Follow up in 1 week  3. Pt has agreed to only be called if INR out of range. They have been instructed to call if any changes in medications, doses, concerns,  etc. Patient expresses understanding and has no further questions at this time.    Zina De La Rosa, [facility]

## 2022-08-31 ENCOUNTER — TELEPHONE (OUTPATIENT)
Dept: CARDIOLOGY | Facility: HOSPITAL | Age: 47
End: 2022-08-31

## 2022-08-31 RX ORDER — TORSEMIDE 20 MG/1
TABLET ORAL
Qty: 150 TABLET | Refills: 0 | Status: SHIPPED | OUTPATIENT
Start: 2022-08-31 | End: 2022-09-26

## 2022-08-31 RX ORDER — WARFARIN SODIUM 2.5 MG/1
TABLET ORAL
Qty: 270 TABLET | Refills: 0 | Status: SHIPPED | OUTPATIENT
Start: 2022-08-31 | End: 2022-11-28

## 2022-08-31 NOTE — TELEPHONE ENCOUNTER
Patient called. She needs a refill sent in for her Torsemide. She is completely out.    Thank you,   Shae HUANG [facility]   none

## 2022-09-06 ENCOUNTER — ANTICOAGULATION VISIT (OUTPATIENT)
Dept: PHARMACY | Facility: HOSPITAL | Age: 47
End: 2022-09-06

## 2022-09-06 LAB — INR PPP: 2.1

## 2022-09-06 NOTE — PROGRESS NOTES
Anticoagulation Clinic Progress Note    Anticoagulation Summary  As of 2022    INR goal:  2.0-3.0   TTR:  55.0 % (1.7 y)   INR used for dosin.10 (2022)   Warfarin maintenance plan:  7.5 mg every day   Weekly warfarin total:  52.5 mg   No change documented:  Zina De La Rosa RPH   Plan last modified:  Zina De La Rosa RPH (2022)   Next INR check:  2022   Priority:  High   Target end date:  Indefinite    Indications    Other acute pulmonary embolism  unspecified whether acute cor pulmonale present (HCC) (Resolved) [I26.99]  Acute pulmonary embolism  unspecified pulmonary embolism type  unspecified whether acute cor pulmonale present (HCC) (Resolved) [I26.99]             Anticoagulation Episode Summary     INR check location:      Preferred lab:      Send INR reminders to:   [facility] TEST [facility]    Comments:  **Home Testing Program**      Anticoagulation Care Providers     Provider Role Specialty Phone number    Lisset Melton MD Referring Cardiology [phone]          Clinic Interview:  No pertinent clinical findings have been reported.    INR History:  Anticoagulation Monitoring 2022   INR 2.30 2.60 2.10   INR Date 2022   INR Goal 2.0-3.0 2.0-3.0 2.0-3.0   Trend Same Same Same   Last Week Total 52.5 mg 52.5 mg 52.5 mg   Next Week Total 52.5 mg 52.5 mg 52.5 mg   Sun 7.5 mg 7.5 mg 7.5 mg   Mon 7.5 mg 7.5 mg 7.5 mg   Tue 7.5 mg 7.5 mg 7.5 mg   Wed 7.5 mg 7.5 mg 7.5 mg   Thu 7.5 mg 7.5 mg 7.5 mg   Fri 7.5 mg 7.5 mg 7.5 mg   Sat 7.5 mg 7.5 mg 7.5 mg   Visit Report - - -   Some recent data might be hidden       Plan:  1. INR is therapeutic today- see above in Anticoagulation Summary.    Nina Saez to continue their warfarin regimen- see above in Anticoagulation Summary.  2. Follow up in 1 week  3. Pt has agreed to only be called if INR out of range. They have been instructed to call if any changes in medications, doses, concerns,  etc. Patient expresses understanding and has no further questions at this time.    Zina De La Rosa, [facility]

## 2022-09-07 RX ORDER — SACUBITRIL AND VALSARTAN 97; 103 MG/1; MG/1
TABLET, FILM COATED ORAL
Qty: 180 TABLET | Refills: 0 | Status: SHIPPED | OUTPATIENT
Start: 2022-09-07 | End: 2022-10-04

## 2022-09-12 ENCOUNTER — HOSPITAL ENCOUNTER (OUTPATIENT)
Dept: CARDIOLOGY | Facility: HOSPITAL | Age: 47
Discharge: HOME | End: 2022-09-12

## 2022-09-12 ENCOUNTER — LAB (OUTPATIENT)
Dept: LAB | Facility: HOSPITAL | Age: 47
End: 2022-09-12

## 2022-09-12 ENCOUNTER — ANTICOAGULATION VISIT (OUTPATIENT)
Dept: PHARMACY | Facility: HOSPITAL | Age: 47
End: 2022-09-12

## 2022-09-12 VITALS
DIASTOLIC BLOOD PRESSURE: 72 MMHG | WEIGHT: 213.8 LBS | SYSTOLIC BLOOD PRESSURE: 116 MMHG | HEIGHT: 67 IN | BODY MASS INDEX: 33.56 KG/M2 | HEART RATE: 68 BPM | OXYGEN SATURATION: 96 %

## 2022-09-12 DIAGNOSIS — I51.89 GRADE II DIASTOLIC DYSFUNCTION: ICD-10-CM

## 2022-09-12 DIAGNOSIS — I42.8 NICM (NONISCHEMIC CARDIOMYOPATHY): ICD-10-CM

## 2022-09-12 DIAGNOSIS — R79.9 ABNORMAL FINDING OF BLOOD CHEMISTRY, UNSPECIFIED: ICD-10-CM

## 2022-09-12 DIAGNOSIS — E66.01 CLASS 2 SEVERE OBESITY WITH SERIOUS COMORBIDITY IN ADULT, UNSPECIFIED BMI, UNSPECIFIED OBESITY TYPE: ICD-10-CM

## 2022-09-12 DIAGNOSIS — I50.20 HFREF (HEART FAILURE WITH REDUCED EJECTION FRACTION): Primary | ICD-10-CM

## 2022-09-12 DIAGNOSIS — I10 ESSENTIAL HYPERTENSION: ICD-10-CM

## 2022-09-12 DIAGNOSIS — I26.99 PULMONARY EMBOLISM, OTHER, UNSPECIFIED CHRONICITY, UNSPECIFIED WHETHER ACUTE COR PULMONALE PRESENT: ICD-10-CM

## 2022-09-12 LAB
ALBUMIN SERPL-MCNC: 4.2 G/DL (ref 3.5–5.2)
ALBUMIN/GLOB SERPL: 1.3 G/DL
ALP SERPL-CCNC: 143 U/L (ref 39–117)
ALT SERPL W P-5'-P-CCNC: 18 U/L (ref 1–33)
ANION GAP SERPL CALCULATED.3IONS-SCNC: 11.7 MMOL/L (ref 5–15)
AST SERPL-CCNC: 19 U/L (ref 1–32)
BASOPHILS # BLD AUTO: 0.02 10*3/MM3 (ref 0–0.2)
BASOPHILS NFR BLD AUTO: 0.2 % (ref 0–1.5)
BILIRUB SERPL-MCNC: 0.4 MG/DL (ref 0–1.2)
BUN SERPL-MCNC: 17 MG/DL (ref 6–20)
BUN/CREAT SERPL: 17 (ref 7–25)
CALCIUM SPEC-SCNC: 9.3 MG/DL (ref 8.6–10.5)
CHLORIDE SERPL-SCNC: 101 MMOL/L (ref 98–107)
CHOLEST SERPL-MCNC: 173 MG/DL (ref 0–200)
CO2 SERPL-SCNC: 24.3 MMOL/L (ref 22–29)
CREAT SERPL-MCNC: 1 MG/DL (ref 0.57–1)
DEPRECATED RDW RBC AUTO: 42.9 FL (ref 37–54)
EGFRCR SERPLBLD CKD-EPI 2021: 70.5 ML/MIN/1.73
EOSINOPHIL # BLD AUTO: 0.04 10*3/MM3 (ref 0–0.4)
EOSINOPHIL NFR BLD AUTO: 0.5 % (ref 0.3–6.2)
ERYTHROCYTE [DISTWIDTH] IN BLOOD BY AUTOMATED COUNT: 12.1 % (ref 12.3–15.4)
GLOBULIN UR ELPH-MCNC: 3.2 GM/DL
GLUCOSE SERPL-MCNC: 96 MG/DL (ref 65–99)
HBA1C MFR BLD: 5 % (ref 4.8–5.6)
HCT VFR BLD AUTO: 40.3 % (ref 34–46.6)
HDLC SERPL-MCNC: 39 MG/DL (ref 40–60)
HGB BLD-MCNC: 13.4 G/DL (ref 12–15.9)
IMM GRANULOCYTES # BLD AUTO: 0.03 10*3/MM3 (ref 0–0.05)
IMM GRANULOCYTES NFR BLD AUTO: 0.3 % (ref 0–0.5)
INR PPP: 2.4
LDLC SERPL CALC-MCNC: 113 MG/DL (ref 0–100)
LDLC/HDLC SERPL: 2.86 {RATIO}
LYMPHOCYTES # BLD AUTO: 1.75 10*3/MM3 (ref 0.7–3.1)
LYMPHOCYTES NFR BLD AUTO: 19.8 % (ref 19.6–45.3)
MCH RBC QN AUTO: 32.4 PG (ref 26.6–33)
MCHC RBC AUTO-ENTMCNC: 33.3 G/DL (ref 31.5–35.7)
MCV RBC AUTO: 97.6 FL (ref 79–97)
MONOCYTES # BLD AUTO: 0.82 10*3/MM3 (ref 0.1–0.9)
MONOCYTES NFR BLD AUTO: 9.3 % (ref 5–12)
NEUTROPHILS NFR BLD AUTO: 6.17 10*3/MM3 (ref 1.7–7)
NEUTROPHILS NFR BLD AUTO: 69.9 % (ref 42.7–76)
NRBC BLD AUTO-RTO: 0 /100 WBC (ref 0–0.2)
NT-PROBNP SERPL-MCNC: 258 PG/ML (ref 0–450)
PLATELET # BLD AUTO: 162 10*3/MM3 (ref 140–450)
PMV BLD AUTO: 12.2 FL (ref 6–12)
POTASSIUM SERPL-SCNC: 3.8 MMOL/L (ref 3.5–5.2)
PROT SERPL-MCNC: 7.4 G/DL (ref 6–8.5)
RBC # BLD AUTO: 4.13 10*6/MM3 (ref 3.77–5.28)
SODIUM SERPL-SCNC: 137 MMOL/L (ref 136–145)
TRIGL SERPL-MCNC: 113 MG/DL (ref 0–150)
VLDLC SERPL-MCNC: 21 MG/DL (ref 5–40)
WBC NRBC COR # BLD: 8.83 10*3/MM3 (ref 3.4–10.8)

## 2022-09-12 PROCEDURE — 83880 ASSAY OF NATRIURETIC PEPTIDE: CPT

## 2022-09-12 PROCEDURE — 83036 HEMOGLOBIN GLYCOSYLATED A1C: CPT

## 2022-09-12 PROCEDURE — 80053 COMPREHEN METABOLIC PANEL: CPT

## 2022-09-12 PROCEDURE — G0249 PROVIDE INR TEST MATER/EQUIP: HCPCS

## 2022-09-12 PROCEDURE — 80061 LIPID PANEL: CPT

## 2022-09-12 PROCEDURE — G0463 HOSPITAL OUTPT CLINIC VISIT: HCPCS

## 2022-09-12 PROCEDURE — 99214 OFFICE O/P EST MOD 30 MIN: CPT | Performed by: NURSE PRACTITIONER

## 2022-09-12 PROCEDURE — 85025 COMPLETE CBC W/AUTO DIFF WBC: CPT

## 2022-09-12 NOTE — PROGRESS NOTES
Anticoagulation Clinic Progress Note    Anticoagulation Summary  As of 2022    INR goal:  2.0-3.0   TTR:  55.5 % (1.7 y)   INR used for dosin.40 (2022)   Warfarin maintenance plan:  7.5 mg every day   Weekly warfarin total:  52.5 mg   No change documented:  Zina De La Rosa RPH   Plan last modified:  Zina De La Rosa RPH (2022)   Next INR check:  2022   Priority:  High   Target end date:  Indefinite    Indications    Other acute pulmonary embolism  unspecified whether acute cor pulmonale present (HCC) (Resolved) [I26.99]  Acute pulmonary embolism  unspecified pulmonary embolism type  unspecified whether acute cor pulmonale present (HCC) (Resolved) [I26.99]             Anticoagulation Episode Summary     INR check location:      Preferred lab:      Send INR reminders to:   [facility] TEST PO[facility]    Comments:  **Home Testing Program**      Anticoagulation Care Providers     Provider Role Specialty Phone number    Lisset Melton MD Referring Cardiology [phone]          Clinic Interview:  No pertinent clinical findings have been reported.    INR History:  Anticoagulation Monitoring 2022   INR 2.60 2.10 2.40   INR Date 2022   INR Goal 2.0-3.0 2.0-3.0 2.0-3.0   Trend Same Same Same   Last Week Total 52.5 mg 52.5 mg 52.5 mg   Next Week Total 52.5 mg 52.5 mg 52.5 mg   Sun 7.5 mg 7.5 mg 7.5 mg   Mon 7.5 mg 7.5 mg 7.5 mg   Tue 7.5 mg 7.5 mg 7.5 mg   Wed 7.5 mg 7.5 mg 7.5 mg   Thu 7.5 mg 7.5 mg 7.5 mg   Fri 7.5 mg 7.5 mg 7.5 mg   Sat 7.5 mg 7.5 mg 7.5 mg   Visit Report - - -   Some recent data might be hidden       Plan:  1. INR is therapeutic today- see above in Anticoagulation Summary.    Nina Saez to continue their warfarin regimen- see above in Anticoagulation Summary.  2. Follow up in 1 week  3. Pt has agreed to only be called if INR out of range. They have been instructed to call if any changes in medications, doses,  concerns, etc. Patient expresses understanding and has no further questions at this time.    Zina De La Rosa, [facility]

## 2022-09-12 NOTE — PROGRESS NOTES
[facility] HEART FAILURE      Patient Name: Nina Saez  :1975  Age: 46 y.o.  Sex: female  Referring Provider: Lisset Melton MD   Primary Cardiologist: Lisset Melton MD  Encounter Provider:  SAMUEL Cisneros      Chief Complaint:   Chief Complaint   Patient presents with   • Congestive Heart Failure         Congestive Heart Failure  Associated symptoms include fatigue and shortness of breath. Pertinent negatives include no chest pain, palpitations or unexpected weight change.    this 46-year-old female, known to this provider, comes to us today for further evaluation of her chronic systolic heart failure.  Current diagnoses to include severe nonischemic cardiomyopathy, left bundle branch block, hypertension, HIV, obesity, and asthma.    Historically she had followed with Dr. Laina Boyd at [facility].  In spring 2019 she was visiting family in [place] and was taken emergently to [facility].  Echocardiogram and cardiac catheterization were performed at that point, carvedilol was changed to metoprolol.    In 2019 she presented through the emergency department at [facility] with chest pain and shortness of breath.  She was treated with IV diuresis, troponin was negative x2 and proBNP was elevated at 5151.  Entresto was started at that point given her severe dilated cardiomyopathy.  She was to follow with cardiology at [facility] at that time.    In 2020 she again presented to the emergency department with shortness of breath and cough x3 weeks.  BNP was elevated at 13,351.  Carvedilol was discontinued that admission given hypotension.  At subsequent outpatient appointment it was felt that Bumex was less effective than Lasix by the patient.  She complained of progressively worsening fatigue.  AICD, single-chamber [employer], interrogation 2020 revealed 10-16 beats of VT.    On September 10, 2020 she  saw Dr. Jamar Azul MD, for consideration of upgrade to biventricular device.  At that point he felt that her left bundle branch block was incomplete and she did not meet criteria for implantation of CRT-D.    She presented 10/27/2020 to [facility] with complaints of acute on chronic shortness of breath that began approximately 1 week prior.  BNP was further elevated at 16,206 that point.  Pulmonary edema was noted on chest x-ray.  Spironolactone was started that admission.  Referral for evaluation by [facility] transplant services was made November 2, 2020.    Left and right cardiac catheterization at [facility] revealed no obstructive CAD with normal filling pressures.  Echocardiogram at that time revealed an EF less than 15% with global hypokinesis-information extracted from external medical record note.  Cardiac catheterization May 30, 2017 performed at [facility] yielded codominant system with normal left main, LAD with distal 30% stenosis, RCA normal, LVEDP 7 mmHg.    Echocardiogram July 9, 2020 revealed EF of 6%, grade 3 LV diastolic dysfunction, significant LV wall motion hypokinesis/akinesis, RVSP 45 to 55 mmHg secondary to moderate tricuspid valve regurgitation, moderate to severe MR noted.    Jardiance 10 mg daily was started on 11/13/2020.      She was admitted from 12/11/2020 to 12/17/2020 for acute pulmonary embolism with right lower lobe pulmonary infarct.  She was started on warfarin at that time.  Additionally, she has been started on Corlanor as well as spironolactone for her heart failure.  January 4, 2021 she was evaluated and treated in the emergency department for possible angioedema.      Entresto has now been increased to  mg twice daily.  Repeat echocardiogram 3/8/2021 revealed improvement in ejection fraction from 6% to 22% with severe LV dilation, severe global hypokinesis and grade 2 LV diastolic dysfunction.    She has elected at this  time not to proceed with transplant work-up just yet.  She follows up with  on November 16.  She recently saw Dr. Melton and her carvedilol was increased to 50 mg at night with a goal of SBP of .    She has been doing very well on Saxenda with no side effects.  She has lost 5 pounds.  She did discover that she was unintentionally taking her Corlanor and Entresto once daily instead of twice daily, so she has rectified this.  She is doing well from a heart failure standpoint.      The following portions of the patient's history were reviewed and updated as appropriate: allergies, current medications, past family history, past medical history, past social history, past surgical history and problem list.    Current Outpatient Medications   Medication Sig Dispense Refill   • acetaminophen (TYLENOL) 325 MG tablet Take 650 mg by mouth Every 6 (Six) Hours As Needed for Mild Pain .     • albuterol sulfate  (90 Base) MCG/ACT inhaler Inhale 2 puffs Every 4 (Four) Hours As Needed for Wheezing. 18 g 5   • atorvastatin (LIPITOR) 10 MG tablet Take 1 tablet by mouth Daily. 30 tablet 11   • BD Pen Needle Heidi 2nd Gen 32G X 4 MM misc USE 1 NEEDLE ONCE DAILY WITH  SAXENDA 100 each 0   • carvedilol (COREG) 3.125 MG tablet TAKE 1 TABLET BY MOUTH IN THE MORNING AND 2 TABLETS IN THE EVENING 90 tablet 1   • CBD (cannabidiol) oral oil Take  by mouth.     • Frhku-Whbxu-Ewpumepx-TenofAF (Symtuza) [phone] MG per tablet Take 1 tablet by mouth Daily.     • Diclofenac Sodium (VOLTAREN) 1 % gel gel Apply 4 g topically to the appropriate area as directed 2 (Two) Times a Day. Use per pkg insert 100 g 2   • diphenhydrAMINE (BENADRYL) 25 mg capsule Take 1 capsule by mouth Every 6 (Six) Hours As Needed for Itching (swelling). 20 capsule 0   • empagliflozin (Jardiance) 10 MG tablet tablet Take 1 tablet by mouth Daily. 30 tablet 11   • Entresto  MG tablet Take 1 tablet by mouth twice daily 180 tablet 0   • fluticasone  (Flonase) 50 MCG/ACT nasal spray 2 sprays into the nostril(s) as directed by provider Daily. 1 bottle 2   • ivabradine HCl (Corlanor) 7.5 MG tablet tablet Take 1 tablet by mouth 2 (Two) Times a Day With Meals. 60 tablet 5   • nitroglycerin (NITROSTAT) 0.4 MG SL tablet Place 1 tablet under the tongue Every 5 (Five) Minutes As Needed for Chest Pain. Take no more than 3 doses in 15 minutes. 30 tablet 5   • potassium chloride (K-DUR,KLOR-CON) 20 MEQ CR tablet Take 3 tablets by mouth once daily 90 tablet 11   • Saxenda 18 MG/3ML injection pen Inject 3 mg under the skin into the appropriate area as directed Daily. 15 mL 2   • sertraline (Zoloft) 50 MG tablet Take 1 tablet by mouth Daily. 90 tablet 3   • spironolactone (ALDACTONE) 25 MG tablet 25 mg Daily.     • torsemide (DEMADEX) 20 MG tablet 3 TABLETS BY MOUTH EVERY MORNING AND 2 TABLETS BY MOUTH EVERY AFTERNOON 150 tablet 0   • vitamin D (ERGOCALCIFEROL) 49512 units capsule capsule Take 50,000 Units by mouth Every 30 (Thirty) Days.     • warfarin (COUMADIN) 2.5 MG tablet Take 3 tablets by mouth once daily 270 tablet 0     No current facility-administered medications for this encounter.       Past Medical History:   Diagnosis Date   • AICD (automatic cardioverter/defibrillator) present 03/06/2020   • Asthma    • CHF (congestive heart failure) ([facility])    • Class 2 obesity in adult    • Coronary artery disease 2017   • Depression 2017   • Diabetes mellitus ([facility])    • Fracture, foot 3/20    Broke   • Grade II diastolic dysfunction     Per echocardiogram 3/2021   • H/O Closed trimalleolar fracture    • Headache 2000   • HIV (human immunodeficiency virus infection) ([facility])    • Hypertension    • LBBB (left bundle branch block)    • NICM (nonischemic cardiomyopathy) ([facility])     7/2020 EF 6% per echocardiogram; AICD present   • Nonrheumatic mitral valve regurgitation 03/08/2021    Moderate per echo 3/2021   • Nonrheumatic tricuspid valve regurgitation     Moderate per echocardiogram  3/2021       Past Surgical History:   Procedure Laterality Date   • ANKLE OPEN REDUCTION INTERNAL FIXATION  3/7/20   • CARDIAC CATHETERIZATION     • CARDIAC DEFIBRILLATOR PLACEMENT     •  SECTION      one C/S   • FRACTURE SURGERY Left     left ankle   • OOPHORECTOMY      h/o teratoma   • TUBAL ABDOMINAL LIGATION         Physical Exam  Vitals and nursing note reviewed.   Constitutional:       General: She is not in acute distress.     Appearance: She is well-developed. She is not ill-appearing.   HENT:      Head: Normocephalic and atraumatic.   Eyes:      Conjunctiva/sclera: Conjunctivae normal.      Pupils: Pupils are equal, round, and reactive to light.   Neck:      Vascular: No JVD.   Cardiovascular:      Rate and Rhythm: Normal rate and regular rhythm.      Heart sounds: Normal heart sounds. No murmur heard.    No friction rub. No gallop.   Pulmonary:      Effort: Pulmonary effort is normal. No respiratory distress.      Breath sounds: Normal breath sounds.   Abdominal:      General: Bowel sounds are normal. There is no distension.      Palpations: Abdomen is soft.   Musculoskeletal:         General: No swelling or deformity.   Skin:     General: Skin is warm and dry.      Capillary Refill: Capillary refill takes less than 2 seconds.   Neurological:      Mental Status: She is alert and oriented to person, place, and time. Mental status is at baseline.   Psychiatric:         Attention and Perception: Attention normal.         Mood and Affect: Mood normal. Affect is tearful.         Behavior: Behavior normal. Behavior is cooperative.          Review of Systems   Constitutional: Positive for fatigue. Negative for appetite change and unexpected weight change.   HENT: Negative for congestion and nosebleeds.    Eyes: Negative for photophobia and visual disturbance.   Respiratory: Positive for shortness of breath. Negative for cough and chest tightness.    Cardiovascular: Negative for chest pain,  "palpitations and leg swelling.   Gastrointestinal: Negative for abdominal distention and blood in stool.   Endocrine: Negative for polyphagia and polyuria.   Genitourinary: Positive for frequency. Negative for enuresis.   Musculoskeletal: Negative for joint swelling and myalgias.   Skin: Negative for pallor and rash.   Neurological: Negative for dizziness, syncope, weakness, light-headedness, numbness and headaches.   Hematological: Does not bruise/bleed easily.   Psychiatric/Behavioral: Negative for decreased concentration and sleep disturbance.        OBJECTIVE:  /72 (BP Location: Right arm, Patient Position: Sitting)   Pulse 68   Ht 170.2 cm (67.01\")   Wt 97 kg (213 lb 12.8 oz)   SpO2 96%   BMI 33.48 kg/m²      Body mass index is 33.48 kg/m².  Wt Readings from Last 1 Encounters:   09/12/22 97 kg (213 lb 12.8 oz)       Lab Review:  Renal Function: CrCl cannot be calculated (Patient's most recent lab result is older than the maximum 30 days allowed.).    Lab Results   Component Value Date    PROBNP 232.0 07/20/2022       Results for orders placed during the hospital encounter of 12/15/21    Adult Transthoracic Echo Complete W/ Cont if Necessary Per Protocol    Interpretation Summary  · Estimated right ventricular systolic pressure from tricuspid regurgitation is normal (<35 mmHg).  · Mildly reduced right ventricular systolic function noted.  · Left ventricular diastolic function is consistent with (grade II w/high LAP) pseudonormalization.  · The left ventricular cavity is severely dilated.  · Estimated left ventricular EF was in disagreement with the calculated left ventricular EF. Left ventricular ejection fraction appears to be 21 - 25%. Left ventricular systolic function is severely decreased.  · There is left ventricular global hypokinesis noted.        6 MINUTE WALK  Patient declined       Cardiac Procedures:  1. Cardiac catheterization [facility] 5/30/17       2.  R/[facility] 4/2019 "   Data deficit      ASSESSMENT:     Diagnosis Plan   1. HFrEF (heart failure with reduced ejection fraction) ([facility])  CBC & Differential    Comprehensive Metabolic Panel    BNP    Lipid Panel    Hemoglobin A1c   2. Abnormal finding of blood chemistry, unspecified   Hemoglobin A1c   3. Essential hypertension     4. NICM (nonischemic cardiomyopathy) ([facility])     5. Grade II diastolic dysfunction     6. Pulmonary embolism, other, unspecified chronicity, unspecified whether acute cor pulmonale present ([facility])     7. Class 2 severe obesity with serious comorbidity in adult, unspecified BMI, unspecified obesity type ([facility])           PLAN OF CARE:  1.  HFrEF-NYHA functional class II.  Most recent EF per echocardiogram 22%, up from 6%. Currently she is on Entresto, torsemide, carvedilol, ivabradine, spironolactone, and Jardiance.  Historically she has been unable to tolerate metoprolol succinate as it made her very dizzy.      She has unintentionally mistakenly been taking her Entresto and Corlanor once daily so she has increase this to twice daily.  I would like to recheck her ejection fraction 3 months after she has been on the twice daily dosing of Entresto to see if there is any more improvement in her ejection fraction.  She is to continue following a strict low-sodium diet of 2000 mg daily or less, fluid restriction of 1500 mL daily, as well as remain adherent with daily weights.  Labs today as below.    Directions for when to call the clinic reviewed with the patient to include weight gain of 2 to 3 pounds in 24 hours, weight gain of 5 to 10 pounds within 7 days; worsening shortness of breath; worsening lower extremity edema or abdominal distention.    2.  Nonobstructive CAD-diffuse 30% LAD lesion noted on prior cardiac catheterization as above.  Stable, denies angina.    3.  Nonischemic cardiomyopathy (dilated)-presence of AICD noted.  EF per echocardiogram was 6%, now improved to 22% per echocardiogram as of 3/8/2021.   Most recent AICD interrogation as above.  Now on target dosing of Entresto.    4.  NSVT-noted on prior device interrogation.  Currently on beta-blockade.    5.  HIV-history noted.  Followed at [facility].    6.  Pulmonary embolism-remains on on warfarin.  Followed by home health and primary cardiology team.    7.  Obesity- Saxenda to be initiated today.  BMI now 34.14 kg/m².        CBC/CMP/BNP/lipid/hemoglobin A1c; increase Entresto and Corlanor as above; follow-up in 6 weeks or sooner if needed        Thank you for allowing me to participate in the care of your patient,         Kiley JIN Oliver, APRDEBRA  \[facility] [place] [facility]\"" HEART FAILURE  09/12/22  13:56 EST      **Lyric Disclaimer:**  Much of this encounter note is an electronic transcription/translation of spoken language to printed text. The electronic translation of spoken language may permit erroneous, or at times, nonsensical words or phrases to be inadvertently transcribed. Although I have reviewed the note for such errors, some may still exist.

## 2022-09-14 ENCOUNTER — TELEPHONE (OUTPATIENT)
Dept: CARDIOLOGY | Facility: CLINIC | Age: 47
End: 2022-09-14

## 2022-09-14 NOTE — TELEPHONE ENCOUNTER
Lab results reviewed with the patient.  Advised LDL is increased.  She states she eats eggs every day, and she will slow down on this.  She will also increase her atorvastatin to 20 mg daily and we will repeat lab work in 3 months.  All questions and concerns addressed at this time, understanding and agreement verbalized.    Kiley Boyd, APRN

## 2022-09-16 RX ORDER — IVABRADINE 7.5 MG/1
TABLET, FILM COATED ORAL
Qty: 60 TABLET | Refills: 0 | Status: SHIPPED | OUTPATIENT
Start: 2022-09-16 | End: 2022-10-04

## 2022-09-22 RX ORDER — CARVEDILOL 3.12 MG/1
TABLET ORAL
Qty: 180 TABLET | Refills: 1 | Status: SHIPPED | OUTPATIENT
Start: 2022-09-22 | End: 2022-10-04 | Stop reason: SDUPTHER

## 2022-09-23 ENCOUNTER — ANTICOAGULATION VISIT (OUTPATIENT)
Dept: PHARMACY | Facility: HOSPITAL | Age: 47
End: 2022-09-23

## 2022-09-23 LAB — INR PPP: 2.9

## 2022-09-23 NOTE — PROGRESS NOTES
Anticoagulation Clinic Progress Note    Anticoagulation Summary  As of 2022    INR goal:  2.0-3.0   TTR:  56.3 % (1.7 y)   INR used for dosin.90 (2022)   Warfarin maintenance plan:  7.5 mg every day   Weekly warfarin total:  52.5 mg   No change documented:  Haley Lancaster, PharmD   Plan last modified:  Zina De La Rosa RPH (2022)   Next INR check:  2022   Priority:  High   Target end date:  Indefinite    Indications    Other acute pulmonary embolism  unspecified whether acute cor pulmonale present (HCC) (Resolved) [I26.99]  Acute pulmonary embolism  unspecified pulmonary embolism type  unspecified whether acute cor pulmonale present (HCC) (Resolved) [I26.99]             Anticoagulation Episode Summary     INR check location:      Preferred lab:      Send INR reminders to:   [employer]    Comments:  **Home Testing Program**      Anticoagulation Care Providers     Provider Role Specialty Phone number    Lisset Melton MD Referring Cardiology [phone]          Clinic Interview:  Patient Findings     Negatives:  Signs/symptoms of thrombosis, Signs/symptoms of bleeding,   Laboratory test error suspected, Change in health, Change in alcohol use,   Change in activity, Upcoming invasive procedure, Emergency department   visit, Upcoming dental procedure, Missed doses, Extra doses, Change in   medications, Change in diet/appetite, Hospital admission, Bruising, Other   complaints      Clinical Outcomes     Negatives:  Major bleeding event, Thromboembolic event,   Anticoagulation-related hospital admission, Anticoagulation-related ED   visit, Anticoagulation-related fatality        INR History:  Anticoagulation Monitoring 2022   INR 2.10 2.40 2.90   INR Date 2022   INR Goal 2.0-3.0 2.0-3.0 2.0-3.0   Trend Same Same Same   Last Week Total 52.5 mg 52.5 mg 52.5 mg   Next Week Total 52.5 mg 52.5 mg 52.5 mg   Sun 7.5 mg 7.5 mg 7.5  mg   Mon 7.5 mg 7.5 mg 7.5 mg   Tue 7.5 mg 7.5 mg 7.5 mg   Wed 7.5 mg 7.5 mg 7.5 mg   Thu 7.5 mg 7.5 mg 7.5 mg   Fri 7.5 mg 7.5 mg 7.5 mg   Sat 7.5 mg 7.5 mg 7.5 mg   Visit Report - - -   Some recent data might be hidden       Plan:  1. INR is Therapeutic today- see above in Anticoagulation Summary.   Will instruct Nina Saez to Continue their warfarin regimen- see above in Anticoagulation Summary.  2. Follow up in 1 weeks  3. Pt has agreed to only be called if INR out of range. They have been instructed to call if any changes in medications, doses, concerns, etc. Patient expresses understanding and has no further questions at this time.    Haley Lancaster, PharmD

## 2022-09-26 RX ORDER — TORSEMIDE 20 MG/1
TABLET ORAL
Qty: 150 TABLET | Refills: 0 | Status: SHIPPED | OUTPATIENT
Start: 2022-09-26 | End: 2022-09-29 | Stop reason: SDUPTHER

## 2022-09-29 ENCOUNTER — OFFICE VISIT (OUTPATIENT)
Dept: CARDIOLOGY | Facility: CLINIC | Age: 47
End: 2022-09-29

## 2022-09-29 ENCOUNTER — CLINICAL SUPPORT NO REQUIREMENTS (OUTPATIENT)
Dept: CARDIOLOGY | Facility: CLINIC | Age: 47
End: 2022-09-29

## 2022-09-29 VITALS
SYSTOLIC BLOOD PRESSURE: 86 MMHG | HEIGHT: 67 IN | DIASTOLIC BLOOD PRESSURE: 60 MMHG | HEART RATE: 50 BPM | BODY MASS INDEX: 34.06 KG/M2 | WEIGHT: 217 LBS

## 2022-09-29 DIAGNOSIS — I34.0 NONRHEUMATIC MITRAL VALVE REGURGITATION: ICD-10-CM

## 2022-09-29 DIAGNOSIS — I10 ESSENTIAL HYPERTENSION: ICD-10-CM

## 2022-09-29 DIAGNOSIS — I50.20 HFREF (HEART FAILURE WITH REDUCED EJECTION FRACTION): Primary | ICD-10-CM

## 2022-09-29 DIAGNOSIS — I42.8 NICM (NONISCHEMIC CARDIOMYOPATHY): ICD-10-CM

## 2022-09-29 DIAGNOSIS — I36.1 NONRHEUMATIC TRICUSPID VALVE REGURGITATION: ICD-10-CM

## 2022-09-29 DIAGNOSIS — I51.89 GRADE II DIASTOLIC DYSFUNCTION: ICD-10-CM

## 2022-09-29 DIAGNOSIS — I42.8 NICM (NONISCHEMIC CARDIOMYOPATHY): Primary | ICD-10-CM

## 2022-09-29 PROCEDURE — 93000 ELECTROCARDIOGRAM COMPLETE: CPT | Performed by: INTERNAL MEDICINE

## 2022-09-29 PROCEDURE — 93282 PRGRMG EVAL IMPLANTABLE DFB: CPT | Performed by: INTERNAL MEDICINE

## 2022-09-29 PROCEDURE — 99214 OFFICE O/P EST MOD 30 MIN: CPT | Performed by: INTERNAL MEDICINE

## 2022-09-29 RX ORDER — TORSEMIDE 20 MG/1
TABLET ORAL
Qty: 150 TABLET | Refills: 0 | Status: SHIPPED | OUTPATIENT
Start: 2022-09-29 | End: 2022-10-25 | Stop reason: SDUPTHER

## 2022-09-29 NOTE — PROGRESS NOTES
Date of Office Visit: 2022  Encounter Provider: Lisset Melton MD  Place of Service: [facility]  Patient Name: Nina Saez  :1975      Patient ID:  Nina Saez is a 46 y.o. female is here for  followup for nonischemic cardiomyopathy.        History of Present Illness    She has a history of HIV, essential hypertension, severe dilated nonischemic cardiomyopathy with chronic systolic congestive heart failure, obesity, history of illicit drug use and asthma.  She has an AICD.  It is a single-chamber [employer].  In 2020, she was hospitalized for an acute PE with right lower lobe pulmonary infarct and was having hemoptysis.  She was placed on warfarin.  She is followed at [facility] for her HIV.     She was diagnosed with heart failure and cardiomyopathy in 2019 at [facility] when she was there visiting family.  An echocardiogram on cardiac catheterization which confirmed this diagnosis.  Her cardiac catheterization done 2019 showed normal coronary arteries, LVEDP 12, wedge pressure 13, PA pressure 28/15 with a mean of 19 mmHg, RA pressure 4, cardiac index 2.5 L/min/m² with a PVR of 1.2 Woods units.     She presented emergency on 20 with short windedness and cough for 3 weeks prior to presentation.  Her weight is been stable and she had no lower extremity edema but she had missed her cardiac medications and had been eating a lot of salt.  On arrival, she was found to be in congestive heart failure.  Echo done 2020 showed severe left ventricular dilation, ejection fraction 6%, very thin left ventricular walls, grade 3 diastolic dysfunction, moderate to severe mitral insufficiency, moderate tricuspid insufficiency, RVSP 48 mmHg.  There was essentially global hypokinesis with akinesis at the bases.  She was able to be dismissed on 2020.  While in the hospital, we did recommend consideration for upgrade  to a biventricular AICD.  She also had some right flank pain during hospitalization and CT scan showed a 2 mm minimally obstructing stone in the right ureter.  Urology saw her and felt like she could pass the stone without further intervention.     On 9/10/2020, she saw Dr. Jamar Azul in the office and he did not feel that she met criteria for implantation of CRT device.       She saw [facility] by telehealth date of service 1/28/21.  They noted the patient underwent a CPX which showed peak VO2 11.9 at RER greater than 1.05 while on Coreg.  Her CPX showed heart related limitations in her functional capacity, but the patient felt satisfied at her functional current level.    She has not had surgery with Dr. Rocky Calvert on her left ankle but still needs this.       Echo done 12/15/2021 shows severe left ventricular dilation with ejection fraction 21-25%, global hypokinesis, grade 2 diastolic dysfunction, normal RVSP and normal valvular structure and function.  Right ventricular systolic function is also mildly to moderately reduced.  When compared with prior echocardiogram, there is less valvular insufficiency.     She has been having more headaches since changing her HIV medications.  She basically has a combination tablet with her darunavir, ritonavir, emtricitabine and tenofovir.  Since then, she has had daily headaches which lasts all day.  She has been using Tylenol extra strength 2 tablets 3 times a day to be added to headaches.     She was evaluated by Dr. Hernandez at  heart failure 2/17/2022.  No changes were made.  She did have COVID-19 January 2022.    She saw Yane Boyd on 9/12/2022.  At that time, they discovered that she was taking her Corlanor and Entresto just once daily so she increase this to twice daily.  She is also on Saxenda for obesity.    Labs on 9/12/2022 showed normal proBNP, normal CMP, hemoglobin A1c 5%, total cholesterol 133, HDL 39, , VLDL 21, triglycerides 113,  normal CBC.  Since increasing her Corlanor and Entresto to twice daily, she has felt terrible.  She is nauseated, she has a headache, her blood pressure is low when she is dizzy.  Her heart rates been very low.  She has not passed out but she is felt like she could.  She is had no fevers or chills.  She does not think this is due to her other medications because she was feeling great until she increase these were increased to twice daily.  She has no orthopnea or PND.  She has no lower extremity edema and no chest pain or pressure.  Interrogation of her device done today shows normal VVI ICD function with 11.5 years of battery left.  She has 31% RV pacing.    Past Medical History:   Diagnosis Date   • AICD (automatic cardioverter/defibrillator) present 2020   • Asthma    • CHF (congestive heart failure) ([facility])    • Class 2 obesity in adult    • Coronary artery disease    • Depression    • Diabetes mellitus ([facility])    • Fracture, foot 3/20    Broke   • Grade II diastolic dysfunction     Per echocardiogram 3/2021   • H/O Closed trimalleolar fracture    • Headache    • HIV (human immunodeficiency virus infection) ([facility])    • Hypertension    • LBBB (left bundle branch block)    • NICM (nonischemic cardiomyopathy) ([facility])     2020 EF 6% per echocardiogram; AICD present   • Nonrheumatic mitral valve regurgitation 2021    Moderate per echo 3/2021   • Nonrheumatic tricuspid valve regurgitation     Moderate per echocardiogram 3/2021         Past Surgical History:   Procedure Laterality Date   • ANKLE OPEN REDUCTION INTERNAL FIXATION  3/7/20   • CARDIAC CATHETERIZATION     • CARDIAC DEFIBRILLATOR PLACEMENT     •  SECTION      one C/S   • FRACTURE SURGERY Left     left ankle   • OOPHORECTOMY      h/o teratoma   • TUBAL ABDOMINAL LIGATION         Current Outpatient Medications on File Prior to Visit   Medication Sig Dispense Refill   • acetaminophen (TYLENOL) 325 MG tablet Take 650 mg by mouth  Every 6 (Six) Hours As Needed for Mild Pain .     • albuterol sulfate  (90 Base) MCG/ACT inhaler Inhale 2 puffs Every 4 (Four) Hours As Needed for Wheezing. 18 g 5   • atorvastatin (LIPITOR) 10 MG tablet Take 1 tablet by mouth Daily. 30 tablet 11   • BD Pen Needle Heidi 2nd Gen 32G X 4 MM misc USE 1 NEEDLE ONCE DAILY WITH  SAXENDA 100 each 0   • carvedilol (COREG) 3.125 MG tablet TAKE 1 TABLET BY MOUTH IN THE MORNING AND 2 IN THE EVENING 180 tablet 1   • CBD (cannabidiol) oral oil Take  by mouth.     • Corlanor 7.5 MG tablet tablet TAKE 1 TABLET BY MOUTH TWICE DAILY WITH MEALS 60 tablet 0   • Elqvq-Xtdur-Komlxijv-TenofAF (Symtuza) [id number] MG per tablet Take 1 tablet by mouth Daily.     • Diclofenac Sodium (VOLTAREN) 1 % gel gel Apply 4 g topically to the appropriate area as directed 2 (Two) Times a Day. Use per pkg insert 100 g 2   • diphenhydrAMINE (BENADRYL) 25 mg capsule Take 1 capsule by mouth Every 6 (Six) Hours As Needed for Itching (swelling). 20 capsule 0   • empagliflozin (Jardiance) 10 MG tablet tablet Take 1 tablet by mouth Daily. 30 tablet 11   • Entresto  MG tablet Take 1 tablet by mouth twice daily 180 tablet 0   • fluticasone (Flonase) 50 MCG/ACT nasal spray 2 sprays into the nostril(s) as directed by provider Daily. 1 bottle 2   • nitroglycerin (NITROSTAT) 0.4 MG SL tablet Place 1 tablet under the tongue Every 5 (Five) Minutes As Needed for Chest Pain. Take no more than 3 doses in 15 minutes. 30 tablet 5   • potassium chloride (K-DUR,KLOR-CON) 20 MEQ CR tablet Take 3 tablets by mouth once daily 90 tablet 11   • Saxenda 18 MG/3ML injection pen Inject 3 mg under the skin into the appropriate area as directed Daily. 15 mL 2   • sertraline (Zoloft) 50 MG tablet Take 1 tablet by mouth Daily. 90 tablet 3   • spironolactone (ALDACTONE) 25 MG tablet 25 mg Daily.     • torsemide (DEMADEX) 20 MG tablet TAKE 3 TABLETS BY MOUTH  IN THE MORNING AND 2 TABLETS IN THE AFTERNOON 150 tablet 0   •  "vitamin D (ERGOCALCIFEROL) 89924 units capsule capsule Take 50,000 Units by mouth Every 30 (Thirty) Days.     • warfarin (COUMADIN) 2.5 MG tablet Take 3 tablets by mouth once daily 270 tablet 0     No current facility-administered medications on file prior to visit.       Social History     Socioeconomic History   • Marital status: Legally    Tobacco Use   • Smoking status: Former Smoker     Packs/day: 0.50     Years: 20.00     Pack years: 10.00     Types: Cigarettes     Quit date: 2015     Years since quittin.7   • Smokeless tobacco: Never Used   Vaping Use   • Vaping Use: Never used   Substance and Sexual Activity   • Alcohol use: Not Currently     Comment: holiday and special occ//caffeine use: Occ   • Drug use: Never   • Sexual activity: Not Currently     Partners: Male           ROS    Procedures    ECG 12 Lead    Date/Time: 2022 2:12 PM  Performed by: Lisset Melton MD  Authorized by: Lisset Melton MD   Comparison: compared with previous ECG   Similar to previous ECG  Pacing: ventricular paced rhythm  Clinical impression: abnormal EKG                Objective:      Vitals:    22 1339   BP: (!) 86/60   Pulse: 50   Weight: 98.4 kg (217 lb)   Height: 170.2 cm (67\")     Body mass index is 33.99 kg/m².    Vitals reviewed.   Constitutional:       General: Not in acute distress.     Appearance: Well-developed. Not diaphoretic.   Eyes:      General: No scleral icterus.     Conjunctiva/sclera: Conjunctivae normal.   HENT:      Head: Normocephalic and atraumatic.   Neck:      Thyroid: No thyromegaly.      Vascular: No carotid bruit or JVD.      Lymphadenopathy: No cervical adenopathy.   Pulmonary:      Effort: Pulmonary effort is normal. No respiratory distress.      Breath sounds: Normal breath sounds. No wheezing. No rhonchi. No rales.   Chest:      Chest wall: Not tender to palpatation.   Cardiovascular:      Normal rate. Regular rhythm.      Murmurs: There is no murmur.     "  No gallop.   Pulses:     Intact distal pulses.   Edema:     Peripheral edema absent.   Abdominal:      General: Bowel sounds are normal. There is no distension or abdominal bruit.      Palpations: Abdomen is soft. There is no abdominal mass.      Tenderness: There is no abdominal tenderness.   Musculoskeletal:         General: No deformity.      Extremities: No clubbing present.     Cervical back: Neck supple. Skin:     General: Skin is warm and dry. There is no cyanosis.      Coloration: Skin is not pale.      Findings: No rash.   Neurological:      Mental Status: Alert and oriented to person, place, and time.      Cranial Nerves: No cranial nerve deficit.   Psychiatric:         Judgment: Judgment normal.         Lab Review:       Assessment:      Diagnosis Plan   1. HFrEF (heart failure with reduced ejection fraction) ([facility])     2. Grade II diastolic dysfunction     3. Essential hypertension     4. NICM (nonischemic cardiomyopathy) ([facility])     5. Nonrheumatic mitral valve regurgitation     6. Nonrheumatic tricuspid valve regurgitation       1. Severe dilated nonischemic cardiomyopathy, ejection fraction 22%, AICD in place, chronic HFrEF.   Her cardiomyopathy is likely multifactorial related to the old illicit drug use history, HIV, possible poorly controlled HTN in the past.  No decompensated heart failure at this time.  2. Essential hypertension, controlled.  3. HIV positive.  Followed at [facility], well treated.  4. Diastolic dysfunction  5. Obesity  6. Asthma  7. LBBB.   8. History of pulmonary embolism on warfarin.  Diagnosed December 2020.  Etiology unknown.   9. Bradycardia with hypotension associated with fatigue, dizziness and headache.  At this point the increase in her Corlanor and Entresto is causing severe symptoms for her because she is now overmedicated.        Plan:       See me next Tuesday at  office.  Stop Corlanor, decrease Entresto to 49/51 twice daily.  Do not take her second carvedilol tonight.   She is overmedicated and that is why she is feeling so poorly.

## 2022-09-30 DIAGNOSIS — I50.22 CHRONIC SYSTOLIC CONGESTIVE HEART FAILURE: ICD-10-CM

## 2022-09-30 RX ORDER — NITROGLYCERIN 0.4 MG/1
0.4 TABLET SUBLINGUAL
Qty: 30 TABLET | Refills: 5 | Status: SHIPPED | OUTPATIENT
Start: 2022-09-30

## 2022-10-03 ENCOUNTER — TELEPHONE (OUTPATIENT)
Dept: INTERNAL MEDICINE | Age: 47
End: 2022-10-03

## 2022-10-03 ENCOUNTER — ANTICOAGULATION VISIT (OUTPATIENT)
Dept: PHARMACY | Facility: HOSPITAL | Age: 47
End: 2022-10-03

## 2022-10-03 LAB — INR PPP: 2.9

## 2022-10-03 NOTE — TELEPHONE ENCOUNTER
Caller: Nina Saez    Relationship: Self    Best call back number: [phone]    What is the medical concern/diagnosis: HEADACHE    What specialty or service is being requested: MRI    What is the provider, practice or medical service name: [facility] NEUROLOG[facility]    What is the office location: [facility], [address] [place] [address] [address], [place] [address]    What is the office phone number: [phone]    Any additional details: PATIENT STATES SHE WAS INFORMED SHE WOULD NEED A REFERRAL IN ORDER TO HAVE HER MRI DONE. SHE HAS HER MRI APPOINTMENT SCHEDULED 01/13/2023. PLEASE CALL AND ADVISE IF NEEDED.

## 2022-10-03 NOTE — PROGRESS NOTES
Anticoagulation Clinic Progress Note    Anticoagulation Summary  As of 10/3/2022    INR goal:  2.0-3.0   TTR:  56.9 % (1.8 y)   INR used for dosin.90 (10/3/2022)   Warfarin maintenance plan:  7.5 mg every day   Weekly warfarin total:  52.5 mg   No change documented:  Zina De La Rosa RPH   Plan last modified:  Zina De La Rosa RPH (2022)   Next INR check:  10/10/2022   Priority:  High   Target end date:  Indefinite    Indications    Other acute pulmonary embolism  unspecified whether acute cor pulmonale present (HCC) (Resolved) [I26.99]  Acute pulmonary embolism  unspecified pulmonary embolism type  unspecified whether acute cor pulmonale present (HCC) (Resolved) [I26.99]             Anticoagulation Episode Summary     INR check location:      Preferred lab:      Send INR reminders to:   [facility]    Comments:  **Home Testing Program**      Anticoagulation Care Providers     Provider Role Specialty Phone number    Lisset Melton MD Referring Cardiology [phone]          Clinic Interview:  No pertinent clinical findings have been reported.    INR History:  Anticoagulation Monitoring 2022 2022 10/3/2022   INR 2.40 2.90 2.90   INR Date 2022 2022 10/3/2022   INR Goal 2.0-3.0 2.0-3.0 2.0-3.0   Trend Same Same Same   Last Week Total 52.5 mg 52.5 mg 52.5 mg   Next Week Total 52.5 mg 52.5 mg 52.5 mg   Sun 7.5 mg 7.5 mg 7.5 mg   Mon 7.5 mg 7.5 mg 7.5 mg   Tue 7.5 mg 7.5 mg 7.5 mg   Wed 7.5 mg 7.5 mg 7.5 mg   Thu 7.5 mg 7.5 mg 7.5 mg   Fri 7.5 mg 7.5 mg 7.5 mg   Sat 7.5 mg 7.5 mg 7.5 mg   Visit Report - - -   Some recent data might be hidden       Plan:  1. INR is therapeutic today- see above in Anticoagulation Summary.    Nina Saez to continue their warfarin regimen- see above in Anticoagulation Summary.  2. Follow up in 1 week  3. Pt has agreed to only be called if INR out of range. They have been instructed to call if any changes in medications, doses,  concerns, etc. Patient expresses understanding and has no further questions at this time.    Zina De La Rosa, [facility]

## 2022-10-04 ENCOUNTER — OFFICE VISIT (OUTPATIENT)
Dept: CARDIOLOGY | Facility: CLINIC | Age: 47
End: 2022-10-04

## 2022-10-04 VITALS
HEART RATE: 63 BPM | HEIGHT: 67 IN | SYSTOLIC BLOOD PRESSURE: 114 MMHG | BODY MASS INDEX: 33.74 KG/M2 | DIASTOLIC BLOOD PRESSURE: 70 MMHG | WEIGHT: 215 LBS

## 2022-10-04 DIAGNOSIS — I42.8 NICM (NONISCHEMIC CARDIOMYOPATHY): Primary | ICD-10-CM

## 2022-10-04 PROCEDURE — 93000 ELECTROCARDIOGRAM COMPLETE: CPT | Performed by: INTERNAL MEDICINE

## 2022-10-04 PROCEDURE — 99214 OFFICE O/P EST MOD 30 MIN: CPT | Performed by: INTERNAL MEDICINE

## 2022-10-04 RX ORDER — SACUBITRIL AND VALSARTAN 49; 51 MG/1; MG/1
1 TABLET, FILM COATED ORAL 2 TIMES DAILY
Qty: 180 TABLET | Refills: 3 | Status: SHIPPED | OUTPATIENT
Start: 2022-10-04

## 2022-10-04 RX ORDER — CARVEDILOL 3.12 MG/1
3.12 TABLET ORAL 2 TIMES DAILY WITH MEALS
Qty: 180 TABLET | Refills: 1 | Status: SHIPPED | OUTPATIENT
Start: 2022-10-04 | End: 2023-01-23

## 2022-10-04 NOTE — PROGRESS NOTES
Date of Office Visit: 10/04/2022  Encounter Provider: Lisset Melton MD  Place of Service: [facility] CARDIOLOGY  Patient Name: Nina Saez  :1975      Patient ID:  Nina Saez is a 46 y.o. female is here for  followup for         History of Present Illness    She has a history of HIV, essential hypertension, severe dilated nonischemic cardiomyopathy with chronic systolic congestive heart failure, obesity, history of illicit drug use and asthma.  She has an AICD.  It is a single-chamber [employer].  In 2020, she was hospitalized for an acute PE with right lower lobe pulmonary infarct and was having hemoptysis.  She was placed on warfarin.  She is followed at [facility] for her HIV.     She was diagnosed with heart failure and cardiomyopathy in 2019 at [facility] when she was there visiting family.  An echocardiogram on cardiac catheterization which confirmed this diagnosis.  Her cardiac catheterization done 2019 showed normal coronary arteries, LVEDP 12, wedge pressure 13, PA pressure 28/15 with a mean of 19 mmHg, RA pressure 4, cardiac index 2.5 L/min/m² with a PVR of 1.2 Woods units.     She presented emergency on 20 with short windedness and cough for 3 weeks prior to presentation.  Her weight is been stable and she had no lower extremity edema but she had missed her cardiac medications and had been eating a lot of salt.  On arrival, she was found to be in congestive heart failure.  Echo done 2020 showed severe left ventricular dilation, ejection fraction 6%, very thin left ventricular walls, grade 3 diastolic dysfunction, moderate to severe mitral insufficiency, moderate tricuspid insufficiency, RVSP 48 mmHg.  There was essentially global hypokinesis with akinesis at the bases.  She was able to be dismissed on 2020.  While in the hospital, we did recommend consideration for upgrade to a biventricular AICD.   She also had some right flank pain during hospitalization and CT scan showed a 2 mm minimally obstructing stone in the right ureter.  Urology saw her and felt like she could pass the stone without further intervention.     On 9/10/2020, she saw Dr. Jamar Azul in the office and he did not feel that she met criteria for implantation of CRT device.       She saw [facility] by telehealth date of service 1/28/21.  They noted the patient underwent a CPX which showed peak VO2 11.9 at RER greater than 1.05 while on Coreg.  Her CPX showed heart related limitations in her functional capacity, but the patient felt satisfied at her functional current level.     She has not had surgery with Dr. Rocky Calvert on her left ankle but still needs this.       Echo done 12/15/2021 shows severe left ventricular dilation with ejection fraction 21-25%, global hypokinesis, grade 2 diastolic dysfunction, normal RVSP and normal valvular structure and function.  Right ventricular systolic function is also mildly to moderately reduced.  When compared with prior echocardiogram, there is less valvular insufficiency.     She has been having more headaches since changing her HIV medications.  She basically has a combination tablet with her darunavir, ritonavir, emtricitabine and tenofovir.  Since then, she has had daily headaches which lasts all day.  She has been using Tylenol extra strength 2 tablets 3 times a day to be added to headaches.      She was evaluated by Dr. Hernandez at  heart failure 2/17/2022.  No changes were made.  She did have COVID-19 January 2022.     She saw Yane Boyd on 9/12/2022.  At that time, they discovered that she was taking her Corlanor and Entresto just once daily so she increase this to twice daily.  She is also on Saxenda for obesity.     Labs on 9/12/2022 showed normal proBNP, normal CMP, hemoglobin A1c 5%, total cholesterol 133, HDL 39, , VLDL 21, triglycerides 113, normal CBC.  Since  increasing her Corlanor and Entresto to twice daily, she has felt terrible.  She is nauseated, she has a headache, her blood pressure is low when she is dizzy.  Her heart rates been very low.  She has not passed out but she is felt like she could.  She is had no fevers or chills.  She does not think this is due to her other medications because she was feeling great until she increase these were increased to twice daily.  She has no orthopnea or PND.  She has no lower extremity edema and no chest pain or pressure.  Interrogation of her device done today shows normal VVI ICD function with 11.5 years of battery left.  She has 31% RV pacing.    She feels much better.  She is no longer dizzy or weak.  She no longer feels like she is going to pass out.  Her headache is better.  She has no difficulty breathing.  She has no orthopnea or PND and no lower extremity edema.  Her weight has been stable.    Past Medical History:   Diagnosis Date   • AICD (automatic cardioverter/defibrillator) present 2020   • Asthma    • CHF (congestive heart failure) ([facility])    • Class 2 obesity in adult    • Coronary artery disease    • Depression    • Diabetes mellitus ([facility])    • Fracture, foot 3/20    Broke   • Grade II diastolic dysfunction     Per echocardiogram 3/2021   • H/O Closed trimalleolar fracture    • Headache    • HIV (human immunodeficiency virus infection) ([facility])    • Hypertension    • LBBB (left bundle branch block)    • NICM (nonischemic cardiomyopathy) ([facility])     2020 EF 6% per echocardiogram; AICD present   • Nonrheumatic mitral valve regurgitation 2021    Moderate per echo 3/2021   • Nonrheumatic tricuspid valve regurgitation     Moderate per echocardiogram 3/2021         Past Surgical History:   Procedure Laterality Date   • ANKLE OPEN REDUCTION INTERNAL FIXATION  3/7/20   • CARDIAC CATHETERIZATION     • CARDIAC DEFIBRILLATOR PLACEMENT     •  SECTION      one C/S   • FRACTURE SURGERY Left      left ankle   • OOPHORECTOMY      h/o teratoma   • TUBAL ABDOMINAL LIGATION         Current Outpatient Medications on File Prior to Visit   Medication Sig Dispense Refill   • acetaminophen (TYLENOL) 325 MG tablet Take 650 mg by mouth Every 6 (Six) Hours As Needed for Mild Pain .     • albuterol sulfate  (90 Base) MCG/ACT inhaler Inhale 2 puffs Every 4 (Four) Hours As Needed for Wheezing. 18 g 5   • atorvastatin (LIPITOR) 10 MG tablet Take 1 tablet by mouth Daily. 30 tablet 11   • BD Pen Needle Heidi 2nd Gen 32G X 4 MM misc USE 1 NEEDLE ONCE DAILY WITH  SAXENDA 100 each 0   • CBD (cannabidiol) oral oil Take  by mouth.     • Xkhgk-Ephcc-Hloznhwx-TenofAF (Symtuza) [phone] MG per tablet Take 1 tablet by mouth Daily.     • Diclofenac Sodium (VOLTAREN) 1 % gel gel Apply 4 g topically to the appropriate area as directed 2 (Two) Times a Day. Use per pkg insert 100 g 2   • diphenhydrAMINE (BENADRYL) 25 mg capsule Take 1 capsule by mouth Every 6 (Six) Hours As Needed for Itching (swelling). 20 capsule 0   • empagliflozin (Jardiance) 10 MG tablet tablet Take 1 tablet by mouth Daily. 30 tablet 11   • fluticasone (Flonase) 50 MCG/ACT nasal spray 2 sprays into the nostril(s) as directed by provider Daily. 1 bottle 2   • nitroglycerin (NITROSTAT) 0.4 MG SL tablet Place 1 tablet under the tongue Every 5 (Five) Minutes As Needed for Chest Pain. Take no more than 3 doses in 15 minutes. 30 tablet 5   • potassium chloride (K-DUR,KLOR-CON) 20 MEQ CR tablet Take 3 tablets by mouth once daily 90 tablet 11   • Saxenda 18 MG/3ML injection pen Inject 3 mg under the skin into the appropriate area as directed Daily. 15 mL 2   • sertraline (Zoloft) 50 MG tablet Take 1 tablet by mouth Daily. 90 tablet 3   • spironolactone (ALDACTONE) 25 MG tablet 25 mg Daily.     • torsemide (DEMADEX) 20 MG tablet TAKE 3 TABLETS BY MOUTH  IN THE MORNING AND 2 TABLETS IN THE AFTERNOON 150 tablet 0   • vitamin D (ERGOCALCIFEROL) 54973 units capsule  "capsule Take 50,000 Units by mouth Every 30 (Thirty) Days.     • warfarin (COUMADIN) 2.5 MG tablet Take 3 tablets by mouth once daily 270 tablet 0   • [DISCONTINUED] carvedilol (COREG) 3.125 MG tablet TAKE 1 TABLET BY MOUTH IN THE MORNING AND 2 IN THE EVENING 180 tablet 1   • [DISCONTINUED] Corlanor 7.5 MG tablet tablet TAKE 1 TABLET BY MOUTH TWICE DAILY WITH MEALS 60 tablet 0   • [DISCONTINUED] Entresto  MG tablet Take 1 tablet by mouth twice daily 180 tablet 0     No current facility-administered medications on file prior to visit.       Social History     Socioeconomic History   • Marital status: Legally    Tobacco Use   • Smoking status: Former Smoker     Packs/day: 0.50     Years: 20.00     Pack years: 10.00     Types: Cigarettes     Quit date: 2015     Years since quittin.7   • Smokeless tobacco: Never Used   Vaping Use   • Vaping Use: Never used   Substance and Sexual Activity   • Alcohol use: Not Currently     Comment: holiday and special occ//caffeine use: Occ   • Drug use: Never   • Sexual activity: Not Currently     Partners: Male           ROS    Procedures    ECG 12 Lead    Date/Time: 10/4/2022 1:46 PM  Performed by: Lisset Melton MD  Authorized by: Lisset Melton MD   Comparison: compared with previous ECG   Similar to previous ECG  Rhythm: sinus rhythm  Conduction: non-specific intraventricular conduction delay  T inversion: II, III, aVF, V4, V5 and V6    Clinical impression: abnormal EKG                Objective:      Vitals:    10/04/22 1316   BP: 114/70   Pulse: 63   Weight: 97.5 kg (215 lb)   Height: 170.2 cm (67\")     Body mass index is 33.67 kg/m².    Vitals reviewed.   Constitutional:       General: Not in acute distress.     Appearance: Well-developed. Not diaphoretic.   Eyes:      General: No scleral icterus.     Conjunctiva/sclera: Conjunctivae normal.   HENT:      Head: Normocephalic and atraumatic.   Neck:      Thyroid: No thyromegaly.      Vascular: " No carotid bruit or JVD.      Lymphadenopathy: No cervical adenopathy.   Pulmonary:      Effort: Pulmonary effort is normal. No respiratory distress.      Breath sounds: Normal breath sounds. No wheezing. No rhonchi. No rales.   Chest:      Chest wall: Not tender to palpatation.   Cardiovascular:      Normal rate. Regular rhythm.      Murmurs: There is no murmur.      No gallop.   Pulses:     Intact distal pulses.   Edema:     Peripheral edema absent.   Abdominal:      General: Bowel sounds are normal. There is no distension or abdominal bruit.      Palpations: Abdomen is soft. There is no abdominal mass.      Tenderness: There is no abdominal tenderness.   Musculoskeletal:         General: No deformity.      Extremities: No clubbing present.     Cervical back: Neck supple. Skin:     General: Skin is warm and dry. There is no cyanosis.      Coloration: Skin is not pale.      Findings: No rash.   Neurological:      Mental Status: Alert and oriented to person, place, and time.      Cranial Nerves: No cranial nerve deficit.   Psychiatric:         Judgment: Judgment normal.         Lab Review:       Assessment:      Diagnosis Plan   1. NICM (nonischemic cardiomyopathy) ([facility])       1. Severe dilated nonischemic cardiomyopathy, ejection fraction 22%, AICD in place, chronic HFrEF.   Her cardiomyopathy is likely multifactorial related to the old illicit drug use history, HIV, possible poorly controlled HTN in the past.  No decompensated heart failure at this time.  2. Essential hypertension, controlled.  3. HIV positive.  Followed at [facility], well treated.  4. Diastolic dysfunction  5. Obesity  6. Asthma  7. LBBB.   8. History of pulmonary embolism on warfarin.  Diagnosed December 2020.  Etiology unknown.   9. Bradycardia with hypotension associated with fatigue, dizziness and headache due to being overmedicated with Entresto and Corlanor.       Plan:       See chelsea in 6 weeks, decrease coreg to 3.125mg BID.  Overall she  is feeling much better since stopping her Corlanor and decrease Entresto to 49/51 twice daily.     Patient seen and evaluated as noted above, agree with patient care plan

## 2022-10-04 NOTE — TELEPHONE ENCOUNTER
Pt notified of specific date referral placed. Pt will contact centralized [facility] to go to [place]. ZAHRA

## 2022-10-05 ENCOUNTER — TELEPHONE (OUTPATIENT)
Dept: INTERNAL MEDICINE | Age: 47
End: 2022-10-05

## 2022-10-05 DIAGNOSIS — R51.9 INTRACTABLE HEADACHE, UNSPECIFIED CHRONICITY PATTERN, UNSPECIFIED HEADACHE TYPE: Primary | ICD-10-CM

## 2022-10-05 NOTE — TELEPHONE ENCOUNTER
MRI had been canceled, shows pt refused on 9/21/2022, CXR has not been done.   New neuro appt is for Jan.   Updated order needed for MRI of brain.     Order attached for sig.

## 2022-10-10 ENCOUNTER — ANTICOAGULATION VISIT (OUTPATIENT)
Dept: PHARMACY | Facility: HOSPITAL | Age: 47
End: 2022-10-10

## 2022-10-10 LAB — INR PPP: 2.9

## 2022-10-10 NOTE — PROGRESS NOTES
Anticoagulation Clinic Progress Note    Anticoagulation Summary  As of 10/10/2022    INR goal:  2.0-3.0   TTR:  57.4 % (1.8 y)   INR used for dosin.90 (10/10/2022)   Warfarin maintenance plan:  7.5 mg every day   Weekly warfarin total:  52.5 mg   No change documented:  Zina De La Rosa RPH   Plan last modified:  Zina De La Rosa RPH (2022)   Next INR check:  10/17/2022   Priority:  High   Target end date:  Indefinite    Indications    Other acute pulmonary embolism  unspecified whether acute cor pulmonale present (HCC) (Resolved) [I26.99]  Acute pulmonary embolism  unspecified pulmonary embolism type  unspecified whether acute cor pulmonale present (HCC) (Resolved) [I26.99]             Anticoagulation Episode Summary     INR check location:      Preferred lab:      Send INR reminders to:   [facility]    Comments:  **Home Testing Program**      Anticoagulation Care Providers     Provider Role Specialty Phone number    Lisset eMlton MD Referring Cardiology [phone]          Clinic Interview:  No pertinent clinical findings have been reported.    INR History:  Anticoagulation Monitoring 2022 10/3/2022 10/10/2022   INR 2.90 2.90 2.90   INR Date 2022 10/3/2022 10/10/2022   INR Goal 2.0-3.0 2.0-3.0 2.0-3.0   Trend Same Same Same   Last Week Total 52.5 mg 52.5 mg 52.5 mg   Next Week Total 52.5 mg 52.5 mg 52.5 mg   Sun 7.5 mg 7.5 mg 7.5 mg   Mon 7.5 mg 7.5 mg 7.5 mg   Tue 7.5 mg 7.5 mg 7.5 mg   Wed 7.5 mg 7.5 mg 7.5 mg   Thu 7.5 mg 7.5 mg 7.5 mg   Fri 7.5 mg 7.5 mg 7.5 mg   Sat 7.5 mg 7.5 mg 7.5 mg   Visit Report - - -   Some recent data might be hidden       Plan:  1. INR is therapeutic today- see above in Anticoagulation Summary.    Nina Saez to continue their warfarin regimen- see above in Anticoagulation Summary.  2. Follow up in 1 week  3. Pt has agreed to only be called if INR out of range. They have been instructed to call if any changes in medications, doses,  concerns, etc. Patient expresses understanding and has no further questions at this time.    Zina De La Rosa, [facility]

## 2022-10-17 ENCOUNTER — ANTICOAGULATION VISIT (OUTPATIENT)
Dept: PHARMACY | Facility: HOSPITAL | Age: 47
End: 2022-10-17

## 2022-10-17 LAB — INR PPP: 3.6

## 2022-10-17 PROCEDURE — G0249 PROVIDE INR TEST MATER/EQUIP: HCPCS

## 2022-10-17 NOTE — PROGRESS NOTES
Anticoagulation Clinic Progress Note    Anticoagulation Summary  As of 10/17/2022    INR goal:  2.0-3.0   TTR:  56.9 % (1.8 y)   INR used for dosing:  3.60 (10/17/2022)   Warfarin maintenance plan:  7.5 mg every day   Weekly warfarin total:  52.5 mg   Plan last modified:  Zina De La Rosa RPH (5/20/2022)   Next INR check:  10/24/2022   Priority:  High   Target end date:  Indefinite    Indications    Other acute pulmonary embolism  unspecified whether acute cor pulmonale present (HCC) (Resolved) [I26.99]  Acute pulmonary embolism  unspecified pulmonary embolism type  unspecified whether acute cor pulmonale present (HCC) (Resolved) [I26.99]             Anticoagulation Episode Summary     INR check location:      Preferred lab:      Send INR reminders to:  [facility] TEST PO[facility]    Comments:  **Home Testing Program**      Anticoagulation Care Providers     Provider Role Specialty Phone number    Lisset Melton MD Referring Cardiology [phone]          Clinic Interview:  Patient Findings     Negatives:  Signs/symptoms of thrombosis, Signs/symptoms of bleeding,   Laboratory test error suspected, Change in health, Change in alcohol use,   Change in activity, Upcoming invasive procedure, Emergency department   visit, Upcoming dental procedure, Missed doses, Extra doses, Change in   medications, Change in diet/appetite, Hospital admission, Bruising, Other   complaints      Clinical Outcomes     Negatives:  Major bleeding event, Thromboembolic event,   Anticoagulation-related hospital admission, Anticoagulation-related ED   visit, Anticoagulation-related fatality        INR History:  Anticoagulation Monitoring 10/3/2022 10/10/2022 10/17/2022   INR 2.90 2.90 3.60   INR Date 10/3/2022 10/10/2022 10/17/2022   INR Goal 2.0-3.0 2.0-3.0 2.0-3.0   Trend Same Same Same   Last Week Total 52.5 mg 52.5 mg 52.5 mg   Next Week Total 52.5 mg 52.5 mg 47.5 mg   Sun 7.5 mg 7.5 mg 7.5 mg   Mon 7.5 mg 7.5 mg 2.5 mg (10/17)    Tue 7.5 mg 7.5 mg 7.5 mg   Wed 7.5 mg 7.5 mg 7.5 mg   Thu 7.5 mg 7.5 mg 7.5 mg   Fri 7.5 mg 7.5 mg 7.5 mg   Sat 7.5 mg 7.5 mg 7.5 mg   Visit Report - - -   Some recent data might be hidden       Plan:  1. INR is Supratherapeutic today- see above in Anticoagulation Summary.   Will instruct Nina Saez to Change their warfarin regimen- see above in Anticoagulation Summary.  2. Follow up in 1 weeks  3.  They have been instructed to call if any changes in medications, doses, concerns, etc. Patient expresses understanding and has no further questions at this time.    Zina De La Rosa [facility]

## 2022-10-20 RX ORDER — ALBUTEROL SULFATE 90 UG/1
AEROSOL, METERED RESPIRATORY (INHALATION)
Qty: 18 G | Refills: 0 | Status: SHIPPED | OUTPATIENT
Start: 2022-10-20

## 2022-10-25 RX ORDER — TORSEMIDE 20 MG/1
TABLET ORAL
Qty: 150 TABLET | Refills: 0 | Status: SHIPPED | OUTPATIENT
Start: 2022-10-25 | End: 2022-12-15

## 2022-10-26 ENCOUNTER — ANTICOAGULATION VISIT (OUTPATIENT)
Dept: PHARMACY | Facility: HOSPITAL | Age: 47
End: 2022-10-26

## 2022-10-26 LAB — INR PPP: 2.6

## 2022-10-26 NOTE — PROGRESS NOTES
Anticoagulation Clinic Progress Note    Anticoagulation Summary  As of 10/26/2022    INR goal:  2.0-3.0   TTR:  56.7 % (1.8 y)   INR used for dosin.60 (10/26/2022)   Warfarin maintenance plan:  7.5 mg every day   Weekly warfarin total:  52.5 mg   No change documented:  Chucho Mcdaniel, PharmD   Plan last modified:  Zina De La Rosa RPH (2022)   Next INR check:  10/31/2022   Priority:  High   Target end date:  Indefinite    Indications    Other acute pulmonary embolism  unspecified whether acute cor pulmonale present (HCC) (Resolved) [I26.99]  Acute pulmonary embolism  unspecified pulmonary embolism type  unspecified whether acute cor pulmonale present (HCC) (Resolved) [I26.99]             Anticoagulation Episode Summary     INR check location:      Preferred lab:      Send INR reminders to:   [facility] [employer] [facility]    Comments:  **Home Testing Program**      Anticoagulation Care Providers     Provider Role Specialty Phone number    Lisset Melton MD Referring Cardiology [phone]          Clinic Interview:  Patient Findings     Negatives:  Signs/symptoms of thrombosis, Signs/symptoms of bleeding,   Laboratory test error suspected, Change in health, Change in alcohol use,   Change in activity, Upcoming invasive procedure, Emergency department   visit, Upcoming dental procedure, Missed doses, Extra doses, Change in   medications, Change in diet/appetite, Hospital admission, Bruising, Other   complaints      Clinical Outcomes     Negatives:  Major bleeding event, Thromboembolic event,   Anticoagulation-related hospital admission, Anticoagulation-related ED   visit, Anticoagulation-related fatality        INR History:  Anticoagulation Monitoring 10/10/2022 10/17/2022 10/26/2022   INR 2.90 3.60 2.60   INR Date 10/10/2022 10/17/2022 10/26/2022   INR Goal 2.0-3.0 2.0-3.0 2.0-3.0   Trend Same Same Same   Last Week Total 52.5 mg 52.5 mg 52.5 mg   Next Week Total 52.5 mg 47.5 mg 52.5 mg   Sun 7.5 mg  7.5 mg 7.5 mg   Mon 7.5 mg 2.5 mg (10/17) -   Tue 7.5 mg 7.5 mg -   Wed 7.5 mg 7.5 mg 7.5 mg   Thu 7.5 mg 7.5 mg 7.5 mg   Fri 7.5 mg 7.5 mg 7.5 mg   Sat 7.5 mg 7.5 mg 7.5 mg   Visit Report - - -   Some recent data might be hidden       Plan:  1. INR is Therapeutic today- see above in Anticoagulation Summary.   Will instruct Nina Saez to Continue their warfarin regimen- see above in Anticoagulation Summary.  2. Follow up in 1 week  3. They have been instructed to call if any changes in medications, doses, concerns, etc. Patient expresses understanding and has no further questions at this time.    Chucho Mcdaniel, PharmD

## 2022-10-31 ENCOUNTER — LAB (OUTPATIENT)
Dept: LAB | Facility: HOSPITAL | Age: 47
End: 2022-10-31

## 2022-10-31 ENCOUNTER — HOSPITAL ENCOUNTER (OUTPATIENT)
Dept: CARDIOLOGY | Facility: HOSPITAL | Age: 47
Discharge: HOME | End: 2022-10-31

## 2022-10-31 VITALS
OXYGEN SATURATION: 96 % | WEIGHT: 216.6 LBS | DIASTOLIC BLOOD PRESSURE: 69 MMHG | SYSTOLIC BLOOD PRESSURE: 94 MMHG | HEIGHT: 67 IN | HEART RATE: 89 BPM | BODY MASS INDEX: 34 KG/M2

## 2022-10-31 DIAGNOSIS — I51.89 GRADE II DIASTOLIC DYSFUNCTION: ICD-10-CM

## 2022-10-31 DIAGNOSIS — I50.20 HFREF (HEART FAILURE WITH REDUCED EJECTION FRACTION): Primary | ICD-10-CM

## 2022-10-31 DIAGNOSIS — I10 ESSENTIAL HYPERTENSION: ICD-10-CM

## 2022-10-31 DIAGNOSIS — I42.8 NICM (NONISCHEMIC CARDIOMYOPATHY): ICD-10-CM

## 2022-10-31 LAB
ANION GAP SERPL CALCULATED.3IONS-SCNC: 14 MMOL/L (ref 5–15)
BUN SERPL-MCNC: 14 MG/DL (ref 6–20)
BUN/CREAT SERPL: 14.3 (ref 7–25)
CALCIUM SPEC-SCNC: 9.3 MG/DL (ref 8.6–10.5)
CHLORIDE SERPL-SCNC: 96 MMOL/L (ref 98–107)
CO2 SERPL-SCNC: 27 MMOL/L (ref 22–29)
CREAT SERPL-MCNC: 0.98 MG/DL (ref 0.57–1)
EGFRCR SERPLBLD CKD-EPI 2021: 72.2 ML/MIN/1.73
GLUCOSE SERPL-MCNC: 91 MG/DL (ref 65–99)
POTASSIUM SERPL-SCNC: 3.3 MMOL/L (ref 3.5–5.2)
SODIUM SERPL-SCNC: 137 MMOL/L (ref 136–145)

## 2022-10-31 PROCEDURE — 80048 BASIC METABOLIC PNL TOTAL CA: CPT

## 2022-10-31 PROCEDURE — G0463 HOSPITAL OUTPT CLINIC VISIT: HCPCS

## 2022-10-31 PROCEDURE — 99214 OFFICE O/P EST MOD 30 MIN: CPT | Performed by: NURSE PRACTITIONER

## 2022-10-31 NOTE — ADDENDUM NOTE
Encounter addended by: Chuyita Hayden MA on: 10/31/2022 2:24 PM   Actions taken: Charge Capture section accepted

## 2022-10-31 NOTE — PROGRESS NOTES
[facility] HEART FAILURE      Patient Name: Nina Saez  :1975  Age: 46 y.o.  Sex: female  Referring Provider: Lisset Melton MD   Primary Cardiologist: Lisset Melton MD  Encounter Provider:  SAMUEL Cisneros      Chief Complaint:   No chief complaint on file.  HFrEF      Congestive Heart Failure  Associated symptoms include fatigue and shortness of breath. Pertinent negatives include no chest pain, palpitations or unexpected weight change.    this 46-year-old female, known to this provider, comes to us today for further evaluation of her chronic systolic heart failure.  Current diagnoses to include severe nonischemic cardiomyopathy, left bundle branch block, hypertension, HIV, obesity, and asthma.    Historically she had followed with Dr. Laina Boyd at [facility].  In spring 2019 she was visiting family in [place] and was taken emergently to [facility].  Echocardiogram and cardiac catheterization were performed at that point, carvedilol was changed to metoprolol.    In 2019 she presented through the emergency department at [facility] with chest pain and shortness of breath.  She was treated with IV diuresis, troponin was negative x2 and proBNP was elevated at 5151.  Entresto was started at that point given her severe dilated cardiomyopathy.  She was to follow with cardiology at [facility] at that time.    In 2020 she again presented to the emergency department with shortness of breath and cough x3 weeks.  BNP was elevated at 13,351.  Carvedilol was discontinued that admission given hypotension.  At subsequent outpatient appointment it was felt that Bumex was less effective than Lasix by the patient.  She complained of progressively worsening fatigue.  AICD, single-chamber [employer], interrogation 2020 revealed 10-16 beats of VT.    On September 10, 2020 she saw Dr. Jamar Azul MD, for  consideration of upgrade to biventricular device.  At that point he felt that her left bundle branch block was incomplete and she did not meet criteria for implantation of CRT-D.    She presented 10/27/2020 to [facility] with complaints of acute on chronic shortness of breath that began approximately 1 week prior.  BNP was further elevated at 16,206 that point.  Pulmonary edema was noted on chest x-ray.  Spironolactone was started that admission.  Referral for evaluation by [facility] transplant services was made November 2, 2020.    Left and right cardiac catheterization at [facility] revealed no obstructive CAD with normal filling pressures.  Echocardiogram at that time revealed an EF less than 15% with global hypokinesis-information extracted from external medical record note.  Cardiac catheterization May 30, 2017 performed at [facility] yielded codominant system with normal left main, LAD with distal 30% stenosis, RCA normal, LVEDP 7 mmHg.    Echocardiogram July 9, 2020 revealed EF of 6%, grade 3 LV diastolic dysfunction, significant LV wall motion hypokinesis/akinesis, RVSP 45 to 55 mmHg secondary to moderate tricuspid valve regurgitation, moderate to severe MR noted.    Jardiance 10 mg daily was started on 11/13/2020.      She was admitted from 12/11/2020 to 12/17/2020 for acute pulmonary embolism with right lower lobe pulmonary infarct.  She was started on warfarin at that time.  Additionally, she has been started on Corlanor as well as spironolactone for her heart failure.  January 4, 2021 she was evaluated and treated in the emergency department for possible angioedema.      Entresto has now been increased to  mg twice daily.  Repeat echocardiogram 3/8/2021 revealed improvement in ejection fraction from 6% to 22% with severe LV dilation, severe global hypokinesis and grade 2 LV diastolic dysfunction.    She has elected at this time not to proceed with  transplant work-up just yet.  She follows up with  on November 16.  She recently saw Dr. Melton and her carvedilol was increased to 50 mg at night with a goal of SBP of .    She has been doing very well on Saxenda with no side effects.  She has lost 5 pounds.  She did discover that she was unintentionally taking her Corlanor and Entresto once daily instead of twice daily, so she had changed this, but was experiencing some severe symptomatic hypotension.  She spoke with Dr. Melton when she saw her about it and Corlanor was discontinued, and Entresto was reduced to 49-51 mg twice daily.  Her blood pressure is still running low normal, but she is feeling much better.  She is currently awaiting an MRI of her brain to evaluate the frequent headaches that she has been having.      The following portions of the patient's history were reviewed and updated as appropriate: allergies, current medications, past family history, past medical history, past social history, past surgical history and problem list.    Current Outpatient Medications   Medication Sig Dispense Refill   • acetaminophen (TYLENOL) 325 MG tablet Take 650 mg by mouth Every 6 (Six) Hours As Needed for Mild Pain .     • albuterol sulfate  (90 Base) MCG/ACT inhaler INHALE 2 PUFFS BY MOUTH EVERY 4 HOURS AS NEEDED FOR WHEEZING 18 g 0   • atorvastatin (LIPITOR) 10 MG tablet Take 1 tablet by mouth Daily. 30 tablet 11   • BD Pen Needle Heidi 2nd Gen 32G X 4 MM misc USE 1 NEEDLE ONCE DAILY WITH  SAXENDA 100 each 0   • carvedilol (COREG) 3.125 MG tablet Take 1 tablet by mouth 2 (Two) Times a Day With Meals. 180 tablet 1   • CBD (cannabidiol) oral oil Take  by mouth.     • Qbwbr-Cbbwd-Vygmyfgl-TenofAF (Symtuza) [id number] MG per tablet Take 1 tablet by mouth Daily.     • Diclofenac Sodium (VOLTAREN) 1 % gel gel Apply 4 g topically to the appropriate area as directed 2 (Two) Times a Day. Use per pkg insert 100 g 2   • diphenhydrAMINE (BENADRYL) 25  mg capsule Take 1 capsule by mouth Every 6 (Six) Hours As Needed for Itching (swelling). 20 capsule 0   • empagliflozin (Jardiance) 10 MG tablet tablet Take 1 tablet by mouth Daily. 30 tablet 11   • fluticasone (Flonase) 50 MCG/ACT nasal spray 2 sprays into the nostril(s) as directed by provider Daily. 1 bottle 2   • nitroglycerin (NITROSTAT) 0.4 MG SL tablet Place 1 tablet under the tongue Every 5 (Five) Minutes As Needed for Chest Pain. Take no more than 3 doses in 15 minutes. 30 tablet 5   • potassium chloride (K-DUR,KLOR-CON) 20 MEQ CR tablet Take 3 tablets by mouth once daily 90 tablet 11   • sacubitril-valsartan (Entresto) 49-51 MG tablet Take 1 tablet by mouth 2 (Two) Times a Day. 180 tablet 3   • Saxenda 18 MG/3ML injection pen Inject 3 mg under the skin into the appropriate area as directed Daily. 15 mL 2   • sertraline (Zoloft) 50 MG tablet Take 1 tablet by mouth Daily. 90 tablet 3   • spironolactone (ALDACTONE) 25 MG tablet 25 mg Daily.     • torsemide (DEMADEX) 20 MG tablet TAKE 3 TABLETS BY MOUTH  IN THE MORNING AND 2 TABLETS IN THE AFTERNOON 150 tablet 0   • vitamin D (ERGOCALCIFEROL) 78181 units capsule capsule Take 50,000 Units by mouth Every 30 (Thirty) Days.     • warfarin (COUMADIN) 2.5 MG tablet Take 3 tablets by mouth once daily 270 tablet 0     No current facility-administered medications for this encounter.       Past Medical History:   Diagnosis Date   • AICD (automatic cardioverter/defibrillator) present 03/06/2020   • Asthma    • CHF (congestive heart failure) ([facility])    • Class 2 obesity in adult    • Coronary artery disease 2017   • Depression 2017   • Diabetes mellitus ([facility])    • Fracture, foot 3/20    Broke   • Grade II diastolic dysfunction     Per echocardiogram 3/2021   • H/O Closed trimalleolar fracture    • Headache 2000   • HIV (human immunodeficiency virus infection) ([facility])    • Hypertension    • LBBB (left bundle branch block)    • NICM (nonischemic cardiomyopathy) ([facility])     7/2020  EF 6% per echocardiogram; AICD present   • Nonrheumatic mitral valve regurgitation 2021    Moderate per echo 3/2021   • Nonrheumatic tricuspid valve regurgitation     Moderate per echocardiogram 3/2021       Past Surgical History:   Procedure Laterality Date   • ANKLE OPEN REDUCTION INTERNAL FIXATION  3/7/20   • CARDIAC CATHETERIZATION     • CARDIAC DEFIBRILLATOR PLACEMENT     •  SECTION      one C/S   • FRACTURE SURGERY Left 2020    left ankle   • OOPHORECTOMY      h/o teratoma   • TUBAL ABDOMINAL LIGATION         Physical Exam  Vitals and nursing note reviewed.   Constitutional:       General: She is not in acute distress.     Appearance: She is well-developed. She is not ill-appearing.   HENT:      Head: Normocephalic and atraumatic.   Eyes:      Conjunctiva/sclera: Conjunctivae normal.      Pupils: Pupils are equal, round, and reactive to light.   Neck:      Vascular: No JVD.   Cardiovascular:      Rate and Rhythm: Normal rate and regular rhythm.      Heart sounds: Normal heart sounds. No murmur heard.    No friction rub. No gallop.   Pulmonary:      Effort: Pulmonary effort is normal. No respiratory distress.      Breath sounds: Normal breath sounds.   Abdominal:      General: Bowel sounds are normal. There is no distension.      Palpations: Abdomen is soft.   Musculoskeletal:         General: No swelling or deformity.   Skin:     General: Skin is warm and dry.      Capillary Refill: Capillary refill takes less than 2 seconds.   Neurological:      Mental Status: She is alert and oriented to person, place, and time. Mental status is at baseline.   Psychiatric:         Attention and Perception: Attention normal.         Mood and Affect: Mood normal. Affect is tearful.         Behavior: Behavior normal. Behavior is cooperative.          Review of Systems   Constitutional: Positive for fatigue. Negative for appetite change and unexpected weight change.   HENT: Negative for congestion and nosebleeds.   "  Eyes: Negative for photophobia and visual disturbance.   Respiratory: Positive for shortness of breath. Negative for cough and chest tightness.    Cardiovascular: Negative for chest pain, palpitations and leg swelling.   Gastrointestinal: Negative for abdominal distention and blood in stool.   Endocrine: Negative for polyphagia and polyuria.   Genitourinary: Positive for frequency. Negative for enuresis.   Musculoskeletal: Negative for joint swelling and myalgias.   Skin: Negative for pallor and rash.   Neurological: Positive for headaches. Negative for dizziness, syncope, weakness, light-headedness and numbness.   Hematological: Does not bruise/bleed easily.   Psychiatric/Behavioral: Negative for decreased concentration and sleep disturbance.        OBJECTIVE:  BP 94/69 (BP Location: Right arm, Patient Position: Sitting)   Pulse 89   Ht 170.2 cm (67.01\")   Wt 98.2 kg (216 lb 9.6 oz)   SpO2 96%   BMI 33.92 kg/m²      Body mass index is 33.92 kg/m².  Wt Readings from Last 1 Encounters:   10/31/22 98.2 kg (216 lb 9.6 oz)       Lab Review:  Renal Function: CrCl cannot be calculated (Patient's most recent lab result is older than the maximum 30 days allowed.).    Lab Results   Component Value Date    PROBNP 258.0 09/12/2022       Results for orders placed during the hospital encounter of 12/15/21    Adult Transthoracic Echo Complete W/ Cont if Necessary Per Protocol    Interpretation Summary  · Estimated right ventricular systolic pressure from tricuspid regurgitation is normal (<35 mmHg).  · Mildly reduced right ventricular systolic function noted.  · Left ventricular diastolic function is consistent with (grade II w/high LAP) pseudonormalization.  · The left ventricular cavity is severely dilated.  · Estimated left ventricular EF was in disagreement with the calculated left ventricular EF. Left ventricular ejection fraction appears to be 21 - 25%. Left ventricular systolic function is severely decreased.  · " There is left ventricular global hypokinesis noted.        6 MINUTE WALK  Patient declined       Cardiac Procedures:  1. Cardiac catheterization [facility] 5/30/17       2.  /[facility] 4/2019   Data deficit      ASSESSMENT:     Diagnosis Plan   1. HFrEF (heart failure with reduced ejection fraction) ([facility])  Basic Metabolic Panel      2. Grade II diastolic dysfunction        3. NICM (nonischemic cardiomyopathy) ([facility])        4. Essential hypertension              PLAN OF CARE:  1.  HFrEF-NYHA functional class II.  Most recent EF per echocardiogram 22%, up from 6%. Currently she is on Entresto, torsemide, carvedilol, spironolactone, and Jardiance.  Historically she has been unable to tolerate metoprolol succinate as it made her very dizzy.      She is euvolemic on exam.  If her blood pressure continues to consider transitioning her carvedilol to metoprolol succinate as she does need better heart rate control.  We will talk about this at her next visit as she has recently had many medication changes.  I would like to otherwise continue her current GDMT and will check a BMP today.  She is to continue following a strict low-sodium diet of 2000 mg daily or less, fluid restriction of 1500 mL daily, as well as remain adherent with daily weights.    Directions for when to call the clinic reviewed with the patient to include weight gain of 2 to 3 pounds in 24 hours, weight gain of 5 to 10 pounds within 7 days; worsening shortness of breath; worsening lower extremity edema or abdominal distention.    2.  Nonobstructive CAD-diffuse 30% LAD lesion noted on prior cardiac catheterization as above.  Stable, denies angina.    3.  Nonischemic cardiomyopathy (dilated)-presence of AICD noted.  EF per echocardiogram was 6%, now improved to 22% per echocardiogram as of 3/8/2021.  Most recent AICD interrogation as above.  Now on target dosing of Entresto.    4.  NSVT-noted on prior device interrogation.  Currently on  beta-blockade.    5.  HIV-history noted.  Followed at [facility].    6.  Pulmonary embolism-remains on on warfarin.  Followed by home health and primary cardiology team.    7.  Obesity- on Saxenda.  BMI now 33.92 kg/m².        BMP; continue current GDMT; follow-up in 3 months or sooner if needed        Thank you for allowing me to participate in the care of your patient,         Kiley BoydSAMUEL  \[facility] [place] [facility]\"" HEART FAILURE  10/31/22  13:56 EST      **Lyric Disclaimer:**  Much of this encounter note is an electronic transcription/translation of spoken language to printed text. The electronic translation of spoken language may permit erroneous, or at times, nonsensical words or phrases to be inadvertently transcribed. Although I have reviewed the note for such errors, some may still exist.

## 2022-11-01 ENCOUNTER — ANTICOAGULATION VISIT (OUTPATIENT)
Dept: PHARMACY | Facility: HOSPITAL | Age: 47
End: 2022-11-01

## 2022-11-01 ENCOUNTER — TELEPHONE (OUTPATIENT)
Dept: CARDIOLOGY | Facility: CLINIC | Age: 47
End: 2022-11-01

## 2022-11-01 LAB — INR PPP: 2

## 2022-11-01 NOTE — TELEPHONE ENCOUNTER
Lab results reviewed the patient.  I advised that her potassium is low.  She states she has only been taking 40 mill equivalents even though she is post to take 60 mill equivalents because the tablets are large.  I asked that she please take 3 tablets instead and we will recheck at her next appointment.  All questions and concerns addressed at this time, understanding and agreement verbalized.    Kiley Boyd, APRN

## 2022-11-01 NOTE — PROGRESS NOTES
Anticoagulation Clinic Progress Note    Anticoagulation Summary  As of 2022    INR goal:  2.0-3.0   TTR:  57.1 % (1.8 y)   INR used for dosin.00 (2022)   Warfarin maintenance plan:  7.5 mg every day   Weekly warfarin total:  52.5 mg   No change documented:  Haley Lancaster, PharmD   Plan last modified:  Zina De La Rosa RPH (2022)   Next INR check:  2022   Priority:  High   Target end date:  Indefinite    Indications    Other acute pulmonary embolism  unspecified whether acute cor pulmonale present (HCC) (Resolved) [I26.99]  Acute pulmonary embolism  unspecified pulmonary embolism type  unspecified whether acute cor pulmonale present (HCC) (Resolved) [I26.99]             Anticoagulation Episode Summary     INR check location:      Preferred lab:      Send INR reminders to:   [facility]    Comments:  **Home Testing Program**      Anticoagulation Care Providers     Provider Role Specialty Phone number    Lisset Melton MD Referring Cardiology [phone]          Clinic Interview:  Patient Findings     Positives:  Missed doses    Negatives:  Signs/symptoms of thrombosis, Signs/symptoms of bleeding,   Laboratory test error suspected, Change in health, Change in alcohol use,   Change in activity, Upcoming invasive procedure, Emergency department   visit, Upcoming dental procedure, Extra doses, Change in medications,   Change in diet/appetite, Hospital admission, Bruising, Other complaints    Comments:  Reports missing  nights dose.       Clinical Outcomes     Negatives:  Major bleeding event, Thromboembolic event,   Anticoagulation-related hospital admission, Anticoagulation-related ED   visit, Anticoagulation-related fatality    Comments:  Reports missing  nights dose.         INR History:  Anticoagulation Monitoring 10/17/2022 10/26/2022 2022   INR 3.60 2.60 2.00   INR Date 10/17/2022 10/26/2022 2022   INR Goal 2.0-3.0 2.0-3.0 2.0-3.0   Trend  Same Same Same   Last Week Total 52.5 mg 52.5 mg 45 mg   Next Week Total 47.5 mg 52.5 mg 52.5 mg   Sun 7.5 mg 7.5 mg 7.5 mg   Mon 2.5 mg (10/17) - 7.5 mg   Tue 7.5 mg - 7.5 mg   Wed 7.5 mg 7.5 mg 7.5 mg   Thu 7.5 mg 7.5 mg 7.5 mg   Fri 7.5 mg 7.5 mg 7.5 mg   Sat 7.5 mg 7.5 mg 7.5 mg   Visit Report - - -   Some recent data might be hidden       Plan:  1. INR is Therapeutic today- see above in Anticoagulation Summary.   Will instruct Nina PHAM Saez to Continue their warfarin regimen- see above in Anticoagulation Summary.  2. Follow up in 1 weeks  3. They have been instructed to call if any changes in medications, doses, concerns, etc. Patient expresses understanding and has no further questions at this time.    Haley Lancaster, PharmD

## 2022-11-07 ENCOUNTER — ANTICOAGULATION VISIT (OUTPATIENT)
Dept: PHARMACY | Facility: HOSPITAL | Age: 47
End: 2022-11-07

## 2022-11-07 LAB — INR PPP: 2.7

## 2022-11-07 NOTE — PROGRESS NOTES
Anticoagulation Clinic Progress Note    Anticoagulation Summary  As of 2022    INR goal:  2.0-3.0   TTR:  57.5 % (1.9 y)   INR used for dosin.70 (2022)   Warfarin maintenance plan:  7.5 mg every day; Starting 2022   Weekly warfarin total:  52.5 mg   No change documented:  Haley Lancaster, PharmD   Plan last modified:  Zina De La Rosa RPH (2022)   Next INR check:  2022   Priority:  High   Target end date:  Indefinite    Indications    Other acute pulmonary embolism  unspecified whether acute cor pulmonale present (HCC) (Resolved) [I26.99]  Acute pulmonary embolism  unspecified pulmonary embolism type  unspecified whether acute cor pulmonale present (HCC) (Resolved) [I26.99]             Anticoagulation Episode Summary     INR check location:      Preferred lab:      Send INR reminders to:  [facility]    Comments:  **Home Testing Program**      Anticoagulation Care Providers     Provider Role Specialty Phone number    Lisset Melton MD Referring Cardiology [phone]          Clinic Interview:  Patient Findings     Negatives:  Signs/symptoms of thrombosis, Signs/symptoms of bleeding,   Laboratory test error suspected, Change in health, Change in alcohol use,   Change in activity, Upcoming invasive procedure, Emergency department   visit, Upcoming dental procedure, Missed doses, Extra doses, Change in   medications, Change in diet/appetite, Hospital admission, Bruising, Other   complaints      Clinical Outcomes     Negatives:  Major bleeding event, Thromboembolic event,   Anticoagulation-related hospital admission, Anticoagulation-related ED   visit, Anticoagulation-related fatality        INR History:  Anticoagulation Monitoring 10/26/2022 2022 2022   INR 2.60 2.00 2.70   INR Date 10/26/2022 2022 2022   INR Goal 2.0-3.0 2.0-3.0 2.0-3.0   Trend Same Same Same   Last Week Total 52.5 mg 45 mg 52.5 mg   Next Week Total 52.5 mg 52.5 mg 52.5 mg    Sun 7.5 mg 7.5 mg 7.5 mg   Mon - 7.5 mg 7.5 mg   Tue - 7.5 mg 7.5 mg   Wed 7.5 mg 7.5 mg 7.5 mg   Thu 7.5 mg 7.5 mg 7.5 mg   Fri 7.5 mg 7.5 mg 7.5 mg   Sat 7.5 mg 7.5 mg 7.5 mg   Visit Report - - -   Some recent data might be hidden       Plan:  1. INR is Therapeutic today- see above in Anticoagulation Summary.   Will instruct Nina Saez to Continue their warfarin regimen- see above in Anticoagulation Summary.  2. Follow up in 1 weeks  3. Pt has agreed to only be called if INR out of range. They have been instructed to call if any changes in medications, doses, concerns, etc. Patient expresses understanding and has no further questions at this time.    Haley Lancaster, PharmD

## 2022-11-14 ENCOUNTER — ANTICOAGULATION VISIT (OUTPATIENT)
Dept: PHARMACY | Facility: HOSPITAL | Age: 47
End: 2022-11-14

## 2022-11-14 LAB — INR PPP: 2.9

## 2022-11-14 PROCEDURE — G0249 PROVIDE INR TEST MATER/EQUIP: HCPCS

## 2022-11-14 NOTE — PROGRESS NOTES
Anticoagulation Clinic Progress Note    Anticoagulation Summary  As of 2022    INR goal:  2.0-3.0   TTR:  58.0 % (1.9 y)   INR used for dosin.90 (2022)   Warfarin maintenance plan:  7.5 mg every day; Starting 2022   Weekly warfarin total:  52.5 mg   No change documented:  Zina De La Rosa RPH   Plan last modified:  Zina De La Rosa RPH (2022)   Next INR check:  2022   Priority:  High   Target end date:  Indefinite    Indications    Other acute pulmonary embolism  unspecified whether acute cor pulmonale present (HCC) (Resolved) [I26.99]  Acute pulmonary embolism  unspecified pulmonary embolism type  unspecified whether acute cor pulmonale present (HCC) (Resolved) [I26.99]             Anticoagulation Episode Summary     INR check location:      Preferred lab:      Send INR reminders to:  [facility]    Comments:  **Home Testing Program**      Anticoagulation Care Providers     Provider Role Specialty Phone number    Lisset Melton MD Referring Cardiology [phone]          Clinic Interview:  No pertinent clinical findings have been reported.    INR History:  Anticoagulation Monitoring 2022   INR 2.00 2.70 2.90   INR Date 2022   INR Goal 2.0-3.0 2.0-3.0 2.0-3.0   Trend Same Same Same   Last Week Total 45 mg 52.5 mg 52.5 mg   Next Week Total 52.5 mg 52.5 mg 52.5 mg   Sun 7.5 mg 7.5 mg 7.5 mg   Mon 7.5 mg 7.5 mg 7.5 mg   Tue 7.5 mg 7.5 mg 7.5 mg   Wed 7.5 mg 7.5 mg 7.5 mg   Thu 7.5 mg 7.5 mg 7.5 mg   Fri 7.5 mg 7.5 mg 7.5 mg   Sat 7.5 mg 7.5 mg 7.5 mg   Visit Report - - -   Some recent data might be hidden       Plan:  1. INR is therapeutic today- see above in Anticoagulation Summary.    Nina Saez to continue their warfarin regimen- see above in Anticoagulation Summary.  2. Follow up in 1 week  3. Pt has agreed to only be called if INR out of range. They have been instructed to call if any changes in  medications, doses, concerns, etc. Patient expresses understanding and has no further questions at this time.    Zina De La Rosa, [facility]

## 2022-11-15 ENCOUNTER — OFFICE VISIT (OUTPATIENT)
Dept: CARDIOLOGY | Facility: CLINIC | Age: 47
End: 2022-11-15

## 2022-11-15 VITALS
HEIGHT: 66 IN | HEART RATE: 99 BPM | WEIGHT: 219.4 LBS | BODY MASS INDEX: 35.26 KG/M2 | SYSTOLIC BLOOD PRESSURE: 114 MMHG | OXYGEN SATURATION: 95 % | DIASTOLIC BLOOD PRESSURE: 76 MMHG

## 2022-11-15 DIAGNOSIS — I42.8 NICM (NONISCHEMIC CARDIOMYOPATHY): ICD-10-CM

## 2022-11-15 DIAGNOSIS — B20 HIV INFECTION, UNSPECIFIED SYMPTOM STATUS: ICD-10-CM

## 2022-11-15 DIAGNOSIS — I26.99 PULMONARY EMBOLISM, OTHER, UNSPECIFIED CHRONICITY, UNSPECIFIED WHETHER ACUTE COR PULMONALE PRESENT: ICD-10-CM

## 2022-11-15 DIAGNOSIS — I50.20 HFREF (HEART FAILURE WITH REDUCED EJECTION FRACTION): Primary | ICD-10-CM

## 2022-11-15 DIAGNOSIS — I36.1 NONRHEUMATIC TRICUSPID VALVE REGURGITATION: ICD-10-CM

## 2022-11-15 DIAGNOSIS — I10 ESSENTIAL HYPERTENSION: ICD-10-CM

## 2022-11-15 DIAGNOSIS — E78.2 MIXED HYPERLIPIDEMIA: ICD-10-CM

## 2022-11-15 DIAGNOSIS — I34.0 NONRHEUMATIC MITRAL VALVE REGURGITATION: ICD-10-CM

## 2022-11-15 DIAGNOSIS — I51.89 GRADE II DIASTOLIC DYSFUNCTION: ICD-10-CM

## 2022-11-15 PROCEDURE — 99214 OFFICE O/P EST MOD 30 MIN: CPT | Performed by: NURSE PRACTITIONER

## 2022-11-15 PROCEDURE — 93000 ELECTROCARDIOGRAM COMPLETE: CPT | Performed by: NURSE PRACTITIONER

## 2022-11-15 NOTE — PROGRESS NOTES
[facility] CARDIOLOGY  [address]  [facility] [address]  Phone: [phone]  Fax: [phone]      Patient Name: Nina Saez  :1975  Age: 46 y.o.  Primary Cardiologist: Lisset Melton MD  Encounter Provider:  SAMUEL Valdez      Chief Complaint     Chief Complaint: Cardiomyopathy      SUBJECTIVE     History of Present Illness:  Nina Saez is a 46 y.o. black female whose medical history includes HIV, hypertension, obesity, asthma, and history of illicit drug use. She is followed in our office by Dr. Melton for dilated non-ischemic cardiomyopathy, chronic HFreF, and history of ICD implant.     11/15/22 Follow-up:  She is here for one month follow up and I am seeing her for the first time today. At last visit, her carvedilol was decreased to 3.125 mg BID. She is feeling much better with these medication changes. She has a little bit of fluid on but is doing a good job of managing her sodium intake; she cooks at home and avoids adding additional salt to food. She denies chest pain, orthopnea, palpitations, lightheadedness or leg swelling. Medications are being taken as prescribed.     Below is a summary of pertinent cardiology findings:  • She is followed by  for HIV.  • 2020 she was hospitalized for acute PE with RLL pulmonary infarct.  • HFreF and Dilated Cardiomyopathy: 2019 diagnosed with heart failure and cardiomyopathy at [facility] while in [place] visiting family.   • 2019 cardiac catheterization showed normal coronary arteries, LVEDP 12 mmHg, PCWP 13 mmHg, PAP 28/15 with mean 19 mmHg, RAP 4 mmHg, CI 2.5 L/min/m2 and PVR 1.2 Wood units.   • 2020 ER for short-windedness and found to be in heart failure after missing medications and eating more salt. Upgrade to BiV ICD was recommended.   • 2020 echocardiogram showed severe LV dilation, EF 6%, very thin LV walls, grade 3 diastolic dysfunction,  moderate to severe mitral insufficiency, moderate tricuspid insufficiency, and RVSP 48 mmHg. There was global hypokinesis and akinesis at the bases.   • 2020 she was evaluated by Dr. Azul with EP and he felt she did not meet criteria for CRT-device.  • 2021  Healthcare noted CPX showed peak VO2 11.9 at RER > 1.05 while on carvedilol. It showed heart related limitations in her functional capacity.  • 2021 echocardiogram showed severe LV dilation, EF 21-25%, global hypokinesis, grade 2 diastolic dysfunction, normal RVSP, mildly reduced RV systolic function, and normal valvular function. Less valvular insufficiency when compared to previous.   • 2022 saw Dr. Hernandez at  Heart Failure; no changes made. She did have COVID-19 in 2022.   • 2022 [facility]; instructed to increase Corlanor and Entresto to BID. She had bradycardia and felt terrible; Corlanor was stopped and Entresto was decreased to 49/41 mmHg.   • 2022 Device interrogation showed VVI ICD function with 11.5 years of battery life left; 31% RV pacing.     Past Medical History:   Diagnosis Date   • AICD (automatic cardioverter/defibrillator) present 2020   • Asthma    • CHF (congestive heart failure) ([facility])    • Class 2 obesity in adult    • Coronary artery disease    • Depression    • Diabetes mellitus ([facility])    • Fracture, foot 3/20   • Grade II diastolic dysfunction    • H/O Closed trimalleolar fracture    • Headache    • HIV (human immunodeficiency virus infection) ([facility])    • Hypertension    • LBBB (left bundle branch block)    • NICM (nonischemic cardiomyopathy) ([facility])    • Nonrheumatic mitral valve regurgitation 2021   • Nonrheumatic tricuspid valve regurgitation        Past Surgical History:  •  has a past surgical history that includes Cardiac catheterization; Fracture surgery (Left, ); Cardiac defibrillator placement;  section; Tubal  ligation; Oophorectomy; and Ankle fracture surgery (3/7/20).     Social History     Socioeconomic History   • Marital status: Legally    Tobacco Use   • Smoking status: Former     Packs/day: 0.50     Years: 20.00     Pack years: 10.00     Types: Cigarettes     Quit date: 2015     Years since quittin.8   • Smokeless tobacco: Never   Vaping Use   • Vaping Use: Never used   Substance and Sexual Activity   • Alcohol use: Not Currently     Comment: holiday and special occ//caffeine use: Occ   • Drug use: Never   • Sexual activity: Not Currently     Partners: Male         Review of Systems     Review of Systems   Cardiovascular: Positive for dyspnea on exertion. Negative for chest pain, claudication, cyanosis, irregular heartbeat, leg swelling, near-syncope, orthopnea, palpitations, paroxysmal nocturnal dyspnea and syncope.       Medications     Allergies as of 11/15/2022 - Reviewed 11/15/2022   Allergen Reaction Noted   • Lisinopril Cough 10/31/2017       Current Outpatient Medications on File Prior to Visit   Medication Sig   • acetaminophen (TYLENOL) 325 MG tablet Take 650 mg by mouth Every 6 (Six) Hours As Needed for Mild Pain .   • albuterol sulfate  (90 Base) MCG/ACT inhaler INHALE 2 PUFFS BY MOUTH EVERY 4 HOURS AS NEEDED FOR WHEEZING   • atorvastatin (LIPITOR) 10 MG tablet Take 1 tablet by mouth Daily.   • BD Pen Needle Heidi 2nd Gen 32G X 4 MM misc USE 1 NEEDLE ONCE DAILY WITH  SAXENDA   • carvedilol (COREG) 3.125 MG tablet Take 1 tablet by mouth 2 (Two) Times a Day With Meals.   • CBD (cannabidiol) oral oil Take  by mouth.   • Pympb-Estho-Hhtghawl-TenofAF (Symtuza) 805-080-191-10 MG per tablet Take 1 tablet by mouth Daily.   • Diclofenac Sodium (VOLTAREN) 1 % gel gel Apply 4 g topically to the appropriate area as directed 2 (Two) Times a Day. Use per pkg insert   • diphenhydrAMINE (BENADRYL) 25 mg capsule Take 1 capsule by mouth Every 6 (Six) Hours As Needed for Itching (swelling).   •  "empagliflozin (Jardiance) 10 MG tablet tablet Take 1 tablet by mouth Daily.   • fluticasone (Flonase) 50 MCG/ACT nasal spray 2 sprays into the nostril(s) as directed by provider Daily.   • nitroglycerin (NITROSTAT) 0.4 MG SL tablet Place 1 tablet under the tongue Every 5 (Five) Minutes As Needed for Chest Pain. Take no more than 3 doses in 15 minutes.   • potassium chloride (K-DUR,KLOR-CON) 20 MEQ CR tablet Take 3 tablets by mouth once daily   • sacubitril-valsartan (Entresto) 49-51 MG tablet Take 1 tablet by mouth 2 (Two) Times a Day.   • Saxenda 18 MG/3ML injection pen Inject 3 mg under the skin into the appropriate area as directed Daily.   • sertraline (Zoloft) 50 MG tablet Take 1 tablet by mouth Daily.   • spironolactone (ALDACTONE) 25 MG tablet 25 mg Daily.   • torsemide (DEMADEX) 20 MG tablet TAKE 3 TABLETS BY MOUTH  IN THE MORNING AND 2 TABLETS IN THE AFTERNOON   • vitamin D (ERGOCALCIFEROL) 97243 units capsule capsule Take 50,000 Units by mouth Every 30 (Thirty) Days.   • warfarin (COUMADIN) 2.5 MG tablet Take 3 tablets by mouth once daily     No current facility-administered medications on file prior to visit.          OBJECTIVE     Vital Signs:   /76 (BP Location: Left arm, Patient Position: Sitting, Cuff Size: Large Adult)   Pulse 99   Ht 167.6 cm (66\")   Wt 99.5 kg (219 lb 6.4 oz)   SpO2 95%   BMI 35.41 kg/m²       Weight:  Wt Readings from Last 3 Encounters:   11/15/22 99.5 kg (219 lb 6.4 oz)   10/31/22 98.2 kg (216 lb 9.6 oz)   10/04/22 97.5 kg (215 lb)     Body mass index is 35.41 kg/m².      Physical Exam     Physical Exam  Constitutional:       General: She is not in acute distress.     Appearance: She is obese.   HENT:      Head: Normocephalic and atraumatic.      Mouth/Throat:      Mouth: Mucous membranes are moist.   Eyes:      General: No scleral icterus.     Extraocular Movements: Extraocular movements intact.      Conjunctiva/sclera: Conjunctivae normal.      Pupils: Pupils are " equal, round, and reactive to light.   Cardiovascular:      Rate and Rhythm: Normal rate and regular rhythm.      Pulses: Normal pulses.      Heart sounds: S1 normal and S2 normal.   Pulmonary:      Effort: No respiratory distress.      Breath sounds: Normal breath sounds. No wheezing, rhonchi or rales.   Abdominal:      General: Bowel sounds are normal. There is no distension.      Palpations: Abdomen is soft.      Tenderness: There is no abdominal tenderness.   Musculoskeletal:         General: Normal range of motion.      Cervical back: Normal range of motion and neck supple.      Right lower leg: No edema.      Left lower leg: No edema.   Skin:     General: Skin is warm and dry.      Coloration: Skin is not jaundiced.   Neurological:      Mental Status: She is alert and oriented to person, place, and time.   Psychiatric:         Mood and Affect: Mood normal.         Reviewed Data     Result Review :  The following data was reviewed by SAMUEL Valdez on 11/15/22:  • Labs 10/31/2022:  cr 1.0, K 3.3, otherwise unremarkable BMP  • Labs 09/12/2022:  cr 1.0, K 3.8, , otherwise unremarkable CMP, proBNP 258, hgb A1c 5.0, Chol 173, HDL 39, , Trig 113, Hgb 13.4, Plt 162      ECG 12 Lead    Date/Time: 11/15/2022 1:48 PM  Performed by: Kandice Damon APRN  Authorized by: Kandice Damon APRN   Comparison: compared with previous ECG from 10/4/2022  Similar to previous ECG  Rhythm: sinus rhythm  Ectopy: unifocal PVCs  Rate: normal  BPM: 84  Conduction: non-specific intraventricular conduction delay  T inversion: II, III, aVF and V6  T flattening: V5  Other findings: T wave abnormality    Clinical impression: abnormal EKG            Assessment and Plan        Assessment and Plan     Assessment:  1. HFrEF (heart failure with reduced ejection fraction) ([facility])    2. NICM (nonischemic cardiomyopathy) ([facility])    3. Nonrheumatic mitral valve regurgitation    4. Nonrheumatic  tricuspid valve regurgitation    5. Grade II diastolic dysfunction    6. Essential hypertension    7. Mixed hyperlipidemia    8. Pulmonary embolism, other, unspecified chronicity, unspecified whether acute cor pulmonale present ([facility])    9. HIV infection, unspecified symptom status ([facility])         1. HFrEF: Diagnosed in April 2019; cardiac catheterization at [facility] showed LVEDP 12 mmHg and cardiac index 2.5 L/min/m².  Hospitalized July 2020 with acute on chronic HFrEF; echocardiogram showed EF 6%.  December 2021 echocardiogram showed EF 21 to 25%.  She has been evaluated by [facility] heart failure and follows with [facility] heart failure clinic.  Her medical therapy includes Entresto 49-51 mg twice daily, carvedilol 3.125 mg twice daily. She is compensated by exam today.   2. Nonischemic cardiomyopathy: Diagnosed April 2019 at [facility]; likely multifactorial related to old illicit drug use, HIV, and previous poorly controlled hypertension.  Cardiac catheterization showed normal coronary arteries.  Hospitalized with acute on chronic HFrEF July 2020; echocardiogram showed severe LV dilation, very thin LV walls, global hypokinesis, and akinesis at the bases.  She has single-chamber ICD.  December 2021 echocardiogram showed severe LV dilation and mildly reduced RV systolic function.    3. Mitral valve regurgitation: July 2020 echocardiogram showed moderate to severe mitral insufficiency: December 2021 echocardiogram showed normal valvular function; less valvular insufficiency compared to previous.  4. Tricuspid valve regurgitation: Graded as moderate on July 2020 echocardiogram.  December 2021 echocardiogram showed normal valvular function; less valvular insufficiency compared to previous.  5. Diastolic dysfunction: This was graded as grade 3 diastolic dysfunction on July 2020 echocardiogram but grade 2 on December 2021 echocardiogram.  6. Hypertension: She has a history of previously poorly  controlled hypertension.  7. Hyperlipidemia: LDL not at goal when checked in September 2022.  8. History of PE: This occurred in December 2020 and she also had a right lower lung pulmonary infarct with hemoptysis.  9. HIV infection: She follows with [facility].    Plan:  1. I made no medication changes today. We discussed that she could take an extra 40 mg torsemide and 10 mEq KCl daily if she has fluid gain during the holidays. I asked her to notify the office if she has to do this more than 3 consecutive days.   2. She will see [facility] HF clinic in January 2023 with echocardiogram at that time.   3. She will follow up with Dr. Melton at that time.       Follow Up:  Return in about 3 months (around 2/15/2023) for Follow-up with Dr. Melton.  Orders Placed This Encounter   Procedures   • ECG 12 Lead      No orders of the defined types were placed in this encounter.        Thank you the opportunity to participate in this patient's care.    SAMUEL Hernandez    This office note has been dictated.

## 2022-11-18 ENCOUNTER — HOSPITAL ENCOUNTER (EMERGENCY)
Facility: HOSPITAL | Age: 47
Discharge: HOME | End: 2022-11-18
Attending: EMERGENCY MEDICINE | Admitting: EMERGENCY MEDICINE

## 2022-11-18 ENCOUNTER — APPOINTMENT (OUTPATIENT)
Dept: GENERAL RADIOLOGY | Facility: HOSPITAL | Age: 47
End: 2022-11-18

## 2022-11-18 VITALS
RESPIRATION RATE: 16 BRPM | BODY MASS INDEX: 33.75 KG/M2 | HEIGHT: 66 IN | DIASTOLIC BLOOD PRESSURE: 75 MMHG | SYSTOLIC BLOOD PRESSURE: 120 MMHG | HEART RATE: 79 BPM | OXYGEN SATURATION: 94 % | TEMPERATURE: 99 F | WEIGHT: 210 LBS

## 2022-11-18 DIAGNOSIS — R05.1 ACUTE COUGH: ICD-10-CM

## 2022-11-18 DIAGNOSIS — J10.1 INFLUENZA A: Primary | ICD-10-CM

## 2022-11-18 DIAGNOSIS — R50.9 FEVER AND CHILLS: ICD-10-CM

## 2022-11-18 LAB
ALBUMIN SERPL-MCNC: 3.8 G/DL (ref 3.5–5.2)
ALBUMIN/GLOB SERPL: 1.2 G/DL
ALP SERPL-CCNC: 128 U/L (ref 39–117)
ALT SERPL W P-5'-P-CCNC: 22 U/L (ref 1–33)
ANION GAP SERPL CALCULATED.3IONS-SCNC: 10.5 MMOL/L (ref 5–15)
APTT PPP: 33.9 SECONDS (ref 22.7–35.4)
AST SERPL-CCNC: 23 U/L (ref 1–32)
B PARAPERT DNA SPEC QL NAA+PROBE: NOT DETECTED
B PERT DNA SPEC QL NAA+PROBE: NOT DETECTED
BACTERIA UR QL AUTO: ABNORMAL /HPF
BASOPHILS # BLD AUTO: 0.01 10*3/MM3 (ref 0–0.2)
BASOPHILS NFR BLD AUTO: 0.2 % (ref 0–1.5)
BILIRUB SERPL-MCNC: 0.5 MG/DL (ref 0–1.2)
BILIRUB UR QL STRIP: NEGATIVE
BUN SERPL-MCNC: 17 MG/DL (ref 6–20)
BUN/CREAT SERPL: 15.5 (ref 7–25)
C PNEUM DNA NPH QL NAA+NON-PROBE: NOT DETECTED
CALCIUM SPEC-SCNC: 9.1 MG/DL (ref 8.6–10.5)
CHLORIDE SERPL-SCNC: 98 MMOL/L (ref 98–107)
CLARITY UR: CLEAR
CO2 SERPL-SCNC: 25.5 MMOL/L (ref 22–29)
COLOR UR: YELLOW
CREAT SERPL-MCNC: 1.1 MG/DL (ref 0.57–1)
D-LACTATE SERPL-SCNC: 0.9 MMOL/L (ref 0.5–2)
DEPRECATED RDW RBC AUTO: 41.2 FL (ref 37–54)
EGFRCR SERPLBLD CKD-EPI 2021: 62.9 ML/MIN/1.73
EOSINOPHIL # BLD AUTO: 0 10*3/MM3 (ref 0–0.4)
EOSINOPHIL NFR BLD AUTO: 0 % (ref 0.3–6.2)
ERYTHROCYTE [DISTWIDTH] IN BLOOD BY AUTOMATED COUNT: 12 % (ref 12.3–15.4)
FLUAV H1 2009 PAND RNA NPH QL NAA+PROBE: DETECTED
FLUBV RNA ISLT QL NAA+PROBE: NOT DETECTED
GLOBULIN UR ELPH-MCNC: 3.3 GM/DL
GLUCOSE SERPL-MCNC: 116 MG/DL (ref 65–99)
GLUCOSE UR STRIP-MCNC: ABNORMAL MG/DL
HADV DNA SPEC NAA+PROBE: NOT DETECTED
HCG SERPL QL: NEGATIVE
HCOV 229E RNA SPEC QL NAA+PROBE: NOT DETECTED
HCOV HKU1 RNA SPEC QL NAA+PROBE: NOT DETECTED
HCOV NL63 RNA SPEC QL NAA+PROBE: NOT DETECTED
HCOV OC43 RNA SPEC QL NAA+PROBE: NOT DETECTED
HCT VFR BLD AUTO: 35.3 % (ref 34–46.6)
HGB BLD-MCNC: 12.1 G/DL (ref 12–15.9)
HGB UR QL STRIP.AUTO: ABNORMAL
HMPV RNA NPH QL NAA+NON-PROBE: NOT DETECTED
HPIV1 RNA ISLT QL NAA+PROBE: NOT DETECTED
HPIV2 RNA SPEC QL NAA+PROBE: NOT DETECTED
HPIV3 RNA NPH QL NAA+PROBE: NOT DETECTED
HPIV4 P GENE NPH QL NAA+PROBE: NOT DETECTED
HYALINE CASTS UR QL AUTO: ABNORMAL /LPF
IMM GRANULOCYTES # BLD AUTO: 0.02 10*3/MM3 (ref 0–0.05)
IMM GRANULOCYTES NFR BLD AUTO: 0.3 % (ref 0–0.5)
INR PPP: 1.9 (ref 0.9–1.1)
KETONES UR QL STRIP: NEGATIVE
LEUKOCYTE ESTERASE UR QL STRIP.AUTO: NEGATIVE
LYMPHOCYTES # BLD AUTO: 1 10*3/MM3 (ref 0.7–3.1)
LYMPHOCYTES NFR BLD AUTO: 15.5 % (ref 19.6–45.3)
M PNEUMO IGG SER IA-ACNC: NOT DETECTED
MCH RBC QN AUTO: 31.9 PG (ref 26.6–33)
MCHC RBC AUTO-ENTMCNC: 34.3 G/DL (ref 31.5–35.7)
MCV RBC AUTO: 93.1 FL (ref 79–97)
MONOCYTES # BLD AUTO: 1.08 10*3/MM3 (ref 0.1–0.9)
MONOCYTES NFR BLD AUTO: 16.7 % (ref 5–12)
NEUTROPHILS NFR BLD AUTO: 4.35 10*3/MM3 (ref 1.7–7)
NEUTROPHILS NFR BLD AUTO: 67.3 % (ref 42.7–76)
NITRITE UR QL STRIP: NEGATIVE
NRBC BLD AUTO-RTO: 0 /100 WBC (ref 0–0.2)
NT-PROBNP SERPL-MCNC: 411 PG/ML (ref 0–450)
PH UR STRIP.AUTO: 6.5 [PH] (ref 5–8)
PLATELET # BLD AUTO: 134 10*3/MM3 (ref 140–450)
PMV BLD AUTO: 12.1 FL (ref 6–12)
POTASSIUM SERPL-SCNC: 4 MMOL/L (ref 3.5–5.2)
PROCALCITONIN SERPL-MCNC: 0.24 NG/ML (ref 0–0.25)
PROT SERPL-MCNC: 7.1 G/DL (ref 6–8.5)
PROT UR QL STRIP: NEGATIVE
PROTHROMBIN TIME: 22 SECONDS (ref 11.7–14.2)
QT INTERVAL: 364 MS
RBC # BLD AUTO: 3.79 10*6/MM3 (ref 3.77–5.28)
RBC # UR STRIP: ABNORMAL /HPF
REF LAB TEST METHOD: ABNORMAL
RHINOVIRUS RNA SPEC NAA+PROBE: NOT DETECTED
RSV RNA NPH QL NAA+NON-PROBE: NOT DETECTED
SARS-COV-2 RNA NPH QL NAA+NON-PROBE: NOT DETECTED
SODIUM SERPL-SCNC: 134 MMOL/L (ref 136–145)
SP GR UR STRIP: 1.02 (ref 1–1.03)
SQUAMOUS #/AREA URNS HPF: ABNORMAL /HPF
TROPONIN T SERPL-MCNC: <0.01 NG/ML (ref 0–0.03)
UROBILINOGEN UR QL STRIP: ABNORMAL
WBC # UR STRIP: ABNORMAL /HPF
WBC NRBC COR # BLD: 6.46 10*3/MM3 (ref 3.4–10.8)

## 2022-11-18 PROCEDURE — 84484 ASSAY OF TROPONIN QUANT: CPT | Performed by: NURSE PRACTITIONER

## 2022-11-18 PROCEDURE — 85730 THROMBOPLASTIN TIME PARTIAL: CPT | Performed by: NURSE PRACTITIONER

## 2022-11-18 PROCEDURE — 84145 PROCALCITONIN (PCT): CPT | Performed by: NURSE PRACTITIONER

## 2022-11-18 PROCEDURE — 80053 COMPREHEN METABOLIC PANEL: CPT | Performed by: NURSE PRACTITIONER

## 2022-11-18 PROCEDURE — 81001 URINALYSIS AUTO W/SCOPE: CPT | Performed by: NURSE PRACTITIONER

## 2022-11-18 PROCEDURE — 87040 BLOOD CULTURE FOR BACTERIA: CPT | Performed by: NURSE PRACTITIONER

## 2022-11-18 PROCEDURE — 83605 ASSAY OF LACTIC ACID: CPT | Performed by: NURSE PRACTITIONER

## 2022-11-18 PROCEDURE — 85025 COMPLETE CBC W/AUTO DIFF WBC: CPT | Performed by: NURSE PRACTITIONER

## 2022-11-18 PROCEDURE — 71045 X-RAY EXAM CHEST 1 VIEW: CPT

## 2022-11-18 PROCEDURE — 83880 ASSAY OF NATRIURETIC PEPTIDE: CPT | Performed by: NURSE PRACTITIONER

## 2022-11-18 PROCEDURE — 93005 ELECTROCARDIOGRAM TRACING: CPT

## 2022-11-18 PROCEDURE — 85610 PROTHROMBIN TIME: CPT | Performed by: NURSE PRACTITIONER

## 2022-11-18 PROCEDURE — 0202U NFCT DS 22 TRGT SARS-COV-2: CPT | Performed by: NURSE PRACTITIONER

## 2022-11-18 PROCEDURE — 99284 EMERGENCY DEPT VISIT MOD MDM: CPT

## 2022-11-18 PROCEDURE — 93005 ELECTROCARDIOGRAM TRACING: CPT | Performed by: EMERGENCY MEDICINE

## 2022-11-18 PROCEDURE — 93010 ELECTROCARDIOGRAM REPORT: CPT | Performed by: INTERNAL MEDICINE

## 2022-11-18 PROCEDURE — 84703 CHORIONIC GONADOTROPIN ASSAY: CPT | Performed by: NURSE PRACTITIONER

## 2022-11-18 PROCEDURE — 36415 COLL VENOUS BLD VENIPUNCTURE: CPT

## 2022-11-18 RX ORDER — SODIUM CHLORIDE 0.9 % (FLUSH) 0.9 %
10 SYRINGE (ML) INJECTION AS NEEDED
Status: DISCONTINUED | OUTPATIENT
Start: 2022-11-18 | End: 2022-11-18 | Stop reason: HOSPADM

## 2022-11-18 RX ORDER — OSELTAMIVIR PHOSPHATE 75 MG/1
75 CAPSULE ORAL 2 TIMES DAILY
Qty: 10 CAPSULE | Refills: 0 | Status: SHIPPED | OUTPATIENT
Start: 2022-11-18 | End: 2023-01-09

## 2022-11-18 RX ORDER — ACETAMINOPHEN 500 MG
1000 TABLET ORAL ONCE
Status: COMPLETED | OUTPATIENT
Start: 2022-11-18 | End: 2022-11-18

## 2022-11-18 RX ADMIN — SODIUM CHLORIDE 1000 ML: 9 INJECTION, SOLUTION INTRAVENOUS at 09:26

## 2022-11-18 RX ADMIN — ACETAMINOPHEN 1000 MG: 500 TABLET ORAL at 09:34

## 2022-11-18 NOTE — ED PROVIDER NOTES
MD ATTESTATION NOTE    The FATIMAH and I have discussed this patient's history, physical exam, and treatment plan.  I have reviewed the documentation and personally had a face to face interaction with the patient. I affirm the documentation and agree with the treatment and plan.  The attached note describes my personal findings.      I provided a substantive portion of the care of the patient.  I personally performed the physical exam in its entirety, and below are my findings.  For this patient encounter, the patient wore surgical mask, I wore full protective PPE including N95 and eye protection    Brief HPI: 46-year-old female with history of HIV disease followed by the [facility] clinic.  She has history of coronary artery disease and is on Coumadin.  Patient states she has been sick for the past 2 days with fever, cough, weakness and vomiting and diarrhea yesterday.  Patient denies any known ill contacts.  Patient states she is compliant with her medications.    General : 46-year-old patient is awake alert and oriented  HEENT: NCAT  CV: Heart is regular with no murmurs  Respiratory: Rhonchi in bases bilaterally  Abd: Soft and nontender  Ext: No acute abnormalities  Skin: No rash  Neuro: Cranial nerves II through XII grossly intact as tested.  No acute lateralizing deficits.  Psych: Normal mood and affect      Plan: Tylenol, IV fluids, respiratory viral panel, chest x-ray, labs and urinalysis  The patient's work-up is remarkable for influenza A.  However the rest of her blood counts including her white blood count and lactic acid: EKG: And chest x-ray are negative.  We offered the patient admission to the hospital for her influenza A and further evaluation and care.  The patient states that she does not want to be admitted to the hospital and would like to go home with Tamiflu but will return if worse.  We will give the patient careful return emergency room instructions.  She understands and agrees with the plan.      Hari Marinelli MD  11/18/22 2200

## 2022-11-18 NOTE — ED PROVIDER NOTES
EMERGENCY DEPARTMENT ENCOUNTER    Room Number:  03/03  Date of encounter:  11/18/2022  PCP: Zach Durand APRN  Historian: Patient  Cardiologist: Dr. Melton    PPE    Patient was placed in face mask in first look. Patient was wearing facemask when I entered the room and throughout our encounter. I wore full protective equipment throughout this patient encounter including a face mask, and gloves. Hand hygiene was performed before donning protective equipment and after removal when leaving the room.        HPI:  Chief Complaint: Fever and cough  A complete HPI/ROS/PMH/PSH/SH/FH are unobtainable due to: Nothing    Context: Nina Saez is a 46 y.o. female with a history of CHF, HTN, HIV, CAD, anticoagulated on Coumadin who arrives to the ED via private vehicle.  Patient presents with c/o productive cough of yellow sputum since yesterday.   Patient also complains of fever, headache, chest pain when she coughs, stuffed up nose, vomiting and diarrhea yesterday and shortness of breath when she coughs.  Patient states yesterday she took Mucinex and Tylenol with no relief of her symptoms.  She states she is not taking anything today.  Patient denies ear pain, dysuria, known sick contacts.  Patient states that nothing makes the symptoms better and nothing worsens symptoms.  LMP-last month  States that she is compliant with all of her medications.  She has had her COVID 19 vaccine and booster.      PAST MEDICAL HISTORY  Active Ambulatory Problems     Diagnosis Date Noted   • Asthma 08/06/2019   • Essential hypertension 08/06/2019   • HIV (human immunodeficiency virus infection) ([facility]) 08/06/2019   • Class 2 obesity in adult 08/06/2019   • NICM (nonischemic cardiomyopathy) ([facility]) 07/08/2020   • Nonrheumatic mitral valve regurgitation 03/08/2021   • Nonrheumatic tricuspid valve regurgitation    • Grade II diastolic dysfunction    • Pulmonary emboli ([facility]) 04/22/2021   • HFrEF (heart failure with reduced ejection fraction)  ([facility]) 2021   • Intractable persistent migraine aura without cerebral infarction and with status migrainosus 2022   • History of drug abuse ([facility]) 2022   • Tibial tendonitis, posterior, left 2022     Resolved Ambulatory Problems     Diagnosis Date Noted   • Exertional dyspnea 2019   • Chest pain 2019   • Chronic systolic congestive heart failure ([facility]) 2019   • Elevated LFTs 2020   • Hypopotassemia 2020   • Hematuria 2020   • Flank pain 2020   • AICD (automatic cardioverter/defibrillator) present 2020   • LBBB (left bundle branch block) 2020   • Acute on chronic combined systolic and diastolic CHF (congestive heart failure) ([facility]) 10/27/2020   • Intractable vomiting with nausea 10/30/2020   • Diarrhea following gastrointestinal surgery 10/27/2020   • Acute pulmonary embolism ([facility]) 2020   • Hemoptysis 2020   • Other acute pulmonary embolism, unspecified whether acute cor pulmonale present ([facility]) 2020   • Acute pulmonary embolism, unspecified pulmonary embolism type, unspecified whether acute cor pulmonale present ([facility]) 2020   • History of open reduction and internal fixation (ORIF) procedure 2021   • Ankle syndesmosis disruption, left, sequela 2021   • Bimalleolar ankle fracture, left, sequela 2021   • Tendonitis, Achilles, left 2021     Past Medical History:   Diagnosis Date   • CHF (congestive heart failure) ([facility])    • Coronary artery disease    • Depression    • Diabetes mellitus ([facility])    • Fracture, foot 3/20   • H/O Closed trimalleolar fracture    • Headache    • Hypertension          PAST SURGICAL HISTORY  Past Surgical History:   Procedure Laterality Date   • ANKLE OPEN REDUCTION INTERNAL FIXATION  3/7/20   • CARDIAC CATHETERIZATION     • CARDIAC DEFIBRILLATOR PLACEMENT     •  SECTION      one C/S   • FRACTURE SURGERY Left     left ankle   • OOPHORECTOMY      h/o  teratoma   • TUBAL ABDOMINAL LIGATION           FAMILY HISTORY  Family History   Problem Relation Age of Onset   • Cancer Mother    • Breast cancer Mother    • Bone cancer Mother    • Ovarian cysts Daughter    • No Known Problems Daughter    • No Known Problems Daughter    • Prostate cancer Paternal Uncle    • Diabetes Maternal Grandmother    • Breast cancer Maternal Grandmother    • Heart disease Maternal Grandmother    • Hypertension Maternal Grandfather    • Hyperlipidemia Maternal Grandfather    • Diabetes Maternal Grandfather    • Prostate cancer Maternal Grandfather    • Breast cancer Paternal Grandmother    • Pancreatic cancer Paternal Grandmother    • Ovarian cancer Neg Hx    • Uterine cancer Neg Hx    • Colon cancer Neg Hx          SOCIAL HISTORY  Social History     Socioeconomic History   • Marital status: Legally    Tobacco Use   • Smoking status: Former     Packs/day: 0.50     Years: 20.00     Pack years: 10.00     Types: Cigarettes     Quit date: 2015     Years since quittin.8   • Smokeless tobacco: Never   Vaping Use   • Vaping Use: Never used   Substance and Sexual Activity   • Alcohol use: Not Currently     Comment: holiday and special occ//caffeine use: Occ   • Drug use: Never   • Sexual activity: Not Currently     Partners: Male         ALLERGIES  Lisinopril        REVIEW OF SYSTEMS  Review of Systems     All systems reviewed and negative except for those discussed in HPI.        PHYSICAL EXAM    ED Triage Vitals   Temp Heart Rate Resp BP SpO2   22 0901 22 0901 22 0901 22 0902 22 0901   (!) 102.1 °F (38.9 °C) (!) 124 16 101/75 94 %       Physical Exam  GENERAL: Well appearing, nontoxic appearing, mildly distressed, speaking in full sentences  HENT: normocephalic, atraumatic  EYES: no scleral icterus, PERRL  CV: regular rhythm, tachycardic, no murmur  RESPIRATORY: normal effort, diminished  ABDOMEN: soft, nontender  MUSCULOSKELETAL: no deformity  NEURO:  alert, moves all extremities, follows commands, mental status normal/baseline  SKIN: warm, dry, no rash   Psych: Appropriate mood and affect  Nursing notes and vital signs reviewed      LAB RESULTS  Recent Results (from the past 24 hour(s))   ECG 12 Lead Chest Pain    Collection Time: 11/18/22  9:14 AM   Result Value Ref Range    QT Interval 364 ms   Comprehensive Metabolic Panel    Collection Time: 11/18/22  9:23 AM    Specimen: Blood   Result Value Ref Range    Glucose 116 (H) 65 - 99 mg/dL    BUN 17 6 - 20 mg/dL    Creatinine 1.10 (H) 0.57 - 1.00 mg/dL    Sodium 134 (L) 136 - 145 mmol/L    Potassium 4.0 3.5 - 5.2 mmol/L    Chloride 98 98 - 107 mmol/L    CO2 25.5 22.0 - 29.0 mmol/L    Calcium 9.1 8.6 - 10.5 mg/dL    Total Protein 7.1 6.0 - 8.5 g/dL    Albumin 3.80 3.50 - 5.20 g/dL    ALT (SGPT) 22 1 - 33 U/L    AST (SGOT) 23 1 - 32 U/L    Alkaline Phosphatase 128 (H) 39 - 117 U/L    Total Bilirubin 0.5 0.0 - 1.2 mg/dL    Globulin 3.3 gm/dL    A/G Ratio 1.2 g/dL    BUN/Creatinine Ratio 15.5 7.0 - 25.0    Anion Gap 10.5 5.0 - 15.0 mmol/L    eGFR 62.9 >60.0 mL/min/1.73   Procalcitonin    Collection Time: 11/18/22  9:23 AM    Specimen: Blood   Result Value Ref Range    Procalcitonin 0.24 0.00 - 0.25 ng/mL   Protime-INR    Collection Time: 11/18/22  9:23 AM    Specimen: Blood   Result Value Ref Range    Protime 22.0 (H) 11.7 - 14.2 Seconds    INR 1.90 (H) 0.90 - 1.10   aPTT    Collection Time: 11/18/22  9:23 AM    Specimen: Blood   Result Value Ref Range    PTT 33.9 22.7 - 35.4 seconds   Lactic Acid, Plasma    Collection Time: 11/18/22  9:23 AM    Specimen: Blood   Result Value Ref Range    Lactate 0.9 0.5 - 2.0 mmol/L   hCG, Serum, Qualitative    Collection Time: 11/18/22  9:23 AM    Specimen: Blood   Result Value Ref Range    HCG Qualitative Negative Negative   Troponin    Collection Time: 11/18/22  9:23 AM    Specimen: Blood   Result Value Ref Range    Troponin T <0.010 0.000 - 0.030 ng/mL   CBC Auto Differential     Collection Time: 11/18/22  9:23 AM    Specimen: Blood   Result Value Ref Range    WBC 6.46 3.40 - 10.80 10*3/mm3    RBC 3.79 3.77 - 5.28 10*6/mm3    Hemoglobin 12.1 12.0 - 15.9 g/dL    Hematocrit 35.3 34.0 - 46.6 %    MCV 93.1 79.0 - 97.0 fL    MCH 31.9 26.6 - 33.0 pg    MCHC 34.3 31.5 - 35.7 g/dL    RDW 12.0 (L) 12.3 - 15.4 %    RDW-SD 41.2 37.0 - 54.0 fl    MPV 12.1 (H) 6.0 - 12.0 fL    Platelets 134 (L) 140 - 450 10*3/mm3    Neutrophil % 67.3 42.7 - 76.0 %    Lymphocyte % 15.5 (L) 19.6 - 45.3 %    Monocyte % 16.7 (H) 5.0 - 12.0 %    Eosinophil % 0.0 (L) 0.3 - 6.2 %    Basophil % 0.2 0.0 - 1.5 %    Immature Grans % 0.3 0.0 - 0.5 %    Neutrophils, Absolute 4.35 1.70 - 7.00 10*3/mm3    Lymphocytes, Absolute 1.00 0.70 - 3.10 10*3/mm3    Monocytes, Absolute 1.08 (H) 0.10 - 0.90 10*3/mm3    Eosinophils, Absolute 0.00 0.00 - 0.40 10*3/mm3    Basophils, Absolute 0.01 0.00 - 0.20 10*3/mm3    Immature Grans, Absolute 0.02 0.00 - 0.05 10*3/mm3    nRBC 0.0 0.0 - 0.2 /100 WBC   BNP    Collection Time: 11/18/22  9:23 AM    Specimen: Blood   Result Value Ref Range    proBNP 411.0 0.0 - 450.0 pg/mL   Respiratory Panel PCR w/COVID-19(SARS-CoV-2) NOMI/MICHAEL/ALBAN/PAD/COR/MAD/GUILLE In-House, NP Swab in [facility], 3-4 HR TAT - Swab, Nasopharynx    Collection Time: 11/18/22  9:36 AM    Specimen: Nasopharynx; Swab   Result Value Ref Range    ADENOVIRUS, PCR Not Detected Not Detected    Coronavirus 229E Not Detected Not Detected    Coronavirus HKU1 Not Detected Not Detected    Coronavirus NL63 Not Detected Not Detected    Coronavirus OC43 Not Detected Not Detected    COVID19 Not Detected Not Detected - Ref. Range    Human Metapneumovirus Not Detected Not Detected    Human Rhinovirus/Enterovirus Not Detected Not Detected    Influenza A H1 2009 PCR Detected (A) Not Detected    Influenza B PCR Not Detected Not Detected    Parainfluenza Virus 1 Not Detected Not Detected    Parainfluenza Virus 2 Not Detected Not Detected    Parainfluenza Virus 3 Not  Detected Not Detected    Parainfluenza Virus 4 Not Detected Not Detected    RSV, PCR Not Detected Not Detected    Bordetella pertussis pcr Not Detected Not Detected    Bordetella parapertussis PCR Not Detected Not Detected    Chlamydophila pneumoniae PCR Not Detected Not Detected    Mycoplasma pneumo by PCR Not Detected Not Detected   Urinalysis With Microscopic If Indicated (No Culture) - Urine, Clean Catch    Collection Time: 11/18/22 11:08 AM    Specimen: Urine, Clean Catch   Result Value Ref Range    Color, UA Yellow Yellow, Straw    Appearance, UA Clear Clear    pH, UA 6.5 5.0 - 8.0    Specific Gravity, UA 1.016 1.005 - 1.030    Glucose, UA >=1000 mg/dL (3+) (A) Negative    Ketones, UA Negative Negative    Bilirubin, UA Negative Negative    Blood, UA Trace (A) Negative    Protein, UA Negative Negative    Leuk Esterase, UA Negative Negative    Nitrite, UA Negative Negative    Urobilinogen, UA 1.0 E.U./dL 0.2 - 1.0 E.U./dL   Urinalysis, Microscopic Only - Urine, Clean Catch    Collection Time: 11/18/22 11:08 AM    Specimen: Urine, Clean Catch   Result Value Ref Range    RBC, UA 3-5 (A) None Seen, 0-2 /HPF    WBC, UA 0-2 None Seen, 0-2 /HPF    Bacteria, UA None Seen None Seen /HPF    Squamous Epithelial Cells, UA 0-2 None Seen, 0-2 /HPF    Hyaline Casts, UA None Seen None Seen /LPF    Methodology Automated Microscopy        Ordered the above labs and independently reviewed the results.      RADIOLOGY  XR Chest AP    Result Date: 11/18/2022  XR CHEST AP-  Clinical: Covid evaluation, cough and fever  COMPARISON 12/11/2020 CT scan of the chest and chest radiograph from 12/11/2020  FINDINGS: The degree of cardiac enlargement has diminished since 2020. There is transvenous pacemaker in position. No pleural effusion, vascular congestion or acute airspace disease has developed. Stable mediastinum and rip. The remainder is unremarkable.  This report was finalized on 11/18/2022 10:04 AM by Dr. Jai Deluca M.D.        I  ordered the above noted radiological studies and viewed the images on the PACS system.       EKG      Independently viewed by me and interpreted by Dr Marinelli         MEDICAL RECORD REVIEW  Medical records reviewed in [facility], patient was diagnosed with heart failure and cardiomyopathy in 2019.  April 2019 she had a cardiac catheterization showed normal coronary arteries.  Patient had transthoracic echo 12/15/2021 EF appeared to be 21 to 25%, severely decreased left ventricular systolic function.    PROCEDURES    Procedures        DIFFERENTIAL DIAGNOSIS  Differential Diagnosis for Fever include but are not limited to the following:  Viral Infection, Bacterial Infection, Fever of Unknown origin,      PROGRESS, DATA ANALYSIS, CONSULTS, AND MEDICAL DECISION MAKING        ED Course as of 11/18/22 1504   Fri Nov 18, 2022   0931 Patient is a 46-year-old who presents to the ER today with fever of 102.1, productive cough and respiratory symptoms for the past day.  Sepsis work-up has been ordered, she does have a history of CHF so will do 1 L of fluid so that cannot put her into fluid overload.  She will receive Tylenol for her fever.  Chest x-ray to evaluate for pneumonia. [MS]   0931 Reviewed pt's history and workup with Dr. Marinelli.  After a bedside evaluation, he agrees with the plan of care.     [MS]   1009 WBC: 6.46 [MS]   1009 Hemoglobin: 12.1 [MS]   1009 Hematocrit: 35.3 [MS]   1009 INR(!): 1.90 [MS]   1043 Influenza A H1 2009 PCR(!): Detected [MS]   1107 proBNP: 411.0 [MS]   1203 Patient ambulated to the restroom, O2 sats maintained at 94-95%.  She is resting comfortably, updated on flu A that was seen on the RVP.  Patient was offered admission, she states that she would prefer to go home.  Strict return to ER precautions discussed with patient.  I did speak with the ER pharmacist Kiley regarding interaction with patient's HIV medications and Tamiflu and Coumadin.  She states that it is okay to prescribe Tamiflu for the  patient.  Patient temp down to 99, she is no longer tachycardic, heart rate is 79. [MS]      ED Course User Index  [MS] Chuyita Marsh, SAMUEL     Discussed plan for discharge, as there is no emergent indication for admission. Pt/family is agreeable and understands need for follow up and repeat testing.  Pt is aware that discharge does not mean that nothing is wrong but it indicates no emergency is present that requires admission and they must continue care with follow-up as given below or physician of their choice.   Patient/Family voiced understanding of above instructions.  Patient discharged in stable condition.    DIAGNOSIS  Final diagnoses:   Influenza A   Fever and chills   Acute cough       FOLLOW UP   Zach Durand APRN  [address] [facility] [address]  [place] [id number] [phone]    Schedule an appointment as soon as possible for a visit in 3 days        RX     Medication List      New Prescriptions    oseltamivir 75 MG capsule  Commonly known as: Tamiflu  Take 1 capsule by mouth 2 (Two) Times a Day.           Where to Get Your Medications      These medications were sent to [facility] [address] [place] [address] ([facility]), [place] - [address] [facility] - [phone]  - [phone] FX [address] [facility] ([facility]) [place] [address]    Phone: [phone]   · oseltamivir 75 MG capsule           MEDICATIONS GIVEN IN ED    Medications   sodium chloride 0.9 % flush 10 mL (has no administration in time range)   sodium chloride 0.9 % bolus 1,000 mL (0 mL Intravenous Stopped 11/18/22 1306)   acetaminophen (TYLENOL) tablet 1,000 mg (1,000 mg Oral Given 11/18/22 0934)           COURSE & MEDICAL DECISION MAKING  Any/All labs and Any/All Imaging studies that were ordered were reviewed and are noted above.  Results were reviewed/discussed with the patient and they were also made aware of online access.    Pt also made aware that some labs, such as cultures, will not be resulted during ER visit and  followup with PMD is necessary.        Chuyita Marsh, APRN  11/18/22 1505

## 2022-11-18 NOTE — ED TRIAGE NOTES
Cp and soa since yest.  She has been vomiting and has had fever to 101.  She reports cough    Patient was placed in face mask during first look triage.  Patient was wearing a face mask throughout encounter.  I wore personal protective equipment throughout the encounter.  Hand hygiene was performed before and after patient encounter.

## 2022-11-18 NOTE — DISCHARGE INSTRUCTIONS
Medications as ordered, continue current home medications  Over-the-counter vitamin C  Increase fluids, rest, activity as tolerated  Over-the-counter cough suppressant  Follow-up with PMD next week if symptoms not improving  Return to the ER for fever, chills, chest pain, shortness of breath, nausea, vomiting, any new or worsening symptoms

## 2022-11-22 ENCOUNTER — OFFICE VISIT (OUTPATIENT)
Dept: INTERNAL MEDICINE | Age: 47
End: 2022-11-22

## 2022-11-22 ENCOUNTER — APPOINTMENT (OUTPATIENT)
Dept: MRI IMAGING | Facility: HOSPITAL | Age: 47
End: 2022-11-22

## 2022-11-22 VITALS
TEMPERATURE: 96.2 F | BODY MASS INDEX: 34.91 KG/M2 | WEIGHT: 217.2 LBS | SYSTOLIC BLOOD PRESSURE: 104 MMHG | HEIGHT: 66 IN | OXYGEN SATURATION: 99 % | HEART RATE: 92 BPM | DIASTOLIC BLOOD PRESSURE: 56 MMHG

## 2022-11-22 DIAGNOSIS — J10.1 INFLUENZA A: Primary | ICD-10-CM

## 2022-11-22 PROCEDURE — 99213 OFFICE O/P EST LOW 20 MIN: CPT | Performed by: NURSE PRACTITIONER

## 2022-11-22 RX ORDER — CEFDINIR 300 MG/1
300 CAPSULE ORAL 2 TIMES DAILY
Qty: 14 CAPSULE | Refills: 0 | Status: SHIPPED | OUTPATIENT
Start: 2022-11-22 | End: 2022-11-29

## 2022-11-22 NOTE — PROGRESS NOTES
"[facility]  SAMUEL West / 47 y.o. / female  11/22/2022      CC:   Influenza (ER f/u)      VITALS    Visit Vitals  /56 (Cuff Size: Adult)   Pulse 92   Temp 96.2 °F (35.7 °C) (Temporal)   Ht 167.6 cm (66\")   Wt 98.5 kg (217 lb 3.2 oz)   SpO2 99%   BMI 35.06 kg/m²       BP Readings from Last 3 Encounters:   11/22/22 104/56   11/18/22 120/75   11/15/22 114/76     Wt Readings from Last 3 Encounters:   11/22/22 98.5 kg (217 lb 3.2 oz)   11/18/22 95.3 kg (210 lb)   11/15/22 99.5 kg (219 lb 6.4 oz)      Body mass index is 35.06 kg/m².    HPI:     Date of Emergency Room Evaluation: 11/18/2022  Hospital: [facility]  Principle Dx: Influenza A   Secondary Dx: Fever and chills, acute cough   History prior to hospitalization: Originally patient presented after 2 days with fever cough weakness vomiting and diarrhea.  History of HIV followed by [facility] clinic, coronary artery disease on Coumadin.  Evaluation/Treatment: Chest x-ray was completed November 18, 2022 showing degree of cardiac enlargement that is diminished since 2020, pacemaker in position no pleural effusion vascular congestion or acute airspace disease.  No signs of pneumonia.  EKG showed normal sinus rhythm without any significant T wave changes.  Mild decrease in lymphocytes on CBC without any significant elevation in white blood cells.  CMP shows stable kidney with mild decrease in sodium.  Procalcitonin negative.  INR 1.9.  Lactic acid negative.  Troponin negative.  No growth on blood cultures BMP within normal.  Influenza a positive.  Urine negative for urinary tract infection.  Microscopic blood in urine followed by urology.  Chronic.  Patient was offered admission but declined and was given Tamiflu.  At time of discharge temperature was down to 99 with heart rate of 79 and she was no longer tachycardic.  Plan for IV fluids, Tylenol.  Course: Today patient feels overall slight improvement she is able to " take deeper breaths than prior.  She does continue to have productive cough.  Afebrile today with oxygen at 98%.  She was unable to get her Tamiflu and is finally picking it up today.    Patient Care Team:  Zach Durand APRN as PCP - General (Internal Medicine)  Lisset Melton MD as Consulting Physician (Cardiology)  Andrew Watson MD as Consulting Physician (Urology)  Rocky Calvert DPM as Consulting Physician (Podiatry)  Lisset Melton MD as Referring Physician (Cardiology)  Chaim Hernandez MD (Internal Medicine)  Yane Boyd APRN as Nurse Practitioner (Nurse Practitioner)  Frannie Rocha MD as Consulting Physician (Gynecology)  Evelyn Santillan MD as Consulting Physician (Hematology and Oncology)  Dennis Diehl MD as Consulting Physician (Pulmonary Disease)  Benny Morgan MD PhD as Consulting Physician (Cardiology)  Talita Patel RN as Ambulatory  ([facility])  ____________________________________________________________________    ASSESSMENT & PLAN:    1. Influenza A      No orders of the defined types were placed in this encounter.      Summary/Discussion:  · Advised patient to  Tamiflu today, will send in cefdinir as she has not had her Tamiflu since onset of flu when she is high risk with HIV  · Discussed red flags to return to ER such as oxygen below 92%, severe chest pain or shortness of breath.    Return in about 1 month (around 12/22/2022) for Next scheduled follow up.    ____________________________________________________________________    REVIEW OF SYSTEMS    Review of Systems  Constitutional neg except per HPI   Resp: Remaining chest tightness, productive cough  CV neg    PHYSICAL EXAMINATION    Physical Exam  Constitutional  No distress  Cardiovascular Rate  normal . Rhythm: regular . Heart sounds:  normal  Pulmonary/Chest  Effort normal. Breath sounds:  normal  Psychiatric  Alert. Judgment and thought content  normal. Mood normal     REVIEWED DATA:    Labs:   Admission on 11/18/2022, Discharged on 11/18/2022   Component Date Value Ref Range Status   • QT Interval 11/18/2022 364  ms Final   • Glucose 11/18/2022 116 (H)  65 - 99 mg/dL Final   • BUN 11/18/2022 17  6 - 20 mg/dL Final   • Creatinine 11/18/2022 1.10 (H)  0.57 - 1.00 mg/dL Final   • Sodium 11/18/2022 134 (L)  136 - 145 mmol/L Final   • Potassium 11/18/2022 4.0  3.5 - 5.2 mmol/L Final   • Chloride 11/18/2022 98  98 - 107 mmol/L Final   • CO2 11/18/2022 25.5  22.0 - 29.0 mmol/L Final   • Calcium 11/18/2022 9.1  8.6 - 10.5 mg/dL Final   • Total Protein 11/18/2022 7.1  6.0 - 8.5 g/dL Final   • Albumin 11/18/2022 3.80  3.50 - 5.20 g/dL Final   • ALT (SGPT) 11/18/2022 22  1 - 33 U/L Final   • AST (SGOT) 11/18/2022 23  1 - 32 U/L Final   • Alkaline Phosphatase 11/18/2022 128 (H)  39 - 117 U/L Final   • Total Bilirubin 11/18/2022 0.5  0.0 - 1.2 mg/dL Final   • Globulin 11/18/2022 3.3  gm/dL Final   • A/G Ratio 11/18/2022 1.2  g/dL Final   • BUN/Creatinine Ratio 11/18/2022 15.5  7.0 - 25.0 Final   • Anion Gap 11/18/2022 10.5  5.0 - 15.0 mmol/L Final   • eGFR 11/18/2022 62.9  >60.0 mL/min/1.73 Final    National Kidney Foundation and American Society of Nephrology (ASN) Task Force recommended calculation based on the Chronic Kidney Disease Epidemiology Collaboration (CKD-EPI) equation refit without adjustment for race.   • Procalcitonin 11/18/2022 0.24  0.00 - 0.25 ng/mL Final   • Protime 11/18/2022 22.0 (H)  11.7 - 14.2 Seconds Final   • INR 11/18/2022 1.90 (H)  0.90 - 1.10 Final   • PTT 11/18/2022 33.9  22.7 - 35.4 seconds Final   • ADENOVIRUS, PCR 11/18/2022 Not Detected  Not Detected Final   • Coronavirus 229E 11/18/2022 Not Detected  Not Detected Final   • Coronavirus HKU1 11/18/2022 Not Detected  Not Detected Final   • Coronavirus NL63 11/18/2022 Not Detected  Not Detected Final   • Coronavirus OC43 11/18/2022 Not Detected  Not Detected Final   • COVID19 11/18/2022  Not Detected  Not Detected - Ref. Range Final   • Human Metapneumovirus 11/18/2022 Not Detected  Not Detected Final   • Human Rhinovirus/Enterovirus 11/18/2022 Not Detected  Not Detected Final   • Influenza A H1 2009 PCR 11/18/2022 Detected (A)  Not Detected Final   • Influenza B PCR 11/18/2022 Not Detected  Not Detected Final   • Parainfluenza Virus 1 11/18/2022 Not Detected  Not Detected Final   • Parainfluenza Virus 2 11/18/2022 Not Detected  Not Detected Final   • Parainfluenza Virus 3 11/18/2022 Not Detected  Not Detected Final   • Parainfluenza Virus 4 11/18/2022 Not Detected  Not Detected Final   • RSV, PCR 11/18/2022 Not Detected  Not Detected Final   • Bordetella pertussis pcr 11/18/2022 Not Detected  Not Detected Final   • Bordetella parapertussis PCR 11/18/2022 Not Detected  Not Detected Final   • Chlamydophila pneumoniae PCR 11/18/2022 Not Detected  Not Detected Final   • Mycoplasma pneumo by PCR 11/18/2022 Not Detected  Not Detected Final   • Lactate 11/18/2022 0.9  0.5 - 2.0 mmol/L Final   • HCG Qualitative 11/18/2022 Negative  Negative Final   • Color, UA 11/18/2022 Yellow  Yellow, Straw Final   • Appearance, UA 11/18/2022 Clear  Clear Final   • pH, UA 11/18/2022 6.5  5.0 - 8.0 Final   • Specific Gravity, UA 11/18/2022 1.016  1.005 - 1.030 Final   • Glucose, UA 11/18/2022 >=1000 mg/dL (3+) (A)  Negative Final   • Ketones, UA 11/18/2022 Negative  Negative Final   • Bilirubin, UA 11/18/2022 Negative  Negative Final   • Blood, UA 11/18/2022 Trace (A)  Negative Final   • Protein, UA 11/18/2022 Negative  Negative Final   • Leuk Esterase, UA 11/18/2022 Negative  Negative Final   • Nitrite, UA 11/18/2022 Negative  Negative Final   • Urobilinogen, UA 11/18/2022 1.0 E.U./dL  0.2 - 1.0 E.U./dL Final   • Troponin T 11/18/2022 <0.010  0.000 - 0.030 ng/mL Final   • WBC 11/18/2022 6.46  3.40 - 10.80 10*3/mm3 Final   • RBC 11/18/2022 3.79  3.77 - 5.28 10*6/mm3 Final   • Hemoglobin 11/18/2022 12.1  12.0 - 15.9 g/dL  Final   • Hematocrit 11/18/2022 35.3  34.0 - 46.6 % Final   • MCV 11/18/2022 93.1  79.0 - 97.0 fL Final   • MCH 11/18/2022 31.9  26.6 - 33.0 pg Final   • MCHC 11/18/2022 34.3  31.5 - 35.7 g/dL Final   • RDW 11/18/2022 12.0 (L)  12.3 - 15.4 % Final   • RDW-SD 11/18/2022 41.2  37.0 - 54.0 fl Final   • MPV 11/18/2022 12.1 (H)  6.0 - 12.0 fL Final   • Platelets 11/18/2022 134 (L)  140 - 450 10*3/mm3 Final   • Neutrophil % 11/18/2022 67.3  42.7 - 76.0 % Final   • Lymphocyte % 11/18/2022 15.5 (L)  19.6 - 45.3 % Final   • Monocyte % 11/18/2022 16.7 (H)  5.0 - 12.0 % Final   • Eosinophil % 11/18/2022 0.0 (L)  0.3 - 6.2 % Final   • Basophil % 11/18/2022 0.2  0.0 - 1.5 % Final   • Immature Grans % 11/18/2022 0.3  0.0 - 0.5 % Final   • Neutrophils, Absolute 11/18/2022 4.35  1.70 - 7.00 10*3/mm3 Final   • Lymphocytes, Absolute 11/18/2022 1.00  0.70 - 3.10 10*3/mm3 Final   • Monocytes, Absolute 11/18/2022 1.08 (H)  0.10 - 0.90 10*3/mm3 Final   • Eosinophils, Absolute 11/18/2022 0.00  0.00 - 0.40 10*3/mm3 Final   • Basophils, Absolute 11/18/2022 0.01  0.00 - 0.20 10*3/mm3 Final   • Immature Grans, Absolute 11/18/2022 0.02  0.00 - 0.05 10*3/mm3 Final   • nRBC 11/18/2022 0.0  0.0 - 0.2 /100 WBC Final   • Blood Culture 11/18/2022 No growth at 4 days   Preliminary   • Blood Culture 11/18/2022 No growth at 4 days   Preliminary   • proBNP 11/18/2022 411.0  0.0 - 450.0 pg/mL Final   • RBC, UA 11/18/2022 3-5 (A)  None Seen, 0-2 /HPF Final   • WBC, UA 11/18/2022 0-2  None Seen, 0-2 /HPF Final   • Bacteria, UA 11/18/2022 None Seen  None Seen /HPF Final   • Squamous Epithelial Cells, UA 11/18/2022 0-2  None Seen, 0-2 /HPF Final   • Hyaline Casts, UA 11/18/2022 None Seen  None Seen /LPF Final   • Methodology 11/18/2022 Automated Microscopy   Final   Anticoagulation Visit on 11/14/2022   Component Date Value Ref Range Status   • INR 11/14/2022 2.90   Final       Imaging:   XR Chest AP    Result Date: 11/18/2022  Narrative: XR CHEST AP-   Clinical: Covid evaluation, cough and fever  COMPARISON 12/11/2020 CT scan of the chest and chest radiograph from 12/11/2020  FINDINGS: The degree of cardiac enlargement has diminished since 2020. There is transvenous pacemaker in position. No pleural effusion, vascular congestion or acute airspace disease has developed. Stable mediastinum and rip. The remainder is unremarkable.  This report was finalized on 11/18/2022 10:04 AM by Dr. Jai Deluca M.D.         Medical Tests:        Summary of old records / correspondence / consultant report:   DC summary re: issues addressed on HPI    Request outside records:       ALLERGIES  Allergies   Allergen Reactions   • Lisinopril Cough        MEDICATIONS   Current Outpatient Medications   Medication Sig Dispense Refill   • acetaminophen (TYLENOL) 325 MG tablet Take 650 mg by mouth Every 6 (Six) Hours As Needed for Mild Pain .     • albuterol sulfate  (90 Base) MCG/ACT inhaler INHALE 2 PUFFS BY MOUTH EVERY 4 HOURS AS NEEDED FOR WHEEZING 18 g 0   • atorvastatin (LIPITOR) 10 MG tablet Take 1 tablet by mouth Daily. 30 tablet 11   • BD Pen Needle Heidi 2nd Gen 32G X 4 MM misc USE 1 NEEDLE ONCE DAILY WITH  SAXENDA 100 each 0   • carvedilol (COREG) 3.125 MG tablet Take 1 tablet by mouth 2 (Two) Times a Day With Meals. 180 tablet 1   • CBD (cannabidiol) oral oil Take  by mouth.     • Hycru-Snzcg-Lnjrehnn-TenofAF (Symtuza) 061-734-691-10 MG per tablet Take 1 tablet by mouth Daily.     • Diclofenac Sodium (VOLTAREN) 1 % gel gel Apply 4 g topically to the appropriate area as directed 2 (Two) Times a Day. Use per pkg insert 100 g 2   • diphenhydrAMINE (BENADRYL) 25 mg capsule Take 1 capsule by mouth Every 6 (Six) Hours As Needed for Itching (swelling). 20 capsule 0   • empagliflozin (Jardiance) 10 MG tablet tablet Take 1 tablet by mouth Daily. 30 tablet 11   • fluticasone (Flonase) 50 MCG/ACT nasal spray 2 sprays into the nostril(s) as directed by provider Daily. 1 bottle 2   •  nitroglycerin (NITROSTAT) 0.4 MG SL tablet Place 1 tablet under the tongue Every 5 (Five) Minutes As Needed for Chest Pain. Take no more than 3 doses in 15 minutes. 30 tablet 5   • oseltamivir (Tamiflu) 75 MG capsule Take 1 capsule by mouth 2 (Two) Times a Day. 10 capsule 0   • potassium chloride (K-DUR,KLOR-CON) 20 MEQ CR tablet Take 3 tablets by mouth once daily 90 tablet 11   • sacubitril-valsartan (Entresto) 49-51 MG tablet Take 1 tablet by mouth 2 (Two) Times a Day. 180 tablet 3   • Saxenda 18 MG/3ML injection pen Inject 3 mg under the skin into the appropriate area as directed Daily. 15 mL 2   • sertraline (Zoloft) 50 MG tablet Take 1 tablet by mouth Daily. 90 tablet 3   • spironolactone (ALDACTONE) 25 MG tablet 25 mg Daily.     • torsemide (DEMADEX) 20 MG tablet TAKE 3 TABLETS BY MOUTH  IN THE MORNING AND 2 TABLETS IN THE AFTERNOON 150 tablet 0   • vitamin D (ERGOCALCIFEROL) 10761 units capsule capsule Take 50,000 Units by mouth Every 30 (Thirty) Days.     • warfarin (COUMADIN) 2.5 MG tablet Take 3 tablets by mouth once daily 270 tablet 0   • cefdinir (OMNICEF) 300 MG capsule Take 1 capsule by mouth 2 (Two) Times a Day for 7 days. 14 capsule 0     No current facility-administered medications for this visit.       Current outpatient and discharge medications have been reconciled for the patient.  Reviewed by: SAMUEL West       [facility]:     The following portions of the patient's history were reviewed and updated as appropriate: Allergies / Current Medications / Past Medical History / Surgical History / Social History / Family History    PROBLEM LIST   Patient Active Problem List   Diagnosis   • Asthma   • Essential hypertension   • HIV (human immunodeficiency virus infection) ([facility])   • Class 2 obesity in adult   • NICM (nonischemic cardiomyopathy) ([facility])   • Nonrheumatic mitral valve regurgitation   • Nonrheumatic tricuspid valve regurgitation   • Grade II diastolic dysfunction   • Pulmonary emboli ([facility])   •  HFrEF (heart failure with reduced ejection fraction) ([facility])   • Intractable persistent migraine aura without cerebral infarction and with status migrainosus   • History of drug abuse ([facility])   • Tibial tendonitis, posterior, left       PAST MEDICAL HISTORY  Past Medical History:   Diagnosis Date   • AICD (automatic cardioverter/defibrillator) present 2020   • Asthma    • CHF (congestive heart failure) ([facility])    • Class 2 obesity in adult    • Coronary artery disease 2017   • Depression    • Diabetes mellitus ([facility])    • Fracture, foot 3/20    Broke   • Grade II diastolic dysfunction     Per echocardiogram 3/2021   • H/O Closed trimalleolar fracture    • Headache    • HIV (human immunodeficiency virus infection) ([facility])    • Hypertension    • LBBB (left bundle branch block)    • NICM (nonischemic cardiomyopathy) ([facility])     2020 EF 6% per echocardiogram; AICD present   • Nonrheumatic mitral valve regurgitation 2021    Moderate per echo 3/2021   • Nonrheumatic tricuspid valve regurgitation     Moderate per echocardiogram 3/2021       SURGICAL HISTORY  Past Surgical History:   Procedure Laterality Date   • ANKLE OPEN REDUCTION INTERNAL FIXATION  3/7/20   • CARDIAC CATHETERIZATION     • CARDIAC DEFIBRILLATOR PLACEMENT     •  SECTION      one C/S   • FRACTURE SURGERY Left     left ankle   • OOPHORECTOMY      h/o teratoma   • TUBAL ABDOMINAL LIGATION         SOCIAL HISTORY  Social History     Socioeconomic History   • Marital status: Legally    Tobacco Use   • Smoking status: Former     Packs/day: 0.50     Years: 20.00     Pack years: 10.00     Types: Cigarettes     Quit date: 2015     Years since quittin.8   • Smokeless tobacco: Never   Vaping Use   • Vaping Use: Never used   Substance and Sexual Activity   • Alcohol use: Not Currently     Comment: holiday and special occ//caffeine use: Occ   • Drug use: Never   • Sexual activity: Not Currently     Partners: Male       FAMILY  HISTORY  Family History   Problem Relation Age of Onset   • Cancer Mother    • Breast cancer Mother    • Bone cancer Mother    • Ovarian cysts Daughter    • No Known Problems Daughter    • No Known Problems Daughter    • Prostate cancer Paternal Uncle    • Diabetes Maternal Grandmother    • Breast cancer Maternal Grandmother    • Heart disease Maternal Grandmother    • Hypertension Maternal Grandfather    • Hyperlipidemia Maternal Grandfather    • Diabetes Maternal Grandfather    • Prostate cancer Maternal Grandfather    • Breast cancer Paternal Grandmother    • Pancreatic cancer Paternal Grandmother    • Ovarian cancer Neg Hx    • Uterine cancer Neg Hx    • Colon cancer Neg Hx        Examiner was wearing KN95 mask, face shield and exam gloves during the entire duration of the visit. Patient was masked the entire time.   Minimum social distance of 6 ft maintained entire visit except if physical contact was necessary as documented.     **Dragon Disclaimer:   Much of this encounter note is an electronic transcription/translation of spoken language to printed text. The electronic translation of spoken language may permit erroneous, or at times, nonsensical words or phrases to be inadvertently transcribed. Although I have reviewed the note for such errors, some may still exist.

## 2022-11-23 ENCOUNTER — PATIENT OUTREACH (OUTPATIENT)
Dept: CASE MANAGEMENT | Facility: OTHER | Age: 47
End: 2022-11-23

## 2022-11-23 LAB
BACTERIA SPEC AEROBE CULT: NORMAL
BACTERIA SPEC AEROBE CULT: NORMAL

## 2022-11-23 NOTE — OUTREACH NOTE
AMBULATORY CASE MANAGEMENT NOTE    Name and Relationship of Patient/Support Person: Nina Saez E - Self    Adult Patient Profile  Questions/Answers    Flowsheet Row Most Recent Value   Symptoms/Conditions Managed at Home other (see comments)  [flu]   Barriers to Managing Health --  [Patient had delay getting Tamiflu. Now taking as prescribed.]      SDOH updated and reviewed with the patient during this program:  Food Insecurity: No Food Insecurity   • Worried About Running Out of Food in the Last Year: Never true   • Ran Out of Food in the Last Year: Never true      Transportation Needs: No Transportation Needs   • Lack of Transportation (Medical): No   • Lack of Transportation (Non-Medical): No       Patient Outreach    Introduced self, explained ACM RN role and provided contact information. Spoke with patient regarding health and wellness. Patient states she has followed up with PCP. Patient has started Tamiflu. Cough is continuing but patient states she feels better. No needs regarding transport or food while recovering. Follow up review scheduled in 2 weeks.     Education Documentation  No documentation found.        JASMIN PAINTING  Ambulatory Case Management    11/23/2022, 12:26 EST

## 2022-11-28 RX ORDER — WARFARIN SODIUM 2.5 MG/1
TABLET ORAL
Qty: 270 TABLET | Refills: 0 | Status: SHIPPED | OUTPATIENT
Start: 2022-11-28 | End: 2023-03-27

## 2022-11-28 RX ORDER — PEN NEEDLE, DIABETIC 32GX 5/32"
NEEDLE, DISPOSABLE MISCELLANEOUS
Qty: 100 EACH | Refills: 0 | Status: SHIPPED | OUTPATIENT
Start: 2022-11-28 | End: 2023-03-31 | Stop reason: SDUPTHER

## 2022-11-29 RX ORDER — EMPAGLIFLOZIN 10 MG/1
TABLET, FILM COATED ORAL
Qty: 30 TABLET | Refills: 0 | Status: SHIPPED | OUTPATIENT
Start: 2022-11-29 | End: 2022-12-29

## 2022-12-02 ENCOUNTER — TELEPHONE (OUTPATIENT)
Dept: PHARMACY | Facility: HOSPITAL | Age: 47
End: 2022-12-02

## 2022-12-05 ENCOUNTER — ANTICOAGULATION VISIT (OUTPATIENT)
Dept: PHARMACY | Facility: HOSPITAL | Age: 47
End: 2022-12-05

## 2022-12-05 LAB — INR PPP: 3.3

## 2022-12-05 NOTE — PROGRESS NOTES
Anticoagulation Clinic Progress Note    Anticoagulation Summary  As of 12/5/2022    INR goal:  2.0-3.0   TTR:  58.3 % (1.9 y)   INR used for dosing:  3.30 (12/5/2022)   Warfarin maintenance plan:  7.5 mg every day; Starting 12/5/2022   Weekly warfarin total:  52.5 mg   Plan last modified:  Zina De La Rosa RPH (5/20/2022)   Next INR check:  12/12/2022   Priority:  High   Target end date:  Indefinite    Indications    Other acute pulmonary embolism  unspecified whether acute cor pulmonale present (HCC) (Resolved) [I26.99]  Acute pulmonary embolism  unspecified pulmonary embolism type  unspecified whether acute cor pulmonale present (HCC) (Resolved) [I26.99]             Anticoagulation Episode Summary     INR check location:      Preferred lab:      Send INR reminders to:   [facility]    Comments:  **Home Testing Program**      Anticoagulation Care Providers     Provider Role Specialty Phone number    Lisset Melton MD Referring Cardiology [phone]          Clinic Interview:  Patient Findings     Positives:  Change in health, Emergency department visit, Change in   medications    Negatives:  Signs/symptoms of thrombosis, Signs/symptoms of bleeding,   Laboratory test error suspected, Change in alcohol use, Change in   activity, Upcoming invasive procedure, Upcoming dental procedure, Missed   doses, Extra doses, Change in diet/appetite, Hospital admission, Bruising,   Other complaints    Comments:  Tamiflu 11/18 after ED visit. Patient has been sick for two   weeks      Clinical Outcomes     Negatives:  Major bleeding event, Thromboembolic event,   Anticoagulation-related hospital admission, Anticoagulation-related ED   visit, Anticoagulation-related fatality    Comments:  Tamiflu 11/18 after ED visit. Patient has been sick for two   weeks        INR History:  Anticoagulation Monitoring 11/7/2022 11/14/2022 12/5/2022   INR 2.70 2.90 3.30   INR Date 11/7/2022 11/14/2022 12/5/2022   INR Goal  2.0-3.0 2.0-3.0 2.0-3.0   Trend Same Same Same   Last Week Total 52.5 mg 52.5 mg 52.5 mg   Next Week Total 52.5 mg 52.5 mg 50 mg   Sun 7.5 mg 7.5 mg 7.5 mg   Mon 7.5 mg 7.5 mg 5 mg (12/5)   Tue 7.5 mg 7.5 mg 7.5 mg   Wed 7.5 mg 7.5 mg 7.5 mg   Thu 7.5 mg 7.5 mg 7.5 mg   Fri 7.5 mg 7.5 mg 7.5 mg   Sat 7.5 mg 7.5 mg 7.5 mg   Visit Report - - -   Some recent data might be hidden       Plan:  1. INR is Supratherapeutic today- see above in Anticoagulation Summary.   Will instruct Nina Saez to Change their warfarin regimen- see above in Anticoagulation Summary.  2. Follow up in 1 week  3. They have been instructed to call if any changes in medications, doses, concerns, etc. Patient expresses understanding and has no further questions at this time.    Zina De La Rosa [facility]

## 2022-12-06 RX ORDER — SACUBITRIL AND VALSARTAN 97; 103 MG/1; MG/1
TABLET, FILM COATED ORAL
Qty: 180 TABLET | Refills: 0 | OUTPATIENT
Start: 2022-12-06

## 2022-12-07 RX ORDER — POTASSIUM CHLORIDE 20 MEQ/1
TABLET, EXTENDED RELEASE ORAL
Qty: 90 TABLET | Refills: 11 | Status: SHIPPED | OUTPATIENT
Start: 2022-12-07

## 2022-12-12 LAB — INR PPP: 2.5

## 2022-12-13 ENCOUNTER — ANTICOAGULATION VISIT (OUTPATIENT)
Dept: PHARMACY | Facility: HOSPITAL | Age: 47
End: 2022-12-13

## 2022-12-13 NOTE — PROGRESS NOTES
Anticoagulation Clinic Progress Note    Anticoagulation Summary  As of 2022    INR goal:  2.0-3.0   TTR:  58.4 % (2 y)   INR used for dosin.50 (2022)   Warfarin maintenance plan:  7.5 mg every day; Starting 2022   Weekly warfarin total:  52.5 mg   No change documented:  Zina De La Rosa RPH   Plan last modified:  Zina De La Rosa RPH (2022)   Next INR check:  2022   Priority:  High   Target end date:  Indefinite    Indications    Other acute pulmonary embolism  unspecified whether acute cor pulmonale present (HCC) (Resolved) [I26.99]  Acute pulmonary embolism  unspecified pulmonary embolism type  unspecified whether acute cor pulmonale present (HCC) (Resolved) [I26.99]             Anticoagulation Episode Summary     INR check location:      Preferred lab:      Send INR reminders to:  [facility]    Comments:  **Home Testing Program**      Anticoagulation Care Providers     Provider Role Specialty Phone number    Lisset Melton MD Referring Cardiology [phone]          Clinic Interview:  No pertinent clinical findings have been reported.    INR History:  Anticoagulation Monitoring 2022   INR 2.90 3.30 2.50   INR Date 2022   INR Goal 2.0-3.0 2.0-3.0 2.0-3.0   Trend Same Same Same   Last Week Total 52.5 mg 52.5 mg 52.5 mg   Next Week Total 52.5 mg 50 mg 52.5 mg   Sun 7.5 mg 7.5 mg 7.5 mg   Mon 7.5 mg 5 mg () -   Tue 7.5 mg 7.5 mg 7.5 mg   Wed 7.5 mg 7.5 mg 7.5 mg   Thu 7.5 mg 7.5 mg 7.5 mg   Fri 7.5 mg 7.5 mg 7.5 mg   Sat 7.5 mg 7.5 mg 7.5 mg   Visit Report - - -   Some recent data might be hidden       Plan:  1. INR is therapeutic today- see above in Anticoagulation Summary.    Nina Saez to continue their warfarin regimen- see above in Anticoagulation Summary.  2. Follow up in 1 week  3. They have been instructed to call if any changes in medications, doses, concerns, etc. Patient expresses  understanding and has no further questions at this time.    Zina De La Rosa, RP

## 2022-12-15 RX ORDER — TORSEMIDE 20 MG/1
TABLET ORAL
Qty: 150 TABLET | Refills: 0 | Status: SHIPPED | OUTPATIENT
Start: 2022-12-15 | End: 2023-01-16

## 2022-12-19 ENCOUNTER — ANTICOAGULATION VISIT (OUTPATIENT)
Dept: PHARMACY | Facility: HOSPITAL | Age: 47
End: 2022-12-19

## 2022-12-19 LAB — INR PPP: 2.9

## 2022-12-19 NOTE — PROGRESS NOTES
Anticoagulation Clinic Progress Note    Anticoagulation Summary  As of 2022    INR goal:  2.0-3.0   TTR:  58.8 % (2 y)   INR used for dosin.90 (2022)   Warfarin maintenance plan:  7.5 mg every day; Starting 2022   Weekly warfarin total:  52.5 mg   No change documented:  Haley Lancaster, John   Plan last modified:  Zina De La Rosa RPH (2022)   Next INR check:  2022   Priority:  High   Target end date:  Indefinite    Indications    Other acute pulmonary embolism  unspecified whether acute cor pulmonale present (HCC) (Resolved) [I26.99]  Acute pulmonary embolism  unspecified pulmonary embolism type  unspecified whether acute cor pulmonale present (HCC) (Resolved) [I26.99]             Anticoagulation Episode Summary     INR check location:      Preferred lab:      Send INR reminders to:  [facility]    Comments:  **Home Testing Program**      Anticoagulation Care Providers     Provider Role Specialty Phone number    Lisset Melton MD Referring Cardiology [phone]          Clinic Interview:  No pertinent clinical findings have been reported.    INR History:  Anticoagulation Monitoring 2022   INR 3.30 2.50 2.90   INR Date 2022   INR Goal 2.0-3.0 2.0-3.0 2.0-3.0   Trend Same Same Same   Last Week Total 52.5 mg 52.5 mg 52.5 mg   Next Week Total 50 mg 52.5 mg 52.5 mg   Sun 7.5 mg 7.5 mg 7.5 mg   Mon 5 mg () - 7.5 mg   Tue 7.5 mg 7.5 mg 7.5 mg   Wed 7.5 mg 7.5 mg 7.5 mg   Thu 7.5 mg 7.5 mg 7.5 mg   Fri 7.5 mg 7.5 mg 7.5 mg   Sat 7.5 mg 7.5 mg 7.5 mg   Visit Report - - -   Some recent data might be hidden       Plan:  1. INR is therapeutic today- see above in Anticoagulation Summary.    Nina Saez to continue their warfarin regimen- see above in Anticoagulation Summary.  2. Follow up in 1 week  3. Pt has agreed to only be called if INR out of range. They have been instructed to call if any changes  in medications, doses, concerns, etc. Patient expresses understanding and has no further questions at this time.    Haley Lancaster, PharmD

## 2022-12-29 RX ORDER — EMPAGLIFLOZIN 10 MG/1
TABLET, FILM COATED ORAL
Qty: 30 TABLET | Refills: 0 | Status: SHIPPED | OUTPATIENT
Start: 2022-12-29 | End: 2023-01-30

## 2022-12-30 ENCOUNTER — ANTICOAGULATION VISIT (OUTPATIENT)
Dept: PHARMACY | Facility: HOSPITAL | Age: 47
End: 2022-12-30

## 2022-12-30 LAB — INR PPP: 2.2

## 2022-12-30 PROCEDURE — G0249 PROVIDE INR TEST MATER/EQUIP: HCPCS

## 2023-01-06 ENCOUNTER — ANTICOAGULATION VISIT (OUTPATIENT)
Dept: PHARMACY | Facility: HOSPITAL | Age: 48
End: 2023-01-06
Payer: MEDICARE

## 2023-01-06 LAB — INR PPP: 3

## 2023-01-06 NOTE — PROGRESS NOTES
Anticoagulation Clinic Progress Note    Anticoagulation Summary  As of 1/6/2023    INR goal:  2.0-3.0   TTR:  59.7 % (2 y)   INR used for dosing:  3.00 (1/6/2023)   Warfarin maintenance plan:  7.5 mg every day; Starting 1/6/2023   Weekly warfarin total:  52.5 mg   No change documented:  Zina De La Rosa RPH   Plan last modified:  Zina De La Rosa RPH (5/20/2022)   Next INR check:  1/13/2023   Priority:  High   Target end date:  Indefinite    Indications    Other acute pulmonary embolism  unspecified whether acute cor pulmonale present (HCC) (Resolved) [I26.99]  Acute pulmonary embolism  unspecified pulmonary embolism type  unspecified whether acute cor pulmonale present (HCC) (Resolved) [I26.99]             Anticoagulation Episode Summary     INR check location:      Preferred lab:      Send INR reminders to:  [facility]    Comments:  **Home Testing Program**      Anticoagulation Care Providers     Provider Role Specialty Phone number    Lisset Melton MD Referring Cardiology [phone]          Clinic Interview:  No pertinent clinical findings have been reported.    INR History:  Anticoagulation Monitoring 12/19/2022 12/30/2022 1/6/2023   INR 2.90 2.20 3.00   INR Date 12/19/2022 12/30/2022 1/6/2023   INR Goal 2.0-3.0 2.0-3.0 2.0-3.0   Trend Same Same Same   Last Week Total 52.5 mg 52.5 mg 52.5 mg   Next Week Total 52.5 mg 52.5 mg 52.5 mg   Sun 7.5 mg 7.5 mg 7.5 mg   Mon 7.5 mg 7.5 mg 7.5 mg   Tue 7.5 mg 7.5 mg 7.5 mg   Wed 7.5 mg 7.5 mg 7.5 mg   Thu 7.5 mg 7.5 mg 7.5 mg   Fri 7.5 mg 7.5 mg 7.5 mg   Sat 7.5 mg 7.5 mg 7.5 mg   Visit Report - - -   Some recent data might be hidden       Plan:  1. INR is therapeutic today- see above in Anticoagulation Summary.    Nina Saez to continue their warfarin regimen- see above in Anticoagulation Summary.  2. Follow up in 1 week  3. Pt has agreed to only be called if INR out of range. They have been instructed to call if any changes in  medications, doses, concerns, etc. Patient expresses understanding and has no further questions at this time.    Zina De La Rosa, [facility]

## 2023-01-09 ENCOUNTER — OFFICE VISIT (OUTPATIENT)
Dept: INTERNAL MEDICINE | Age: 48
End: 2023-01-09
Payer: MEDICARE

## 2023-01-09 VITALS
OXYGEN SATURATION: 98 % | HEART RATE: 91 BPM | SYSTOLIC BLOOD PRESSURE: 106 MMHG | BODY MASS INDEX: 35.68 KG/M2 | TEMPERATURE: 97.1 F | DIASTOLIC BLOOD PRESSURE: 62 MMHG | WEIGHT: 222 LBS | HEIGHT: 66 IN

## 2023-01-09 DIAGNOSIS — F41.9 ANXIETY: Primary | ICD-10-CM

## 2023-01-09 DIAGNOSIS — Z12.11 SCREENING FOR MALIGNANT NEOPLASM OF COLON: ICD-10-CM

## 2023-01-09 PROCEDURE — 99213 OFFICE O/P EST LOW 20 MIN: CPT | Performed by: NURSE PRACTITIONER

## 2023-01-09 NOTE — PROGRESS NOTES
I N T E R N A L  M E D I C I N E  SAMUEL JACOB      ENCOUNTER DATE:  01/09/2023    Nina Saez / 47 y.o. / female      CHIEF COMPLAINT / REASON FOR OFFICE VISIT     Anxiety      ASSESSMENT & PLAN     Problem List Items Addressed This Visit    None  Visit Diagnoses     Anxiety    -  Primary    Screening for malignant neoplasm of colon            No orders of the defined types were placed in this encounter.    No orders of the defined types were placed in this encounter.      SUMMARY/DISCUSSION  • Continue current medication regimen for anxiety with sertraline 50 mg.  • Patient will discuss with cardiology if she can proceed with colonoscopy, if not would like to wait to 50 for cologuard or consider fit test.     Next Appointment with me: Visit date not found    Return in about 6 months (around 7/9/2023) for Next scheduled follow up.      VITAL SIGNS     Visit Vitals  /62 (Cuff Size: Large Adult)   Pulse 91   Temp 97.1 °F (36.2 °C) (Temporal)   Ht 167.6 cm (66\")   Wt 101 kg (222 lb)   SpO2 98%   BMI 35.83 kg/m²     Wt Readings from Last 3 Encounters:   01/09/23 101 kg (222 lb)   11/22/22 98.5 kg (217 lb 3.2 oz)   11/18/22 95.3 kg (210 lb)     Body mass index is 35.83 kg/m².      MEDICATIONS AT THE TIME OF OFFICE VISIT     Current Outpatient Medications on File Prior to Visit   Medication Sig   • acetaminophen (TYLENOL) 325 MG tablet Take 650 mg by mouth Every 6 (Six) Hours As Needed for Mild Pain .   • albuterol sulfate  (90 Base) MCG/ACT inhaler INHALE 2 PUFFS BY MOUTH EVERY 4 HOURS AS NEEDED FOR WHEEZING   • atorvastatin (LIPITOR) 10 MG tablet Take 1 tablet by mouth Daily.   • BD Pen Needle Ehidi 2nd Gen 32G X 4 MM misc USE 1 NEEDLE ONCE DAILY WITH  SAXENDA   • carvedilol (COREG) 3.125 MG tablet Take 1 tablet by mouth 2 (Two) Times a Day With Meals.   • CBD (cannabidiol) oral oil Take  by mouth.   • Kwfks-Erjoj-Qlmtmjwu-TenofAF (Symtuza) 066-442-733-10 MG per tablet Take 1 tablet by mouth  Daily.   • Diclofenac Sodium (VOLTAREN) 1 % gel gel Apply 4 g topically to the appropriate area as directed 2 (Two) Times a Day. Use per pkg insert   • diphenhydrAMINE (BENADRYL) 25 mg capsule Take 1 capsule by mouth Every 6 (Six) Hours As Needed for Itching (swelling).   • fluticasone (Flonase) 50 MCG/ACT nasal spray 2 sprays into the nostril(s) as directed by provider Daily.   • Jardiance 10 MG tablet tablet Take 1 tablet by mouth once daily   • nitroglycerin (NITROSTAT) 0.4 MG SL tablet Place 1 tablet under the tongue Every 5 (Five) Minutes As Needed for Chest Pain. Take no more than 3 doses in 15 minutes.   • potassium chloride (K-DUR,KLOR-CON) 20 MEQ CR tablet Take 3 tablets by mouth once daily   • sacubitril-valsartan (Entresto) 49-51 MG tablet Take 1 tablet by mouth 2 (Two) Times a Day.   • Saxenda 18 MG/3ML injection pen Inject 3 mg under the skin into the appropriate area as directed Daily.   • sertraline (Zoloft) 50 MG tablet Take 1 tablet by mouth Daily.   • spironolactone (ALDACTONE) 25 MG tablet 25 mg Daily.   • torsemide (DEMADEX) 20 MG tablet TAKE 3 TABLETS BY MOUTH IN THE MORNING AND 2 IN THE AFTERNOON   • vitamin D (ERGOCALCIFEROL) 01720 units capsule capsule Take 50,000 Units by mouth Every 30 (Thirty) Days.   • warfarin (COUMADIN) 2.5 MG tablet Take 3 tablets by mouth once daily   • [DISCONTINUED] oseltamivir (Tamiflu) 75 MG capsule Take 1 capsule by mouth 2 (Two) Times a Day.     No current facility-administered medications on file prior to visit.          HISTORY OF PRESENT ILLNESS     Patient presents for chronic 6-month follow-up on generalized anxiety disorder.  States that she has no increasing irritability, anxiety, dysphoric mood, insomnia thoughts of suicide or self-harm.  Stable on sertraline 50 mg.    She does need colon cancer screening, she plans to check with her cardiologist if her ejection fraction and heart can handle.  Unfortunately Medicare will not allow us to do Cologuard  until she is 50.     REVIEW OF SYSTEMS     Constitutional neg except per HPI   Resp stable   CV stable   Psych stable     PHYSICAL EXAMINATION     Physical Exam  Constitutional  No distress  Psychiatric  Alert. Judgment and thought content normal. Mood normal     REVIEWED DATA     Labs:   Lab Results   Component Value Date    GLUCOSE 116 (H) 11/18/2022    BUN 17 11/18/2022    CREATININE 1.10 (H) 11/18/2022    EGFRIFAFRI 68 02/02/2022    BCR 15.5 11/18/2022    K 4.0 11/18/2022    CO2 25.5 11/18/2022    CALCIUM 9.1 11/18/2022    ALBUMIN 3.80 11/18/2022    LABIL2 1.2 03/07/2020    AST 23 11/18/2022    ALT 22 11/18/2022     Lab Results   Component Value Date    TSH 2.330 10/28/2020    O5OIEXM 5.38 07/29/2020     Lab Results   Component Value Date    CHOL 173 09/12/2022    TRIG 113 09/12/2022    HDL 39 (L) 09/12/2022     (H) 09/12/2022     Lab Results   Component Value Date    HGBA1C 5.00 09/12/2022     Lab Results   Component Value Date    WBC 6.46 11/18/2022    HGB 12.1 11/18/2022    HCT 35.3 11/18/2022    MCV 93.1 11/18/2022     (L) 11/18/2022       Imaging:           Medical Tests:           Summary of old records / correspondence / consultant report:           Request outside records:             *Examiner was wearing medical surgical mask.     **Dragon dictation used for documentation.

## 2023-01-13 ENCOUNTER — ANTICOAGULATION VISIT (OUTPATIENT)
Dept: PHARMACY | Facility: HOSPITAL | Age: 48
End: 2023-01-13
Payer: MEDICARE

## 2023-01-13 LAB — INR PPP: 2.8

## 2023-01-13 NOTE — PROGRESS NOTES
Anticoagulation Clinic Progress Note    Anticoagulation Summary  As of 2023    INR goal:  2.0-3.0   TTR:  60.1 % (2 y)   INR used for dosin.80 (2023)   Warfarin maintenance plan:  7.5 mg every day; Starting 2023   Weekly warfarin total:  52.5 mg   No change documented:  Zina De La Rosa RPH   Plan last modified:  Zina De La Rosa RPH (2022)   Next INR check:  2023   Priority:  High   Target end date:  Indefinite    Indications    Other acute pulmonary embolism  unspecified whether acute cor pulmonale present (HCC) (Resolved) [I26.99]  Acute pulmonary embolism  unspecified pulmonary embolism type  unspecified whether acute cor pulmonale present (HCC) (Resolved) [I26.99]             Anticoagulation Episode Summary     INR check location:      Preferred lab:      Send INR reminders to:  [facility]    Comments:  **Home Testing Program**      Anticoagulation Care Providers     Provider Role Specialty Phone number    Lisset Melton MD Referring Cardiology [phone]          Clinic Interview:  No pertinent clinical findings have been reported.    INR History:  Anticoagulation Monitoring 2022   INR 2.20 3.00 2.80   INR Date 2022   INR Goal 2.0-3.0 2.0-3.0 2.0-3.0   Trend Same Same Same   Last Week Total 52.5 mg 52.5 mg 52.5 mg   Next Week Total 52.5 mg 52.5 mg 52.5 mg   Sun 7.5 mg 7.5 mg 7.5 mg   Mon 7.5 mg 7.5 mg 7.5 mg   Tue 7.5 mg 7.5 mg 7.5 mg   Wed 7.5 mg 7.5 mg 7.5 mg   Thu 7.5 mg 7.5 mg 7.5 mg   Fri 7.5 mg 7.5 mg 7.5 mg   Sat 7.5 mg 7.5 mg 7.5 mg   Visit Report - - -   Some recent data might be hidden       Plan:  1. INR is therapeutic today- see above in Anticoagulation Summary.    Nina Saez to continue their warfarin regimen- see above in Anticoagulation Summary.  2. Follow up in 1 week  3. Pt has agreed to only be called if INR out of range. They have been instructed to call if any changes in  medications, doses, concerns, etc. Patient expresses understanding and has no further questions at this time.    Zina De La Rosa, [facility]

## 2023-01-16 RX ORDER — TORSEMIDE 20 MG/1
TABLET ORAL
Qty: 150 TABLET | Refills: 0 | Status: SHIPPED | OUTPATIENT
Start: 2023-01-16 | End: 2023-02-22 | Stop reason: SDUPTHER

## 2023-01-20 ENCOUNTER — OFFICE VISIT (OUTPATIENT)
Dept: INTERNAL MEDICINE | Age: 48
End: 2023-01-20
Payer: MEDICARE

## 2023-01-20 ENCOUNTER — TELEPHONE (OUTPATIENT)
Dept: INTERNAL MEDICINE | Age: 48
End: 2023-01-20

## 2023-01-20 VITALS
BODY MASS INDEX: 34.87 KG/M2 | WEIGHT: 217 LBS | DIASTOLIC BLOOD PRESSURE: 64 MMHG | HEIGHT: 66 IN | TEMPERATURE: 96.9 F | HEART RATE: 68 BPM | SYSTOLIC BLOOD PRESSURE: 124 MMHG

## 2023-01-20 DIAGNOSIS — M62.89 MUSCLE TIGHTNESS: ICD-10-CM

## 2023-01-20 DIAGNOSIS — S16.1XXA ACUTE STRAIN OF NECK MUSCLE, INITIAL ENCOUNTER: Primary | ICD-10-CM

## 2023-01-20 PROCEDURE — 99213 OFFICE O/P EST LOW 20 MIN: CPT | Performed by: NURSE PRACTITIONER

## 2023-01-20 RX ORDER — CYCLOBENZAPRINE HCL 10 MG
10 TABLET ORAL 3 TIMES DAILY PRN
Qty: 21 TABLET | Refills: 0 | Status: SHIPPED | OUTPATIENT
Start: 2023-01-20 | End: 2023-01-26 | Stop reason: SDUPTHER

## 2023-01-20 NOTE — PROGRESS NOTES
"    I N T E R N A L  M E D I C I N E  ANGEL LEPE, APRN      ENCOUNTER DATE:  01/20/2023    Nina Saez / 47 y.o. / female      CHIEF COMPLAINT / REASON FOR OFFICE VISIT     Neck Pain      ASSESSMENT & PLAN     Problem List Items Addressed This Visit    None  Visit Diagnoses     Acute strain of neck muscle, initial encounter    -  Primary    Relevant Medications    cyclobenzaprine (FLEXERIL) 10 MG tablet    Muscle tightness        Relevant Medications    cyclobenzaprine (FLEXERIL) 10 MG tablet        No orders of the defined types were placed in this encounter.    New Medications Ordered This Visit   Medications   • cyclobenzaprine (FLEXERIL) 10 MG tablet     Sig: Take 1 tablet by mouth 3 (Three) Times a Day As Needed for Muscle Spasms.     Dispense:  21 tablet     Refill:  0       SUMMARY/DISCUSSION  • Patient is provided with cervical stretching exercises, declines physical therapy or x-ray imaging at this time, follow-up if no symptom improvement  • Discussed potential sedation with Flexeril, do not operate heavy machinery or drive a vehicle    Next Appointment with me: 7/10/2023    No follow-ups on file.      VITAL SIGNS     Visit Vitals  /64   Pulse 68   Temp 96.9 °F (36.1 °C)   Ht 167.6 cm (65.98\")   Wt 98.4 kg (217 lb)   BMI 35.04 kg/m²     Wt Readings from Last 3 Encounters:   01/20/23 98.4 kg (217 lb)   01/09/23 101 kg (222 lb)   11/22/22 98.5 kg (217 lb 3.2 oz)     Body mass index is 35.04 kg/m².      MEDICATIONS AT THE TIME OF OFFICE VISIT     Current Outpatient Medications on File Prior to Visit   Medication Sig   • acetaminophen (TYLENOL) 325 MG tablet Take 650 mg by mouth Every 6 (Six) Hours As Needed for Mild Pain .   • albuterol sulfate  (90 Base) MCG/ACT inhaler INHALE 2 PUFFS BY MOUTH EVERY 4 HOURS AS NEEDED FOR WHEEZING   • atorvastatin (LIPITOR) 10 MG tablet Take 1 tablet by mouth Daily.   • BD Pen Needle Heidi 2nd Gen 32G X 4 MM misc USE 1 NEEDLE ONCE DAILY WITH  SAXENDA   • " carvedilol (COREG) 3.125 MG tablet Take 1 tablet by mouth 2 (Two) Times a Day With Meals.   • CBD (cannabidiol) oral oil Take  by mouth.   • Tcezn-Hsegi-Lvkffauk-TenofAF (Symtuza) 215-282-012-10 MG per tablet Take 1 tablet by mouth Daily.   • Diclofenac Sodium (VOLTAREN) 1 % gel gel Apply 4 g topically to the appropriate area as directed 2 (Two) Times a Day. Use per pkg insert   • diphenhydrAMINE (BENADRYL) 25 mg capsule Take 1 capsule by mouth Every 6 (Six) Hours As Needed for Itching (swelling).   • fluticasone (Flonase) 50 MCG/ACT nasal spray 2 sprays into the nostril(s) as directed by provider Daily.   • Jardiance 10 MG tablet tablet Take 1 tablet by mouth once daily   • nitroglycerin (NITROSTAT) 0.4 MG SL tablet Place 1 tablet under the tongue Every 5 (Five) Minutes As Needed for Chest Pain. Take no more than 3 doses in 15 minutes.   • potassium chloride (K-DUR,KLOR-CON) 20 MEQ CR tablet Take 3 tablets by mouth once daily   • sacubitril-valsartan (Entresto) 49-51 MG tablet Take 1 tablet by mouth 2 (Two) Times a Day.   • Saxenda 18 MG/3ML injection pen Inject 3 mg under the skin into the appropriate area as directed Daily.   • sertraline (Zoloft) 50 MG tablet Take 1 tablet by mouth Daily.   • spironolactone (ALDACTONE) 25 MG tablet 25 mg Daily.   • torsemide (DEMADEX) 20 MG tablet TAKE 3 TABLETS BY MOUTH IN THE MORNING AND 2 IN THE AFTERNOON   • vitamin D (ERGOCALCIFEROL) 10237 units capsule capsule Take 50,000 Units by mouth Every 30 (Thirty) Days.   • warfarin (COUMADIN) 2.5 MG tablet Take 3 tablets by mouth once daily     No current facility-administered medications on file prior to visit.          HISTORY OF PRESENT ILLNESS     Patient presents with approximately 5 days of right-sided neck pain.  Significant muscle tightness and spasming with occasion extending down her to her right shoulder, no numbness or tingling or weakness.  No recent injury.  Stated that she slept in uncomfortable position and woke up  with her neck extremely tight.    REVIEW OF SYSTEMS     Constitutional neg except per HPI   Resp neg  CV neg  Musc right neck pain    PHYSICAL EXAMINATION     Physical Exam  Constitutional  No distress  Musc right cervical muscle tightness and tenderness; equal upper extremity strength bilateral    REVIEWED DATA     Labs:     Lab Results   Component Value Date    GLUCOSE 116 (H) 11/18/2022    BUN 17 11/18/2022    CREATININE 1.10 (H) 11/18/2022    EGFRIFAFRI 68 02/02/2022    BCR 15.5 11/18/2022    K 4.0 11/18/2022    CO2 25.5 11/18/2022    CALCIUM 9.1 11/18/2022    ALBUMIN 3.80 11/18/2022    LABIL2 1.2 03/07/2020    AST 23 11/18/2022    ALT 22 11/18/2022         Imaging:           Medical Tests:           Summary of old records / correspondence / consultant report:           Request outside records:             *Examiner was wearing medical surgical mask    **Dragon dictation used for documentation

## 2023-01-20 NOTE — TELEPHONE ENCOUNTER
Caller: Nina Saez    Relationship: Self    Best call back number: [phone] (Mobile)    What is the best time to reach you: ANYTIME, ASAP    Who are you requesting to speak with (clinical staff, provider,  specific staff member): CLINICAL STAFF/ ANGEL LEPE SPECIFICALLY    Do you know the name of the person who called: Nina Saez    What was the call regarding: PATIENT STATES SHE IS HAVING PAIN AND STIFFNESS IN RIGHT SIDE OF NECK AND AND RIGHT SHOULDER AND HAS BEEN HAVING THIS FOR OVER A WEEK, PATIENT STATES SHE TOOK A MUSCLE RELAXER THAT HER NEIGHBOR GAVE HER AND IT WORKED WHEN TYLENOL, HEATING PAD, COLD COMPRESS, AND MUSCLE CREAM DID NOT AND HER PAIN IS A 10, PATIENT IS ASKING FOR ANGEL LEPE TO CALL HER BACK REGARDING THIS ASAP    Do you require a callback: YES, ASAP

## 2023-01-23 RX ORDER — CARVEDILOL 3.12 MG/1
TABLET ORAL
Qty: 180 TABLET | Refills: 3 | Status: SHIPPED | OUTPATIENT
Start: 2023-01-23 | End: 2023-02-21 | Stop reason: SDUPTHER

## 2023-01-25 ENCOUNTER — ANTICOAGULATION VISIT (OUTPATIENT)
Dept: PHARMACY | Facility: HOSPITAL | Age: 48
End: 2023-01-25
Payer: MEDICARE

## 2023-01-25 LAB — INR PPP: 2.2

## 2023-01-25 NOTE — PROGRESS NOTES
Anticoagulation Clinic Progress Note    Anticoagulation Summary  As of 2023    INR goal:  2.0-3.0   TTR:  60.7 % (2.1 y)   INR used for dosin.20 (2023)   Warfarin maintenance plan:  7.5 mg every day; Starting 2023   Weekly warfarin total:  52.5 mg   No change documented:  Haley Lancaster, PharmD   Plan last modified:  Zina De La Rosa RPH (2022)   Next INR check:  2023   Priority:  High   Target end date:  Indefinite    Indications    Other acute pulmonary embolism  unspecified whether acute cor pulmonale present (HCC) (Resolved) [I26.99]  Acute pulmonary embolism  unspecified pulmonary embolism type  unspecified whether acute cor pulmonale present (HCC) (Resolved) [I26.99]             Anticoagulation Episode Summary     INR check location:      Preferred lab:      Send INR reminders to:  [facility]    Comments:  **Home Testing Program**      Anticoagulation Care Providers     Provider Role Specialty Phone number    Lisset Melton MD Referring Cardiology [phone]          Clinic Interview:  Patient Findings     Negatives:  Signs/symptoms of thrombosis, Signs/symptoms of bleeding,   Laboratory test error suspected, Change in health, Change in alcohol use,   Change in activity, Upcoming invasive procedure, Emergency department   visit, Upcoming dental procedure, Missed doses, Extra doses, Change in   medications, Change in diet/appetite, Hospital admission, Bruising, Other   complaints      Clinical Outcomes     Negatives:  Major bleeding event, Thromboembolic event,   Anticoagulation-related hospital admission, Anticoagulation-related ED   visit, Anticoagulation-related fatality        INR History:  Anticoagulation Monitoring 2023   INR 3.00 2.80 2.20   INR Date 2023   INR Goal 2.0-3.0 2.0-3.0 2.0-3.0   Trend Same Same Same   Last Week Total 52.5 mg 52.5 mg 52.5 mg   Next Week Total 52.5 mg 52.5 mg 52.5 mg    Sun 7.5 mg 7.5 mg 7.5 mg   Mon 7.5 mg 7.5 mg 7.5 mg   Tue 7.5 mg 7.5 mg 7.5 mg   Wed 7.5 mg 7.5 mg 7.5 mg   Thu 7.5 mg 7.5 mg 7.5 mg   Fri 7.5 mg 7.5 mg 7.5 mg   Sat 7.5 mg 7.5 mg 7.5 mg   Visit Report - - -   Some recent data might be hidden       Plan:  1. INR is Therapeutic today- see above in Anticoagulation Summary.   Will instruct Nina Saez to Continue their warfarin regimen- see above in Anticoagulation Summary.  2. Follow up in 1 weeks  3. Pt has agreed to only be called if INR out of range. They have been instructed to call if any changes in medications, doses, concerns, etc. Patient expresses understanding and has no further questions at this time.    Haley Lancaster, PharmD

## 2023-01-26 ENCOUNTER — OFFICE VISIT (OUTPATIENT)
Dept: INTERNAL MEDICINE | Age: 48
End: 2023-01-26
Payer: MEDICARE

## 2023-01-26 ENCOUNTER — HOSPITAL ENCOUNTER (OUTPATIENT)
Dept: GENERAL RADIOLOGY | Facility: HOSPITAL | Age: 48
Discharge: HOME | End: 2023-01-26
Admitting: NURSE PRACTITIONER
Payer: MEDICARE

## 2023-01-26 VITALS
OXYGEN SATURATION: 98 % | BODY MASS INDEX: 34.55 KG/M2 | SYSTOLIC BLOOD PRESSURE: 100 MMHG | HEIGHT: 66 IN | TEMPERATURE: 96.9 F | DIASTOLIC BLOOD PRESSURE: 60 MMHG | WEIGHT: 215 LBS | HEART RATE: 46 BPM

## 2023-01-26 DIAGNOSIS — R63.5 WEIGHT GAIN: Primary | ICD-10-CM

## 2023-01-26 DIAGNOSIS — R63.2 EXCESSIVE HUNGER: ICD-10-CM

## 2023-01-26 DIAGNOSIS — S16.1XXA ACUTE STRAIN OF NECK MUSCLE, INITIAL ENCOUNTER: ICD-10-CM

## 2023-01-26 DIAGNOSIS — M62.89 MUSCLE TIGHTNESS: ICD-10-CM

## 2023-01-26 DIAGNOSIS — M54.2 CERVICAL PAIN: ICD-10-CM

## 2023-01-26 DIAGNOSIS — K21.9 GASTROESOPHAGEAL REFLUX DISEASE, UNSPECIFIED WHETHER ESOPHAGITIS PRESENT: ICD-10-CM

## 2023-01-26 PROCEDURE — 72040 X-RAY EXAM NECK SPINE 2-3 VW: CPT

## 2023-01-26 PROCEDURE — 99214 OFFICE O/P EST MOD 30 MIN: CPT | Performed by: NURSE PRACTITIONER

## 2023-01-26 RX ORDER — CYCLOBENZAPRINE HCL 10 MG
10 TABLET ORAL 3 TIMES DAILY PRN
Qty: 21 TABLET | Refills: 0 | Status: SHIPPED | OUTPATIENT
Start: 2023-01-26

## 2023-01-26 RX ORDER — FAMOTIDINE 20 MG/1
20 TABLET, FILM COATED ORAL 2 TIMES DAILY PRN
Start: 2023-01-26

## 2023-01-26 NOTE — PROGRESS NOTES
"    I N T E R N A L  M E D I C I N E  ANGEL LEPE, APRN      ENCOUNTER DATE:  01/26/2023    Nina Saez / 47 y.o. / female      CHIEF COMPLAINT / REASON FOR OFFICE VISIT     Heartburn, Neck Pain, hunger, and Weight Gain      ASSESSMENT & PLAN     Problem List Items Addressed This Visit    None  Visit Diagnoses     Weight gain    -  Primary    Relevant Orders    Cortisol - AM    Cortisol, Urine, Free 24Hr - Urine, Clean Catch    Excessive hunger        Relevant Orders    Cortisol - AM    Cortisol, Urine, Free 24Hr - Urine, Clean Catch    Gastroesophageal reflux disease, unspecified whether esophagitis present        Relevant Medications    famotidine (Pepcid) 20 MG tablet    Acute strain of neck muscle, initial encounter        Relevant Medications    cyclobenzaprine (FLEXERIL) 10 MG tablet    Muscle tightness        Relevant Medications    cyclobenzaprine (FLEXERIL) 10 MG tablet    Other Relevant Orders    Ambulatory Referral to Physical Therapy Evaluate and treat    Cervical pain        Relevant Orders    XR Spine Cervical 2 or 3 View    Ambulatory Referral to Physical Therapy Evaluate and treat        Orders Placed This Encounter   Procedures   • XR Spine Cervical 2 or 3 View   • Cortisol - AM   • Cortisol, Urine, Free 24Hr - Urine, Clean Catch   • Ambulatory Referral to Physical Therapy Evaluate and treat     New Medications Ordered This Visit   Medications   • famotidine (Pepcid) 20 MG tablet     Sig: Take 1 tablet by mouth 2 (Two) Times a Day As Needed for Heartburn.   • cyclobenzaprine (FLEXERIL) 10 MG tablet     Sig: Take 1 tablet by mouth 3 (Three) Times a Day As Needed for Muscle Spasms.     Dispense:  21 tablet     Refill:  0       SUMMARY/DISCUSSION  •       Next Appointment with me: 7/10/2023    Return for Next scheduled follow up.      VITAL SIGNS     Visit Vitals  /60   Pulse (!) 46 Comment: manual, irregular   Temp 96.9 °F (36.1 °C) (Temporal)   Ht 167.6 cm (65.98\")   Wt 97.5 kg (215 lb) "   LMP 01/16/2023 (Approximate)   SpO2 98%   BMI 34.72 kg/m²     Wt Readings from Last 3 Encounters:   01/26/23 97.5 kg (215 lb)   01/20/23 98.4 kg (217 lb)   01/09/23 101 kg (222 lb)     Body mass index is 34.72 kg/m².      MEDICATIONS AT THE TIME OF OFFICE VISIT     Current Outpatient Medications on File Prior to Visit   Medication Sig   • acetaminophen (TYLENOL) 325 MG tablet Take 650 mg by mouth Every 6 (Six) Hours As Needed for Mild Pain .   • albuterol sulfate  (90 Base) MCG/ACT inhaler INHALE 2 PUFFS BY MOUTH EVERY 4 HOURS AS NEEDED FOR WHEEZING   • atorvastatin (LIPITOR) 10 MG tablet Take 1 tablet by mouth Daily.   • BD Pen Needle Heidi 2nd Gen 32G X 4 MM misc USE 1 NEEDLE ONCE DAILY WITH  SAXENDA   • carvedilol (COREG) 3.125 MG tablet TAKE 1 TABLET BY MOUTH ONCE DAILY IN THE MORNING AND 2 IN THE EVENING (Patient taking differently: 3.125 mg 2 (Two) Times a Day With Meals.)   • CBD (cannabidiol) oral oil Take  by mouth.   • Dyjtr-Rzmln-Gukefenu-TenofAF (Symtuza) [phone] MG per tablet Take 1 tablet by mouth Daily.   • Diclofenac Sodium (VOLTAREN) 1 % gel gel Apply 4 g topically to the appropriate area as directed 2 (Two) Times a Day. Use per pkg insert   • diphenhydrAMINE (BENADRYL) 25 mg capsule Take 1 capsule by mouth Every 6 (Six) Hours As Needed for Itching (swelling).   • fluticasone (Flonase) 50 MCG/ACT nasal spray 2 sprays into the nostril(s) as directed by provider Daily.   • Jardiance 10 MG tablet tablet Take 1 tablet by mouth once daily   • nitroglycerin (NITROSTAT) 0.4 MG SL tablet Place 1 tablet under the tongue Every 5 (Five) Minutes As Needed for Chest Pain. Take no more than 3 doses in 15 minutes.   • potassium chloride (K-DUR,KLOR-CON) 20 MEQ CR tablet Take 3 tablets by mouth once daily   • sacubitril-valsartan (Entresto) 49-51 MG tablet Take 1 tablet by mouth 2 (Two) Times a Day.   • Saxenda 18 MG/3ML injection pen Inject 3 mg under the skin into the appropriate area as directed  Daily.   • sertraline (Zoloft) 50 MG tablet Take 1 tablet by mouth Daily.   • spironolactone (ALDACTONE) 25 MG tablet 25 mg Daily.   • torsemide (DEMADEX) 20 MG tablet TAKE 3 TABLETS BY MOUTH IN THE MORNING AND 2 IN THE AFTERNOON   • vitamin D (ERGOCALCIFEROL) 16014 units capsule capsule Take 50,000 Units by mouth Every 30 (Thirty) Days.   • warfarin (COUMADIN) 2.5 MG tablet Take 3 tablets by mouth once daily   • [DISCONTINUED] cyclobenzaprine (FLEXERIL) 10 MG tablet Take 1 tablet by mouth 3 (Three) Times a Day As Needed for Muscle Spasms.     No current facility-administered medications on file prior to visit.          HISTORY OF PRESENT ILLNESS     Patient had home health monitoring through her insurance company, suggested she look into Cushing's diagnosis along with alternatives to heartburn medications.    Previously taking powdered sodium bicarb for heartburn, improvement in symptoms.  No sore throat or trouble swallowing, using 5 to 6 days weekly along with Tums.    Excessive hunger at nighttime, chronic, weight gain especially around the stomach and midsection.  Fatty hump between the shoulders, however this does go down with weight loss.     She has continued to have cervical muscle tightness and has been performing home exercises, Flexeril significantly improves her symptoms.  No numbness tingling or weakness.    REVIEW OF SYSTEMS     Constitutional weight gain, excessive hunger at nighttime  Resp neg  CV neg  Musc right neck pain    PHYSICAL EXAMINATION     Physical Exam  Physical Exam  Constitutional  No distress  Musc right cervical muscle tightness and tenderness; equal upper extremity strength bilateral  Skin fat pad between the shoulders    REVIEWED DATA     Labs:   Lab Results   Component Value Date    GLUCOSE 116 (H) 11/18/2022    BUN 17 11/18/2022    CREATININE 1.10 (H) 11/18/2022    EGFRIFAFRI 68 02/02/2022    BCR 15.5 11/18/2022    K 4.0 11/18/2022    CO2 25.5 11/18/2022    CALCIUM 9.1 11/18/2022     ALBUMIN 3.80 11/18/2022    LABIL2 1.2 03/07/2020    AST 23 11/18/2022    ALT 22 11/18/2022         Imaging:           Medical Tests:           Summary of old records / correspondence / consultant report:           Request outside records:             *Examiner was wearing medical surgical mask    **Dragon dictation used for documentation

## 2023-01-27 LAB — CORTIS AM PEAK SERPL-MCNC: 15.2 UG/DL (ref 6.2–19.4)

## 2023-01-30 DIAGNOSIS — R63.2 EXCESSIVE HUNGER: ICD-10-CM

## 2023-01-30 DIAGNOSIS — R63.5 WEIGHT GAIN: ICD-10-CM

## 2023-01-30 RX ORDER — EMPAGLIFLOZIN 10 MG/1
TABLET, FILM COATED ORAL
Qty: 30 TABLET | Refills: 0 | Status: SHIPPED | OUTPATIENT
Start: 2023-01-30 | End: 2023-02-22 | Stop reason: SDUPTHER

## 2023-02-02 ENCOUNTER — ANTICOAGULATION VISIT (OUTPATIENT)
Dept: PHARMACY | Facility: HOSPITAL | Age: 48
End: 2023-02-02
Payer: MEDICARE

## 2023-02-02 DIAGNOSIS — R82.998 LOW URINARY CORTISOL: Primary | ICD-10-CM

## 2023-02-02 LAB
CORTIS F 24H UR-MCNC: 1 UG/L
CORTIS F 24H UR-MRATE: 3 UG/24 HR (ref 6–42)
INR PPP: 2.6

## 2023-02-02 PROCEDURE — G0249 PROVIDE INR TEST MATER/EQUIP: HCPCS

## 2023-02-02 NOTE — PROGRESS NOTES
Anticoagulation Clinic Progress Note    Anticoagulation Summary  As of 2023    INR goal:  2.0-3.0   TTR:  61.1 % (2.1 y)   INR used for dosin.60 (2023)   Warfarin maintenance plan:  7.5 mg every day; Starting 2023   Weekly warfarin total:  52.5 mg   No change documented:  Zina De La Rosa RPH   Plan last modified:  Zina De La Rosa RPH (2022)   Next INR check:  2023   Priority:  High   Target end date:  Indefinite    Indications    Other acute pulmonary embolism  unspecified whether acute cor pulmonale present (HCC) (Resolved) [I26.99]  Acute pulmonary embolism  unspecified pulmonary embolism type  unspecified whether acute cor pulmonale present (HCC) (Resolved) [I26.99]             Anticoagulation Episode Summary     INR check location:      Preferred lab:      Send INR reminders to:  [facility]    Comments:  **Home Testing Program**      Anticoagulation Care Providers     Provider Role Specialty Phone number    Lisset Melton MD Referring Cardiology [phone]          Clinic Interview:  No pertinent clinical findings have been reported.    INR History:  Anticoagulation Monitoring 2023   INR 2.80 2.20 2.60   INR Date 2023   INR Goal 2.0-3.0 2.0-3.0 2.0-3.0   Trend Same Same Same   Last Week Total 52.5 mg 52.5 mg 52.5 mg   Next Week Total 52.5 mg 52.5 mg 52.5 mg   Sun 7.5 mg 7.5 mg 7.5 mg   Mon 7.5 mg 7.5 mg 7.5 mg   Tue 7.5 mg 7.5 mg 7.5 mg   Wed 7.5 mg 7.5 mg 7.5 mg   Thu 7.5 mg 7.5 mg 7.5 mg   Fri 7.5 mg 7.5 mg 7.5 mg   Sat 7.5 mg 7.5 mg 7.5 mg   Visit Report - - -   Some recent data might be hidden       Plan:  1. INR is therapeutic today- see above in Anticoagulation Summary.    Nina Saez to continue their warfarin regimen- see above in Anticoagulation Summary.  2. Follow up in 1 week  3. Pt has agreed to only be called if INR out of range. They have been instructed to call if any changes in  medications, doses, concerns, etc. Patient expresses understanding and has no further questions at this time.    Zina De La Rosa, [facility]

## 2023-02-09 ENCOUNTER — OFFICE VISIT (OUTPATIENT)
Dept: NEUROLOGY | Facility: CLINIC | Age: 48
End: 2023-02-09
Payer: MEDICARE

## 2023-02-09 VITALS
WEIGHT: 220 LBS | HEIGHT: 66 IN | BODY MASS INDEX: 35.36 KG/M2 | HEART RATE: 53 BPM | SYSTOLIC BLOOD PRESSURE: 110 MMHG | OXYGEN SATURATION: 94 % | DIASTOLIC BLOOD PRESSURE: 68 MMHG

## 2023-02-09 DIAGNOSIS — M54.81 BILATERAL OCCIPITAL NEURALGIA: ICD-10-CM

## 2023-02-09 DIAGNOSIS — G43.719 INTRACTABLE CHRONIC MIGRAINE WITHOUT AURA AND WITHOUT STATUS MIGRAINOSUS: Primary | ICD-10-CM

## 2023-02-09 PROBLEM — I50.43 ACUTE ON CHRONIC COMBINED SYSTOLIC AND DIASTOLIC HEART FAILURE: Status: ACTIVE | Noted: 2020-10-27

## 2023-02-09 PROBLEM — I36.1 NONRHEUMATIC TRICUSPID VALVE REGURGITATION: Status: ACTIVE | Noted: 2021-07-29

## 2023-02-09 PROBLEM — B20 HUMAN IMMUNODEFICIENCY VIRUS INFECTION: Status: ACTIVE | Noted: 2018-04-05

## 2023-02-09 PROBLEM — R87.629 ABNORMAL PAPANICOLAOU SMEAR OF VAGINA: Status: ACTIVE | Noted: 2017-10-26

## 2023-02-09 PROBLEM — Z98.890 OTHER SPECIFIED POSTPROCEDURAL STATES: Status: ACTIVE | Noted: 2021-04-14

## 2023-02-09 PROBLEM — S82.852A CLOSED TRIMALLEOLAR FRACTURE OF LEFT ANKLE: Status: ACTIVE | Noted: 2020-03-06

## 2023-02-09 PROBLEM — F41.9 ANXIETY: Status: ACTIVE | Noted: 2017-09-29

## 2023-02-09 PROBLEM — I50.9 ACUTE ON CHRONIC CONGESTIVE HEART FAILURE: Status: ACTIVE | Noted: 2019-08-06

## 2023-02-09 PROBLEM — I26.99 PULMONARY EMBOLISM (HCC): Status: ACTIVE | Noted: 2021-04-22

## 2023-02-09 PROBLEM — I50.23 ACUTE ON CHRONIC SYSTOLIC CONGESTIVE HEART FAILURE: Status: ACTIVE | Noted: 2019-08-06

## 2023-02-09 PROBLEM — R06.09 DYSPNEA ON EXERTION: Status: ACTIVE | Noted: 2020-01-16

## 2023-02-09 PROBLEM — Z23 ENCOUNTER FOR IMMUNIZATION: Status: ACTIVE | Noted: 2021-01-25

## 2023-02-09 PROBLEM — M51.37 DEGENERATION OF LUMBOSACRAL INTERVERTEBRAL DISC: Status: ACTIVE | Noted: 2021-07-29

## 2023-02-09 PROBLEM — R07.9 CHEST PAIN: Status: ACTIVE | Noted: 2017-03-21

## 2023-02-09 PROBLEM — I51.89 DIASTOLIC DYSFUNCTION: Status: ACTIVE | Noted: 2021-07-29

## 2023-02-09 PROBLEM — R94.39 CARDIOVASCULAR STRESS TEST ABNORMAL: Status: ACTIVE | Noted: 2017-04-06

## 2023-02-09 PROBLEM — Z95.810 CARDIAC DEFIBRILLATOR IN SITU: Status: ACTIVE | Noted: 2017-10-13

## 2023-02-09 PROBLEM — B97.7 HUMAN PAPILLOMA VIRUS INFECTION: Status: ACTIVE | Noted: 2017-10-05

## 2023-02-09 PROBLEM — I42.8 NICM (NONISCHEMIC CARDIOMYOPATHY): Status: ACTIVE | Noted: 2021-11-16

## 2023-02-09 PROBLEM — H92.09 OTALGIA: Status: ACTIVE | Noted: 2020-06-11

## 2023-02-09 PROBLEM — I50.20 HEART FAILURE WITH REDUCED EJECTION FRACTION: Status: ACTIVE | Noted: 2021-07-30

## 2023-02-09 PROBLEM — J01.00 ACUTE MAXILLARY SINUSITIS: Status: ACTIVE | Noted: 2019-02-26

## 2023-02-09 PROBLEM — Z20.6 EXPOSURE TO HUMAN IMMUNODEFICIENCY VIRUS: Status: ACTIVE | Noted: 2018-01-04

## 2023-02-09 PROBLEM — G89.29 CHRONIC LOW BACK PAIN: Status: ACTIVE | Noted: 2017-03-21

## 2023-02-09 PROBLEM — I42.0 DILATED CARDIOMYOPATHY (HCC): Status: ACTIVE | Noted: 2017-08-14

## 2023-02-09 PROBLEM — E78.5 HYPERLIPIDEMIA: Status: ACTIVE | Noted: 2021-07-28

## 2023-02-09 PROBLEM — G43.009 MIGRAINE WITHOUT AURA, NOT REFRACTORY: Status: ACTIVE | Noted: 2018-04-23

## 2023-02-09 PROBLEM — K21.9 GASTROESOPHAGEAL REFLUX DISEASE: Status: ACTIVE | Noted: 2017-03-21

## 2023-02-09 PROBLEM — M76.62 ACHILLES TENDINITIS OF LEFT LOWER EXTREMITY: Status: ACTIVE | Noted: 2021-04-14

## 2023-02-09 PROBLEM — G43.009 MIGRAINE WITHOUT AURA, NOT REFRACTORY: Status: RESOLVED | Noted: 2018-04-23 | Resolved: 2023-02-09

## 2023-02-09 PROBLEM — S82.843A BIMALLEOLAR FRACTURE: Status: ACTIVE | Noted: 2021-04-14

## 2023-02-09 PROBLEM — I51.7 CARDIOMEGALY: Status: ACTIVE | Noted: 2021-11-16

## 2023-02-09 PROBLEM — R25.2 CRAMPS OF LOWER EXTREMITY: Status: ACTIVE | Noted: 2019-02-11

## 2023-02-09 PROBLEM — E66.9 OBESITY: Status: ACTIVE | Noted: 2017-03-21

## 2023-02-09 PROBLEM — F19.11 HISTORY OF DRUG ABUSE: Status: ACTIVE | Noted: 2021-07-29

## 2023-02-09 PROBLEM — I10 ESSENTIAL HYPERTENSION: Status: ACTIVE | Noted: 2019-08-06

## 2023-02-09 PROBLEM — R92.8 ABNORMAL MAMMOGRAM, UNSPECIFIED: Status: ACTIVE | Noted: 2019-05-09

## 2023-02-09 PROBLEM — M54.50 CHRONIC LOW BACK PAIN: Status: ACTIVE | Noted: 2017-03-21

## 2023-02-09 PROBLEM — M19.079 DISORDER OF ANKLE JOINT: Status: ACTIVE | Noted: 2021-04-14

## 2023-02-09 PROCEDURE — 99204 OFFICE O/P NEW MOD 45 MIN: CPT | Performed by: PSYCHIATRY & NEUROLOGY

## 2023-02-09 RX ORDER — RIMEGEPANT SULFATE 75 MG/75MG
75 TABLET, ORALLY DISINTEGRATING ORAL ONCE AS NEEDED
Qty: 4 TABLET | Refills: 0 | COMMUNITY
Start: 2023-02-09

## 2023-02-09 RX ORDER — TOPIRAMATE 25 MG/1
25 TABLET ORAL 2 TIMES DAILY
Qty: 60 TABLET | Refills: 2 | Status: SHIPPED | OUTPATIENT
Start: 2023-02-09

## 2023-02-09 NOTE — PROGRESS NOTES
Chief Complaint  Headache    Subjective          Nina Saez presents to [facility] for   HISTORY OF PRESENT ILLNESS:    Nina Saez is a 47 year old right handed woman who presents to neurology clinic for initial evaluation and treatment of headaches.  She reports a history of headaches starting about 15+ years ago.  She is HIV positive and undetected currently on Symtuza.  Her headaches are in the back of her head and in her temples sometimes and she has noticed that they can move up from the back of her head up to the front of her head.  She has significant muscle tension in her neck and back of her head.  She is on muscle relaxer which helps her sleep.  She is currently on warfarin for history of PE and her most recent INR was therapeutic at 2.6.  The pain can be throbbing with 8/10 on pain scale 1-10 quality with light and sound sensitivity along with nausea at times.  She reports getting 15-20 headache days per month.  She takes Tylenol as needed which does not do much and tries to sleep off the headaches.  There is family history of migraines in her grandmother, uncle and father.  She is currently on carvedilol, sertraline, spironolactone and torsemide.  She tells me she drinks plenty of water.  She does not have history of kidney stones.  She is not able to get pregnant.  She has not had any head imaging.  She tells me she is significantly claustrophobic and not willing to undergo MRI scan but willing to have CT scan performed.  She has SHAREE and uses her CPAP.      Past Medical History:   Diagnosis Date   • AICD (automatic cardioverter/defibrillator) present 03/06/2020   • Asthma    • CHF (congestive heart failure) ([facility])    • Class 2 obesity in adult    • Coronary artery disease 2017   • Depression 2017   • Diabetes mellitus ([facility])    • Fracture, foot 3/20    [place]   • Grade II diastolic dysfunction     Per echocardiogram 3/2021   • H/O Closed trimalleolar fracture    •  Headache    • HIV (human immunodeficiency virus infection) ([facility])    • Hypertension    • LBBB (left bundle branch block)    • NICM (nonischemic cardiomyopathy) ([facility])     2020 EF 6% per echocardiogram; AICD present   • Nonrheumatic mitral valve regurgitation 2021    Moderate per echo 3/2021   • Nonrheumatic tricuspid valve regurgitation     Moderate per echocardiogram 3/2021        Family History   Problem Relation Age of Onset   • Cancer Mother    • Breast cancer Mother    • Bone cancer Mother    • Ovarian cysts Daughter    • No Known Problems Daughter    • No Known Problems Daughter    • Prostate cancer Paternal Uncle    • Diabetes Maternal Grandmother    • Breast cancer Maternal Grandmother    • Heart disease Maternal Grandmother    • Hypertension Maternal Grandfather    • Hyperlipidemia Maternal Grandfather    • Diabetes Maternal Grandfather    • Prostate cancer Maternal Grandfather    • Breast cancer Paternal Grandmother    • Pancreatic cancer Paternal Grandmother    • Ovarian cancer Neg Hx    • Uterine cancer Neg Hx    • Colon cancer Neg Hx         Social History     Socioeconomic History   • Marital status: Legally    Tobacco Use   • Smoking status: Former     Packs/day: 0.50     Years: 20.00     Pack years: 10.00     Types: Cigarettes     Quit date:      Years since quittin.1   • Smokeless tobacco: Never   Vaping Use   • Vaping Use: Never used   Substance and Sexual Activity   • Alcohol use: Yes     Comment: holiday and special occ//caffeine use: Occ   • Drug use: Yes     Types: Marijuana     Comment: gummy   • Sexual activity: Not Currently     Partners: Male        I have reviewed and confirmed the accuracy of the ROS as documented by the MA/LPN/RN Claire Gauthier MD    Review of Systems   Constitutional: Positive for fatigue.   HENT: Positive for sinus pressure. Negative for facial swelling and tinnitus.    Eyes: Positive for visual disturbance. Negative for blurred vision and  "double vision.   Respiratory: Negative for chest tightness and shortness of breath.    Cardiovascular: Negative for chest pain, palpitations and leg swelling.   Gastrointestinal: Negative for constipation, diarrhea, nausea and vomiting.   Genitourinary: Negative for difficulty urinating, frequency and urgency.   Musculoskeletal: Positive for back pain, neck pain and neck stiffness. Negative for gait problem and joint swelling.   Neurological: Positive for dizziness, light-headedness and headache. Negative for tremors, seizures, syncope, speech difficulty, weakness, numbness, memory problem and confusion.   Hematological: Does not bruise/bleed easily.   Psychiatric/Behavioral: Positive for sleep disturbance. Negative for agitation, behavioral problems, decreased concentration, hallucinations, self-injury, suicidal ideas, depressed mood and stress. The patient is nervous/anxious.         Objective   Vital Signs:   /68   Pulse 53   Ht 167.6 cm (66\")   Wt 99.8 kg (220 lb)   SpO2 94%   BMI 35.51 kg/m²       PHYSICAL EXAM:    General   Mental Status - Alert. General Appearance - Well developed, Well groomed, Oriented and Cooperative. Orientation - Oriented X3.       Head and Neck  Head - normocephalic, atraumatic with no lesions or palpable masses.  Neck    Global Assessment - supple.       Eye   Sclera/Conjunctiva - Bilateral - Normal.    ENMT  Mouth and Throat   Oral Cavity/Oropharynx: Oropharynx - the soft palate,uvula and tongue are normal in appearance.    Chest and Lung Exam   Chest - lung clear to auscultation bilaterally.    Cardiovascular   Cardiovascular examination reveals  - normal heart sounds, regular rate and rhythm.    Neurologic   Mental Status: Speech - Normal. Cognitive function - appropriate fund of knowledge. No impairment of attention, Impairment of concentration, impairment of long term memory or impairment of short term memory.  Cranial Nerves:   II Optic: Visual acuity - Left - Normal. " Right - Normal. Visual fields - Normal (to confrontation).  III Oculomotor: Pupillary constriction - Left - Normal. Right - Normal.  VII Facial: - Normal Bilaterally.   IX Glossopharyngeal / X Vagus - Normal.  XI Accessory: Trapezius - Bilateral - Normal. Sternocleidomastoid - Bilateral - Normal.  XII Hypoglossal - Bilateral - Normal.  Eye Movements: - Normal Bilaterally.  Sensory:   Light Touch: Intact - Globally.  Motor:   Bulk and Contour: - Normal.  Tone: - Normal.  Tremor: Not present.  Strength: 5/5 normal muscle strength - All Muscles.   General Assessment of Reflexes: - deep tendon reflexes are normal. Coordination - No Impairment of finger-to-nose or Impairment of rapid alternating movements. Gait - Normal.      Result Review :                 Assessment and Plan    Problem List Items Addressed This Visit        Musculoskeletal and Injuries    Bilateral occipital neuralgia    Current Assessment & Plan      We can consider ONBs in the future as she does have tenderness to palpation over there greater occipital nerves bilaterally.              Neuro    Intractable chronic migraine without aura - Primary    Current Assessment & Plan     47 year old right handed woman with chronic intractable migraines headaches.  She reports a history of headaches starting about 15+ years ago.  She is HIV positive and undetected currently on Symtuza.  Her headaches are in the back of her head and in her temples sometimes and she has noticed that they can move up from the back of her head up to the front of her head.  She has significant muscle tension in her neck and back of her head.  She is on muscle relaxer which helps her sleep.  She is currently on warfarin for history of PE and her most recent INR was therapeutic at 2.6.  The pain can be throbbing with 8/10 on pain scale 1-10 quality with light and sound sensitivity along with nausea at times.  She reports getting 15-20 headache days per month.  She takes Tylenol as  needed which does not do much and tries to sleep off the headaches.  There is family history of migraines in her grandmother, uncle and father.  She is currently on carvedilol, sertraline, spironolactone and torsemide.  She tells me she drinks plenty of water.  She does not have history of kidney stones.  She is not able to get pregnant.  She has not had any head imaging.  She tells me she is significantly claustrophobic and not willing to undergo MRI scan but willing to have CT scan performed.  She has SHAREE and uses her CPAP.  I will start her on topiramate for migraine prevention after discussing potential side effects.  I do not want to use triptans given her multiple comorbid conditions.  I will give her samples of Nurtec ODT to try acutely and see if this helps with acute treatment of her migraines.  We can consider ONBs in the future as she does have tenderness to palpation over there greater occipital nerves bilaterally.  Also discussed migraine triggers and lifestyle modifications and provided patient education information today.  She drinks plenty of water.  I will see her back in 2 months to reevaluate.               Relevant Medications    topiramate (TOPAMAX) 25 MG tablet    Rimegepant Sulfate (Nurtec) 75 MG tablet dispersible tablet    Other Relevant Orders    CT Head Without Contrast       I spent 49 minutes caring for Nina on this date of service. This time includes time spent by me in the following activities:preparing for the visit, reviewing tests, obtaining and/or reviewing a separately obtained history, performing a medically appropriate examination and/or evaluation , counseling and educating the patient/family/caregiver, ordering medications, tests, or procedures, documenting information in the medical record and care coordination    Follow Up   Return in about 2 months (around 4/9/2023).  Patient was given instructions and counseling regarding her condition or for health maintenance advice.  Please see specific information pulled into the AVS if appropriate.

## 2023-02-09 NOTE — ASSESSMENT & PLAN NOTE
47 year old right handed woman with chronic intractable migraines headaches.  She reports a history of headaches starting about 15+ years ago.  She is HIV positive and undetected currently on Symtuza.  Her headaches are in the back of her head and in her temples sometimes and she has noticed that they can move up from the back of her head up to the front of her head.  She has significant muscle tension in her neck and back of her head.  She is on muscle relaxer which helps her sleep.  She is currently on warfarin for history of PE and her most recent INR was therapeutic at 2.6.  The pain can be throbbing with 8/10 on pain scale 1-10 quality with light and sound sensitivity along with nausea at times.  She reports getting 15-20 headache days per month.  She takes Tylenol as needed which does not do much and tries to sleep off the headaches.  There is family history of migraines in her grandmother, uncle and father.  She is currently on carvedilol, sertraline, spironolactone and torsemide.  She tells me she drinks plenty of water.  She does not have history of kidney stones.  She is not able to get pregnant.  She has not had any head imaging.  She tells me she is significantly claustrophobic and not willing to undergo MRI scan but willing to have CT scan performed.  She has SHAREE and uses her CPAP.  I will start her on topiramate for migraine prevention after discussing potential side effects.  I do not want to use triptans given her multiple comorbid conditions.  I will give her samples of Nurtec ODT to try acutely and see if this helps with acute treatment of her migraines.  We can consider ONBs in the future as she does have tenderness to palpation over there greater occipital nerves bilaterally.  Also discussed migraine triggers and lifestyle modifications and provided patient education information today.  She drinks plenty of water.  I will see her back in 2 months to reevaluate.

## 2023-02-09 NOTE — ASSESSMENT & PLAN NOTE
We can consider ONBs in the future as she does have tenderness to palpation over there greater occipital nerves bilaterally.

## 2023-02-10 ENCOUNTER — ANTICOAGULATION VISIT (OUTPATIENT)
Dept: PHARMACY | Facility: HOSPITAL | Age: 48
End: 2023-02-10
Payer: MEDICARE

## 2023-02-10 LAB — INR PPP: 2.1

## 2023-02-10 NOTE — PROGRESS NOTES
Anticoagulation Clinic Progress Note    Anticoagulation Summary  As of 2/10/2023    INR goal:  2.0-3.0   TTR:  61.5 % (2.1 y)   INR used for dosin.10 (2/10/2023)   Warfarin maintenance plan:  7.5 mg every day; Starting 2/10/2023   Weekly warfarin total:  52.5 mg   No change documented:  Zina De La Rosa RPH   Plan last modified:  Zina De La Rosa RPH (2022)   Next INR check:  2023   Priority:  High   Target end date:  Indefinite    Indications    Other acute pulmonary embolism  unspecified whether acute cor pulmonale present (HCC) (Resolved) [I26.99]  Acute pulmonary embolism  unspecified pulmonary embolism type  unspecified whether acute cor pulmonale present (HCC) (Resolved) [I26.99]             Anticoagulation Episode Summary     INR check location:      Preferred lab:      Send INR reminders to:  [facility] HOME TEST POOL    Comments:  **Home Testing Program**      Anticoagulation Care Providers     Provider Role Specialty Phone number    Lisset Melton MD Referring Cardiology [phone]          Clinic Interview:  No pertinent clinical findings have been reported.    INR History:  Anticoagulation Monitoring 2023 2023 2/10/2023   INR 2.20 2.60 2.10   INR Date 2023 2023 2/10/2023   INR Goal 2.0-3.0 2.0-3.0 2.0-3.0   Trend Same Same Same   Last Week Total 52.5 mg 52.5 mg 52.5 mg   Next Week Total 52.5 mg 52.5 mg 52.5 mg   Sun 7.5 mg 7.5 mg 7.5 mg   Mon 7.5 mg 7.5 mg 7.5 mg   Tue 7.5 mg 7.5 mg 7.5 mg   Wed 7.5 mg 7.5 mg 7.5 mg   Thu 7.5 mg 7.5 mg 7.5 mg   Fri 7.5 mg 7.5 mg 7.5 mg   Sat 7.5 mg 7.5 mg 7.5 mg   Visit Report - - -   Some recent data might be hidden       Plan:  1. INR is therapeutic today- see above in Anticoagulation Summary.    Nina Saez to continue their warfarin regimen- see above in Anticoagulation Summary.  2. Follow up in 1 week  3. Pt has agreed to only be called if INR out of range. They have been instructed to call if any changes in  medications, doses, concerns, etc. Patient expresses understanding and has no further questions at this time.    Zina De La Rosa, [facility]

## 2023-02-16 ENCOUNTER — PATIENT ROUNDING (BHMG ONLY) (OUTPATIENT)
Dept: NEUROLOGY | Facility: CLINIC | Age: 48
End: 2023-02-16
Payer: MEDICARE

## 2023-02-17 ENCOUNTER — ANTICOAGULATION VISIT (OUTPATIENT)
Dept: PHARMACY | Facility: HOSPITAL | Age: 48
End: 2023-02-17
Payer: MEDICARE

## 2023-02-17 LAB — INR PPP: 1.9

## 2023-02-17 NOTE — PROGRESS NOTES
Anticoagulation Clinic Progress Note    Anticoagulation Summary  As of 2023    INR goal:  2.0-3.0   TTR:  61.4 % (2.1 y)   INR used for dosin.90 (2023)   Warfarin maintenance plan:  7.5 mg every day; Starting 2023   Weekly warfarin total:  52.5 mg   Plan last modified:  Zina De La Rosa RPH (2022)   Next INR check:  2023   Priority:  High   Target end date:  Indefinite    Indications    Other acute pulmonary embolism  unspecified whether acute cor pulmonale present (HCC) (Resolved) [I26.99]  Acute pulmonary embolism  unspecified pulmonary embolism type  unspecified whether acute cor pulmonale present (HCC) (Resolved) [I26.99]             Anticoagulation Episode Summary     INR check location:      Preferred lab:      Send INR reminders to:   [facility]    Comments:  **Home Testing Program**      Anticoagulation Care Providers     Provider Role Specialty Phone number    Lisset Melton MD Referring Cardiology [phone]          Clinic Interview:  Patient Findings     Positives:  Missed doses    Negatives:  Signs/symptoms of thrombosis, Signs/symptoms of bleeding,   Laboratory test error suspected, Change in health, Change in alcohol use,   Change in activity, Upcoming invasive procedure, Emergency department   visit, Upcoming dental procedure, Extra doses, Change in medications,   Change in diet/appetite, Hospital admission, Bruising, Other complaints      Clinical Outcomes     Negatives:  Major bleeding event, Thromboembolic event,   Anticoagulation-related hospital admission, Anticoagulation-related ED   visit, Anticoagulation-related fatality        INR History:  Anticoagulation Monitoring 2023 2/10/2023 2023   INR 2.60 2.10 1.90   INR Date 2023 2/10/2023 2023   INR Goal 2.0-3.0 2.0-3.0 2.0-3.0   Trend Same Same Same   Last Week Total 52.5 mg 52.5 mg 45 mg   Next Week Total 52.5 mg 52.5 mg 52.5 mg   Sun 7.5 mg 7.5 mg 7.5 mg   Mon 7.5 mg  7.5 mg 7.5 mg   Tue 7.5 mg 7.5 mg 7.5 mg   Wed 7.5 mg 7.5 mg 7.5 mg   Thu 7.5 mg 7.5 mg 7.5 mg   Fri 7.5 mg 7.5 mg 7.5 mg   Sat 7.5 mg 7.5 mg 7.5 mg   Visit Report - - -   Some recent data might be hidden       Plan:  1. INR is Subtherapeutic today- see above in Anticoagulation Summary.   Will instruct Nina Saez to Continue their warfarin regimen due to INR is very close to goal- see above in Anticoagulation Summary.  2. Follow up in 1 weeks  3.  They have been instructed to call if any changes in medications, doses, concerns, etc. Patient expresses understanding and has no further questions at this time.    Zina De La Rosa [facility]

## 2023-02-21 ENCOUNTER — OFFICE VISIT (OUTPATIENT)
Dept: CARDIOLOGY | Facility: CLINIC | Age: 48
End: 2023-02-21
Payer: MEDICARE

## 2023-02-21 VITALS
WEIGHT: 221 LBS | SYSTOLIC BLOOD PRESSURE: 110 MMHG | BODY MASS INDEX: 35.52 KG/M2 | HEIGHT: 66 IN | DIASTOLIC BLOOD PRESSURE: 70 MMHG | HEART RATE: 81 BPM

## 2023-02-21 DIAGNOSIS — G47.33 OSA (OBSTRUCTIVE SLEEP APNEA): ICD-10-CM

## 2023-02-21 DIAGNOSIS — I42.8 NICM (NONISCHEMIC CARDIOMYOPATHY): ICD-10-CM

## 2023-02-21 DIAGNOSIS — I42.0 DILATED CARDIOMYOPATHY: Primary | ICD-10-CM

## 2023-02-21 DIAGNOSIS — I50.20 HFREF (HEART FAILURE WITH REDUCED EJECTION FRACTION): ICD-10-CM

## 2023-02-21 DIAGNOSIS — I51.89 DIASTOLIC DYSFUNCTION: ICD-10-CM

## 2023-02-21 DIAGNOSIS — B20 HIV INFECTION, UNSPECIFIED SYMPTOM STATUS: ICD-10-CM

## 2023-02-21 DIAGNOSIS — I10 ESSENTIAL HYPERTENSION: ICD-10-CM

## 2023-02-21 PROCEDURE — 93000 ELECTROCARDIOGRAM COMPLETE: CPT | Performed by: INTERNAL MEDICINE

## 2023-02-21 PROCEDURE — 99214 OFFICE O/P EST MOD 30 MIN: CPT | Performed by: INTERNAL MEDICINE

## 2023-02-21 RX ORDER — CARVEDILOL 3.12 MG/1
3.12 TABLET ORAL 2 TIMES DAILY WITH MEALS
Qty: 180 TABLET | Refills: 3 | Status: SHIPPED | OUTPATIENT
Start: 2023-02-21

## 2023-02-21 NOTE — PROGRESS NOTES
Date of Office Visit: 2023  Encounter Provider: Lisset Melton MD  Place of Service: [facility] CARDIOLOGY  Patient Name: Nina Saez  :1975      Patient ID:  Nina Saez is a 47 y.o. female is here for  followup for cardiomyopathy.        History of Present Illness    She has a history of HIV, essential hypertension, severe dilated nonischemic cardiomyopathy with chronic systolic congestive heart failure, obesity, history of illicit drug use and asthma.  She has an AICD.  It is a single-chamber [employer].  In 2020, she was hospitalized for an acute PE with right lower lobe pulmonary infarct and was having hemoptysis.  She was placed on warfarin.  She is followed at [facility] for her HIV.  She has SHAREE and uses a CPAP.     She was diagnosed with heart failure and cardiomyopathy in 2019 at [facility] when she was there visiting family.  An echocardiogram on cardiac catheterization which confirmed this diagnosis.  Her cardiac catheterization done 2019 showed normal coronary arteries, LVEDP 12, wedge pressure 13, PA pressure 28/15 with a mean of 19 mmHg, RA pressure 4, cardiac index 2.5 L/min/m² with a PVR of 1.2 Woods units.     She presented emergency on 20 with short windedness and cough for 3 weeks prior to presentation.  Her weight is been stable and she had no lower extremity edema but she had missed her cardiac medications and had been eating a lot of salt.  On arrival, she was found to be in congestive heart failure.  Echo done 2020 showed severe left ventricular dilation, ejection fraction 6%, very thin left ventricular walls, grade 3 diastolic dysfunction, moderate to severe mitral insufficiency, moderate tricuspid insufficiency, RVSP 48 mmHg.  There was essentially global hypokinesis with akinesis at the bases.  She was able to be dismissed on 2020.  While in the hospital, we did recommend  consideration for upgrade to a biventricular AICD.  She also had some right flank pain during hospitalization and CT scan showed a 2 mm minimally obstructing stone in the right ureter.  Urology saw her and felt like she could pass the stone without further intervention.     On 9/10/2020, she saw Dr. Jamar Azul in the office and he did not feel that she met criteria for implantation of CRT device.       She saw [facility] by telehealth date of service 1/28/21.  They noted the patient underwent a CPX which showed peak VO2 11.9 at RER greater than 1.05 while on Coreg.  Her CPX showed heart related limitations in her functional capacity, but the patient felt satisfied at her functional current level.     She has not had surgery with Dr. Rocky Calvert on her left ankle but still needs this.       Echo done 12/15/2021 shows severe left ventricular dilation with ejection fraction 21-25%, global hypokinesis, grade 2 diastolic dysfunction, normal RVSP and normal valvular structure and function.  Right ventricular systolic function is also mildly to moderately reduced.  When compared with prior echocardiogram, there is less valvular insufficiency.     She has been having more headaches since changing her HIV medications.  She basically has a combination tablet with her darunavir, ritonavir, emtricitabine and tenofovir.  Since then, she has had daily headaches which lasts all day.  She has been using Tylenol extra strength 2 tablets 3 times a day to be added to headaches.      She was evaluated by Dr. Hernandez at  heart failure 2/17/2022.  No changes were made.  She did have COVID-19 January 2022.     She saw Yaen Boyd on 9/12/2022.  At that time, they discovered that she was taking her Corlanor and Entresto just once daily so she increase this to twice daily.  She is also on Saxenda for obesity.     Labs 11/7/22 show normal troponin, normal proBNP, sodium 134, creatinine 1.10, otherwise normal CMP, normal CBC.   She has no chest pain or pressure.  She has no difficulty breathing.  She does not feel her heart racing or skipping.  She has no dizziness.  She is had no near syncope.  She is taking her medications as directed without difficulty.  She has no orthopnea or PND.  She has SHAREE and uses a CPAP at night, sleeps well.    Past Medical History:   Diagnosis Date   • AICD (automatic cardioverter/defibrillator) present 2020   • Asthma    • CHF (congestive heart failure) ([facility])    • Class 2 obesity in adult    • Coronary artery disease    • Depression    • Diabetes mellitus ([facility])    • Fracture, foot 3/20    Broke   • Grade II diastolic dysfunction     Per echocardiogram 3/2021   • H/O Closed trimalleolar fracture    • Headache    • HIV (human immunodeficiency virus infection) ([facility])    • Hypertension    • LBBB (left bundle branch block)    • NICM (nonischemic cardiomyopathy) ([facility])     2020 EF 6% per echocardiogram; AICD present   • Nonrheumatic mitral valve regurgitation 2021    Moderate per echo 3/2021   • Nonrheumatic tricuspid valve regurgitation     Moderate per echocardiogram 3/2021   • SHAREE (obstructive sleep apnea) 2023         Past Surgical History:   Procedure Laterality Date   • ANKLE OPEN REDUCTION INTERNAL FIXATION  3/7/20   • CARDIAC CATHETERIZATION     • CARDIAC DEFIBRILLATOR PLACEMENT     •  SECTION      one C/S   • FRACTURE SURGERY Left     left ankle   • OOPHORECTOMY      h/o teratoma   • TUBAL ABDOMINAL LIGATION         Current Outpatient Medications on File Prior to Visit   Medication Sig Dispense Refill   • acetaminophen (TYLENOL) 325 MG tablet Take 650 mg by mouth Every 6 (Six) Hours As Needed for Mild Pain .     • albuterol sulfate  (90 Base) MCG/ACT inhaler INHALE 2 PUFFS BY MOUTH EVERY 4 HOURS AS NEEDED FOR WHEEZING 18 g 0   • atorvastatin (LIPITOR) 10 MG tablet Take 1 tablet by mouth Daily. 30 tablet 11   • BD Pen Needle Heidi 2nd Gen 32G X 4 MM misc USE 1  NEEDLE ONCE DAILY WITH  SAXENDA 100 each 0   • CBD (cannabidiol) oral oil Take  by mouth.     • cyclobenzaprine (FLEXERIL) 10 MG tablet Take 1 tablet by mouth 3 (Three) Times a Day As Needed for Muscle Spasms. 21 tablet 0   • Prjwh-Cfnas-Emuckkdw-TenofAF (Symtuza) [phone] MG per tablet Take 1 tablet by mouth Daily.     • Diclofenac Sodium (VOLTAREN) 1 % gel gel Apply 4 g topically to the appropriate area as directed 2 (Two) Times a Day. Use per pkg insert 100 g 2   • diphenhydrAMINE (BENADRYL) 25 mg capsule Take 1 capsule by mouth Every 6 (Six) Hours As Needed for Itching (swelling). 20 capsule 0   • famotidine (Pepcid) 20 MG tablet Take 1 tablet by mouth 2 (Two) Times a Day As Needed for Heartburn.     • fluticasone (Flonase) 50 MCG/ACT nasal spray 2 sprays into the nostril(s) as directed by provider Daily. 1 bottle 2   • Jardiance 10 MG tablet tablet Take 1 tablet by mouth once daily 30 tablet 0   • nitroglycerin (NITROSTAT) 0.4 MG SL tablet Place 1 tablet under the tongue Every 5 (Five) Minutes As Needed for Chest Pain. Take no more than 3 doses in 15 minutes. 30 tablet 5   • potassium chloride (K-DUR,KLOR-CON) 20 MEQ CR tablet Take 3 tablets by mouth once daily 90 tablet 11   • Rimegepant Sulfate (Nurtec) 75 MG tablet dispersible tablet Take 1 tablet by mouth 1 (One) Time As Needed (migraine). 4 tablet 0   • sacubitril-valsartan (Entresto) 49-51 MG tablet Take 1 tablet by mouth 2 (Two) Times a Day. 180 tablet 3   • Saxenda 18 MG/3ML injection pen Inject 3 mg under the skin into the appropriate area as directed Daily. 15 mL 2   • sertraline (Zoloft) 50 MG tablet Take 1 tablet by mouth Daily. 90 tablet 3   • spironolactone (ALDACTONE) 25 MG tablet 25 mg Daily.     • topiramate (TOPAMAX) 25 MG tablet Take 1 tablet by mouth 2 (Two) Times a Day. 60 tablet 2   • torsemide (DEMADEX) 20 MG tablet TAKE 3 TABLETS BY MOUTH IN THE MORNING AND 2 IN THE AFTERNOON 150 tablet 0   • vitamin D (ERGOCALCIFEROL) 35795 units  "capsule capsule Take 50,000 Units by mouth Every 30 (Thirty) Days.     • warfarin (COUMADIN) 2.5 MG tablet Take 3 tablets by mouth once daily 270 tablet 0   • [DISCONTINUED] carvedilol (COREG) 3.125 MG tablet TAKE 1 TABLET BY MOUTH ONCE DAILY IN THE MORNING AND 2 IN THE EVENING (Patient taking differently: 3.125 mg 2 (Two) Times a Day With Meals.) 180 tablet 3     No current facility-administered medications on file prior to visit.       Social History     Socioeconomic History   • Marital status: Legally    Tobacco Use   • Smoking status: Former     Packs/day: 0.50     Years: 20.00     Pack years: 10.00     Types: Cigarettes     Quit date:      Years since quittin.1   • Smokeless tobacco: Never   Vaping Use   • Vaping Use: Never used   Substance and Sexual Activity   • Alcohol use: Yes     Comment: holiday and special occ//caffeine use: Occ   • Drug use: Yes     Types: Marijuana     Comment: gummy   • Sexual activity: Not Currently     Partners: Male           ROS    Procedures    ECG 12 Lead    Date/Time: 2023 1:21 PM  Performed by: Lisset Melton MD  Authorized by: Lisset Melton MD   Comparison: compared with previous ECG   Similar to previous ECG  Rhythm: sinus rhythm  Conduction: non-specific intraventricular conduction delay  Other findings: left ventricular hypertrophy    Clinical impression: abnormal EKG                Objective:      Vitals:    23 1257   BP: 110/70   Pulse: 81   Weight: 100 kg (221 lb)   Height: 167.6 cm (66\")     Body mass index is 35.67 kg/m².    Vitals reviewed.   Constitutional:       General: Not in acute distress.     Appearance: Well-developed. Obese. Not diaphoretic.   Eyes:      General: No scleral icterus.     Conjunctiva/sclera: Conjunctivae normal.   HENT:      Head: Normocephalic and atraumatic.   Neck:      Thyroid: No thyromegaly.      Vascular: No carotid bruit or JVD.      Lymphadenopathy: No cervical adenopathy.   Pulmonary:      " Effort: Pulmonary effort is normal. No respiratory distress.      Breath sounds: Normal breath sounds. No wheezing. No rhonchi. No rales.   Chest:      Chest wall: Not tender to palpatation.   Cardiovascular:      Normal rate. Regular rhythm.      Murmurs: There is no murmur.      No gallop.   Pulses:     Intact distal pulses.   Edema:     Peripheral edema absent.   Abdominal:      General: Bowel sounds are normal. There is no distension or abdominal bruit.      Palpations: Abdomen is soft. There is no abdominal mass.      Tenderness: There is no abdominal tenderness.   Musculoskeletal:         General: No deformity.      Extremities: No clubbing present.     Cervical back: Neck supple. Skin:     General: Skin is warm and dry. There is no cyanosis.      Coloration: Skin is not pale.      Findings: No rash.   Neurological:      Mental Status: Alert and oriented to person, place, and time.      Cranial Nerves: No cranial nerve deficit.   Psychiatric:         Judgment: Judgment normal.         Lab Review:       Assessment:      Diagnosis Plan   1. Dilated cardiomyopathy ([facility])  Adult Transthoracic Echo Complete W/ Cont if Necessary Per Protocol      2. Diastolic dysfunction        3. Essential hypertension        4. HFrEF (heart failure with reduced ejection fraction) ([facility])        5. HIV infection, unspecified symptom status ([facility])        6. SHAREE (obstructive sleep apnea)        7. NICM (nonischemic cardiomyopathy) ([facility])  Adult Transthoracic Echo Complete W/ Cont if Necessary Per Protocol        1. Severe dilated nonischemic cardiomyopathy, ejection fraction 22%, AICD in place, chronic HFrEF.   Her cardiomyopathy is likely multifactorial related to the old illicit drug use history, HIV, possible poorly controlled HTN in the past.  No decompensated heart failure at this time.  2. Essential hypertension, controlled.  3. HIV positive.  Followed at [facility], well treated.  4. Diastolic  dysfunction  5. Obesity  6. Asthma  7. LBBB.   8. History of pulmonary embolism on warfarin.  Diagnosed December 2020.  Etiology unknown.   9. History of bradycardia with hypotension associated with fatigue, dizziness and headache due to being overmedicated with Entresto and Corlanor.  10. Migraine headaches, okay to use Topamax.  11. SHAREE, on CPAP.     Plan:       See Chuyita or Yane Boyd in 3 months, set up echocardiogram, no medication changes.  Overall she is doing well.

## 2023-02-22 RX ORDER — TORSEMIDE 20 MG/1
TABLET ORAL
Qty: 150 TABLET | Refills: 0 | Status: SHIPPED | OUTPATIENT
Start: 2023-02-22 | End: 2023-03-28

## 2023-02-24 ENCOUNTER — ANTICOAGULATION VISIT (OUTPATIENT)
Dept: PHARMACY | Facility: HOSPITAL | Age: 48
End: 2023-02-24
Payer: MEDICARE

## 2023-02-24 LAB — INR PPP: 3.2

## 2023-02-24 RX ORDER — EMPAGLIFLOZIN 10 MG/1
TABLET, FILM COATED ORAL
Qty: 30 TABLET | Refills: 0 | Status: SHIPPED | OUTPATIENT
Start: 2023-02-24

## 2023-02-24 NOTE — PROGRESS NOTES
Anticoagulation Clinic Progress Note    Anticoagulation Summary  As of 2/24/2023    INR goal:  2.0-3.0   TTR:  61.4 % (2.2 y)   INR used for dosing:  3.20 (2/24/2023)   Warfarin maintenance plan:  7.5 mg every day; Starting 2/24/2023   Weekly warfarin total:  52.5 mg   Plan last modified:  Zina De La Rosa RPH (5/20/2022)   Next INR check:  3/3/2023   Priority:  High   Target end date:  Indefinite    Indications    Other acute pulmonary embolism  unspecified whether acute cor pulmonale present (HCC) (Resolved) [I26.99]  Acute pulmonary embolism  unspecified pulmonary embolism type  unspecified whether acute cor pulmonale present (HCC) (Resolved) [I26.99]             Anticoagulation Episode Summary     INR check location:      Preferred lab:      Send INR reminders to:   [facility] TEST PO[facility]    Comments:  **Home Testing Program**      Anticoagulation Care Providers     Provider Role Specialty Phone number    Lisset Melton MD Referring Cardiology [phone]          Clinic Interview:  Patient Findings     Negatives:  Signs/symptoms of thrombosis, Signs/symptoms of bleeding,   Laboratory test error suspected, Change in health, Change in alcohol use,   Change in activity, Upcoming invasive procedure, Emergency department   visit, Upcoming dental procedure, Missed doses, Extra doses, Change in   medications, Change in diet/appetite, Hospital admission, Bruising, Other   complaints      Clinical Outcomes     Negatives:  Major bleeding event, Thromboembolic event,   Anticoagulation-related hospital admission, Anticoagulation-related ED   visit, Anticoagulation-related fatality        INR History:  Anticoagulation Monitoring 2/10/2023 2/17/2023 2/24/2023   INR 2.10 1.90 3.20   INR Date 2/10/2023 2/17/2023 2/24/2023   INR Goal 2.0-3.0 2.0-3.0 2.0-3.0   Trend Same Same Same   Last Week Total 52.5 mg 45 mg 52.5 mg   Next Week Total 52.5 mg 52.5 mg 50 mg   Sun 7.5 mg 7.5 mg 7.5 mg   Mon 7.5 mg 7.5 mg 7.5 mg    Tue 7.5 mg 7.5 mg 7.5 mg   Wed 7.5 mg 7.5 mg 7.5 mg   Thu 7.5 mg 7.5 mg 7.5 mg   Fri 7.5 mg 7.5 mg 5 mg (2/24)   Sat 7.5 mg 7.5 mg 7.5 mg   Visit Report - - -   Some recent data might be hidden       Plan:  1. INR is Supratherapeutic today- see above in Anticoagulation Summary.   Will instruct Nina Saez to take 5 mg on 2/24, then continue their warfarin regimen- see above in Anticoagulation Summary.  2. Follow up in 1 week  3. They have been instructed to call if any changes in medications, doses, concerns, etc. Patient expresses understanding and has no further questions at this time.    Shawn Tong [facility]

## 2023-02-28 ENCOUNTER — HOSPITAL ENCOUNTER (OUTPATIENT)
Dept: CARDIOLOGY | Facility: HOSPITAL | Age: 48
Discharge: HOME | End: 2023-02-28
Payer: MEDICARE

## 2023-02-28 ENCOUNTER — LAB (OUTPATIENT)
Dept: LAB | Facility: HOSPITAL | Age: 48
End: 2023-02-28
Payer: MEDICARE

## 2023-02-28 VITALS
WEIGHT: 220 LBS | SYSTOLIC BLOOD PRESSURE: 104 MMHG | HEART RATE: 91 BPM | HEIGHT: 66 IN | DIASTOLIC BLOOD PRESSURE: 78 MMHG | OXYGEN SATURATION: 97 % | BODY MASS INDEX: 35.36 KG/M2

## 2023-02-28 DIAGNOSIS — E66.01 CLASS 2 SEVERE OBESITY WITH SERIOUS COMORBIDITY IN ADULT, UNSPECIFIED BMI, UNSPECIFIED OBESITY TYPE: ICD-10-CM

## 2023-02-28 DIAGNOSIS — I50.20 HEART FAILURE WITH REDUCED EJECTION FRACTION: Primary | ICD-10-CM

## 2023-02-28 DIAGNOSIS — I10 ESSENTIAL HYPERTENSION: ICD-10-CM

## 2023-02-28 DIAGNOSIS — I50.20 HFREF (HEART FAILURE WITH REDUCED EJECTION FRACTION): ICD-10-CM

## 2023-02-28 DIAGNOSIS — I42.0 DILATED CARDIOMYOPATHY: ICD-10-CM

## 2023-02-28 DIAGNOSIS — G47.33 OSA (OBSTRUCTIVE SLEEP APNEA): ICD-10-CM

## 2023-02-28 DIAGNOSIS — I42.8 NICM (NONISCHEMIC CARDIOMYOPATHY): ICD-10-CM

## 2023-02-28 PROBLEM — I51.89 DIASTOLIC DYSFUNCTION: Status: RESOLVED | Noted: 2021-07-29 | Resolved: 2023-02-28

## 2023-02-28 PROBLEM — Z98.890 OTHER SPECIFIED POSTPROCEDURAL STATES: Status: RESOLVED | Noted: 2021-04-14 | Resolved: 2023-02-28

## 2023-02-28 PROBLEM — F19.11 HISTORY OF DRUG ABUSE: Status: RESOLVED | Noted: 2021-07-29 | Resolved: 2023-02-28

## 2023-02-28 PROBLEM — M19.079 DISORDER OF ANKLE JOINT: Status: RESOLVED | Noted: 2021-04-14 | Resolved: 2023-02-28

## 2023-02-28 PROBLEM — G43.511 INTRACTABLE PERSISTENT MIGRAINE AURA WITHOUT CEREBRAL INFARCTION AND WITH STATUS MIGRAINOSUS: Status: RESOLVED | Noted: 2022-05-24 | Resolved: 2023-02-28

## 2023-02-28 PROBLEM — R07.9 CHEST PAIN: Status: RESOLVED | Noted: 2017-03-21 | Resolved: 2023-02-28

## 2023-02-28 PROBLEM — I26.99 PULMONARY EMBOLI: Status: RESOLVED | Noted: 2021-04-22 | Resolved: 2023-02-28

## 2023-02-28 PROBLEM — F19.11 HISTORY OF DRUG ABUSE: Status: RESOLVED | Noted: 2022-05-24 | Resolved: 2023-02-28

## 2023-02-28 PROBLEM — M76.822 TIBIAL TENDONITIS, POSTERIOR, LEFT: Status: RESOLVED | Noted: 2022-06-16 | Resolved: 2023-02-28

## 2023-02-28 PROBLEM — I50.43 ACUTE ON CHRONIC COMBINED SYSTOLIC AND DIASTOLIC HEART FAILURE: Status: RESOLVED | Noted: 2020-10-27 | Resolved: 2023-02-28

## 2023-02-28 PROBLEM — R94.39 CARDIOVASCULAR STRESS TEST ABNORMAL: Status: RESOLVED | Noted: 2017-04-06 | Resolved: 2023-02-28

## 2023-02-28 PROBLEM — Z23 ENCOUNTER FOR IMMUNIZATION: Status: RESOLVED | Noted: 2021-01-25 | Resolved: 2023-02-28

## 2023-02-28 PROBLEM — I50.23 ACUTE ON CHRONIC SYSTOLIC CONGESTIVE HEART FAILURE: Status: RESOLVED | Noted: 2019-08-06 | Resolved: 2023-02-28

## 2023-02-28 PROBLEM — I26.99 PULMONARY EMBOLISM: Status: RESOLVED | Noted: 2021-04-22 | Resolved: 2023-02-28

## 2023-02-28 PROBLEM — I34.0 NONRHEUMATIC MITRAL VALVE REGURGITATION: Status: RESOLVED | Noted: 2021-03-08 | Resolved: 2023-02-28

## 2023-02-28 PROBLEM — S82.852A CLOSED TRIMALLEOLAR FRACTURE OF LEFT ANKLE: Status: RESOLVED | Noted: 2020-03-06 | Resolved: 2023-02-28

## 2023-02-28 PROBLEM — M76.62 ACHILLES TENDINITIS OF LEFT LOWER EXTREMITY: Status: RESOLVED | Noted: 2021-04-14 | Resolved: 2023-02-28

## 2023-02-28 PROBLEM — I51.7 CARDIOMEGALY: Status: RESOLVED | Noted: 2021-11-16 | Resolved: 2023-02-28

## 2023-02-28 PROBLEM — R92.8 ABNORMAL MAMMOGRAM, UNSPECIFIED: Status: RESOLVED | Noted: 2019-05-09 | Resolved: 2023-02-28

## 2023-02-28 PROBLEM — Z20.6 EXPOSURE TO HUMAN IMMUNODEFICIENCY VIRUS: Status: RESOLVED | Noted: 2018-01-04 | Resolved: 2023-02-28

## 2023-02-28 PROBLEM — R06.09 DYSPNEA ON EXERTION: Status: RESOLVED | Noted: 2020-01-16 | Resolved: 2023-02-28

## 2023-02-28 PROBLEM — H92.09 OTALGIA: Status: RESOLVED | Noted: 2020-06-11 | Resolved: 2023-02-28

## 2023-02-28 PROBLEM — J01.00 ACUTE MAXILLARY SINUSITIS: Status: RESOLVED | Noted: 2019-02-26 | Resolved: 2023-02-28

## 2023-02-28 PROBLEM — R25.2 CRAMPS OF LOWER EXTREMITY: Status: RESOLVED | Noted: 2019-02-11 | Resolved: 2023-02-28

## 2023-02-28 PROBLEM — Z95.810 CARDIAC DEFIBRILLATOR IN SITU: Status: RESOLVED | Noted: 2017-10-13 | Resolved: 2023-02-28

## 2023-02-28 PROBLEM — S82.843A BIMALLEOLAR FRACTURE: Status: RESOLVED | Noted: 2021-04-14 | Resolved: 2023-02-28

## 2023-02-28 PROBLEM — R87.629 ABNORMAL PAPANICOLAOU SMEAR OF VAGINA: Status: RESOLVED | Noted: 2017-10-26 | Resolved: 2023-02-28

## 2023-02-28 PROBLEM — B20 HUMAN IMMUNODEFICIENCY VIRUS INFECTION (HCC): Status: RESOLVED | Noted: 2018-04-05 | Resolved: 2023-02-28

## 2023-02-28 PROBLEM — I50.9 ACUTE ON CHRONIC CONGESTIVE HEART FAILURE: Status: RESOLVED | Noted: 2019-08-06 | Resolved: 2023-02-28

## 2023-02-28 LAB
ALBUMIN SERPL-MCNC: 4.3 G/DL (ref 3.5–5.2)
ALBUMIN/GLOB SERPL: 1.3 G/DL
ALP SERPL-CCNC: 168 U/L (ref 39–117)
ALT SERPL W P-5'-P-CCNC: 25 U/L (ref 1–33)
ANION GAP SERPL CALCULATED.3IONS-SCNC: 9.3 MMOL/L (ref 5–15)
AST SERPL-CCNC: 23 U/L (ref 1–32)
BASOPHILS # BLD AUTO: 0.03 10*3/MM3 (ref 0–0.2)
BASOPHILS NFR BLD AUTO: 0.4 % (ref 0–1.5)
BILIRUB SERPL-MCNC: 0.4 MG/DL (ref 0–1.2)
BUN SERPL-MCNC: 17 MG/DL (ref 6–20)
BUN/CREAT SERPL: 16 (ref 7–25)
CALCIUM SPEC-SCNC: 9.8 MG/DL (ref 8.6–10.5)
CHLORIDE SERPL-SCNC: 98 MMOL/L (ref 98–107)
CO2 SERPL-SCNC: 27.7 MMOL/L (ref 22–29)
CREAT SERPL-MCNC: 1.06 MG/DL (ref 0.57–1)
DEPRECATED RDW RBC AUTO: 42.3 FL (ref 37–54)
EGFRCR SERPLBLD CKD-EPI 2021: 65.3 ML/MIN/1.73
EOSINOPHIL # BLD AUTO: 0.05 10*3/MM3 (ref 0–0.4)
EOSINOPHIL NFR BLD AUTO: 0.6 % (ref 0.3–6.2)
ERYTHROCYTE [DISTWIDTH] IN BLOOD BY AUTOMATED COUNT: 12.2 % (ref 12.3–15.4)
GLOBULIN UR ELPH-MCNC: 3.2 GM/DL
GLUCOSE SERPL-MCNC: 122 MG/DL (ref 65–99)
HCT VFR BLD AUTO: 41.4 % (ref 34–46.6)
HGB BLD-MCNC: 13.6 G/DL (ref 12–15.9)
IMM GRANULOCYTES # BLD AUTO: 0.02 10*3/MM3 (ref 0–0.05)
IMM GRANULOCYTES NFR BLD AUTO: 0.2 % (ref 0–0.5)
LYMPHOCYTES # BLD AUTO: 2.14 10*3/MM3 (ref 0.7–3.1)
LYMPHOCYTES NFR BLD AUTO: 25 % (ref 19.6–45.3)
MCH RBC QN AUTO: 31.1 PG (ref 26.6–33)
MCHC RBC AUTO-ENTMCNC: 32.9 G/DL (ref 31.5–35.7)
MCV RBC AUTO: 94.7 FL (ref 79–97)
MONOCYTES # BLD AUTO: 0.83 10*3/MM3 (ref 0.1–0.9)
MONOCYTES NFR BLD AUTO: 9.7 % (ref 5–12)
NEUTROPHILS NFR BLD AUTO: 5.5 10*3/MM3 (ref 1.7–7)
NEUTROPHILS NFR BLD AUTO: 64.1 % (ref 42.7–76)
NRBC BLD AUTO-RTO: 0 /100 WBC (ref 0–0.2)
NT-PROBNP SERPL-MCNC: 325 PG/ML (ref 0–450)
PLATELET # BLD AUTO: 213 10*3/MM3 (ref 140–450)
PMV BLD AUTO: 11.7 FL (ref 6–12)
POTASSIUM SERPL-SCNC: 3.7 MMOL/L (ref 3.5–5.2)
PROT SERPL-MCNC: 7.5 G/DL (ref 6–8.5)
RBC # BLD AUTO: 4.37 10*6/MM3 (ref 3.77–5.28)
SODIUM SERPL-SCNC: 135 MMOL/L (ref 136–145)
WBC NRBC COR # BLD: 8.57 10*3/MM3 (ref 3.4–10.8)

## 2023-02-28 PROCEDURE — 80053 COMPREHEN METABOLIC PANEL: CPT | Performed by: NURSE PRACTITIONER

## 2023-02-28 PROCEDURE — 85025 COMPLETE CBC W/AUTO DIFF WBC: CPT | Performed by: NURSE PRACTITIONER

## 2023-02-28 PROCEDURE — 83880 ASSAY OF NATRIURETIC PEPTIDE: CPT | Performed by: NURSE PRACTITIONER

## 2023-02-28 PROCEDURE — G0463 HOSPITAL OUTPT CLINIC VISIT: HCPCS

## 2023-02-28 PROCEDURE — 99214 OFFICE O/P EST MOD 30 MIN: CPT | Performed by: NURSE PRACTITIONER

## 2023-02-28 NOTE — PROGRESS NOTES
[facility] HEART FAILURE      Patient Name: Nina Saez  :1975  Age: 47 y.o.  Sex: female  Referring Provider: Kandice Damon*   Primary Cardiologist: Lisset Melton MD  Encounter Provider:  SAMUEL Cisneros      Chief Complaint:   Chief Complaint   Patient presents with   • Congestive Heart Failure         Congestive Heart Failure  Associated symptoms include fatigue and shortness of breath. Pertinent negatives include no chest pain, palpitations or unexpected weight change.    this 47-year-old female, known to this provider, comes to us today for further evaluation of her chronic systolic heart failure.  Current diagnoses to include severe nonischemic cardiomyopathy, left bundle branch block, hypertension, HIV, obesity, and asthma.    Historically she had followed with Dr. Laina Boyd at [facility].  In spring 2019 she was visiting family in [place] and was taken emergently to [facility].  Echocardiogram and cardiac catheterization were performed at that point, carvedilol was changed to metoprolol.    In 2019 she presented through the emergency department at [facility] with chest pain and shortness of breath.  She was treated with IV diuresis, troponin was negative x2 and proBNP was elevated at 5151.  Entresto was started at that point given her severe dilated cardiomyopathy.  She was to follow with cardiology at [facility] at that time.    In 2020 she again presented to the emergency department with shortness of breath and cough x3 weeks.  BNP was elevated at 13,351.  Carvedilol was discontinued that admission given hypotension.  At subsequent outpatient appointment it was felt that Bumex was less effective than Lasix by the patient.  She complained of progressively worsening fatigue.  AICD, single-chamber [employer], interrogation 2020 revealed 10-16 beats of VT.    On September 10, 2020 she  saw Dr. Jamar Azul MD, for consideration of upgrade to biventricular device.  At that point he felt that her left bundle branch block was incomplete and she did not meet criteria for implantation of CRT-D.    She presented 10/27/2020 to [facility] with complaints of acute on chronic shortness of breath that began approximately 1 week prior.  BNP was further elevated at 16,206 that point.  Pulmonary edema was noted on chest x-ray.  Spironolactone was started that admission.  Referral for evaluation by [facility] services was made November 2, 2020.    Left and right cardiac catheterization at [facility] revealed no obstructive CAD with normal filling pressures.  Echocardiogram at that time revealed an EF less than 15% with global hypokinesis-information extracted from external medical record note.  Cardiac catheterization May 30, 2017 performed at [facility] yielded codominant system with normal left main, LAD with distal 30% stenosis, RCA normal, LVEDP 7 mmHg.    Echocardiogram July 9, 2020 revealed EF of 6%, grade 3 LV diastolic dysfunction, significant LV wall motion hypokinesis/akinesis, RVSP 45 to 55 mmHg secondary to moderate tricuspid valve regurgitation, moderate to severe MR noted.    Jardiance 10 mg daily was started on 11/13/2020.      She was admitted from 12/11/2020 to 12/17/2020 for acute pulmonary embolism with right lower lobe pulmonary infarct.  She was started on warfarin at that time.  Additionally, she has been started on Corlanor as well as spironolactone for her heart failure.  January 4, 2021 she was evaluated and treated in the emergency department for possible angioedema.      Entresto has now been increased to  mg twice daily.  Repeat echocardiogram 3/8/2021 revealed improvement in ejection fraction from 6% to 22% with severe LV dilation, severe global hypokinesis and grade 2 LV diastolic dysfunction.    She has elected at this  time not to proceed with transplant work-up just yet.  She follows up with  on November 16.  She recently saw Dr. Melton and her carvedilol was increased to 50 mg at night with a goal of SBP of .    She spoke with Dr. Melton when she saw her about it and Corlanor was discontinued, and Entresto was reduced to 49-51 mg twice daily.      She is currently still on the Saxenda and doing well.  She is awaiting a repeat echocardiogram.  She sees  later in March.  She does not have any active complaints at this time regarding her heart failure.    The following portions of the patient's history were reviewed and updated as appropriate: allergies, current medications, past family history, past medical history, past social history, past surgical history and problem list.    Current Outpatient Medications   Medication Sig Dispense Refill   • acetaminophen (TYLENOL) 325 MG tablet Take 2 tablets by mouth Every 6 (Six) Hours As Needed for Mild Pain.     • albuterol sulfate  (90 Base) MCG/ACT inhaler INHALE 2 PUFFS BY MOUTH EVERY 4 HOURS AS NEEDED FOR WHEEZING 18 g 0   • atorvastatin (LIPITOR) 10 MG tablet Take 1 tablet by mouth Daily. 30 tablet 11   • BD Pen Needle Heidi 2nd Gen 32G X 4 MM misc USE 1 NEEDLE ONCE DAILY WITH  SAXENDA 100 each 0   • carvedilol (COREG) 3.125 MG tablet Take 1 tablet by mouth 2 (Two) Times a Day With Meals. 180 tablet 3   • CBD (cannabidiol) oral oil Take  by mouth.     • cyclobenzaprine (FLEXERIL) 10 MG tablet Take 1 tablet by mouth 3 (Three) Times a Day As Needed for Muscle Spasms. 21 tablet 0   • Brsag-Kqokl-Knqaarlk-TenofAF (Symtuza) 147-980-553-10 MG per tablet Take 1 tablet by mouth Daily.     • Diclofenac Sodium (VOLTAREN) 1 % gel gel Apply 4 g topically to the appropriate area as directed 2 (Two) Times a Day. Use per pkg insert 100 g 2   • diphenhydrAMINE (BENADRYL) 25 mg capsule Take 1 capsule by mouth Every 6 (Six) Hours As Needed for Itching (swelling). 20 capsule 0   •  famotidine (Pepcid) 20 MG tablet Take 1 tablet by mouth 2 (Two) Times a Day As Needed for Heartburn.     • fluticasone (Flonase) 50 MCG/ACT nasal spray 2 sprays into the nostril(s) as directed by provider Daily. 1 bottle 2   • Jardiance 10 MG tablet tablet Take 1 tablet by mouth once daily 30 tablet 0   • nitroglycerin (NITROSTAT) 0.4 MG SL tablet Place 1 tablet under the tongue Every 5 (Five) Minutes As Needed for Chest Pain. Take no more than 3 doses in 15 minutes. 30 tablet 5   • potassium chloride (K-DUR,KLOR-CON) 20 MEQ CR tablet Take 3 tablets by mouth once daily 90 tablet 11   • Rimegepant Sulfate (Nurtec) 75 MG tablet dispersible tablet Take 1 tablet by mouth 1 (One) Time As Needed (migraine). 4 tablet 0   • sacubitril-valsartan (Entresto) 49-51 MG tablet Take 1 tablet by mouth 2 (Two) Times a Day. 180 tablet 3   • Saxenda 18 MG/3ML injection pen Inject 3 mg under the skin into the appropriate area as directed Daily. 15 mL 2   • sertraline (Zoloft) 50 MG tablet Take 1 tablet by mouth Daily. 90 tablet 3   • spironolactone (ALDACTONE) 25 MG tablet 1 tablet Daily.     • topiramate (TOPAMAX) 25 MG tablet Take 1 tablet by mouth 2 (Two) Times a Day. 60 tablet 2   • torsemide (DEMADEX) 20 MG tablet TAKE 3 TABLETS BY MOUTH IN THE MORNING AND 2 IN THE AFTERNOON 150 tablet 0   • vitamin D (ERGOCALCIFEROL) 51435 units capsule capsule Take 1 capsule by mouth Every 30 (Thirty) Days.     • warfarin (COUMADIN) 2.5 MG tablet Take 3 tablets by mouth once daily 270 tablet 0     No current facility-administered medications for this encounter.       Past Medical History:   Diagnosis Date   • AICD (automatic cardioverter/defibrillator) present 03/06/2020   • Asthma    • CHF (congestive heart failure) ([facility])    • Class 2 obesity in adult    • Coronary artery disease 2017   • Depression 2017   • Diabetes mellitus (HCC)    • Fracture, foot 3/20    Broke   • Grade II diastolic dysfunction     Per echocardiogram 3/2021   • H/O Closed  trimalleolar fracture    • Headache    • HIV (human immunodeficiency virus infection) ([facility])    • Hypertension    • LBBB (left bundle branch block)    • NICM (nonischemic cardiomyopathy) ([facility])     2020 EF 6% per echocardiogram; AICD present   • Nonrheumatic mitral valve regurgitation 2021    Moderate per echo 3/2021   • Nonrheumatic tricuspid valve regurgitation     Moderate per echocardiogram 3/2021   • SHAREE (obstructive sleep apnea) 2023       Past Surgical History:   Procedure Laterality Date   • ANKLE OPEN REDUCTION INTERNAL FIXATION  3/7/20   • CARDIAC CATHETERIZATION     • CARDIAC DEFIBRILLATOR PLACEMENT     •  SECTION      one C/S   • FRACTURE SURGERY Left     left ankle   • OOPHORECTOMY      h/o teratoma   • TUBAL ABDOMINAL LIGATION         Physical Exam  Vitals and nursing note reviewed.   Constitutional:       General: She is not in acute distress.     Appearance: She is well-developed. She is not ill-appearing.   HENT:      Head: Normocephalic and atraumatic.   Eyes:      Conjunctiva/sclera: Conjunctivae normal.      Pupils: Pupils are equal, round, and reactive to light.   Neck:      Vascular: No JVD.   Cardiovascular:      Rate and Rhythm: Normal rate and regular rhythm.      Heart sounds: Normal heart sounds. No murmur heard.    No friction rub. No gallop.   Pulmonary:      Effort: Pulmonary effort is normal. No respiratory distress.      Breath sounds: Normal breath sounds.   Abdominal:      General: Bowel sounds are normal. There is no distension.      Palpations: Abdomen is soft.   Musculoskeletal:         General: No swelling or deformity.   Skin:     General: Skin is warm and dry.      Capillary Refill: Capillary refill takes less than 2 seconds.   Neurological:      Mental Status: She is alert and oriented to person, place, and time. Mental status is at baseline.   Psychiatric:         Attention and Perception: Attention normal.         Mood and Affect: Mood normal.  "Affect is tearful.         Behavior: Behavior normal. Behavior is cooperative.          Review of Systems   Constitutional: Positive for fatigue. Negative for appetite change and unexpected weight change.   HENT: Negative for congestion and nosebleeds.    Eyes: Negative for photophobia and visual disturbance.   Respiratory: Positive for shortness of breath. Negative for cough and chest tightness.    Cardiovascular: Negative for chest pain, palpitations and leg swelling.   Gastrointestinal: Negative for abdominal distention and blood in stool.   Endocrine: Negative for polyphagia and polyuria.   Genitourinary: Positive for frequency. Negative for enuresis.   Musculoskeletal: Negative for joint swelling and myalgias.   Skin: Negative for pallor and rash.   Neurological: Positive for headaches. Negative for dizziness, syncope, weakness, light-headedness and numbness.   Hematological: Does not bruise/bleed easily.   Psychiatric/Behavioral: Negative for decreased concentration and sleep disturbance.        OBJECTIVE:  /78 (BP Location: Left arm, Patient Position: Sitting)   Pulse 91   Ht 167.6 cm (65.98\")   Wt 99.8 kg (220 lb)   SpO2 97%   BMI 35.53 kg/m²      Body mass index is 35.53 kg/m².  Wt Readings from Last 1 Encounters:   02/28/23 99.8 kg (220 lb)       Lab Review:  Renal Function: CrCl cannot be calculated (Patient's most recent lab result is older than the maximum 30 days allowed.).    Lab Results   Component Value Date    PROBNP 411.0 11/18/2022       Results for orders placed during the hospital encounter of 12/15/21    Adult Transthoracic Echo Complete W/ Cont if Necessary Per Protocol    Interpretation Summary  · Estimated right ventricular systolic pressure from tricuspid regurgitation is normal (<35 mmHg).  · Mildly reduced right ventricular systolic function noted.  · Left ventricular diastolic function is consistent with (grade II w/high LAP) pseudonormalization.  · The left ventricular " cavity is severely dilated.  · Estimated left ventricular EF was in disagreement with the calculated left ventricular EF. Left ventricular ejection fraction appears to be 21 - 25%. Left ventricular systolic function is severely decreased.  · There is left ventricular global hypokinesis noted.        6 MINUTE WALK  Patient declined       Cardiac Procedures:  1. Cardiac catheterization [facility] 5/30/17       2.  /[facility] 4/2019   Data deficit      ASSESSMENT:     Diagnosis Plan   1. Heart failure with reduced ejection fraction (HCC)  CBC & Differential    Comprehensive Metabolic Panel    BNP      2. HFrEF (heart failure with reduced ejection fraction) ([facility])        3. Class 2 severe obesity with serious comorbidity in adult, unspecified BMI, unspecified obesity type ([facility])        4. Essential hypertension        5. Dilated cardiomyopathy (HCC)        6. NICM (nonischemic cardiomyopathy) ([facility])        7. SHAREE (obstructive sleep apnea)              PLAN OF CARE:  1.  HFrEF-NYHA functional class II.  Most recent EF per echocardiogram 22%, up from 6%. Currently she is on Entresto, torsemide, carvedilol, spironolactone, and Jardiance.  Historically she has been unable to tolerate metoprolol succinate as it made her very dizzy.      She remains euvolemic on exam.  She is awaiting her repeat echocardiogram.  She continues to follow with .  I will check labs today as below.  She is to continue following a strict low-sodium diet of 2000 mg daily or less, fluid restriction of 1500 mL daily, and remains adherent with daily weights.  I would like to to jump into the rotation of making sure 1 of us from either here or Dr. Melton's office is laying eyes on her every 3 months to make sure she is staying stable.    Directions for when to call the clinic reviewed with the patient to include weight gain of 2 to 3 pounds in 24 hours, weight gain of 5 to 10 pounds within 7 days; worsening shortness of breath;  worsening lower extremity edema or abdominal distention.    2.  Nonobstructive CAD-diffuse 30% LAD lesion noted on prior cardiac catheterization as above.  Stable, denies angina.    3.  Nonischemic cardiomyopathy (dilated)-presence of AICD noted.  EF per echocardiogram was 6%, now improved to 22% per echocardiogram as of 3/8/2021.  Most recent AICD interrogation as above.  Now on target dosing of Entresto.    4.  NSVT-noted on prior device interrogation.  Currently on beta-blockade.    5.  HIV-history noted.  Followed at [facility].    6.  Pulmonary embolism-remains on on warfarin.  Followed by home health and primary cardiology team.    7.  Obesity- on Saxenda.  BMI now 35.53 kg/m².        CBC/CMP/BNP; continue current GDMT; follow-up in [facility] in August or sooner if needed and keep follow-up with Dr. Melton's office in May.        Thank you for allowing me to participate in the care of your patient,         Kiley JIN SAMUEL Boyd  [facility] HEART FAILURE  02/28/23  13:56 EST      **Lyric Disclaimer:**  Much of this encounter note is an electronic transcription/translation of spoken language to printed text. The electronic translation of spoken language may permit erroneous, or at times, nonsensical words or phrases to be inadvertently transcribed. Although I have reviewed the note for such errors, some may still exist.

## 2023-02-28 NOTE — ADDENDUM NOTE
Encounter addended by: Chuyita Hayden MA on: 2/28/2023 2:35 PM   Actions taken: Charge Capture section accepted

## 2023-03-01 ENCOUNTER — TELEPHONE (OUTPATIENT)
Dept: CARDIOLOGY | Facility: CLINIC | Age: 48
End: 2023-03-01
Payer: MEDICARE

## 2023-03-14 ENCOUNTER — CLINICAL SUPPORT NO REQUIREMENTS (OUTPATIENT)
Dept: CARDIOLOGY | Facility: CLINIC | Age: 48
End: 2023-03-14
Payer: MEDICARE

## 2023-03-14 ENCOUNTER — TELEPHONE (OUTPATIENT)
Dept: PHARMACY | Facility: HOSPITAL | Age: 48
End: 2023-03-14
Payer: MEDICARE

## 2023-03-14 DIAGNOSIS — I42.8 NICM (NONISCHEMIC CARDIOMYOPATHY): Primary | ICD-10-CM

## 2023-03-14 PROCEDURE — 93282 PRGRMG EVAL IMPLANTABLE DFB: CPT | Performed by: INTERNAL MEDICINE

## 2023-03-15 ENCOUNTER — ANTICOAGULATION VISIT (OUTPATIENT)
Dept: PHARMACY | Facility: HOSPITAL | Age: 48
End: 2023-03-15
Payer: MEDICARE

## 2023-03-15 LAB
INR PPP: 2.7
INR PPP: 2.7

## 2023-03-15 PROCEDURE — G0249 PROVIDE INR TEST MATER/EQUIP: HCPCS

## 2023-03-15 NOTE — PROGRESS NOTES
Anticoagulation Clinic Progress Note    Anticoagulation Summary  As of 3/15/2023    INR goal:  2.0-3.0   TTR:  61.4 % (2.2 y)   INR used for dosin.70 (3/15/2023)   Warfarin maintenance plan:  7.5 mg every day; Starting 3/15/2023   Weekly warfarin total:  52.5 mg   Plan last modified:  Zina De La Rosa RPH (2022)   Next INR check:  3/22/2023   Priority:  High   Target end date:  Indefinite    Indications    Other acute pulmonary embolism  unspecified whether acute cor pulmonale present (HCC) (Resolved) [I26.99]  Acute pulmonary embolism  unspecified pulmonary embolism type  unspecified whether acute cor pulmonale present (HCC) (Resolved) [I26.99]             Anticoagulation Episode Summary     INR check location:      Preferred lab:      Send INR reminders to:   [facility]    Comments:  **Home Testing Program**      Anticoagulation Care Providers     Provider Role Specialty Phone number    Lisset Melton MD Referring Cardiology [phone]          Clinic Interview:  Patient Findings     Negatives:  Signs/symptoms of thrombosis, Signs/symptoms of bleeding,   Laboratory test error suspected, Change in health, Change in alcohol use,   Change in activity, Upcoming invasive procedure, Emergency department   visit, Upcoming dental procedure, Missed doses, Extra doses, Change in   medications, Change in diet/appetite, Hospital admission, Bruising, Other   complaints      Clinical Outcomes     Negatives:  Major bleeding event, Thromboembolic event,   Anticoagulation-related hospital admission, Anticoagulation-related ED   visit, Anticoagulation-related fatality        INR History:  Anticoagulation Monitoring 2023 3/15/2023 3/15/2023   INR 3.20 2.70 2.70   INR Date 2023 3/15/2023 3/15/2023   INR Goal 2.0-3.0 2.0-3.0 2.0-3.0   Trend Same Same Same   Last Week Total 52.5 mg 52.5 mg 52.5 mg   Next Week Total 50 mg 52.5 mg 52.5 mg   Sun 7.5 mg 7.5 mg 7.5 mg   Mon 7.5 mg 7.5 mg 7.5  mg   Tue 7.5 mg 7.5 mg 7.5 mg   Wed 7.5 mg 7.5 mg 7.5 mg   Thu 7.5 mg 7.5 mg 7.5 mg   Fri 5 mg (2/24) 7.5 mg 7.5 mg   Sat 7.5 mg 7.5 mg 7.5 mg   Visit Report - - -   Some recent data might be hidden       Plan:  1. INR is Therapeutic today- see above in Anticoagulation Summary.   Will instruct Nina Saez to Continue their warfarin regimen- see above in Anticoagulation Summary.  2. Follow up in 1 week  3. They have been instructed to call if any changes in medications, doses, concerns, etc. Patient expresses understanding and has no further questions at this time.    Shawn Tong [facility]

## 2023-03-17 ENCOUNTER — HOSPITAL ENCOUNTER (OUTPATIENT)
Dept: CARDIOLOGY | Facility: HOSPITAL | Age: 48
Discharge: HOME | End: 2023-03-17
Admitting: INTERNAL MEDICINE
Payer: MEDICARE

## 2023-03-17 ENCOUNTER — TELEPHONE (OUTPATIENT)
Dept: CARDIOLOGY | Facility: CLINIC | Age: 48
End: 2023-03-17

## 2023-03-17 VITALS
OXYGEN SATURATION: 98 % | BODY MASS INDEX: 36.65 KG/M2 | HEART RATE: 88 BPM | SYSTOLIC BLOOD PRESSURE: 126 MMHG | HEIGHT: 65 IN | WEIGHT: 220 LBS | DIASTOLIC BLOOD PRESSURE: 60 MMHG

## 2023-03-17 DIAGNOSIS — I42.0 DILATED CARDIOMYOPATHY: ICD-10-CM

## 2023-03-17 DIAGNOSIS — I42.8 NICM (NONISCHEMIC CARDIOMYOPATHY): ICD-10-CM

## 2023-03-17 LAB
AORTIC ARCH: 2.3 CM
ASCENDING AORTA: 2.4 CM
BH CV ECHO MEAS - ACS: 2.6 CM
BH CV ECHO MEAS - AO MAX PG: 5.3 MMHG
BH CV ECHO MEAS - AO MEAN PG: 3.1 MMHG
BH CV ECHO MEAS - AO ROOT DIAM: 3.2 CM
BH CV ECHO MEAS - AO V2 MAX: 115.4 CM/SEC
BH CV ECHO MEAS - AO V2 VTI: 17 CM
BH CV ECHO MEAS - AVA(I,D): 2 CM2
BH CV ECHO MEAS - EDV(CUBED): 170.1 ML
BH CV ECHO MEAS - EDV(MOD-SP2): 274 ML
BH CV ECHO MEAS - EDV(MOD-SP4): 255 ML
BH CV ECHO MEAS - EF(MOD-BP): 29.8 %
BH CV ECHO MEAS - EF(MOD-SP2): 29.6 %
BH CV ECHO MEAS - EF(MOD-SP4): 24.7 %
BH CV ECHO MEAS - ESV(CUBED): 127.6 ML
BH CV ECHO MEAS - ESV(MOD-SP2): 193 ML
BH CV ECHO MEAS - ESV(MOD-SP4): 192 ML
BH CV ECHO MEAS - FS: 9.2 %
BH CV ECHO MEAS - IVS/LVPW: 0.79 CM
BH CV ECHO MEAS - IVSD: 0.95 CM
BH CV ECHO MEAS - LAT PEAK E' VEL: 4.9 CM/SEC
BH CV ECHO MEAS - LV DIASTOLIC VOL/BSA (35-75): 123.8 CM2
BH CV ECHO MEAS - LV MASS(C)D: 238 GRAMS
BH CV ECHO MEAS - LV MAX PG: 1.65 MMHG
BH CV ECHO MEAS - LV MEAN PG: 0.97 MMHG
BH CV ECHO MEAS - LV SYSTOLIC VOL/BSA (12-30): 93.2 CM2
BH CV ECHO MEAS - LV V1 MAX: 64.3 CM/SEC
BH CV ECHO MEAS - LV V1 VTI: 9.5 CM
BH CV ECHO MEAS - LVIDD: 5.5 CM
BH CV ECHO MEAS - LVIDS: 5 CM
BH CV ECHO MEAS - LVOT AREA: 3.6 CM2
BH CV ECHO MEAS - LVOT DIAM: 2.13 CM
BH CV ECHO MEAS - LVPWD: 1.2 CM
BH CV ECHO MEAS - MED PEAK E' VEL: 3.6 CM/SEC
BH CV ECHO MEAS - MR MAX PG: 77.8 MMHG
BH CV ECHO MEAS - MR MAX VEL: 441.1 CM/SEC
BH CV ECHO MEAS - MV A DUR: 0.1 SEC
BH CV ECHO MEAS - MV A MAX VEL: 81.8 CM/SEC
BH CV ECHO MEAS - MV DEC SLOPE: 350.5 CM/SEC2
BH CV ECHO MEAS - MV DEC TIME: 0.16 MSEC
BH CV ECHO MEAS - MV E MAX VEL: 42.4 CM/SEC
BH CV ECHO MEAS - MV E/A: 0.52
BH CV ECHO MEAS - MV MAX PG: 3.6 MMHG
BH CV ECHO MEAS - MV MEAN PG: 1.61 MMHG
BH CV ECHO MEAS - MV P1/2T: 51 MSEC
BH CV ECHO MEAS - MV V2 VTI: 24 CM
BH CV ECHO MEAS - MVA(P1/2T): 4.3 CM2
BH CV ECHO MEAS - MVA(VTI): 1.42 CM2
BH CV ECHO MEAS - PA ACC TIME: 0.08 SEC
BH CV ECHO MEAS - PA PR(ACCEL): 43.3 MMHG
BH CV ECHO MEAS - PA V2 MAX: 70.6 CM/SEC
BH CV ECHO MEAS - PI END-D VEL: 120.8 CM/SEC
BH CV ECHO MEAS - PULM A REVS DUR: 0.12 SEC
BH CV ECHO MEAS - PULM A REVS VEL: 20.4 CM/SEC
BH CV ECHO MEAS - PULM DIAS VEL: 43.9 CM/SEC
BH CV ECHO MEAS - PULM S/D: 0.84
BH CV ECHO MEAS - PULM SYS VEL: 36.8 CM/SEC
BH CV ECHO MEAS - QP/QS: 0.67
BH CV ECHO MEAS - RAP SYSTOLE: 3 MMHG
BH CV ECHO MEAS - RV MAX PG: 1.2 MMHG
BH CV ECHO MEAS - RV V1 MAX: 54.8 CM/SEC
BH CV ECHO MEAS - RV V1 VTI: 7.9 CM
BH CV ECHO MEAS - RVOT DIAM: 1.92 CM
BH CV ECHO MEAS - RVSP: 22.8 MMHG
BH CV ECHO MEAS - SI(MOD-SP2): 39.3 ML/M2
BH CV ECHO MEAS - SI(MOD-SP4): 30.6 ML/M2
BH CV ECHO MEAS - SUP REN AO DIAM: 1.7 CM
BH CV ECHO MEAS - SV(LVOT): 34.1 ML
BH CV ECHO MEAS - SV(MOD-SP2): 81 ML
BH CV ECHO MEAS - SV(MOD-SP4): 63 ML
BH CV ECHO MEAS - SV(RVOT): 22.9 ML
BH CV ECHO MEAS - TAPSE (>1.6): 1.17 CM
BH CV ECHO MEAS - TR MAX PG: 19.8 MMHG
BH CV ECHO MEAS - TR MAX VEL: 222.6 CM/SEC
BH CV ECHO MEASUREMENTS AVERAGE E/E' RATIO: 9.98
BH CV XLRA - RV BASE: 1.76 CM
BH CV XLRA - RV LENGTH: 8 CM
BH CV XLRA - RV MID: 1.72 CM
BH CV XLRA - TDI S': 12.1 CM/SEC
LEFT ATRIUM VOLUME INDEX: 16.8 ML/M2
MAXIMAL PREDICTED HEART RATE: 173 BPM
SINUS: 2.5 CM
STJ: 2.6 CM
STRESS TARGET HR: 147 BPM

## 2023-03-17 PROCEDURE — 93306 TTE W/DOPPLER COMPLETE: CPT

## 2023-03-17 PROCEDURE — 93306 TTE W/DOPPLER COMPLETE: CPT | Performed by: INTERNAL MEDICINE

## 2023-03-17 PROCEDURE — 25510000001 PERFLUTREN (DEFINITY) 8.476 MG IN SODIUM CHLORIDE (PF) 0.9 % 10 ML INJECTION: Performed by: INTERNAL MEDICINE

## 2023-03-17 RX ADMIN — PERFLUTREN 1 ML: 6.52 INJECTION, SUSPENSION INTRAVENOUS at 08:25

## 2023-03-17 NOTE — TELEPHONE ENCOUNTER
Notified patient of results and recommendations. She verbalized understanding.    Geovanna Page, RN  Triage [facility]

## 2023-03-17 NOTE — TELEPHONE ENCOUNTER
Attempted to call Nina Saez, no answer.  Left a voicemail for patient to call back.  Will continue to try to reach patient.    Lesa Hunter RN  [facility] Triage  03/17/23 09:27 EDT

## 2023-03-24 NOTE — PROGRESS NOTES
GI Daily Progress Note    Assessment/Plan:      Acute on chronic systolic congestive heart failure (CMS/HCC)    Asthma    Essential hypertension    HIV (human immunodeficiency virus infection) (CMS/HCC)    NICM (nonischemic cardiomyopathy) (CMS/HCC)    Elevated LFTs    AICD (automatic cardioverter/defibrillator) present    Intractable vomiting with nausea    Diarrhea following gastrointestinal surgery       LOS: 5 days     Nina Saez is a 44 y.o. female who was admitted with Acute on chronic systolic congestive heart failure (CMS/HCC). She reports the symptoms are improving with treatment. Her Lfts are trending down. Given repeated discussion wrt risks of sedation with her CHF she opted out of her endoscopy yesterday. She states her plan is for ADC in AM    Subjective:    Patient expresses diarrhea and reflux  Patient denies vomiting and bloody stools    Objective:    Vital signs in last 24 hours:  Temp:  [97.2 °F (36.2 °C)-98.3 °F (36.8 °C)] 97.9 °F (36.6 °C)  Heart Rate:  [] 81  Resp:  [16-18] 18  BP: ()/(67-79) 94/70    Intake/Output last 3 shifts:  I/O last 3 completed shifts:  In: 240 [P.O.:240]  Out: -   Intake/Output this shift:  I/O this shift:  In: 360 [P.O.:360]  Out: -     Physical Exam:Abdomen  Sounds Normal Active Bowel Sounds   Distension Soft   Tenderness Nontender     Imaging Results (Last 72 Hours)     ** No results found for the last 72 hours. **          WBC   Date Value Ref Range Status   11/01/2020 6.62 3.40 - 10.80 10*3/mm3 Final     RBC   Date Value Ref Range Status   11/01/2020 3.51 (L) 3.77 - 5.28 10*6/mm3 Final     Hemoglobin   Date Value Ref Range Status   11/01/2020 11.5 (L) 12.0 - 15.9 g/dL Final     Hematocrit   Date Value Ref Range Status   11/01/2020 35.1 34.0 - 46.6 % Final     MCV   Date Value Ref Range Status   11/01/2020 100.0 (H) 79.0 - 97.0 fL Final     MCH   Date Value Ref Range Status   11/01/2020 32.8 26.6 - 33.0 pg Final     MCHC   Date Value Ref Range  OFFICE VISIT    Patient: Marcelo Reyes   : 1953 MRN: [id number]    SUBJECTIVE:  Chief Complaint   Patient presents with   • Office Visit   • Medicare Wellness Visit     Subsequent   • Convey Results   • Establish Care     Transfer from [facility]       Patient has given consent to record this visit for documentation in their clinical record.    A 70 year old female presents for  a subsequent annual Medicare wellness visit.    HISTORY OF PRESENT ILLNESS:    Historian: Self.    Medicare annual wellness visit, subsequent    Labs  Total cholesterol, triglycerides, and LDL are 168 mg/dL, 128 mg/dL, and 99 mg/dL. HDL is decreased from 56 mg/dL to 43 mg/dL. Vitamin D levels have improved. Electrolytes and blood gases are normal. Fasting blood glucose, kidney function, calcium, liver enzymes and blood proteins are within normal range.    Vision changes  Has vision changes like blurry due to increased screen time. Due for eye exam.    Benign hypertension  Blood pressure today is normal. Taking Amlodipine, Valsartan, and Metoprolol and is compliant with medications. Denies any dizziness when changing positions.    Iron deficiency anemia secondary to inadequate dietary iron intake  RBC is improved from 3.75 mil/mcL to 3.91 mil/mcL. MCV is 100.8 fl which shows RBC are mildly bigger. WBC and platelets are normal. Iron counts have improved from earlier. Had bleeding hemorrhoids, so has iron deficiency. Took iron supplements, but has stomach cramps and constipation. After undergoing hemorrhoidectomy, condition got better.      DJD (degenerative joint disease), lumbosacral  Complains of pain in the ankles. Has stiffness and sore in the leg when sitting, and becomes difficult to walk. Using Norco 2 tablets a day with benefits, and makes her sleepy. Has constipation, and takes stool softner if doesn't relieve after 3 days.    Aneurysm (CMD)/Aneurysm artery, neck (CMD)  Denies headaches. Complains of dizziness, when turned  Status   11/01/2020 32.8 31.5 - 35.7 g/dL Final     RDW   Date Value Ref Range Status   11/01/2020 12.3 12.3 - 15.4 % Final     RDW-SD   Date Value Ref Range Status   11/01/2020 44.5 37.0 - 54.0 fl Final     MPV   Date Value Ref Range Status   11/01/2020 13.1 (H) 6.0 - 12.0 fL Final     Platelets   Date Value Ref Range Status   11/01/2020 158 140 - 450 10*3/mm3 Final     Neutrophil %   Date Value Ref Range Status   11/01/2020 56.0 42.7 - 76.0 % Final     Lymphocyte %   Date Value Ref Range Status   11/01/2020 35.5 19.6 - 45.3 % Final     Monocyte %   Date Value Ref Range Status   11/01/2020 7.6 5.0 - 12.0 % Final     Eosinophil %   Date Value Ref Range Status   11/01/2020 0.3 0.3 - 6.2 % Final     Basophil %   Date Value Ref Range Status   11/01/2020 0.3 0.0 - 1.5 % Final     Neutrophils Absolute   Date Value Ref Range Status   11/01/2020 4.79 1.70 - 7.00 10*3/mm3 Final     Neutrophils, Absolute   Date Value Ref Range Status   11/01/2020 3.71 1.70 - 7.00 10*3/mm3 Final     Lymphocytes, Absolute   Date Value Ref Range Status   11/01/2020 2.35 0.70 - 3.10 10*3/mm3 Final     Monocytes, Absolute   Date Value Ref Range Status   11/01/2020 0.50 0.10 - 0.90 10*3/mm3 Final     Eosinophils, Absolute   Date Value Ref Range Status   11/01/2020 0.02 0.00 - 0.40 10*3/mm3 Final     Basophils, Absolute   Date Value Ref Range Status   11/01/2020 0.02 0.00 - 0.20 10*3/mm3 Final       Glucose   Date Value Ref Range Status   11/01/2020 113 (H) 65 - 99 mg/dL Final     Sodium   Date Value Ref Range Status   11/01/2020 136 136 - 145 mmol/L Final     Potassium   Date Value Ref Range Status   11/01/2020 3.9 3.5 - 5.2 mmol/L Final     CO2   Date Value Ref Range Status   11/01/2020 24.7 22.0 - 29.0 mmol/L Final     Chloride   Date Value Ref Range Status   11/01/2020 102 98 - 107 mmol/L Final     Anion Gap   Date Value Ref Range Status   11/01/2020 9.3 5.0 - 15.0 mmol/L Final     Creatinine   Date Value Ref Range Status   11/01/2020 0.94 0.57  quickly. Visited ER for dizziness in 2020. Had stable aneurysm on the left and right side. 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  Unchanged 2 mm anterior choroidal origin infundibulum. Underwent MRI in 2018 and noted 2 to 3 mm saccular aneurysm arising from the ventral wall of the supraclinoid right internal carotid artery. An additional 2 mm blister aneurysm arising from the posterior wall of the carotid terminus similar to prior CTA noted on right Internal carotid artery. On the Left Internal carotid artery, there is an 8 mm signal void arising along the posterior wall of the stent consistent with previously coiled aneurysm. Not visiting any neurologist for follow-up.    Postmenopausal  Last DEXA scan was done in 2021, and was normal. Due for DEXA scan. Taking vitamin D supplement. DEXA scan reports of 2013 showed osteoporosis, and started medication.          PAST MEDICAL HISTORY:  Past Medical History:   Diagnosis Date   • Anxiety disorder    • Benign essential hypertension    • Cataract    • Cerebral aneurysm, nonruptured    • Chronic low back pain    • DJD (degenerative joint disease), lumbosacral    • Osteoporosis    • RA (rheumatoid arthritis) (CMD)    • SAH (subarachnoid hemorrhage) (CMD) 8/2015    small   • Sciatica    • Tubular adenoma of colon 04/13/2021     MEDICATIONS:  Current Outpatient Medications   Medication Sig Dispense Refill   • HYDROcodone-acetaminophen (NORCO) 5-325 MG per tablet Take 1-2 tablets by mouth every 6 hours as needed for Pain. 30 tablet 0   • cyclobenzaprine (FLEXERIL) 10 MG tablet Take 1 tablet by mouth 2 times daily as needed for Muscle spasms. 90 tablet 3   • metoPROLOL tartrate (LOPRESSOR) 25 MG tablet Take 1 tablet by mouth every 12 hours. 180 tablet 0   • amLODIPine (NORVASC) 5 MG tablet Take 1 tablet by mouth daily. 90 tablet 0   • valsartan (DIOVAN) 320 MG tablet Take 1  "- 1.00 mg/dL Final     BUN   Date Value Ref Range Status   11/01/2020 15 6 - 20 mg/dL Final     BUN/Creatinine Ratio   Date Value Ref Range Status   11/01/2020 16.0 7.0 - 25.0 Final     Calcium   Date Value Ref Range Status   11/01/2020 8.6 8.6 - 10.5 mg/dL Final     Alkaline Phosphatase   Date Value Ref Range Status   11/01/2020 148 (H) 39 - 117 U/L Final     Total Protein   Date Value Ref Range Status   11/01/2020 5.1 (L) 6.0 - 8.5 g/dL Final     ALT (SGPT)   Date Value Ref Range Status   11/01/2020 157 (H) 1 - 33 U/L Final     AST (SGOT)   Date Value Ref Range Status   11/01/2020 53 (H) 1 - 32 U/L Final     Total Bilirubin   Date Value Ref Range Status   11/01/2020 0.7 0.0 - 1.2 mg/dL Final     Albumin   Date Value Ref Range Status   11/01/2020 3.20 (L) 3.50 - 5.20 g/dL Final     Globulin   Date Value Ref Range Status   11/01/2020 1.9 gm/dL Final     1. Nausea, vomiting and diarrhea.  2. Elevated liver enzymes  3. Acid reflux  4. History of HIV and drug use, now clean.  5. Acute on chronic systolic heart failure, high risk per cardiology. We discussed her case (after her bout of nonsustained VT this AM) with Dr. Chou.    Discussed again with Dr Chou, feel most of her symptoms are related to her CHF and \"low flow\" state. But would be glad tosee as an OP to help her thru a transplant or better afterward to see what she might still be dealing with.   " tablet by mouth daily. 90 tablet 0   • gabapentin (NEURONTIN) 300 MG capsule TAKE 1 CAPSULE NIGHTLY 90 capsule 1   • Cyanocobalamin (B-12) 1000 MCG Cap Take 1 capsule by mouth daily.     • aspirin 325 MG tablet Take 1 tablet by mouth daily.     • Cholecalciferol (VITAMIN D) 1000 UNITS capsule Take 1,000 Units by mouth daily. 30 capsule 6   • docusate sodium (COLACE) 100 MG capsule Take 100 mg by mouth 2 times daily as needed.       No current facility-administered medications for this visit.     ALLERGIES:  Allergies as of 2023 - Reviewed 2023   Allergen Reaction Noted   • Latex RASH and PRURITUS 2017     FAMILY HISTORY:  Family History   Family history unknown: Yes     SOCIAL HISTORY:  Social History     Tobacco Use   • Smoking status: Former     Packs/day: 0.00     Types: Cigarettes     Quit date: 1985     Years since quittin.2   • Smokeless tobacco: Never   • Tobacco comments:     less than 1 pack per day   Substance Use Topics   • Alcohol use: Yes     Alcohol/week: 2.0 - 3.0 standard drinks     Types: 2 - 3 Standard drinks or equivalent per week     Comment: when she bowls   • Drug use: No     Past Surgical HISTORY  Past Surgical History:   Procedure Laterality Date   • Arterial aneurysm repair Left 2015    LICA   • Colonoscopy  2006   • Colonoscopy  2021    repeat colon in 5 yrs, adenoma polyps   • Dexa bone density axial skeleton  2013   • Hemorrhoidectomy  2019       REVIEW OF SYSTEMS:    Constitutional:  Denies fevers, chills, weakness, fatigue, loss of appetite, abnormal weight gain or abnormal weight loss.  Eyes: As per HPI.  HENT:  Denies facial pain, ear pain, hearing loss, tinnitus, nasal congestion, rhinorrhea, epistaxis, sinus pain, mouth lesions or sore throat.  Respiratory:  Denies shortness of breath, cough, sputum production, hemoptysis or wheezing.  Cardiovascular:  Denies chest pains, palpitations, tachycardia, edema, cyanosis or vertigo.  No  near-syncope or syncope. No exertional chest pains or shortness of breath. No orthopnea or paroxysmal nocturnal dyspnea. No leg swelling.  Gastrointestinal:  Denies abdominal pain, heartburn, nausea, vomiting, diarrhea, constipation or blood in stool.  No hematemesis, hematochezia or rectal bleeding.  Musculoskeletal: As per HPI.  Neurologic: As per HPI.  Genitourinary:  Denies urinary frequency, nocturia, urgency, incontinence, dysuria or hematuria.  No problems with impotence or libido.   No discharge.  Hematologic/Lymphatic:  Denies easy bruising or bleeding, swollen lymph glands.  Endocrine:  Denies heat or cold intolerance, polydipsia or polyuria.  Denies changes in hair or skin texture.  Integument:  Denies new rashes or lesions, pruritus or dryness of skin.  Psychiatric:  Denies anxiety, depression, hallucinations, irritability or sleeping problems.  Allergic/Immunologic:  Denies recurrent infections, hypersensitivity.  All other Review of Systems negative.      OBJECTIVE:  Visit Vitals  /78 (BP Location: LUE - Left upper extremity, Patient Position: Sitting, Cuff Size: Regular)   Pulse 64   Resp 16   Ht 5' 1.5\"   Wt 62.6 kg (138 lb)   SpO2 99%   BMI 25.65 kg/m²       PHYSICAL EXAM:    Constitutional: Alert, in no acute distress and current vital signs reviewed.  Head and Face: Atraumatic, no deformities, normocephalic, normal facies.  Eyes: No discharge, normal conjunctiva, no eyelid swelling, no ptosis and the sclerae were normal. Pupils equal, round and reactive to light and accommodation, conjugate gaze and extraocular movements were intact.  ENT: Normal appearing outer ear, normal appearing nose. Examination of the tympanic membrane showed normal landmarks, normal appearing external canal. Nasal mucosa is moist and pink, no nasal discharge. Normal lips. Oral mucosa pink and moist, no oral lesions.  Neck: Normal appearing neck, supple neck and no mass was seen. Thyroid not enlarged and no thyroid  nodules. No lymphadenopathy.  Pulmonary: No respiratory distress, normal respiratory rate and effort and no accessory muscle use. Breath sounds clear to auscultation bilaterally.  Cardiovascular: Normal rate, systolic murmurs were heard, regular rhythm, normal S1 and normal S2. No pedal edema.   Abdomen: Soft, nontender, nondistended, normal bowel sounds and no abdominal mass. No hepatomegaly and no splenomegaly. No umbilical hernia was discovered.  Musculoskeletal: Normal gait. No musculoskeletal erythema was seen, no joint swelling seen, and no joint tenderness was elicited. No scoliosis. Normal range of motion. There was no joint instability noted. Muscle strength and tone were normal.  Neurologic: Cranial nerves grossly intact. Normal DTRs. No sensory deficits noted. No coordination deficits.  Psychiatric: Oriented to person, oriented to place and oriented to time. Alert and awake, interactive and mood/affect were appropriate. Judgment not impaired. Normal attention span. Short term memory intact.   Skin, Hair, Nails: Normal skin color and pigmentation and no rash. No foot ulcers and no skin ulcers were seen. Normal skin turgor. No clubbing or cyanosis of the fingernails.     DIAGNOSTIC STUDIES:  LAB RESULTS:  Lab Services on 03/16/2023   Component Date Value Ref Range Status   • Fasting Status 03/16/2023 12  0 - 999 Hours Final   • Sodium 03/16/2023 143  135 - 145 mmol/L Final   • Potassium 03/16/2023 4.8  3.4 - 5.1 mmol/L Final   • Chloride 03/16/2023 106  97 - 110 mmol/L Final   • Carbon Dioxide 03/16/2023 27  21 - 32 mmol/L Final   • Anion Gap 03/16/2023 15  7 - 19 mmol/L Final   • Glucose 03/16/2023 92  70 - 99 mg/dL Final   • BUN 03/16/2023 17  6 - 20 mg/dL Final   • Creatinine 03/16/2023 0.83  0.51 - 0.95 mg/dL Final   • Glomerular Filtration Rate 03/16/2023 76  >=60 Final   • BUN/Cr 03/16/2023 20  7 - 25 Final   • Calcium 03/16/2023 8.9  8.4 - 10.2 mg/dL Final   • Bilirubin, Total 03/16/2023 0.5  0.2 -  1.0 mg/dL Final   • GOT/AST 03/16/2023 23  <=37 Units/L Final   • GPT/ALT 03/16/2023 33  <64 Units/L Final   • Alkaline Phosphatase 03/16/2023 83  45 - 117 Units/L Final   • Albumin 03/16/2023 3.7  3.6 - 5.1 g/dL Final   • Protein, Total 03/16/2023 7.0  6.4 - 8.2 g/dL Final   • Globulin 03/16/2023 3.3  2.0 - 4.0 g/dL Final   • A/G Ratio 03/16/2023 1.1  1.0 - 2.4 Final   • Fasting Status 03/16/2023 12  0 - 999 Hours Final   • Cholesterol 03/16/2023 168  <=199 mg/dL Final   • Triglycerides 03/16/2023 128  <=149 mg/dL Final   • HDL 03/16/2023 43 (A)  >=50 mg/dL Final   • LDL 03/16/2023 99  <=129 mg/dL Final   • Non-HDL Cholesterol 03/16/2023 125  mg/dL Final   • Cholesterol/ HDL Ratio 03/16/2023 3.9  <=4.4 Final   • Iron 03/16/2023 114  50 - 170 mcg/dL Final   • Iron Binding Capacity 03/16/2023 258  250 - 450 mcg/dL Final   • Iron, Percent Saturation 03/16/2023 44  15 - 45 % Final   • Vitamin D, 25-Hydroxy 03/16/2023 50.6  30.0 - 100.0 ng/mL Final   • WBC 03/16/2023 4.8  4.2 - 11.0 K/mcL Final   • RBC 03/16/2023 3.91 (A)  4.00 - 5.20 mil/mcL Final   • HGB 03/16/2023 12.9  12.0 - 15.5 g/dL Final   • HCT 03/16/2023 39.4  36.0 - 46.5 % Final   • MCV 03/16/2023 100.8 (A)  78.0 - 100.0 fl Final   • MCH 03/16/2023 33.0  26.0 - 34.0 pg Final   • MCHC 03/16/2023 32.7  32.0 - 36.5 g/dL Final   • RDW-CV 03/16/2023 12.2  11.0 - 15.0 % Final   • RDW-SD 03/16/2023 45.6  39.0 - 50.0 fL Final   • PLT 03/16/2023 232  140 - 450 K/mcL Final   • NRBC 03/16/2023 0  <=0 /100 WBC Final   • Neutrophil, Percent 03/16/2023 53  % Final   • Lymphocytes, Percent 03/16/2023 37  % Final   • Mono, Percent 03/16/2023 9  % Final   • Eosinophils, Percent 03/16/2023 1  % Final   • Basophils, Percent 03/16/2023 0  % Final   • Immature Granulocytes 03/16/2023 0  % Final   • Absolute Neutrophils 03/16/2023 2.5  1.8 - 7.7 K/mcL Final   • Absolute Lymphocytes 03/16/2023 1.8  1.0 - 4.0 K/mcL Final   • Absolute Monocytes 03/16/2023 0.4  0.3 - 0.9 K/mcL Final    • Absolute Eosinophils  03/16/2023 0.1  0.0 - 0.5 K/mcL Final   • Absolute Basophils 03/16/2023 0.0  0.0 - 0.3 K/mcL Final   • Absolute Immature Granulocytes 03/16/2023 0.0  0.0 - 0.2 K/mcL Final       ASSESSMENT AND PLAN:  This 70 year old female presents with :  1. Medicare annual wellness visit, subsequent    2. Benign hypertension    3. Iron deficiency anemia secondary to inadequate dietary iron intake    4. Aneurysm artery, neck (CMD)    5. DJD (degenerative joint disease), lumbosacral    6. Aneurysm (CMD)    7. Postmenopausal        Orders Placed This Encounter   •  - Subsequent Medicare Wellness Visit - Select if billing add'l E/M   • BD Dexa Axial Skeleton       PLAN:    Medicare annual wellness visit, subsequent  Labs  Reviewed and discussed previous reports.  Continue taking vitamin D supplements.  Reviewed and discussed diagnostic criteria of lipid panel.  Educated about the diet changes to improve HDL.    Vision changes  Advised visiting ophthalmologist as scheduled.    Benign hypertension  Currently, stable.  Continue taking Amlodipine, Valsartan, and Metoprolol as prescribed.    Iron deficiency anemia secondary to inadequate dietary iron intake  Currently, RBC has improved. MCV is mildly elevated. So likely have folate or vitamin B12 deficiency.  Reviewed and discussed previous reports.  Continue taking supplements daily.    DJD (degenerative joint disease), lumbosacral  Currently, stable.  Continue Norco as needed.    Aneurysm (CMD)Aneurysm artery, neck (CMD)  Currently, stable.  Reviewed and discussed previous MRI and CTA reports.  Recommended MRI every 2 years to check aneurysms.  Patient denied undergoing MRI now.  Explained about the pathophysiology, complications, and treatment of aneurysms.      Postmenopausal  Currently, stable.  Reviewed and discussed previous reports.  Ordered BD DEXA axial skeleton.  Educated about the diet changes to improve calcium.       Refer to orders.  Medical  compliance with plan discussed and risks of non-compliance reviewed.  Patient education completed on disease process, etiology & prognosis.  Proper usage and side effects of medications reviewed & discussed.  Patient understands and agrees with the plan.  Return to clinic as clinically indicated as discussed with the patient who verbalized understanding of the plan and is in agreement with the plan.    Return in about 1 year (around 3/23/2024) for Medicare Wellness Visit with fasting labs .    I,  Dr. Alexa Moody, have created a visit summary document based on the audio recording between ORTIZ Muhammad and this patient for the physician to review, edit as needed, and authenticate.    Creation Date: 3/24/2023    I have reviewed and edited the visit summary above and attest that it is accurate. ORTIZ Austin

## 2023-03-27 RX ORDER — WARFARIN SODIUM 2.5 MG/1
TABLET ORAL
Qty: 270 TABLET | Refills: 1 | Status: SHIPPED | OUTPATIENT
Start: 2023-03-27

## 2023-03-28 ENCOUNTER — ANTICOAGULATION VISIT (OUTPATIENT)
Dept: PHARMACY | Facility: HOSPITAL | Age: 48
End: 2023-03-28
Payer: MEDICARE

## 2023-03-28 LAB — INR PPP: 2.9

## 2023-03-28 RX ORDER — TORSEMIDE 20 MG/1
TABLET ORAL
Qty: 150 TABLET | Refills: 0 | Status: SHIPPED | OUTPATIENT
Start: 2023-03-28

## 2023-03-28 RX ORDER — ATORVASTATIN CALCIUM 10 MG/1
TABLET, FILM COATED ORAL
Qty: 90 TABLET | Refills: 0 | Status: SHIPPED | OUTPATIENT
Start: 2023-03-28

## 2023-03-28 NOTE — PROGRESS NOTES
Anticoagulation Clinic Progress Note    Anticoagulation Summary  As of 3/28/2023    INR goal:  2.0-3.0   TTR:  62.1 % (2.2 y)   INR used for dosin.90 (3/28/2023)   Warfarin maintenance plan:  7.5 mg every day; Starting 3/28/2023   Weekly warfarin total:  52.5 mg   No change documented:  Zina De La Rosa RPH   Plan last modified:  Zina De La Rosa RPH (2022)   Next INR check:  2023   Priority:  High   Target end date:  Indefinite    Indications    Other acute pulmonary embolism  unspecified whether acute cor pulmonale present (HCC) (Resolved) [I26.99]  Acute pulmonary embolism  unspecified pulmonary embolism type  unspecified whether acute cor pulmonale present (HCC) (Resolved) [I26.99]             Anticoagulation Episode Summary     INR check location:      Preferred lab:      Send INR reminders to:  [facility]    Comments:  **Home Testing Program**      Anticoagulation Care Providers     Provider Role Specialty Phone number    Lisset Melton MD Referring Cardiology [phone]          Clinic Interview:  No pertinent clinical findings have been reported.    INR History:  Anticoagulation Monitoring 3/15/2023 3/15/2023 3/28/2023   INR 2.70 2.70 2.90   INR Date 3/15/2023 3/15/2023 3/28/2023   INR Goal 2.0-3.0 2.0-3.0 2.0-3.0   Trend Same Same Same   Last Week Total 52.5 mg 52.5 mg 52.5 mg   Next Week Total 52.5 mg 52.5 mg 52.5 mg   Sun 7.5 mg 7.5 mg 7.5 mg   Mon 7.5 mg 7.5 mg 7.5 mg   Tue 7.5 mg 7.5 mg 7.5 mg   Wed 7.5 mg 7.5 mg 7.5 mg   Thu 7.5 mg 7.5 mg 7.5 mg   Fri 7.5 mg 7.5 mg 7.5 mg   Sat 7.5 mg 7.5 mg 7.5 mg   Visit Report - - -   Some recent data might be hidden       Plan:  1. INR is therapeutic today- see above in Anticoagulation Summary.    Nina Saez to continue their warfarin regimen- see above in Anticoagulation Summary.  2. Follow up in 1 week  3. Pt has agreed to only be called if INR out of range. They have been instructed to call if any changes in  medications, doses, concerns, etc. Patient expresses understanding and has no further questions at this time.    Zina De La Rosa, [facility]

## 2023-03-30 ENCOUNTER — TELEPHONE (OUTPATIENT)
Dept: CARDIOLOGY | Facility: HOSPITAL | Age: 48
End: 2023-03-30
Payer: MEDICARE

## 2023-03-30 NOTE — TELEPHONE ENCOUNTER
Patient called [facility] this morning and left a voicemail. She states that she is out of needles for her shots.   Please send a refill to her pharmacy for the BD Pen Needle.    Thanks!  Shae HUANG [facility]

## 2023-03-31 RX ORDER — PEN NEEDLE, DIABETIC 32GX 5/32"
NEEDLE, DISPOSABLE MISCELLANEOUS
Qty: 100 EACH | Refills: 0 | Status: SHIPPED | OUTPATIENT
Start: 2023-03-31

## 2023-03-31 RX ORDER — PEN NEEDLE, DIABETIC 32GX 5/32"
NEEDLE, DISPOSABLE MISCELLANEOUS
Qty: 100 EACH | Refills: 0 | Status: SHIPPED | OUTPATIENT
Start: 2023-03-31 | End: 2023-03-31

## 2023-03-31 RX ORDER — PEN NEEDLE, DIABETIC 32GX 5/32"
NEEDLE, DISPOSABLE MISCELLANEOUS
Qty: 100 EACH | Refills: 0 | Status: SHIPPED | OUTPATIENT
Start: 2023-03-31 | End: 2023-03-31 | Stop reason: SDUPTHER

## 2023-04-03 ENCOUNTER — TRANSCRIBE ORDERS (OUTPATIENT)
Dept: ADMINISTRATIVE | Facility: HOSPITAL | Age: 48
End: 2023-04-03
Payer: MEDICARE

## 2023-04-03 DIAGNOSIS — Z12.31 ENCOUNTER FOR SCREENING MAMMOGRAM FOR MALIGNANT NEOPLASM OF BREAST: Primary | ICD-10-CM

## 2023-04-04 DIAGNOSIS — F41.9 ANXIETY: ICD-10-CM

## 2023-04-07 ENCOUNTER — ANTICOAGULATION VISIT (OUTPATIENT)
Dept: PHARMACY | Facility: HOSPITAL | Age: 48
End: 2023-04-07
Payer: MEDICARE

## 2023-04-07 LAB — INR PPP: 2.4

## 2023-04-07 NOTE — PROGRESS NOTES
Anticoagulation Clinic Progress Note    Anticoagulation Summary  As of 2023    INR goal:  2.0-3.0   TTR:  62.5 % (2.3 y)   INR used for dosin.40 (2023)   Warfarin maintenance plan:  7.5 mg every day; Starting 2023   Weekly warfarin total:  52.5 mg   No change documented:  Zina De La Rosa RPH   Plan last modified:  Zina De La Rosa RPH (2022)   Next INR check:  2023   Priority:  High   Target end date:  Indefinite    Indications    Other acute pulmonary embolism  unspecified whether acute cor pulmonale present (Resolved) [I26.99]  Acute pulmonary embolism  unspecified pulmonary embolism type  unspecified whether acute cor pulmonale present (Resolved) [I26.99]             Anticoagulation Episode Summary     INR check location:      Preferred lab:      Send INR reminders to:   [facility]    Comments:  **Home Testing Program**      Anticoagulation Care Providers     Provider Role Specialty Phone number    Lisset Melton MD Referring Cardiology [phone]          Clinic Interview:  No pertinent clinical findings have been reported.    INR History:  Anticoagulation Monitoring 3/15/2023 3/28/2023 2023   INR 2.70 2.90 2.40   INR Date 3/15/2023 3/28/2023 2023   INR Goal 2.0-3.0 2.0-3.0 2.0-3.0   Trend Same Same Same   Last Week Total 52.5 mg 52.5 mg 52.5 mg   Next Week Total 52.5 mg 52.5 mg 52.5 mg   Sun 7.5 mg 7.5 mg 7.5 mg   Mon 7.5 mg 7.5 mg 7.5 mg   Tue 7.5 mg 7.5 mg 7.5 mg   Wed 7.5 mg 7.5 mg 7.5 mg   Thu 7.5 mg 7.5 mg 7.5 mg   Fri 7.5 mg 7.5 mg 7.5 mg   Sat 7.5 mg 7.5 mg 7.5 mg   Visit Report - - -   Some recent data might be hidden       Plan:  1. INR is therapeutic today- see above in Anticoagulation Summary.    Nina Saez to continue their warfarin regimen- see above in Anticoagulation Summary.  2. Follow up in 1 week  3. Pt has agreed to only be called if INR out of range. They have been instructed to call if any changes in medications, doses,  concerns, etc. Patient expresses understanding and has no further questions at this time.    Zina De La Rosa, [facility], [facility]

## 2023-04-13 ENCOUNTER — ANTICOAGULATION VISIT (OUTPATIENT)
Dept: PHARMACY | Facility: HOSPITAL | Age: 48
End: 2023-04-13
Payer: MEDICARE

## 2023-04-13 LAB — INR PPP: 2.7

## 2023-04-13 PROCEDURE — G0249 PROVIDE INR TEST MATER/EQUIP: HCPCS

## 2023-04-13 NOTE — PROGRESS NOTES
Anticoagulation Clinic Progress Note    Anticoagulation Summary  As of 2023    INR goal:  2.0-3.0   TTR:  62.8 % (2.3 y)   INR used for dosin.70 (2023)   Warfarin maintenance plan:  7.5 mg every day; Starting 2023   Weekly warfarin total:  52.5 mg   No change documented:  Zina De La Rosa RPH   Plan last modified:  Zina De La Rosa RPH (2022)   Next INR check:  2023   Priority:  High   Target end date:  Indefinite    Indications    Other acute pulmonary embolism  unspecified whether acute cor pulmonale present (Resolved) [I26.99]  Acute pulmonary embolism  unspecified pulmonary embolism type  unspecified whether acute cor pulmonale present (Resolved) [I26.99]             Anticoagulation Episode Summary     INR check location:      Preferred lab:      Send INR reminders to:   [facility]    Comments:  **Home Testing Program**      Anticoagulation Care Providers     Provider Role Specialty Phone number    Lisset Melton MD Referring Cardiology [phone]          Clinic Interview:  No pertinent clinical findings have been reported.    INR History:      3/15/2023     1:51 PM 3/28/2023    12:00 AM 3/28/2023    12:00 PM 2023    12:00 AM 2023     3:46 PM 2023    12:00 AM 2023    11:30 AM   Anticoagulation Monitoring   INR 2.70  2.90  2.40  2.70   INR Date 3/15/2023  3/28/2023  2023  2023   INR Goal 2.0-3.0  2.0-3.0  2.0-3.0  2.0-3.0   Trend Same  Same  Same  Same   Last Week Total 52.5 mg  52.5 mg  52.5 mg  52.5 mg   Next Week Total 52.5 mg  52.5 mg  52.5 mg  52.5 mg   Sun 7.5 mg  7.5 mg  7.5 mg  7.5 mg   Mon 7.5 mg  7.5 mg  7.5 mg  7.5 mg   Tue 7.5 mg  7.5 mg  7.5 mg  7.5 mg   Wed 7.5 mg  7.5 mg  7.5 mg  7.5 mg   Thu 7.5 mg  7.5 mg  7.5 mg  7.5 mg   Fri 7.5 mg  7.5 mg  7.5 mg  7.5 mg   Sat 7.5 mg  7.5 mg  7.5 mg  7.5 mg   Historical INR  2.90       2.40       2.70             This result is from an external source.       Plan:  1. INR is  therapeutic today- see above in Anticoagulation Summary.    Nina Saez to continue their warfarin regimen- see above in Anticoagulation Summary.  2. Follow up in 1 week  3. Pt has agreed to only be called if INR out of range. They have been instructed to call if any changes in medications, doses, concerns, etc. Patient expresses understanding and has no further questions at this time.    Zina De La Rosa, [facility]

## 2023-04-18 ENCOUNTER — OFFICE VISIT (OUTPATIENT)
Dept: ENDOCRINOLOGY | Age: 48
End: 2023-04-18
Payer: MEDICARE

## 2023-04-18 VITALS
OXYGEN SATURATION: 97 % | HEIGHT: 65 IN | HEART RATE: 79 BPM | BODY MASS INDEX: 36.89 KG/M2 | WEIGHT: 221.4 LBS | TEMPERATURE: 97.7 F | SYSTOLIC BLOOD PRESSURE: 120 MMHG | DIASTOLIC BLOOD PRESSURE: 80 MMHG

## 2023-04-18 DIAGNOSIS — R82.998 LOW URINARY CORTISOL: Primary | ICD-10-CM

## 2023-04-18 NOTE — PROGRESS NOTES
New Patient      Chief Complaint    Chief Complaint   Patient presents with   • Low urinary cortisol        HPI:   Nina Saez is a 47 y.o. female sent to us for consultative evaluation and management of a low urinary cortisol.  The patient has congestive heart failure and is on a salt restricted diet.  A visiting nurse came to her house looked at her and said she had symptoms or features suggestive of Cushing's and some tests with therefore ordered.  Her 24-hour urine urine free cortisol on January 29, 2023 was reported as 3 mcg per 24 hours.  A referral was therefore put to endocrinology for further investigation.  The patient does not have any features remotely suggestive of Cushing's, such as easy bruising, poor wound healing, bone fractures or kidney stones.  And she does not have any history of diabetes or prediabetes.  Her A1c on September 12, 2022, was 5.0% compared to 5.5% on April 22, 2022.  Most importantly she does not have any features suggestive of adrenocortical insufficiency or Rebel's disease. She does not have any symptoms of early morning nausea, vomiting or headaches or weight loss.  She maintains normal blood pressure readings and in the clinic her blood pressure was 120/80 mmHg.  She does not have any history of hyperkalemia to suggest [place]'s disease.  Most importantly her a.m. cortisol on January 26, 2023, was normal at 15.2 mcg/dL.        Past Medical History:   Diagnosis Date   • AICD (automatic cardioverter/defibrillator) present 03/06/2020   • Asthma    • CHF (congestive heart failure)    • Class 2 obesity in adult    • Coronary artery disease 2017   • Depression 2017   • Diabetes mellitus    • Fracture, foot 3/20    [place]   • Grade II diastolic dysfunction     Per echocardiogram 3/2021   • H/O Closed trimalleolar fracture    • Headache 2000   • HIV (human immunodeficiency virus infection)    • Hypertension    • LBBB (left bundle branch block)    • NICM (nonischemic  cardiomyopathy)     2020 EF 6% per echocardiogram; AICD present   • Nonrheumatic mitral valve regurgitation 2021    Moderate per echo 3/2021   • Nonrheumatic tricuspid valve regurgitation     Moderate per echocardiogram 3/2021   • SHAREE (obstructive sleep apnea) 2023          ROS:  Pertinent to this visit, only as mentioned above.  The rest was negative.    Social History     Socioeconomic History   • Marital status: Legally    Tobacco Use   • Smoking status: Former     Packs/day: 0.50     Years: 20.00     Pack years: 10.00     Types: Cigarettes     Quit date:      Years since quittin.2   • Smokeless tobacco: Never   Vaping Use   • Vaping Use: Never used   Substance and Sexual Activity   • Alcohol use: Yes     Comment: holiday and special occ//caffeine use: Occ   • Drug use: Yes     Types: Marijuana     Comment: gummy   • Sexual activity: Not Currently     Partners: Male       Physical Exam:  GENERAL: She looked well.  Obese.  HEENT: Normal examination.  LUNGS: Clear to auscultation bilaterally  CVS: RRR  EXTREMITIES: Examination.    Assessment:  1.  Low urine cortisol in a patient sent to us to rule out Cushing's.    Diagnoses and all orders for this visit:    1. Low urinary cortisol (Primary)    Recommendations:  1.  We reviewed with Ms. Saez, the reason for the endocrinology consultation and discussed the fact that she does not have any features that remotely suggest Cushing's and most importantly she does not have any features of Rutherford's disease either.  2.  Her a.m. cortisol is normal and she does not have any symptoms suggestive of Rutherford's or any blood pressure problems or electrolyte abnormalities to suggest that.  3. The patient was happy with this.  4. She will therefore follow-up with her primary care physician.

## 2023-04-20 ENCOUNTER — ANTICOAGULATION VISIT (OUTPATIENT)
Dept: PHARMACY | Facility: HOSPITAL | Age: 48
End: 2023-04-20
Payer: MEDICARE

## 2023-04-20 LAB — INR PPP: 1.9

## 2023-04-20 NOTE — PROGRESS NOTES
Anticoagulation Clinic Progress Note    Anticoagulation Summary  As of 2023    INR goal:  2.0-3.0   TTR:  62.9 % (2.3 y)   INR used for dosin.90 (2023)   Warfarin maintenance plan:  7.5 mg every day; Starting 2023   Weekly warfarin total:  52.5 mg   No change documented:  Zina De La Rosa RPH   Plan last modified:  Zina De La Rosa RPH (2022)   Next INR check:  2023   Priority:  High   Target end date:  Indefinite    Indications    Other acute pulmonary embolism  unspecified whether acute cor pulmonale present (Resolved) [I26.99]  Acute pulmonary embolism  unspecified pulmonary embolism type  unspecified whether acute cor pulmonale present (Resolved) [I26.99]             Anticoagulation Episode Summary     INR check location:      Preferred lab:      Send INR reminders to:   [facility]    Comments:  **Home Testing Program**      Anticoagulation Care Providers     Provider Role Specialty Phone number    Lisset Melton MD Referring Cardiology [phone]          Clinic Interview:  Patient Findings     Positives:  Change in diet/appetite    Negatives:  Signs/symptoms of thrombosis, Signs/symptoms of bleeding,   Laboratory test error suspected, Change in health, Change in alcohol use,   Change in activity, Upcoming invasive procedure, Emergency department   visit, Upcoming dental procedure, Missed doses, Extra doses, Change in   medications, Hospital admission, Bruising, Other complaints    Comments:  Increase in greens       Clinical Outcomes     Negatives:  Major bleeding event, Thromboembolic event,   Anticoagulation-related hospital admission, Anticoagulation-related ED   visit, Anticoagulation-related fatality    Comments:  Increase in greens         INR History:      3/28/2023    12:00 PM 2023    12:00 AM 2023     3:46 PM 2023    12:00 AM 2023    11:30 AM 2023    12:00 AM 2023     3:40 PM   Anticoagulation Monitoring   INR 2.90   2.40  2.70  1.90   INR Date 3/28/2023  4/7/2023  4/13/2023  4/20/2023   INR Goal 2.0-3.0  2.0-3.0  2.0-3.0  2.0-3.0   Trend Same  Same  Same  Same   Last Week Total 52.5 mg  52.5 mg  52.5 mg  52.5 mg   Next Week Total 52.5 mg  52.5 mg  52.5 mg  52.5 mg   Sun 7.5 mg  7.5 mg  7.5 mg  7.5 mg   Mon 7.5 mg  7.5 mg  7.5 mg  7.5 mg   Tue 7.5 mg  7.5 mg  7.5 mg  7.5 mg   Wed 7.5 mg  7.5 mg  7.5 mg  7.5 mg   Thu 7.5 mg  7.5 mg  7.5 mg  7.5 mg   Fri 7.5 mg  7.5 mg  7.5 mg  7.5 mg   Sat 7.5 mg  7.5 mg  7.5 mg  7.5 mg   Historical INR  2.40       2.70       1.90             This result is from an external source.       Plan:  1. INR is Subtherapeutic today- see above in Anticoagulation Summary.   Will instruct Nina Saez to Continue their warfarin regimen as INR is very close to goal- see above in Anticoagulation Summary.  2. Follow up in 1 weeks  3.  They have been instructed to call if any changes in medications, doses, concerns, etc. Patient expresses understanding and has no further questions at this time.    Zina De La Rosa [facility]

## 2023-04-22 DIAGNOSIS — I50.20 HFREF (HEART FAILURE WITH REDUCED EJECTION FRACTION): Primary | ICD-10-CM

## 2023-04-24 DIAGNOSIS — I50.20 HFREF (HEART FAILURE WITH REDUCED EJECTION FRACTION): Primary | ICD-10-CM

## 2023-04-24 RX ORDER — TORSEMIDE 20 MG/1
TABLET ORAL
Qty: 150 TABLET | Refills: 0 | Status: SHIPPED | OUTPATIENT
Start: 2023-04-24

## 2023-04-24 RX ORDER — EMPAGLIFLOZIN 10 MG/1
TABLET, FILM COATED ORAL
Qty: 30 TABLET | Refills: 0 | Status: SHIPPED | OUTPATIENT
Start: 2023-04-24

## 2023-04-24 NOTE — TELEPHONE ENCOUNTER
Last Office Visit: 02/28/2023    Labs: 02/28/2023  Next Follow-up appointment: 08/31/2023    Fercho Bradshaw DNP, MSN, RN  RN Clinical Coordiantor  [facility]

## 2023-05-03 ENCOUNTER — ANTICOAGULATION VISIT (OUTPATIENT)
Dept: PHARMACY | Facility: HOSPITAL | Age: 48
End: 2023-05-03
Payer: MEDICARE

## 2023-05-03 LAB — INR PPP: 2.1

## 2023-05-03 NOTE — PROGRESS NOTES
Anticoagulation Clinic Progress Note    Anticoagulation Summary  As of 5/3/2023    INR goal:  2.0-3.0   TTR:  62.7 % (2.3 y)   INR used for dosin.10 (5/3/2023)   Warfarin maintenance plan:  7.5 mg every day; Starting 5/3/2023   Weekly warfarin total:  52.5 mg   No change documented:  Zina De La Rosa RPH   Plan last modified:  Zina De La Rosa RPH (2022)   Next INR check:  5/10/2023   Priority:  High   Target end date:  Indefinite    Indications    Other acute pulmonary embolism  unspecified whether acute cor pulmonale present (Resolved) [I26.99]  Acute pulmonary embolism  unspecified pulmonary embolism type  unspecified whether acute cor pulmonale present (Resolved) [I26.99]             Anticoagulation Episode Summary     INR check location:      Preferred lab:      Send INR reminders to:   [facility]    Comments:  **Home Testing Program**      Anticoagulation Care Providers     Provider Role Specialty Phone number    Lisset Melton MD Referring Cardiology [phone]          Clinic Interview:  No pertinent clinical findings have been reported.    INR History:      2023     3:46 PM 2023    12:00 AM 2023    11:30 AM 2023    12:00 AM 2023     3:40 PM 5/3/2023    12:00 AM 5/3/2023     1:00 PM   Anticoagulation Monitoring   INR 2.40  2.70  1.90  2.10   INR Date 2023  2023  2023  5/3/2023   INR Goal 2.0-3.0  2.0-3.0  2.0-3.0  2.0-3.0   Trend Same  Same  Same  Same   Last Week Total 52.5 mg  52.5 mg  52.5 mg  52.5 mg   Next Week Total 52.5 mg  52.5 mg  52.5 mg  52.5 mg   Sun 7.5 mg  7.5 mg  7.5 mg  7.5 mg   Mon 7.5 mg  7.5 mg  7.5 mg  7.5 mg   Tue 7.5 mg  7.5 mg  7.5 mg  7.5 mg   Wed 7.5 mg  7.5 mg  7.5 mg  7.5 mg   Thu 7.5 mg  7.5 mg  7.5 mg  7.5 mg   Fri 7.5 mg  7.5 mg  7.5 mg  7.5 mg   Sat 7.5 mg  7.5 mg  7.5 mg  7.5 mg   Historical INR  2.70       1.90       2.10             This result is from an external source.       Plan:  1. INR is  therapeutic today- see above in Anticoagulation Summary.    Nina Saez to continue their warfarin regimen- see above in Anticoagulation Summary.  2. Follow up in 1 week  3. Pt has agreed to only be called if INR out of range. They have been instructed to call if any changes in medications, doses, concerns, etc. Patient expresses understanding and has no further questions at this time.    Zina De La Rosa, [facility]

## 2023-05-11 ENCOUNTER — ANTICOAGULATION VISIT (OUTPATIENT)
Dept: PHARMACY | Facility: HOSPITAL | Age: 48
End: 2023-05-11
Payer: MEDICARE

## 2023-05-11 LAB — INR PPP: 1.7

## 2023-05-11 NOTE — PROGRESS NOTES
Anticoagulation Clinic Progress Note    Anticoagulation Summary  As of 2023    INR goal:  2.0-3.0   TTR:  62.3 % (2.4 y)   INR used for dosin.70 (2023)   Warfarin maintenance plan:  7.5 mg every day; Starting 2023   Weekly warfarin total:  52.5 mg   Plan last modified:  Zina De La Rosa RPH (2022)   Next INR check:  2023   Priority:  High   Target end date:  Indefinite    Indications    Other acute pulmonary embolism  unspecified whether acute cor pulmonale present (Resolved) [I26.99]  Acute pulmonary embolism  unspecified pulmonary embolism type  unspecified whether acute cor pulmonale present (Resolved) [I26.99]             Anticoagulation Episode Summary     INR check location:      Preferred lab:      Send INR reminders to:  [facility] TEST POOL    Comments:  **Home Testing Program**      Anticoagulation Care Providers     Provider Role Specialty Phone number    Lisset Melton MD Referring Cardiology [phone]          Clinic Interview:  Patient Findings     Positives:  Missed doses    Negatives:  Signs/symptoms of thrombosis, Signs/symptoms of bleeding,   Laboratory test error suspected, Change in health, Change in alcohol use,   Change in activity, Upcoming invasive procedure, Emergency department   visit, Upcoming dental procedure, Extra doses, Change in medications,   Change in diet/appetite, Hospital admission, Bruising, Other complaints    Comments:  Missed dose on Monday       Clinical Outcomes     Negatives:  Major bleeding event, Thromboembolic event,   Anticoagulation-related hospital admission, Anticoagulation-related ED   visit, Anticoagulation-related fatality    Comments:  Missed dose on Monday         INR History:      2023    11:30 AM 2023    12:00 AM 2023     3:40 PM 5/3/2023    12:00 AM 5/3/2023     1:00 PM 2023    12:00 AM 2023     1:37 PM   Anticoagulation Monitoring   INR 2.70  1.90  2.10  1.70   INR Date 2023   4/20/2023  5/3/2023  5/11/2023   INR Goal 2.0-3.0  2.0-3.0  2.0-3.0  2.0-3.0   Trend Same  Same  Same  Same   Last Week Total 52.5 mg  52.5 mg  52.5 mg  45 mg   Next Week Total 52.5 mg  52.5 mg  52.5 mg  55 mg   Sun 7.5 mg  7.5 mg  7.5 mg  7.5 mg   Mon 7.5 mg  7.5 mg  7.5 mg  7.5 mg   Tue 7.5 mg  7.5 mg  7.5 mg  7.5 mg   Wed 7.5 mg  7.5 mg  7.5 mg  7.5 mg   Thu 7.5 mg  7.5 mg  7.5 mg  10 mg (5/11)   Fri 7.5 mg  7.5 mg  7.5 mg  7.5 mg   Sat 7.5 mg  7.5 mg  7.5 mg  7.5 mg   Historical INR  1.90       2.10       1.70             This result is from an external source.       Plan:  1. INR is Subtherapeutic today- see above in Anticoagulation Summary.   Will instruct Nina PHAM Saez to Increase their warfarin regimen- see above in Anticoagulation Summary.  Boost to 10 mg today then resume 7.5 mg daily, rck 1 week   2. Follow up in 1 weeks  3.  They have been instructed to call if any changes in medications, doses, concerns, etc. Patient expresses understanding and has no further questions at this time.    Zina De La Rosa [facility]

## 2023-05-15 ENCOUNTER — HOSPITAL ENCOUNTER (OUTPATIENT)
Dept: MAMMOGRAPHY | Facility: HOSPITAL | Age: 48
Discharge: HOME | End: 2023-05-15
Admitting: NURSE PRACTITIONER
Payer: MEDICARE

## 2023-05-15 DIAGNOSIS — Z12.31 ENCOUNTER FOR SCREENING MAMMOGRAM FOR MALIGNANT NEOPLASM OF BREAST: ICD-10-CM

## 2023-05-15 PROCEDURE — 77063 BREAST TOMOSYNTHESIS BI: CPT

## 2023-05-15 PROCEDURE — 77067 SCR MAMMO BI INCL CAD: CPT

## 2023-05-19 ENCOUNTER — ANTICOAGULATION VISIT (OUTPATIENT)
Dept: PHARMACY | Facility: HOSPITAL | Age: 48
End: 2023-05-19
Payer: MEDICARE

## 2023-05-19 DIAGNOSIS — G43.719 INTRACTABLE CHRONIC MIGRAINE WITHOUT AURA AND WITHOUT STATUS MIGRAINOSUS: ICD-10-CM

## 2023-05-19 LAB — INR PPP: 2

## 2023-05-19 PROCEDURE — G0249 PROVIDE INR TEST MATER/EQUIP: HCPCS

## 2023-05-19 RX ORDER — TOPIRAMATE 25 MG/1
TABLET ORAL
Qty: 60 TABLET | Refills: 0 | Status: SHIPPED | OUTPATIENT
Start: 2023-05-19

## 2023-05-19 NOTE — PROGRESS NOTES
Anticoagulation Clinic Progress Note    Anticoagulation Summary  As of 2023    INR goal:  2.0-3.0   TTR:  61.9 % (2.4 y)   INR used for dosin.00 (2023)   Warfarin maintenance plan:  7.5 mg every day; Starting 2023   Weekly warfarin total:  52.5 mg   No change documented:  Zina De La Rosa RPH   Plan last modified:  Zina De La Rosa RPH (2022)   Next INR check:  2023   Priority:  High   Target end date:  Indefinite    Indications    Other acute pulmonary embolism  unspecified whether acute cor pulmonale present (Resolved) [I26.99]  Acute pulmonary embolism  unspecified pulmonary embolism type  unspecified whether acute cor pulmonale present (Resolved) [I26.99]             Anticoagulation Episode Summary     INR check location:      Preferred lab:      Send INR reminders to:   [facility] TEST [facility]    Comments:  **Home Testing Program**      Anticoagulation Care Providers     Provider Role Specialty Phone number    Lisset Melton MD Referring Cardiology [phone]          Clinic Interview:  No pertinent clinical findings have been reported.    INR History:      2023     3:40 PM 5/3/2023    12:00 AM 5/3/2023     1:00 PM 2023    12:00 AM 2023     1:37 PM 2023    12:00 AM 2023     2:00 PM   Anticoagulation Monitoring   INR 1.90  2.10  1.70  2.00   INR Date 2023  5/3/2023  2023  2023   INR Goal 2.0-3.0  2.0-3.0  2.0-3.0  2.0-3.0   Trend Same  Same  Same  Same   Last Week Total 52.5 mg  52.5 mg  45 mg  52.5 mg   Next Week Total 52.5 mg  52.5 mg  55 mg  52.5 mg   Sun 7.5 mg  7.5 mg  7.5 mg  7.5 mg   Mon 7.5 mg  7.5 mg  7.5 mg  7.5 mg   Tue 7.5 mg  7.5 mg  7.5 mg  7.5 mg   Wed 7.5 mg  7.5 mg  7.5 mg  7.5 mg   Thu 7.5 mg  7.5 mg  10 mg ()  7.5 mg   Fri 7.5 mg  7.5 mg  7.5 mg  7.5 mg   Sat 7.5 mg  7.5 mg  7.5 mg  7.5 mg   Historical INR  2.10       1.70       2.00             This result is from an external source.       Plan:  1. INR  is therapeutic today- see above in Anticoagulation Summary.    Nina Saez to continue their warfarin regimen- see above in Anticoagulation Summary.  2. Follow up in 1 week  3. Pt has agreed to only be called if INR out of range. They have been instructed to call if any changes in medications, doses, concerns, etc. Patient expresses understanding and has no further questions at this time.    Zina De La Rosa, [facility]

## 2023-05-21 DIAGNOSIS — I50.20 HFREF (HEART FAILURE WITH REDUCED EJECTION FRACTION): ICD-10-CM

## 2023-05-22 ENCOUNTER — HOSPITAL ENCOUNTER (OUTPATIENT)
Dept: CT IMAGING | Facility: HOSPITAL | Age: 48
Discharge: HOME | End: 2023-05-22
Admitting: PSYCHIATRY & NEUROLOGY
Payer: MEDICARE

## 2023-05-22 DIAGNOSIS — G43.719 INTRACTABLE CHRONIC MIGRAINE WITHOUT AURA AND WITHOUT STATUS MIGRAINOSUS: ICD-10-CM

## 2023-05-22 PROCEDURE — 70450 CT HEAD/BRAIN W/O DYE: CPT

## 2023-05-22 RX ORDER — TORSEMIDE 20 MG/1
TABLET ORAL
Qty: 150 TABLET | Refills: 0 | Status: SHIPPED | OUTPATIENT
Start: 2023-05-22

## 2023-05-22 NOTE — TELEPHONE ENCOUNTER
Last Office Visit: 02/28/2023  Labs: 02/28/2023  Next Follow-up appointment: 08/31/2023      Reviewed by:    Fercho Bradshaw DNP, MSN, RN  RN Clinical Coordinator  [facility]

## 2023-05-23 ENCOUNTER — OFFICE VISIT (OUTPATIENT)
Dept: CARDIOLOGY | Facility: CLINIC | Age: 48
End: 2023-05-23
Payer: MEDICARE

## 2023-05-23 ENCOUNTER — TELEPHONE (OUTPATIENT)
Dept: NEUROLOGY | Facility: CLINIC | Age: 48
End: 2023-05-23
Payer: MEDICARE

## 2023-05-23 VITALS
WEIGHT: 220.4 LBS | DIASTOLIC BLOOD PRESSURE: 68 MMHG | SYSTOLIC BLOOD PRESSURE: 96 MMHG | HEART RATE: 89 BPM | BODY MASS INDEX: 34.59 KG/M2 | OXYGEN SATURATION: 99 % | HEIGHT: 67 IN

## 2023-05-23 DIAGNOSIS — I36.1 NONRHEUMATIC TRICUSPID VALVE REGURGITATION: ICD-10-CM

## 2023-05-23 DIAGNOSIS — I10 ESSENTIAL HYPERTENSION: ICD-10-CM

## 2023-05-23 DIAGNOSIS — B20 HIV INFECTION, UNSPECIFIED SYMPTOM STATUS: ICD-10-CM

## 2023-05-23 DIAGNOSIS — I42.8 NICM (NONISCHEMIC CARDIOMYOPATHY): ICD-10-CM

## 2023-05-23 DIAGNOSIS — I34.0 NONRHEUMATIC MITRAL VALVE REGURGITATION: ICD-10-CM

## 2023-05-23 DIAGNOSIS — I50.20 HFREF (HEART FAILURE WITH REDUCED EJECTION FRACTION): Primary | ICD-10-CM

## 2023-05-23 DIAGNOSIS — I51.89 DIASTOLIC DYSFUNCTION: ICD-10-CM

## 2023-05-23 RX ORDER — FLUTICASONE PROPIONATE 50 MCG
2 SPRAY, SUSPENSION (ML) NASAL DAILY
Qty: 11.1 G | Refills: 3 | Status: SHIPPED | OUTPATIENT
Start: 2023-05-23

## 2023-05-23 NOTE — TELEPHONE ENCOUNTER
----- Message from Claire Gauthier MD sent at 5/23/2023 11:18 AM EDT -----  Nothing definitively concerning.  We can discuss results on follow up but nothing to be concerned about at this time.

## 2023-05-23 NOTE — PROGRESS NOTES
[facility] CARDIOLOGY  [address]  [facility] [address]  Phone: [phone]  Fax: [phone]      Patient Name: Nina Saez  :1975  Age: 47 y.o.  Primary Cardiologist: Lisset Melton MD  Encounter Provider:  SAMUEL Valdez      Chief Complaint     Chief Complaint: Palpitations      SUBJECTIVE     History of Present Illness:  Nina Saez is a 47 y.o. black female whose medical history includes HIV, hypertension, obesity, asthma, and history of illicit drug use. She is followed in our office by Dr. Melton for dilated non-ischemic cardiomyopathy, chronic HFreF, and history of ICD implant.     23 Follow-up:  She is here for three month follow up.  Her blood pressure is a little bit low today but she states she just took her medicine prior to arrival.  She does not feel lightheaded or dizzy.  She has been having headaches and bilateral jaw pain; the pain radiates to both ears.  She has been taking Tylenol Sinus and regular Tylenol for this which is helping.  She does report that she grinds her teeth and is seeing her dentist in early 2023.  She states she has to keep her mouth closed for her nasal CPAP pillows.  She denies chest pain, dyspnea with exertion, orthopnea, or leg swelling.  She is taking her medications as prescribed.    2023 remote device interrogation showed normal device function with 9.5 years of battery life, sensing/impedance/thresholds reviewed, and there were no alerts.    Below is a summary of pertinent cardiology findings:  • She is followed by  for HIV.  • 2020 she was hospitalized for acute PE with RLL pulmonary infarct.  • HFreF and Dilated Cardiomyopathy: 2019 diagnosed with heart failure and cardiomyopathy at [facility] while in [place] visiting family.   • 2019 cardiac catheterization showed normal coronary arteries, LVEDP 12 mmHg, PCWP 13 mmHg, PAP 28/15 with  mean 19 mmHg, RAP 4 mmHg, CI 2.5 L/min/m2 and PVR 1.2 Wood units.   • July 2020 ER for short-windedness and found to be in heart failure after missing medications and eating more salt. Upgrade to BiV ICD was recommended.   • July 2020 echocardiogram showed severe LV dilation, EF 6%, very thin LV walls, grade 3 diastolic dysfunction, moderate to severe mitral insufficiency, moderate tricuspid insufficiency, and RVSP 48 mmHg. There was global hypokinesis and akinesis at the bases.   • September 2020 she was evaluated by Dr. Azul with EP and he felt she did not meet criteria for CRT-device.  • January 2021  Healthcare noted CPX showed peak VO2 11.9 at RER > 1.05 while on carvedilol. It showed heart related limitations in her functional capacity.  • December 2021 echocardiogram showed severe LV dilation, EF 21-25%, global hypokinesis, grade 2 diastolic dysfunction, normal RVSP, mildly reduced RV systolic function, and normal valvular function. Less valvular insufficiency when compared to previous.   • February 2022 saw Dr. Hernandez at  Heart Failure; no changes made. She did have COVID-19 in January 2022.   • September 2022 [facility]; instructed to increase Corlanor and Entresto to BID. She had bradycardia and felt terrible; Corlanor was stopped and Entresto was decreased to 49/41 mmHg.   • October 2022 Device interrogation showed VVI ICD function with 11.5 years of battery life left; 31% RV pacing.     Past Medical History:   Diagnosis Date   • AICD (automatic cardioverter/defibrillator) present 03/06/2020   • Asthma    • CHF (congestive heart failure)    • Class 2 obesity in adult    • Coronary artery disease 2017   • Depression 2017   • Diabetes mellitus    • Fracture, foot 3/20   • Grade II diastolic dysfunction    • H/O Closed trimalleolar fracture    • Headache 2000   • HIV (human immunodeficiency virus infection)    • Hypertension    • LBBB (left bundle branch block)    • NICM (nonischemic  cardiomyopathy)    • Nonrheumatic mitral valve regurgitation 2021   • Nonrheumatic tricuspid valve regurgitation    • SHAREE (obstructive sleep apnea) 2023       Past Surgical History:  •  has a past surgical history that includes Cardiac catheterization; Fracture surgery (Left, 2020); Cardiac defibrillator placement;  section; Tubal ligation; Oophorectomy; and Ankle fracture surgery (3/7/20).     Social History     Socioeconomic History   • Marital status: Legally    Tobacco Use   • Smoking status: Former     Packs/day: 0.50     Years: 20.00     Pack years: 10.00     Types: Cigarettes     Quit date:      Years since quittin.3   • Smokeless tobacco: Never   Vaping Use   • Vaping Use: Never used   Substance and Sexual Activity   • Alcohol use: Yes     Comment: holiday and special occ//caffeine use: Occ   • Drug use: Yes     Types: Marijuana     Comment: gummy   • Sexual activity: Not Currently     Partners: Male         Review of Systems     Review of Systems   HENT: Positive for ear pain.         Jaw pain   Cardiovascular: Positive for dyspnea on exertion. Negative for chest pain, claudication, cyanosis, irregular heartbeat, leg swelling, near-syncope, orthopnea, palpitations, paroxysmal nocturnal dyspnea and syncope.   Neurological: Positive for headaches.       Medications     Allergies as of 2023 - Reviewed 2023   Allergen Reaction Noted   • Lisinopril Cough 10/31/2017       Current Outpatient Medications on File Prior to Visit   Medication Sig   • acetaminophen (TYLENOL) 325 MG tablet Take 2 tablets by mouth Every 6 (Six) Hours As Needed for Mild Pain.   • albuterol sulfate  (90 Base) MCG/ACT inhaler INHALE 2 PUFFS BY MOUTH EVERY 4 HOURS AS NEEDED FOR WHEEZING   • atorvastatin (LIPITOR) 10 MG tablet Take 1 tablet by mouth once daily   • carvedilol (COREG) 3.125 MG tablet Take 1 tablet by mouth 2 (Two) Times a Day With Meals.   • CBD (cannabidiol) oral oil Take   by mouth.   • cyclobenzaprine (FLEXERIL) 10 MG tablet Take 1 tablet by mouth 3 (Three) Times a Day As Needed for Muscle Spasms.   • Drwox-Unwvz-Rksiqurz-TenofAF (Symtuza) 225-869-850-10 MG per tablet Take 1 tablet by mouth Daily.   • Diclofenac Sodium (VOLTAREN) 1 % gel gel Apply 4 g topically to the appropriate area as directed 2 (Two) Times a Day. Use per pkg insert   • diphenhydrAMINE (BENADRYL) 25 mg capsule Take 1 capsule by mouth Every 6 (Six) Hours As Needed for Itching (swelling).   • famotidine (Pepcid) 20 MG tablet Take 1 tablet by mouth 2 (Two) Times a Day As Needed for Heartburn.   • Insulin Pen Needle (BD Pen Needle Heidi 2nd Gen) 32G X 4 MM misc USE 1 NEEDLE ONCE DAILY WITH SAXENDA   • Jardiance 10 MG tablet tablet Take 1 tablet by mouth once daily   • nitroglycerin (NITROSTAT) 0.4 MG SL tablet Place 1 tablet under the tongue Every 5 (Five) Minutes As Needed for Chest Pain. Take no more than 3 doses in 15 minutes.   • potassium chloride (K-DUR,KLOR-CON) 20 MEQ CR tablet Take 3 tablets by mouth once daily   • Rimegepant Sulfate (Nurtec) 75 MG tablet dispersible tablet Take 1 tablet by mouth 1 (One) Time As Needed (migraine).   • sacubitril-valsartan (Entresto) 49-51 MG tablet Take 1 tablet by mouth 2 (Two) Times a Day.   • Saxenda 18 MG/3ML injection pen Inject 3 mg under the skin into the appropriate area as directed Daily.   • sertraline (ZOLOFT) 50 MG tablet Take 1 tablet by mouth once daily   • spironolactone (ALDACTONE) 25 MG tablet 1 tablet Daily.   • topiramate (TOPAMAX) 25 MG tablet Take 1 tablet by mouth twice daily   • torsemide (DEMADEX) 20 MG tablet TAKE 3 TABLETS BY MOUTH IN THE MORNING AND  2  TABS  IN  THE  AFTERNOON.   • vitamin D (ERGOCALCIFEROL) 19636 units capsule capsule Take 1 capsule by mouth Every 30 (Thirty) Days.   • warfarin (COUMADIN) 2.5 MG tablet Take 3 tablets (7.5 mg) by mouth daily or as directed.   • [DISCONTINUED] fluticasone (Flonase) 50 MCG/ACT nasal spray 2 sprays  "into the nostril(s) as directed by provider Daily.     No current facility-administered medications on file prior to visit.          OBJECTIVE     Vital Signs:   BP 96/68   Pulse 89   Ht 170.2 cm (67\")   Wt 100 kg (220 lb 6.4 oz)   SpO2 99%   BMI 34.52 kg/m²       Weight:  Wt Readings from Last 3 Encounters:   05/23/23 100 kg (220 lb 6.4 oz)   04/18/23 100 kg (221 lb 6.4 oz)   03/17/23 99.8 kg (220 lb)     Body mass index is 34.52 kg/m².      Physical Exam     Physical Exam  Constitutional:       General: She is not in acute distress.     Appearance: She is obese.   HENT:      Head: Normocephalic and atraumatic.      Mouth/Throat:      Mouth: Mucous membranes are moist.   Eyes:      General: No scleral icterus.     Extraocular Movements: Extraocular movements intact.      Conjunctiva/sclera: Conjunctivae normal.      Pupils: Pupils are equal, round, and reactive to light.   Cardiovascular:      Rate and Rhythm: Normal rate and regular rhythm.      Pulses: Normal pulses.      Heart sounds: S1 normal and S2 normal.   Pulmonary:      Effort: No respiratory distress.      Breath sounds: Normal breath sounds. No wheezing, rhonchi or rales.   Abdominal:      General: Bowel sounds are normal. There is no distension.      Palpations: Abdomen is soft.      Tenderness: There is no abdominal tenderness.   Musculoskeletal:         General: Normal range of motion.      Cervical back: Normal range of motion and neck supple.      Right lower leg: No edema.      Left lower leg: No edema.   Skin:     General: Skin is warm and dry.      Coloration: Skin is not jaundiced.   Neurological:      Mental Status: She is alert and oriented to person, place, and time.   Psychiatric:         Mood and Affect: Mood normal.         Reviewed Data     Result Review :  The following data was reviewed by SAMUEL Valdez on 05/23/23:  • Labs 10/31/2022:  cr 1.0, K 3.3, otherwise unremarkable BMP  • Labs 09/12/2022:  cr 1.0, K 3.8, " , otherwise unremarkable CMP, proBNP 258, hgb A1c 5.0, Chol 173, HDL 39, , Trig 113, Hgb 13.4, Plt 162  • 02/28/2023: cr 1.1, K3.7, , otherwise unremarkable CMP, proBNP 325, Hgb 13.6,   •   • , And 10 mg Jardiance daily      ECG 12 Lead    Date/Time: 5/23/2023 12:48 PM  Performed by: Kandice Damon APRN  Authorized by: Kandice Damon APRN   Comparison: compared with previous ECG from 2/21/2023  Comparison to previous ECG: Similar to 11/15/22 EKG; T waves  Rhythm: sinus rhythm  Rate: normal  BPM: 73  T flattening: III, aVF and II  Other findings: left ventricular hypertrophy    Clinical impression: abnormal EKG            Assessment and Plan        Assessment and Plan     Assessment:  1. HFrEF (heart failure with reduced ejection fraction)    2. NICM (nonischemic cardiomyopathy)    3. Nonrheumatic mitral valve regurgitation    4. Nonrheumatic tricuspid valve regurgitation    5. Diastolic dysfunction    6. Essential hypertension    7. HIV infection, unspecified symptom status         1. HFrEF: Diagnosed in April 2019; cardiac catheterization at [facility] showed LVEDP 12 mmHg and cardiac index 2.5 L/min/m².  Hospitalized July 2020 with acute on chronic HFrEF; echocardiogram showed EF 6%.  December 2021 echocardiogram showed EF 21 to 25%.  She has been evaluated by [facility] heart failure and follows with [facility] heart failure clinic.  Her medical therapy includes Entresto 49-51 mg twice daily, carvedilol 3.125 mg twice daily, 10 mg Jardiance daily, and 25 mg spironolactone daily. She is compensated by exam today.   2. Nonischemic cardiomyopathy: Diagnosed April 2019 at [facility]; likely multifactorial related to old illicit drug use, HIV, and previous poorly controlled hypertension.  Cardiac catheterization showed normal coronary arteries.  Hospitalized with acute on chronic HFrEF July 2020; echocardiogram showed severe LV dilation, very thin  LV walls, global hypokinesis, and akinesis at the bases.  She has single-chamber ICD.  2021 echocardiogram showed severe LV dilation and mildly reduced RV systolic function.    3. Mitral valve regurgitation: 2020 echocardiogram showed moderate to severe mitral insufficiency: 2021 echocardiogram showed normal valvular function; less valvular insufficiency compared to previous.  No murmur appreciated today.  No murmur appreciated today.  4. Tricuspid valve regurgitation: Graded as moderate on 2020 echocardiogram.  2021 echocardiogram showed normal valvular function; less valvular insufficiency compared to previous.  5. Diastolic dysfunction: This was graded as grade 3 diastolic dysfunction on 2020 echocardiogram but grade 2 on 2021 echocardiogram.  She is on carvedilol, Entresto, spironolactone, and Jardiance.  6. Hypertension: Low BP in office today but she is asymptomatic; recheck was 106/80.  7. Hyperlipidemia: LDL not at goal when checked in 2022.  No recent lipids to review.  8. History of PE: This occurred in 2020 and she also had a right lower lung pulmonary infarct with hemoptysis.  9. HIV infection: She follows with [facility].  No recent changes.    Plan:  1. I made no medication changes today. We, again, discussed that she could take an extra 40 mg torsemide and 10 mEq KCl daily if she has fluid gain during the holidays. I asked her to notify the office if she has to do this more than 3 consecutive days.   2. She will see Dr. Melton in 3 months.      Follow Up:  Return in about 3 months (around 2023) for Follow-up with Dr. Melton.  Orders Placed This Encounter   Procedures   • ECG 12 Lead      New Medications Ordered This Visit   Medications   • fluticasone (FLONASE) 50 MCG/ACT nasal spray     Si sprays into the nostril(s) as directed by provider Daily.     Dispense:  11.1 g     Refill:  3         Thank you for the opportunity  to participate in this patient's care.    SAMUEL Wynne    This office note has been dictated.

## 2023-05-24 ENCOUNTER — OFFICE VISIT (OUTPATIENT)
Dept: OBSTETRICS AND GYNECOLOGY | Age: 48
End: 2023-05-24
Payer: MEDICARE

## 2023-05-24 VITALS
DIASTOLIC BLOOD PRESSURE: 74 MMHG | HEIGHT: 67 IN | BODY MASS INDEX: 34.62 KG/M2 | SYSTOLIC BLOOD PRESSURE: 134 MMHG | WEIGHT: 220.6 LBS

## 2023-05-24 DIAGNOSIS — Z12.11 COLON CANCER SCREENING: ICD-10-CM

## 2023-05-24 DIAGNOSIS — Z01.419 WELL WOMAN EXAM WITH ROUTINE GYNECOLOGICAL EXAM: Primary | ICD-10-CM

## 2023-05-24 PROCEDURE — 3075F SYST BP GE 130 - 139MM HG: CPT | Performed by: STUDENT IN AN ORGANIZED HEALTH CARE EDUCATION/TRAINING PROGRAM

## 2023-05-24 PROCEDURE — 99396 PREV VISIT EST AGE 40-64: CPT | Performed by: STUDENT IN AN ORGANIZED HEALTH CARE EDUCATION/TRAINING PROGRAM

## 2023-05-24 PROCEDURE — 3078F DIAST BP <80 MM HG: CPT | Performed by: STUDENT IN AN ORGANIZED HEALTH CARE EDUCATION/TRAINING PROGRAM

## 2023-05-24 NOTE — PROGRESS NOTES
[facility]   Obstetrics and Gynecology   Routine Annual Visit    2023    Patient: Nina Saez          MR#:[id number]    History of Present Illness    Chief Complaint   Patient presents with   • Annual Exam     AE today, Last AE 2022 w/pap nml and HPV neg, Mg 05/15/2023, Hx Hysterectomy, No problems today       47 y.o. female  who presents for annual exam.   H/o HIV controlled with Symtuza.  Also has h/o essential hypertension, severe dilated nonischemic cardiomyopathy with chronic systolic congestive heart failure (defibrillator in place), and provoked PE after surgery 2020.  Now takes warfarin.  Managed by Heme/Onc.    Reports regular monthly menses without issue.    Studies reviewed:  IGP, Aptima HPV, Rfx 16 / 18,45 (2022 11:09) NIL, HPV neg, repeat q3yrs due to HIV  Mammo Screening Digital Tomosynthesis Bilateral With CAD (05/15/2023 12:53)  - normal, repeat 1 yr  Not cleared by Cardiology for anesthesia so has not had colonoscopy, PCP trying to get cologard covered by Medicare    Obstetric History:  OB History        5    Para   5    Term   5            AB        Living   5       SAB        IAB        Ectopic        Molar        Multiple        Live Births   5               Menstrual History:     Patient's last menstrual period was 2023 (exact date).       Sexual History:     Not sexually active, declines STD testing      Social History:   Disabled so not working    ________________________________________  Patient Active Problem List   Diagnosis   • Asthma   • HIV (human immunodeficiency virus infection)   • Class 2 obesity in adult   • HFrEF (heart failure with reduced ejection fraction)   • Anxiety   • Chronic low back pain   • Degeneration of lumbosacral intervertebral disc   • Gastroesophageal reflux disease   • Human papilloma virus infection   • Hyperlipidemia   • Vitamin D deficiency   • Obesity   • Essential hypertension   • Dilated  cardiomyopathy   • NICM (nonischemic cardiomyopathy)   • Nonrheumatic tricuspid valve regurgitation   • Intractable chronic migraine without aura   • Bilateral occipital neuralgia   • SHAREE (obstructive sleep apnea)     Past Medical History:   Diagnosis Date   • AICD (automatic cardioverter/defibrillator) present 2020   • Asthma    • CHF (congestive heart failure)    • Class 2 obesity in adult    • Coronary artery disease 2017   • Depression    • Diabetes mellitus    • Fracture, foot 3/20    Broke   • Grade II diastolic dysfunction     Per echocardiogram 3/2021   • H/O Closed trimalleolar fracture    • Headache    • HIV (human immunodeficiency virus infection)    • Hypertension    • LBBB (left bundle branch block)    • NICM (nonischemic cardiomyopathy)     2020 EF 6% per echocardiogram; AICD present   • Nonrheumatic mitral valve regurgitation 2021    Moderate per echo 3/2021   • Nonrheumatic tricuspid valve regurgitation     Moderate per echocardiogram 3/2021   • SHAREE (obstructive sleep apnea) 2023     Past Surgical History:   Procedure Laterality Date   • ANKLE OPEN REDUCTION INTERNAL FIXATION  3/7/20   • CARDIAC CATHETERIZATION     • CARDIAC DEFIBRILLATOR PLACEMENT     •  SECTION      one C/S   • FRACTURE SURGERY Left     left ankle   • OOPHORECTOMY      h/o teratoma   • TUBAL ABDOMINAL LIGATION       Social History     Tobacco Use   Smoking Status Former   • Packs/day: 0.50   • Years: 20.00   • Pack years: 10.00   • Types: Cigarettes   • Quit date:    • Years since quittin.3   • Passive exposure: Never   Smokeless Tobacco Never     Family History   Problem Relation Age of Onset   • Cancer Mother    • Breast cancer Mother    • Bone cancer Mother    • Ovarian cysts Daughter    • No Known Problems Daughter    • No Known Problems Daughter    • Breast cancer Paternal Grandmother          at 84   • Pancreatic cancer Paternal Grandmother    • Diabetes Maternal Grandmother     • Breast cancer Maternal Grandmother 40   • Heart disease Maternal Grandmother    • Hypertension Maternal Grandfather    • Hyperlipidemia Maternal Grandfather    • Diabetes Maternal Grandfather    • Prostate cancer Maternal Grandfather    • Prostate cancer Paternal Uncle    • Ovarian cancer Neg Hx    • Uterine cancer Neg Hx    • Colon cancer Neg Hx      Prior to Admission medications    Medication Sig Start Date End Date Taking? Authorizing Provider   acetaminophen (TYLENOL) 325 MG tablet Take 2 tablets by mouth Every 6 (Six) Hours As Needed for Mild Pain.   Yes ProviderGerard MD   albuterol sulfate  (90 Base) MCG/ACT inhaler INHALE 2 PUFFS BY MOUTH EVERY 4 HOURS AS NEEDED FOR WHEEZING 10/20/22  Yes Zach Durand APRN   atorvastatin (LIPITOR) 10 MG tablet Take 1 tablet by mouth once daily 3/28/23  Yes Kiley Boyd APRN   carvedilol (COREG) 3.125 MG tablet Take 1 tablet by mouth 2 (Two) Times a Day With Meals. 2/21/23  Yes Lisset Melotn MD   CBD (cannabidiol) oral oil Take  by mouth.   Yes ProviderGerard MD   cyclobenzaprine (FLEXERIL) 10 MG tablet Take 1 tablet by mouth 3 (Three) Times a Day As Needed for Muscle Spasms. 1/26/23  Yes Zach Durand APRN   Mmhsy-Cpbta-Mhhfwcwg-TenofAF (Symtuza) 167-348-239-10 MG per tablet Take 1 tablet by mouth Daily.   Yes ProviderGerard MD   Diclofenac Sodium (VOLTAREN) 1 % gel gel Apply 4 g topically to the appropriate area as directed 2 (Two) Times a Day. Use per pkg insert 9/2/21  Yes Livan Munoz MD   diphenhydrAMINE (BENADRYL) 25 mg capsule Take 1 capsule by mouth Every 6 (Six) Hours As Needed for Itching (swelling). 1/4/21  Yes Live Broussard MD   famotidine (Pepcid) 20 MG tablet Take 1 tablet by mouth 2 (Two) Times a Day As Needed for Heartburn. 1/26/23  Yes Zach Durand APRN   fluticasone (FLONASE) 50 MCG/ACT nasal spray 2 sprays into the nostril(s) as directed by provider Daily. 5/23/23  Yes Kandice Damon  SAMUEL Boogie   Insulin Pen Needle (BD Pen Needle Heidi 2nd Gen) 32G X 4 MM misc USE 1 NEEDLE ONCE DAILY WITH SAXENDA 3/31/23  Yes Kiley Boyd APRN   Jardiance 10 MG tablet tablet Take 1 tablet by mouth once daily 4/24/23  Yes Kiley Boyd APRN   nitroglycerin (NITROSTAT) 0.4 MG SL tablet Place 1 tablet under the tongue Every 5 (Five) Minutes As Needed for Chest Pain. Take no more than 3 doses in 15 minutes. 9/30/22  Yes Zach Durand APRN   potassium chloride (K-DUR,KLOR-CON) 20 MEQ CR tablet Take 3 tablets by mouth once daily 12/7/22  Yes Kandice Damon APRN   Rimegepant Sulfate (Nurtec) 75 MG tablet dispersible tablet Take 1 tablet by mouth 1 (One) Time As Needed (migraine). 2/9/23  Yes Claire Gauthier MD   sacubitril-valsartan (Entresto) 49-51 MG tablet Take 1 tablet by mouth 2 (Two) Times a Day. 10/4/22  Yes Lisset Melton MD   Saxenda 18 MG/3ML injection pen Inject 3 mg under the skin into the appropriate area as directed Daily. 8/24/22  Yes Kiley Boyd APRN   sertraline (ZOLOFT) 50 MG tablet Take 1 tablet by mouth once daily 4/4/23  Yes Zach Durand APRN   spironolactone (ALDACTONE) 25 MG tablet 1 tablet Daily. 12/3/20  Yes Gerard Lyn MD   topiramate (TOPAMAX) 25 MG tablet Take 1 tablet by mouth twice daily 5/19/23  Yes Claire Gauthier MD   torsemide (DEMADEX) 20 MG tablet TAKE 3 TABLETS BY MOUTH IN THE MORNING AND  2  TABS  IN  THE  AFTERNOON. 5/22/23  Yes Kiley Boyd APRN   vitamin D (ERGOCALCIFEROL) 27021 units capsule capsule Take 1 capsule by mouth Every 30 (Thirty) Days.   Yes Gerard Lyn MD   warfarin (COUMADIN) 2.5 MG tablet Take 3 tablets (7.5 mg) by mouth daily or as directed. 3/27/23  Yes Lisset Melton MD     ________________________________________    Current contraception: none  History of abnormal Pap smear: no  Family history of uterine or ovarian cancer: no  Family History of colon cancer/colon polyps: no  History  "of abnormal mammogram: no    The following portions of the patient's history were reviewed and updated as appropriate: allergies, current medications, past family history, past medical history, past social history, past surgical history and problem list.    Review of Systems   All other systems reviewed and are negative.           Objective     /74   Ht 170.2 cm (67\")   Wt 100 kg (220 lb 9.6 oz)   LMP 05/13/2023 (Exact Date)   Breastfeeding No   BMI 34.55 kg/m²    BP Readings from Last 3 Encounters:   05/24/23 134/74   05/23/23 96/68   04/18/23 120/80      Wt Readings from Last 3 Encounters:   05/24/23 100 kg (220 lb 9.6 oz)   05/23/23 100 kg (220 lb 6.4 oz)   04/18/23 100 kg (221 lb 6.4 oz)        BMI: Estimated body mass index is 34.55 kg/m² as calculated from the following:    Height as of this encounter: 170.2 cm (67\").    Weight as of this encounter: 100 kg (220 lb 9.6 oz).    Physical Exam  Vitals and nursing note reviewed.   Constitutional:       General: She is not in acute distress.     Appearance: Normal appearance.   HENT:      Head: Normocephalic and atraumatic.   Eyes:      Extraocular Movements: Extraocular movements intact.   Cardiovascular:      Rate and Rhythm: Normal rate and regular rhythm.      Pulses: Normal pulses.      Heart sounds: No murmur heard.  Pulmonary:      Effort: Pulmonary effort is normal. No respiratory distress.      Breath sounds: Normal breath sounds.   Chest:   Breasts:     Right: Normal. No mass, nipple discharge, skin change or tenderness.      Left: Normal. No mass, nipple discharge, skin change or tenderness.   Abdominal:      General: There is no distension.      Palpations: Abdomen is soft. There is no mass.      Tenderness: There is no abdominal tenderness.   Genitourinary:     General: Normal vulva.      Labia:         Right: No rash or lesion.         Left: No rash or lesion.       Urethra: No prolapse, urethral swelling or urethral lesion.      Vagina: " Normal.      Cervix: Normal.      Uterus: Normal.       Adnexa: Right adnexa normal and left adnexa normal.      Comments: Bladder: no masses or tenderness  Perineum/Anus: no masses, lesions, or skin changes  Musculoskeletal:         General: No swelling. Normal range of motion.      Cervical back: Normal range of motion.   Lymphadenopathy:      Upper Body:      Right upper body: No axillary adenopathy.      Left upper body: No axillary adenopathy.   Skin:     General: Skin is warm and dry.   Neurological:      General: No focal deficit present.      Mental Status: She is alert and oriented to person, place, and time.   Psychiatric:         Mood and Affect: Mood normal.         Behavior: Behavior normal.         As part of wellness and prevention, the following topics were discussed with the patient:  Encouraged self breast exam  Physical activity and regular exercised encouraged.   Injury prevention discussed.  Healthy weight discussed.  Nutrition discussed.  Substance abuse/misuse discussed.  Sexual behavior/safe practices discussed.   Sexual transmitted disease prevention   Contraception discussed.   Mental health discussed.           Assessment:  Diagnoses and all orders for this visit:    1. Well woman exam with routine gynecological exam (Primary)    2. Colon cancer screening  -     Cologuard - Stool, Per Rectum; Future      -Breast and pelvic exam normal  - Pap up-to-date, repeat every 3 years due to HIV  - Declined STD screen and contraception because not sexually active  - Mammogram up-to-date  - Cannot do colonoscopy due to CHF so opted for Cologuard    Plan:  Return in about 1 year (around 5/24/2024) for Annual.      Frannie Rocha MD  5/24/2023 13:40 EDT

## 2023-06-02 DIAGNOSIS — I50.20 HFREF (HEART FAILURE WITH REDUCED EJECTION FRACTION): ICD-10-CM

## 2023-06-02 RX ORDER — EMPAGLIFLOZIN 10 MG/1
TABLET, FILM COATED ORAL
Qty: 30 TABLET | Refills: 0 | Status: SHIPPED | OUTPATIENT
Start: 2023-06-02

## 2023-06-02 NOTE — TELEPHONE ENCOUNTER
Last Office Visit: 02/28/2023  Labs: 02/28/2023  Next Follow-up appointment: 08/31/2023      Reviewed by:    Fercho Bradshaw DNP, MSN, RN  RN Clinical Coordinator  [facility]

## 2023-06-07 ENCOUNTER — ANTICOAGULATION VISIT (OUTPATIENT)
Dept: PHARMACY | Facility: HOSPITAL | Age: 48
End: 2023-06-07
Payer: MEDICARE

## 2023-06-07 LAB — INR PPP: 2.8

## 2023-06-07 NOTE — PROGRESS NOTES
Anticoagulation Clinic Progress Note    Anticoagulation Summary  As of 2023      INR goal:  2.0-3.0   TTR:  62.7 % (2.4 y)   INR used for dosin.80 (2023)   Warfarin maintenance plan:  7.5 mg every day; Starting 2023   Weekly warfarin total:  52.5 mg   No change documented:  Zina De La Rosa RPH   Plan last modified:  Zina De La Rosa RPH (2022)   Next INR check:  2023   Priority:  High   Target end date:  Indefinite    Indications    Other acute pulmonary embolism  unspecified whether acute cor pulmonale present (Resolved) [I26.99]  Acute pulmonary embolism  unspecified pulmonary embolism type  unspecified whether acute cor pulmonale present (Resolved) [I26.99]                 Anticoagulation Episode Summary       INR check location:      Preferred lab:      Send INR reminders to:   [facility] [employer] [facility] [employer]    Comments:  **Home Testing Program**          Anticoagulation Care Providers       Provider Role Specialty Phone number    Lisset Melton MD Referring Cardiology [phone]            Clinic Interview:  No pertinent clinical findings have been reported.    INR History:      5/3/2023     1:00 PM 2023    12:00 AM 2023     1:37 PM 2023    12:00 AM 2023     2:00 PM 2023    12:00 AM 2023    10:45 AM   Anticoagulation Monitoring   INR 2.10  1.70  2.00  2.80   INR Date 5/3/2023  2023  2023  2023   INR Goal 2.0-3.0  2.0-3.0  2.0-3.0  2.0-3.0   Trend Same  Same  Same  Same   Last Week Total 52.5 mg  45 mg  52.5 mg  52.5 mg   Next Week Total 52.5 mg  55 mg  52.5 mg  52.5 mg   Sun 7.5 mg  7.5 mg  7.5 mg  7.5 mg   Mon 7.5 mg  7.5 mg  7.5 mg  7.5 mg   Tue 7.5 mg  7.5 mg  7.5 mg  7.5 mg   Wed 7.5 mg  7.5 mg  7.5 mg  7.5 mg   Thu 7.5 mg  10 mg (5/11)  7.5 mg  7.5 mg   Fri 7.5 mg  7.5 mg  7.5 mg  7.5 mg   Sat 7.5 mg  7.5 mg  7.5 mg  7.5 mg   Historical INR  1.70      2.00      2.80            This result is from an external source.        Plan:  1. INR is therapeutic today- see above in Anticoagulation Summary.    Nina Saez to continue their warfarin regimen- see above in Anticoagulation Summary.  2. Follow up in 1 week  3. Pt has agreed to only be called if INR out of range. They have been instructed to call if any changes in medications, doses, concerns, etc. Patient expresses understanding and has no further questions at this time.    Zina De La Rosa, [facility]

## 2023-06-12 ENCOUNTER — TELEPHONE (OUTPATIENT)
Dept: NEUROLOGY | Facility: CLINIC | Age: 48
End: 2023-06-12

## 2023-06-12 NOTE — TELEPHONE ENCOUNTER
Pt sent appt request for end of June on a Monday. lvm w/ provider's 1st availability that meets pt's request. Marco Vasco message responded with new appt date

## 2023-06-15 ENCOUNTER — ANTICOAGULATION VISIT (OUTPATIENT)
Dept: PHARMACY | Facility: HOSPITAL | Age: 48
End: 2023-06-15
Payer: MEDICARE

## 2023-06-15 DIAGNOSIS — I50.20 HFREF (HEART FAILURE WITH REDUCED EJECTION FRACTION): ICD-10-CM

## 2023-06-15 LAB — INR PPP: 2.6

## 2023-06-15 RX ORDER — TORSEMIDE 20 MG/1
TABLET ORAL
Qty: 150 TABLET | Refills: 0 | Status: SHIPPED | OUTPATIENT
Start: 2023-06-15

## 2023-06-15 NOTE — PROGRESS NOTES
Anticoagulation Clinic Progress Note    Anticoagulation Summary  As of 6/15/2023      INR goal:  2.0-3.0   TTR:  63.0 % (2.5 y)   INR used for dosin.60 (6/15/2023)   Warfarin maintenance plan:  7.5 mg every day; Starting 6/15/2023   Weekly warfarin total:  52.5 mg   No change documented:  Zina De La Rosa RPH   Plan last modified:  Zina De La Rosa RPH (2022)   Next INR check:  2023   Priority:  High   Target end date:  Indefinite    Indications    Other acute pulmonary embolism  unspecified whether acute cor pulmonale present (Resolved) [I26.99]  Acute pulmonary embolism  unspecified pulmonary embolism type  unspecified whether acute cor pulmonale present (Resolved) [I26.99]                 Anticoagulation Episode Summary       INR check location:      Preferred lab:      Send INR reminders to:   [facility] [employer]    Comments:  **Home Testing Program**          Anticoagulation Care Providers       Provider Role Specialty Phone number    Lisset Melton MD Referring Cardiology [phone]            Clinic Interview:  No pertinent clinical findings have been reported.    INR History:      2023     1:37 PM 2023    12:00 AM 2023     2:00 PM 2023    12:00 AM 2023    10:45 AM 6/15/2023    12:00 AM 6/15/2023    11:05 AM   Anticoagulation Monitoring   INR 1.70  2.00  2.80  2.60   INR Date 2023  2023  2023  6/15/2023   INR Goal 2.0-3.0  2.0-3.0  2.0-3.0  2.0-3.0   Trend Same  Same  Same  Same   Last Week Total 45 mg  52.5 mg  52.5 mg  52.5 mg   Next Week Total 55 mg  52.5 mg  52.5 mg  52.5 mg   Sun 7.5 mg  7.5 mg  7.5 mg  7.5 mg   Mon 7.5 mg  7.5 mg  7.5 mg  7.5 mg   Tue 7.5 mg  7.5 mg  7.5 mg  7.5 mg   Wed 7.5 mg  7.5 mg  7.5 mg  7.5 mg   Thu 10 mg ()  7.5 mg  7.5 mg  7.5 mg   Fri 7.5 mg  7.5 mg  7.5 mg  7.5 mg   Sat 7.5 mg  7.5 mg  7.5 mg  7.5 mg   Historical INR  2.00      2.80      2.60            This result is from an external source.        Plan:  1. INR is therapeutic today- see above in Anticoagulation Summary.    Nina Saez to continue their warfarin regimen- see above in Anticoagulation Summary.  2. Follow up in 1 week  3. Pt has agreed to only be called if INR out of range. They have been instructed to call if any changes in medications, doses, concerns, etc. Patient expresses understanding and has no further questions at this time.    Zina De La Rosa, [facility]

## 2023-06-15 NOTE — TELEPHONE ENCOUNTER
Last Office Visit: 02/28/2023  Labs: 02/28/2023  Next Follow-up appointment: 08/31/2023      Reviewed by:    Fercho Bradshaw DNP, MSN, RN  RN Clinical Coordinator  [facility]

## 2023-06-19 RX ORDER — ATORVASTATIN CALCIUM 10 MG/1
TABLET, FILM COATED ORAL
Qty: 90 TABLET | Refills: 0 | Status: SHIPPED | OUTPATIENT
Start: 2023-06-19

## 2023-06-19 NOTE — TELEPHONE ENCOUNTER
Last Office Visit: 02/28/2023  Labs: 02/28/2023  Next Follow-up appointment: 08/31/2023      Reviewed by:    Fercho Bradshaw DNP, MSN, RN  RN Clinical Coordinator  [facility]

## 2023-07-31 ENCOUNTER — ANTICOAGULATION VISIT (OUTPATIENT)
Dept: PHARMACY | Facility: HOSPITAL | Age: 48
End: 2023-07-31
Payer: MEDICARE

## 2023-07-31 LAB — INR PPP: 3.6

## 2023-08-08 ENCOUNTER — ANTICOAGULATION VISIT (OUTPATIENT)
Dept: PHARMACY | Facility: HOSPITAL | Age: 48
End: 2023-08-08
Payer: MEDICARE

## 2023-08-08 LAB — INR PPP: 3.2

## 2023-08-08 NOTE — PROGRESS NOTES
Anticoagulation Clinic Progress Note    Anticoagulation Summary  As of 8/8/2023      INR goal:  2.0-3.0   TTR:  63.1 % (2.6 y)   INR used for dosing:  3.20 (8/8/2023)   Warfarin maintenance plan:  7.5 mg every day   Weekly warfarin total:  52.5 mg   Plan last modified:  Zina De La Rosa RPH (5/20/2022)   Next INR check:  8/15/2023   Priority:  High   Target end date:  Indefinite    Indications    Other acute pulmonary embolism  unspecified whether acute cor pulmonale present (Resolved) [I26.99]  Acute pulmonary embolism  unspecified pulmonary embolism type  unspecified whether acute cor pulmonale present (Resolved) [I26.99]                 Anticoagulation Episode Summary       INR check location:      Preferred lab:      Send INR reminders to:  [facility] TEST PO[facility]    Comments:  **Home Testing Program**          Anticoagulation Care Providers       Provider Role Specialty Phone number    Lisset Melton MD Referring Cardiology [phone]            Clinic Interview:  Patient Findings     Negatives:  Signs/symptoms of thrombosis, Signs/symptoms of bleeding,   Laboratory test error suspected, Change in health, Change in alcohol use,   Change in activity, Upcoming invasive procedure, Emergency department   visit, Upcoming dental procedure, Missed doses, Extra doses, Change in   medications, Change in diet/appetite, Hospital admission, Bruising, Other   complaints      Clinical Outcomes     Negatives:  Major bleeding event, Thromboembolic event,   Anticoagulation-related hospital admission, Anticoagulation-related ED   visit, Anticoagulation-related fatality        INR History:      6/30/2023    11:15 AM 7/21/2023    12:00 AM 7/21/2023    11:33 AM 7/31/2023    12:00 AM 7/31/2023     1:57 PM 8/8/2023    12:00 AM 8/8/2023    11:41 AM   Anticoagulation Monitoring   INR 3.10  2.60  3.60  3.20   INR Date 6/30/2023 7/21/2023 7/31/2023 8/8/2023   INR Goal 2.0-3.0  2.0-3.0  2.0-3.0  2.0-3.0   Trend Same   Same  Same  Same   Last Week Total 52.5 mg  52.5 mg  52.5 mg  52.5 mg   Next Week Total 52.5 mg  52.5 mg  45 mg  50 mg   Sun 7.5 mg  7.5 mg  7.5 mg  7.5 mg   Mon 7.5 mg  7.5 mg  Hold (7/31)  7.5 mg   Tue 7.5 mg  7.5 mg  7.5 mg  5 mg (8/8)   Wed 7.5 mg  7.5 mg  7.5 mg  7.5 mg   Thu 7.5 mg  7.5 mg  7.5 mg  7.5 mg   Fri 7.5 mg  7.5 mg  7.5 mg  7.5 mg   Sat 7.5 mg  7.5 mg  7.5 mg  7.5 mg   Historical INR  2.60      3.60      3.20            This result is from an external source.       Plan:  1. INR is Supratherapeutic today- see above in Anticoagulation Summary.   Will instruct iNna Saez to Change their warfarin regimen- see above in Anticoagulation Summary.  partial to 5 mg then resume, rck 1 week   2. Follow up in 1 weeks  3.  They have been instructed to call if any changes in medications, doses, concerns, etc. Patient expresses understanding and has no further questions at this time.    Zina De La Rosa [facility]

## 2023-08-10 DIAGNOSIS — I50.20 HFREF (HEART FAILURE WITH REDUCED EJECTION FRACTION): ICD-10-CM

## 2023-08-10 RX ORDER — EMPAGLIFLOZIN 10 MG/1
TABLET, FILM COATED ORAL
Qty: 30 TABLET | Refills: 0 | Status: SHIPPED | OUTPATIENT
Start: 2023-08-10

## 2023-08-10 NOTE — TELEPHONE ENCOUNTER
Last Office Visit: 02/28/2023  Labs: 07/13/2023  Next Follow-up appointment: 08/31/2023      Reviewed by:    Fercho Bradshaw DNP, MSN, RN  RN Clinical Coordinator  [facility] Failure [facility]

## 2023-08-14 ENCOUNTER — ANTICOAGULATION VISIT (OUTPATIENT)
Dept: PHARMACY | Facility: HOSPITAL | Age: 48
End: 2023-08-14
Payer: MEDICARE

## 2023-08-14 LAB — INR PPP: 2.1

## 2023-08-14 PROCEDURE — G0249 PROVIDE INR TEST MATER/EQUIP: HCPCS

## 2023-08-14 NOTE — PROGRESS NOTES
Anticoagulation Clinic Progress Note    Anticoagulation Summary  As of 2023      INR goal:  2.0-3.0   TTR:  63.3 % (2.6 y)   INR used for dosin.10 (2023)   Warfarin maintenance plan:  7.5 mg every day   Weekly warfarin total:  52.5 mg   No change documented:  Zina De La Rosa RPH   Plan last modified:  Zina De La Rosa RPH (2022)   Next INR check:  2023   Priority:  High   Target end date:  Indefinite    Indications    Other acute pulmonary embolism  unspecified whether acute cor pulmonale present (Resolved) [I26.99]  Acute pulmonary embolism  unspecified pulmonary embolism type  unspecified whether acute cor pulmonale present (Resolved) [I26.99]                 Anticoagulation Episode Summary       INR check location:      Preferred lab:      Send INR reminders to:  [facility] HOME TEST PO[facility]    Comments:  **Home Testing Program**          Anticoagulation Care Providers       Provider Role Specialty Phone number    Lisset Melton MD Referring Cardiology [phone]            Clinic Interview:  No pertinent clinical findings have been reported.    INR History:      2023    11:33 AM 2023    12:00 AM 2023     1:57 PM 2023    12:00 AM 2023    11:41 AM 2023    12:00 AM 2023    12:45 PM   Anticoagulation Monitoring   INR 2.60  3.60  3.20  2.10   INR Date 2023   INR Goal 2.0-3.0  2.0-3.0  2.0-3.0  2.0-3.0   Trend Same  Same  Same  Same   Last Week Total 52.5 mg  52.5 mg  52.5 mg  50 mg   Next Week Total 52.5 mg  45 mg  50 mg  52.5 mg   Sun 7.5 mg  7.5 mg  7.5 mg  7.5 mg   Mon 7.5 mg  Hold ()  7.5 mg  7.5 mg   Tue 7.5 mg  7.5 mg  5 mg ()  7.5 mg   Wed 7.5 mg  7.5 mg  7.5 mg  7.5 mg   Thu 7.5 mg  7.5 mg  7.5 mg  7.5 mg   Fri 7.5 mg  7.5 mg  7.5 mg  7.5 mg   Sat 7.5 mg  7.5 mg  7.5 mg  7.5 mg   Historical INR  3.60      3.20      2.10            This result is from an external source.       Plan:  1. INR is  therapeutic today- see above in Anticoagulation Summary.    Nina Saez to continue their warfarin regimen- see above in Anticoagulation Summary.  2. Follow up in 1 week  3. They have been instructed to call if any changes in medications, doses, concerns, etc. Patient expresses understanding and has no further questions at this time.    Zina De La Rosa, [facility]

## 2023-08-18 ENCOUNTER — HOSPITAL ENCOUNTER (OUTPATIENT)
Dept: CARDIOLOGY | Facility: HOSPITAL | Age: 48
Discharge: HOME | End: 2023-08-18
Payer: MEDICARE

## 2023-08-18 ENCOUNTER — TELEPHONE (OUTPATIENT)
Dept: CARDIOLOGY | Facility: HOSPITAL | Age: 48
End: 2023-08-18
Payer: MEDICARE

## 2023-08-18 ENCOUNTER — SPECIALTY PHARMACY (OUTPATIENT)
Dept: NEUROLOGY | Facility: CLINIC | Age: 48
End: 2023-08-18
Payer: MEDICARE

## 2023-08-18 VITALS
WEIGHT: 222.4 LBS | BODY MASS INDEX: 34.91 KG/M2 | HEART RATE: 59 BPM | HEIGHT: 67 IN | SYSTOLIC BLOOD PRESSURE: 98 MMHG | OXYGEN SATURATION: 93 % | DIASTOLIC BLOOD PRESSURE: 60 MMHG

## 2023-08-18 DIAGNOSIS — I10 ESSENTIAL HYPERTENSION: ICD-10-CM

## 2023-08-18 DIAGNOSIS — I42.0 DILATED CARDIOMYOPATHY: ICD-10-CM

## 2023-08-18 DIAGNOSIS — I50.20 HFREF (HEART FAILURE WITH REDUCED EJECTION FRACTION): Primary | ICD-10-CM

## 2023-08-18 DIAGNOSIS — G47.33 OSA (OBSTRUCTIVE SLEEP APNEA): ICD-10-CM

## 2023-08-18 DIAGNOSIS — E78.2 MIXED HYPERLIPIDEMIA: ICD-10-CM

## 2023-08-18 DIAGNOSIS — I42.8 NICM (NONISCHEMIC CARDIOMYOPATHY): ICD-10-CM

## 2023-08-18 LAB
ALBUMIN SERPL-MCNC: 4.3 G/DL (ref 3.5–5.2)
ALBUMIN/GLOB SERPL: 1.6 G/DL
ALP SERPL-CCNC: 147 U/L (ref 39–117)
ALT SERPL W P-5'-P-CCNC: 18 U/L (ref 1–33)
ANION GAP SERPL CALCULATED.3IONS-SCNC: 12 MMOL/L (ref 5–15)
AST SERPL-CCNC: 17 U/L (ref 1–32)
BASOPHILS # BLD AUTO: 0.02 10*3/MM3 (ref 0–0.2)
BASOPHILS NFR BLD AUTO: 0.2 % (ref 0–1.5)
BILIRUB SERPL-MCNC: 0.3 MG/DL (ref 0–1.2)
BUN SERPL-MCNC: 12 MG/DL (ref 6–20)
BUN/CREAT SERPL: 12.4 (ref 7–25)
CALCIUM SPEC-SCNC: 9.3 MG/DL (ref 8.6–10.5)
CHLORIDE SERPL-SCNC: 102 MMOL/L (ref 98–107)
CO2 SERPL-SCNC: 25 MMOL/L (ref 22–29)
CREAT SERPL-MCNC: 0.97 MG/DL (ref 0.57–1)
DEPRECATED RDW RBC AUTO: 42.2 FL (ref 37–54)
EGFRCR SERPLBLD CKD-EPI 2021: 72.7 ML/MIN/1.73
EOSINOPHIL # BLD AUTO: 0.03 10*3/MM3 (ref 0–0.4)
EOSINOPHIL NFR BLD AUTO: 0.3 % (ref 0.3–6.2)
ERYTHROCYTE [DISTWIDTH] IN BLOOD BY AUTOMATED COUNT: 12.1 % (ref 12.3–15.4)
GLOBULIN UR ELPH-MCNC: 2.7 GM/DL
GLUCOSE SERPL-MCNC: 154 MG/DL (ref 65–99)
HCT VFR BLD AUTO: 37.3 % (ref 34–46.6)
HGB BLD-MCNC: 12.7 G/DL (ref 12–15.9)
IMM GRANULOCYTES # BLD AUTO: 0.03 10*3/MM3 (ref 0–0.05)
IMM GRANULOCYTES NFR BLD AUTO: 0.3 % (ref 0–0.5)
LYMPHOCYTES # BLD AUTO: 1.91 10*3/MM3 (ref 0.7–3.1)
LYMPHOCYTES NFR BLD AUTO: 22.3 % (ref 19.6–45.3)
MCH RBC QN AUTO: 32.2 PG (ref 26.6–33)
MCHC RBC AUTO-ENTMCNC: 34 G/DL (ref 31.5–35.7)
MCV RBC AUTO: 94.4 FL (ref 79–97)
MONOCYTES # BLD AUTO: 0.57 10*3/MM3 (ref 0.1–0.9)
MONOCYTES NFR BLD AUTO: 6.6 % (ref 5–12)
NEUTROPHILS NFR BLD AUTO: 6.02 10*3/MM3 (ref 1.7–7)
NEUTROPHILS NFR BLD AUTO: 70.3 % (ref 42.7–76)
NRBC BLD AUTO-RTO: 0 /100 WBC (ref 0–0.2)
NT-PROBNP SERPL-MCNC: 575 PG/ML (ref 0–450)
PLATELET # BLD AUTO: 196 10*3/MM3 (ref 140–450)
PMV BLD AUTO: 11.9 FL (ref 6–12)
POTASSIUM SERPL-SCNC: 3.8 MMOL/L (ref 3.5–5.2)
PROT SERPL-MCNC: 7 G/DL (ref 6–8.5)
RBC # BLD AUTO: 3.95 10*6/MM3 (ref 3.77–5.28)
SODIUM SERPL-SCNC: 139 MMOL/L (ref 136–145)
WBC NRBC COR # BLD: 8.58 10*3/MM3 (ref 3.4–10.8)

## 2023-08-18 PROCEDURE — 83880 ASSAY OF NATRIURETIC PEPTIDE: CPT | Performed by: NURSE PRACTITIONER

## 2023-08-18 PROCEDURE — 80053 COMPREHEN METABOLIC PANEL: CPT | Performed by: NURSE PRACTITIONER

## 2023-08-18 PROCEDURE — G0463 HOSPITAL OUTPT CLINIC VISIT: HCPCS

## 2023-08-18 PROCEDURE — 85025 COMPLETE CBC W/AUTO DIFF WBC: CPT | Performed by: NURSE PRACTITIONER

## 2023-08-18 NOTE — ADDENDUM NOTE
Encounter addended by: Chuyita Hayden MA on: 8/18/2023 12:58 PM   Actions taken: Charge Capture section accepted

## 2023-08-18 NOTE — LETTER
2023       No Recipients    Patient: Nina Saez   YOB: 1975   Date of Visit: 2023     Dear Dr. Ferro Recipients:    Thank you for referring Nina Saez to me for evaluation. Below are the relevant portions of my assessment and plan of care.    If you have questions, please do not hesitate to call me. I look forward to following Nina along with you.         Sincerely,        SAMUEL Cisneros        CC:   No Recipients      Progress Notes:          [facility] [place] [facility]      Patient Name: Nina Saez  :1975  Age: 47 y.o.  Sex: female  Referring Provider: Nilda Gallagher APRN   Primary Cardiologist: Lisset Melton MD  Encounter Provider:  SAMUEL Cisneros      Chief Complaint:   Chief Complaint   Patient presents with    Congestive Heart Failure         Congestive Heart Failure  Associated symptoms include fatigue and shortness of breath. Pertinent negatives include no chest pain, palpitations or unexpected weight change.  this 47-year-old female, known to this provider, comes to us today for further evaluation of her chronic systolic heart failure.  Current diagnoses to include severe nonischemic cardiomyopathy, left bundle branch block, hypertension, HIV, obesity, and asthma.    Historically she had followed with Dr. Laina Boyd at [facility].  In spring 2019 she was visiting family in [place] and was taken emergently to [facility].  Echocardiogram and cardiac catheterization were performed at that point, carvedilol was changed to metoprolol.    In 2019 she presented through the emergency department at [facility] with chest pain and shortness of breath.  She was treated with IV diuresis, troponin was negative x2 and proBNP was elevated at 5151.  Entresto was started at that point given her severe dilated cardiomyopathy.  She was to follow with cardiology at [facility] at that  time.    In July 2020 she again presented to the emergency department with shortness of breath and cough x3 weeks.  BNP was elevated at 13,351.  Carvedilol was discontinued that admission given hypotension.  At subsequent outpatient appointment it was felt that Bumex was less effective than Lasix by the patient.  She complained of progressively worsening fatigue.  AICD, single-chamber [employer], interrogation July 29, 2020 revealed 10-16 beats of VT.    On September 10, 2020 she saw Dr. Jamar Azul MD, for consideration of upgrade to biventricular device.  At that point he felt that her left bundle branch block was incomplete and she did not meet criteria for implantation of CRT-D.    She presented 10/27/2020 to [facility] with complaints of acute on chronic shortness of breath that began approximately 1 week prior.  BNP was further elevated at 16,206 that point.  Pulmonary edema was noted on chest x-ray.  Spironolactone was started that admission.  Referral for evaluation by [facility] transplant services was made November 2, 2020.    Left and right cardiac catheterization at [facility] revealed no obstructive CAD with normal filling pressures.  Echocardiogram at that time revealed an EF less than 15% with global hypokinesis-information extracted from external medical record note.  Cardiac catheterization May 30, 2017 performed at [facility] yielded codominant system with normal left main, LAD with distal 30% stenosis, RCA normal, LVEDP 7 mmHg.    Echocardiogram July 9, 2020 revealed EF of 6%, grade 3 LV diastolic dysfunction, significant LV wall motion hypokinesis/akinesis, RVSP 45 to 55 mmHg secondary to moderate tricuspid valve regurgitation, moderate to severe MR noted.    Jardiance 10 mg daily was started on 11/13/2020.      She was admitted from 12/11/2020 to 12/17/2020 for acute pulmonary embolism with right lower lobe pulmonary infarct.  She was started on  warfarin at that time.  Additionally, she has been started on Corlanor as well as spironolactone for her heart failure.  January 4, 2021 she was evaluated and treated in the emergency department for possible angioedema.      Entresto has now been increased to  mg twice daily.  Repeat echocardiogram 3/8/2021 revealed improvement in ejection fraction from 6% to 22% with severe LV dilation, severe global hypokinesis and grade 2 LV diastolic dysfunction.    She has elected at this time not to proceed with transplant work-up just yet.  She follows up with  on November 16.  She recently saw Dr. Melton and her carvedilol was increased to 50 mg at night with a goal of SBP of .    She spoke with Dr. Melton when she saw her about it and Corlanor was discontinued, and Entresto was reduced to 49-51 mg twice daily.      Echocardiogram was repeated in March 2023, LVEF had improved to 29%.  She had stopped her Saxenda on her own and has been focusing on heart healthy diet.  She presents today with no active complaints, stating she is more active than she has been able to be in a very long time, and wanting to restart her Saxenda.    The following portions of the patient's history were reviewed and updated as appropriate: allergies, current medications, past family history, past medical history, past social history, past surgical history and problem list.    Current Outpatient Medications   Medication Sig Dispense Refill    acetaminophen (TYLENOL) 325 MG tablet Take 2 tablets by mouth Every 6 (Six) Hours As Needed for Mild Pain.      albuterol sulfate  (90 Base) MCG/ACT inhaler INHALE 2 PUFFS BY MOUTH EVERY 4 HOURS AS NEEDED FOR WHEEZING 18 g 0    atorvastatin (LIPITOR) 20 MG tablet Take 1 tablet by mouth Daily. 90 tablet 0    BD Pen Needle Heidi 2nd Gen 32G X 4 MM misc USE 1 NEEDLE ONCE DAILY WITH SAXENDA 100 each 0    carvedilol (COREG) 3.125 MG tablet Take 1 tablet by mouth 2 (Two) Times a Day With  Meals. 180 tablet 3    CBD (cannabidiol) oral oil Take  by mouth.      cyclobenzaprine (FLEXERIL) 10 MG tablet Take 1 tablet by mouth 3 (Three) Times a Day As Needed for Muscle Spasms. 21 tablet 0    Rechr-Jgdqv-Pmaxpkpw-TenofAF (Symtuza) 080-740-734-10 MG per tablet Take 1 tablet by mouth Daily.      Diclofenac Sodium (VOLTAREN) 1 % gel gel Apply 4 g topically to the appropriate area as directed 2 (Two) Times a Day. Use per pkg insert 100 g 2    diphenhydrAMINE (BENADRYL) 25 mg capsule Take 1 capsule by mouth Every 6 (Six) Hours As Needed for Itching (swelling). 20 capsule 0    famotidine (Pepcid) 20 MG tablet Take 1 tablet by mouth 2 (Two) Times a Day As Needed for Heartburn.      fluticasone (FLONASE) 50 MCG/ACT nasal spray 2 sprays into the nostril(s) as directed by provider Daily. 11.1 g 3    Jardiance 10 MG tablet tablet Take 1 tablet by mouth once daily 30 tablet 0    nitroglycerin (NITROSTAT) 0.4 MG SL tablet Place 1 tablet under the tongue Every 5 (Five) Minutes As Needed for Chest Pain. Take no more than 3 doses in 15 minutes. 30 tablet 5    potassium chloride (K-DUR,KLOR-CON) 20 MEQ CR tablet Take 3 tablets by mouth once daily 90 tablet 11    Rimegepant Sulfate (Nurtec) 75 MG tablet dispersible tablet Take 1 tablet by mouth 1 (One) Time As Needed for migraine. Max of 1 tablet in 24 hours. 8 tablet 2    sacubitril-valsartan (Entresto) 49-51 MG tablet Take 1 tablet by mouth 2 (Two) Times a Day. 180 tablet 3    Saxenda 18 MG/3ML injection pen Inject 3 mg under the skin into the appropriate area as directed Daily. 15 mL 2    sertraline (ZOLOFT) 50 MG tablet Take 1 tablet by mouth once daily 90 tablet 1    spironolactone (ALDACTONE) 25 MG tablet 1 tablet Daily.      torsemide (DEMADEX) 20 MG tablet TAKE 3 TABLETS BY MOUTH ONCE IN THE MORNING AND TAKE 2 TABLETS IN THE AFTERNOON 150 tablet 0    vitamin D (ERGOCALCIFEROL) 33938 units capsule capsule Take 1 capsule by mouth Every 30 (Thirty) Days.       warfarin (COUMADIN) 2.5 MG tablet Take 3 tablets (7.5 mg) by mouth daily or as directed. 270 tablet 1    topiramate (TOPAMAX) 50 MG tablet Take 1 tablet by mouth 2 (Two) Times a Day. (Patient not taking: Reported on 2023) 60 tablet 2     No current facility-administered medications for this encounter.       Past Medical History:   Diagnosis Date    AICD (automatic cardioverter/defibrillator) present 2020    Asthma     CHF (congestive heart failure)     Class 2 obesity in adult     Coronary artery disease 2017    Depression     Diabetes mellitus     Fracture, foot 3/20    Broke    Grade II diastolic dysfunction     Per echocardiogram 3/2021    H/O Closed trimalleolar fracture     Headache     HIV (human immunodeficiency virus infection)     Hypertension     LBBB (left bundle branch block)     NICM (nonischemic cardiomyopathy)     2020 EF 6% per echocardiogram; AICD present    Nonrheumatic mitral valve regurgitation 2021    Moderate per echo 3/2021    Nonrheumatic tricuspid valve regurgitation     Moderate per echocardiogram 3/2021    SHAREE (obstructive sleep apnea) 2023       Past Surgical History:   Procedure Laterality Date    ANKLE OPEN REDUCTION INTERNAL FIXATION  3/7/20    CARDIAC CATHETERIZATION      CARDIAC DEFIBRILLATOR PLACEMENT       SECTION      one C/S    FRACTURE SURGERY Left     left ankle    OOPHORECTOMY      h/o teratoma    TUBAL ABDOMINAL LIGATION         Physical Exam  Vitals and nursing note reviewed.   Constitutional:       General: She is not in acute distress.     Appearance: She is well-developed. She is not ill-appearing.   HENT:      Head: Normocephalic and atraumatic.   Eyes:      Conjunctiva/sclera: Conjunctivae normal.      Pupils: Pupils are equal, round, and reactive to light.   Neck:      Vascular: No JVD.   Cardiovascular:      Rate and Rhythm: Normal rate and regular rhythm.      Heart sounds: Normal heart  "sounds. No murmur heard.    No friction rub. No gallop.   Pulmonary:      Effort: Pulmonary effort is normal. No respiratory distress.      Breath sounds: Normal breath sounds.   Abdominal:      General: Bowel sounds are normal. There is no distension.      Palpations: Abdomen is soft.   Musculoskeletal:         General: No swelling or deformity.   Skin:     General: Skin is warm and dry.      Capillary Refill: Capillary refill takes less than 2 seconds.   Neurological:      Mental Status: She is alert and oriented to person, place, and time. Mental status is at baseline.   Psychiatric:         Attention and Perception: Attention normal.         Mood and Affect: Mood normal. Affect is tearful.         Behavior: Behavior normal. Behavior is cooperative.        Review of Systems   Constitutional:  Positive for fatigue. Negative for appetite change and unexpected weight change.   HENT:  Negative for congestion and nosebleeds.    Eyes:  Negative for photophobia and visual disturbance.   Respiratory:  Positive for shortness of breath. Negative for cough and chest tightness.    Cardiovascular:  Negative for chest pain, palpitations and leg swelling.   Gastrointestinal:  Negative for abdominal distention and blood in stool.   Endocrine: Negative for polyphagia and polyuria.   Genitourinary:  Positive for frequency. Negative for enuresis.   Musculoskeletal:  Negative for joint swelling and myalgias.   Skin:  Negative for pallor and rash.   Neurological:  Positive for headaches. Negative for dizziness, syncope, weakness, light-headedness and numbness.   Hematological:  Does not bruise/bleed easily.   Psychiatric/Behavioral:  Negative for decreased concentration and sleep disturbance.       OBJECTIVE:  BP 98/60 (BP Location: Right arm, Patient Position: Sitting, Cuff Size: Adult)   Pulse 59   Ht 170.2 cm (67.01\")   Wt 101 kg (222 lb 6.4 oz)   SpO2 93%   BMI 34.82 kg/mý      Body mass index is 34.82 kg/mý.  Wt Readings " from Last 1 Encounters:   08/18/23 101 kg (222 lb 6.4 oz)       Lab Review:  Renal Function: CrCl cannot be calculated (Patient's most recent lab result is older than the maximum 30 days allowed.).    Lab Results   Component Value Date    PROBNP 325.0 02/28/2023       Results for orders placed during the hospital encounter of 03/17/23    Adult Transthoracic Echo Complete W/ Cont if Necessary Per Protocol    Interpretation Summary    Left ventricular systolic function is moderately decreased. Calculated left ventricular EF = 29.8%    The left ventricular cavity is moderately dilated.    The following left ventricular wall segments are hypokinetic: mid anterior, apical anterior, apical lateral, mid inferolateral, apical inferior, mid inferior, apex hypokinetic, basal anterior and basal inferior. The following left ventricular wall segments are dyskinetic: apical septal, basal inferoseptal, mid inferoseptal and mid anteroseptal.    Left ventricular diastolic function is consistent with (grade I) impaired relaxation.    Estimated right ventricular systolic pressure from tricuspid regurgitation is normal (<35 mmHg). Calculated right ventricular systolic pressure from tricuspid regurgitation is 23 mmHg.        6 MINUTE WALK  Patient declined       Cardiac Procedures:  1. Cardiac catheterization [facility] 5/30/17       2.  R/[facility] 4/2019   Data deficit      ASSESSMENT:     Diagnosis Plan   1. HFrEF (heart failure with reduced ejection fraction)  CBC & Differential    Comprehensive Metabolic Panel    BNP      2. Mixed hyperlipidemia        3. Essential hypertension        4. Dilated cardiomyopathy        5. NICM (nonischemic cardiomyopathy)        6. SHAREE (obstructive sleep apnea)              PLAN OF CARE:  1.  HFrEF-NYHA functional class II.  Most recent EF per echocardiogram 29%, up from 6%. Currently she is on Entresto, torsemide, carvedilol, spironolactone, and Jardiance.  Historically she has  been unable to tolerate metoprolol succinate as it made her very dizzy.      She remains euvolemic on exam and is doing very well from a heart failure perspective.  Her EF has recovered considerably, and hopefully will continue to do so.  She is now able to be much more active and has been walking in her neighborhood every night with her family.  Initially we will begin seeing her she could not even walk down the hallway to our clinic.  I would like to check labs today as below, she is to continue following a strict low-sodium diet of 2000 mg daily or less, fluid restriction of 1500 mL daily, as well as remain adherent with daily weights.    Directions for when to call the clinic reviewed with the patient to include weight gain of 2 to 3 pounds in 24 hours, weight gain of 5 to 10 pounds within 7 days; worsening shortness of breath; worsening lower extremity edema or abdominal distention.    2.  Nonobstructive CAD-diffuse 30% LAD lesion noted on prior cardiac catheterization as above.  Stable, remains without angina.    3.  Nonischemic cardiomyopathy (dilated)-presence of AICD noted.  EF per echocardiogram was 6%, now improved to 29% per echocardiogram in March 2023.  Most recent AICD interrogation as above.  On target dosing of Entresto.    4.  NSVT-noted on prior device interrogation.  Currently on beta-blockade.    5.  HIV-history noted.  Followed at [facility].    6.  Pulmonary embolism-remains on on warfarin.  Followed by home health and primary cardiology team.    7.  Obesity-discontinued Saxenda on her own, she would like to resume this.  BMI now 34.82 kg/mý.  Our pharmacist has gone over directions on resumption in extensive detail.        CBC/CMP/BNP; resume Saxenda; follow-up in 4 weeks or sooner if needed        Thank you for allowing me to participate in the care of your patient,         Kiley SAMUEL Olvera  [facility] HEART FAILURE  08/18/23  13:56 EST      **Lyric Disclaimer:**  Much of this encounter  note is an electronic transcription/translation of spoken language to printed text. The electronic translation of spoken language may permit erroneous, or at times, nonsensical words or phrases to be inadvertently transcribed. Although I have reviewed the note for such errors, some may still exist.

## 2023-08-18 NOTE — PROGRESS NOTES
Specialty Pharmacy Patient Management Program  Call Center Refill Outreach      Nina is a 47 y.o. female contacted today regarding refills of her medication(s).    Specialty medication(s) and dose(s) confirmed: Nurtec ODT 75 mg      Refill Questions      Flowsheet Row Most Recent Value   Changes to allergies? No   Changes to medications? No   New conditions since last clinic visit No   Unplanned office visit, urgent care, ED, or hospital admission in the last 4 weeks  No   How does patient/caregiver feel medication is working? Very good   Financial problems or insurance changes  No   Since the previous refill, were any specialty medication doses or scheduled injections missed or delayed?  No   Does this patient require a clinical escalation to a pharmacist? No                Medication Adherence    Adherence tools used: patient uses a pill box to manage medications  Support network for adherence: family member            Follow-up: 21 Days     Marisol Izaguirre CPhT  Care Coordinator  [facility] Neurology  [phone]  8/18/2023  08:42 EDT

## 2023-08-18 NOTE — PROGRESS NOTES
[facility] HEART FAILURE      Patient Name: Nina Saez  :1975  Age: 47 y.o.  Sex: female  Referring Provider: Nilda Gallagher APRN   Primary Cardiologist: Lisset Melton MD  Encounter Provider:  SAMUEL Cisneros      Chief Complaint:   Chief Complaint   Patient presents with    Congestive Heart Failure         Congestive Heart Failure  Associated symptoms include fatigue and shortness of breath. Pertinent negatives include no chest pain, palpitations or unexpected weight change.  this 47-year-old female, known to this provider, comes to us today for further evaluation of her chronic systolic heart failure.  Current diagnoses to include severe nonischemic cardiomyopathy, left bundle branch block, hypertension, HIV, obesity, and asthma.    Historically she had followed with Dr. Laina Boyd at [facility].  In spring 2019 she was visiting family in [place] and was taken emergently to [facility].  Echocardiogram and cardiac catheterization were performed at that point, carvedilol was changed to metoprolol.    In 2019 she presented through the emergency department at [facility] with chest pain and shortness of breath.  She was treated with IV diuresis, troponin was negative x2 and proBNP was elevated at 5151.  Entresto was started at that point given her severe dilated cardiomyopathy.  She was to follow with cardiology at [facility] at that time.    In 2020 she again presented to the emergency department with shortness of breath and cough x3 weeks.  BNP was elevated at 13,351.  Carvedilol was discontinued that admission given hypotension.  At subsequent outpatient appointment it was felt that Bumex was less effective than Lasix by the patient.  She complained of progressively worsening fatigue.  AICD, single-chamber [employer], interrogation 2020 revealed 10-16 beats of VT.    On September 10, 2020 she saw  Dr. Jamar Azul MD, for consideration of upgrade to biventricular device.  At that point he felt that her left bundle branch block was incomplete and she did not meet criteria for implantation of CRT-D.    She presented 10/27/2020 to [facility] with complaints of acute on chronic shortness of breath that began approximately 1 week prior.  BNP was further elevated at 16,206 that point.  Pulmonary edema was noted on chest x-ray.  Spironolactone was started that admission.  Referral for evaluation by [facility] transplant services was made November 2, 2020.    Left and right cardiac catheterization at [facility] revealed no obstructive CAD with normal filling pressures.  Echocardiogram at that time revealed an EF less than 15% with global hypokinesis-information extracted from external medical record note.  Cardiac catheterization May 30, 2017 performed at [facility] yielded codominant system with normal left main, LAD with distal 30% stenosis, RCA normal, LVEDP 7 mmHg.    Echocardiogram July 9, 2020 revealed EF of 6%, grade 3 LV diastolic dysfunction, significant LV wall motion hypokinesis/akinesis, RVSP 45 to 55 mmHg secondary to moderate tricuspid valve regurgitation, moderate to severe MR noted.    Jardiance 10 mg daily was started on 11/13/2020.      She was admitted from 12/11/2020 to 12/17/2020 for acute pulmonary embolism with right lower lobe pulmonary infarct.  She was started on warfarin at that time.  Additionally, she has been started on Corlanor as well as spironolactone for her heart failure.  January 4, 2021 she was evaluated and treated in the emergency department for possible angioedema.      Entresto has now been increased to  mg twice daily.  Repeat echocardiogram 3/8/2021 revealed improvement in ejection fraction from 6% to 22% with severe LV dilation, severe global hypokinesis and grade 2 LV diastolic dysfunction.    She has elected at this time  not to proceed with transplant work-up just yet.  She follows up with  on November 16.  She recently saw Dr. Melton and her carvedilol was increased to 50 mg at night with a goal of SBP of .    She spoke with Dr. Melton when she saw her about it and Corlanor was discontinued, and Entresto was reduced to 49-51 mg twice daily.      Echocardiogram was repeated in March 2023, LVEF had improved to 29%.  She had stopped her Saxenda on her own and has been focusing on heart healthy diet.  She presents today with no active complaints, stating she is more active than she has been able to be in a very long time, and wanting to restart her Saxenda.    The following portions of the patient's history were reviewed and updated as appropriate: allergies, current medications, past family history, past medical history, past social history, past surgical history and problem list.    Current Outpatient Medications   Medication Sig Dispense Refill    acetaminophen (TYLENOL) 325 MG tablet Take 2 tablets by mouth Every 6 (Six) Hours As Needed for Mild Pain.      albuterol sulfate  (90 Base) MCG/ACT inhaler INHALE 2 PUFFS BY MOUTH EVERY 4 HOURS AS NEEDED FOR WHEEZING 18 g 0    atorvastatin (LIPITOR) 20 MG tablet Take 1 tablet by mouth Daily. 90 tablet 0    BD Pen Needle Heidi 2nd Gen 32G X 4 MM misc USE 1 NEEDLE ONCE DAILY WITH SAXENDA 100 each 0    carvedilol (COREG) 3.125 MG tablet Take 1 tablet by mouth 2 (Two) Times a Day With Meals. 180 tablet 3    CBD (cannabidiol) oral oil Take  by mouth.      cyclobenzaprine (FLEXERIL) 10 MG tablet Take 1 tablet by mouth 3 (Three) Times a Day As Needed for Muscle Spasms. 21 tablet 0    Iofxk-Gewjh-Kgdkhidv-TenofAF (Symtuza) [phone] MG per tablet Take 1 tablet by mouth Daily.      Diclofenac Sodium (VOLTAREN) 1 % gel gel Apply 4 g topically to the appropriate area as directed 2 (Two) Times a Day. Use per pkg insert 100 g 2    diphenhydrAMINE (BENADRYL) 25 mg capsule Take  1 capsule by mouth Every 6 (Six) Hours As Needed for Itching (swelling). 20 capsule 0    famotidine (Pepcid) 20 MG tablet Take 1 tablet by mouth 2 (Two) Times a Day As Needed for Heartburn.      fluticasone (FLONASE) 50 MCG/ACT nasal spray 2 sprays into the nostril(s) as directed by provider Daily. 11.1 g 3    Jardiance 10 MG tablet tablet Take 1 tablet by mouth once daily 30 tablet 0    nitroglycerin (NITROSTAT) 0.4 MG SL tablet Place 1 tablet under the tongue Every 5 (Five) Minutes As Needed for Chest Pain. Take no more than 3 doses in 15 minutes. 30 tablet 5    potassium chloride (K-DUR,KLOR-CON) 20 MEQ CR tablet Take 3 tablets by mouth once daily 90 tablet 11    Rimegepant Sulfate (Nurtec) 75 MG tablet dispersible tablet Take 1 tablet by mouth 1 (One) Time As Needed for migraine. Max of 1 tablet in 24 hours. 8 tablet 2    sacubitril-valsartan (Entresto) 49-51 MG tablet Take 1 tablet by mouth 2 (Two) Times a Day. 180 tablet 3    Saxenda 18 MG/3ML injection pen Inject 3 mg under the skin into the appropriate area as directed Daily. 15 mL 2    sertraline (ZOLOFT) 50 MG tablet Take 1 tablet by mouth once daily 90 tablet 1    spironolactone (ALDACTONE) 25 MG tablet 1 tablet Daily.      torsemide (DEMADEX) 20 MG tablet TAKE 3 TABLETS BY MOUTH ONCE IN THE MORNING AND TAKE 2 TABLETS IN THE AFTERNOON 150 tablet 0    vitamin D (ERGOCALCIFEROL) 42819 units capsule capsule Take 1 capsule by mouth Every 30 (Thirty) Days.      warfarin (COUMADIN) 2.5 MG tablet Take 3 tablets (7.5 mg) by mouth daily or as directed. 270 tablet 1    topiramate (TOPAMAX) 50 MG tablet Take 1 tablet by mouth 2 (Two) Times a Day. (Patient not taking: Reported on 8/18/2023) 60 tablet 2     No current facility-administered medications for this encounter.       Past Medical History:   Diagnosis Date    AICD (automatic cardioverter/defibrillator) present 03/06/2020    Asthma     CHF (congestive heart failure)     Class 2 obesity in adult     Coronary  artery disease 2017    Depression 2017    Diabetes mellitus     Fracture, foot 3/20    Broke    Grade II diastolic dysfunction     Per echocardiogram 3/2021    H/O Closed trimalleolar fracture     Headache     HIV (human immunodeficiency virus infection)     Hypertension     LBBB (left bundle branch block)     NICM (nonischemic cardiomyopathy)     2020 EF 6% per echocardiogram; AICD present    Nonrheumatic mitral valve regurgitation 2021    Moderate per echo 3/2021    Nonrheumatic tricuspid valve regurgitation     Moderate per echocardiogram 3/2021    SHAREE (obstructive sleep apnea) 2023       Past Surgical History:   Procedure Laterality Date    ANKLE OPEN REDUCTION INTERNAL FIXATION  3/7/20    CARDIAC CATHETERIZATION      CARDIAC DEFIBRILLATOR PLACEMENT       SECTION      one C/S    FRACTURE SURGERY Left     left ankle    OOPHORECTOMY      h/o teratoma    TUBAL ABDOMINAL LIGATION         Physical Exam  Vitals and nursing note reviewed.   Constitutional:       General: She is not in acute distress.     Appearance: She is well-developed. She is not ill-appearing.   HENT:      Head: Normocephalic and atraumatic.   Eyes:      Conjunctiva/sclera: Conjunctivae normal.      Pupils: Pupils are equal, round, and reactive to light.   Neck:      Vascular: No JVD.   Cardiovascular:      Rate and Rhythm: Normal rate and regular rhythm.      Heart sounds: Normal heart sounds. No murmur heard.    No friction rub. No gallop.   Pulmonary:      Effort: Pulmonary effort is normal. No respiratory distress.      Breath sounds: Normal breath sounds.   Abdominal:      General: Bowel sounds are normal. There is no distension.      Palpations: Abdomen is soft.   Musculoskeletal:         General: No swelling or deformity.   Skin:     General: Skin is warm and dry.      Capillary Refill: Capillary refill takes less than 2 seconds.   Neurological:      Mental Status: She is alert and oriented to person, place, and  "time. Mental status is at baseline.   Psychiatric:         Attention and Perception: Attention normal.         Mood and Affect: Mood normal. Affect is tearful.         Behavior: Behavior normal. Behavior is cooperative.        Review of Systems   Constitutional:  Positive for fatigue. Negative for appetite change and unexpected weight change.   HENT:  Negative for congestion and nosebleeds.    Eyes:  Negative for photophobia and visual disturbance.   Respiratory:  Positive for shortness of breath. Negative for cough and chest tightness.    Cardiovascular:  Negative for chest pain, palpitations and leg swelling.   Gastrointestinal:  Negative for abdominal distention and blood in stool.   Endocrine: Negative for polyphagia and polyuria.   Genitourinary:  Positive for frequency. Negative for enuresis.   Musculoskeletal:  Negative for joint swelling and myalgias.   Skin:  Negative for pallor and rash.   Neurological:  Positive for headaches. Negative for dizziness, syncope, weakness, light-headedness and numbness.   Hematological:  Does not bruise/bleed easily.   Psychiatric/Behavioral:  Negative for decreased concentration and sleep disturbance.       OBJECTIVE:  BP 98/60 (BP Location: Right arm, Patient Position: Sitting, Cuff Size: Adult)   Pulse 59   Ht 170.2 cm (67.01\")   Wt 101 kg (222 lb 6.4 oz)   SpO2 93%   BMI 34.82 kg/mý      Body mass index is 34.82 kg/mý.  Wt Readings from Last 1 Encounters:   08/18/23 101 kg (222 lb 6.4 oz)       Lab Review:  Renal Function: CrCl cannot be calculated (Patient's most recent lab result is older than the maximum 30 days allowed.).    Lab Results   Component Value Date    PROBNP 325.0 02/28/2023       Results for orders placed during the hospital encounter of 03/17/23    Adult Transthoracic Echo Complete W/ Cont if Necessary Per Protocol    Interpretation Summary    Left ventricular systolic function is moderately decreased. Calculated left ventricular EF = 29.8%    The " left ventricular cavity is moderately dilated.    The following left ventricular wall segments are hypokinetic: mid anterior, apical anterior, apical lateral, mid inferolateral, apical inferior, mid inferior, apex hypokinetic, basal anterior and basal inferior. The following left ventricular wall segments are dyskinetic: apical septal, basal inferoseptal, mid inferoseptal and mid anteroseptal.    Left ventricular diastolic function is consistent with (grade I) impaired relaxation.    Estimated right ventricular systolic pressure from tricuspid regurgitation is normal (<35 mmHg). Calculated right ventricular systolic pressure from tricuspid regurgitation is 23 mmHg.        6 MINUTE WALK  Patient declined       Cardiac Procedures:  1. Cardiac catheterization [facility] 5/30/17       2.  R/[facility] 4/2019   Data deficit      ASSESSMENT:     Diagnosis Plan   1. HFrEF (heart failure with reduced ejection fraction)  CBC & Differential    Comprehensive Metabolic Panel    BNP      2. Mixed hyperlipidemia        3. Essential hypertension        4. Dilated cardiomyopathy        5. NICM (nonischemic cardiomyopathy)        6. SHAREE (obstructive sleep apnea)              PLAN OF CARE:  1.  HFrEF-NYHA functional class II.  Most recent EF per echocardiogram 29%, up from 6%. Currently she is on Entresto, torsemide, carvedilol, spironolactone, and Jardiance.  Historically she has been unable to tolerate metoprolol succinate as it made her very dizzy.      She remains euvolemic on exam and is doing very well from a heart failure perspective.  Her EF has recovered considerably, and hopefully will continue to do so.  She is now able to be much more active and has been walking in her neighborhood every night with her family.  Initially we will begin seeing her she could not even walk down the hallway to our clinic.  I would like to check labs today as below, she is to continue following a strict low-sodium diet of 2000 mg  daily or less, fluid restriction of 1500 mL daily, as well as remain adherent with daily weights.    Directions for when to call the clinic reviewed with the patient to include weight gain of 2 to 3 pounds in 24 hours, weight gain of 5 to 10 pounds within 7 days; worsening shortness of breath; worsening lower extremity edema or abdominal distention.    2.  Nonobstructive CAD-diffuse 30% LAD lesion noted on prior cardiac catheterization as above.  Stable, remains without angina.    3.  Nonischemic cardiomyopathy (dilated)-presence of AICD noted.  EF per echocardiogram was 6%, now improved to 29% per echocardiogram in March 2023.  Most recent AICD interrogation as above.  On target dosing of Entresto.    4.  NSVT-noted on prior device interrogation.  Currently on beta-blockade.    5.  HIV-history noted.  Followed at [facility].    6.  Pulmonary embolism-remains on on warfarin.  Followed by home health and primary cardiology team.    7.  Obesity-discontinued Saxenda on her own, she would like to resume this.  BMI now 34.82 kg/mý.  Our pharmacist has gone over directions on resumption in extensive detail.        CBC/CMP/BNP; resume Saxenda; follow-up in 4 weeks or sooner if needed        Thank you for allowing me to participate in the care of your patient,         Kiley JIN SAMUEL Boyd  [facility] HEART FAILURE  08/18/23  13:56 EST      **Lyric Disclaimer:**  Much of this encounter note is an electronic transcription/translation of spoken language to printed text. The electronic translation of spoken language may permit erroneous, or at times, nonsensical words or phrases to be inadvertently transcribed. Although I have reviewed the note for such errors, some may still exist.

## 2023-08-18 NOTE — TELEPHONE ENCOUNTER
Call Reason:  Lab results    Plan of care:  No change in current plan of care.    Per provider: SAMUEL Rolle    Reviewed by:  Fercho Bradshaw DNP, MSN, RN  RN Clinical Coordinator  [facility] Heart Failure Clinic

## 2023-08-21 ENCOUNTER — ANTICOAGULATION VISIT (OUTPATIENT)
Dept: PHARMACY | Facility: HOSPITAL | Age: 48
End: 2023-08-21
Payer: MEDICARE

## 2023-08-21 LAB — INR PPP: 2.9

## 2023-08-21 NOTE — PROGRESS NOTES
Anticoagulation Clinic Progress Note    Anticoagulation Summary  As of 2023      INR goal:  2.0-3.0   TTR:  63.5 % (2.6 y)   INR used for dosin.90 (2023)   Warfarin maintenance plan:  7.5 mg every day   Weekly warfarin total:  52.5 mg   No change documented:  Zina De La Rosa RPH   Plan last modified:  Zina De La Rosa RPH (2022)   Next INR check:  2023   Priority:  High   Target end date:  Indefinite    Indications    Other acute pulmonary embolism  unspecified whether acute cor pulmonale present (Resolved) [I26.99]  Acute pulmonary embolism  unspecified pulmonary embolism type  unspecified whether acute cor pulmonale present (Resolved) [I26.99]                 Anticoagulation Episode Summary       INR check location:      Preferred lab:      Send INR reminders to:  [facility]    Comments:  **Home Testing Program**          Anticoagulation Care Providers       Provider Role Specialty Phone number    Lisset Melton MD Referring Cardiology [phone]            Clinic Interview:  No pertinent clinical findings have been reported.    INR History:      2023     1:57 PM 2023    12:00 AM 2023    11:41 AM 2023    12:00 AM 2023    12:45 PM 2023    12:00 AM 2023     1:05 PM   Anticoagulation Monitoring   INR 3.60  3.20  2.10  2.90   INR Date 2023   INR Goal 2.0-3.0  2.0-3.0  2.0-3.0  2.0-3.0   Trend Same  Same  Same  Same   Last Week Total 52.5 mg  52.5 mg  50 mg  52.5 mg   Next Week Total 45 mg  50 mg  52.5 mg  52.5 mg   Sun 7.5 mg  7.5 mg  7.5 mg  7.5 mg   Mon Hold ()  7.5 mg  7.5 mg  7.5 mg   Tue 7.5 mg  5 mg ()  7.5 mg  7.5 mg   Wed 7.5 mg  7.5 mg  7.5 mg  7.5 mg   Thu 7.5 mg  7.5 mg  7.5 mg  7.5 mg   Fri 7.5 mg  7.5 mg  7.5 mg  7.5 mg   Sat 7.5 mg  7.5 mg  7.5 mg  7.5 mg   Historical INR  3.20      2.10      2.90            This result is from an external source.       Plan:  1. INR is  therapeutic today- see above in Anticoagulation Summary.    Nina Saez to continue their warfarin regimen- see above in Anticoagulation Summary.  2. Follow up in 1 week  3. Pt has agreed to only be called if INR out of range. They have been instructed to call if any changes in medications, doses, concerns, etc. Patient expresses understanding and has no further questions at this time.    Zina De La Rosa, formerly [facility]

## 2023-08-27 DIAGNOSIS — I50.20 HFREF (HEART FAILURE WITH REDUCED EJECTION FRACTION): ICD-10-CM

## 2023-08-28 ENCOUNTER — ANTICOAGULATION VISIT (OUTPATIENT)
Dept: PHARMACY | Facility: HOSPITAL | Age: 48
End: 2023-08-28
Payer: MEDICARE

## 2023-08-28 LAB — INR PPP: 3

## 2023-08-28 RX ORDER — TORSEMIDE 20 MG/1
TABLET ORAL
Qty: 150 TABLET | Refills: 0 | Status: SHIPPED | OUTPATIENT
Start: 2023-08-28

## 2023-08-28 NOTE — TELEPHONE ENCOUNTER
Last Office Visit:  08/18/2023  Labs: 08/18/2023  Next Follow-up appointment: 09/15/2023      Reviewed by:    Fercho Bradshaw DNP, MSN, RN  RN Clinical Coordinator  [facility] Heart Failure [facility]

## 2023-08-28 NOTE — PROGRESS NOTES
Anticoagulation Clinic Progress Note    Anticoagulation Summary  As of 8/28/2023      INR goal:  2.0-3.0   TTR:  63.8 % (2.7 y)   INR used for dosing:  3.00 (8/28/2023)   Warfarin maintenance plan:  7.5 mg every day   Weekly warfarin total:  52.5 mg   No change documented:  Halye Lancaster, PharmD   Plan last modified:  Zina De La Rosa RPH (5/20/2022)   Next INR check:  9/4/2023   Priority:  High   Target end date:  Indefinite    Indications    Other acute pulmonary embolism  unspecified whether acute cor pulmonale present (Resolved) [I26.99]  Acute pulmonary embolism  unspecified pulmonary embolism type  unspecified whether acute cor pulmonale present (Resolved) [I26.99]                 Anticoagulation Episode Summary       INR check location:      Preferred lab:      Send INR reminders to:   [facility]    Comments:  **Home Testing Program**          Anticoagulation Care Providers       Provider Role Specialty Phone number    Lisset Melton MD Referring Cardiology [phone]            Clinic Interview:  No pertinent clinical findings have been reported.    INR History:      8/8/2023    11:41 AM 8/14/2023    12:00 AM 8/14/2023    12:45 PM 8/21/2023    12:00 AM 8/21/2023     1:05 PM 8/28/2023    12:00 AM 8/28/2023     2:02 PM   Anticoagulation Monitoring   INR 3.20  2.10  2.90  3.00   INR Date 8/8/2023 8/14/2023 8/21/2023 8/28/2023   INR Goal 2.0-3.0  2.0-3.0  2.0-3.0  2.0-3.0   Trend Same  Same  Same  Same   Last Week Total 52.5 mg  50 mg  52.5 mg  52.5 mg   Next Week Total 50 mg  52.5 mg  52.5 mg  52.5 mg   Sun 7.5 mg  7.5 mg  7.5 mg  7.5 mg   Mon 7.5 mg  7.5 mg  7.5 mg  7.5 mg   Tue 5 mg (8/8)  7.5 mg  7.5 mg  7.5 mg   Wed 7.5 mg  7.5 mg  7.5 mg  7.5 mg   Thu 7.5 mg  7.5 mg  7.5 mg  7.5 mg   Fri 7.5 mg  7.5 mg  7.5 mg  7.5 mg   Sat 7.5 mg  7.5 mg  7.5 mg  7.5 mg   Historical INR  2.10      2.90      3.00            This result is from an external source.       Plan:  1. INR is  therapeutic today- see above in Anticoagulation Summary.    Nina Saez to continue their warfarin regimen- see above in Anticoagulation Summary.  2. Follow up in 1 week  3. They have been instructed to call if any changes in medications, doses, concerns, etc. Patient expresses understanding and has no further questions at this time.    Haley Lancaster, PharmD

## 2023-09-05 ENCOUNTER — ANTICOAGULATION VISIT (OUTPATIENT)
Dept: PHARMACY | Facility: HOSPITAL | Age: 48
End: 2023-09-05
Payer: MEDICARE

## 2023-09-05 LAB — INR PPP: 4.1

## 2023-09-05 NOTE — PROGRESS NOTES
Anticoagulation Clinic Progress Note    Anticoagulation Summary  As of 2023      INR goal:  2.0-3.0   TTR:  63.3 % (2.7 y)   INR used for dosin.10 (2023)   Warfarin maintenance plan:  7.5 mg every day   Weekly warfarin total:  52.5 mg   Plan last modified:  Zina De La Rosa RPH (2022)   Next INR check:  2023   Priority:  High   Target end date:  Indefinite    Indications    Other acute pulmonary embolism  unspecified whether acute cor pulmonale present (Resolved) [I26.99]  Acute pulmonary embolism  unspecified pulmonary embolism type  unspecified whether acute cor pulmonale present (Resolved) [I26.99]                 Anticoagulation Episode Summary       INR check location:      Preferred lab:      Send INR reminders to:  [facility]    Comments:  **Home Testing Program**          Anticoagulation Care Providers       Provider Role Specialty Phone number    Lisset Melton MD Referring Cardiology [phone]            Clinic Interview:  Patient Findings     Negatives:  Signs/symptoms of thrombosis, Signs/symptoms of bleeding,   Laboratory test error suspected, Change in health, Change in alcohol use,   Change in activity, Upcoming invasive procedure, Emergency department   visit, Upcoming dental procedure, Missed doses, Extra doses, Change in   medications, Change in diet/appetite, Hospital admission, Bruising, Other   complaints      Clinical Outcomes     Negatives:  Major bleeding event, Thromboembolic event,   Anticoagulation-related hospital admission, Anticoagulation-related ED   visit, Anticoagulation-related fatality        INR History:      2023    12:45 PM 2023    12:00 AM 2023     1:05 PM 2023    12:00 AM 2023     2:02 PM 2023    12:00 AM 2023    11:48 AM   Anticoagulation Monitoring   INR 2.10  2.90  3.00  4.10   INR Date 2023   INR Goal 2.0-3.0  2.0-3.0  2.0-3.0  2.0-3.0   Trend Same   Same  Same  Same   Last Week Total 50 mg  52.5 mg  52.5 mg  52.5 mg   Next Week Total 52.5 mg  52.5 mg  52.5 mg  45 mg   Sun 7.5 mg  7.5 mg  7.5 mg  7.5 mg   Mon 7.5 mg  7.5 mg  7.5 mg  7.5 mg   Tue 7.5 mg  7.5 mg  7.5 mg  Hold (9/5)   Wed 7.5 mg  7.5 mg  7.5 mg  7.5 mg   Thu 7.5 mg  7.5 mg  7.5 mg  7.5 mg   Fri 7.5 mg  7.5 mg  7.5 mg  7.5 mg   Sat 7.5 mg  7.5 mg  7.5 mg  7.5 mg   Historical INR  2.90      3.00      4.10            This result is from an external source.       Plan:  1. INR is Supratherapeutic today- see above in Anticoagulation Summary.   Will instruct Nina Saez to Change their warfarin regimen- see above in Anticoagulation Summary.  Hold and recheck in 1 week   2. Follow up in 1 weeks  3.They have been instructed to call if any changes in medications, doses, concerns, etc. Patient expresses understanding and has no further questions at this time.    Zina De La Rosa [facility]

## 2023-09-07 DIAGNOSIS — I50.20 HFREF (HEART FAILURE WITH REDUCED EJECTION FRACTION): ICD-10-CM

## 2023-09-07 RX ORDER — EMPAGLIFLOZIN 10 MG/1
TABLET, FILM COATED ORAL
Qty: 30 TABLET | Refills: 0 | Status: SHIPPED | OUTPATIENT
Start: 2023-09-07

## 2023-09-11 ENCOUNTER — ANTICOAGULATION VISIT (OUTPATIENT)
Dept: PHARMACY | Facility: HOSPITAL | Age: 48
End: 2023-09-11
Payer: MEDICARE

## 2023-09-11 LAB — INR PPP: 2.4

## 2023-09-11 PROCEDURE — G0249 PROVIDE INR TEST MATER/EQUIP: HCPCS

## 2023-09-11 NOTE — PROGRESS NOTES
Anticoagulation Clinic Progress Note    Anticoagulation Summary  As of 2023      INR goal:  2.0-3.0   TTR:  63.1 % (2.7 y)   INR used for dosin.40 (2023)   Warfarin maintenance plan:  7.5 mg every day   Weekly warfarin total:  52.5 mg   No change documented:  Haley Lancaster, PharmD   Plan last modified:  Zina De La Rosa RPH (2022)   Next INR check:  2023   Priority:  High   Target end date:  Indefinite    Indications    Other acute pulmonary embolism  unspecified whether acute cor pulmonale present (Resolved) [I26.99]  Acute pulmonary embolism  unspecified pulmonary embolism type  unspecified whether acute cor pulmonale present (Resolved) [I26.99]                 Anticoagulation Episode Summary       INR check location:      Preferred lab:      Send INR reminders to:   [facility]    Comments:  **Home Testing Program**          Anticoagulation Care Providers       Provider Role Specialty Phone number    Lisset Melton MD Referring Cardiology [phone]            Clinic Interview:  No pertinent clinical findings have been reported.    INR History:      2023     1:05 PM 2023    12:00 AM 2023     2:02 PM 2023    12:00 AM 2023    11:48 AM 2023    12:00 AM 2023    11:45 AM   Anticoagulation Monitoring   INR 2.90  3.00  4.10  2.40   INR Date 2023   INR Goal 2.0-3.0  2.0-3.0  2.0-3.0  2.0-3.0   Trend Same  Same  Same  Same   Last Week Total 52.5 mg  52.5 mg  52.5 mg  45 mg   Next Week Total 52.5 mg  52.5 mg  45 mg  52.5 mg   Sun 7.5 mg  7.5 mg  7.5 mg  7.5 mg   Mon 7.5 mg  7.5 mg  7.5 mg  7.5 mg   Tue 7.5 mg  7.5 mg  Hold ()  7.5 mg   Wed 7.5 mg  7.5 mg  7.5 mg  7.5 mg   Thu 7.5 mg  7.5 mg  7.5 mg  7.5 mg   Fri 7.5 mg  7.5 mg  7.5 mg  7.5 mg   Sat 7.5 mg  7.5 mg  7.5 mg  7.5 mg   Historical INR  3.00      4.10      2.40            This result is from an external source.       Plan:  1. INR is  therapeutic today- see above in Anticoagulation Summary.    Nina Saez to continue their warfarin regimen- see above in Anticoagulation Summary.  2. Follow up in 1 week  3. They have been instructed to call if any changes in medications, doses, concerns, etc. Patient expresses understanding and has no further questions at this time.    Haley Lancaster, PharmD

## 2023-09-12 RX ORDER — WARFARIN SODIUM 2.5 MG/1
TABLET ORAL
Qty: 270 TABLET | Refills: 0 | Status: SHIPPED | OUTPATIENT
Start: 2023-09-12

## 2023-09-15 ENCOUNTER — SPECIALTY PHARMACY (OUTPATIENT)
Dept: NEUROLOGY | Facility: CLINIC | Age: 48
End: 2023-09-15
Payer: MEDICARE

## 2023-09-18 ENCOUNTER — ANTICOAGULATION VISIT (OUTPATIENT)
Dept: PHARMACY | Facility: HOSPITAL | Age: 48
End: 2023-09-18
Payer: MEDICARE

## 2023-09-18 LAB — INR PPP: 3.9

## 2023-09-18 RX ORDER — SACUBITRIL AND VALSARTAN 49; 51 MG/1; MG/1
TABLET, FILM COATED ORAL
Qty: 180 TABLET | Refills: 0 | Status: SHIPPED | OUTPATIENT
Start: 2023-09-18

## 2023-09-18 NOTE — PROGRESS NOTES
Anticoagulation Clinic Progress Note    Anticoagulation Summary  As of 9/18/2023      INR goal:  2.0-3.0   TTR:  62.9 % (2.7 y)   INR used for dosing:  3.90 (9/18/2023)   Warfarin maintenance plan:  5 mg every Fri; 7.5 mg all other days   Weekly warfarin total:  50 mg   Plan last modified:  Zina De La Rosa RPH (9/18/2023)   Next INR check:  9/25/2023   Priority:  High   Target end date:  Indefinite    Indications    Other acute pulmonary embolism  unspecified whether acute cor pulmonale present (Resolved) [I26.99]  Acute pulmonary embolism  unspecified pulmonary embolism type  unspecified whether acute cor pulmonale present (Resolved) [I26.99]                 Anticoagulation Episode Summary       INR check location:      Preferred lab:      Send INR reminders to:  [facility]    Comments:  **Home Testing Program**          Anticoagulation Care Providers       Provider Role Specialty Phone number    Lisset Melton MD Referring Cardiology [phone]            Clinic Interview:  Patient Findings     Positives:  Change in diet/appetite    Negatives:  Signs/symptoms of thrombosis, Signs/symptoms of bleeding,   Laboratory test error suspected, Change in health, Change in alcohol use,   Change in activity, Upcoming invasive procedure, Emergency department   visit, Upcoming dental procedure, Missed doses, Extra doses, Change in   medications, Hospital admission, Bruising, Other complaints    Comments:  not eating veggies       Clinical Outcomes     Negatives:  Major bleeding event, Thromboembolic event,   Anticoagulation-related hospital admission, Anticoagulation-related ED   visit, Anticoagulation-related fatality    Comments:  not eating veggies         INR History:      8/28/2023     2:02 PM 9/5/2023    12:00 AM 9/5/2023    11:48 AM 9/11/2023    12:00 AM 9/11/2023    11:45 AM 9/18/2023    12:00 AM 9/18/2023    11:48 AM   Anticoagulation Monitoring   INR 3.00  4.10  2.40  3.90   INR Date  8/28/2023 9/5/2023 9/11/2023 9/18/2023   INR Goal 2.0-3.0  2.0-3.0  2.0-3.0  2.0-3.0   Trend Same  Same  Same  Down   Last Week Total 52.5 mg  52.5 mg  45 mg  52.5 mg   Next Week Total 52.5 mg  45 mg  52.5 mg  42.5 mg   Sun 7.5 mg  7.5 mg  7.5 mg  7.5 mg   Mon 7.5 mg  7.5 mg  7.5 mg  Hold (9/18)   Tue 7.5 mg  Hold (9/5)  7.5 mg  7.5 mg   Wed 7.5 mg  7.5 mg  7.5 mg  7.5 mg   Thu 7.5 mg  7.5 mg  7.5 mg  7.5 mg   Fri 7.5 mg  7.5 mg  7.5 mg  5 mg   Sat 7.5 mg  7.5 mg  7.5 mg  7.5 mg   Historical INR  4.10      2.40      3.90            This result is from an external source.       Plan:  1. INR is Supratherapeutic today- see above in Anticoagulation Summary.   Will instruct Nina NATH Nadja to Change their warfarin regimen- see above in Anticoagulation Summary.  hold and then trial 5 mg on fri, 7.5 mg ISABELL, merle 1 week   2. Follow up in 1 weeks  3. They have been instructed to call if any changes in medications, doses, concerns, etc. Patient expresses understanding and has no further questions at this time.    Zina De La Rosa [facility]

## 2023-09-19 ENCOUNTER — OFFICE VISIT (OUTPATIENT)
Dept: CARDIOLOGY | Facility: CLINIC | Age: 48
End: 2023-09-19
Payer: MEDICARE

## 2023-09-19 VITALS
BODY MASS INDEX: 34.88 KG/M2 | DIASTOLIC BLOOD PRESSURE: 64 MMHG | OXYGEN SATURATION: 98 % | HEART RATE: 76 BPM | WEIGHT: 222.2 LBS | SYSTOLIC BLOOD PRESSURE: 90 MMHG | HEIGHT: 67 IN

## 2023-09-19 DIAGNOSIS — I10 ESSENTIAL HYPERTENSION: ICD-10-CM

## 2023-09-19 DIAGNOSIS — E78.2 MIXED HYPERLIPIDEMIA: ICD-10-CM

## 2023-09-19 DIAGNOSIS — I42.0 DILATED CARDIOMYOPATHY: Primary | ICD-10-CM

## 2023-09-19 DIAGNOSIS — I50.20 HFREF (HEART FAILURE WITH REDUCED EJECTION FRACTION): ICD-10-CM

## 2023-09-19 NOTE — PROGRESS NOTES
Date of Office Visit: 2023  Encounter Provider: Lisset Melton MD  Place of Service: [facility] CARDIOLOGY  Patient Name: Nina Saez  :1975      Patient ID:  Nina Saez is a 47 y.o. female is here for  followup for         History of Present Illness    She has a history of HIV, essential hypertension, severe dilated nonischemic cardiomyopathy with chronic systolic congestive heart failure, obesity, SHAREE on CPAP, history of illicit drug use and asthma.  She has an AICD-single-chamber [employer].  In 2020, she was hospitalized for an acute PE with right lower lobe pulmonary infarct and was having hemoptysis.  She was placed on warfarin.  She is followed at [facility] for her HIV.     She was diagnosed with heart failure and cardiomyopathy in 2019 at [facility] when she was there visiting family.  An echocardiogram on cardiac catheterization which confirmed this diagnosis.  Her cardiac catheterization done 2019 showed normal coronary arteries, LVEDP 12, wedge pressure 13, PA pressure 28/15 with a mean of 19 mmHg, RA pressure 4, cardiac index 2.5 L/min/m² with a PVR of 1.2 Woods units.     She presented emergency on 20 with short windedness and cough for 3 weeks prior to presentation.  Her weight is been stable and she had no lower extremity edema but she had missed her cardiac medications and had been eating a lot of salt.  On arrival, she was found to be in congestive heart failure.  Echo done 2020 showed severe left ventricular dilation, ejection fraction 6%, very thin left ventricular walls, grade 3 diastolic dysfunction, moderate to severe mitral insufficiency, moderate tricuspid insufficiency, RVSP 48 mmHg.  There was essentially global hypokinesis with akinesis at the bases.  She was able to be dismissed on 2020.  While in the hospital, we did recommend consideration for upgrade to a biventricular AICD.   She also had some right flank pain during hospitalization and CT scan showed a 2 mm minimally obstructing stone in the right ureter.  Urology saw her and felt like she could pass the stone without further intervention.     On 9/10/2020, she saw Dr. Jamar Azul in the office and he did not feel that she met criteria for implantation of CRT device.       She saw [facility] by telehealth date of service 1/28/21.  They noted the patient underwent a CPX which showed peak VO2 11.9 at RER greater than 1.05 while on Coreg.  Her CPX showed heart related limitations in her functional capacity, but the patient felt satisfied at her functional current level.     She has not had surgery with Dr. Rocky Calvert on her left ankle but still needs this.       Echo done 12/15/2021 shows severe left ventricular dilation with ejection fraction 21-25%, global hypokinesis, grade 2 diastolic dysfunction, normal RVSP and normal valvular structure and function.  Right ventricular systolic function is also mildly to moderately reduced.  When compared with prior echocardiogram, there is less valvular insufficiency.     She has been having more headaches since changing her HIV medications.  She basically has a combination tablet with her darunavir, ritonavir, emtricitabine and tenofovir.  Since then, she has had daily headaches which lasts all day.  She has been using Tylenol extra strength 2 tablets 3 times a day to be added to headaches.      She was evaluated by Dr. Hernandez at  heart failure 2/17/2022.  No changes were made.  She did have COVID-19 January 2022.     Labs on 8/18/2023 show proBNP 575, glucose 154, otherwise normal CMP, normal CBC.  Echo done 3/17/2023 showed ejection fraction of 30% with moderate left ventricular dilation, grade 1 diastolic dysfunction, dyskinesis of the septum with otherwise global hypokinesis, mild mitral insufficiency with no other significant valvular abnormalities and normal RVSP.  She had an ICD  interrogation done 2023 was normal.  She has no chest pain or pressure.  She has no orthopnea or PND.  She has no lower extremity edema.  She is taking her medications as directed without difficulty.  She is really trying to watch her salt and eats no red meat.  She plans to get her flu shot.  She has no exertional dyspnea.  She is on Saxenda to try to lose weight but is not really losing weight with this.  I did explain to her this likely due to all the medications that she is on for her heart failure and HIV-this will make it very difficult to lose weight.    Past Medical History:   Diagnosis Date    AICD (automatic cardioverter/defibrillator) present 2020    Asthma     CHF (congestive heart failure)     Class 2 obesity in adult     Coronary artery disease 2017    Depression     Diabetes mellitus     Fracture, foot 3/20    Broke    Grade II diastolic dysfunction     Per echocardiogram 3/2021    H/O Closed trimalleolar fracture     Headache     HIV (human immunodeficiency virus infection)     Hypertension     LBBB (left bundle branch block)     NICM (nonischemic cardiomyopathy)     2020 EF 6% per echocardiogram; AICD present    Nonrheumatic mitral valve regurgitation 2021    Moderate per echo 3/2021    Nonrheumatic tricuspid valve regurgitation     Moderate per echocardiogram 3/2021    SHAREE (obstructive sleep apnea) 2023         Past Surgical History:   Procedure Laterality Date    ANKLE OPEN REDUCTION INTERNAL FIXATION  3/7/20    CARDIAC CATHETERIZATION      CARDIAC DEFIBRILLATOR PLACEMENT       SECTION      one C/S    FRACTURE SURGERY Left     left ankle    OOPHORECTOMY      h/o teratoma    TUBAL ABDOMINAL LIGATION         Current Outpatient Medications on File Prior to Visit   Medication Sig Dispense Refill    acetaminophen (TYLENOL) 325 MG tablet Take 2 tablets by mouth Every 6 (Six) Hours As Needed for Mild Pain.      albuterol sulfate  (90 Base) MCG/ACT inhaler  INHALE 2 PUFFS BY MOUTH EVERY 4 HOURS AS NEEDED FOR WHEEZING 18 g 0    atorvastatin (LIPITOR) 20 MG tablet Take 1 tablet by mouth Daily. 90 tablet 0    BD Pen Needle Heidi 2nd Gen 32G X 4 MM misc USE 1 NEEDLE ONCE DAILY WITH SAXENDA 100 each 0    carvedilol (COREG) 3.125 MG tablet Take 1 tablet by mouth 2 (Two) Times a Day With Meals. 180 tablet 3    CBD (cannabidiol) oral oil Take  by mouth.      cyclobenzaprine (FLEXERIL) 10 MG tablet Take 1 tablet by mouth 3 (Three) Times a Day As Needed for Muscle Spasms. 21 tablet 0    Grphz-Tbuuh-Bchuiyez-TenofAF (Symtuza) 321-632-473-10 MG per tablet Take 1 tablet by mouth Daily.      Diclofenac Sodium (VOLTAREN) 1 % gel gel Apply 4 g topically to the appropriate area as directed 2 (Two) Times a Day. Use per pkg insert 100 g 2    diphenhydrAMINE (BENADRYL) 25 mg capsule Take 1 capsule by mouth Every 6 (Six) Hours As Needed for Itching (swelling). 20 capsule 0    Entresto 49-51 MG tablet Take 1 tablet by mouth twice daily 180 tablet 0    famotidine (Pepcid) 20 MG tablet Take 1 tablet by mouth 2 (Two) Times a Day As Needed for Heartburn.      fluticasone (FLONASE) 50 MCG/ACT nasal spray 2 sprays into the nostril(s) as directed by provider Daily. 11.1 g 3    Jardiance 10 MG tablet tablet Take 1 tablet by mouth once daily 30 tablet 0    nitroglycerin (NITROSTAT) 0.4 MG SL tablet Place 1 tablet under the tongue Every 5 (Five) Minutes As Needed for Chest Pain. Take no more than 3 doses in 15 minutes. 30 tablet 5    potassium chloride (K-DUR,KLOR-CON) 20 MEQ CR tablet Take 3 tablets by mouth once daily 90 tablet 11    Rimegepant Sulfate (Nurtec) 75 MG tablet dispersible tablet Take 1 tablet by mouth 1 (One) Time As Needed for migraine. Max of 1 tablet in 24 hours. 8 tablet 2    Saxenda 18 MG/3ML injection pen Inject 3 mg under the skin into the appropriate area as directed Daily. 15 mL 2    sertraline (ZOLOFT) 50 MG tablet Take 1 tablet by mouth once daily 90 tablet 1     "spironolactone (ALDACTONE) 25 MG tablet 1 tablet Daily.      torsemide (DEMADEX) 20 MG tablet TAKE 3 TABLETS BY MOUTH IN THE MORNING THEN 2 IN THE AFTERNOON 150 tablet 0    vitamin D (ERGOCALCIFEROL) 65611 units capsule capsule Take 1 capsule by mouth Every 30 (Thirty) Days.      warfarin (COUMADIN) 2.5 MG tablet TAKE 3 TABLETS (7.5MG) BY MOUTH DAILY OR AS DIRECTED 270 tablet 0    topiramate (TOPAMAX) 50 MG tablet Take 1 tablet by mouth 2 (Two) Times a Day. (Patient not taking: Reported on 2023) 60 tablet 2     No current facility-administered medications on file prior to visit.       Social History     Socioeconomic History    Marital status: Legally    Tobacco Use    Smoking status: Former     Packs/day: 0.50     Years: 20.00     Pack years: 10.00     Types: Cigarettes     Quit date:      Years since quittin.7     Passive exposure: Past    Smokeless tobacco: Never   Vaping Use    Vaping Use: Never used   Substance and Sexual Activity    Alcohol use: Not Currently     Comment: holiday and special occ//caffeine use: Occ    Drug use: Not Currently     Types: Marijuana     Comment: gummy    Sexual activity: Not Currently     Partners: Male     Birth control/protection: Hysterectomy           ROS    Procedures    ECG 12 Lead    Date/Time: 2023 1:04 PM  Performed by: Lisset Melton MD  Authorized by: Lisset Melton MD   Comparison: compared with previous ECG   Similar to previous ECG  Rhythm: sinus rhythm  Conduction: non-specific intraventricular conduction delay  T inversion: II, III, aVF, V5 and V6    Clinical impression: abnormal EKG            Objective:      Vitals:    23 1256 23 1259   BP: 98/68 90/64   BP Location: Left arm Right arm   Patient Position: Sitting Sitting   Cuff Size: Large Adult Large Adult   Pulse: 76    SpO2: 98%    Weight: 101 kg (222 lb 3.2 oz)    Height: 170.2 cm (67\")      Body mass index is 34.8 kg/m².    Vitals reviewed. "   Constitutional:       General: Not in acute distress.     Appearance: Well-developed. Not diaphoretic.   Eyes:      General: No scleral icterus.     Conjunctiva/sclera: Conjunctivae normal.   HENT:      Head: Normocephalic and atraumatic.   Neck:      Thyroid: No thyromegaly.      Vascular: No carotid bruit or JVD.      Lymphadenopathy: No cervical adenopathy.   Pulmonary:      Effort: Pulmonary effort is normal. No respiratory distress.      Breath sounds: Normal breath sounds. No wheezing. No rhonchi. No rales.   Chest:      Chest wall: Not tender to palpatation.   Cardiovascular:      Normal rate. Regular rhythm.      Murmurs: There is no murmur.      No gallop.  No rub.   Pulses:     Intact distal pulses.      Carotid: 2+ bilaterally.     Radial: 2+ bilaterally.     Dorsalis pedis: 2+ bilaterally.     Posterior tibial: 2+ bilaterally.  Edema:     Peripheral edema absent.   Abdominal:      General: Bowel sounds are normal. There is no distension or abdominal bruit.      Palpations: Abdomen is soft. There is no abdominal mass.      Tenderness: There is no abdominal tenderness.   Musculoskeletal:         General: No deformity.      Extremities: No clubbing present.     Cervical back: Neck supple. Skin:     General: Skin is warm and dry. There is no cyanosis.      Coloration: Skin is not pale.      Findings: No rash.   Neurological:      Mental Status: Alert and oriented to person, place, and time.      Cranial Nerves: No cranial nerve deficit.   Psychiatric:         Judgment: Judgment normal.       Lab Review:       Assessment:      Diagnosis Plan   1. Dilated cardiomyopathy        2. Essential hypertension        3. HFrEF (heart failure with reduced ejection fraction)        4. Mixed hyperlipidemia          Severe dilated nonischemic cardiomyopathy, ejection fraction 22%, AICD in place, chronic HFrEF.   Her cardiomyopathy is likely multifactorial related to the old illicit drug use history, HIV, possible poorly  controlled HTN in the past.  No decompensated heart failure at this time.  Essential hypertension, controlled.  HIV positive.  Followed at [facility], well treated.  Obesity, on Saxenda  Asthma, stable  LBBB.  Chronic and stable  History of pulmonary embolism on warfarin.  Diagnosed December 2020.  Etiology unknown.   History of bradycardia with hypotension associated with fatigue, dizziness and headache due to being overmedicated with Entresto and Corlanor.  This improved with decreasing Entresto and stopping Corlanor.  She is now on carvedilol and tolerating it well.  Migraine headaches, okay to use Topamax.  SHAREE, on CPAP.     Plan:       No medication changes, did advise for her to get the flu shot, overall doing well, no other testing at this time, see me in 3 months.

## 2023-09-25 ENCOUNTER — ANTICOAGULATION VISIT (OUTPATIENT)
Dept: PHARMACY | Facility: HOSPITAL | Age: 48
End: 2023-09-25

## 2023-09-25 LAB — INR PPP: 3.5

## 2023-09-25 NOTE — PROGRESS NOTES
Anticoagulation Clinic Progress Note    Anticoagulation Summary  As of 9/25/2023      INR goal:  2.0-3.0   TTR:  62.5 % (2.7 y)   INR used for dosing:  3.50 (9/25/2023)   Warfarin maintenance plan:  5 mg every Fri; 7.5 mg all other days   Weekly warfarin total:  50 mg   Plan last modified:  Zina De La Rosa RPH (9/18/2023)   Next INR check:  10/2/2023   Priority:  High   Target end date:  Indefinite    Indications    Other acute pulmonary embolism  unspecified whether acute cor pulmonale present (Resolved) [I26.99]  Acute pulmonary embolism  unspecified pulmonary embolism type  unspecified whether acute cor pulmonale present (Resolved) [I26.99]                 Anticoagulation Episode Summary       INR check location:      Preferred lab:      Send INR reminders to:  [facility]    Comments:  **Home Testing Program**          Anticoagulation Care Providers       Provider Role Specialty Phone number    Lisset Melton MD Referring Cardiology [phone]            Clinic Interview:  Patient Findings     Positives:  Other complaints    Negatives:  Signs/symptoms of thrombosis, Signs/symptoms of bleeding,   Laboratory test error suspected, Change in health, Change in alcohol use,   Change in activity, Upcoming invasive procedure, Emergency department   visit, Upcoming dental procedure, Missed doses, Extra doses, Change in   medications, Change in diet/appetite, Hospital admission, Bruising    Comments:  Confirms she forgot to hold the dose last Monday.       Clinical Outcomes     Negatives:  Major bleeding event, Thromboembolic event,   Anticoagulation-related hospital admission, Anticoagulation-related ED   visit, Anticoagulation-related fatality    Comments:  Confirms she forgot to hold the dose last Monday.         INR History:      9/5/2023    11:48 AM 9/11/2023    12:00 AM 9/11/2023    11:45 AM 9/18/2023    12:00 AM 9/18/2023    11:48 AM 9/25/2023    12:00 AM 9/25/2023     1:04 PM    Anticoagulation Monitoring   INR 4.10  2.40  3.90  3.50   INR Date 9/5/2023 9/11/2023 9/18/2023 9/25/2023   INR Goal 2.0-3.0  2.0-3.0  2.0-3.0  2.0-3.0   Trend Same  Same  Down  Same   Last Week Total 52.5 mg  45 mg  52.5 mg  50 mg   Next Week Total 45 mg  52.5 mg  42.5 mg  42.5 mg   Sun 7.5 mg  7.5 mg  7.5 mg  7.5 mg   Mon 7.5 mg  7.5 mg  Hold (9/18)  Hold (9/25)   Tue Hold (9/5)  7.5 mg  7.5 mg  7.5 mg   Wed 7.5 mg  7.5 mg  7.5 mg  7.5 mg   Thu 7.5 mg  7.5 mg  7.5 mg  7.5 mg   Fri 7.5 mg  7.5 mg  5 mg  5 mg   Sat 7.5 mg  7.5 mg  7.5 mg  7.5 mg   Historical INR  2.40      3.90      3.50            This result is from an external source.       Plan:  1. INR is Supratherapeutic today- see above in Anticoagulation Summary.   Will instruct Nina Saez to Change their warfarin regimen- see above in Anticoagulation Summary.  2. Follow up in 1 weeks  3. They have been instructed to call if any changes in medications, doses, concerns, etc. Patient expresses understanding and has no further questions at this time.    Haely Lancaster, PharmD   Introduction Text (Please End With A Colon): Reason to defer:) Procedure To Be Performed At Next Visit: Excision Detail Level: Simple

## 2023-09-28 DIAGNOSIS — I50.20 HFREF (HEART FAILURE WITH REDUCED EJECTION FRACTION): ICD-10-CM

## 2023-09-29 DIAGNOSIS — I50.20 HFREF (HEART FAILURE WITH REDUCED EJECTION FRACTION): ICD-10-CM

## 2023-10-01 DIAGNOSIS — G43.719 INTRACTABLE CHRONIC MIGRAINE WITHOUT AURA AND WITHOUT STATUS MIGRAINOSUS: ICD-10-CM

## 2023-10-01 DIAGNOSIS — F41.9 ANXIETY: ICD-10-CM

## 2023-10-02 RX ORDER — TOPIRAMATE 50 MG/1
TABLET, FILM COATED ORAL
Qty: 60 TABLET | Refills: 0 | Status: SHIPPED | OUTPATIENT
Start: 2023-10-02

## 2023-10-02 RX ORDER — EMPAGLIFLOZIN 10 MG/1
TABLET, FILM COATED ORAL
Qty: 90 TABLET | Refills: 1 | Status: SHIPPED | OUTPATIENT
Start: 2023-10-02

## 2023-10-03 RX ORDER — TORSEMIDE 20 MG/1
TABLET ORAL
Qty: 150 TABLET | Refills: 0 | Status: SHIPPED | OUTPATIENT
Start: 2023-10-03

## 2023-10-09 ENCOUNTER — TELEPHONE (OUTPATIENT)
Dept: PHARMACY | Facility: HOSPITAL | Age: 48
End: 2023-10-09
Payer: MEDICARE

## 2023-10-09 NOTE — TELEPHONE ENCOUNTER
INR Reminder: Spoke with patient regarding overdue INR. She recently started a new job and is planning to check on Wednesdays from now on.

## 2023-10-10 ENCOUNTER — SPECIALTY PHARMACY (OUTPATIENT)
Dept: NEUROLOGY | Facility: CLINIC | Age: 48
End: 2023-10-10
Payer: MEDICARE

## 2023-10-11 ENCOUNTER — ANTICOAGULATION VISIT (OUTPATIENT)
Dept: PHARMACY | Facility: HOSPITAL | Age: 48
End: 2023-10-11
Payer: MEDICARE

## 2023-10-11 LAB — INR PPP: 3.3

## 2023-10-11 NOTE — PROGRESS NOTES
Anticoagulation Clinic Progress Note    Anticoagulation Summary  As of 10/11/2023      INR goal:  2.0-3.0   TTR:  61.5% (2.8 y)   INR used for dosing:  3.30 (10/11/2023)   Warfarin maintenance plan:  5 mg every Mon, Fri; 7.5 mg all other days   Weekly warfarin total:  47.5 mg   Plan last modified:  Zina De La Rosa RPH (10/11/2023)   Next INR check:  10/18/2023   Priority:  High   Target end date:  Indefinite    Indications    Other acute pulmonary embolism  unspecified whether acute cor pulmonale present (Resolved) [I26.99]  Acute pulmonary embolism  unspecified pulmonary embolism type  unspecified whether acute cor pulmonale present (Resolved) [I26.99]                 Anticoagulation Episode Summary       INR check location:      Preferred lab:      Send INR reminders to:  [facility] TEST PO[facility]    Comments:  **Home Testing Program**          Anticoagulation Care Providers       Provider Role Specialty Phone number    Lisset Melton MD Referring Cardiology [phone]            Clinic Interview:  Patient Findings     Negatives:  Signs/symptoms of thrombosis, Signs/symptoms of bleeding,   Laboratory test error suspected, Change in health, Change in alcohol use,   Change in activity, Upcoming invasive procedure, Emergency department   visit, Upcoming dental procedure, Missed doses, Extra doses, Change in   medications, Change in diet/appetite, Hospital admission, Bruising, Other   complaints      Clinical Outcomes     Negatives:  Major bleeding event, Thromboembolic event,   Anticoagulation-related hospital admission, Anticoagulation-related ED   visit, Anticoagulation-related fatality        INR History:      9/11/2023    11:45 AM 9/18/2023    12:00 AM 9/18/2023    11:48 AM 9/25/2023    12:00 AM 9/25/2023     1:04 PM 10/11/2023    12:00 AM 10/11/2023    11:34 AM   Anticoagulation Monitoring   INR 2.40  3.90  3.50  3.30   INR Date 9/11/2023  9/18/2023  9/25/2023  10/11/2023   INR Goal 2.0-3.0   2.0-3.0  2.0-3.0  2.0-3.0   Trend Same  Down  Same  Down   Last Week Total 45 mg  52.5 mg  50 mg  50 mg   Next Week Total 52.5 mg  42.5 mg  42.5 mg  45 mg   Sun 7.5 mg  7.5 mg  7.5 mg  7.5 mg   Mon 7.5 mg  Hold (9/18)  Hold (9/25)  5 mg   Tue 7.5 mg  7.5 mg  7.5 mg  7.5 mg   Wed 7.5 mg  7.5 mg  7.5 mg  5 mg (10/11)   Thu 7.5 mg  7.5 mg  7.5 mg  7.5 mg   Fri 7.5 mg  5 mg  5 mg  5 mg   Sat 7.5 mg  7.5 mg  7.5 mg  7.5 mg   Historical INR  3.90      3.50      3.30            This result is from an external source.       Plan:  1. INR is Supratherapeutic today- see above in Anticoagulation Summary.   Will instruct Nina Saez to Change their warfarin regimen- see above in Anticoagulation Summary.  partial to 5 mg today and then trial 5 mg MF, 7.5 mg merle WHYTE 1 week   2. Follow up in 1 weeks  3. They have been instructed to call if any changes in medications, doses, concerns, etc. Patient expresses understanding and has no further questions at this time.    Zina De La Rosa [facility]

## 2023-10-17 RX ORDER — PEN NEEDLE, DIABETIC 32GX 5/32"
NEEDLE, DISPOSABLE MISCELLANEOUS
Qty: 100 EACH | Refills: 0 | Status: ON HOLD | OUTPATIENT
Start: 2023-10-17

## 2023-10-17 RX ORDER — ATORVASTATIN CALCIUM 20 MG/1
20 TABLET, FILM COATED ORAL DAILY
Qty: 90 TABLET | Refills: 0 | Status: ON HOLD | OUTPATIENT
Start: 2023-10-17

## 2023-10-18 ENCOUNTER — ANTICOAGULATION VISIT (OUTPATIENT)
Dept: PHARMACY | Facility: HOSPITAL | Age: 48
End: 2023-10-18
Payer: MEDICARE

## 2023-10-18 LAB — INR PPP: 3.4

## 2023-10-18 PROCEDURE — G0249 PROVIDE INR TEST MATER/EQUIP: HCPCS

## 2023-10-18 NOTE — PROGRESS NOTES
Anticoagulation Clinic Progress Note    Anticoagulation Summary  As of 10/18/2023      INR goal:  2.0-3.0   TTR:  61.1% (2.8 y)   INR used for dosing:  3.40 (10/18/2023)   Warfarin maintenance plan:  5 mg every Mon, Wed, Fri; 7.5 mg all other days   Weekly warfarin total:  45 mg   Plan last modified:  Zina De La Rosa RPH (10/18/2023)   Next INR check:  10/25/2023   Priority:  High   Target end date:  Indefinite    Indications    Other acute pulmonary embolism  unspecified whether acute cor pulmonale present (Resolved) [I26.99]  Acute pulmonary embolism  unspecified pulmonary embolism type  unspecified whether acute cor pulmonale present (Resolved) [I26.99]                 Anticoagulation Episode Summary       INR check location:      Preferred lab:      Send INR reminders to:  [facility]    Comments:  **Home Testing Program**          Anticoagulation Care Providers       Provider Role Specialty Phone number    Lisset Melton MD Referring Cardiology [phone]            Clinic Interview:  Patient Findings     Negatives:  Signs/symptoms of thrombosis, Signs/symptoms of bleeding,   Laboratory test error suspected, Change in health, Change in alcohol use,   Change in activity, Upcoming invasive procedure, Emergency department   visit, Upcoming dental procedure, Missed doses, Extra doses, Change in   medications, Change in diet/appetite, Hospital admission, Bruising, Other   complaints      Clinical Outcomes     Negatives:  Major bleeding event, Thromboembolic event,   Anticoagulation-related hospital admission, Anticoagulation-related ED   visit, Anticoagulation-related fatality        INR History:      9/18/2023    11:48 AM 9/25/2023    12:00 AM 9/25/2023     1:04 PM 10/11/2023    12:00 AM 10/11/2023    11:34 AM 10/18/2023    12:00 AM 10/18/2023    12:45 PM   Anticoagulation Monitoring   INR 3.90  3.50  3.30  3.40   INR Date 9/18/2023  9/25/2023  10/11/2023  10/18/2023   INR Goal 2.0-3.0   2.0-3.0  2.0-3.0  2.0-3.0   Trend Down  Same  Down  Down   Last Week Total 52.5 mg  50 mg  50 mg  47.5 mg   Next Week Total 42.5 mg  42.5 mg  45 mg  45 mg   Sun 7.5 mg  7.5 mg  7.5 mg  7.5 mg   Mon Hold (9/18)  Hold (9/25)  5 mg  5 mg   Tue 7.5 mg  7.5 mg  7.5 mg  7.5 mg   Wed 7.5 mg  7.5 mg  5 mg (10/11)  5 mg   Thu 7.5 mg  7.5 mg  7.5 mg  7.5 mg   Fri 5 mg  5 mg  5 mg  5 mg   Sat 7.5 mg  7.5 mg  7.5 mg  7.5 mg   Historical INR  3.50      3.30      3.40  C          C Corrected result    This result is from an external source.       Plan:  1. INR is Supratherapeutic today- see above in Anticoagulation Summary.   Will instruct Nina Saez to Change their warfarin regimen- see above in Anticoagulation Summary.  Patient did not take 5 mg on Wednesday, trial on 5 mg MWF, 7.5 mg AOD, rck 1 week   2. Follow up in 1 weeks  3. They have been instructed to call if any changes in medications, doses, concerns, etc. Patient expresses understanding and has no further questions at this time.    Zina De La Rosa [facility]

## 2023-10-23 ENCOUNTER — APPOINTMENT (OUTPATIENT)
Dept: GENERAL RADIOLOGY | Facility: HOSPITAL | Age: 48
End: 2023-10-23
Payer: MEDICARE

## 2023-10-23 ENCOUNTER — HOSPITAL ENCOUNTER (OUTPATIENT)
Facility: HOSPITAL | Age: 48
Setting detail: OBSERVATION
Discharge: HOME | End: 2023-10-24
Attending: STUDENT IN AN ORGANIZED HEALTH CARE EDUCATION/TRAINING PROGRAM | Admitting: EMERGENCY MEDICINE
Payer: MEDICARE

## 2023-10-23 DIAGNOSIS — R07.9 CHEST PAIN, UNSPECIFIED TYPE: Primary | ICD-10-CM

## 2023-10-23 LAB
ALBUMIN SERPL-MCNC: 4.2 G/DL (ref 3.5–5.2)
ALBUMIN/GLOB SERPL: 1.4 G/DL
ALP SERPL-CCNC: 149 U/L (ref 39–117)
ALT SERPL W P-5'-P-CCNC: 25 U/L (ref 1–33)
ANION GAP SERPL CALCULATED.3IONS-SCNC: 12.5 MMOL/L (ref 5–15)
AST SERPL-CCNC: 20 U/L (ref 1–32)
BASOPHILS # BLD AUTO: 0.03 10*3/MM3 (ref 0–0.2)
BASOPHILS NFR BLD AUTO: 0.2 % (ref 0–1.5)
BILIRUB SERPL-MCNC: 0.3 MG/DL (ref 0–1.2)
BILIRUB UR QL STRIP: NEGATIVE
BUN SERPL-MCNC: 14 MG/DL (ref 6–20)
BUN/CREAT SERPL: 14.4 (ref 7–25)
CALCIUM SPEC-SCNC: 9.2 MG/DL (ref 8.6–10.5)
CHLORIDE SERPL-SCNC: 98 MMOL/L (ref 98–107)
CLARITY UR: CLEAR
CO2 SERPL-SCNC: 25.5 MMOL/L (ref 22–29)
COLOR UR: YELLOW
CREAT SERPL-MCNC: 0.97 MG/DL (ref 0.57–1)
DEPRECATED RDW RBC AUTO: 41.5 FL (ref 37–54)
EGFRCR SERPLBLD CKD-EPI 2021: 72.7 ML/MIN/1.73
EOSINOPHIL # BLD AUTO: 0.05 10*3/MM3 (ref 0–0.4)
EOSINOPHIL NFR BLD AUTO: 0.4 % (ref 0.3–6.2)
ERYTHROCYTE [DISTWIDTH] IN BLOOD BY AUTOMATED COUNT: 12 % (ref 12.3–15.4)
GEN 5 2HR TROPONIN T REFLEX: 10 NG/L
GLOBULIN UR ELPH-MCNC: 2.9 GM/DL
GLUCOSE BLDC GLUCOMTR-MCNC: 162 MG/DL (ref 70–130)
GLUCOSE SERPL-MCNC: 107 MG/DL (ref 65–99)
GLUCOSE UR STRIP-MCNC: ABNORMAL MG/DL
HCT VFR BLD AUTO: 39.5 % (ref 34–46.6)
HGB BLD-MCNC: 13.2 G/DL (ref 12–15.9)
HGB UR QL STRIP.AUTO: NEGATIVE
HOLD SPECIMEN: NORMAL
HOLD SPECIMEN: NORMAL
IMM GRANULOCYTES # BLD AUTO: 0.04 10*3/MM3 (ref 0–0.05)
IMM GRANULOCYTES NFR BLD AUTO: 0.3 % (ref 0–0.5)
INR PPP: 2.66 (ref 0.9–1.1)
KETONES UR QL STRIP: NEGATIVE
LEUKOCYTE ESTERASE UR QL STRIP.AUTO: NEGATIVE
LYMPHOCYTES # BLD AUTO: 2.26 10*3/MM3 (ref 0.7–3.1)
LYMPHOCYTES NFR BLD AUTO: 17.2 % (ref 19.6–45.3)
MCH RBC QN AUTO: 32 PG (ref 26.6–33)
MCHC RBC AUTO-ENTMCNC: 33.4 G/DL (ref 31.5–35.7)
MCV RBC AUTO: 95.9 FL (ref 79–97)
MONOCYTES # BLD AUTO: 0.9 10*3/MM3 (ref 0.1–0.9)
MONOCYTES NFR BLD AUTO: 6.8 % (ref 5–12)
NEUTROPHILS NFR BLD AUTO: 75.1 % (ref 42.7–76)
NEUTROPHILS NFR BLD AUTO: 9.86 10*3/MM3 (ref 1.7–7)
NITRITE UR QL STRIP: NEGATIVE
NRBC BLD AUTO-RTO: 0.1 /100 WBC (ref 0–0.2)
NT-PROBNP SERPL-MCNC: 897 PG/ML (ref 0–450)
PH UR STRIP.AUTO: 6.5 [PH] (ref 5–8)
PLATELET # BLD AUTO: 173 10*3/MM3 (ref 140–450)
PMV BLD AUTO: 12.4 FL (ref 6–12)
POTASSIUM SERPL-SCNC: 3.9 MMOL/L (ref 3.5–5.2)
PROT SERPL-MCNC: 7.1 G/DL (ref 6–8.5)
PROT UR QL STRIP: NEGATIVE
PROTHROMBIN TIME: 28.9 SECONDS (ref 11.7–14.2)
QT INTERVAL: 451 MS
QTC INTERVAL: 511 MS
RBC # BLD AUTO: 4.12 10*6/MM3 (ref 3.77–5.28)
SODIUM SERPL-SCNC: 136 MMOL/L (ref 136–145)
SP GR UR STRIP: 1.02 (ref 1–1.03)
TROPONIN T DELTA: -1 NG/L
TROPONIN T SERPL HS-MCNC: 11 NG/L
UROBILINOGEN UR QL STRIP: ABNORMAL
WBC NRBC COR # BLD: 13.14 10*3/MM3 (ref 3.4–10.8)
WHOLE BLOOD HOLD COAG: NORMAL
WHOLE BLOOD HOLD SPECIMEN: NORMAL

## 2023-10-23 PROCEDURE — 83880 ASSAY OF NATRIURETIC PEPTIDE: CPT | Performed by: STUDENT IN AN ORGANIZED HEALTH CARE EDUCATION/TRAINING PROGRAM

## 2023-10-23 PROCEDURE — 84484 ASSAY OF TROPONIN QUANT: CPT | Performed by: STUDENT IN AN ORGANIZED HEALTH CARE EDUCATION/TRAINING PROGRAM

## 2023-10-23 PROCEDURE — G0378 HOSPITAL OBSERVATION PER HR: HCPCS

## 2023-10-23 PROCEDURE — 93005 ELECTROCARDIOGRAM TRACING: CPT | Performed by: STUDENT IN AN ORGANIZED HEALTH CARE EDUCATION/TRAINING PROGRAM

## 2023-10-23 PROCEDURE — 80053 COMPREHEN METABOLIC PANEL: CPT | Performed by: STUDENT IN AN ORGANIZED HEALTH CARE EDUCATION/TRAINING PROGRAM

## 2023-10-23 PROCEDURE — 99284 EMERGENCY DEPT VISIT MOD MDM: CPT

## 2023-10-23 PROCEDURE — 82948 REAGENT STRIP/BLOOD GLUCOSE: CPT

## 2023-10-23 PROCEDURE — 71045 X-RAY EXAM CHEST 1 VIEW: CPT

## 2023-10-23 PROCEDURE — 85610 PROTHROMBIN TIME: CPT | Performed by: STUDENT IN AN ORGANIZED HEALTH CARE EDUCATION/TRAINING PROGRAM

## 2023-10-23 PROCEDURE — 93010 ELECTROCARDIOGRAM REPORT: CPT | Performed by: INTERNAL MEDICINE

## 2023-10-23 PROCEDURE — 36415 COLL VENOUS BLD VENIPUNCTURE: CPT | Performed by: STUDENT IN AN ORGANIZED HEALTH CARE EDUCATION/TRAINING PROGRAM

## 2023-10-23 PROCEDURE — 85025 COMPLETE CBC W/AUTO DIFF WBC: CPT | Performed by: STUDENT IN AN ORGANIZED HEALTH CARE EDUCATION/TRAINING PROGRAM

## 2023-10-23 PROCEDURE — 81003 URINALYSIS AUTO W/O SCOPE: CPT | Performed by: NURSE PRACTITIONER

## 2023-10-23 RX ORDER — DARUNAVIR 800 MG/1
800 TABLET, FILM COATED ORAL DAILY
Status: DISCONTINUED | OUTPATIENT
Start: 2023-10-24 | End: 2023-10-24 | Stop reason: HOSPADM

## 2023-10-23 RX ORDER — NITROGLYCERIN 0.4 MG/1
0.4 TABLET SUBLINGUAL
Status: DISCONTINUED | OUTPATIENT
Start: 2023-10-23 | End: 2023-10-24 | Stop reason: HOSPADM

## 2023-10-23 RX ORDER — WARFARIN SODIUM 7.5 MG/1
7.5 TABLET ORAL
Status: COMPLETED | OUTPATIENT
Start: 2023-10-23 | End: 2023-10-23

## 2023-10-23 RX ORDER — SPIRONOLACTONE 25 MG/1
25 TABLET ORAL DAILY
Status: DISCONTINUED | OUTPATIENT
Start: 2023-10-24 | End: 2023-10-24 | Stop reason: HOSPADM

## 2023-10-23 RX ORDER — POTASSIUM CHLORIDE 750 MG/1
60 TABLET, FILM COATED, EXTENDED RELEASE ORAL DAILY
Status: DISCONTINUED | OUTPATIENT
Start: 2023-10-24 | End: 2023-10-24 | Stop reason: HOSPADM

## 2023-10-23 RX ORDER — TORSEMIDE 20 MG/1
40 TABLET ORAL ONCE
Status: COMPLETED | OUTPATIENT
Start: 2023-10-23 | End: 2023-10-23

## 2023-10-23 RX ORDER — ATORVASTATIN CALCIUM 20 MG/1
20 TABLET, FILM COATED ORAL DAILY
Status: DISCONTINUED | OUTPATIENT
Start: 2023-10-24 | End: 2023-10-24 | Stop reason: HOSPADM

## 2023-10-23 RX ORDER — SODIUM CHLORIDE 0.9 % (FLUSH) 0.9 %
10 SYRINGE (ML) INJECTION AS NEEDED
Status: DISCONTINUED | OUTPATIENT
Start: 2023-10-23 | End: 2023-10-24 | Stop reason: HOSPADM

## 2023-10-23 RX ORDER — FAMOTIDINE 20 MG/1
20 TABLET, FILM COATED ORAL 2 TIMES DAILY PRN
Status: DISCONTINUED | OUTPATIENT
Start: 2023-10-23 | End: 2023-10-24 | Stop reason: HOSPADM

## 2023-10-23 RX ORDER — CARVEDILOL 3.12 MG/1
3.12 TABLET ORAL 2 TIMES DAILY WITH MEALS
Status: DISCONTINUED | OUTPATIENT
Start: 2023-10-23 | End: 2023-10-24 | Stop reason: HOSPADM

## 2023-10-23 RX ORDER — POTASSIUM CHLORIDE 750 MG/1
60 TABLET, EXTENDED RELEASE ORAL DAILY
Status: DISCONTINUED | OUTPATIENT
Start: 2023-10-24 | End: 2023-10-23

## 2023-10-23 RX ORDER — ASPIRIN 325 MG
325 TABLET ORAL ONCE
Status: DISCONTINUED | OUTPATIENT
Start: 2023-10-23 | End: 2023-10-24 | Stop reason: HOSPADM

## 2023-10-23 RX ADMIN — SACUBITRIL AND VALSARTAN 1 TABLET: 49; 51 TABLET, FILM COATED ORAL at 22:44

## 2023-10-23 RX ADMIN — TORSEMIDE 40 MG: 20 TABLET ORAL at 22:44

## 2023-10-23 RX ADMIN — WARFARIN 7.5 MG: 7.5 TABLET ORAL at 22:44

## 2023-10-23 RX ADMIN — CARVEDILOL 3.12 MG: 3.12 TABLET, FILM COATED ORAL at 22:47

## 2023-10-23 NOTE — ED NOTES
Patient to ER via ems from work for chest pain x noon    Patient took nitro last night for same and it went away    Patient took 1 nitro today and 324 asprin

## 2023-10-24 ENCOUNTER — SPECIALTY PHARMACY (OUTPATIENT)
Dept: NEUROLOGY | Facility: CLINIC | Age: 48
End: 2023-10-24
Payer: MEDICARE

## 2023-10-24 ENCOUNTER — READMISSION MANAGEMENT (OUTPATIENT)
Dept: CALL CENTER | Facility: HOSPITAL | Age: 48
End: 2023-10-24
Payer: MEDICARE

## 2023-10-24 VITALS
BODY MASS INDEX: 35.31 KG/M2 | HEART RATE: 76 BPM | SYSTOLIC BLOOD PRESSURE: 91 MMHG | TEMPERATURE: 98 F | RESPIRATION RATE: 18 BRPM | OXYGEN SATURATION: 99 % | DIASTOLIC BLOOD PRESSURE: 47 MMHG | HEIGHT: 67 IN | WEIGHT: 225 LBS

## 2023-10-24 LAB
ANION GAP SERPL CALCULATED.3IONS-SCNC: 10 MMOL/L (ref 5–15)
BUN SERPL-MCNC: 14 MG/DL (ref 6–20)
BUN/CREAT SERPL: 17.1 (ref 7–25)
CALCIUM SPEC-SCNC: 9.4 MG/DL (ref 8.6–10.5)
CHLORIDE SERPL-SCNC: 99 MMOL/L (ref 98–107)
CO2 SERPL-SCNC: 28 MMOL/L (ref 22–29)
CREAT SERPL-MCNC: 0.82 MG/DL (ref 0.57–1)
DEPRECATED RDW RBC AUTO: 42.8 FL (ref 37–54)
EGFRCR SERPLBLD CKD-EPI 2021: 88.9 ML/MIN/1.73
ERYTHROCYTE [DISTWIDTH] IN BLOOD BY AUTOMATED COUNT: 12 % (ref 12.3–15.4)
GLUCOSE BLDC GLUCOMTR-MCNC: 120 MG/DL (ref 70–130)
GLUCOSE SERPL-MCNC: 138 MG/DL (ref 65–99)
HCT VFR BLD AUTO: 38.8 % (ref 34–46.6)
HGB BLD-MCNC: 12.9 G/DL (ref 12–15.9)
MAGNESIUM SERPL-MCNC: 2.3 MG/DL (ref 1.6–2.6)
MCH RBC QN AUTO: 32.5 PG (ref 26.6–33)
MCHC RBC AUTO-ENTMCNC: 33.2 G/DL (ref 31.5–35.7)
MCV RBC AUTO: 97.7 FL (ref 79–97)
PLATELET # BLD AUTO: 180 10*3/MM3 (ref 140–450)
PMV BLD AUTO: 11.9 FL (ref 6–12)
POTASSIUM SERPL-SCNC: 4.3 MMOL/L (ref 3.5–5.2)
QT INTERVAL: 391 MS
QTC INTERVAL: 449 MS
RBC # BLD AUTO: 3.97 10*6/MM3 (ref 3.77–5.28)
SODIUM SERPL-SCNC: 137 MMOL/L (ref 136–145)
TROPONIN T SERPL HS-MCNC: 11 NG/L
WBC NRBC COR # BLD: 10.03 10*3/MM3 (ref 3.4–10.8)

## 2023-10-24 PROCEDURE — 84484 ASSAY OF TROPONIN QUANT: CPT | Performed by: NURSE PRACTITIONER

## 2023-10-24 PROCEDURE — 93005 ELECTROCARDIOGRAM TRACING: CPT | Performed by: NURSE PRACTITIONER

## 2023-10-24 PROCEDURE — 83735 ASSAY OF MAGNESIUM: CPT | Performed by: NURSE PRACTITIONER

## 2023-10-24 PROCEDURE — 80048 BASIC METABOLIC PNL TOTAL CA: CPT | Performed by: NURSE PRACTITIONER

## 2023-10-24 PROCEDURE — 85027 COMPLETE CBC AUTOMATED: CPT | Performed by: NURSE PRACTITIONER

## 2023-10-24 PROCEDURE — 99222 1ST HOSP IP/OBS MODERATE 55: CPT | Performed by: INTERNAL MEDICINE

## 2023-10-24 PROCEDURE — 82948 REAGENT STRIP/BLOOD GLUCOSE: CPT

## 2023-10-24 PROCEDURE — G0378 HOSPITAL OBSERVATION PER HR: HCPCS

## 2023-10-24 RX ORDER — FAMOTIDINE 20 MG/1
40 TABLET, FILM COATED ORAL 2 TIMES DAILY PRN
Start: 2023-10-24

## 2023-10-24 RX ORDER — ALUMINA, MAGNESIA, AND SIMETHICONE 2400; 2400; 240 MG/30ML; MG/30ML; MG/30ML
15 SUSPENSION ORAL ONCE
Status: COMPLETED | OUTPATIENT
Start: 2023-10-24 | End: 2023-10-24

## 2023-10-24 RX ADMIN — COBICISTAT 150 MG: 150 TABLET, FILM COATED ORAL at 10:20

## 2023-10-24 RX ADMIN — EMTRICITABINE AND TENOFOVIR ALAFENAMIDE 1 TABLET: 200; 25 TABLET ORAL at 10:20

## 2023-10-24 RX ADMIN — SACUBITRIL AND VALSARTAN 1 TABLET: 49; 51 TABLET, FILM COATED ORAL at 10:19

## 2023-10-24 RX ADMIN — POTASSIUM CHLORIDE 60 MEQ: 750 TABLET, EXTENDED RELEASE ORAL at 10:16

## 2023-10-24 RX ADMIN — ALUMINUM HYDROXIDE, MAGNESIUM HYDROXIDE, AND DIMETHICONE 15 ML: 400; 400; 40 SUSPENSION ORAL at 10:39

## 2023-10-24 RX ADMIN — ATORVASTATIN CALCIUM 20 MG: 20 TABLET, FILM COATED ORAL at 10:18

## 2023-10-24 RX ADMIN — SERTRALINE 50 MG: 50 TABLET, FILM COATED ORAL at 10:18

## 2023-10-24 RX ADMIN — DARUNAVIR 800 MG: 800 TABLET, FILM COATED ORAL at 10:20

## 2023-10-24 RX ADMIN — SPIRONOLACTONE 25 MG: 25 TABLET ORAL at 10:18

## 2023-10-24 NOTE — CASE MANAGEMENT/SOCIAL WORK
Case Management Discharge Note      Final Note: home no needs         Selected Continued Care - Discharged on 10/24/2023 Admission date: 10/23/2023 - Discharge disposition: Home or Self Care      Destination    No services have been selected for the patient.                Durable Medical Equipment    No services have been selected for the patient.                Dialysis/Infusion    No services have been selected for the patient.                Home Medical Care    No services have been selected for the patient.                Therapy    No services have been selected for the patient.                Community Resources    No services have been selected for the patient.                Community & DME    No services have been selected for the patient.                    Selected Continued Care - Episodes Includes continued care and service providers with selected services from the active episodes listed below      Chronic Migraine Episode start date: 6/26/2023   There are no active outsourced providers for this episode.                 Transportation Services  Private: Car    Final Discharge Disposition Code: 01 - home or self-care

## 2023-10-24 NOTE — PROGRESS NOTES
ED OBSERVATION PROGRESS/DISCHARGE SUMMARY    Date of Admission: 10/23/2023   LOS: 0 days   PCP: Zach Durand APRN    Final Diagnosis atypical chest pain      Subjective     Hospital Outcome:     Pleasant afebrile ambulatory 47-year-old black female admitted to the ED observation unit for chest discomfort.  High-sensitivity troponin is trended from 11-10 and back up to 11 this morning.  She had some mild hyperglycemia but otherwise lab work-up was fairly unremarkable.    She was seen and evaluated by Dr. Skinner with the cardiology service who feels the patient's symptoms are more consistent with gastrointestinal related chest discomfort.  She was given a GI cocktail with some relief and her Pepcid was increased from 20 to 40 mg twice daily.    Patient did have a slight elevation of her BNP but no peripheral edema or shortness of breath, cardiology recommends continuation of her Entresto, carvedilol, Jardiance and spironolactone.    ROS:  General: no fevers, chills  Respiratory: no cough, dyspnea  Cardiovascular: no chest pain, palpitations  Abdomen: No abdominal pain, nausea, vomiting, or diarrhea  Neurologic: No focal weakness    Objective   Physical Exam:  I have reviewed the vital signs.  Temp:  [98 °F (36.7 °C)-98.9 °F (37.2 °C)] 98 °F (36.7 °C)  Heart Rate:  [74-82] 76  Resp:  [16-18] 18  BP: ()/(47-79) 91/47  General Appearance:    Alert, cooperative, no distress, chronically ill-appearing  Head:    Normocephalic, atraumatic  Eyes:    Sclerae anicteric  Neck:   Supple, no mass  Lungs: Clear to auscultation bilaterally, respirations unlabored  Heart: Regular rate and rhythm, S1 and S2 normal, no murmur, rub or gallop  Abdomen:  Soft, non-tender, bowel sounds active, abdominal obesity present  Extremities: No clubbing, cyanosis, or edema to lower extremities  Pulses:  2+ and symmetric in distal lower extremities  Skin: No rashes   Neurologic: Oriented x3, Normal strength to extremities    Results Review:     I have reviewed the labs, radiology results and diagnostic studies.    Results from last 7 days   Lab Units 10/24/23  0839   WBC 10*3/mm3 10.03   HEMOGLOBIN g/dL 12.9   HEMATOCRIT % 38.8   PLATELETS 10*3/mm3 180     Results from last 7 days   Lab Units 10/23/23  1755   SODIUM mmol/L 136   POTASSIUM mmol/L 3.9   CHLORIDE mmol/L 98   CO2 mmol/L 25.5   BUN mg/dL 14   CREATININE mg/dL 0.97   CALCIUM mg/dL 9.2   BILIRUBIN mg/dL 0.3   ALK PHOS U/L 149*   ALT (SGPT) U/L 25   AST (SGOT) U/L 20   GLUCOSE mg/dL 107*     Imaging Results (Last 24 Hours)       Procedure Component Value Units Date/Time    XR Chest 1 View [[id number]] Collected: 10/23/23 1532     Updated: 10/23/23 1538    Narrative:      EXAM: XR CHEST 1 VW-     INDICATION: Chest pain     COMPARISON: Chest radiographs dating back to 8/5/2019          Impression:      No focal consolidation. No pleural effusion or pneumothorax.  Stable enlarged cardiac silhouette with single lead right ventricular  pacemaker/AICD. No focal osseous abnormality.           This report was finalized on 10/23/2023 3:35 PM by Dr. Ganesh Lyn M.D on Workstation: [id number]               I have reviewed the medications.  ---------------------------------------------------------------------------------------------  Assessment & Plan   Assessment/Problem List    Chest pain      Plan:    Chest pain  -Initial troponin 11, repeat 10 with a delta of -1  -EKG nonischemic  -Cardiology consult, clear for discharge home     CHF  History of cardiomyopathy s/p AICD  -Euvolemic on exam  -Continue Entresto and torsemide  -     Hyperlipidemia  -Continue statin     Hypertension  -Continue carvedilol     History of PE  -Continue warfarin  -INR therapeutic      HIV  -Continue Symtuza     Depression  -Continue Zoloft     DM2  -Continue Jardiance    Disposition: Home    Follow-up after Discharge: PCP    This note will serve as a discharge summary.    Che Fuller, APRN 10/24/23 08:57 EDT    I have  worn appropriate PPE during this patient encounter, sanitized my hands both with entering and exiting patient's room.      32 minutes has been spent by [facility] providers in the care of this patient while under observation status

## 2023-10-24 NOTE — PLAN OF CARE
Goal Outcome Evaluation:                      Pt admitted from ED for chest pain. Pt describes it as gas pains. Pt feels this is not cardiac in nature and that she just has gas.

## 2023-10-24 NOTE — NURSING NOTE
Pt d/c via private vehicle. IV removed. Belongings returned. Follow up instruction given. No further questions/complaints at this time.

## 2023-10-24 NOTE — H&P
". [facility]   HISTORY AND PHYSICAL    Patient Name: Nina Saez  : 1975  MRN: [id number]  Primary Care Physician:  Zach Durand APRN  Date of admission: 10/23/2023    Subjective   Subjective     Chief Complaint: Chest pain    HPI:    Nina Saez is a 47 y.o. female with past medical history including but not limited to asthma, CHF, hypertension, hyperlipidemia, CAD, history of PE and currently on Coumadin, DM 2, HIV, obesity and SHAREE [facility] with [facility].  Symptoms started around noon while she was at work that she describes as chest discomfort.  Pain was radiating into her neck, jaw and bilateral shoulders and upper extremities.  Patient states that she felt \"hot \"despite being in the cooler at work.  Pain is none exertional and nonpleuritic.  Denies associated nausea, vomiting, diaphoresis, back pain or shortness of breath.  Symptoms lasted for about 30 minutes and resolved after taking sublingual nitroglycerin.  Currently patient asymptomatic.  Denies abdominal pain, nausea, vomiting, diarrhea.  Denies cough, fever, chills, lower extremity edema.    Laboratory evaluation in the ED clued high-sensitivity troponin 11, 10 with a delta of -1, , creatinine 0.97, glucose 107, alkaline phosphatase 149, INR 2.66, WBC 13.14, hemoglobin 13.2 and platelets 173.  Chest x-ray shows no evidence of active disease and EKG nonischemic.    Review of Systems   All systems were reviewed and negative except for: That mentioned above in HPI    Personal History     Past Medical History:   Diagnosis Date    AICD (automatic cardioverter/defibrillator) present 2020    Asthma     CHF (congestive heart failure)     Class 2 obesity in adult     Coronary artery disease 2017    Depression 2017    Diabetes mellitus     Fracture, foot 3/20    Broke    Grade II diastolic dysfunction     Per echocardiogram 3/2021    H/O Closed trimalleolar fracture     Headache     HIV (human " immunodeficiency virus infection)     Hypertension     LBBB (left bundle branch block)     NICM (nonischemic cardiomyopathy)     2020 EF 6% per echocardiogram; AICD present    Nonrheumatic mitral valve regurgitation 2021    Moderate per echo 3/2021    Nonrheumatic tricuspid valve regurgitation     Moderate per echocardiogram 3/2021    SHAREE (obstructive sleep apnea) 2023       Past Surgical History:   Procedure Laterality Date    ANKLE OPEN REDUCTION INTERNAL FIXATION  3/7/20    CARDIAC CATHETERIZATION      CARDIAC DEFIBRILLATOR PLACEMENT       SECTION      one C/S    FRACTURE SURGERY Left     left ankle    OOPHORECTOMY      h/o teratoma    TUBAL ABDOMINAL LIGATION         Family History: family history includes Bone cancer in her mother; Breast cancer in her mother and paternal grandmother; Breast cancer (age of onset: 40) in her maternal grandmother; Cancer in her mother; Diabetes in her maternal grandfather and maternal grandmother; Heart disease in her maternal grandmother; Hyperlipidemia in her maternal grandfather; Hypertension in her maternal grandfather; No Known Problems in her daughter and daughter; Ovarian cysts in her daughter; Pancreatic cancer in her paternal grandmother; Prostate cancer in her maternal grandfather and paternal uncle. Otherwise pertinent FHx was reviewed and not pertinent to current issue.    Social History:  reports that she quit smoking about 6 years ago. Her smoking use included cigarettes. She has a 10.00 pack-year smoking history. She has been exposed to tobacco smoke. She has never used smokeless tobacco. She reports that she does not currently use alcohol. She reports that she does not currently use drugs after having used the following drugs: Marijuana.    Home Medications:  CBD, Lophf-Ciwpt-Vzrsmncv-TenofAF, Diclofenac Sodium, Insulin Pen Needle, Liraglutide, Rimegepant Sulfate, acetaminophen, albuterol sulfate HFA, atorvastatin, carvedilol,  cyclobenzaprine, diphenhydrAMINE, empagliflozin, famotidine, fluticasone, nitroglycerin, potassium chloride, sacubitril-valsartan, sertraline, spironolactone, topiramate, torsemide, vitamin D, and warfarin    Allergies:  Allergies   Allergen Reactions    Lisinopril Cough       Objective   Objective     Vitals:   Temp:  [98.9 °F (37.2 °C)] 98.9 °F (37.2 °C)  Heart Rate:  [74-82] 81  Resp:  [16] 16  BP: ()/(65-79) 104/69  Physical Exam    Constitutional: Awake, alert   Eyes: PERRLA, sclerae anicteric, no conjunctival injection   HENT: NCAT, mucous membranes moist   Neck: Supple, no thyromegaly, no lymphadenopathy, trachea midline   Respiratory: Clear to auscultation bilaterally, nonlabored respirations    Cardiovascular: RRR, no murmurs, rubs, or gallops, palpable pedal pulses bilaterally   Gastrointestinal: Positive bowel sounds, soft, nontender, nondistended   Musculoskeletal: No bilateral ankle edema, no clubbing or cyanosis to extremities   Psychiatric: Appropriate affect, cooperative   Neurologic: Oriented x 3, strength symmetric in all extremities, Cranial Nerves grossly intact to confrontation, speech clear   Skin: No rashes     Result Review    Result Review:  I have personally reviewed the results from the time of this admission to 10/23/2023 20:52 EDT and agree with these findings:  [x]  Laboratory list / accordion  []  Microbiology  [x]  Radiology  [x]  EKG/Telemetry   []  Cardiology/Vascular   []  Pathology  []  Old records  []  Other:        Assessment & Plan   Assessment / Plan     Brief Patient Summary:  Nina Saez is a 47 y.o. female who was seen and evaluated in the ED for chest pain.    Active Hospital Problems:  Active Hospital Problems    Diagnosis     Chest pain      Plan:   Chest pain  -Initial troponin 11, repeat 10 with a delta of -1  -EKG nonischemic  -Cardiology consult  -Cardiac monitoring  -N.p.o. after midnight    CHF  History of cardiomyopathy s/p AICD  -Euvolemic on  exam  -Continue Entresto and torsemide  -    Hyperlipidemia  -Continue statin    Hypertension  -Continue carvedilol  -Vital signs every 4 hours    History of PE  -Continue warfarin  -INR therapeutic at 2.66    HIV  -Continue Symtuza    Depression  -Continue Zoloft    DM2  -Continue Jardiance  -Accu-Chek before meals and at bedtime      DVT prophylaxis:  No DVT prophylaxis order currently exists.    CODE STATUS:    Level Of Support Discussed With: Patient  Code Status (Patient has no pulse and is not breathing): CPR (Attempt to Resuscitate)  Medical Interventions (Patient has pulse or is breathing): Full Support    Admission Status:  I believe this patient meets observation status.    72 minutes has been spent by [facility] providers in the care of this patient while under observation status    .During patient visit, I utilized appropriate personal protective equipment including gloves. Appropriate PPE was worn during the entire visit.  Hand hygiene was completed before and after    Electronically signed by SAMUEL Gardiner, 10/23/23, 8:52 PM EDT.

## 2023-10-24 NOTE — OUTREACH NOTE
Prep Survey      Flowsheet Row Responses   [facility] [place] patient discharged from? [place]   Is LACE score < 7 ? Yes   Eligibility [facility]   Date of Admission 10/23/23   Date of Discharge 10/24/23   Discharge Disposition Home or Self Care   Discharge diagnosis Chest pain   Does the patient have one of the following disease processes/diagnoses(primary or secondary)? Other   Does the patient have Home health ordered? No   Is there a DME ordered? No   Prep survey completed? Yes            Nicky GARLAND - Registered Nurse

## 2023-10-24 NOTE — PROGRESS NOTES
MD ATTESTATION NOTE    The FATIMAH and I have discussed this patient's history, physical exam, and treatment plan.  I have reviewed the documentation and personally had a face to face interaction with the patient. I affirm the documentation and agree with the treatment and plan.  The attached note describes my personal findings.      I provided a substantive portion of the care of the patient.  I personally performed the physical exam in its entirety, and below are my findings.       Brief HPI: Patient admitted for chest pain.  Patient with history of CHF hypertension hyperlipidemia CAD PE currently anticoagulated on Coumadin as well as type 2 diabetes.  Patient states she has had pain that started around noon today.  States radiation to her neck and jaw.  States she got diaphoretic.  Symptoms lasted perhaps 30 minutes.    PHYSICAL EXAM  ED Triage Vitals   Temp Heart Rate Resp BP SpO2   10/23/23 1512 10/23/23 1512 10/23/23 1512 10/23/23 1512 10/23/23 1512   98.9 °F (37.2 °C) 77 16 98/68 97 %      Temp src Heart Rate Source Patient Position BP Location FiO2 (%)   10/23/23 2050 10/23/23 2050 10/23/23 2050 10/23/23 2050 --   Oral Monitor Lying Right arm          GENERAL: no acute distress  HENT: nares patent  EYES: no scleral icterus  CV: regular rhythm, normal rate  RESPIRATORY: normal effort  ABDOMEN: soft  MUSCULOSKELETAL: no deformity  NEURO: alert, moves all extremities, follows commands  PSYCH:  calm, cooperative  SKIN: warm, dry    Vital signs and nursing notes reviewed.        Plan: Neurology consulted.  High sensitive troponin with insignificant delta.  EKG nonischemic.     [id number]

## 2023-10-24 NOTE — PROGRESS NOTES
XENIA PALMER Attestation Note    I supervised care provided by the midlevel provider.    The FATIMAH and I have discussed this patient's history, physical exam, and treatment plan. I have reviewed the documentation and personally had a face to face interaction with the patient  I affirm the documentation and agree with the treatment and plan. I provided a substantive portion of the care of this patient.  I personally performed the physical exam, in its entirety.  My personal findings are documented in below:    History:  47-year-old female who was admitted for evaluation of chest discomfort with significant chronic medical disease has been seen and evaluated by cardiology who feels comfortable with discharge at this time.  Patient complains primarily of dyspepsia at this time and is requesting medication for this.    Physical Exam:  General: No acute distress.  HENT: NCAT, PERRL, Nares patent.  Eyes: no scleral icterus.  Neck: trachea midline, no ROM limitations.  CV: Pink warm and well-perfused throughout  Respiratory: No distress or increased work of breathing  Musculoskeletal: no deformity.  Neuro: alert, moves all extremities, follows commands.  Skin: warm, dry.    Assessment and Plan:  Agree with plan for discharge home and outpatient follow-up as recommended by cardiology.

## 2023-10-24 NOTE — ED PROVIDER NOTES
EMERGENCY DEPARTMENT ENCOUNTER    Room Number:  28/28  PCP: Zach Durand APRN  History obtained from: Patient      HPI:  Chief Complaint: Chest pain  A complete HPI/ROS/PMH/PSH/SH/FH are unobtainable due to: Not applicable  Context: Nina Saez is a 47 y.o. female who presents to the ED c/o chest pain.  Occurred earlier today at work, also felt like she could not breathe.  Had a similar episode last night which resolved with nitroglycerin.  Patient reports she has never had an episode like this before.  History of nonischemic cardiomyopathy.  No other recent illness, fever, chills.  Currently chest pain-free.  Does report she has been having lots of gas.            PAST MEDICAL HISTORY  Active Ambulatory Problems     Diagnosis Date Noted    Asthma 08/06/2019    HIV (human immunodeficiency virus infection) 08/06/2019    Class 2 obesity in adult 08/06/2019    HFrEF (heart failure with reduced ejection fraction) 07/30/2021    Anxiety 09/29/2017    Chronic low back pain 03/21/2017    Degeneration of lumbosacral intervertebral disc 07/29/2021    Gastroesophageal reflux disease 03/21/2017    Human papilloma virus infection 10/05/2017    Hyperlipidemia 07/28/2021    Vitamin D deficiency 09/22/2016    Obesity 03/21/2017    Essential hypertension 08/06/2019    Dilated cardiomyopathy 08/14/2017    NICM (nonischemic cardiomyopathy) 11/16/2021    Nonrheumatic tricuspid valve regurgitation 07/29/2021    Intractable chronic migraine without aura 02/09/2023    Bilateral occipital neuralgia 02/09/2023    SHAREE (obstructive sleep apnea) 02/21/2023     Resolved Ambulatory Problems     Diagnosis Date Noted    Essential hypertension 08/06/2019    Exertional dyspnea 08/06/2019    Chest pain 03/21/2017    Chronic systolic congestive heart failure 08/06/2019    NICM (nonischemic cardiomyopathy) 07/08/2020    Elevated LFTs 07/08/2020    Hypopotassemia 07/08/2020    Hematuria 07/08/2020    Flank pain 07/08/2020    AICD (automatic  cardioverter/defibrillator) present 03/06/2020    LBBB (left bundle branch block) 08/28/2020    Acute on chronic combined systolic and diastolic CHF (congestive heart failure) 10/27/2020    Intractable vomiting with nausea 10/30/2020    Diarrhea following gastrointestinal surgery 10/27/2020    Acute pulmonary embolism 12/11/2020    Hemoptysis 12/13/2020    Other acute pulmonary embolism, unspecified whether acute cor pulmonale present 12/17/2020    Acute pulmonary embolism, unspecified pulmonary embolism type, unspecified whether acute cor pulmonale present 12/17/2020    Nonrheumatic mitral valve regurgitation 03/08/2021    Nonrheumatic tricuspid valve regurgitation     Grade II diastolic dysfunction     History of open reduction and internal fixation (ORIF) procedure 04/14/2021    Disorder of ankle joint 04/14/2021    Bimalleolar fracture 04/14/2021    Tendonitis, Achilles, left 04/14/2021    Pulmonary emboli 04/22/2021    Intractable persistent migraine aura without cerebral infarction and with status migrainosus 05/24/2022    History of drug abuse 05/24/2022    Tibial tendonitis, posterior, left 06/16/2022    Abnormal mammogram, unspecified 05/09/2019    Abnormal Papanicolaou smear of vagina 10/26/2017    Acute maxillary sinusitis 02/26/2019    Acute on chronic congestive heart failure 08/06/2019    Cardiovascular stress test abnormal 04/06/2017    Closed trimalleolar fracture of left ankle 03/06/2020    Cramps of lower extremity 02/11/2019    Encounter for immunization 01/25/2021    Exposure to human immunodeficiency virus 01/04/2018    Otalgia 06/11/2020    Migraine without aura, not refractory 04/23/2018    Other specified postprocedural states 04/14/2021    Acute on chronic combined systolic and diastolic heart failure 10/27/2020    Acute on chronic systolic congestive heart failure 08/06/2019    Pulmonary embolism 04/22/2021    Cardiac defibrillator in situ 10/13/2017    Heart failure with reduced ejection  fraction 2021    Dyspnea on exertion 2020    Diastolic dysfunction 2021    History of drug abuse 2021    Human immunodeficiency virus infection 2018    Cardiomegaly 2021    Achilles tendinitis of left lower extremity 2021     Past Medical History:   Diagnosis Date    CHF (congestive heart failure)     Coronary artery disease     Depression     Diabetes mellitus     Fracture, foot 3/20    H/O Closed trimalleolar fracture     Headache 2000    Hypertension          PAST SURGICAL HISTORY  Past Surgical History:   Procedure Laterality Date    ANKLE OPEN REDUCTION INTERNAL FIXATION  3/7/20    CARDIAC CATHETERIZATION      CARDIAC DEFIBRILLATOR PLACEMENT       SECTION      one C/S    FRACTURE SURGERY Left     left ankle    OOPHORECTOMY      h/o teratoma    TUBAL ABDOMINAL LIGATION           FAMILY HISTORY  Family History   Problem Relation Age of Onset    Cancer Mother     Breast cancer Mother     Bone cancer Mother     Ovarian cysts Daughter     No Known Problems Daughter     No Known Problems Daughter     Breast cancer Paternal Grandmother          at 84    Pancreatic cancer Paternal Grandmother     Diabetes Maternal Grandmother     Breast cancer Maternal Grandmother 40    Heart disease Maternal Grandmother     Hypertension Maternal Grandfather     Hyperlipidemia Maternal Grandfather     Diabetes Maternal Grandfather     Prostate cancer Maternal Grandfather     Prostate cancer Paternal Uncle     Ovarian cancer Neg Hx     Uterine cancer Neg Hx     Colon cancer Neg Hx          SOCIAL HISTORY  Social History     Socioeconomic History    Marital status: Legally    Tobacco Use    Smoking status: Former     Packs/day: 0.50     Years: 20.00     Additional pack years: 0.00     Total pack years: 10.00     Types: Cigarettes     Quit date:      Years since quittin.8     Passive exposure: Past    Smokeless tobacco: Never   Vaping Use    Vaping Use:  Never used   Substance and Sexual Activity    Alcohol use: Not Currently     Comment: holiday and special occ//caffeine use: Occ    Drug use: Not Currently     Types: Marijuana     Comment: gummy    Sexual activity: Not Currently     Partners: Male     Birth control/protection: Hysterectomy         ALLERGIES  Lisinopril        REVIEW OF SYSTEMS    As per HPI      PHYSICAL EXAM  ED Triage Vitals [10/23/23 1512]   Temp Heart Rate Resp BP SpO2   98.9 °F (37.2 °C) 77 16 98/68 97 %      Temp src Heart Rate Source Patient Position BP Location FiO2 (%)   -- -- -- -- --       Physical Exam  Constitutional:       General: She is not in acute distress.  HENT:      Head: Normocephalic and atraumatic.   Cardiovascular:      Rate and Rhythm: Normal rate and regular rhythm.   Pulmonary:      Effort: Pulmonary effort is normal. No respiratory distress.   Abdominal:      General: There is no distension.      Palpations: Abdomen is soft.      Tenderness: There is no abdominal tenderness.   Musculoskeletal:         General: No swelling or deformity.   Skin:     General: Skin is warm and dry.   Neurological:      Mental Status: She is alert. Mental status is at baseline.           Vital signs and nursing notes reviewed.          LAB RESULTS  Recent Results (from the past 24 hour(s))   ECG 12 Lead ED Triage Standing Order; Chest Pain    Collection Time: 10/23/23  3:32 PM   Result Value Ref Range    QT Interval 451 ms    QTC Interval 511 ms   Green Top (Gel)    Collection Time: 10/23/23  3:58 PM   Result Value Ref Range    Extra Tube Hold for add-ons.    Lavender Top    Collection Time: 10/23/23  3:58 PM   Result Value Ref Range    Extra Tube hold for add-on    Gold Top - SST    Collection Time: 10/23/23  3:58 PM   Result Value Ref Range    Extra Tube Hold for add-ons.    Light Blue Top    Collection Time: 10/23/23  3:58 PM   Result Value Ref Range    Extra Tube Hold for add-ons.    CBC Auto Differential    Collection Time: 10/23/23   3:58 PM    Specimen: Blood   Result Value Ref Range    WBC 13.14 (H) 3.40 - 10.80 10*3/mm3    RBC 4.12 3.77 - 5.28 10*6/mm3    Hemoglobin 13.2 12.0 - 15.9 g/dL    Hematocrit 39.5 34.0 - 46.6 %    MCV 95.9 79.0 - 97.0 fL    MCH 32.0 26.6 - 33.0 pg    MCHC 33.4 31.5 - 35.7 g/dL    RDW 12.0 (L) 12.3 - 15.4 %    RDW-SD 41.5 37.0 - 54.0 fl    MPV 12.4 (H) 6.0 - 12.0 fL    Platelets 173 140 - 450 10*3/mm3    Neutrophil % 75.1 42.7 - 76.0 %    Lymphocyte % 17.2 (L) 19.6 - 45.3 %    Monocyte % 6.8 5.0 - 12.0 %    Eosinophil % 0.4 0.3 - 6.2 %    Basophil % 0.2 0.0 - 1.5 %    Immature Grans % 0.3 0.0 - 0.5 %    Neutrophils, Absolute 9.86 (H) 1.70 - 7.00 10*3/mm3    Lymphocytes, Absolute 2.26 0.70 - 3.10 10*3/mm3    Monocytes, Absolute 0.90 0.10 - 0.90 10*3/mm3    Eosinophils, Absolute 0.05 0.00 - 0.40 10*3/mm3    Basophils, Absolute 0.03 0.00 - 0.20 10*3/mm3    Immature Grans, Absolute 0.04 0.00 - 0.05 10*3/mm3    nRBC 0.1 0.0 - 0.2 /100 WBC   Protime-INR    Collection Time: 10/23/23  3:58 PM    Specimen: Blood   Result Value Ref Range    Protime 28.9 (H) 11.7 - 14.2 Seconds    INR 2.66 (H) 0.90 - 1.10   Comprehensive Metabolic Panel    Collection Time: 10/23/23  5:55 PM    Specimen: Blood   Result Value Ref Range    Glucose 107 (H) 65 - 99 mg/dL    BUN 14 6 - 20 mg/dL    Creatinine 0.97 0.57 - 1.00 mg/dL    Sodium 136 136 - 145 mmol/L    Potassium 3.9 3.5 - 5.2 mmol/L    Chloride 98 98 - 107 mmol/L    CO2 25.5 22.0 - 29.0 mmol/L    Calcium 9.2 8.6 - 10.5 mg/dL    Total Protein 7.1 6.0 - 8.5 g/dL    Albumin 4.2 3.5 - 5.2 g/dL    ALT (SGPT) 25 1 - 33 U/L    AST (SGOT) 20 1 - 32 U/L    Alkaline Phosphatase 149 (H) 39 - 117 U/L    Total Bilirubin 0.3 0.0 - 1.2 mg/dL    Globulin 2.9 gm/dL    A/G Ratio 1.4 g/dL    BUN/Creatinine Ratio 14.4 7.0 - 25.0    Anion Gap 12.5 5.0 - 15.0 mmol/L    eGFR 72.7 >60.0 mL/min/1.73   High Sensitivity Troponin T    Collection Time: 10/23/23  5:55 PM    Specimen: Blood   Result Value Ref Range     HS Troponin T 11 (H) <10 ng/L   BNP    Collection Time: 10/23/23  5:55 PM    Specimen: Blood   Result Value Ref Range    proBNP 897.0 (H) 0.0 - 450.0 pg/mL   High Sensitivity Troponin T 2Hr    Collection Time: 10/23/23  7:48 PM    Specimen: Blood   Result Value Ref Range    HS Troponin T 10 (H) <10 ng/L    Troponin T Delta -1 >=-4 - <+4 ng/L       Ordered the above labs and reviewed the results.        RADIOLOGY  XR Chest 1 View    Result Date: 10/23/2023  EXAM: XR CHEST 1 VW-  INDICATION: Chest pain  COMPARISON: Chest radiographs dating back to 8/5/2019       No focal consolidation. No pleural effusion or pneumothorax. Stable enlarged cardiac silhouette with single lead right ventricular pacemaker/AICD. No focal osseous abnormality.    This report was finalized on 10/23/2023 3:35 PM by Dr. Ganesh Lyn M.D on Workstation: [employer]       Ordered the above noted radiological studies. Reviewed by me in PACS.            PROCEDURES  Procedures    HEART Score: 5              MEDICATIONS GIVEN IN ER  Medications   sodium chloride 0.9 % flush 10 mL (has no administration in time range)   aspirin tablet 325 mg (325 mg Oral Not Given 10/23/23 1521)   nitroglycerin (NITROSTAT) SL tablet 0.4 mg (has no administration in time range)               MEDICAL DECISION MAKING, PROGRESS, and CONSULTS    MDM: Patient presented the emergency department with episode of chest pain, shortness of breath.  Currently resolved.  Otherwise well-appearing, vitals otherwise stable.  Labs reassuring.  Chest x-ray demonstrating no evidence of heart failure exacerbation.  Due to concerning history and cardiac risk factors will admit for observation.    All labs have been independently reviewed by me.  All radiology studies have been reviewed by me and I have also reviewed the radiology report.   EKG's independently viewed and interpreted by me.  Discussion below represents my analysis of pertinent findings related to patient's condition,  differential diagnosis, treatment plan and final disposition.      Additional sources:  - Discussed/ obtained information from independent historians:      - External (non-ED) record review: Reviewed records, last ejection fraction was 29%.    - Chronic or social conditions impacting care: Chronic heart failure with reduced ejection fraction affects today's visit.    - Shared decision making: Discussed plan for admission for observation, patient agrees.      Orders placed during this visit:  Orders Placed This Encounter   Procedures    XR Chest 1 View    [place] Draw    Comprehensive Metabolic Panel    High Sensitivity Troponin T    CBC Auto Differential    BNP    Protime-INR    High Sensitivity Troponin T 2Hr    Basic Metabolic Panel    CBC (No Diff)    Magnesium    High Sensitivity Troponin T    NPO Diet NPO Type: Strict NPO    Undress & Gown    Vital Signs    Oral Care    Vital Signs Every 15 Minutes Until Stable, Then Every 4 Hours    Telemetry - Maintain IV Access    Telemetry - Place Orders & Notify Provider of Results When Patient Experiences Acute Chest Pain, Dysrhythmia or Respiratory Distress    May Be Off Telemetry for Tests    Pulse Oximetry, Continuous    Notify Physician For Unrelieved Chest Pain    Code Status and Medical Interventions:    Inpatient Cardiology Consult    Oxygen Therapy- Nasal Cannula; Titrate 1-6 LPM Per SpO2; 90 - 95%    ECG 12 Lead ED Triage Standing Order; Chest Pain    ECG 12 Lead ED Triage Standing Order; Chest Pain    ECG 12 Lead Chest Pain    Insert Peripheral IV    Initiate ED Observation Status    CBC & Differential    Green Top (Gel)    Lavender Top    Gold Top - SST    Light Blue Top         Additional orders considered but not ordered:  Considered CT PE however patient is anticoagulated with therapeutic INR.        Differential diagnosis includes but is not limited to:    Acute coronary syndrome, vasospasm, angina, pulmonary embolism, esophagitis/gastritis, anxiety  attack      Independent interpretation of labs, radiology studies, and discussions with consultants:  ED Course as of 10/23/23 2040   Mon Oct 23, 2023   1531 Chest x-ray interpreted myself:  Trachea midline, defibrillator in place, small pleural effusions bilaterally versus overlying soft tissue, no edema [FS]   1534 EKG interpreted myself:  1532, sinus rhythm rate of 77, T wave inversions in inferior distribution, left bundle branch block, no inappropriate repolarization abnormality.  Limited by artifact. [FS]      ED Course User Index  [FS] Aakash Beltran MD           DIAGNOSIS  Final diagnoses:   Chest pain, unspecified type         DISPOSITION  Admitted to observation unit.        Latest Documented Vital Signs:  As of 20:40 EDT  BP- 104/69 HR- 81 Temp- 98.9 °F (37.2 °C) O2 sat- 95%              --    Please note that portions of this were completed with a voice recognition program.       Note Disclaimer: At [facility], we believe that sharing information builds trust and better relationships. You are receiving this note because you are receiving care at [facility] or recently visited. It is possible you will see health information before a provider has talked with you about it. This kind of information can be easy to misunderstand. To help you fully understand what it means for your health, we urge you to discuss this note with your provider.             Aakash Beltran MD  10/23/23 2042

## 2023-10-24 NOTE — CONSULTS
"  [facility] Consult    Patient Name: Nina aSez  Age/Sex: 47 y.o. female  : 1975  MRN: [id number]    Date of Admission: 10/23/2023  Date of Encounter Visit: 10/24/23  Encounter Provider: Olimpia Skinner MD  Referring Provider: No ref. provider found  Place of Service: [facility] CARDIOLOGY  Patient Care Team:  Zach Durand APRN as PCP - General (Internal Medicine)  Lisset Melton MD as Consulting Physician (Cardiology)  Andrew Watson MD as Consulting Physician (Urology)  Rocky Calvert DPM as Consulting Physician (Podiatry)  Lisset Melton MD as Referring Physician (Cardiology)  Chaim Hernandez MD (Internal Medicine)  Frannie Rocha MD as Consulting Physician (Gynecology)  Evelyn Santillan MD as Consulting Physician (Hematology and Oncology)  Dennis Diehl MD as Consulting Physician (Pulmonary Disease)  Benny Morgan MD PhD as Consulting Physician (Cardiology)  Kiley Boyd APRN as Nurse Practitioner (Cardiology)  Claire Gauthier MD as Consulting Physician (Neurology)  Nilda Gallagher APRN as Nurse Practitioner (Cardiology)    Subjective:     Consulted for: Chest pain    Chief Complaint: Chest pain    History of Present Illness:  Nina Saez is a 47 y.o. female with nonischemic cardiomyopathy with an EF of 30%, diabetes mellitus type 2, chronic left bundle branch block, single-chamber ICD, obstructive sleep apnea, HIV, prior pulmonary embolism on chronic anticoagulation with warfarin, who presented to the emergency room with complaints of chest pain.    The patient reports that she was at work standing at the window not actually do anything physical when she had onset of discomfort that started in the middle of her chest and radiated across her chest.  She reports that this symptoms initially felt like \"gas\".  She has had similar episodes like this before which usually resolve with " belching, Pepcid, or sometimes nitroglycerin.  She tried taking a nitroglycerin without any relief.  Symptoms persisted longer than they normally do.  It was associated with lightheadedness.  At this point she began to feel anxious and started having a panic attack.  EMS was called.  Given aspirin.  In route her symptoms began to resolve she suspects on their own.  She also began belching which helped relieve any residual pressure or discomfort.  By the time of arrival to the emergency room she was discomfort free.  She has not had any further episodes since then.    She otherwise denies any symptoms of increasing shortness of breath, lower extremity swelling, palpitations, lightheadedness, near-syncope or syncope.  She reports compliance with all of her medications.    Following her arrival to the emergency room her EKG showed stable findings of normal sinus rhythm, left bundle branch block and anterolateral T wave inversions.  INR was therapeutic at 2.66.  CBC and CMP showed no acute issues.  BNP was mildly elevated 897.  Troponins were unremarkable at 11 and 10.    Her last echocardiogram was in 3/2023 and showed moderately depressed left ventricular function with an EF of 30%, grade 1 diastolic dysfunction, no significant valvular disease.  Her last cardiac catheterization was in 2019 at an outside hospital which apparently showed normal coronary arteries.  Last device interrogation in 9/2023 showed normal function and no acute events.  She was last seen in the office by Dr. Melton at that time.  She appeared to be doing fairly well from a cardiac standpoint and no changes were made to her follow-up at that time.      Past Medical History:  Past Medical History:   Diagnosis Date    AICD (automatic cardioverter/defibrillator) present 03/06/2020    Asthma     CHF (congestive heart failure)     Class 2 obesity in adult     Coronary artery disease 2017    Depression 2017    Diabetes mellitus     Fracture, foot  3/20    Broke    Grade II diastolic dysfunction     Per echocardiogram 3/2021    H/O Closed trimalleolar fracture     Headache     HIV (human immunodeficiency virus infection)     Hypertension     LBBB (left bundle branch block)     NICM (nonischemic cardiomyopathy)     2020 EF 6% per echocardiogram; AICD present    Nonrheumatic mitral valve regurgitation 2021    Moderate per echo 3/2021    Nonrheumatic tricuspid valve regurgitation     Moderate per echocardiogram 3/2021    SHAREE (obstructive sleep apnea) 2023    Pulmonary embolism        Past Surgical History:   Procedure Laterality Date    ANKLE OPEN REDUCTION INTERNAL FIXATION  3/7/20    CARDIAC CATHETERIZATION      CARDIAC DEFIBRILLATOR PLACEMENT       SECTION      one C/S    FRACTURE SURGERY Left     left ankle    OOPHORECTOMY      h/o teratoma    TUBAL ABDOMINAL LIGATION         Home Medications:   Medications Prior to Admission   Medication Sig Dispense Refill Last Dose    atorvastatin (LIPITOR) 20 MG tablet Take 1 tablet by mouth once daily 90 tablet 0 10/23/2023    carvedilol (COREG) 3.125 MG tablet Take 1 tablet by mouth 2 (Two) Times a Day With Meals. 180 tablet 3 10/22/2023    empagliflozin (Jardiance) 10 MG tablet tablet Take 1 tablet by mouth once daily 90 tablet 1 10/23/2023    Entresto 49-51 MG tablet Take 1 tablet by mouth twice daily 180 tablet 0 10/22/2023    famotidine (Pepcid) 20 MG tablet Take 1 tablet by mouth 2 (Two) Times a Day As Needed for Heartburn.   10/23/2023    nitroglycerin (NITROSTAT) 0.4 MG SL tablet Place 1 tablet under the tongue Every 5 (Five) Minutes As Needed for Chest Pain. Take no more than 3 doses in 15 minutes. 30 tablet 5 10/23/2023    potassium chloride (K-DUR,KLOR-CON) 20 MEQ CR tablet Take 3 tablets by mouth once daily 90 tablet 11 10/23/2023    Rimegepant Sulfate (Nurtec) 75 MG tablet dispersible tablet Take 1 tablet by mouth 1 (One) Time As Needed for migraine. Max of 1 tablet in 24 hours.  8 tablet 2 Past Month    sertraline (ZOLOFT) 50 MG tablet Take 1 tablet by mouth once daily 90 tablet 1 10/23/2023    spironolactone (ALDACTONE) 25 MG tablet 1 tablet Daily.   10/23/2023    torsemide (DEMADEX) 20 MG tablet TAKE 3 TABLETS BY MOUTH IN THE MORNING AND 2 TABLETS IN THE AFTERNOON 150 tablet 0 10/22/2023    warfarin (COUMADIN) 2.5 MG tablet TAKE 3 TABLETS (7.5MG) BY MOUTH DAILY OR AS DIRECTED 270 tablet 0 10/22/2023    acetaminophen (TYLENOL) 325 MG tablet Take 2 tablets by mouth Every 6 (Six) Hours As Needed for Mild Pain.       albuterol sulfate  (90 Base) MCG/ACT inhaler INHALE 2 PUFFS BY MOUTH EVERY 4 HOURS AS NEEDED FOR WHEEZING 18 g 0     CBD (cannabidiol) oral oil Take  by mouth.       cyclobenzaprine (FLEXERIL) 10 MG tablet Take 1 tablet by mouth 3 (Three) Times a Day As Needed for Muscle Spasms. 21 tablet 0     Drpik-Eywvq-Yawfpaaa-TenofAF (Symtuza) [phone] MG per tablet Take 1 tablet by mouth Daily.       Diclofenac Sodium (VOLTAREN) 1 % gel gel Apply 4 g topically to the appropriate area as directed 2 (Two) Times a Day. Use per pkg insert 100 g 2     diphenhydrAMINE (BENADRYL) 25 mg capsule Take 1 capsule by mouth Every 6 (Six) Hours As Needed for Itching (swelling). 20 capsule 0     fluticasone (FLONASE) 50 MCG/ACT nasal spray 2 sprays into the nostril(s) as directed by provider Daily. 11.1 g 3     Insulin Pen Needle (BD Pen Needle Heidi 2nd Gen) 32G X 4 MM misc USE 1  ONCE DAILY WITH  SAXENDA 100 each 0     Saxenda 18 MG/3ML injection pen Inject 3 mg under the skin into the appropriate area as directed Daily. 15 mL 2     vitamin D (ERGOCALCIFEROL) 53189 units capsule capsule Take 1 capsule by mouth Every 30 (Thirty) Days.          Allergies:  Allergies   Allergen Reactions    Lisinopril Cough       Past Social History:  Social History     Socioeconomic History    Marital status: Legally    Tobacco Use    Smoking status: Former     Packs/day: 0.50     Years: 20.00      Additional pack years: 0.00     Total pack years: 10.00     Types: Cigarettes     Quit date:      Years since quittin.8     Passive exposure: Past    Smokeless tobacco: Never   Vaping Use    Vaping Use: Never used   Substance and Sexual Activity    Alcohol use: Not Currently     Comment: holiday and special occ//caffeine use: Occ    Drug use: Not Currently     Types: Marijuana     Comment: gummy    Sexual activity: Not Currently     Partners: Male     Birth control/protection: Hysterectomy       Past Family History:  Family History   Problem Relation Age of Onset    Cancer Mother     Breast cancer Mother     Bone cancer Mother     Ovarian cysts Daughter     No Known Problems Daughter     No Known Problems Daughter     Breast cancer Paternal Grandmother          at 84    Pancreatic cancer Paternal Grandmother     Diabetes Maternal Grandmother     Breast cancer Maternal Grandmother 40    Heart disease Maternal Grandmother     Hypertension Maternal Grandfather     Hyperlipidemia Maternal Grandfather     Diabetes Maternal Grandfather     Prostate cancer Maternal Grandfather     Prostate cancer Paternal Uncle     Ovarian cancer Neg Hx     Uterine cancer Neg Hx     Colon cancer Neg Hx        Review of Systems:   All systems reviewed. Pertinent positives identified in HPI. All other systems are negative.    Objective:   Temp:  [98 °F (36.7 °C)-98.9 °F (37.2 °C)] 98 °F (36.7 °C)  Heart Rate:  [74-82] 76  Resp:  [16-18] 18  BP: ()/(47-79) 91/47   No intake or output data in the 24 hours ending 10/24/23 0729  Body mass index is 35.24 kg/m².      10/23/23  2113   Weight: 102 kg (225 lb)     Weight change:     Physical Exam:   General Appearance:    Alert, cooperative, in no acute distress   Head:    Normocephalic, without obvious abnormality, atraumatic   Eyes:            Lids and lashes normal, conjunctivae and sclerae normal, no   icterus, no pallor, corneas clear, PERRLA   Ears:    Ears appear intact with  "no abnormalities noted   Neck:   No adenopathy, supple, trachea midline, no thyromegaly, no   carotid bruit, no JVD   Lungs:     Clear to auscultation,respirations regular, even and unlabored    Heart:    Regular rhythm and normal rate, normal S1 and S2, no murmur, no gallop, no rub, no click   Chest Wall:    No abnormalities observed   Abdomen:     Normal bowel sounds, no masses, no organomegaly, soft        non-tender, non-distended, no guarding, no rebound  tenderness   Extremities:   Moves all extremities well, no edema, no cyanosis, no redness   Pulses:   Pulses palpable and equal bilaterally. Normal radial, carotid, femoral, dorsalis pedis and posterior tibial pulses bilaterally. Normal abdominal aorta   Skin:  Psychiatric:   No bleeding, bruising or rash    Alert and oriented x 3, normal mood and affect       Lab Review:   Results from last 7 days   Lab Units 10/23/23  1755   SODIUM mmol/L 136   POTASSIUM mmol/L 3.9   CHLORIDE mmol/L 98   CO2 mmol/L 25.5   BUN mg/dL 14   CREATININE mg/dL 0.97   GLUCOSE mg/dL 107*   CALCIUM mg/dL 9.2   AST (SGOT) U/L 20   ALT (SGPT) U/L 25     Results from last 7 days   Lab Units 10/23/23  1948 10/23/23  1755   HSTROP T ng/L 10* 11*     Results from last 7 days   Lab Units 10/23/23  1558   WBC 10*3/mm3 13.14*   HEMOGLOBIN g/dL 13.2   HEMATOCRIT % 39.5   PLATELETS 10*3/mm3 173     Results from last 7 days   Lab Units 10/23/23  1558 10/18/23  0000   INR  2.66* 3.40               Invalid input(s): \"LDLCALC\"  Results from last 7 days   Lab Units 10/23/23  1755   PROBNP pg/mL 897.0*           Echo EF Estimated  Lab Results   Component Value Date    ECHOEFEST 22 03/08/2021       EKG:     Imaging:  Imaging Results (Most Recent)       Procedure Component Value Units Date/Time    XR Chest 1 View [[id number]] Collected: 10/23/23 1532     Updated: 10/23/23 1538    Narrative:      EXAM: XR CHEST 1 VW-     INDICATION: Chest pain     COMPARISON: Chest radiographs dating back to 8/5/2019      "     Impression:      No focal consolidation. No pleural effusion or pneumothorax.  Stable enlarged cardiac silhouette with single lead right ventricular  pacemaker/AICD. No focal osseous abnormality.           This report was finalized on 10/23/2023 3:35 PM by Dr. Ganesh Lyn M.D on Workstation: [id number]               I personally viewed and interpreted the patient's EKG    Assessment/Plan:     1.  Chest pain.  Appears to be atypical and suspect more GI related.  EKG unchanged and troponins unremarkable.  No further symptoms since admission.  2.  Nonischemic cardiomyopathy.  Although BNP mildly elevated she does not appear to be volume overloaded on exam.  She is on medical management with Entresto, carvedilol, Jardiance and spironolactone.  3.  Chronic left bundle branch block  4.  Obstructive sleep apnea  5.  History of pulmonary embolism.  On chronic anticoagulation with warfarin.  INR is therapeutic.  6.  Single-chamber ICD  7.  HIV.    - Due to atypical sounding symptoms and unremarkable troponins and stable appearing EKG would hold off on further cardiac work-up at this time.  - As long as she remains asymptomatic without any further episodes she is okay for discharge from my standpoint.  We will arrange for office follow-up in 1 to 2 weeks.    Thank you for allowing me to participate in the care of Nina Saez. Feel free to contact me directly with any further questions or concerns.    Olimpia Skinner MD  [facility]  10/24/23  07:29 EDT

## 2023-10-25 ENCOUNTER — SPECIALTY PHARMACY (OUTPATIENT)
Dept: NEUROLOGY | Facility: CLINIC | Age: 48
End: 2023-10-25
Payer: MEDICARE

## 2023-10-25 ENCOUNTER — TRANSITIONAL CARE MANAGEMENT TELEPHONE ENCOUNTER (OUTPATIENT)
Dept: CALL CENTER | Facility: HOSPITAL | Age: 48
End: 2023-10-25
Payer: MEDICARE

## 2023-10-25 ENCOUNTER — ANTICOAGULATION VISIT (OUTPATIENT)
Dept: PHARMACY | Facility: HOSPITAL | Age: 48
End: 2023-10-25
Payer: MEDICARE

## 2023-10-25 LAB — INR PPP: 4

## 2023-10-25 NOTE — PROGRESS NOTES
Anticoagulation Clinic Progress Note    Anticoagulation Summary  As of 10/25/2023      INR goal:  2.0-3.0   TTR:  61.0% (2.8 y)   INR used for dosin.00 (10/25/2023)   Warfarin maintenance plan:  5 mg every Mon, Wed, Fri; 7.5 mg all other days   Weekly warfarin total:  45 mg   Plan last modified:  Zina De La Rosa RPH (10/18/2023)   Next INR check:  2023   Priority:  High   Target end date:  Indefinite    Indications    Other acute pulmonary embolism  unspecified whether acute cor pulmonale present (Resolved) [I26.99]  Acute pulmonary embolism  unspecified pulmonary embolism type  unspecified whether acute cor pulmonale present (Resolved) [I26.99]                 Anticoagulation Episode Summary       INR check location:      Preferred lab:      Send INR reminders to:  [facility] TEST POOL    Comments:  **Home Testing Program**          Anticoagulation Care Providers       Provider Role Specialty Phone number    Lisset Melton MD Referring Cardiology [phone]            Clinic Interview:  Patient Findings     Positives:  Change in medications, Hospital admission, Other complaints    Negatives:  Signs/symptoms of thrombosis, Signs/symptoms of bleeding,   Laboratory test error suspected, Change in health, Change in alcohol use,   Change in activity, Upcoming invasive procedure, Emergency department   visit, Upcoming dental procedure, Missed doses, Extra doses, Change in   diet/appetite, Bruising    Comments:  Hospital admission 10/23/23 related to chest pain. She was   discharged on famotidine. She received higher dose of warfarin 7.5 mg   (rather than 5 mg) on 10/23/23.       Clinical Outcomes     Negatives:  Major bleeding event, Thromboembolic event,   Anticoagulation-related hospital admission, Anticoagulation-related ED   visit, Anticoagulation-related fatality    Comments:  Hospital admission 10/23/23 related to chest pain. She was   discharged on famotidine. She received higher dose  of warfarin 7.5 mg   (rather than 5 mg) on 10/23/23.         INR History:      10/11/2023    12:00 AM 10/11/2023    11:34 AM 10/18/2023    12:00 AM 10/18/2023    12:45 PM 10/23/2023     3:58 PM 10/25/2023    12:00 AM 10/25/2023     1:22 PM   Anticoagulation Monitoring   INR  3.30  3.40   4.00   INR Date  10/11/2023  10/18/2023   10/25/2023   INR Goal  2.0-3.0  2.0-3.0   2.0-3.0   Trend  Down  Down   Same   Last Week Total  50 mg  47.5 mg   47.5 mg   Next Week Total  45 mg  45 mg   40 mg   Sun  7.5 mg  7.5 mg   7.5 mg   Mon  5 mg  5 mg   5 mg   Tue  7.5 mg  7.5 mg   7.5 mg   Wed  5 mg (10/11)  5 mg   Hold (10/25)   Thu  7.5 mg  7.5 mg   7.5 mg   Fri  5 mg  5 mg   5 mg   Sat  7.5 mg  7.5 mg   7.5 mg   Historical INR 3.30      3.40  C     2.66  4.00           C Corrected result    This result is from an external source.       Plan:  1. INR is Supratherapeutic today- see above in Anticoagulation Summary.   Will instruct Nina PHAM Saez to Change their warfarin regimen (HOLD today, then resume 5 mg MWF, 7.5 mg all other days) - see above in Anticoagulation Summary.  2. Follow up in 1 week.  3. They have been instructed to call if any changes in medications, doses, concerns, etc. Patient expresses understanding and has no further questions at this time.    Chucho Mcdaniel, PharmD

## 2023-10-25 NOTE — OUTREACH NOTE
Call Center TCM Note      Flowsheet Row Responses   [facility], [place], operated by [facility] patient discharged from? [facility]   Does the patient have one of the following disease processes/diagnoses(primary or secondary)? Other   TCM attempt successful? Yes   Call start time 0913   Call end time 0916   Discharge diagnosis Chest pain   Meds reviewed with patient/caregiver? Yes   Is the patient having any side effects they believe may be caused by any medication additions or changes? No   Does the patient have all medications ordered at discharge? Yes   Is the patient taking all medications as directed (includes completed medication regime)? Yes   Does the patient have an appointment with their PCP within 7-14 days of discharge? No appointments available   Nursing Interventions Routed TCM call to PCP office, PCP office requested to make appointment - message sent   Has home health visited the patient within 72 hours of discharge? N/A   Psychosocial issues? No   Did the patient receive a copy of their discharge instructions? Yes   Nursing interventions Reviewed instructions with patient   What is the patient's perception of their health status since discharge? Returned to baseline/stable   Is the patient/caregiver able to teach back signs and symptoms related to disease process for when to call PCP? Yes   Is the patient/caregiver able to teach back signs and symptoms related to disease process for when to call 911? Yes   Is the patient/caregiver able to teach back the hierarchy of who to call/visit for symptoms/problems? PCP, Specialist, Home health nurse, Urgent Care, ED, 911 Yes   If the patient is a current smoker, are they able to teach back resources for cessation? Not a smoker   TCM call completed? Yes   Wrap up additional comments D/C DX: chest pain - Pt states chest pain is resolved, she is having some intermittent nausea w/o emesis. Pt has [facility] follow up 11/07/2023. Pt very much wants TCM APPT with PCP SAMUEL Durand.  Pt would prefer to see Zach but will see another provider in office if this is not possible.   Call end time 0916            Angie Early MA    10/25/2023, 09:20 EDT

## 2023-10-30 RX ORDER — POTASSIUM CHLORIDE 20 MEQ/1
60 TABLET, EXTENDED RELEASE ORAL DAILY
Qty: 90 TABLET | Refills: 0 | Status: SHIPPED | OUTPATIENT
Start: 2023-10-30

## 2023-11-01 ENCOUNTER — ANTICOAGULATION VISIT (OUTPATIENT)
Dept: PHARMACY | Facility: HOSPITAL | Age: 48
End: 2023-11-01
Payer: MEDICARE

## 2023-11-01 DIAGNOSIS — I50.20 HFREF (HEART FAILURE WITH REDUCED EJECTION FRACTION): ICD-10-CM

## 2023-11-01 LAB — INR PPP: 3.4

## 2023-11-01 RX ORDER — TORSEMIDE 20 MG/1
TABLET ORAL
Qty: 150 TABLET | Refills: 0 | Status: SHIPPED | OUTPATIENT
Start: 2023-11-01

## 2023-11-01 NOTE — PROGRESS NOTES
Anticoagulation Clinic Progress Note    Anticoagulation Summary  As of 11/1/2023      INR goal:  2.0-3.0   TTR:  60.6% (2.8 y)   INR used for dosing:  3.40 (11/1/2023)   Warfarin maintenance plan:  7.5 mg every Tue, Thu; 5 mg all other days   Weekly warfarin total:  40 mg   Plan last modified:  Zina De La Rosa RPH (11/1/2023)   Next INR check:  11/8/2023   Priority:  High   Target end date:  Indefinite    Indications    Other acute pulmonary embolism  unspecified whether acute cor pulmonale present (Resolved) [I26.99]  Acute pulmonary embolism  unspecified pulmonary embolism type  unspecified whether acute cor pulmonale present (Resolved) [I26.99]                 Anticoagulation Episode Summary       INR check location:      Preferred lab:      Send INR reminders to:  [facility] TEST PO[facility]    Comments:  **Home Testing Program**          Anticoagulation Care Providers       Provider Role Specialty Phone number    Lisset Melton MD Referring Cardiology [phone]            Clinic Interview:  Patient Findings     Negatives:  Signs/symptoms of thrombosis, Signs/symptoms of bleeding,   Laboratory test error suspected, Change in health, Change in alcohol use,   Change in activity, Upcoming invasive procedure, Emergency department   visit, Upcoming dental procedure, Missed doses, Extra doses, Change in   medications, Change in diet/appetite, Hospital admission, Bruising, Other   complaints      Clinical Outcomes     Negatives:  Major bleeding event, Thromboembolic event,   Anticoagulation-related hospital admission, Anticoagulation-related ED   visit, Anticoagulation-related fatality        INR History:      10/18/2023    12:00 AM 10/18/2023    12:45 PM 10/23/2023     3:58 PM 10/25/2023    12:00 AM 10/25/2023     1:22 PM 11/1/2023    12:00 AM 11/1/2023     1:32 PM   Anticoagulation Monitoring   INR  3.40   4.00  3.40   INR Date  10/18/2023   10/25/2023  11/1/2023   INR Goal  2.0-3.0   2.0-3.0  2.0-3.0    Trend  Down   Same  Down   Last Week Total  47.5 mg   47.5 mg  40 mg   Next Week Total  45 mg   40 mg  40 mg   Sun  7.5 mg   7.5 mg  5 mg   Mon  5 mg   5 mg  5 mg   Tue  7.5 mg   7.5 mg  7.5 mg   Wed  5 mg   Hold (10/25)  5 mg   Thu  7.5 mg   7.5 mg  7.5 mg   Fri  5 mg   5 mg  5 mg   Sat  7.5 mg   7.5 mg  5 mg   Historical INR 3.40  C     2.66  4.00      3.40           C Corrected result    This result is from an external source.       Plan:  1. INR is Supratherapeutic today- see above in Anticoagulation Summary.   Will instruct Nina PHAM Saez to Change their warfarin regimen- see above in Anticoagulation Summary.  trial on 7.5 mg on tues, thurs, 5 mg AOD, rck 1 week (11.1% dose reduction)   2. Follow up in 1 weeks  3.They have been instructed to call if any changes in medications, doses, concerns, etc. Patient expresses understanding and has no further questions at this time.    Zina De La Rosa [facility]

## 2023-11-07 ENCOUNTER — OFFICE VISIT (OUTPATIENT)
Dept: CARDIOLOGY | Facility: CLINIC | Age: 48
End: 2023-11-07
Payer: MEDICARE

## 2023-11-07 VITALS
WEIGHT: 221 LBS | BODY MASS INDEX: 34.69 KG/M2 | SYSTOLIC BLOOD PRESSURE: 132 MMHG | DIASTOLIC BLOOD PRESSURE: 74 MMHG | HEART RATE: 84 BPM | HEIGHT: 67 IN

## 2023-11-07 DIAGNOSIS — R51.9 GENERALIZED HEADACHES: ICD-10-CM

## 2023-11-07 DIAGNOSIS — B20 HIV INFECTION, UNSPECIFIED SYMPTOM STATUS: ICD-10-CM

## 2023-11-07 DIAGNOSIS — I42.8 NICM (NONISCHEMIC CARDIOMYOPATHY): ICD-10-CM

## 2023-11-07 DIAGNOSIS — I50.20 HFREF (HEART FAILURE WITH REDUCED EJECTION FRACTION): Primary | ICD-10-CM

## 2023-11-07 DIAGNOSIS — E78.2 MIXED HYPERLIPIDEMIA: ICD-10-CM

## 2023-11-07 DIAGNOSIS — I10 ESSENTIAL HYPERTENSION: ICD-10-CM

## 2023-11-07 DIAGNOSIS — I42.0 DILATED CARDIOMYOPATHY: ICD-10-CM

## 2023-11-07 PROCEDURE — 99214 OFFICE O/P EST MOD 30 MIN: CPT | Performed by: NURSE PRACTITIONER

## 2023-11-07 PROCEDURE — 3078F DIAST BP <80 MM HG: CPT | Performed by: NURSE PRACTITIONER

## 2023-11-07 PROCEDURE — 93000 ELECTROCARDIOGRAM COMPLETE: CPT | Performed by: NURSE PRACTITIONER

## 2023-11-07 PROCEDURE — 3075F SYST BP GE 130 - 139MM HG: CPT | Performed by: NURSE PRACTITIONER

## 2023-11-07 RX ORDER — PANTOPRAZOLE SODIUM 20 MG/1
20 TABLET, DELAYED RELEASE ORAL DAILY
Qty: 90 TABLET | Refills: 3 | Status: SHIPPED | OUTPATIENT
Start: 2023-11-07

## 2023-11-07 NOTE — PROGRESS NOTES
[facility] CARDIOLOGY  [address]  [place] [address]  Phone: [phone]  Fax: [phone]      Patient Name: Nina Saez  :1975  Age: 47 y.o.  Primary Cardiologist: Lisset Melton MD  Encounter Provider:  SAMUEL Valdez      Chief Complaint     Chief Complaint: Follow-up      SUBJECTIVE     History of Present Illness:  Nina Saez is a 47 y.o. black female whose medical history includes HIV, hypertension, obesity, asthma, and history of illicit drug use. She is followed in our office by Dr. Melton for dilated non-ischemic cardiomyopathy, chronic HFreF, and history of ICD implant.     23 Follow-up:  She is here for ER follow-up.  She was seen in the emergency room in 2023 for chest pain.  She was ruled out for ACS and any signs of exacerbated heart failure.  She thinks her chest pain was due to anxiety.  She has been having issues with headaches, dizziness, and some abdominal discomfort.  She is trying to use her CPAP but she feels it is blowing too much air into her belly which is uncomfortable.  She is also taking some extra torsemide as she feels her diet is too salty.  Her blood pressure is a little elevated today but she has not yet taken her medications; typically her blood pressures running 90s to 100s/70s with heart rates in the 80s.  She has no leg swelling, shortness of breath, or palpitations.    Below is a summary of pertinent cardiology findings:  She is followed by  for HIV.  2020 she was hospitalized for acute PE with RLL pulmonary infarct.  HFreF and Dilated Cardiomyopathy: 2019 diagnosed with heart failure and cardiomyopathy at [facility] while in [place] visiting family.   2019 cardiac catheterization showed normal coronary arteries, LVEDP 12 mmHg, PCWP 13 mmHg, PAP 28/15 with mean 19 mmHg, RAP 4 mmHg, CI 2.5 L/min/m2 and PVR 1.2 Wood units.   2020 ER for  short-windedness and found to be in heart failure after missing medications and eating more salt. Upgrade to BiV ICD was recommended.   July 2020 echocardiogram showed severe LV dilation, EF 6%, very thin LV walls, grade 3 diastolic dysfunction, moderate to severe mitral insufficiency, moderate tricuspid insufficiency, and RVSP 48 mmHg. There was global hypokinesis and akinesis at the bases.   September 2020 she was evaluated by Dr. Azul with EP and he felt she did not meet criteria for CRT-device.  January 2021  Healthcare noted CPX showed peak VO2 11.9 at RER > 1.05 while on carvedilol. It showed heart related limitations in her functional capacity.  December 2021 echocardiogram showed severe LV dilation, EF 21-25%, global hypokinesis, grade 2 diastolic dysfunction, normal RVSP, mildly reduced RV systolic function, and normal valvular function. Less valvular insufficiency when compared to previous.   February 2022 saw Dr. Hernandez at  Heart Failure; no changes made. She did have COVID-19 in January 2022.   September 2022 [facility]; instructed to increase Corlanor and Entresto to BID. She had bradycardia and felt terrible; Corlanor was stopped and Entresto was decreased to 49/41 mmHg.   October 2022 Device interrogation showed VVI ICD function with 11.5 years of battery life left; 31% RV pacing.     Past Medical History:   Diagnosis Date    AICD (automatic cardioverter/defibrillator) present 03/06/2020    Asthma     CHF (congestive heart failure)     Class 2 obesity in adult     Coronary artery disease 2017    Degeneration of lumbosacral intervertebral disc 07/29/2021    Depression 2017    Diabetes mellitus     Fracture, foot 3/20    Grade II diastolic dysfunction     H/O Closed trimalleolar fracture     Headache 2000    HIV (human immunodeficiency virus infection)     Hypertension     LBBB (left bundle branch block)     NICM (nonischemic cardiomyopathy)     Nonrheumatic mitral valve  regurgitation 2021    Nonrheumatic tricuspid valve regurgitation     SHAREE (obstructive sleep apnea) 2023    Pulmonary embolism        Past Surgical History:   has a past surgical history that includes Cardiac catheterization; Fracture surgery (Left, ); Cardiac defibrillator placement;  section; Tubal ligation; Oophorectomy; and Ankle fracture surgery (3/7/20).     Social History     Socioeconomic History    Marital status: Legally    Tobacco Use    Smoking status: Former     Packs/day: 0.50     Years: 20.00     Additional pack years: 0.00     Total pack years: 10.00     Types: Cigarettes     Quit date:      Years since quittin.8     Passive exposure: Past    Smokeless tobacco: Never   Vaping Use    Vaping Use: Never used   Substance and Sexual Activity    Alcohol use: Not Currently     Comment: holiday and special occ//caffeine use: Occ    Drug use: Not Currently     Types: Marijuana     Comment: gummy    Sexual activity: Not Currently     Partners: Male     Birth control/protection: Hysterectomy         Review of Systems     Review of Systems   HENT:          Jaw pain   Cardiovascular:  Positive for chest pain and dyspnea on exertion. Negative for claudication, cyanosis, irregular heartbeat, leg swelling, near-syncope, orthopnea, palpitations, paroxysmal nocturnal dyspnea and syncope.   Neurological:  Positive for dizziness and headaches.       Medications     Allergies as of 2023 - Reviewed 2023   Allergen Reaction Noted    Lisinopril Cough 10/31/2017       Current Outpatient Medications on File Prior to Visit   Medication Sig    acetaminophen (TYLENOL) 325 MG tablet Take 2 tablets by mouth Every 6 (Six) Hours As Needed for Mild Pain.    albuterol sulfate  (90 Base) MCG/ACT inhaler INHALE 2 PUFFS BY MOUTH EVERY 4 HOURS AS NEEDED FOR WHEEZING    atorvastatin (LIPITOR) 20 MG tablet Take 1 tablet by mouth once daily    carvedilol (COREG) 3.125 MG tablet Take  1 tablet by mouth 2 (Two) Times a Day With Meals.    CBD (cannabidiol) oral oil Take  by mouth.    cyclobenzaprine (FLEXERIL) 10 MG tablet Take 1 tablet by mouth 3 (Three) Times a Day As Needed for Muscle Spasms.    Emtrl-Lptnm-Yggwuusg-TenofAF (Symtuza) 180-600-258-10 MG per tablet Take 1 tablet by mouth Daily.    Diclofenac Sodium (VOLTAREN) 1 % gel gel Apply 4 g topically to the appropriate area as directed 2 (Two) Times a Day. Use per pkg insert    diphenhydrAMINE (BENADRYL) 25 mg capsule Take 1 capsule by mouth Every 6 (Six) Hours As Needed for Itching (swelling).    empagliflozin (Jardiance) 10 MG tablet tablet Take 1 tablet by mouth once daily    Entresto 49-51 MG tablet Take 1 tablet by mouth twice daily    famotidine (Pepcid) 20 MG tablet Take 2 tablets by mouth 2 (Two) Times a Day As Needed for Heartburn.    fluticasone (FLONASE) 50 MCG/ACT nasal spray 2 sprays into the nostril(s) as directed by provider Daily.    Insulin Pen Needle (BD Pen Needle Heidi 2nd Gen) 32G X 4 MM misc USE 1  ONCE DAILY WITH  SAXENDA    nitroglycerin (NITROSTAT) 0.4 MG SL tablet Place 1 tablet under the tongue Every 5 (Five) Minutes As Needed for Chest Pain. Take no more than 3 doses in 15 minutes.    potassium chloride (K-DUR,KLOR-CON) 20 MEQ CR tablet Take 3 tablets by mouth once daily    Rimegepant Sulfate (Nurtec) 75 MG tablet dispersible tablet Take 1 tablet by mouth 1 (One) Time As Needed for migraine. Max of 1 tablet in 24 hours.    sertraline (ZOLOFT) 50 MG tablet Take 1 tablet by mouth once daily    spironolactone (ALDACTONE) 25 MG tablet 1 tablet Daily.    torsemide (DEMADEX) 20 MG tablet TAKE 3 TABLETS BY MOUTH IN THE MORNING AND 2 TABLETS IN THE AFTERNOON    vitamin D (ERGOCALCIFEROL) 02197 units capsule capsule Take 1 capsule by mouth Every 30 (Thirty) Days.    warfarin (COUMADIN) 2.5 MG tablet TAKE 3 TABLETS (7.5MG) BY MOUTH DAILY OR AS DIRECTED    [DISCONTINUED] Saxenda 18 MG/3ML injection pen Inject 3 mg under the  "skin into the appropriate area as directed Daily. (Patient not taking: Reported on 11/7/2023)     No current facility-administered medications on file prior to visit.          OBJECTIVE     Vital Signs:   /74   Pulse 84   Ht 170.2 cm (67\")   Wt 100 kg (221 lb)   BMI 34.61 kg/m²       Weight:  Wt Readings from Last 3 Encounters:   11/07/23 100 kg (221 lb)   10/23/23 102 kg (225 lb)   09/19/23 101 kg (222 lb 3.2 oz)     Body mass index is 34.61 kg/m².      Physical Exam     Physical Exam  Constitutional:       General: She is not in acute distress.     Appearance: She is obese.   HENT:      Head: Normocephalic and atraumatic.      Mouth/Throat:      Mouth: Mucous membranes are moist.   Eyes:      General: No scleral icterus.     Extraocular Movements: Extraocular movements intact.      Conjunctiva/sclera: Conjunctivae normal.      Pupils: Pupils are equal, round, and reactive to light.   Cardiovascular:      Rate and Rhythm: Normal rate and regular rhythm.      Pulses: Normal pulses.      Heart sounds: S1 normal and S2 normal.   Pulmonary:      Effort: No respiratory distress.      Breath sounds: Normal breath sounds. No wheezing, rhonchi or rales.   Abdominal:      General: Bowel sounds are normal. There is no distension.      Palpations: Abdomen is soft.      Tenderness: There is no abdominal tenderness.   Musculoskeletal:         General: Normal range of motion.      Cervical back: Normal range of motion and neck supple.      Right lower leg: No edema.      Left lower leg: No edema.   Skin:     General: Skin is warm and dry.      Coloration: Skin is not jaundiced.   Neurological:      Mental Status: She is alert and oriented to person, place, and time.   Psychiatric:         Mood and Affect: Mood normal.         Reviewed Data     Result Review :  The following data was reviewed by SAMUEL Valdez on 11/07/23:  Labs 10/31/2022:  cr 1.0, K 3.3, otherwise unremarkable BMP  Labs 09/12/2022:  " cr 1.0, K 3.8, , otherwise unremarkable CMP, proBNP 258, hgb A1c 5.0, Chol 173, HDL 39, , Trig 113, Hgb 13.4, Plt 162  02/28/2023: cr 1.1, K3.7, , otherwise unremarkable CMP, proBNP 325, Hgb 13.6,   07/13/2023:  cr 1.1, K 4.2, , otherwise unremarkable CMP, , HgbA1c 5.8, TSH 1.910, free T4 1.13, Chol 167, HDL 33, , Trig 144, Hgb 13.5,   10/23/2023:  cr 1.0, K 3.9, , otherwise unremarkable CMP, proBNP 897, troponin 11 and 10, Hgb 13.2,       ECG 12 Lead    Date/Time: 11/7/2023 11:18 AM  Performed by: Kandice Damon APRN    Authorized by: Kandice Damon, SAMUEL  Comparison: compared with previous ECG from 10/24/2023  Similar to previous ECG  Rhythm: sinus rhythm  Ectopy: unifocal PVCs  Rate: normal  BPM: 84  Conduction: non-specific intraventricular conduction delay    Clinical impression: normal ECG          Assessment and Plan        Assessment and Plan     Assessment:  1. HFrEF (heart failure with reduced ejection fraction)    2. NICM (nonischemic cardiomyopathy)    3. Dilated cardiomyopathy    4. Essential hypertension    5. Mixed hyperlipidemia    6. HIV infection, unspecified symptom status    7. Generalized headaches           Headaches and dizziness: I think this may be due to low blood pressure and overdiuresis.  HFrEF: Diagnosed in April 2019; cardiac catheterization at [facility] showed LVEDP 12 mmHg and cardiac index 2.5 L/min/m².  Hospitalized July 2020 with acute on chronic HFrEF; echocardiogram showed EF 6%.  December 2021 echocardiogram showed EF 21 to 25%.  She has been evaluated by [facility] heart failure and follows with [facility] heart failure clinic.  Her medical therapy includes Entresto 49-51 mg twice daily, carvedilol 3.125 mg twice daily, 10 mg Jardiance daily, and 25 mg spironolactone daily. She is taking a total of 100 mg torsemide daily.  Nonischemic cardiomyopathy: Diagnosed April 2019  at [facility]; likely multifactorial related to old illicit drug use, HIV, and previous poorly controlled hypertension.  Cardiac catheterization showed normal coronary arteries.  Hospitalized with acute on chronic HFrEF July 2020; echocardiogram showed severe LV dilation, very thin LV walls, global hypokinesis, and akinesis at the bases.  She has single-chamber ICD.  December 2021 echocardiogram showed severe LV dilation and mildly reduced RV systolic function.  Chest pain likely due to anxiety.  Mitral valve regurgitation: July 2020 echocardiogram showed moderate to severe mitral insufficiency: December 2021 echocardiogram showed normal valvular function; less valvular insufficiency compared to previous.  No murmur appreciated today.  No murmur appreciated today.  Tricuspid valve regurgitation: Graded as moderate on July 2020 echocardiogram.  December 2021 echocardiogram showed normal valvular function; less valvular insufficiency compared to previous.  Diastolic dysfunction: This was graded as grade 3 diastolic dysfunction on July 2020 echocardiogram but grade 2 on December 2021 echocardiogram.  She is on carvedilol, Entresto, spironolactone, and Jardiance.  She is also on 100 mg total daily of torsemide.  Hypertension: She continues to have low BP at home.  She has not yet taken her medications today.  Hyperlipidemia: LDL not at goal when checked in July 2023.  No recent lipids to review.  History of PE: This occurred in December 2020 and she also had a right lower lung pulmonary infarct with hemoptysis.  HIV infection: She follows with [facility].  No recent changes.    Plan:  I am concerned her blood pressure is overcontrolled.  I am going to have her hold her torsemide today and then just resume it at 60 mg every morning for the rest of the week.  I will call her on Friday to see how her weight and blood pressure is doing.  Low threshold to reduce her Entresto.  She will keep her January 2024 appointment with  me.      Follow Up:  No follow-ups on file.  Orders Placed This Encounter   Procedures    ECG 12 Lead      New Medications Ordered This Visit   Medications    pantoprazole (Protonix) 20 MG EC tablet     Sig: Take 1 tablet by mouth Daily.     Dispense:  90 tablet     Refill:  3         Thank you for the opportunity to participate in this patient's care.    SAMUEL Wynne    This office note has been dictated.

## 2023-11-08 ENCOUNTER — ANTICOAGULATION VISIT (OUTPATIENT)
Dept: PHARMACY | Facility: HOSPITAL | Age: 48
End: 2023-11-08
Payer: MEDICARE

## 2023-11-08 ENCOUNTER — TELEPHONE (OUTPATIENT)
Dept: INTERNAL MEDICINE | Age: 48
End: 2023-11-08
Payer: MEDICARE

## 2023-11-08 LAB — INR PPP: 2.2

## 2023-11-08 NOTE — PROGRESS NOTES
Anticoagulation Clinic Progress Note    Anticoagulation Summary  As of 2023      INR goal:  2.0-3.0   TTR:  60.6% (2.9 y)   INR used for dosin.20 (2023)   Warfarin maintenance plan:  5 mg every day   Weekly warfarin total:  35 mg   Plan last modified:  Haley Lancaster, PharmD (2023)   Next INR check:  11/15/2023   Priority:  High   Target end date:  Indefinite    Indications    Other acute pulmonary embolism  unspecified whether acute cor pulmonale present (Resolved) [I26.99]  Acute pulmonary embolism  unspecified pulmonary embolism type  unspecified whether acute cor pulmonale present (Resolved) [I26.99]                 Anticoagulation Episode Summary       INR check location:      Preferred lab:      Send INR reminders to:   [facility]    Comments:  **Home Testing Program**          Anticoagulation Care Providers       Provider Role Specialty Phone number    Lisset Melton MD Referring Cardiology [phone]            Clinic Interview:  Patient Findings     Positives:  Missed doses    Negatives:  Signs/symptoms of thrombosis, Signs/symptoms of bleeding,   Laboratory test error suspected, Change in health, Change in alcohol use,   Change in activity, Upcoming invasive procedure, Emergency department   visit, Upcoming dental procedure, Extra doses, Change in medications,   Change in diet/appetite, Hospital admission, Bruising, Other complaints    Comments:  Patient reports taking 5 mg daily since last INR.        Clinical Outcomes     Negatives:  Major bleeding event, Thromboembolic event,   Anticoagulation-related hospital admission, Anticoagulation-related ED   visit, Anticoagulation-related fatality    Comments:  Patient reports taking 5 mg daily since last INR.          INR History:      10/23/2023     3:58 PM 10/25/2023    12:00 AM 10/25/2023     1:22 PM 2023    12:00 AM 2023     1:32 PM 2023    12:00 AM 2023    10:52 AM   Anticoagulation  Monitoring   INR   4.00  3.40  2.20   INR Date   10/25/2023  11/1/2023  11/8/2023   INR Goal   2.0-3.0  2.0-3.0  2.0-3.0   Trend   Same  Down  Down   Last Week Total   47.5 mg  40 mg  35 mg   Next Week Total   40 mg  40 mg  35 mg   Sun   7.5 mg  5 mg  5 mg   Mon   5 mg  5 mg  5 mg   Tue   7.5 mg  7.5 mg  5 mg   Wed   Hold (10/25)  5 mg  5 mg   Thu   7.5 mg  7.5 mg  5 mg (11/9)   Fri   5 mg  5 mg  5 mg   Sat   7.5 mg  5 mg  5 mg   Historical INR 2.66  4.00      3.40      2.20            This result is from an external source.       Plan:  1. INR is Therapeutic today- see above in Anticoagulation Summary.   Will instruct Nina PHAM Saez to Continue their warfarin regimen- see above in Anticoagulation Summary.  2. Follow up in 1 weeks  3. They have been instructed to call if any changes in medications, doses, concerns, etc. Patient expresses understanding and has no further questions at this time.    Haley Lancaster, PharmD

## 2023-11-08 NOTE — TELEPHONE ENCOUNTER
Patient requested a TCM appt for her hospitalization for chest pain (discharged 10/24). Patient had prior appt but was unable to keep it. Scheduled her at the soonest available appt time we had that worked for the patient with Zach. Was seen by cardiology for TCM on 11/7.

## 2023-11-10 ENCOUNTER — TELEPHONE (OUTPATIENT)
Dept: CARDIOLOGY | Facility: CLINIC | Age: 48
End: 2023-11-10
Payer: MEDICARE

## 2023-11-10 DIAGNOSIS — R51.9 CHRONIC NONINTRACTABLE HEADACHE, UNSPECIFIED HEADACHE TYPE: Primary | ICD-10-CM

## 2023-11-10 DIAGNOSIS — J45.20 MILD INTERMITTENT ASTHMA WITHOUT COMPLICATION: ICD-10-CM

## 2023-11-10 DIAGNOSIS — R06.09 DYSPNEA ON EXERTION: Primary | ICD-10-CM

## 2023-11-10 DIAGNOSIS — I26.99 PULMONARY EMBOLISM, OTHER, UNSPECIFIED CHRONICITY, UNSPECIFIED WHETHER ACUTE COR PULMONALE PRESENT: ICD-10-CM

## 2023-11-10 DIAGNOSIS — B20 HIV INFECTION, UNSPECIFIED SYMPTOM STATUS: ICD-10-CM

## 2023-11-10 DIAGNOSIS — M54.81 BILATERAL OCCIPITAL NEURALGIA: ICD-10-CM

## 2023-11-10 DIAGNOSIS — G89.29 CHRONIC NONINTRACTABLE HEADACHE, UNSPECIFIED HEADACHE TYPE: Primary | ICD-10-CM

## 2023-11-10 DIAGNOSIS — G43.719 INTRACTABLE CHRONIC MIGRAINE WITHOUT AURA AND WITHOUT STATUS MIGRAINOSUS: ICD-10-CM

## 2023-11-10 DIAGNOSIS — I42.0 DILATED CARDIOMYOPATHY: ICD-10-CM

## 2023-11-10 DIAGNOSIS — Z86.711 HX OF PULMONARY EMBOLUS: ICD-10-CM

## 2023-11-10 NOTE — TELEPHONE ENCOUNTER
"Reviewed recommendations with patient, verbalized understanding, will call with any further questions or complaints.    Chuyita- pt states that she wants a CT of her \"head down to her chest\".  Asks if her medicines could be causing her headaches and her chest to feel like it's \"full of air\".  She also asks if you could refer her to a neurologist close to our office on [facility], as the one she was seeing is too far away for her to go with her symptoms the way they are.    Thanks so much,  Sherrell Mosher RN  Triage Nurse  11/10/23 11:34 EST    "

## 2023-11-10 NOTE — TELEPHONE ENCOUNTER
I've ordered CT head and neurology consult. She had a CT head in May 2023; she may need MRI, but this would best be ordered by neurology.     The hospital will call to schedule CT.     Thanks!  SAMUEL Wynne

## 2023-11-10 NOTE — TELEPHONE ENCOUNTER
Please call to check on her BP, weight, lightheadedness, and headaches.     I reduced her torsemide.     Thanks!  SAMUEL Wynne

## 2023-11-10 NOTE — TELEPHONE ENCOUNTER
Ok, continue with the torsemide as she's taking it; 2 tablets at night. It's ok for her to take tylenol.   I've ordered a CXR but she had one in the ER which was normal; we can recheck it, or we could check a CT if she wants.    Thanks!  SAMUEL Wynne

## 2023-11-10 NOTE — TELEPHONE ENCOUNTER
Reviewed recommendations with patient, verbalized understanding, will call with any further questions or complaints.    Sherrell Mosher RN  Triage Nurse  11/10/23 12:58 EST

## 2023-11-20 ENCOUNTER — ANTICOAGULATION VISIT (OUTPATIENT)
Dept: PHARMACY | Facility: HOSPITAL | Age: 48
End: 2023-11-20
Payer: MEDICARE

## 2023-11-20 LAB — INR PPP: 1.9

## 2023-11-20 PROCEDURE — G0249 PROVIDE INR TEST MATER/EQUIP: HCPCS

## 2023-11-20 NOTE — PROGRESS NOTES
Anticoagulation Clinic Progress Note    Anticoagulation Summary  As of 2023      INR goal:  2.0-3.0   TTR:  60.7% (2.9 y)   INR used for dosin.90 (2023)   Warfarin maintenance plan:  5 mg every day   Weekly warfarin total:  35 mg   Plan last modified:  Haley Lancaster, PharmD (2023)   Next INR check:  2023   Priority:  High   Target end date:  Indefinite    Indications    Other acute pulmonary embolism  unspecified whether acute cor pulmonale present (Resolved) [I26.99]  Acute pulmonary embolism  unspecified pulmonary embolism type  unspecified whether acute cor pulmonale present (Resolved) [I26.99]                 Anticoagulation Episode Summary       INR check location:      Preferred lab:      Send INR reminders to:  [facility]    Comments:  **Home Testing Program**          Anticoagulation Care Providers       Provider Role Specialty Phone number    Lisset Melton MD Referring Cardiology [phone]            Clinic Interview:  Patient Findings     Negatives:  Signs/symptoms of thrombosis, Signs/symptoms of bleeding,   Laboratory test error suspected, Change in health, Change in alcohol use,   Change in activity, Upcoming invasive procedure, Emergency department   visit, Upcoming dental procedure, Missed doses, Extra doses, Change in   medications, Change in diet/appetite, Hospital admission, Bruising, Other   complaints      Clinical Outcomes     Negatives:  Major bleeding event, Thromboembolic event,   Anticoagulation-related hospital admission, Anticoagulation-related ED   visit, Anticoagulation-related fatality        INR History:      10/25/2023     1:22 PM 2023    12:00 AM 2023     1:32 PM 2023    12:00 AM 2023    10:52 AM 2023    12:00 AM 2023     1:00 PM   Anticoagulation Monitoring   INR 4.00  3.40  2.20  1.90   INR Date 10/25/2023  2023  2023  2023   INR Goal 2.0-3.0  2.0-3.0  2.0-3.0  2.0-3.0    Trend Same  Down  Down  Same   Last Week Total 47.5 mg  40 mg  35 mg  35 mg   Next Week Total 40 mg  40 mg  35 mg  37.5 mg   Sun 7.5 mg  5 mg  5 mg  5 mg   Mon 5 mg  5 mg  5 mg  7.5 mg (11/20)   Tue 7.5 mg  7.5 mg  5 mg  5 mg   Wed Hold (10/25)  5 mg  5 mg  5 mg   Thu 7.5 mg  7.5 mg  5 mg (11/9)  5 mg   Fri 5 mg  5 mg  5 mg  5 mg   Sat 7.5 mg  5 mg  5 mg  5 mg   Historical INR  3.40      2.20      1.90            This result is from an external source.       Plan:  1. INR is Subtherapeutic today- see above in Anticoagulation Summary.   Will instruct Nina Saez to Change their warfarin regimen- see above in Anticoagulation Summary.  2. Follow up in 1 weeks  3. They have been instructed to call if any changes in medications, doses, concerns, etc. Patient expresses understanding and has no further questions at this time.    Haley Lancaster, PharmD

## 2023-11-27 DIAGNOSIS — I50.20 HFREF (HEART FAILURE WITH REDUCED EJECTION FRACTION): ICD-10-CM

## 2023-11-27 RX ORDER — TORSEMIDE 20 MG/1
TABLET ORAL
Qty: 150 TABLET | Refills: 0 | Status: SHIPPED | OUTPATIENT
Start: 2023-11-27

## 2023-11-27 RX ORDER — POTASSIUM CHLORIDE 20 MEQ/1
TABLET, EXTENDED RELEASE ORAL
Qty: 90 TABLET | Refills: 0 | Status: SHIPPED | OUTPATIENT
Start: 2023-11-27

## 2023-11-28 ENCOUNTER — OFFICE VISIT (OUTPATIENT)
Dept: INTERNAL MEDICINE | Age: 48
End: 2023-11-28
Payer: MEDICARE

## 2023-11-28 ENCOUNTER — ANTICOAGULATION VISIT (OUTPATIENT)
Dept: PHARMACY | Facility: HOSPITAL | Age: 48
End: 2023-11-28
Payer: MEDICARE

## 2023-11-28 ENCOUNTER — HOSPITAL ENCOUNTER (OUTPATIENT)
Dept: GENERAL RADIOLOGY | Facility: HOSPITAL | Age: 48
Discharge: HOME | End: 2023-11-28
Payer: MEDICARE

## 2023-11-28 ENCOUNTER — HOSPITAL ENCOUNTER (OUTPATIENT)
Dept: CARDIOLOGY | Facility: HOSPITAL | Age: 48
Discharge: HOME | End: 2023-11-28
Payer: MEDICARE

## 2023-11-28 ENCOUNTER — LAB (OUTPATIENT)
Dept: LAB | Facility: HOSPITAL | Age: 48
End: 2023-11-28
Payer: MEDICARE

## 2023-11-28 VITALS
OXYGEN SATURATION: 95 % | HEIGHT: 67 IN | BODY MASS INDEX: 35.82 KG/M2 | HEART RATE: 66 BPM | WEIGHT: 228.2 LBS | DIASTOLIC BLOOD PRESSURE: 78 MMHG | SYSTOLIC BLOOD PRESSURE: 110 MMHG

## 2023-11-28 VITALS
WEIGHT: 225.6 LBS | SYSTOLIC BLOOD PRESSURE: 122 MMHG | OXYGEN SATURATION: 98 % | HEART RATE: 82 BPM | HEIGHT: 67 IN | TEMPERATURE: 97.8 F | DIASTOLIC BLOOD PRESSURE: 76 MMHG | BODY MASS INDEX: 35.41 KG/M2

## 2023-11-28 DIAGNOSIS — I42.0 DILATED CARDIOMYOPATHY: ICD-10-CM

## 2023-11-28 DIAGNOSIS — B37.31 VAGINAL YEAST INFECTION: ICD-10-CM

## 2023-11-28 DIAGNOSIS — Z91.89 CHRONIC CHEST PAIN WITH HIGH RISK FOR CAD: ICD-10-CM

## 2023-11-28 DIAGNOSIS — Z23 NEED FOR VACCINATION: Primary | ICD-10-CM

## 2023-11-28 DIAGNOSIS — F41.1 GENERALIZED ANXIETY DISORDER: ICD-10-CM

## 2023-11-28 DIAGNOSIS — R07.9 CHRONIC CHEST PAIN WITH HIGH RISK FOR CAD: ICD-10-CM

## 2023-11-28 DIAGNOSIS — G89.29 CHRONIC CHEST PAIN WITH HIGH RISK FOR CAD: ICD-10-CM

## 2023-11-28 DIAGNOSIS — I50.20 HFREF (HEART FAILURE WITH REDUCED EJECTION FRACTION): ICD-10-CM

## 2023-11-28 DIAGNOSIS — F41.9 ANXIETY: ICD-10-CM

## 2023-11-28 DIAGNOSIS — I42.8 NICM (NONISCHEMIC CARDIOMYOPATHY): ICD-10-CM

## 2023-11-28 DIAGNOSIS — I10 ESSENTIAL HYPERTENSION: ICD-10-CM

## 2023-11-28 DIAGNOSIS — I50.20 HFREF (HEART FAILURE WITH REDUCED EJECTION FRACTION): Primary | ICD-10-CM

## 2023-11-28 LAB
ALBUMIN SERPL-MCNC: 4 G/DL (ref 3.5–5.2)
ALBUMIN/GLOB SERPL: 1.4 G/DL
ALP SERPL-CCNC: 152 U/L (ref 39–117)
ALT SERPL W P-5'-P-CCNC: 16 U/L (ref 1–33)
ANION GAP SERPL CALCULATED.3IONS-SCNC: 8.8 MMOL/L (ref 5–15)
AST SERPL-CCNC: 19 U/L (ref 1–32)
BASOPHILS # BLD AUTO: 0.02 10*3/MM3 (ref 0–0.2)
BASOPHILS NFR BLD AUTO: 0.2 % (ref 0–1.5)
BILIRUB SERPL-MCNC: 0.4 MG/DL (ref 0–1.2)
BUN SERPL-MCNC: 11 MG/DL (ref 6–20)
BUN/CREAT SERPL: 12.2 (ref 7–25)
CALCIUM SPEC-SCNC: 9.3 MG/DL (ref 8.6–10.5)
CHLORIDE SERPL-SCNC: 103 MMOL/L (ref 98–107)
CO2 SERPL-SCNC: 25.2 MMOL/L (ref 22–29)
CREAT SERPL-MCNC: 0.9 MG/DL (ref 0.57–1)
DEPRECATED RDW RBC AUTO: 41.6 FL (ref 37–54)
EGFRCR SERPLBLD CKD-EPI 2021: 79 ML/MIN/1.73
EOSINOPHIL # BLD AUTO: 0.04 10*3/MM3 (ref 0–0.4)
EOSINOPHIL NFR BLD AUTO: 0.5 % (ref 0.3–6.2)
ERYTHROCYTE [DISTWIDTH] IN BLOOD BY AUTOMATED COUNT: 11.8 % (ref 12.3–15.4)
GLOBULIN UR ELPH-MCNC: 2.8 GM/DL
GLUCOSE SERPL-MCNC: 96 MG/DL (ref 65–99)
HCT VFR BLD AUTO: 38.1 % (ref 34–46.6)
HGB BLD-MCNC: 12.6 G/DL (ref 12–15.9)
IMM GRANULOCYTES # BLD AUTO: 0.03 10*3/MM3 (ref 0–0.05)
IMM GRANULOCYTES NFR BLD AUTO: 0.3 % (ref 0–0.5)
INR PPP: 2.1
LYMPHOCYTES # BLD AUTO: 2.41 10*3/MM3 (ref 0.7–3.1)
LYMPHOCYTES NFR BLD AUTO: 27.7 % (ref 19.6–45.3)
MCH RBC QN AUTO: 32.1 PG (ref 26.6–33)
MCHC RBC AUTO-ENTMCNC: 33.1 G/DL (ref 31.5–35.7)
MCV RBC AUTO: 97.2 FL (ref 79–97)
MONOCYTES # BLD AUTO: 0.8 10*3/MM3 (ref 0.1–0.9)
MONOCYTES NFR BLD AUTO: 9.2 % (ref 5–12)
NEUTROPHILS NFR BLD AUTO: 5.39 10*3/MM3 (ref 1.7–7)
NEUTROPHILS NFR BLD AUTO: 62.1 % (ref 42.7–76)
NRBC BLD AUTO-RTO: 0 /100 WBC (ref 0–0.2)
NT-PROBNP SERPL-MCNC: 1117 PG/ML (ref 0–450)
PLATELET # BLD AUTO: 188 10*3/MM3 (ref 140–450)
PMV BLD AUTO: 12.4 FL (ref 6–12)
POTASSIUM SERPL-SCNC: 4.5 MMOL/L (ref 3.5–5.2)
PROT SERPL-MCNC: 6.8 G/DL (ref 6–8.5)
RBC # BLD AUTO: 3.92 10*6/MM3 (ref 3.77–5.28)
SODIUM SERPL-SCNC: 137 MMOL/L (ref 136–145)
WBC NRBC COR # BLD AUTO: 8.69 10*3/MM3 (ref 3.4–10.8)

## 2023-11-28 PROCEDURE — 80053 COMPREHEN METABOLIC PANEL: CPT | Performed by: NURSE PRACTITIONER

## 2023-11-28 PROCEDURE — 36415 COLL VENOUS BLD VENIPUNCTURE: CPT | Performed by: NURSE PRACTITIONER

## 2023-11-28 PROCEDURE — G0463 HOSPITAL OUTPT CLINIC VISIT: HCPCS

## 2023-11-28 PROCEDURE — 83880 ASSAY OF NATRIURETIC PEPTIDE: CPT | Performed by: NURSE PRACTITIONER

## 2023-11-28 PROCEDURE — 85025 COMPLETE CBC W/AUTO DIFF WBC: CPT | Performed by: NURSE PRACTITIONER

## 2023-11-28 PROCEDURE — 71046 X-RAY EXAM CHEST 2 VIEWS: CPT

## 2023-11-28 RX ORDER — METOPROLOL SUCCINATE 25 MG/1
12.5 TABLET, EXTENDED RELEASE ORAL DAILY
Qty: 30 TABLET | Refills: 0 | Status: SHIPPED | OUTPATIENT
Start: 2023-11-28

## 2023-11-28 RX ORDER — CARVEDILOL 3.12 MG/1
3.12 TABLET ORAL DAILY
COMMUNITY
End: 2023-11-28

## 2023-11-28 RX ORDER — SERTRALINE HYDROCHLORIDE 100 MG/1
100 TABLET, FILM COATED ORAL DAILY
Qty: 90 TABLET | Refills: 1 | Status: SHIPPED | OUTPATIENT
Start: 2023-11-28

## 2023-11-28 RX ORDER — FLUCONAZOLE 150 MG/1
TABLET ORAL
Qty: 2 TABLET | Refills: 0 | Status: SHIPPED | OUTPATIENT
Start: 2023-11-28

## 2023-11-28 NOTE — PROGRESS NOTES
"[facility] [place] [facility]  SAMUEL West / 48 y.o. / female  11/28/2023      CC:   Anxiety      VITALS    Visit Vitals  /76 (BP Location: Left arm, Patient Position: Sitting, Cuff Size: Adult)   Pulse 82   Temp 97.8 °F (36.6 °C) (Temporal)   Ht 170.2 cm (67.01\")   Wt 102 kg (225 lb 9.6 oz)   LMP 11/25/2023   SpO2 98%   BMI 35.32 kg/m²       BP Readings from Last 3 Encounters:   11/28/23 122/76   11/28/23 110/78   11/07/23 132/74     Wt Readings from Last 3 Encounters:   11/28/23 102 kg (225 lb 9.6 oz)   11/28/23 104 kg (228 lb 3.2 oz)   11/07/23 100 kg (221 lb)      Body mass index is 35.32 kg/m².    HPI:     Date of admission/discharge: 10/23/2023 - 10/24/2023  Hospital: [facility]  Principle Dx: Chest Pain   Secondary Dx:   History prior to hospitalization: Patient presented to the emergency room with chest pain.  Pain was radiating to neck jaw and bilateral shoulders and upper extremities.  Nonexertional nonpleuritic.  Lasted for 30 minutes and resolved with sublingual nitroglycerin.  Known history of CHF, hypertension, asthma, hyperlipidemia, CAD, history of PE and currently on Coumadin, type 2 diabetes, HIV, obesity and SHAREE.  Evaluation/Treatment: Labs in the emergency department showed high-sensitivity troponin at 11, , creatinine 0.97, glucose of 107, alkaline phosphate 149, INR 2.66, white blood cells at 13.14, hemoglobin at 13.2 and platelets of 173.  Chest x-ray had no evidence of disease and EKG was nonischemic.  Cardiology was consulted and cleared for discharge.  Course: Since discharge she was seen by cardiology on November 7, 2023.  Headaches and dizziness thought to be due to low blood pressure and overdiuresis.  Torsemide was held and resumed at 60 mg every morning for the rest of the week rather than 100 mg.  Low threshold to reduce Entresto.  She was seen by the heart failure clinic earlier today.  Most recent echocardiogram EF of 29% of 6%.  Heart " failure believe she was in fluid over load on exam.  Recommended laboratory evaluation along with chest x-ray.  Stop carvedilol and retrial metoprolol.  Low-sodium diet and fluid restriction.  Pending CBC, CMP and BNP.  Chest x-ray also pending.     Today she believes that the chest pain may have been anxiety.  Increased as of late due to situational factors.  Vaginal yeast, not improving with over-the-counter cream     Patient Care Team:  Zach Durand APRN as PCP - General (Internal Medicine)  Lisset Melton MD as Consulting Physician (Cardiology)  Andrew Watson MD as Consulting Physician (Urology)  Rocky Calvert DPM as Consulting Physician (Podiatry)  Lisset Melton MD as Referring Physician (Cardiology)  Chaim Hernandez MD (Internal Medicine)  Frannie Rocha MD as Consulting Physician (Gynecology)  Evelyn Santillan MD as Consulting Physician (Hematology and Oncology)  Dennis Diehl MD as Consulting Physician (Pulmonary Disease)  Benny Morgan MD PhD as Consulting Physician (Cardiology)  Kiley Boyd APRN as Nurse Practitioner (Cardiology)  Claire Gauthier MD as Consulting Physician (Neurology)  Nilda Gallagher APRN as Nurse Practitioner (Cardiology)  ____________________________________________________________________    ASSESSMENT & PLAN:    1. Need for vaccination    2. Anxiety    3. Generalized anxiety disorder    4. Vaginal yeast infection      Orders Placed This Encounter   Procedures    Fluzone (or Fluarix & Flulaval for VFC) >6 Mos ([id number])    Hepatitis B Vaccine Adult IM (ENERGIX/RECOMBIVAX)       Summary/Discussion:  We will go ahead and increase her sertraline from 50 to 100 mg daily to see if this will help with some of her anxiety.  We will prescribe Diflucan, discussed that this may increase warfarin concentrations and she will need to check her INR at least twice weekly and of above 3 call her monitoring provider  immediately.    Return in about 4 months (around 3/28/2024) for Next scheduled follow up; can cancel january .    ____________________________________________________________________    REVIEW OF SYSTEMS    Review of Systems  Constitutional neg except per HPI   Resp neg  CV neg   GI bloating, fluid   Psych anxious     PHYSICAL EXAMINATION    Physical Exam  Constitutional  No distress  Cardiovascular Rate  normal . Rhythm: regular . Heart sounds:  normal  Pulmonary/Chest  Effort normal. Breath sounds:  normal  GI bloating okay  Psychiatric  Alert. Judgment and thought content normal. Mood normal       REVIEWED DATA:    Labs:   Anticoagulation Visit on 11/20/2023   Component Date Value Ref Range Status    INR 11/20/2023 1.90   Final       Imaging:   No results found.     Medical Tests:        Summary of old records / correspondence / consultant report:   DC summary re: issues addressed on HPI    Request outside records:       ALLERGIES  Allergies   Allergen Reactions    Lisinopril Cough        MEDICATIONS   Current Outpatient Medications   Medication Sig Dispense Refill    acetaminophen (TYLENOL) 325 MG tablet Take 2 tablets by mouth Every 6 (Six) Hours As Needed for Mild Pain.      albuterol sulfate  (90 Base) MCG/ACT inhaler INHALE 2 PUFFS BY MOUTH EVERY 4 HOURS AS NEEDED FOR WHEEZING 18 g 0    atorvastatin (LIPITOR) 20 MG tablet Take 1 tablet by mouth once daily 90 tablet 0    CBD (cannabidiol) oral oil Take  by mouth.      cyclobenzaprine (FLEXERIL) 10 MG tablet Take 1 tablet by mouth 3 (Three) Times a Day As Needed for Muscle Spasms. 21 tablet 0    Ezqnt-Vbaco-Aebaogrb-TenofAF (Symtuza) 470-373-424-10 MG per tablet Take 1 tablet by mouth Daily.      Diclofenac Sodium (VOLTAREN) 1 % gel gel Apply 4 g topically to the appropriate area as directed 2 (Two) Times a Day. Use per pkg insert 100 g 2    diphenhydrAMINE (BENADRYL) 25 mg capsule Take 1 capsule by mouth Every 6 (Six) Hours As Needed for Itching  (swelling). 20 capsule 0    empagliflozin (Jardiance) 10 MG tablet tablet Take 1 tablet by mouth once daily 90 tablet 1    Entresto 49-51 MG tablet Take 1 tablet by mouth twice daily 180 tablet 0    famotidine (Pepcid) 20 MG tablet Take 2 tablets by mouth 2 (Two) Times a Day As Needed for Heartburn.      fluticasone (FLONASE) 50 MCG/ACT nasal spray 2 sprays into the nostril(s) as directed by provider Daily. 11.1 g 3    Insulin Pen Needle (BD Pen Needle Heidi 2nd Gen) 32G X 4 MM misc USE 1  ONCE DAILY WITH  SAXENDA 100 each 0    nitroglycerin (NITROSTAT) 0.4 MG SL tablet Place 1 tablet under the tongue Every 5 (Five) Minutes As Needed for Chest Pain. Take no more than 3 doses in 15 minutes. 30 tablet 5    pantoprazole (Protonix) 20 MG EC tablet Take 1 tablet by mouth Daily. 90 tablet 3    potassium chloride (K-DUR,KLOR-CON) 20 MEQ CR tablet TAKE 3  BY MOUTH ONCE DAILY 90 tablet 0    Rimegepant Sulfate (Nurtec) 75 MG tablet dispersible tablet Take 1 tablet by mouth 1 (One) Time As Needed for migraine. Max of 1 tablet in 24 hours. 8 tablet 2    spironolactone (ALDACTONE) 25 MG tablet 1 tablet Daily.      torsemide (DEMADEX) 20 MG tablet TAKE 2 TABLETS BY MOUTH IN THE MORNING AND 2 TABLETS IN THE AFTERNOON 150 tablet 0    vitamin D (ERGOCALCIFEROL) 08397 units capsule capsule Take 1 capsule by mouth Every 30 (Thirty) Days.      warfarin (COUMADIN) 2.5 MG tablet TAKE 3 TABLETS (7.5MG) BY MOUTH DAILY OR AS DIRECTED 270 tablet 0    carvedilol (COREG) 3.125 MG tablet Take 1 tablet by mouth Daily. (Patient not taking: Reported on 11/28/2023)      fluconazole (Diflucan) 150 MG tablet Take one tablet now and one in 72 hours 2 tablet 0    sertraline (Zoloft) 100 MG tablet Take 1 tablet by mouth Daily. 90 tablet 1     No current facility-administered medications for this visit.       Current outpatient and discharge medications have been reconciled for the patient.  Reviewed by: SAMUEL West       [facility]:     The following  portions of the patient's history were reviewed and updated as appropriate: Allergies / Current Medications / Past Medical History / Surgical History / Social History / Family History    PROBLEM LIST   Patient Active Problem List   Diagnosis    Asthma    HIV (human immunodeficiency virus infection)    Class 2 obesity in adult    HFrEF (heart failure with reduced ejection fraction)    Anxiety    Chronic low back pain    Degeneration of lumbosacral intervertebral disc    Gastroesophageal reflux disease    Human papilloma virus infection    Hyperlipidemia    Vitamin D deficiency    Obesity    Essential hypertension    Dilated cardiomyopathy    NICM (nonischemic cardiomyopathy)    Nonrheumatic tricuspid valve regurgitation    Intractable chronic migraine without aura    Bilateral occipital neuralgia    SHAREE (obstructive sleep apnea)    Chronic chest pain with high risk for CAD       PAST MEDICAL HISTORY  Past Medical History:   Diagnosis Date    AICD (automatic cardioverter/defibrillator) present 03/06/2020    Asthma     CHF (congestive heart failure)     Class 2 obesity in adult     Coronary artery disease 2017    Degeneration of lumbosacral intervertebral disc 07/29/2021    Depression 2017    Diabetes mellitus     Fracture, foot 3/20    Broke    Grade II diastolic dysfunction     Per echocardiogram 3/2021    H/O Closed trimalleolar fracture     Headache 2000    HIV (human immunodeficiency virus infection)     Hypertension     LBBB (left bundle branch block)     NICM (nonischemic cardiomyopathy)     7/2020 EF 6% per echocardiogram; AICD present    Nonrheumatic mitral valve regurgitation 03/08/2021    Moderate per echo 3/2021    Nonrheumatic tricuspid valve regurgitation     Moderate per echocardiogram 3/2021    SHAREE (obstructive sleep apnea) 02/21/2023    Pulmonary embolism        SURGICAL HISTORY  Past Surgical History:   Procedure Laterality Date    ANKLE OPEN REDUCTION INTERNAL FIXATION  3/7/20    CARDIAC  CATHETERIZATION      CARDIAC DEFIBRILLATOR PLACEMENT       SECTION      one C/S    FRACTURE SURGERY Left     left ankle    OOPHORECTOMY      h/o teratoma    TUBAL ABDOMINAL LIGATION         SOCIAL HISTORY  Social History     Socioeconomic History    Marital status: Legally    Tobacco Use    Smoking status: Former     Packs/day: 0.50     Years: 20.00     Additional pack years: 0.00     Total pack years: 10.00     Types: Cigarettes     Quit date:      Years since quittin.9     Passive exposure: Past    Smokeless tobacco: Never   Vaping Use    Vaping Use: Never used   Substance and Sexual Activity    Alcohol use: Not Currently     Comment: holiday and special occ//caffeine use: Occ    Drug use: Not Currently     Types: Marijuana     Comment: gummy    Sexual activity: Not Currently     Partners: Male     Birth control/protection: Hysterectomy       FAMILY HISTORY  Family History   Problem Relation Age of Onset    Cancer Mother     Breast cancer Mother     Bone cancer Mother     Ovarian cysts Daughter     No Known Problems Daughter     No Known Problems Daughter     Breast cancer Paternal Grandmother          at 84    Pancreatic cancer Paternal Grandmother     Diabetes Maternal Grandmother     Breast cancer Maternal Grandmother 40    Heart disease Maternal Grandmother     Hypertension Maternal Grandfather     Hyperlipidemia Maternal Grandfather     Diabetes Maternal Grandfather     Prostate cancer Maternal Grandfather     Prostate cancer Paternal Uncle     Ovarian cancer Neg Hx     Uterine cancer Neg Hx     Colon cancer Neg Hx      *Dragon dictation used for documentation.  Skin okay she said its been like a couple

## 2023-11-28 NOTE — Clinical Note
I believe she may be beginning to hold onto fluid, but she is also complaining of some intermittent chest pain that is midsternal, does not radiate, but it is relieved by nitro.  I wanted to let you know in case there is anything additional that you would like done.  Thanks, Yane

## 2023-11-28 NOTE — PROGRESS NOTES
[facility] HEART FAILURE      Patient Name: Nina Saez  :1975  Age: 48 y.o.  Sex: female  Referring Provider: Nilda Gallagher APRN   Primary Cardiologist: Lisset Melton MD  Encounter Provider:  SAMUEL Cisneros      Chief Complaint:   Chief Complaint   Patient presents with    Congestive Heart Failure         Congestive Heart Failure  Associated symptoms include fatigue and shortness of breath. Pertinent negatives include no chest pain, palpitations or unexpected weight change.    this 48-year-old female, known to this provider, comes to us today for further evaluation of her chronic systolic heart failure.  Current diagnoses to include severe nonischemic cardiomyopathy, left bundle branch block, hypertension, HIV, obesity, and asthma.    Historically she had followed with Dr. Lania Boyd at [facility].  In spring 2019 she was visiting family in [place] and was taken emergently to [facility].  Echocardiogram and cardiac catheterization were performed at that point, carvedilol was changed to metoprolol.    In 2019 she presented through the emergency department at [facility] with chest pain and shortness of breath.  She was treated with IV diuresis, troponin was negative x2 and proBNP was elevated at 5151.  Entresto was started at that point given her severe dilated cardiomyopathy.  She was to follow with cardiology at [facility] at that time.    In 2020 she again presented to the emergency department with shortness of breath and cough x3 weeks.  BNP was elevated at 13,351.  Carvedilol was discontinued that admission given hypotension.  At subsequent outpatient appointment it was felt that Bumex was less effective than Lasix by the patient.  She complained of progressively worsening fatigue.  AICD, single-chamber [employer], interrogation 2020 revealed 10-16 beats of VT.    On September 10, 2020 she  saw Dr. Jamar Azul MD, for consideration of upgrade to biventricular device.  At that point he felt that her left bundle branch block was incomplete and she did not meet criteria for implantation of CRT-D.    She presented 10/27/2020 to [facility] with complaints of acute on chronic shortness of breath that began approximately 1 week prior.  BNP was further elevated at 16,206 that point.  Pulmonary edema was noted on chest x-ray.  Spironolactone was started that admission.  Referral for evaluation by [facility] services was made November 2, 2020.    Left and right cardiac catheterization at [facility] revealed no obstructive CAD with normal filling pressures.  Echocardiogram at that time revealed an EF less than 15% with global hypokinesis-information extracted from external medical record note.  Cardiac catheterization May 30, 2017 performed at [facility] yielded codominant system with normal left main, LAD with distal 30% stenosis, RCA normal, LVEDP 7 mmHg.    Echocardiogram July 9, 2020 revealed EF of 6%, grade 3 LV diastolic dysfunction, significant LV wall motion hypokinesis/akinesis, RVSP 45 to 55 mmHg secondary to moderate tricuspid valve regurgitation, moderate to severe MR noted.    Jardiance 10 mg daily was started on 11/13/2020.      She was admitted from 12/11/2020 to 12/17/2020 for acute pulmonary embolism with right lower lobe pulmonary infarct.  She was started on warfarin at that time.  Additionally, she has been started on Corlanor as well as spironolactone for her heart failure.  January 4, 2021 she was evaluated and treated in the emergency department for possible angioedema.      Entresto has now been increased to  mg twice daily.  Repeat echocardiogram 3/8/2021 revealed improvement in ejection fraction from 6% to 22% with severe LV dilation, severe global hypokinesis and grade 2 LV diastolic dysfunction.    She has elected at this  time not to proceed with transplant work-up just yet.  She follows up with  on November 16.  She recently saw Dr. Melton and her carvedilol was increased to 50 mg at night with a goal of SBP of .    She spoke with Dr. Melton when she saw her about it and Corlanor was discontinued, and Entresto was reduced to 49-51 mg twice daily.      Echocardiogram was repeated in March 2023, LVEF had improved to 29%.  She has again stopped her Saxenda as she was not successful with weight loss.  Her torsemide was recently reduced by primary cardiology given soft blood pressures.  She was having dizziness and lightheadedness with this.  Her dizziness and lightheadedness is since resolved and her blood pressure is now running closer to 130s systolically.  On her own she has cut her carvedilol down to once daily and is taking this at night.  She did not feel that the reduced dose of torsemide was enough for her so she had been taking 2 tablets in the morning and 2 in the afternoon.  She also has complaints of chest pain relieved by nitro.    The following portions of the patient's history were reviewed and updated as appropriate: allergies, current medications, past family history, past medical history, past social history, past surgical history and problem list.    Current Outpatient Medications   Medication Sig Dispense Refill    acetaminophen (TYLENOL) 325 MG tablet Take 2 tablets by mouth Every 6 (Six) Hours As Needed for Mild Pain.      albuterol sulfate  (90 Base) MCG/ACT inhaler INHALE 2 PUFFS BY MOUTH EVERY 4 HOURS AS NEEDED FOR WHEEZING 18 g 0    atorvastatin (LIPITOR) 20 MG tablet Take 1 tablet by mouth once daily 90 tablet 0    carvedilol (COREG) 3.125 MG tablet Take 1 tablet by mouth Daily. (Patient not taking: Reported on 11/28/2023)      CBD (cannabidiol) oral oil Take  by mouth.      cyclobenzaprine (FLEXERIL) 10 MG tablet Take 1 tablet by mouth 3 (Three) Times a Day As Needed for Muscle Spasms. 21  tablet 0    Hzoqz-Siony-Katdjrtt-TenofAF (Symtuza) 977-826-185-10 MG per tablet Take 1 tablet by mouth Daily.      Diclofenac Sodium (VOLTAREN) 1 % gel gel Apply 4 g topically to the appropriate area as directed 2 (Two) Times a Day. Use per pkg insert 100 g 2    diphenhydrAMINE (BENADRYL) 25 mg capsule Take 1 capsule by mouth Every 6 (Six) Hours As Needed for Itching (swelling). 20 capsule 0    empagliflozin (Jardiance) 10 MG tablet tablet Take 1 tablet by mouth once daily 90 tablet 1    Entresto 49-51 MG tablet Take 1 tablet by mouth twice daily 180 tablet 0    famotidine (Pepcid) 20 MG tablet Take 2 tablets by mouth 2 (Two) Times a Day As Needed for Heartburn.      fluticasone (FLONASE) 50 MCG/ACT nasal spray 2 sprays into the nostril(s) as directed by provider Daily. 11.1 g 3    Insulin Pen Needle (BD Pen Needle Heidi 2nd Gen) 32G X 4 MM misc USE 1  ONCE DAILY WITH  SAXENDA 100 each 0    nitroglycerin (NITROSTAT) 0.4 MG SL tablet Place 1 tablet under the tongue Every 5 (Five) Minutes As Needed for Chest Pain. Take no more than 3 doses in 15 minutes. 30 tablet 5    pantoprazole (Protonix) 20 MG EC tablet Take 1 tablet by mouth Daily. 90 tablet 3    potassium chloride (K-DUR,KLOR-CON) 20 MEQ CR tablet TAKE 3  BY MOUTH ONCE DAILY 90 tablet 0    Rimegepant Sulfate (Nurtec) 75 MG tablet dispersible tablet Take 1 tablet by mouth 1 (One) Time As Needed for migraine. Max of 1 tablet in 24 hours. 8 tablet 2    sertraline (ZOLOFT) 50 MG tablet Take 1 tablet by mouth once daily 90 tablet 1    spironolactone (ALDACTONE) 25 MG tablet 1 tablet Daily.      torsemide (DEMADEX) 20 MG tablet TAKE 2 TABLETS BY MOUTH IN THE MORNING AND 2 TABLETS IN THE AFTERNOON 150 tablet 0    vitamin D (ERGOCALCIFEROL) 64599 units capsule capsule Take 1 capsule by mouth Every 30 (Thirty) Days.      warfarin (COUMADIN) 2.5 MG tablet TAKE 3 TABLETS (7.5MG) BY MOUTH DAILY OR AS DIRECTED 270 tablet 0     No current facility-administered medications for  this encounter.       Past Medical History:   Diagnosis Date    AICD (automatic cardioverter/defibrillator) present 2020    Asthma     CHF (congestive heart failure)     Class 2 obesity in adult     Coronary artery disease 2017    Degeneration of lumbosacral intervertebral disc 2021    Depression 2017    Diabetes mellitus     Fracture, foot 3/20    Broke    Grade II diastolic dysfunction     Per echocardiogram 3/2021    H/O Closed trimalleolar fracture     Headache     HIV (human immunodeficiency virus infection)     Hypertension     LBBB (left bundle branch block)     NICM (nonischemic cardiomyopathy)     2020 EF 6% per echocardiogram; AICD present    Nonrheumatic mitral valve regurgitation 2021    Moderate per echo 3/2021    Nonrheumatic tricuspid valve regurgitation     Moderate per echocardiogram 3/2021    SHAREE (obstructive sleep apnea) 2023    Pulmonary embolism        Past Surgical History:   Procedure Laterality Date    ANKLE OPEN REDUCTION INTERNAL FIXATION  3/7/20    CARDIAC CATHETERIZATION      CARDIAC DEFIBRILLATOR PLACEMENT       SECTION      one C/S    FRACTURE SURGERY Left     left ankle    OOPHORECTOMY      h/o teratoma    TUBAL ABDOMINAL LIGATION         Physical Exam  Vitals and nursing note reviewed.   Constitutional:       General: She is not in acute distress.     Appearance: She is well-developed. She is not ill-appearing.   HENT:      Head: Normocephalic and atraumatic.   Eyes:      Conjunctiva/sclera: Conjunctivae normal.      Pupils: Pupils are equal, round, and reactive to light.   Neck:      Vascular: No JVD.   Cardiovascular:      Rate and Rhythm: Normal rate and regular rhythm.      Heart sounds: Normal heart sounds. No murmur heard.     No friction rub. No gallop.   Pulmonary:      Effort: Pulmonary effort is normal. No respiratory distress.      Breath sounds: Normal breath sounds.   Abdominal:      General: Bowel sounds are normal. There is no  "distension.      Palpations: Abdomen is soft.   Musculoskeletal:         General: No swelling or deformity.   Skin:     General: Skin is warm and dry.      Capillary Refill: Capillary refill takes less than 2 seconds.   Neurological:      Mental Status: She is alert and oriented to person, place, and time. Mental status is at baseline.   Psychiatric:         Attention and Perception: Attention normal.         Mood and Affect: Mood normal. Affect is tearful.         Behavior: Behavior normal. Behavior is cooperative.          Review of Systems   Constitutional:  Positive for fatigue. Negative for appetite change and unexpected weight change.   HENT:  Negative for congestion and nosebleeds.    Eyes:  Negative for photophobia and visual disturbance.   Respiratory:  Positive for shortness of breath. Negative for cough and chest tightness.    Cardiovascular:  Negative for chest pain, palpitations and leg swelling.   Gastrointestinal:  Positive for abdominal distention. Negative for blood in stool.   Endocrine: Negative for polyphagia and polyuria.   Genitourinary:  Positive for frequency. Negative for enuresis.   Musculoskeletal:  Negative for joint swelling and myalgias.   Skin:  Negative for pallor and rash.   Neurological:  Negative for dizziness, syncope, weakness, light-headedness, numbness and headaches.   Hematological:  Does not bruise/bleed easily.   Psychiatric/Behavioral:  Negative for decreased concentration and sleep disturbance.         OBJECTIVE:  /78 (BP Location: Right arm, Patient Position: Sitting)   Pulse 66   Ht 170.2 cm (67.01\")   Wt 104 kg (228 lb 3.2 oz)   SpO2 95%   BMI 35.73 kg/m²      Body mass index is 35.73 kg/m².  Wt Readings from Last 1 Encounters:   11/28/23 104 kg (228 lb 3.2 oz)       Lab Review:  Renal Function: CrCl cannot be calculated (Patient's most recent lab result is older than the maximum 30 days allowed.).    Lab Results   Component Value Date    PROBNP 897.0 (H) " 10/23/2023       Results for orders placed during the hospital encounter of 03/17/23    Adult Transthoracic Echo Complete W/ Cont if Necessary Per Protocol    Interpretation Summary    Left ventricular systolic function is moderately decreased. Calculated left ventricular EF = 29.8%    The left ventricular cavity is moderately dilated.    The following left ventricular wall segments are hypokinetic: mid anterior, apical anterior, apical lateral, mid inferolateral, apical inferior, mid inferior, apex hypokinetic, basal anterior and basal inferior. The following left ventricular wall segments are dyskinetic: apical septal, basal inferoseptal, mid inferoseptal and mid anteroseptal.    Left ventricular diastolic function is consistent with (grade I) impaired relaxation.    Estimated right ventricular systolic pressure from tricuspid regurgitation is normal (<35 mmHg). Calculated right ventricular systolic pressure from tricuspid regurgitation is 23 mmHg.        6 MINUTE WALK  Patient declined       Cardiac Procedures:  1. Cardiac catheterization [facility] 5/30/17       2.  [facility]   Data deficit      ASSESSMENT:     Diagnosis Plan   1. HFrEF (heart failure with reduced ejection fraction)  CBC & Differential    Comprehensive Metabolic Panel    BNP    XR Chest 2 View      2. Chronic chest pain with high risk for CAD        3. Essential hypertension        4. Dilated cardiomyopathy        5. NICM (nonischemic cardiomyopathy)                PLAN OF CARE:  1.  HFrEF-NYHA functional class II.  Most recent EF per echocardiogram 29%, up from 6%. Currently she is on Entresto, torsemide, carvedilol, spironolactone, and Jardiance.  Historically she has been unable to tolerate metoprolol succinate as it made her very dizzy.      She remains without any overt signs of volume overload on exam, however, she states she is not quite feeling right and is unable to put a label on how she is feeling.  I would  like to check labs and chest x-ray as below.  We will adjust her diuretic if indicated.  I did also like to stop her carvedilol and retrial metoprolol succinate as she is only taking her carvedilol at night anyway.  She is to follow a low-sodium diet of 2000 mg daily or less, fluid restriction of 1500 mL daily, as well as remain adherent with daily weights.  He has had chest pain that has been relieved by nitro.  I will reach out to her primary cardiologist regarding this.    Directions for when to call the clinic reviewed with the patient to include weight gain of 2 to 3 pounds in 24 hours, weight gain of 5 to 10 pounds within 7 days; worsening shortness of breath; worsening lower extremity edema or abdominal distention.    2.  Nonobstructive CAD-diffuse 30% LAD lesion noted on prior cardiac catheterization as above.  Stable, remains without angina.    3.  Nonischemic cardiomyopathy (dilated)-presence of AICD noted.  EF per echocardiogram was 6%, now improved to 29% per echocardiogram in March 2023.  Most recent AICD interrogation as above.  On target dosing of Entresto.    4.  NSVT-noted on prior device interrogation.  Currently on beta-blockade.    5.  HIV-history noted.  Followed at [facility].    6.  Pulmonary embolism-remains on on warfarin.  Followed by home health and primary cardiology team.    7.  Obesity-discontinued Saxenda on her own again.  BMI now 35.73 kg/m².          BC/CMP/BNP; chest x-ray; follow-up next week or sooner if needed diuretic adjustment if indicated; discuss chest pain with Dr. Melton        Thank you for allowing me to participate in the care of your patient,         Kiley JIN SAMUEL Boyd  [facility] HEART FAILURE  11/28/23  13:56 EST      **Lyric Disclaimer:**  Much of this encounter note is an electronic transcription/translation of spoken language to printed text. The electronic translation of spoken language may permit erroneous, or at times, nonsensical words or phrases to be  inadvertently transcribed. Although I have reviewed the note for such errors, some may still exist.

## 2023-11-28 NOTE — PROGRESS NOTES
"[facility] Heart Failure Clinic      Patient Name: Nina Saez  :1975  Age: 48 y.o.  Sex: female  Referring Provider: Nilda Gallagehr APRN   Primary Cardiologist: Geronimo  Encounter Provider: Andrew Jason II, PharmD      Chief Complaint:   Chief Complaint   Patient presents with    Congestive Heart Failure       HPI:  Patient presents for their follow-up visit. PMH is significant for HIV, asthma, DM, COPD, HTN, LBBB, SHAREE, and HFrEF. 2020 patient had ECHO which revealed EF of 6%, since then patient has improved to 29% as of 2023. Historically has been having issues with low BP and GDMT were adjusted including discontinuing Corlanor and decreasing Entresto to 49/51 mg BID. Torsemide was recently reduced by primary d/t low blood pressures reporting some dizziness and lightheadedness will on it. These symptoms have since resolved and patient reports systolic BP runs around 130 sometimes. Also stated c/o chest pain which has been resolving with nitroglycerin. Today, patient presents to clinic stating she still feels that she is holding on to fluid in her abdomen. She did endorse that she has been indulging in saltier snacks recently, but tries not to eat too much throughout the day.       Current Regimen:  Heart Failure  Carvedilol 3.125 mg daily  Jardiance 10 mg daily  Entresto 49/51 mg BID  Torsemide 20 mg     Other CV Meds  Warfarin 5 mg daily  Atorvastatin 20 mg daily   Nitroglycerin 0.4 mg PRN    Medication Use:  Past hx of medication use/intolerance:   Affordability: Medicare Advantage        Immunization Status:  Pneumococcal: PCV13 (4/3/17) PPSV23 (21)  Influenza: Up to date  COVID-19: 21, 21, 21, 22, 23      OBJECTIVE:    /78 (BP Location: Right arm, Patient Position: Sitting)   Pulse 66   Ht 170.2 cm (67.01\")   Wt 104 kg (228 lb 3.2 oz)   LMP 2023   SpO2 95%   BMI 35.73 kg/m²     Body mass index is 35.73 kg/m².  Wt " Readings from Last 1 Encounters:   11/28/23 102 kg (225 lb 9.6 oz)       Lab Review:  Renal Function: Estimated Creatinine Clearance: 94.9 mL/min (by C-G formula based on SCr of 0.9 mg/dL).    Lab Results   Component Value Date    PROBNP 1,117.0 (H) 11/28/2023       Results for orders placed during the hospital encounter of 03/17/23    Adult Transthoracic Echo Complete W/ Cont if Necessary Per Protocol    Interpretation Summary    Left ventricular systolic function is moderately decreased. Calculated left ventricular EF = 29.8%    The left ventricular cavity is moderately dilated.    The following left ventricular wall segments are hypokinetic: mid anterior, apical anterior, apical lateral, mid inferolateral, apical inferior, mid inferior, apex hypokinetic, basal anterior and basal inferior. The following left ventricular wall segments are dyskinetic: apical septal, basal inferoseptal, mid inferoseptal and mid anteroseptal.    Left ventricular diastolic function is consistent with (grade I) impaired relaxation.    Estimated right ventricular systolic pressure from tricuspid regurgitation is normal (<35 mmHg). Calculated right ventricular systolic pressure from tricuspid regurgitation is 23 mmHg.          ASSESSMENT/PLAN OF CARE:    HFrEF (ECHO 3/17/23 29.8%)  Patient's abdomen appears to be distended with no overt signs of volume overload. Patient will be getting chest x-ray today which may provider clearer picture for distention etiology.   HF Beta-blocker: Change carvedilol 3.125 mg daily to metoprolol succinate 12.5 mg daily. With historically low BP, switching to metoprolol may alleviate any low BP issues in the future. Will monitor vitals with this medication change. Discussed with patient differences between the beta blockers including how they affect the body differently and dosing.   ACEi/ARB/ARNI: Continue Entresto 49/51 mg BID. Patient denies any issues with dizziness. She does check BP at home and says  recently has been running more normal in the 130s (systolic).  MRA: Continue spironolactone 25 mg daily. Will continue to follow labs for any electrolyte or renal changes.   SGLT2i: Continue Jardiance 10 mg daily.  Diuretics: Continue torsemide 40 mg BID. Will continue to assess labs and for s/sx of fluid overload.   Ivabradine: Not indicated HR is at goal on current BB regimen. With the current changes, may assess for use in the future if HR does remain at goal.   Vericiguat: Not indicated at this time. May consider if patient shows signs of worsening, but BP may prove to be a barrier.   Hydralazine/ISDN: Not indicated d/t low/normotensive BP.  Advised patient to call clinic with 2-3 lb weight gain in 24 hrs or >5 lb weight gain in 1 week  Reviewed diet recommendations including limiting sodium intake to <2000 mg/day. Discussed reading nutrition labels and reviewed the [place].  Discussed exercise recommendations including 150 minutes of moderate exercise per week      Thank you for allowing me to participate in the care of your patient,         Andrew Jason II, PharmD  [facility]  11/28/23  12:12 EST

## 2023-11-28 NOTE — ADDENDUM NOTE
Encounter addended by: Andrew Jason II, PharmD on: 11/28/2023 2:47 PM   Actions taken: Clinical Note Signed

## 2023-11-28 NOTE — PROGRESS NOTES
Anticoagulation Clinic Progress Note    Anticoagulation Summary  As of 2023      INR goal:  2.0-3.0   TTR:  60.6% (2.9 y)   INR used for dosin.10 (2023)   Warfarin maintenance plan:  5 mg every day   Weekly warfarin total:  35 mg   No change documented:  Zina De La Rosa, DAVINA   Plan last modified:  Haley Lancaster, John (2023)   Next INR check:  2023   Priority:  High   Target end date:  Indefinite    Indications    Other acute pulmonary embolism  unspecified whether acute cor pulmonale present (Resolved) [I26.99]  Acute pulmonary embolism  unspecified pulmonary embolism type  unspecified whether acute cor pulmonale present (Resolved) [I26.99]                 Anticoagulation Episode Summary       INR check location:      Preferred lab:      Send INR reminders to:  [facility] HOME TEST POOL    Comments:  **Home Testing Program**          Anticoagulation Care Providers       Provider Role Specialty Phone number    Lisset Melton MD Referring Cardiology [phone]            Clinic Interview:  No pertinent clinical findings have been reported.    INR History:      2023     1:32 PM 2023    12:00 AM 2023    10:52 AM 2023    12:00 AM 2023     1:00 PM 2023    12:00 AM 2023     2:41 PM   Anticoagulation Monitoring   INR 3.40  2.20  1.90  2.10   INR Date 2023   INR Goal 2.0-3.0  2.0-3.0  2.0-3.0  2.0-3.0   Trend Down  Down  Same  Same   Last Week Total 40 mg  35 mg  35 mg  35 mg   Next Week Total 40 mg  35 mg  37.5 mg  35 mg   Sun 5 mg  5 mg  5 mg  5 mg   Mon 5 mg  5 mg  7.5 mg ()  5 mg   Tue 7.5 mg  5 mg  5 mg  5 mg   Wed 5 mg  5 mg  5 mg  5 mg   Thu 7.5 mg  5 mg ()  5 mg  5 mg   Fri 5 mg  5 mg  5 mg  5 mg   Sat 5 mg  5 mg  5 mg  5 mg   Historical INR  2.20      1.90      2.10        Visit Report      Report Report       This result is from an external source.       Plan:  1. INR is therapeutic  today- see above in Anticoagulation Summary.    Nina Saez to continue their warfarin regimen- see above in Anticoagulation Summary.  2. Follow up in 1 week  3. Pt has agreed to only be called if INR out of range. They have been instructed to call if any changes in medications, doses, concerns, etc. Patient expresses understanding and has no further questions at this time.    Zina De La Rosa, [facility]

## 2023-11-28 NOTE — LETTER
[facility]  Vaccine Consent Form    Patient Name:  Nina Saez  Patient :  1975     Vaccine(s) Ordered    Fluzone (or Fluarix & Flulaval for VFC) >6 Mos ([id number])  Hepatitis B Vaccine Adult IM (ENERGIX/RECOMBIVAX)        Screening Checklist  The following questions should be completed prior to vaccination. If you answer “yes” to any question, it does not necessarily mean you should not be vaccinated. It just means we may need to clarify or ask more questions. If a question is unclear, please ask your healthcare provider to explain it.    Yes No   Any fever or moderate to severe illness today (mild illness and/or antibiotic treatment are not contraindications)?     Do you have a history of a serious reaction to any previous vaccinations, such as anaphylaxis, encephalopathy within 7 days, Guillain-Winona syndrome within 6 weeks, seizure?     Have you received any live vaccine(s) (e.g MMR, EVANS) or any other vaccines in the last month (to ensure duplicate doses aren't given)?     Do you have an anaphylactic allergy to latex (DTaP, DTaP-IPV, Hep A, Hep B, MenB, RV, Td, Tdap), baker’s yeast (Hep B, HPV), polysorbates (RSV, nirsevimab, PCV 20, Rotavirrus, Tdap, Shingrix), or gelatin (EVANS, MMR)?     Do you have an anaphylactic allergy to neomycin (Rabies, EVANS, MMR, IPV, Hep A), polymyxin B (IPV), or streptomycin (IPV)?      Any cancer, leukemia, AIDS, or other immune system disorder? (EVANS, MMR, RV)     Do you have a parent, brother, or sister with an immune system problem (if immune competence of vaccine recipient clinically verified, can proceed)? (MMR, EVANS)     Any recent steroid treatments for >2 weeks, chemotherapy, or radiation treatment? (EVANS, MMR)     Have you received antibody-containing blood transfusions or IVIG in the past 11 months (recommended interval is dependent on product)? (MMR, EVANS)     Have you taken antiviral drugs (acyclovir, famciclovir, valacyclovir for EVANS) in the last 24 or 48  hours, respectively?      Are you pregnant or planning to become pregnant within 1 month? (EVANS, MMR, HPV, IPV, MenB, Abrexvy; For Hep B- refer to Engerix-B; For RSV - Abrysvo is indicated for 32-36 weeks of pregnancy from September to January)     For infants, have you ever been told your child has had intussusception or a medical emergency involving obstruction of the intestine (Rotavirus)? If not for an infant, can skip this question.         *Ordering Physician/APC should be consulted if “yes” is checked by the patient or guardian above.      I have received, read, and understand the Vaccine Information Statement (VIS) for each vaccine ordered above.  I have considered my health status as well as the health status of my close contacts.  I have taken the opportunity to discuss my vaccine questions with my health care provider.   I have requested that the ordered vaccine(s) be given to me.  I understand the benefits and risks of the vaccines.  I understand that I should remain in the clinic for 15 minutes after receiving the vaccine(s).  _________________________________________________________  Signature of Patient or Parent/Legal Guardian ____________________  Date

## 2023-11-30 ENCOUNTER — TELEPHONE (OUTPATIENT)
Dept: CARDIOLOGY | Facility: CLINIC | Age: 48
End: 2023-11-30

## 2023-11-30 DIAGNOSIS — Z91.89 CHRONIC CHEST PAIN WITH HIGH RISK FOR CAD: Primary | ICD-10-CM

## 2023-11-30 DIAGNOSIS — R07.9 CHRONIC CHEST PAIN WITH HIGH RISK FOR CAD: Primary | ICD-10-CM

## 2023-11-30 DIAGNOSIS — G89.29 CHRONIC CHEST PAIN WITH HIGH RISK FOR CAD: Primary | ICD-10-CM

## 2023-11-30 NOTE — TELEPHONE ENCOUNTER
----- Message from Lisset Melton MD sent at 11/29/2023  9:35 AM EST -----  Start imdur and set up stress nuclear in our office.     rm  ----- Message -----  From: Kiley oByd APRN  Sent: 11/29/2023   9:35 AM EST  To: Lisset Melton MD    Did gain weight back through it looks like her last ischemic eval was in 2017.  Her blood pressure has been low, so I just reduced her Entresto in order to increase her diuretic.  I do not know that today she could tolerate it just because I do not know what her blood pressure will be doing, but overall I do think she probably could tolerate it because there is plenty more room to adjust medication to allow for more blood pressure from that point.    Thanks,  Yane  ----- Message -----  From: Lisset Melton MD  Sent: 11/29/2023   9:09 AM EST  To: SAMUEL Cisneros    When was the last ischemic eval?    rm  ----- Message -----  From: Kiley Boyd APRN  Sent: 11/29/2023   9:00 AM EST  To: Lisset Melton MD    Her BNP is elevated, so I'll be adjusting her diuretic.  Is there anything else you'd like me to do regarding her CP?  ----- Message -----  From: Lisset Melton MD  Sent: 11/28/2023   5:03 PM EST  To: SAMUEL Cisneros    Does she need more diuretic or higher SGLT2i?    rm  ----- Message -----  From: Kiley Boyd APRN  Sent: 11/28/2023   1:15 PM EST  To: Lisset Melton MD    I believe she may be beginning to hold onto fluid, but she is also complaining of some intermittent chest pain that is midsternal, does not radiate, but it is relieved by nitro.  I wanted to let you know in case there is anything additional that you would like done.    Thanks,  Yane

## 2023-11-30 NOTE — TELEPHONE ENCOUNTER
I spoke with the patient and advised that a stress test is recommended.  She would prefer to hold off on starting isosorbide for now.  We will get this ordered and get follow-up with Dr. Melton coordinated for her as well.  All questions and concerns addressed at this time, understanding and agreement verbalized.    Tammy,    Do mind to please get her scheduled with Dr. Melton once the stress test can be done?    Thanks,  Yane

## 2023-12-06 ENCOUNTER — ANTICOAGULATION VISIT (OUTPATIENT)
Dept: PHARMACY | Facility: HOSPITAL | Age: 48
End: 2023-12-06
Payer: MEDICARE

## 2023-12-06 LAB — INR PPP: 2.3

## 2023-12-06 NOTE — PROGRESS NOTES
Anticoagulation Clinic Progress Note    Anticoagulation Summary  As of 2023      INR goal:  2.0-3.0   TTR:  60.9% (2.9 y)   INR used for dosin.30 (2023)   Warfarin maintenance plan:  5 mg every day   Weekly warfarin total:  35 mg   No change documented:  Zina De La Rosa, CAYDEN   Plan last modified:  Haley Lancaster, John (2023)   Next INR check:  2023   Priority:  High   Target end date:  Indefinite    Indications    Other acute pulmonary embolism  unspecified whether acute cor pulmonale present (Resolved) [I26.99]  Acute pulmonary embolism  unspecified pulmonary embolism type  unspecified whether acute cor pulmonale present (Resolved) [I26.99]                 Anticoagulation Episode Summary       INR check location:      Preferred lab:      Send INR reminders to:  [facility] HOME TEST POOL    Comments:  **Home Testing Program**          Anticoagulation Care Providers       Provider Role Specialty Phone number    Lisset Melton MD Referring Cardiology [phone]            Clinic Interview:  No pertinent clinical findings have been reported.    INR History:      2023    10:52 AM 2023    12:00 AM 2023     1:00 PM 2023    12:00 AM 2023     2:41 PM 2023    12:00 AM 2023    11:16 AM   Anticoagulation Monitoring   INR 2.20  1.90  2.10  2.30   INR Date 2023   INR Goal 2.0-3.0  2.0-3.0  2.0-3.0  2.0-3.0   Trend Down  Same  Same  Same   Last Week Total 35 mg  35 mg  35 mg  35 mg   Next Week Total 35 mg  37.5 mg  35 mg  35 mg   Sun 5 mg  5 mg  5 mg  5 mg   Mon 5 mg  7.5 mg ()  5 mg  5 mg   Tue 5 mg  5 mg  5 mg  5 mg   Wed 5 mg  5 mg  5 mg  5 mg   Thu 5 mg ()  5 mg  5 mg  5 mg   Fri 5 mg  5 mg  5 mg  5 mg   Sat 5 mg  5 mg  5 mg  5 mg   Historical INR  1.90      2.10      2.30        Visit Report    Report Report         This result is from an external source.       Plan:  1. INR is therapeutic today-  see above in Anticoagulation Summary.    Nina Saez to continue their warfarin regimen- see above in Anticoagulation Summary.  2. Follow up in 1 week  3. Pt has agreed to only be called if INR out of range. They have been instructed to call if any changes in medications, doses, concerns, etc. Patient expresses understanding and has no further questions at this time.    Zina De La Rosa, [facility]

## 2023-12-12 RX ORDER — SACUBITRIL AND VALSARTAN 49; 51 MG/1; MG/1
TABLET, FILM COATED ORAL
Qty: 180 TABLET | Refills: 0 | Status: SHIPPED | OUTPATIENT
Start: 2023-12-12

## 2023-12-12 RX ORDER — WARFARIN SODIUM 2.5 MG/1
TABLET ORAL
Qty: 270 TABLET | Refills: 0 | Status: SHIPPED | OUTPATIENT
Start: 2023-12-12

## 2023-12-15 ENCOUNTER — SPECIALTY PHARMACY (OUTPATIENT)
Dept: NEUROLOGY | Facility: CLINIC | Age: 48
End: 2023-12-15
Payer: MEDICARE

## 2023-12-15 NOTE — PROGRESS NOTES
Specialty Pharmacy Patient Management Program  Neurology Reassessment     Nina Saez is a 48 y.o. female with chronic migraine seen by a Neurology provider and enrolled in the Neurology Patient Management program offered by [facility].  A follow-up outreach was conducted, including assessment of continued therapy appropriateness, medication adherence, and side effect incidence and management for rimegepant (Nurtec).     Changes to Insurance Coverage or Financial Support  No changes.       Relevant Past Medical History and Comorbidities  Relevant medical history and concomitant health conditions were discussed with the patient. The patient's chart has been reviewed for relevant past medical history and comorbid health conditions and updated as necessary.   Past Medical History:   Diagnosis Date    AICD (automatic cardioverter/defibrillator) present 03/06/2020    Asthma     CHF (congestive heart failure)     Class 2 obesity in adult     Coronary artery disease 2017    Degeneration of lumbosacral intervertebral disc 07/29/2021    Depression 2017    Diabetes mellitus     Fracture, foot 3/20    Broke    Grade II diastolic dysfunction     Per echocardiogram 3/2021    H/O Closed trimalleolar fracture     Headache 2000    HIV (human immunodeficiency virus infection)     Hypertension     LBBB (left bundle branch block)     NICM (nonischemic cardiomyopathy)     7/2020 EF 6% per echocardiogram; AICD present    Nonrheumatic mitral valve regurgitation 03/08/2021    Moderate per echo 3/2021    Nonrheumatic tricuspid valve regurgitation     Moderate per echocardiogram 3/2021    SHAREE (obstructive sleep apnea) 02/21/2023    Pulmonary embolism      Social History     Socioeconomic History    Marital status: Legally    Tobacco Use    Smoking status: Former     Packs/day: 0.50     Years: 20.00     Additional pack years: 0.00     Total pack years: 10.00     Types: Cigarettes     Quit date: 2017      Years since quittin.9     Passive exposure: Past    Smokeless tobacco: Never   Vaping Use    Vaping Use: Never used   Substance and Sexual Activity    Alcohol use: Not Currently     Comment: holiday and special occ//caffeine use: Occ    Drug use: Not Currently     Types: Marijuana     Comment: gummy    Sexual activity: Not Currently     Partners: Male     Birth control/protection: Hysterectomy     Problem list reviewed by Konstantin Tovar RPH on 12/15/2023 at  9:27 AM      Hospitalizations and Urgent Care Since Last Assessment  ED Visits, Admissions, or Hospitalizations: None.   Urgent Office Visits: None.       Allergies  Known allergies and reactions were discussed with the patient. The patient's chart has been reviewed for allergy information and updated as necessary.   Allergies   Allergen Reactions    Lisinopril Cough     Allergies reviewed by Konstantin Tovar RPH on 12/15/2023 at  9:27 AM      Relevant Laboratory Values  Common labs          10/23/2023    15:58 10/23/2023    17:55 10/24/2023    08:39 2023    12:26   Common Labs   Glucose  107  138  96    BUN  14  14  11    Creatinine  0.97  0.82  0.90    Sodium  136  137  137    Potassium  3.9  4.3  4.5    Chloride  98  99  103    Calcium  9.2  9.4  9.3    Albumin  4.2   4.0    Total Bilirubin  0.3   0.4    Alkaline Phosphatase  149   152    AST (SGOT)  20   19    ALT (SGPT)  25   16    WBC 13.14   10.03  8.69    Hemoglobin 13.2   12.9  12.6    Hematocrit 39.5   38.8  38.1    Platelets 173   180  188        Lab Assessment  The above labs have been reviewed. No dose adjustments are needed for the specialty medication(s) based on the labs.       Current Medication List  This medication list has been reviewed with the patient and evaluated for any interactions or necessary modifications/recommendations, and updated to include all prescription medications, OTC medications, and supplements the patient is currently taking.  This list reflects what is  contained in the patient's profile, which has also been marked as reviewed to communicate to other providers it is the most up to date version of the patient's current medication therapy.     Current Outpatient Medications:     acetaminophen (TYLENOL) 325 MG tablet, Take 2 tablets by mouth Every 6 (Six) Hours As Needed for Mild Pain., Disp: , Rfl:     albuterol sulfate  (90 Base) MCG/ACT inhaler, INHALE 2 PUFFS BY MOUTH EVERY 4 HOURS AS NEEDED FOR WHEEZING, Disp: 18 g, Rfl: 0    atorvastatin (LIPITOR) 20 MG tablet, Take 1 tablet by mouth once daily, Disp: 90 tablet, Rfl: 0    CBD (cannabidiol) oral oil, Take  by mouth., Disp: , Rfl:     cyclobenzaprine (FLEXERIL) 10 MG tablet, Take 1 tablet by mouth 3 (Three) Times a Day As Needed for Muscle Spasms., Disp: 21 tablet, Rfl: 0    Kiqal-Kbncx-Zoqpszwd-TenofAF (Symtuza) 715-288-544-10 MG per tablet, Take 1 tablet by mouth Daily., Disp: , Rfl:     Diclofenac Sodium (VOLTAREN) 1 % gel gel, Apply 4 g topically to the appropriate area as directed 2 (Two) Times a Day. Use per pkg insert, Disp: 100 g, Rfl: 2    diphenhydrAMINE (BENADRYL) 25 mg capsule, Take 1 capsule by mouth Every 6 (Six) Hours As Needed for Itching (swelling)., Disp: 20 capsule, Rfl: 0    empagliflozin (Jardiance) 10 MG tablet tablet, Take 1 tablet by mouth once daily, Disp: 90 tablet, Rfl: 1    Entresto 49-51 MG tablet, Take 1 tablet by mouth twice daily, Disp: 180 tablet, Rfl: 0    famotidine (Pepcid) 20 MG tablet, Take 2 tablets by mouth 2 (Two) Times a Day As Needed for Heartburn., Disp: , Rfl:     fluconazole (Diflucan) 150 MG tablet, Take one tablet now and one in 72 hours, Disp: 2 tablet, Rfl: 0    fluticasone (FLONASE) 50 MCG/ACT nasal spray, 2 sprays into the nostril(s) as directed by provider Daily., Disp: 11.1 g, Rfl: 3    Insulin Pen Needle (BD Pen Needle Heidi 2nd Gen) 32G X 4 MM misc, USE 1  ONCE DAILY WITH  SAXENDA, Disp: 100 each, Rfl: 0    metoprolol succinate XL (TOPROL-XL) 25 MG 24  hr tablet, Take 0.5 tablets by mouth Daily., Disp: 30 tablet, Rfl: 0    nitroglycerin (NITROSTAT) 0.4 MG SL tablet, Place 1 tablet under the tongue Every 5 (Five) Minutes As Needed for Chest Pain. Take no more than 3 doses in 15 minutes., Disp: 30 tablet, Rfl: 5    pantoprazole (Protonix) 20 MG EC tablet, Take 1 tablet by mouth Daily., Disp: 90 tablet, Rfl: 3    potassium chloride (K-DUR,KLOR-CON) 20 MEQ CR tablet, TAKE 3  BY MOUTH ONCE DAILY, Disp: 90 tablet, Rfl: 0    Rimegepant Sulfate (Nurtec) 75 MG tablet dispersible tablet, Take 1 tablet by mouth 1 (One) Time As Needed for migraine. Max of 1 tablet in 24 hours., Disp: 8 tablet, Rfl: 2    sertraline (Zoloft) 100 MG tablet, Take 1 tablet by mouth Daily., Disp: 90 tablet, Rfl: 1    spironolactone (ALDACTONE) 25 MG tablet, 1 tablet Daily., Disp: , Rfl:     torsemide (DEMADEX) 20 MG tablet, TAKE 2 TABLETS BY MOUTH IN THE MORNING AND 2 TABLETS IN THE AFTERNOON, Disp: 150 tablet, Rfl: 0    vitamin D (ERGOCALCIFEROL) 69264 units capsule capsule, Take 1 capsule by mouth Every 30 (Thirty) Days., Disp: , Rfl:     warfarin (COUMADIN) 2.5 MG tablet, TAKE 3 TABLETS BY MOUTH ONCE DAILY OR AS DIRECTED, Disp: 270 tablet, Rfl: 0    Medicines reviewed by Konstantin Tovar [facility] on 12/15/2023 at  9:27 AM    Drug Interactions  None identified      Adverse Drug Reactions  Medication tolerability: Tolerating with no to minimal ADRs  Medication plan: Continue therapy with normal follow-up  Plan for ADR Management: N/A, no ADR's      Adherence, Self-Administration, and Current Therapy Problems  Adherence related patient's specialty therapy was discussed with the patient. The Adherence segment of this outreach has been reviewed and updated.   Adherence Questions  Linked Medication(s) Assessed: Rimegepant Sulfate  On average, how many doses/injections does the patient miss per month?: 0  What are the identified reasons for non-adherence or missed doses? : no problems identified  What is  the estimated medication adherence level?: %  Based on the patient/caregiver response and refill history, does this patient require an MTP to track adherence improvements?: no    Additional Barriers to Patient Self-Administration: No barriers.  Methods for Supporting Patient Self-Administration: None needed at this time.    Recently Close Medication Therapy Problems  No medication therapy recommendations to display  Open Medication Therapy Problems  No medication therapy recommendations to display     Goals of Therapy  Goals related to the patient's specialty therapy was discussed with the patient. The Patient Goals segment of this outreach has been reviewed and updated.    Goals Addressed Today        Specialty Pharmacy General Goal      Reduce severity and frequency of migraines. Currently with ~7-12 migraine days per month normally associated with menses.     12/15/23 clinical reassessment: Patient reports 6-7 migraine days per month currently. Rimegepant completely resolves migraine symptoms every time she takes it.                Quality of Life Assessment   Quality of Life related to the patient's enrollment in the patient management program and services provided was discussed with the patient. The QOL segment of this outreach has been reviewed and updated.   Quality of Life Improvement Scale: 9-A good deal better      Reassessment Plan & Follow-Up  Medication Therapy Changes: No changes, continuing rimegepant (Nurtec) as needed for acute treatment of migraine per patient's neurologist.   Related Plans, Therapy Recommendations, or Issues to Be Addressed: Nothing further to be addressed at this time.   Pharmacist to perform regular reassessments no more than (6) months from the previous assessment.  Care Coordinator to set up future refill outreaches, coordinate prescription delivery, and escalate clinical questions to pharmacist.     Attestation  Therapeutic appropriateness: Appropriate  I attest the  patient was actively involved in and has agreed to the above plan of care. If the prescribed therapy is at any point deemed not appropriate based on the current or future assessments, a consultation will be initiated with the patient's specialty care provider to determine the best course of action. The revised plan of therapy will be documented along with any additional patient education provided. Discussed aforementioned material with patient by phone.    Konstantin Tovar, PharmD, [facility]  Clinic Specialty Pharmacist, Neurology  12/15/2023  09:32 EST

## 2023-12-20 ENCOUNTER — ANTICOAGULATION VISIT (OUTPATIENT)
Dept: PHARMACY | Facility: HOSPITAL | Age: 48
End: 2023-12-20
Payer: MEDICARE

## 2023-12-20 LAB — INR PPP: 1.5

## 2023-12-20 NOTE — PROGRESS NOTES
Anticoagulation Clinic Progress Note    Anticoagulation Summary  As of 2023      INR goal:  2.0-3.0   TTR:  60.6% (3 y)   INR used for dosin.50 (2023)   Warfarin maintenance plan:  5 mg every day   Weekly warfarin total:  35 mg   Plan last modified:  Haley Lancaster, PharmD (2023)   Next INR check:  2023   Priority:  High   Target end date:  Indefinite    Indications    Other acute pulmonary embolism  unspecified whether acute cor pulmonale present (Resolved) [I26.99]  Acute pulmonary embolism  unspecified pulmonary embolism type  unspecified whether acute cor pulmonale present (Resolved) [I26.99]                 Anticoagulation Episode Summary       INR check location:      Preferred lab:      Send INR reminders to:  [facility] HOME TEST POOL    Comments:  **Home Testing Program**          Anticoagulation Care Providers       Provider Role Specialty Phone number    Lisset Melton MD Referring Cardiology [phone]            Clinic Interview:  Patient Findings     Negatives:  Signs/symptoms of thrombosis, Signs/symptoms of bleeding,   Laboratory test error suspected, Change in health, Change in alcohol use,   Change in activity, Upcoming invasive procedure, Emergency department   visit, Upcoming dental procedure, Missed doses, Extra doses, Change in   medications, Change in diet/appetite, Hospital admission, Bruising, Other   complaints      Clinical Outcomes     Negatives:  Major bleeding event, Thromboembolic event,   Anticoagulation-related hospital admission, Anticoagulation-related ED   visit, Anticoagulation-related fatality        INR History:      2023     1:00 PM 2023    12:00 AM 2023     2:41 PM 2023    12:00 AM 2023    11:16 AM 2023    12:00 AM 2023     1:34 PM   Anticoagulation Monitoring   INR 1.90  2.10  2.30  1.50   INR Date 2023   INR Goal 2.0-3.0  2.0-3.0  2.0-3.0  2.0-3.0    Trend Same  Same  Same  Same   Last Week Total 35 mg  35 mg  35 mg  35 mg   Next Week Total 37.5 mg  35 mg  35 mg  37.5 mg   Sun 5 mg  5 mg  5 mg  5 mg   Mon 7.5 mg (11/20)  5 mg  5 mg  5 mg   Tue 5 mg  5 mg  5 mg  5 mg   Wed 5 mg  5 mg  5 mg  7.5 mg (12/20)   Thu 5 mg  5 mg  5 mg  5 mg   Fri 5 mg  5 mg  5 mg  5 mg   Sat 5 mg  5 mg  5 mg  5 mg   Historical INR  2.10      2.30      1.50        Visit Report  Report Report           This result is from an external source.       Plan:  1. INR is Subtherapeutic today- see above in Anticoagulation Summary.   Will instruct Nina PHAM Saez to Change their warfarin regimen- see above in Anticoagulation Summary.  2. Follow up in 1 weeks  3.  They have been instructed to call if any changes in medications, doses, concerns, etc. Patient expresses understanding and has no further questions at this time.    Haley Lancaster, PharmD

## 2023-12-27 ENCOUNTER — PATIENT MESSAGE (OUTPATIENT)
Age: 48
End: 2023-12-27
Payer: MEDICARE

## 2023-12-27 ENCOUNTER — TELEPHONE (OUTPATIENT)
Dept: CARDIOLOGY | Facility: HOSPITAL | Age: 48
End: 2023-12-27
Payer: MEDICARE

## 2023-12-27 ENCOUNTER — APPOINTMENT (OUTPATIENT)
Dept: GENERAL RADIOLOGY | Facility: HOSPITAL | Age: 48
End: 2023-12-27
Payer: MEDICARE

## 2023-12-27 ENCOUNTER — APPOINTMENT (OUTPATIENT)
Dept: CT IMAGING | Facility: HOSPITAL | Age: 48
End: 2023-12-27
Payer: MEDICARE

## 2023-12-27 ENCOUNTER — HOSPITAL ENCOUNTER (EMERGENCY)
Facility: HOSPITAL | Age: 48
Discharge: HOME | End: 2023-12-27
Attending: EMERGENCY MEDICINE | Admitting: EMERGENCY MEDICINE
Payer: MEDICARE

## 2023-12-27 VITALS
SYSTOLIC BLOOD PRESSURE: 117 MMHG | TEMPERATURE: 98.5 F | DIASTOLIC BLOOD PRESSURE: 79 MMHG | WEIGHT: 216 LBS | HEIGHT: 66 IN | RESPIRATION RATE: 18 BRPM | OXYGEN SATURATION: 98 % | BODY MASS INDEX: 34.72 KG/M2 | HEART RATE: 72 BPM

## 2023-12-27 DIAGNOSIS — I50.22 CHRONIC SYSTOLIC CONGESTIVE HEART FAILURE: ICD-10-CM

## 2023-12-27 DIAGNOSIS — M79.18 MUSCULOSKELETAL PAIN: Primary | ICD-10-CM

## 2023-12-27 LAB
ALBUMIN SERPL-MCNC: 4.4 G/DL (ref 3.5–5.2)
ALBUMIN/GLOB SERPL: 1.4 G/DL
ALP SERPL-CCNC: 167 U/L (ref 39–117)
ALT SERPL W P-5'-P-CCNC: 20 U/L (ref 1–33)
ANION GAP SERPL CALCULATED.3IONS-SCNC: 12 MMOL/L (ref 5–15)
AST SERPL-CCNC: 16 U/L (ref 1–32)
BASOPHILS # BLD AUTO: 0.03 10*3/MM3 (ref 0–0.2)
BASOPHILS NFR BLD AUTO: 0.3 % (ref 0–1.5)
BILIRUB SERPL-MCNC: 0.3 MG/DL (ref 0–1.2)
BUN SERPL-MCNC: 21 MG/DL (ref 6–20)
BUN/CREAT SERPL: 19.6 (ref 7–25)
CALCIUM SPEC-SCNC: 9.6 MG/DL (ref 8.6–10.5)
CHLORIDE SERPL-SCNC: 99 MMOL/L (ref 98–107)
CO2 SERPL-SCNC: 23 MMOL/L (ref 22–29)
CREAT SERPL-MCNC: 1.07 MG/DL (ref 0.57–1)
DEPRECATED RDW RBC AUTO: 42 FL (ref 37–54)
EGFRCR SERPLBLD CKD-EPI 2021: 64.2 ML/MIN/1.73
EOSINOPHIL # BLD AUTO: 0.03 10*3/MM3 (ref 0–0.4)
EOSINOPHIL NFR BLD AUTO: 0.3 % (ref 0.3–6.2)
ERYTHROCYTE [DISTWIDTH] IN BLOOD BY AUTOMATED COUNT: 11.9 % (ref 12.3–15.4)
GEN 5 2HR TROPONIN T REFLEX: 18 NG/L
GLOBULIN UR ELPH-MCNC: 3.1 GM/DL
GLUCOSE SERPL-MCNC: 139 MG/DL (ref 65–99)
HCT VFR BLD AUTO: 39.6 % (ref 34–46.6)
HGB BLD-MCNC: 12.6 G/DL (ref 12–15.9)
HOLD SPECIMEN: NORMAL
HOLD SPECIMEN: NORMAL
IMM GRANULOCYTES # BLD AUTO: 0.03 10*3/MM3 (ref 0–0.05)
IMM GRANULOCYTES NFR BLD AUTO: 0.3 % (ref 0–0.5)
INR PPP: 1.83 (ref 0.9–1.1)
LYMPHOCYTES # BLD AUTO: 2.58 10*3/MM3 (ref 0.7–3.1)
LYMPHOCYTES NFR BLD AUTO: 26.3 % (ref 19.6–45.3)
MCH RBC QN AUTO: 30.6 PG (ref 26.6–33)
MCHC RBC AUTO-ENTMCNC: 31.8 G/DL (ref 31.5–35.7)
MCV RBC AUTO: 96.1 FL (ref 79–97)
MONOCYTES # BLD AUTO: 0.73 10*3/MM3 (ref 0.1–0.9)
MONOCYTES NFR BLD AUTO: 7.4 % (ref 5–12)
NEUTROPHILS NFR BLD AUTO: 6.42 10*3/MM3 (ref 1.7–7)
NEUTROPHILS NFR BLD AUTO: 65.4 % (ref 42.7–76)
NRBC BLD AUTO-RTO: 0 /100 WBC (ref 0–0.2)
PLATELET # BLD AUTO: 218 10*3/MM3 (ref 140–450)
PMV BLD AUTO: 11.4 FL (ref 6–12)
POTASSIUM SERPL-SCNC: 3.6 MMOL/L (ref 3.5–5.2)
PROT SERPL-MCNC: 7.5 G/DL (ref 6–8.5)
PROTHROMBIN TIME: 21.5 SECONDS (ref 11.7–14.2)
QT INTERVAL: 424 MS
QTC INTERVAL: 502 MS
RBC # BLD AUTO: 4.12 10*6/MM3 (ref 3.77–5.28)
SODIUM SERPL-SCNC: 134 MMOL/L (ref 136–145)
TROPONIN T DELTA: 2 NG/L
TROPONIN T SERPL HS-MCNC: 16 NG/L
WBC NRBC COR # BLD AUTO: 9.82 10*3/MM3 (ref 3.4–10.8)
WHOLE BLOOD HOLD COAG: NORMAL
WHOLE BLOOD HOLD SPECIMEN: NORMAL

## 2023-12-27 PROCEDURE — 71045 X-RAY EXAM CHEST 1 VIEW: CPT

## 2023-12-27 PROCEDURE — 80053 COMPREHEN METABOLIC PANEL: CPT

## 2023-12-27 PROCEDURE — 84484 ASSAY OF TROPONIN QUANT: CPT

## 2023-12-27 PROCEDURE — 85610 PROTHROMBIN TIME: CPT

## 2023-12-27 PROCEDURE — 99284 EMERGENCY DEPT VISIT MOD MDM: CPT

## 2023-12-27 PROCEDURE — 85025 COMPLETE CBC W/AUTO DIFF WBC: CPT

## 2023-12-27 PROCEDURE — 93005 ELECTROCARDIOGRAM TRACING: CPT

## 2023-12-27 PROCEDURE — 72125 CT NECK SPINE W/O DYE: CPT

## 2023-12-27 PROCEDURE — 36415 COLL VENOUS BLD VENIPUNCTURE: CPT

## 2023-12-27 PROCEDURE — 70450 CT HEAD/BRAIN W/O DYE: CPT

## 2023-12-27 RX ORDER — HYDROCODONE BITARTRATE AND ACETAMINOPHEN 7.5; 325 MG/1; MG/1
1 TABLET ORAL ONCE
Status: COMPLETED | OUTPATIENT
Start: 2023-12-27 | End: 2023-12-27

## 2023-12-27 RX ORDER — SODIUM CHLORIDE 0.9 % (FLUSH) 0.9 %
10 SYRINGE (ML) INJECTION AS NEEDED
Status: DISCONTINUED | OUTPATIENT
Start: 2023-12-27 | End: 2023-12-27 | Stop reason: HOSPADM

## 2023-12-27 RX ORDER — POTASSIUM CHLORIDE 20 MEQ/1
TABLET, EXTENDED RELEASE ORAL
Qty: 90 TABLET | Refills: 0 | Status: SHIPPED | OUTPATIENT
Start: 2023-12-27

## 2023-12-27 RX ORDER — ASPIRIN 325 MG
325 TABLET ORAL ONCE
Status: COMPLETED | OUTPATIENT
Start: 2023-12-27 | End: 2023-12-27

## 2023-12-27 RX ORDER — NITROGLYCERIN 0.4 MG/1
TABLET SUBLINGUAL
Qty: 25 TABLET | Refills: 0 | Status: SHIPPED | OUTPATIENT
Start: 2023-12-27

## 2023-12-27 RX ORDER — BACLOFEN 10 MG/1
10 TABLET ORAL 2 TIMES DAILY
Qty: 20 TABLET | Refills: 0 | Status: SHIPPED | OUTPATIENT
Start: 2023-12-27

## 2023-12-27 RX ORDER — BACLOFEN 10 MG/1
10 TABLET ORAL ONCE
Status: COMPLETED | OUTPATIENT
Start: 2023-12-27 | End: 2023-12-27

## 2023-12-27 RX ADMIN — BACLOFEN 10 MG: 10 TABLET ORAL at 17:04

## 2023-12-27 RX ADMIN — HYDROCODONE BITARTRATE AND ACETAMINOPHEN 1 TABLET: 7.5; 325 TABLET ORAL at 17:04

## 2023-12-27 RX ADMIN — ASPIRIN 325 MG: 325 TABLET ORAL at 16:59

## 2023-12-27 NOTE — ED PROVIDER NOTES
EMERGENCY DEPARTMENT ENCOUNTER    Room Number:  33/33  Date seen:  12/28/2023  PCP: Zach Durand APRN  Historian: Patient      HPI:  Chief Complaint: Multiple  Context: Nina Saez is a 48 y.o. female who presents to the ED c/o headache, neck pain, upper chest pain, upper back pain, left arm pain, left hand pain and numbness in left hand.  Patient has history of nonischemic cardiomyopathy.  The patient called her doctor today who advised her to come to the emergency room to rule out stroke.      PAST MEDICAL HISTORY  Active Ambulatory Problems     Diagnosis Date Noted    Asthma 08/06/2019    HIV (human immunodeficiency virus infection) 08/06/2019    Class 2 obesity in adult 08/06/2019    HFrEF (heart failure with reduced ejection fraction) 07/30/2021    Anxiety 09/29/2017    Chronic low back pain 03/21/2017    Degeneration of lumbosacral intervertebral disc 07/29/2021    Gastroesophageal reflux disease 03/21/2017    Human papilloma virus infection 10/05/2017    Hyperlipidemia 07/28/2021    Vitamin D deficiency 09/22/2016    Obesity 03/21/2017    Essential hypertension 08/06/2019    Dilated cardiomyopathy 08/14/2017    NICM (nonischemic cardiomyopathy) 11/16/2021    Nonrheumatic tricuspid valve regurgitation 07/29/2021    Intractable chronic migraine without aura 02/09/2023    Bilateral occipital neuralgia 02/09/2023    SHAREE (obstructive sleep apnea) 02/21/2023    Chronic chest pain with high risk for CAD 10/23/2023     Resolved Ambulatory Problems     Diagnosis Date Noted    Essential hypertension 08/06/2019    Exertional dyspnea 08/06/2019    Chest pain 03/21/2017    Chronic systolic congestive heart failure 08/06/2019    NICM (nonischemic cardiomyopathy) 07/08/2020    Elevated LFTs 07/08/2020    Hypopotassemia 07/08/2020    Hematuria 07/08/2020    Flank pain 07/08/2020    AICD (automatic cardioverter/defibrillator) present 03/06/2020    LBBB (left bundle branch block) 08/28/2020    Acute on chronic combined  systolic and diastolic CHF (congestive heart failure) 10/27/2020    Intractable vomiting with nausea 10/30/2020    Diarrhea following gastrointestinal surgery 10/27/2020    Acute pulmonary embolism 12/11/2020    Hemoptysis 12/13/2020    Other acute pulmonary embolism, unspecified whether acute cor pulmonale present 12/17/2020    Acute pulmonary embolism, unspecified pulmonary embolism type, unspecified whether acute cor pulmonale present 12/17/2020    Nonrheumatic mitral valve regurgitation 03/08/2021    Nonrheumatic tricuspid valve regurgitation     Grade II diastolic dysfunction     History of open reduction and internal fixation (ORIF) procedure 04/14/2021    Disorder of ankle joint 04/14/2021    Bimalleolar fracture 04/14/2021    Tendonitis, Achilles, left 04/14/2021    Pulmonary emboli 04/22/2021    Intractable persistent migraine aura without cerebral infarction and with status migrainosus 05/24/2022    History of drug abuse 05/24/2022    Tibial tendonitis, posterior, left 06/16/2022    Abnormal mammogram, unspecified 05/09/2019    Abnormal Papanicolaou smear of vagina 10/26/2017    Acute maxillary sinusitis 02/26/2019    Acute on chronic congestive heart failure 08/06/2019    Cardiovascular stress test abnormal 04/06/2017    Closed trimalleolar fracture of left ankle 03/06/2020    Cramps of lower extremity 02/11/2019    Encounter for immunization 01/25/2021    Exposure to human immunodeficiency virus 01/04/2018    Otalgia 06/11/2020    Migraine without aura, not refractory 04/23/2018    Other specified postprocedural states 04/14/2021    Acute on chronic combined systolic and diastolic heart failure 10/27/2020    Acute on chronic systolic congestive heart failure 08/06/2019    Pulmonary embolism 04/22/2021    Cardiac defibrillator in situ 10/13/2017    Heart failure with reduced ejection fraction 07/30/2021    Dyspnea on exertion 01/16/2020    Diastolic dysfunction 07/29/2021    History of drug abuse  2021    Human immunodeficiency virus infection 2018    Cardiomegaly 2021    Achilles tendinitis of left lower extremity 2021     Past Medical History:   Diagnosis Date    CHF (congestive heart failure)     Coronary artery disease 2017    Depression 2017    Diabetes mellitus     Fracture, foot 3/20    H/O Closed trimalleolar fracture     Headache 2000    Hypertension          REVIEW OF SYSTEMS  All systems reviewed and negative except for those discussed in HPI.       PAST SURGICAL HISTORY  Past Surgical History:   Procedure Laterality Date    ANKLE OPEN REDUCTION INTERNAL FIXATION  3/7/20    CARDIAC CATHETERIZATION      CARDIAC DEFIBRILLATOR PLACEMENT       SECTION      one C/S    FRACTURE SURGERY Left     left ankle    OOPHORECTOMY      h/o teratoma    TUBAL ABDOMINAL LIGATION           FAMILY HISTORY  Family History   Problem Relation Age of Onset    Cancer Mother     Breast cancer Mother     Bone cancer Mother     Ovarian cysts Daughter     No Known Problems Daughter     No Known Problems Daughter     Breast cancer Paternal Grandmother          at 84    Pancreatic cancer Paternal Grandmother     Diabetes Maternal Grandmother     Breast cancer Maternal Grandmother 40    Heart disease Maternal Grandmother     Hypertension Maternal Grandfather     Hyperlipidemia Maternal Grandfather     Diabetes Maternal Grandfather     Prostate cancer Maternal Grandfather     Prostate cancer Paternal Uncle     Ovarian cancer Neg Hx     Uterine cancer Neg Hx     Colon cancer Neg Hx          SOCIAL HISTORY  Social History     Socioeconomic History    Marital status: Legally    Tobacco Use    Smoking status: Former     Packs/day: 0.50     Years: 20.00     Additional pack years: 0.00     Total pack years: 10.00     Types: Cigarettes     Quit date:      Years since quittin.9     Passive exposure: Past    Smokeless tobacco: Never   Vaping Use    Vaping Use: Never used   Substance  and Sexual Activity    Alcohol use: Not Currently     Comment: holiday and special occ//caffeine use: Occ    Drug use: Not Currently     Types: Marijuana     Comment: gummy    Sexual activity: Not Currently     Partners: Male     Birth control/protection: Hysterectomy         ALLERGIES  Lisinopril      PHYSICAL EXAM  ED Triage Vitals   Temp Heart Rate Resp BP SpO2   12/27/23 1450 12/27/23 1450 12/27/23 1450 12/27/23 1455 12/27/23 1450   98.5 °F (36.9 °C) (!) 136 18 115/73 98 %      Temp src Heart Rate Source Patient Position BP Location FiO2 (%)   12/27/23 1450 12/27/23 1450 -- -- --   Tympanic Monitor          Physical Exam      GENERAL: 48-year-old female in no acute distress  HENT: NCAT: nares patent: Neck supple: Bilateral paraspinal tenderness and movement of her neck reproduces her symptoms.  EYES: no scleral icterus  CV: regular rhythm, normal rate: Reproducible upper chest wall pain bilaterally  RESPIRATORY: normal effort  ABDOMEN: soft, NTND: Bowel sounds positive  MUSCULOSKELETAL: no deformity  NEURO: alert and oriented x 3 with a normal neuroexam including good strength and sensation in her left hand: NIHSS of 0  PSYCH:  calm, cooperative  SKIN: warm, dry    Vital signs and nursing notes reviewed.      LAB RESULTS  Recent Results (from the past 24 hour(s))   ECG 12 Lead ED Triage Standing Order; Chest Pain    Collection Time: 12/27/23  2:55 PM   Result Value Ref Range    QT Interval 424 ms    QTC Interval 502 ms   Comprehensive Metabolic Panel    Collection Time: 12/27/23  3:10 PM    Specimen: Blood   Result Value Ref Range    Glucose 139 (H) 65 - 99 mg/dL    BUN 21 (H) 6 - 20 mg/dL    Creatinine 1.07 (H) 0.57 - 1.00 mg/dL    Sodium 134 (L) 136 - 145 mmol/L    Potassium 3.6 3.5 - 5.2 mmol/L    Chloride 99 98 - 107 mmol/L    CO2 23.0 22.0 - 29.0 mmol/L    Calcium 9.6 8.6 - 10.5 mg/dL    Total Protein 7.5 6.0 - 8.5 g/dL    Albumin 4.4 3.5 - 5.2 g/dL    ALT (SGPT) 20 1 - 33 U/L    AST (SGOT) 16 1 - 32 U/L     Alkaline Phosphatase 167 (H) 39 - 117 U/L    Total Bilirubin 0.3 0.0 - 1.2 mg/dL    Globulin 3.1 gm/dL    A/G Ratio 1.4 g/dL    BUN/Creatinine Ratio 19.6 7.0 - 25.0    Anion Gap 12.0 5.0 - 15.0 mmol/L    eGFR 64.2 >60.0 mL/min/1.73   High Sensitivity Troponin T    Collection Time: 12/27/23  3:10 PM    Specimen: Blood   Result Value Ref Range    HS Troponin T 16 (H) <14 ng/L   Protime-INR    Collection Time: 12/27/23  3:10 PM    Specimen: Blood   Result Value Ref Range    Protime 21.5 (H) 11.7 - 14.2 Seconds    INR 1.83 (H) 0.90 - 1.10   Green Top (Gel)    Collection Time: 12/27/23  3:10 PM   Result Value Ref Range    Extra Tube Hold for add-ons.    Lavender Top    Collection Time: 12/27/23  3:10 PM   Result Value Ref Range    Extra Tube hold for add-on    Gold Top - SST    Collection Time: 12/27/23  3:10 PM   Result Value Ref Range    Extra Tube Hold for add-ons.    Light Blue Top    Collection Time: 12/27/23  3:10 PM   Result Value Ref Range    Extra Tube Hold for add-ons.    CBC Auto Differential    Collection Time: 12/27/23  3:10 PM    Specimen: Blood   Result Value Ref Range    WBC 9.82 3.40 - 10.80 10*3/mm3    RBC 4.12 3.77 - 5.28 10*6/mm3    Hemoglobin 12.6 12.0 - 15.9 g/dL    Hematocrit 39.6 34.0 - 46.6 %    MCV 96.1 79.0 - 97.0 fL    MCH 30.6 26.6 - 33.0 pg    MCHC 31.8 31.5 - 35.7 g/dL    RDW 11.9 (L) 12.3 - 15.4 %    RDW-SD 42.0 37.0 - 54.0 fl    MPV 11.4 6.0 - 12.0 fL    Platelets 218 140 - 450 10*3/mm3    Neutrophil % 65.4 42.7 - 76.0 %    Lymphocyte % 26.3 19.6 - 45.3 %    Monocyte % 7.4 5.0 - 12.0 %    Eosinophil % 0.3 0.3 - 6.2 %    Basophil % 0.3 0.0 - 1.5 %    Immature Grans % 0.3 0.0 - 0.5 %    Neutrophils, Absolute 6.42 1.70 - 7.00 10*3/mm3    Lymphocytes, Absolute 2.58 0.70 - 3.10 10*3/mm3    Monocytes, Absolute 0.73 0.10 - 0.90 10*3/mm3    Eosinophils, Absolute 0.03 0.00 - 0.40 10*3/mm3    Basophils, Absolute 0.03 0.00 - 0.20 10*3/mm3    Immature Grans, Absolute 0.03 0.00 - 0.05 10*3/mm3    nRBC  0.0 0.0 - 0.2 /100 WBC   High Sensitivity Troponin T 2Hr    Collection Time: 12/27/23  4:09 PM    Specimen: Arm, Right; Blood   Result Value Ref Range    HS Troponin T 18 (H) <14 ng/L    Troponin T Delta 2 >=-4 - <+4 ng/L       Ordered the above labs and reviewed the results.        RADIOLOGY  CT Head Without Contrast, CT Cervical Spine Without Contrast    Result Date: 12/27/2023  CT HEAD WO CONTRAST-, CT CERVICAL SPINE WO CONTRAST-  INDICATIONS: Weakness  TECHNIQUE: Radiation dose reduction techniques were utilized, including automated exposure control and exposure modulation based on body size. Noncontrast head CT, cervical spine CT  COMPARISON: None available  FINDINGS:  Head CT:  No acute intracranial hemorrhage, midline shift or mass effect. No acute territorial infarct is identified.   Ventricles, cisterns, cerebral sulci are unremarkable for patient age.  The visualized paranasal sinuses, orbits, mastoid air cells are unremarkable.   Cervical spine CT:  No acute fracture or malalignment is identified.  No conspicuous osseous narrowing of the neural foramina.  No high-grade central stenosis is identified without the benefit of intrathecal contrast material.   Cardiac lead is partly included.         No acute intracranial hemorrhage or hydrocephalus. No acute cervical fracture identified. Follow-up/further evaluation can be obtained as indications persist.    This report was finalized on 12/27/2023 7:07 PM by Dr. Prasanna Meneses M.D on Workstation: [id number]      XR Chest 1 View    Result Date: 12/27/2023  XR CHEST 1 VW-  HISTORY: Chest pain and heaviness.  COMPARISON: Chest radiograph 11/28/2023  FINDINGS: A single view of the chest was obtained.  Support Devices: There is a left chest cardiac defibrillator, similar to prior. Cardiac Silhouette/Mediastinum/Milly: The cardiac silhouette is upper normal in size. Mediastinal and hilar contours are within normal limits. Lungs/Pleural Spaces:  The lungs and pleural  spaces are clear. Chest Wall/Diaphragm/Upper Abdomen:  The thoracic musculoskeletal structures and the upper abdomen are within normal limits.   CONCLUSION(S):   1.  No focal consolidation or effusion.  This report was finalized on 12/27/2023 3:47 PM by Dr. Toya Viramontes M.D on Workstation: [id number]       Ordered the above noted radiological studies. Reviewed by me in PACS.            PROCEDURES  Procedures    EKG    EKG time: 1455  Rhythm/Rate: Normal sinus rhythm at 84  Left bundle branch block  No Acute Ischemia  Non-Specific ST-T changes    Similar compared to prior on 11/7/2023 except now the PVCs have resolved    Interpreted Contemporaneously by me.  Independently viewed by me        MEDICATIONS GIVEN IN ER  Medications   aspirin tablet 325 mg (325 mg Oral Given 12/27/23 1659)   HYDROcodone-acetaminophen (NORCO) 7.5-325 MG per tablet 1 tablet (1 tablet Oral Given 12/27/23 1704)   baclofen (LIORESAL) tablet 10 mg (10 mg Oral Given 12/27/23 1704)             MEDICAL DECISION MAKING, PROGRESS, and CONSULTS    All labs have been independently reviewed by me.  All radiology studies have been reviewed by me and I have also reviewed the radiology report.   EKG's independently viewed and interpreted by me.  Discussion below represents my analysis of pertinent findings related to patient's condition, differential diagnosis, treatment plan and final disposition.      Additional sources:  - External (non-ED) record review: I reviewed the patient's admission to the hospital in October this year for chest pain.  She was seen by cardiology who noted she has a nonischemic cardiomyopathy and chronic left bundle branch block with history of pulmonary embolism.  Her workup at that time was unremarkable.      - Shared decision making: Shared decision-making discussion and agreed she is stable for discharge and outpatient follow-up      Orders placed during this visit:  Orders Placed This Encounter   Procedures    XR Chest 1 View     CT Head Without Contrast    CT Cervical Spine Without Contrast    [facility] Draw    Comprehensive Metabolic Panel    High Sensitivity Troponin T    Protime-INR    CBC Auto Differential    High Sensitivity Troponin T 2Hr    Undress & Gown    Continuous Pulse Oximetry    ECG 12 Lead ED Triage Standing Order; Chest Pain    CBC & Differential    Green Top (Gel)    Lavender Top    Gold Top - SST    Light Blue Top         Differential diagnosis:  My differential diagnosis includes but is not limited to myocardial infarction, acute coronary syndrome, pericarditis, chest wall pain, pneumonia, pulmonary embolism, pneumothorax, or esophageal spasm.      Independent interpretation of labs, radiology studies, and discussions with consultants:  ED Course as of 12/28/23 0006   Wed Dec 27, 2023   1659 I will obtain EKG and labs along with CT head and CT C-spine for further evaluation.  I will treat the patient with a Lortab and baclofen because I feel this is most likely musculoskeletal in origin. [GP]   2019 Patient's delta troponin is 2.  Her EKG is unremarkable. [GP]   2019 CT head, CT C-spine and chest x-ray are all unremarkable. [GP]      ED Course User Index  [GP] Hari Marinelli MD               DIAGNOSIS  Final diagnoses:   Musculoskeletal pain         DISPOSITION  Discharge            Latest Documented Vital Signs:  As of 00:06 EST  BP- 117/79 HR- 72 Temp- 98.5 °F (36.9 °C) (Tympanic) O2 sat- 98%--      --------------------  Please note that portions of this were completed with a voice recognition program.       Note Disclaimer: At [facility], we believe that sharing information builds trust and better relationships. You are receiving this note because you are receiving care at [facility] or recently visited. It is possible you will see health information before a provider has talked with you about it. This kind of information can be easy to misunderstand. To help you fully understand what it means for your  health, we urge you to discuss this note with your provider.             Hari Marinelli MD  12/28/23 0007

## 2023-12-27 NOTE — Clinical Note
[facility] EMERGENCY DEPARTMENT  [address] [facility] [address]  Phone: [phone]    Nina Saez was seen and treated in our emergency department on 12/27/2023.  She may return to work on 12/29/2023.         Thank you for choosing [facility].    Rachel Grey RN

## 2023-12-27 NOTE — TELEPHONE ENCOUNTER
Patient called states she has had extremely bad headache for couple days and her hand feels heavy for couple days. Now shoulders feel heavy. BP running good.     I advised patient to be seen in ER .     Patient verbalized understanding and agreed.

## 2023-12-27 NOTE — ED TRIAGE NOTES
Patient from home via PV reporting a generalized headache, neck and shoulder tension, and chest pain described as aching pain. Patient also states her left arm has been shaking when she picks up items like a coke can. NIH performed, no deficits.

## 2023-12-28 ENCOUNTER — TELEPHONE (OUTPATIENT)
Dept: INTERNAL MEDICINE | Age: 48
End: 2023-12-28

## 2023-12-28 DIAGNOSIS — I10 ESSENTIAL HYPERTENSION: ICD-10-CM

## 2023-12-28 DIAGNOSIS — I50.20 HFREF (HEART FAILURE WITH REDUCED EJECTION FRACTION): ICD-10-CM

## 2023-12-28 DIAGNOSIS — G89.29 CHRONIC CHEST PAIN WITH HIGH RISK FOR CAD: ICD-10-CM

## 2023-12-28 DIAGNOSIS — I36.1 NONRHEUMATIC TRICUSPID VALVE REGURGITATION: ICD-10-CM

## 2023-12-28 DIAGNOSIS — I50.20 HFREF (HEART FAILURE WITH REDUCED EJECTION FRACTION): Primary | ICD-10-CM

## 2023-12-28 DIAGNOSIS — I42.0 DILATED CARDIOMYOPATHY: ICD-10-CM

## 2023-12-28 DIAGNOSIS — Z91.89 CHRONIC CHEST PAIN WITH HIGH RISK FOR CAD: ICD-10-CM

## 2023-12-28 DIAGNOSIS — R07.9 CHRONIC CHEST PAIN WITH HIGH RISK FOR CAD: ICD-10-CM

## 2023-12-28 RX ORDER — TORSEMIDE 20 MG/1
TABLET ORAL
Qty: 150 TABLET | Refills: 0 | Status: SHIPPED | OUTPATIENT
Start: 2023-12-28

## 2023-12-28 NOTE — TELEPHONE ENCOUNTER
Pt said she is not aware of any cardiology referral, she sees Dr. Melton and wants to keep it that way, I advised her to contact their office, I only had so much information to give her.

## 2023-12-28 NOTE — TELEPHONE ENCOUNTER
Nicky with [facility] called needing a pcp referral. She said the referral needs to be from [employer] sent over. Call back number [phone] ext [phone].

## 2023-12-28 NOTE — DISCHARGE INSTRUCTIONS
Go home and rest tonight.  Take baclofen as needed for muscle spasms.  Follow-up your doctor next week if not better or return if worse

## 2024-01-03 ENCOUNTER — ANTICOAGULATION VISIT (OUTPATIENT)
Dept: PHARMACY | Facility: HOSPITAL | Age: 49
End: 2024-01-03
Payer: MEDICARE

## 2024-01-03 LAB — INR PPP: 1.7

## 2024-01-03 PROCEDURE — G0249 PROVIDE INR TEST MATER/EQUIP: HCPCS

## 2024-01-03 NOTE — PROGRESS NOTES
Anticoagulation Clinic Progress Note    Anticoagulation Summary  As of 1/3/2024      INR goal:  2.0-3.0   TTR:  59.8% (3 y)   INR used for dosin.70 (1/3/2024)   Warfarin maintenance plan:  7.5 mg every Wed, Sat; 5 mg all other days   Weekly warfarin total:  40 mg   Plan last modified:  Zina De La Rosa RPH (1/3/2024)   Next INR check:  1/10/2024   Priority:  High   Target end date:  Indefinite    Indications    Other acute pulmonary embolism  unspecified whether acute cor pulmonale present (Resolved) [I26.99]  Acute pulmonary embolism  unspecified pulmonary embolism type  unspecified whether acute cor pulmonale present (Resolved) [I26.99]                 Anticoagulation Episode Summary       INR check location:      Preferred lab:      Send INR reminders to:  [facility]    Comments:  **Home Testing Program**          Anticoagulation Care Providers       Provider Role Specialty Phone number    Lisset Melton MD Referring Cardiology [phone]            Clinic Interview:  Patient Findings     Positives:  Emergency department visit    Negatives:  Signs/symptoms of thrombosis, Signs/symptoms of bleeding,   Laboratory test error suspected, Change in health, Change in alcohol use,   Change in activity, Upcoming invasive procedure, Upcoming dental   procedure, Missed doses, Extra doses, Change in medications, Change in   diet/appetite, Hospital admission, Bruising, Other complaints    Comments:  ED on  for muscle spasms        Clinical Outcomes     Negatives:  Major bleeding event, Thromboembolic event,   Anticoagulation-related hospital admission, Anticoagulation-related ED   visit, Anticoagulation-related fatality    Comments:  ED on  for muscle spasms          INR History:      2023    12:00 AM 2023    11:16 AM 2023    12:00 AM 2023     1:34 PM 2023     3:10 PM 1/3/2024    12:00 AM 1/3/2024     2:00 PM   Anticoagulation Monitoring   INR  2.30  1.50    1.70   INR Date  12/6/2023  12/20/2023   1/3/2024   INR Goal  2.0-3.0  2.0-3.0   2.0-3.0   Trend  Same  Up   Up   Last Week Total  35 mg  35 mg   37.5 mg   Next Week Total  35 mg  37.5 mg   40 mg   Sun  5 mg  5 mg   5 mg   Mon  5 mg  5 mg   5 mg   Tue  5 mg  5 mg   5 mg   Wed  5 mg  7.5 mg (12/20)   7.5 mg   Thu  5 mg  5 mg   5 mg   Fri  5 mg  5 mg   5 mg   Sat  5 mg  5 mg   7.5 mg   Historical INR 2.30      1.50      1.83  1.70            This result is from an external source.       Plan:  1. INR is Subtherapeutic today- see above in Anticoagulation Summary.   Will instruct Nina Saez to Increase their warfarin regimen- see above in Anticoagulation Summary.   trial on 7.5 mg on wed, sat and 5 mg AOD, rck 1 week   2. Follow up in 1 weeks  3. They have been instructed to call if any changes in medications, doses, concerns, etc. Patient expresses understanding and has no further questions at this time.    Zina De La Rosa [facility]

## 2024-01-10 ENCOUNTER — ANTICOAGULATION VISIT (OUTPATIENT)
Dept: PHARMACY | Facility: HOSPITAL | Age: 49
End: 2024-01-10
Payer: MEDICARE

## 2024-01-10 LAB — INR PPP: 2.5

## 2024-01-10 NOTE — PROGRESS NOTES
Anticoagulation Clinic Progress Note    Anticoagulation Summary  As of 1/10/2024      INR goal:  2.0-3.0   TTR:  59.8% (3 y)   INR used for dosin.50 (1/10/2024)   Warfarin maintenance plan:  7.5 mg every Wed, Sat; 5 mg all other days   Weekly warfarin total:  40 mg   No change documented:  Zina De La Rosa RPH   Plan last modified:  Zina De La Rosa RPH (1/3/2024)   Next INR check:  2024   Priority:  High   Target end date:  Indefinite    Indications    Other acute pulmonary embolism  unspecified whether acute cor pulmonale present (Resolved) [I26.99]  Acute pulmonary embolism  unspecified pulmonary embolism type  unspecified whether acute cor pulmonale present (Resolved) [I26.99]                 Anticoagulation Episode Summary       INR check location:      Preferred lab:      Send INR reminders to:   [facility]    Comments:  **Home Testing Program**          Anticoagulation Care Providers       Provider Role Specialty Phone number    Lisset Melton MD Referring Cardiology [phone]            Clinic Interview:  No pertinent clinical findings have been reported.    INR History:      2023    12:00 AM 2023     1:34 PM 2023     3:10 PM 1/3/2024    12:00 AM 1/3/2024     2:00 PM 1/10/2024    12:00 AM 1/10/2024     1:45 PM   Anticoagulation Monitoring   INR  1.50   1.70  2.50   INR Date  2023   1/3/2024  1/10/2024   INR Goal  2.0-3.0   2.0-3.0  2.0-3.0   Trend  Up   Up  Same   Last Week Total  35 mg   37.5 mg  40 mg   Next Week Total  37.5 mg   40 mg  40 mg   Sun  5 mg   5 mg  5 mg   Mon  5 mg   5 mg  5 mg   Tue  5 mg   5 mg  5 mg   Wed  7.5 mg ()   7.5 mg  7.5 mg   Thu  5 mg   5 mg  5 mg   Fri  5 mg   5 mg  5 mg   Sat  5 mg   7.5 mg  7.5 mg   Historical INR 1.50      1.83  1.70      2.50            This result is from an external source.       Plan:  1. INR is therapeutic today- see above in Anticoagulation Summary.    Nina Saez to continue their  warfarin regimen- see above in Anticoagulation Summary.  2. Follow up in 1 week  3. Pt has agreed to only be called if INR out of range. They have been instructed to call if any changes in medications, doses, concerns, etc. Patient expresses understanding and has no further questions at this time.    Zina De La Rosa, [facility]

## 2024-01-12 ENCOUNTER — SPECIALTY PHARMACY (OUTPATIENT)
Dept: NEUROLOGY | Facility: CLINIC | Age: 49
End: 2024-01-12
Payer: MEDICARE

## 2024-01-12 NOTE — PROGRESS NOTES
Specialty Pharmacy Refill Coordination Note     Nina is a 48 y.o. female contacted today regarding refills of  [facility] specialty medication(s).    Reviewed and verified with patient:       Specialty medication(s) and dose(s) confirmed: yes    Refill Questions      Flowsheet Row Most Recent Value   Changes to allergies? No   Changes to medications? No   New conditions or infections since last clinic visit No   Unplanned office visit, urgent care, ED, or hospital admission in the last 4 weeks  No   How does patient/caregiver feel medication is working? Very good   Financial problems or insurance changes  No   Since the previous refill, were any specialty medication doses or scheduled injections missed or delayed?  No   Does this patient require a clinical escalation to a pharmacist? No            Delivery Questions      Flowsheet Row Most Recent Value   Delivery method Beeline   Delivery address verified with patient/caregiver? Yes   Delivery address Home   Number of medications in delivery 1   Medication(s) being filled and delivered Rimegepant Sulfate   Doses left of specialty medications 2-3   Copay verified? Yes   Copay amount $0   Copay form of payment No copayment ($0)              Medication Adherence          Adherence tools used: patient uses a pill box to manage medications      Support network for adherence: family member                Follow-up: 21 day(s)     Sydnee Lozano, Pharmacy Technician  Specialty Pharmacy Technician

## 2024-01-18 ENCOUNTER — HOSPITAL ENCOUNTER (OUTPATIENT)
Dept: NUCLEAR MEDICINE | Facility: HOSPITAL | Age: 49
Discharge: HOME | End: 2024-01-18
Payer: MEDICARE

## 2024-01-18 DIAGNOSIS — R07.9 CHRONIC CHEST PAIN WITH HIGH RISK FOR CAD: ICD-10-CM

## 2024-01-18 DIAGNOSIS — Z91.89 CHRONIC CHEST PAIN WITH HIGH RISK FOR CAD: ICD-10-CM

## 2024-01-18 DIAGNOSIS — G89.29 CHRONIC CHEST PAIN WITH HIGH RISK FOR CAD: ICD-10-CM

## 2024-01-18 LAB
BH CV REST NUCLEAR ISOTOPE DOSE: 8.9 MCI
BH CV STRESS COMMENTS STAGE 1: NORMAL
BH CV STRESS DOSE REGADENOSON STAGE 1: 0.4
BH CV STRESS DURATION MIN STAGE 1: 0
BH CV STRESS DURATION SEC STAGE 1: 10
BH CV STRESS NUCLEAR ISOTOPE DOSE: 28 MCI
BH CV STRESS PROTOCOL 1: NORMAL
BH CV STRESS RECOVERY BP: NORMAL MMHG
BH CV STRESS RECOVERY HR: 102 BPM
BH CV STRESS STAGE 1: 1
LV EF NUC BP: 15 %
MAXIMAL PREDICTED HEART RATE: 172 BPM
PERCENT MAX PREDICTED HR: 72.67 %
STRESS BASELINE BP: NORMAL MMHG
STRESS BASELINE HR: 86 BPM
STRESS PERCENT HR: 85 %
STRESS POST PEAK BP: NORMAL MMHG
STRESS POST PEAK HR: 125 BPM
STRESS TARGET HR: 146 BPM

## 2024-01-18 PROCEDURE — 78452 HT MUSCLE IMAGE SPECT MULT: CPT

## 2024-01-18 PROCEDURE — 25010000002 REGADENOSON 0.4 MG/5ML SOLUTION: Performed by: NURSE PRACTITIONER

## 2024-01-18 PROCEDURE — 93017 CV STRESS TEST TRACING ONLY: CPT

## 2024-01-18 PROCEDURE — A9500 TC99M SESTAMIBI: HCPCS | Performed by: NURSE PRACTITIONER

## 2024-01-18 PROCEDURE — 0 TECHNETIUM SESTAMIBI: Performed by: NURSE PRACTITIONER

## 2024-01-18 RX ORDER — ATORVASTATIN CALCIUM 20 MG/1
20 TABLET, FILM COATED ORAL DAILY
Qty: 90 TABLET | Refills: 0 | Status: SHIPPED | OUTPATIENT
Start: 2024-01-18

## 2024-01-18 RX ORDER — REGADENOSON 0.08 MG/ML
0.4 INJECTION, SOLUTION INTRAVENOUS
Status: COMPLETED | OUTPATIENT
Start: 2024-01-18 | End: 2024-01-18

## 2024-01-18 RX ADMIN — TECHNETIUM TC 99M SESTAMIBI 1 DOSE: 1 INJECTION INTRAVENOUS at 09:38

## 2024-01-18 RX ADMIN — TECHNETIUM TC 99M SESTAMIBI 1 DOSE: 1 INJECTION INTRAVENOUS at 08:26

## 2024-01-18 RX ADMIN — REGADENOSON 0.4 MG: 0.08 INJECTION, SOLUTION INTRAVENOUS at 09:38

## 2024-01-22 RX ORDER — POTASSIUM CHLORIDE 20 MEQ/1
60 TABLET, EXTENDED RELEASE ORAL DAILY
Qty: 90 TABLET | Refills: 0 | Status: SHIPPED | OUTPATIENT
Start: 2024-01-22

## 2024-01-25 ENCOUNTER — TELEPHONE (OUTPATIENT)
Dept: CARDIOLOGY | Facility: CLINIC | Age: 49
End: 2024-01-25
Payer: MEDICARE

## 2024-01-25 DIAGNOSIS — R94.39 ABNORMAL NUCLEAR STRESS TEST: ICD-10-CM

## 2024-01-25 DIAGNOSIS — I50.20 HFREF (HEART FAILURE WITH REDUCED EJECTION FRACTION): Primary | ICD-10-CM

## 2024-01-26 ENCOUNTER — TELEPHONE (OUTPATIENT)
Dept: CARDIOLOGY | Facility: CLINIC | Age: 49
End: 2024-01-26
Payer: MEDICARE

## 2024-01-26 ENCOUNTER — ANTICOAGULATION VISIT (OUTPATIENT)
Dept: PHARMACY | Facility: HOSPITAL | Age: 49
End: 2024-01-26
Payer: MEDICARE

## 2024-01-26 LAB — INR PPP: 2.9

## 2024-01-26 NOTE — TELEPHONE ENCOUNTER
Patient called and stated that she would like to be off work until she has her next stress test.  She is requesting a letter stating that she should be off until that is done.  Are you willing to write a letter?    Please advise.    CB: [phone]    Thanks,  Francisca

## 2024-01-26 NOTE — TELEPHONE ENCOUNTER
Caller: Nina Saez    Relationship: Self    Best call back number: [phone]    What form or medical record are you requesting: A LETTER OF LEAVE OF ABSENCE FOR WORK, FOR PT TO BE OFF 30 DAYS UNTIL SHE CAN TAKE HER NEW STRESS TEST    Who is requesting this form or medical record from you: PATIENT'S EMPLOYER     How would you like to receive the form or medical records (pick-up, mail, fax): UPLOADED TO [employer]    Timeframe paperwork needed: ASAP    Additional notes: PT STATES SHE RECEIVED A CALL YESTERDAY WITH HER STRESS TEST RESULTS, AND ANOTHER TEST IS BEING ORDERED. PT IS REQUESTING TO BE OFF OF WORK UNTIL SHE IS ABLE TO GET THIS NEW TEST DONE, AS SHE IS VERY STRESSED OUT. PLEASE ADVISE IF A LETTER FOR PT TO BE OFF CAN BE MADE AND UPLOADED TO [employer], CALL PT BACK TO DISCUSS.

## 2024-01-26 NOTE — TELEPHONE ENCOUNTER
To whom it may concern:    Nina Saez is followed in our office for severe cardiac issues.  She needs further testing for her heart upcoming due to abnormalities on testing.  I would like her to remain off of work until this is completed.  She has had significant health decompensations in the past due to cardiac pathology, some issues requiring hospitalization.  Given this, I think we need to proceed with caution until she has her testing done.  Please excuse her from work.    If you have any questions, please feel free to contact me.    Sincerely-    Lisset Melton MD

## 2024-01-26 NOTE — PROGRESS NOTES
Anticoagulation Clinic Progress Note    Anticoagulation Summary  As of 2024      INR goal:  2.0-3.0   TTR:  60.4% (3.1 y)   INR used for dosin.90 (2024)   Warfarin maintenance plan:  7.5 mg every Wed, Sat; 5 mg all other days   Weekly warfarin total:  40 mg   No change documented:  Zina De La Rosa RPH   Plan last modified:  Zina De La Rosa RPH (1/3/2024)   Next INR check:  2024   Priority:  High   Target end date:  Indefinite    Indications    Other acute pulmonary embolism  unspecified whether acute cor pulmonale present (Resolved) [I26.99]  Acute pulmonary embolism  unspecified pulmonary embolism type  unspecified whether acute cor pulmonale present (Resolved) [I26.99]                 Anticoagulation Episode Summary       INR check location:      Preferred lab:      Send INR reminders to:   [facility] HOME TEST POOL    Comments:  **Home Testing Program**          Anticoagulation Care Providers       Provider Role Specialty Phone number    Lisset Melton MD Referring Cardiology [phone]            Clinic Interview:  No pertinent clinical findings have been reported.    INR History:      2023     3:10 PM 1/3/2024    12:00 AM 1/3/2024     2:00 PM 1/10/2024    12:00 AM 1/10/2024     1:45 PM 2024    12:00 AM 2024    10:49 AM   Anticoagulation Monitoring   INR   1.70  2.50  2.90   INR Date   1/3/2024  1/10/2024  2024   INR Goal   2.0-3.0  2.0-3.0  2.0-3.0   Trend   Up  Same  Same   Last Week Total   37.5 mg  40 mg  40 mg   Next Week Total   40 mg  40 mg  40 mg   Sun   5 mg  5 mg  5 mg   Mon   5 mg  5 mg  5 mg   Tue   5 mg  5 mg  5 mg   Wed   7.5 mg  7.5 mg  7.5 mg   Thu   5 mg  5 mg  5 mg   Fri   5 mg  5 mg  5 mg   Sat   7.5 mg  7.5 mg  7.5 mg   Historical INR 1.83  1.70      2.50      2.90            This result is from an external source.       Plan:  1. INR is therapeutic today- see above in Anticoagulation Summary.    Nina Saez to continue their warfarin  regimen- see above in Anticoagulation Summary.  2. Follow up in 1 week  3. Pt has agreed to only be called if INR out of range. They have been instructed to call if any changes in medications, doses, concerns, etc. Patient expresses understanding and has no further questions at this time.    Zina De La Rosa, [facility]

## 2024-01-30 ENCOUNTER — TELEPHONE (OUTPATIENT)
Dept: INTERNAL MEDICINE | Age: 49
End: 2024-01-30

## 2024-01-30 DIAGNOSIS — I50.20 HFREF (HEART FAILURE WITH REDUCED EJECTION FRACTION): ICD-10-CM

## 2024-01-30 RX ORDER — TORSEMIDE 20 MG/1
TABLET ORAL
Qty: 150 TABLET | Refills: 0 | Status: SHIPPED | OUTPATIENT
Start: 2024-01-30

## 2024-01-30 RX ORDER — TORSEMIDE 20 MG/1
TABLET ORAL
Qty: 150 TABLET | Refills: 3 | Status: SHIPPED | OUTPATIENT
Start: 2024-01-30 | End: 2024-01-30 | Stop reason: SDUPTHER

## 2024-01-30 NOTE — TELEPHONE ENCOUNTER
Caller: Nina Saez    Relationship to patient: Self    Best call back number:[phone]     Date of positive COVID19 test: 1/28/24    COVID19 symptoms: HEADACHE, SINUS PRESSURE. SNEEZING, COUGH, CONGESTION     Additional information or concerns: PATIENT WOULD LIKE TO KNOW WHAT ANGEL RECOMMENDS HER TO DO OR TAKE. PATIENT HAS BEEN TAKING OVER THE COUNTER MEDICATION     What is the patients preferred pharmacy: [address] ([facility]), [place] - [address] [facility] [phone] [facility] [phone] FX     PLEASE CALL AND ADVISE

## 2024-01-31 ENCOUNTER — TELEMEDICINE (OUTPATIENT)
Dept: INTERNAL MEDICINE | Age: 49
End: 2024-01-31
Payer: MEDICARE

## 2024-01-31 DIAGNOSIS — J01.90 ACUTE NON-RECURRENT SINUSITIS, UNSPECIFIED LOCATION: Primary | ICD-10-CM

## 2024-01-31 PROCEDURE — 99213 OFFICE O/P EST LOW 20 MIN: CPT

## 2024-01-31 RX ORDER — AMOXICILLIN 875 MG/1
875 TABLET, COATED ORAL 2 TIMES DAILY
Qty: 14 TABLET | Refills: 0 | Status: SHIPPED | OUTPATIENT
Start: 2024-01-31 | End: 2024-02-07

## 2024-01-31 NOTE — PROGRESS NOTES
I N T E R N A L  M E D I C I N E  JENNIFER DEE, APRN      ENCOUNTER DATE:  01/31/2024    Nina NATH Saez / 48 y.o. / female        You have chosen to receive care through a telehealth visit.  Do you consent to use a video/audio connection for your medical care today? Yes    Location of patient and provider are in [place]       CHIEF COMPLAINT / REASON FOR OFFICE VISIT     TELEHEALTH ENCOUNTER:  COVID-19      ASSESSMENT & PLAN     Diagnoses and all orders for this visit:    1. Acute non-recurrent sinusitis, unspecified location (Primary)  -     amoxicillin (AMOXIL) 875 MG tablet; Take 1 tablet by mouth 2 (Two) Times a Day for 7 days.  Dispense: 14 tablet; Refill: 0          SUMMARY/DISCUSSION  Patient was unable to connect to video feed, but was agreeable to conduct today's appointment via telephone instead.  She is past 5 day window to consider Paxlovid RX. From history, she has developed secondary bacterial sinusitis.  Will treat with amoxicillin.  Continue Mucinex DM, good hydration with water.  She is aware to visit ER for acutely worsening symptoms.        Time spent: 15 minutes    Next Appointment with me: Visit date not found    Return for Next scheduled follow up.        HISTORY OF PRESENT ILLNESS     Symptoms started on Wednesday, January 24, with hoarse voice, fever, cough, sore throat.    She tested positive for COVID-19 on Sunday, January 28, 2024.  Now with frontal headache, sinus pressure/ pain, nasal congestion.  Using Mucinex DM.  High risk due to HIV history, dilated cardiomyopathy.            REVIEWED DATA     Labs:           Imaging:           Medical Tests:           Summary of old records / correspondence / consultant report:           Request outside records:         Template created by Arley Ruth MD

## 2024-02-01 ENCOUNTER — ANTICOAGULATION VISIT (OUTPATIENT)
Dept: PHARMACY | Facility: HOSPITAL | Age: 49
End: 2024-02-01
Payer: MEDICARE

## 2024-02-01 LAB — INR PPP: 3

## 2024-02-01 NOTE — PROGRESS NOTES
Anticoagulation Clinic Progress Note    Anticoagulation Summary  As of 2/1/2024      INR goal:  2.0-3.0   TTR:  60.6% (3.1 y)   INR used for dosing:  3.00 (2/1/2024)   Warfarin maintenance plan:  7.5 mg every Wed, Sat; 5 mg all other days   Weekly warfarin total:  40 mg   No change documented:  Zina De La Rosa RPH   Plan last modified:  Zina De La Rosa RPH (1/3/2024)   Next INR check:  2/8/2024   Priority:  High   Target end date:  Indefinite    Indications    Other acute pulmonary embolism  unspecified whether acute cor pulmonale present (Resolved) [I26.99]  Acute pulmonary embolism  unspecified pulmonary embolism type  unspecified whether acute cor pulmonale present (Resolved) [I26.99]                 Anticoagulation Episode Summary       INR check location:      Preferred lab:      Send INR reminders to:   [facility] HOME TEST POOL    Comments:  **Home Testing Program**          Anticoagulation Care Providers       Provider Role Specialty Phone number    Lisset Melton MD Referring Cardiology [phone]            Clinic Interview:  No pertinent clinical findings have been reported.    INR History:      1/3/2024     2:00 PM 1/10/2024    12:00 AM 1/10/2024     1:45 PM 1/26/2024    12:00 AM 1/26/2024    10:49 AM 2/1/2024    12:00 AM 2/1/2024     1:48 PM   Anticoagulation Monitoring   INR 1.70  2.50  2.90  3.00   INR Date 1/3/2024  1/10/2024  1/26/2024  2/1/2024   INR Goal 2.0-3.0  2.0-3.0  2.0-3.0  2.0-3.0   Trend Up  Same  Same  Same   Last Week Total 37.5 mg  40 mg  40 mg  40 mg   Next Week Total 40 mg  40 mg  40 mg  40 mg   Sun 5 mg  5 mg  5 mg  5 mg   Mon 5 mg  5 mg  5 mg  5 mg   Tue 5 mg  5 mg  5 mg  5 mg   Wed 7.5 mg  7.5 mg  7.5 mg  7.5 mg   Thu 5 mg  5 mg  5 mg  5 mg   Fri 5 mg  5 mg  5 mg  5 mg   Sat 7.5 mg  7.5 mg  7.5 mg  7.5 mg   Historical INR  2.50      2.90      3.00            This result is from an external source.       Plan:  1. INR is therapeutic today- see above in  Anticoagulation Summary.    Nina Saez to continue their warfarin regimen- see above in Anticoagulation Summary.  2. Follow up in 1 week  3. Pt has agreed to only be called if INR out of range. They have been instructed to call if any changes in medications, doses, concerns, etc. Patient expresses understanding and has no further questions at this time.    Zina De La Rosa, [facility]

## 2024-02-07 ENCOUNTER — ANTICOAGULATION VISIT (OUTPATIENT)
Dept: PHARMACY | Facility: HOSPITAL | Age: 49
End: 2024-02-07
Payer: MEDICARE

## 2024-02-07 LAB — INR PPP: 4

## 2024-02-07 PROCEDURE — G0249 PROVIDE INR TEST MATER/EQUIP: HCPCS

## 2024-02-07 NOTE — PROGRESS NOTES
Anticoagulation Clinic Progress Note    Anticoagulation Summary  As of 2024      INR goal:  2.0-3.0   TTR:  60.3% (3.1 y)   INR used for dosin.00 (2024)   Warfarin maintenance plan:  7.5 mg every Wed, Sat; 5 mg all other days   Weekly warfarin total:  40 mg   Plan last modified:  Zina De La Rosa RPH (1/3/2024)   Next INR check:  2024   Priority:  High   Target end date:  Indefinite    Indications    Other acute pulmonary embolism  unspecified whether acute cor pulmonale present (Resolved) [I26.99]  Acute pulmonary embolism  unspecified pulmonary embolism type  unspecified whether acute cor pulmonale present (Resolved) [I26.99]                 Anticoagulation Episode Summary       INR check location:      Preferred lab:      Send INR reminders to:  [facility] HOME TEST POOL    Comments:  **Home Testing Program**          Anticoagulation Care Providers       Provider Role Specialty Phone number    Lisset Melton MD Referring Cardiology [phone]            Clinic Interview:  Patient Findings     Positives:  Change in health, Change in medications, Other complaints    Negatives:  Signs/symptoms of thrombosis, Signs/symptoms of bleeding,   Laboratory test error suspected, Change in alcohol use, Change in   activity, Upcoming invasive procedure, Emergency department visit,   Upcoming dental procedure, Missed doses, Extra doses, Change in   diet/appetite, Hospital admission, Bruising    Comments:  Reports recent COVID infection, during which she experienced   chills x 1 day, chest pain, headache, and upper respiratory symptoms.   Reports her appetite was stable. Upper respiratory symptoms persisted, and   she was prescribed a course of amoxicillin, which she will finish tomorrow   morning.       Clinical Outcomes     Negatives:  Major bleeding event, Thromboembolic event,   Anticoagulation-related hospital admission, Anticoagulation-related ED   visit, Anticoagulation-related fatality     Comments:  Reports recent COVID infection, during which she experienced   chills x 1 day, chest pain, headache, and upper respiratory symptoms.   Reports her appetite was stable. Upper respiratory symptoms persisted, and   she was prescribed a course of amoxicillin, which she will finish tomorrow   morning.         INR History:      1/10/2024     1:45 PM 1/26/2024    12:00 AM 1/26/2024    10:49 AM 2/1/2024    12:00 AM 2/1/2024     1:48 PM 2/7/2024    12:00 AM 2/7/2024    11:45 AM   Anticoagulation Monitoring   INR 2.50  2.90  3.00  4.00   INR Date 1/10/2024  1/26/2024  2/1/2024  2/7/2024   INR Goal 2.0-3.0  2.0-3.0  2.0-3.0  2.0-3.0   Trend Same  Same  Same  Same   Last Week Total 40 mg  40 mg  40 mg  40 mg   Next Week Total 40 mg  40 mg  40 mg  35 mg   Sun 5 mg  5 mg  5 mg  5 mg   Mon 5 mg  5 mg  5 mg  5 mg   Tue 5 mg  5 mg  5 mg  5 mg   Wed 7.5 mg  7.5 mg  7.5 mg  2.5 mg (2/7)   Thu 5 mg  5 mg  5 mg  5 mg   Fri 5 mg  5 mg  5 mg  5 mg   Sat 7.5 mg  7.5 mg  7.5 mg  7.5 mg   Historical INR  2.90      3.00      4.00            This result is from an external source.       Plan:  1. INR is Supratherapeutic today- see above in Anticoagulation Summary.   Will instruct Nina Saez to Change their warfarin regimen (2.5 mg today, then resume 7.5 mg Wed/Sat, 5 mg all other days) - see above in Anticoagulation Summary.  2. Follow up in 1 week.  3. They have been instructed to call if any changes in medications, doses, concerns, etc. Patient expresses understanding and has no further questions at this time.    Chucho Mcdaniel, PharmD

## 2024-02-09 ENCOUNTER — SPECIALTY PHARMACY (OUTPATIENT)
Dept: NEUROLOGY | Facility: CLINIC | Age: 49
End: 2024-02-09
Payer: MEDICARE

## 2024-02-09 NOTE — PROGRESS NOTES
Specialty Pharmacy Refill Coordination Note     Nina is a 48 y.o. female contacted today regarding refills of  [facility] specialty medication(s).    Reviewed and verified with patient:       Specialty medication(s) and dose(s) confirmed: yes    Refill Questions      Flowsheet Row Most Recent Value   Changes to allergies? No   Changes to medications? No   New conditions or infections since last clinic visit No   Unplanned office visit, urgent care, ED, or hospital admission in the last 4 weeks  No   How does patient/caregiver feel medication is working? Very good   Financial problems or insurance changes  No   Since the previous refill, were any specialty medication doses or scheduled injections missed or delayed?  No   Does this patient require a clinical escalation to a pharmacist? No            Delivery Questions      Flowsheet Row Most Recent Value   Delivery method Beeline   Delivery address verified with patient/caregiver? Yes   Delivery address Home   Number of medications in delivery 1   Medication(s) being filled and delivered Rimegepant Sulfate   Doses left of specialty medications 5   Copay verified? Yes   Copay amount $0   Copay form of payment No copayment ($0)              Medication Adherence          Adherence tools used: patient uses a pill box to manage medications      Support network for adherence: family member                Follow-up: 21 day(s)     Sydnee Lozano, Pharmacy Technician  Specialty Pharmacy Technician

## 2024-02-14 ENCOUNTER — ANTICOAGULATION VISIT (OUTPATIENT)
Dept: PHARMACY | Facility: HOSPITAL | Age: 49
End: 2024-02-14
Payer: MEDICARE

## 2024-02-14 LAB — INR PPP: 2.8

## 2024-02-20 ENCOUNTER — OFFICE VISIT (OUTPATIENT)
Dept: CARDIOLOGY | Facility: CLINIC | Age: 49
End: 2024-02-20
Payer: MEDICARE

## 2024-02-20 ENCOUNTER — DOCUMENTATION (OUTPATIENT)
Dept: CARDIOLOGY | Facility: CLINIC | Age: 49
End: 2024-02-20

## 2024-02-20 VITALS
HEIGHT: 66 IN | BODY MASS INDEX: 35.03 KG/M2 | DIASTOLIC BLOOD PRESSURE: 62 MMHG | HEART RATE: 85 BPM | WEIGHT: 218 LBS | SYSTOLIC BLOOD PRESSURE: 110 MMHG

## 2024-02-20 DIAGNOSIS — I50.20 HFREF (HEART FAILURE WITH REDUCED EJECTION FRACTION): Primary | ICD-10-CM

## 2024-02-20 DIAGNOSIS — B20 HIV INFECTION, UNSPECIFIED SYMPTOM STATUS: ICD-10-CM

## 2024-02-20 DIAGNOSIS — I42.8 NICM (NONISCHEMIC CARDIOMYOPATHY): ICD-10-CM

## 2024-02-20 PROCEDURE — 99214 OFFICE O/P EST MOD 30 MIN: CPT | Performed by: INTERNAL MEDICINE

## 2024-02-20 PROCEDURE — 3078F DIAST BP <80 MM HG: CPT | Performed by: INTERNAL MEDICINE

## 2024-02-20 PROCEDURE — 3074F SYST BP LT 130 MM HG: CPT | Performed by: INTERNAL MEDICINE

## 2024-02-20 PROCEDURE — 93000 ELECTROCARDIOGRAM COMPLETE: CPT | Performed by: INTERNAL MEDICINE

## 2024-02-20 PROCEDURE — 1160F RVW MEDS BY RX/DR IN RCRD: CPT | Performed by: INTERNAL MEDICINE

## 2024-02-20 PROCEDURE — 1159F MED LIST DOCD IN RCRD: CPT | Performed by: INTERNAL MEDICINE

## 2024-02-20 RX ORDER — POTASSIUM CHLORIDE 20 MEQ/1
60 TABLET, EXTENDED RELEASE ORAL DAILY
Qty: 90 TABLET | Refills: 0 | Status: SHIPPED | OUTPATIENT
Start: 2024-02-20

## 2024-02-20 RX ORDER — CARVEDILOL 3.12 MG/1
3.12 TABLET ORAL 2 TIMES DAILY WITH MEALS
COMMUNITY
Start: 2024-01-08

## 2024-02-20 RX ORDER — ONDANSETRON 4 MG/1
4 TABLET, FILM COATED ORAL EVERY 12 HOURS PRN
Qty: 30 TABLET | Refills: 0 | Status: SHIPPED | OUTPATIENT
Start: 2024-02-20

## 2024-02-20 NOTE — TELEPHONE ENCOUNTER
I am covering for [place]. Please ensure the MA helping Dr. Melton ensures this is addressed today in clinic.     I will forward this to Dr. Melton also   
18

## 2024-02-20 NOTE — PROGRESS NOTES
To whom it may concern:      Nina Saez may return to work. If you have any questions, please free to contact me.    Sincerely-        Lisset Melton MD

## 2024-02-20 NOTE — PROGRESS NOTES
Date of Office Visit: 2023  Encounter Provider: Lisset Melton MD  Place of Service: [facility] CARDIOLOGY  Patient Name: Nina Saez  :1975      Patient ID:  Nina Saez is a 48 y.o. female is here for  followup for chronic HFrEF.        History of Present Illness    She has a history of HIV, essential hypertension, severe dilated nonischemic cardiomyopathy with chronic systolic congestive heart failure, obesity, SHAREE on CPAP, history of illicit drug use and asthma.  She has an AICD-single-chamber [employer].  In 2020, she was hospitalized for an acute PE with right lower lobe pulmonary infarct and was having hemoptysis.  She was placed on warfarin.  She is followed at [facility] for her HIV.     She was diagnosed with heart failure and cardiomyopathy in 2019 at [facility] when she was there visiting family.  An echocardiogram on cardiac catheterization which confirmed this diagnosis.  Her cardiac catheterization done 2019 showed normal coronary arteries, LVEDP 12, wedge pressure 13, PA pressure 28/15 with a mean of 19 mmHg, RA pressure 4, cardiac index 2.5 L/min/m² with a PVR of 1.2 Woods units.     She presented emergency on 20 with short windedness and cough for 3 weeks prior to presentation.  Her weight is been stable and she had no lower extremity edema but she had missed her cardiac medications and had been eating a lot of salt.  On arrival, she was found to be in congestive heart failure.  Echo done 2020 showed severe left ventricular dilation, ejection fraction 6%, very thin left ventricular walls, grade 3 diastolic dysfunction, moderate to severe mitral insufficiency, moderate tricuspid insufficiency, RVSP 48 mmHg.  There was essentially global hypokinesis with akinesis at the bases.  She was able to be dismissed on 2020.  While in the hospital, we did recommend consideration for upgrade to a  biventricular AICD.  She also had some right flank pain during hospitalization and CT scan showed a 2 mm minimally obstructing stone in the right ureter.  Urology saw her and felt like she could pass the stone without further intervention.     On 9/10/2020, she saw Dr. Jamar Azul in the office and he did not feel that she met criteria for implantation of CRT device.       She saw [facility] by telehealth date of service 1/28/21.  They noted the patient underwent a CPX which showed peak VO2 11.9 at RER greater than 1.05 while on Coreg.  Her CPX showed heart related limitations in her functional capacity, but the patient felt satisfied at her functional current level.     She has not had surgery with Dr. Rocky Calvert on her left ankle-she has never had this done.     Echo done 12/15/2021 shows severe left ventricular dilation with ejection fraction 21-25%, global hypokinesis, grade 2 diastolic dysfunction, normal RVSP and normal valvular structure and function.  Right ventricular systolic function is also mildly to moderately reduced.  When compared with prior echocardiogram, there is less valvular insufficiency.     She had headaches since changing her HIV medications.  She basically has a combination tablet with her darunavir, ritonavir, emtricitabine and tenofovir.  Since then, she has had daily headaches which lasts all day.  She has been using Tylenol extra strength 2 tablets 3 times a day to be added to headaches.      She was evaluated by Dr. Hernandez at  heart failure 2/17/2022.       Echo done 3/17/2023 showed ejection fraction of 30% with moderate left ventricular dilation, grade 1 diastolic dysfunction, dyskinesis of the septum with otherwise global hypokinesis, mild mitral insufficiency with no other significant valvular abnormalities and normal RVSP.      She was in the ER for headache neck pain upper chest pain and back pain on 12/27/2023.  Her high-sensitivity troponin was 16 then 18,  creatinine 1.07, sodium 134, BUN 21, glucose 139, otherwise normal CMP, normal CBC.  Her chest x-ray was unremarkable and her ECG was unchanged.  CT head and cervical spine showed no acute intracranial hemorrhage or hydrocephalus, no acute cervical fracture.  She had a device interrogation done 12/24/2023 which showed 10 years of battery, 0% RV pacing, no events, no changes made.    She has stress nuclear perfusion study done 1/18/2024 which showed a small sized area of mild ischemia in the septal wall.  I reviewed the Stress images and there is no significant ischemia.  She has no chest pain or pressure.  She is going through menopause so she is having a lot of nausea associate with this.  She does not feel heart racing or skipping.  She has had no dizziness, syncope or falls.  She just feels fatigued and grouchy.  She has no lower extremity edema.  She has no orthopnea or PND.  Her weight is stable.  She is taking her medications as directed without difficulty.    Past Medical History:   Diagnosis Date    AICD (automatic cardioverter/defibrillator) present 03/06/2020    Asthma     CHF (congestive heart failure)     Class 2 obesity in adult     Coronary artery disease 2017    Degeneration of lumbosacral intervertebral disc 07/29/2021    Depression 2017    Diabetes mellitus     Fracture, foot 3/20    Broke    Grade II diastolic dysfunction     Per echocardiogram 3/2021    H/O Closed trimalleolar fracture     Headache 2000    HIV (human immunodeficiency virus infection)     Hypertension     LBBB (left bundle branch block)     NICM (nonischemic cardiomyopathy)     7/2020 EF 6% per echocardiogram; AICD present    Nonrheumatic mitral valve regurgitation 03/08/2021    Moderate per echo 3/2021    Nonrheumatic tricuspid valve regurgitation     Moderate per echocardiogram 3/2021    SHAREE (obstructive sleep apnea) 02/21/2023    Pulmonary embolism          Past Surgical History:   Procedure Laterality Date    ANKLE OPEN  REDUCTION INTERNAL FIXATION  3/7/20    CARDIAC CATHETERIZATION      CARDIAC DEFIBRILLATOR PLACEMENT       SECTION      one C/S    FRACTURE SURGERY Left 2020    left ankle    OOPHORECTOMY      h/o teratoma    TUBAL ABDOMINAL LIGATION         Current Outpatient Medications on File Prior to Visit   Medication Sig Dispense Refill    acetaminophen (TYLENOL) 325 MG tablet Take 2 tablets by mouth Every 6 (Six) Hours As Needed for Mild Pain.      albuterol sulfate  (90 Base) MCG/ACT inhaler INHALE 2 PUFFS BY MOUTH EVERY 4 HOURS AS NEEDED FOR WHEEZING 18 g 0    atorvastatin (LIPITOR) 20 MG tablet Take 1 tablet by mouth Daily. 90 tablet 0    baclofen (LIORESAL) 10 MG tablet Take 1 tablet by mouth 2 (Two) Times a Day. 20 tablet 0    carvedilol (COREG) 3.125 MG tablet Take 1 tablet by mouth 2 (Two) Times a Day With Meals.      CBD (cannabidiol) oral oil Take  by mouth.      cyclobenzaprine (FLEXERIL) 10 MG tablet Take 1 tablet by mouth 3 (Three) Times a Day As Needed for Muscle Spasms. 21 tablet 0    Geplo-Meipr-Tpgmduvr-TenofAF (Symtuza) 817-333-067-10 MG per tablet Take 1 tablet by mouth Daily.      Diclofenac Sodium (VOLTAREN) 1 % gel gel Apply 4 g topically to the appropriate area as directed 2 (Two) Times a Day. Use per pkg insert 100 g 2    diphenhydrAMINE (BENADRYL) 25 mg capsule Take 1 capsule by mouth Every 6 (Six) Hours As Needed for Itching (swelling). 20 capsule 0    empagliflozin (Jardiance) 10 MG tablet tablet Take 1 tablet by mouth once daily 90 tablet 1    Entresto 49-51 MG tablet Take 1 tablet by mouth twice daily 180 tablet 0    famotidine (Pepcid) 20 MG tablet Take 2 tablets by mouth 2 (Two) Times a Day As Needed for Heartburn.      fluticasone (FLONASE) 50 MCG/ACT nasal spray 2 sprays into the nostril(s) as directed by provider Daily. 11.1 g 3    Insulin Pen Needle (BD Pen Needle Heidi 2nd Gen) 32G X 4 MM misc USE 1  ONCE DAILY WITH  SAXENDA 100 each 0    nitroglycerin (NITROSTAT) 0.4 MG SL  tablet PLACE 1 TABLET UNDER THE TONGUE EVERY 5 MINUTES AS NEEDED FOR CHEST PAIN. TAKE NO MORE THAN 3 DOSES IN 15 MINUTES. 25 tablet 0    pantoprazole (Protonix) 20 MG EC tablet Take 1 tablet by mouth Daily. 90 tablet 3    potassium chloride (K-DUR,KLOR-CON) 20 MEQ CR tablet Take 3 tablets by mouth once daily 90 tablet 0    Rimegepant Sulfate (Nurtec) 75 MG tablet dispersible tablet Take 1 tablet by mouth 1 (One) Time As Needed for migraine. Max of 1 tablet in 24 hours. 8 tablet 2    sertraline (Zoloft) 100 MG tablet Take 1 tablet by mouth Daily. 90 tablet 1    spironolactone (ALDACTONE) 25 MG tablet 1 tablet Daily.      torsemide (DEMADEX) 20 MG tablet TAKE 2 TABLETS BY MOUTH IN THE MORNING AND 2 TABLETS IN THE AFTERNOON 150 tablet 0    vitamin D (ERGOCALCIFEROL) 17205 units capsule capsule Take 1 capsule by mouth Every 30 (Thirty) Days.      warfarin (COUMADIN) 2.5 MG tablet TAKE 3 TABLETS BY MOUTH ONCE DAILY OR AS DIRECTED 270 tablet 0    metoprolol succinate XL (TOPROL-XL) 25 MG 24 hr tablet Take 0.5 tablets by mouth Daily. (Patient not taking: Reported on 2024) 30 tablet 0    [DISCONTINUED] fluconazole (Diflucan) 150 MG tablet Take one tablet now and one in 72 hours (Patient not taking: Reported on 2024) 2 tablet 0     No current facility-administered medications on file prior to visit.       Social History     Socioeconomic History    Marital status: Legally    Tobacco Use    Smoking status: Former     Packs/day: 0.50     Years: 20.00     Additional pack years: 0.00     Total pack years: 10.00     Types: Cigarettes     Quit date:      Years since quittin.1     Passive exposure: Past    Smokeless tobacco: Never   Vaping Use    Vaping Use: Never used   Substance and Sexual Activity    Alcohol use: Not Currently     Comment: holiday and special occ//caffeine use: Occ    Drug use: Not Currently     Types: Marijuana     Comment: gummy    Sexual activity: Not Currently     Partners: Male     " Birth control/protection: Hysterectomy           ROS    Procedures    ECG 12 Lead    Date/Time: 2/20/2024 10:39 AM  Performed by: Lisset Melton MD    Authorized by: Lisset Melton MD  Comparison: compared with previous ECG   Similar to previous ECG  Rhythm: sinus rhythm  Ectopy: atrial premature contractions  Conduction: left bundle branch block    Clinical impression: abnormal EKG            Objective:      Vitals:    02/20/24 1023   BP: 110/62   Pulse: 85   Weight: 98.9 kg (218 lb)   Height: 167.6 cm (66\")     Body mass index is 35.19 kg/m².    Vitals reviewed.   Constitutional:       General: Not in acute distress.     Appearance: Well-developed. Not diaphoretic.   Eyes:      General: No scleral icterus.     Conjunctiva/sclera: Conjunctivae normal.   HENT:      Head: Normocephalic and atraumatic.   Neck:      Thyroid: No thyromegaly.      Vascular: No carotid bruit or JVD.      Lymphadenopathy: No cervical adenopathy.   Pulmonary:      Effort: Pulmonary effort is normal. No respiratory distress.      Breath sounds: Normal breath sounds. No wheezing. No rhonchi. No rales.   Chest:      Chest wall: Not tender to palpatation.   Cardiovascular:      Normal rate. Regular rhythm.      Murmurs: There is no murmur.      No gallop.  No rub.   Pulses:     Intact distal pulses.      Carotid: 2+ bilaterally.     Radial: 2+ bilaterally.     Dorsalis pedis: 2+ bilaterally.     Posterior tibial: 2+ bilaterally.  Edema:     Peripheral edema absent.   Abdominal:      General: Bowel sounds are normal. There is no distension or abdominal bruit.      Palpations: Abdomen is soft. There is no abdominal mass.      Tenderness: There is no abdominal tenderness.   Musculoskeletal:         General: No deformity.      Extremities: No clubbing present.     Cervical back: Neck supple. Skin:     General: Skin is warm and dry. There is no cyanosis.      Coloration: Skin is not pale.      Findings: No rash.   Neurological:      " Mental Status: Alert and oriented to person, place, and time.      Cranial Nerves: No cranial nerve deficit.   Psychiatric:         Judgment: Judgment normal.       Lab Review:       Assessment:      Diagnosis Plan   1. HFrEF (heart failure with reduced ejection fraction)        2. NICM (nonischemic cardiomyopathy)        3. HIV infection, unspecified symptom status          Severe dilated nonischemic cardiomyopathy, ejection fraction 22%, AICD in place, chronic HFrEF.   Her cardiomyopathy is likely multifactorial related to the old illicit drug use history, HIV, possible poorly controlled HTN in the past.  No decompensated heart failure at this time.  Essential hypertension, controlled.  HIV positive.  [facility] well [facility].  Obesity, on Saxenda  Asthma, stable  LBBB.  Chronic and stable  History of pulmonary embolism on warfarin.  Diagnosed December 2020.  Etiology unknown.   History of bradycardia with hypotension associated with fatigue, dizziness and headache due to being overmedicated with Entresto and Corlanor.  This improved with decreasing Entresto and stopping Corlanor.  She is now on carvedilol and tolerating it well.  Migraine headaches, okay to use Topamax.  SHAREE, on CPAP.     Plan:     Advised that she see her GYN because of her menopausal symptoms.  She is okay to use Zofran as needed for nausea.  No other medication changes, see me back in 3 months.  Her stress study is really stable, there is no significant ischemia.  She has no restrictions for working.

## 2024-02-21 ENCOUNTER — ANTICOAGULATION VISIT (OUTPATIENT)
Dept: PHARMACY | Facility: HOSPITAL | Age: 49
End: 2024-02-21
Payer: MEDICARE

## 2024-02-21 LAB — INR PPP: 2.9

## 2024-02-21 NOTE — PROGRESS NOTES
Anticoagulation Clinic Progress Note    Anticoagulation Summary  As of 2024      INR goal:  2.0-3.0   TTR:  60.3% (3.2 y)   INR used for dosin.90 (2024)   Warfarin maintenance plan:  7.5 mg every Wed, Sat; 5 mg all other days   Weekly warfarin total:  40 mg   No change documented:  Zina De La Rosa RPH   Plan last modified:  Zina De La Rosa RPH (1/3/2024)   Next INR check:  2024   Priority:  High   Target end date:  Indefinite    Indications    Other acute pulmonary embolism  unspecified whether acute cor pulmonale present (Resolved) [I26.99]  Acute pulmonary embolism  unspecified pulmonary embolism type  unspecified whether acute cor pulmonale present (Resolved) [I26.99]                 Anticoagulation Episode Summary       INR check location:      Preferred lab:      Send INR reminders to:   [facility]    Comments:  **Home Testing Program**          Anticoagulation Care Providers       Provider Role Specialty Phone number    Lisset Melton MD Referring Cardiology [phone]            Clinic Interview:  No pertinent clinical findings have been reported.    INR History:      2024     1:48 PM 2024    12:00 AM 2024    11:45 AM 2024    12:00 AM 2024     1:27 PM 2024    12:00 AM 2024    11:09 AM   Anticoagulation Monitoring   INR 3.00  4.00  2.80  2.90   INR Date 2024   INR Goal 2.0-3.0  2.0-3.0  2.0-3.0  2.0-3.0   Trend Same  Same  Same  Same   Last Week Total 40 mg  40 mg  35 mg  40 mg   Next Week Total 40 mg  35 mg  40 mg  40 mg   Sun 5 mg  5 mg  5 mg  5 mg   Mon 5 mg  5 mg  5 mg  5 mg   Tue 5 mg  5 mg  5 mg  5 mg   Wed 7.5 mg  2.5 mg ()  7.5 mg  7.5 mg   Thu 5 mg  5 mg  5 mg  5 mg   Fri 5 mg  5 mg  5 mg  5 mg   Sat 7.5 mg  7.5 mg  7.5 mg  7.5 mg   Historical INR  4.00      2.80      2.90            This result is from an external source.       Plan:  1. INR is therapeutic today- see above in  Anticoagulation Summary.    Nina Saez to continue their warfarin regimen- see above in Anticoagulation Summary.  2. Follow up in 1 week  3. Pt has agreed to only be called if INR out of range. They have been instructed to call if any changes in medications, doses, concerns, etc. Patient expresses understanding and has no further questions at this time.    Zina De La Rosa, [facility]

## 2024-02-28 ENCOUNTER — ANTICOAGULATION VISIT (OUTPATIENT)
Dept: PHARMACY | Facility: HOSPITAL | Age: 49
End: 2024-02-28
Payer: MEDICARE

## 2024-02-28 ENCOUNTER — PATIENT MESSAGE (OUTPATIENT)
Dept: OBSTETRICS AND GYNECOLOGY | Age: 49
End: 2024-02-28
Payer: MEDICARE

## 2024-02-28 LAB — INR PPP: 2.6

## 2024-02-28 NOTE — TELEPHONE ENCOUNTER
From: Nina Saez  To: Frannie Rocha  Sent: 2/28/2024 11:31 AM EST  Subject: Me    Can you please give me a call as soon as you can @[phone]

## 2024-02-28 NOTE — PROGRESS NOTES
Anticoagulation Clinic Progress Note    Anticoagulation Summary  As of 2024      INR goal:  2.0-3.0   TTR:  60.5% (3.2 y)   INR used for dosin.60 (2024)   Warfarin maintenance plan:  7.5 mg every Wed, Sat; 5 mg all other days   Weekly warfarin total:  40 mg   No change documented:  Zina De La Rosa RPH   Plan last modified:  Zina De La Rosa RPH (1/3/2024)   Next INR check:  3/6/2024   Priority:  High   Target end date:  Indefinite    Indications    Other acute pulmonary embolism  unspecified whether acute cor pulmonale present (Resolved) [I26.99]  Acute pulmonary embolism  unspecified pulmonary embolism type  unspecified whether acute cor pulmonale present (Resolved) [I26.99]                 Anticoagulation Episode Summary       INR check location:      Preferred lab:      Send INR reminders to:   [email or web address] TEST PO[facility]    Comments:  **Home Testing Program**          Anticoagulation Care Providers       Provider Role Specialty Phone number    Lisset Melton MD Referring Cardiology [phone]            Clinic Interview:  No pertinent clinical findings have been reported.    INR History:      2024    11:45 AM 2024    12:00 AM 2024     1:27 PM 2024    12:00 AM 2024    11:09 AM 2024    12:00 AM 2024    10:39 AM   Anticoagulation Monitoring   INR 4.00  2.80  2.90  2.60   INR Date 2024   INR Goal 2.0-3.0  2.0-3.0  2.0-3.0  2.0-3.0   Trend Same  Same  Same  Same   Last Week Total 40 mg  35 mg  40 mg  40 mg   Next Week Total 35 mg  40 mg  40 mg  40 mg   Sun 5 mg  5 mg  5 mg  5 mg   Mon 5 mg  5 mg  5 mg  5 mg   Tue 5 mg  5 mg  5 mg  5 mg   Wed 2.5 mg ()  7.5 mg  7.5 mg  7.5 mg   Thu 5 mg  5 mg  5 mg  5 mg   Fri 5 mg  5 mg  5 mg  5 mg   Sat 7.5 mg  7.5 mg  7.5 mg  7.5 mg   Historical INR  2.80      2.90      2.60            This result is from an external source.       Plan:  1. INR is therapeutic today- see above in  Anticoagulation Summary.    Nina Saez to continue their warfarin regimen- see above in Anticoagulation Summary.  2. Follow up in 1 week  3. Pt has agreed to only be called if INR out of range. They have been instructed to call if any changes in medications, doses, concerns, etc. Patient expresses understanding and has no further questions at this time.    Zina De La Rosa, [facility]

## 2024-02-28 NOTE — TELEPHONE ENCOUNTER
Pt called stating she has not had a period since Dec,extremly nauseated all the time,hot flashes, very irritable. Pt asking if something can be done

## 2024-03-01 ENCOUNTER — TELEMEDICINE (OUTPATIENT)
Dept: INTERNAL MEDICINE | Age: 49
End: 2024-03-01
Payer: MEDICARE

## 2024-03-01 VITALS — WEIGHT: 218 LBS | BODY MASS INDEX: 35.03 KG/M2 | TEMPERATURE: 97.2 F | HEIGHT: 66 IN

## 2024-03-01 DIAGNOSIS — J06.9 UPPER RESPIRATORY TRACT INFECTION, UNSPECIFIED TYPE: Primary | ICD-10-CM

## 2024-03-01 DIAGNOSIS — R05.9 COUGH, UNSPECIFIED TYPE: ICD-10-CM

## 2024-03-01 RX ORDER — CEFDINIR 300 MG/1
300 CAPSULE ORAL 2 TIMES DAILY
Qty: 14 CAPSULE | Refills: 0 | Status: SHIPPED | OUTPATIENT
Start: 2024-03-01 | End: 2024-03-08

## 2024-03-01 RX ORDER — DEXTROMETHORPHAN HYDROBROMIDE AND PROMETHAZINE HYDROCHLORIDE 15; 6.25 MG/5ML; MG/5ML
5 SYRUP ORAL 4 TIMES DAILY PRN
Qty: 118 ML | Refills: 0 | Status: SHIPPED | OUTPATIENT
Start: 2024-03-01

## 2024-03-01 NOTE — PROGRESS NOTES
[facility]  [place]      Nina Saez / 48 y.o. / female  03/01/2024      CC:  Main reason(s) for today's visit: TELEHEALTH ENCOUNTER: Cough      HPI:     THIS WAS A MYCHART TELEHEALTH ENCOUNTER.    You have chosen to receive care through a telehealth visit.  Do you consent to use a video/audio connection for your medical care today? Yes Both patient and provider were in [place], [place] at time of office visit.     Patient presents with lingering productive cough since having COVID-19 January 31.  She has no increasing lower extremity edema or shortness of breath that she also has known heart failure.  No rapid weight gain.  No fever or chills.    Patient Care Team:  Zach Durand APRN as PCP - General (Internal Medicine)  Lisset Melton MD as Consulting Physician (Cardiology)  Andrew Watson MD as Consulting Physician (Urology)  Rocky Calvert DPM as Consulting Physician (Podiatry)  Lisset Melton MD as Referring Physician (Cardiology)  Chaim Hernandez MD (Internal Medicine)  Frannie Rocha MD as Consulting Physician (Gynecology)  Evelyn Santillan MD as Consulting Physician (Hematology and Oncology)  Dennis Diehl MD as Consulting Physician (Pulmonary Disease)  Benny Morgan MD PhD as Consulting Physician (Cardiology)  Kiley Boyd APRN as Nurse Practitioner (Cardiology)  Cliare Gauthier MD as Consulting Physician (Neurology)  Nilda Gallagher APRN as Nurse Practitioner (Cardiology)    ASSESSMENT & PLAN:     Diagnosis Plan   1. Upper respiratory tract infection, unspecified type        2. Cough, unspecified type  promethazine-dextromethorphan (PROMETHAZINE-DM) 6.25-15 MG/5ML syrup    XR Chest PA & Lateral        Summary/Discussion:  We will go ahead and treat her with cefdinir antibiotic for upper respiratory infection with sounds like it is turning bacterial, chest x-ray to rule out pneumonia.  Phenergan DM for nighttime cough.   May incorporate Mucinex in the morning  If worsening shortness of breath, fever or chills or any signs of heart failure such as rapid weight gain she will present to the ER    Time spent: 13 minutes    Next Appointment with me: 3/27/2024    No follow-ups on file.    ____________________________________________________________________    MEDICATIONS  Current Outpatient Medications   Medication Sig Dispense Refill    acetaminophen (TYLENOL) 325 MG tablet Take 2 tablets by mouth Every 6 (Six) Hours As Needed for Mild Pain.      albuterol sulfate  (90 Base) MCG/ACT inhaler INHALE 2 PUFFS BY MOUTH EVERY 4 HOURS AS NEEDED FOR WHEEZING 18 g 0    atorvastatin (LIPITOR) 20 MG tablet Take 1 tablet by mouth Daily. 90 tablet 0    baclofen (LIORESAL) 10 MG tablet Take 1 tablet by mouth 2 (Two) Times a Day. 20 tablet 0    carvedilol (COREG) 3.125 MG tablet Take 1 tablet by mouth 2 (Two) Times a Day With Meals.      CBD (cannabidiol) oral oil Take  by mouth.      cyclobenzaprine (FLEXERIL) 10 MG tablet Take 1 tablet by mouth 3 (Three) Times a Day As Needed for Muscle Spasms. 21 tablet 0    Byjap-Vlvmx-Dbzevfwz-TenofAF (Symtuza) 310-368-249-10 MG per tablet Take 1 tablet by mouth Daily.      Diclofenac Sodium (VOLTAREN) 1 % gel gel Apply 4 g topically to the appropriate area as directed 2 (Two) Times a Day. Use per pkg insert 100 g 2    diphenhydrAMINE (BENADRYL) 25 mg capsule Take 1 capsule by mouth Every 6 (Six) Hours As Needed for Itching (swelling). 20 capsule 0    empagliflozin (Jardiance) 10 MG tablet tablet Take 1 tablet by mouth once daily 90 tablet 1    Entresto 49-51 MG tablet Take 1 tablet by mouth twice daily 180 tablet 0    famotidine (Pepcid) 20 MG tablet Take 2 tablets by mouth 2 (Two) Times a Day As Needed for Heartburn.      fluticasone (FLONASE) 50 MCG/ACT nasal spray 2 sprays into the nostril(s) as directed by provider Daily. 11.1 g 3    Insulin Pen Needle (BD Pen Needle Heidi 2nd Gen) 32G X 4 MM misc USE 1  " ONCE DAILY WITH  SAXENDA 100 each 0    nitroglycerin (NITROSTAT) 0.4 MG SL tablet PLACE 1 TABLET UNDER THE TONGUE EVERY 5 MINUTES AS NEEDED FOR CHEST PAIN. TAKE NO MORE THAN 3 DOSES IN 15 MINUTES. 25 tablet 0    ondansetron (Zofran) 4 MG tablet Take 1 tablet by mouth Every 12 (Twelve) Hours As Needed for Nausea or Vomiting. 30 tablet 0    pantoprazole (Protonix) 20 MG EC tablet Take 1 tablet by mouth Daily. 90 tablet 3    potassium chloride (K-DUR,KLOR-CON) 20 MEQ CR tablet Take 3 tablets by mouth once daily 90 tablet 0    Rimegepant Sulfate (Nurtec) 75 MG tablet dispersible tablet Take 1 tablet by mouth 1 (One) Time As Needed for migraine. Max of 1 tablet in 24 hours. 8 tablet 2    sertraline (Zoloft) 100 MG tablet Take 1 tablet by mouth Daily. 90 tablet 1    spironolactone (ALDACTONE) 25 MG tablet 1 tablet Daily.      torsemide (DEMADEX) 20 MG tablet TAKE 2 TABLETS BY MOUTH IN THE MORNING AND 2 TABLETS IN THE AFTERNOON 150 tablet 0    vitamin D (ERGOCALCIFEROL) 56932 units capsule capsule Take 1 capsule by mouth Every 30 (Thirty) Days.      warfarin (COUMADIN) 2.5 MG tablet TAKE 3 TABLETS BY MOUTH ONCE DAILY OR AS DIRECTED 270 tablet 0    cefdinir (OMNICEF) 300 MG capsule Take 1 capsule by mouth 2 (Two) Times a Day for 7 days. 14 capsule 0    metoprolol succinate XL (TOPROL-XL) 25 MG 24 hr tablet Take 0.5 tablets by mouth Daily. (Patient not taking: Reported on 3/1/2024) 30 tablet 0    promethazine-dextromethorphan (PROMETHAZINE-DM) 6.25-15 MG/5ML syrup Take 5 mL by mouth 4 (Four) Times a Day As Needed for Cough (cough). 118 mL 0     No current facility-administered medications for this visit.          ____________________________________________________________________      REVIEW OF SYSTEMS    Review of Systems  Constitutional neg except per HPI   Resp productive cough  CV neg     PHYSICAL EXAMINATION  Temp 97.2 °F (36.2 °C)   Ht 167.6 cm (66\")   Wt 98.9 kg (218 lb)   LMP 12/13/2023 (Approximate)   BMI " 35.19 kg/m²    No acute distress.  Alert with normal thought and judgment.       REVIEWED DATA:    Labs:   Lab Results   Component Value Date    GLUCOSE 139 (H) 12/27/2023    BUN 21 (H) 12/27/2023    CREATININE 1.07 (H) 12/27/2023    EGFRRESULT 62 07/13/2023    EGFR 64.2 12/27/2023    BCR 19.6 12/27/2023    K 3.6 12/27/2023    CO2 23.0 12/27/2023    CALCIUM 9.6 12/27/2023    PROTENTOTREF 7.1 07/13/2023    ALBUMIN 4.4 12/27/2023    BILITOT 0.3 12/27/2023    AST 16 12/27/2023    ALT 20 12/27/2023     Imaging:         Medical Tests:         Summary of old records / correspondence / consultant report:          Request outside records:         ALLERGIES  Allergies   Allergen Reactions    Lisinopril Cough        PFSH:     The following portions of the patient's history were reviewed and updated as appropriate: Allergies / Current Medications / Past Medical History / Surgical History / Social History / Family History    PROBLEM LIST   Patient Active Problem List   Diagnosis    Asthma    HIV (human immunodeficiency virus infection)    Class 2 obesity in adult    HFrEF (heart failure with reduced ejection fraction)    Anxiety    Chronic low back pain    Degeneration of lumbosacral intervertebral disc    Gastroesophageal reflux disease    Human papilloma virus infection    Hyperlipidemia    Vitamin D deficiency    Obesity    Essential hypertension    Dilated cardiomyopathy    NICM (nonischemic cardiomyopathy)    Nonrheumatic tricuspid valve regurgitation    Intractable chronic migraine without aura    Bilateral occipital neuralgia    SHAREE (obstructive sleep apnea)    Chronic chest pain with high risk for CAD       PAST MEDICAL HISTORY  Past Medical History:   Diagnosis Date    AICD (automatic cardioverter/defibrillator) present 03/06/2020    Asthma     CHF (congestive heart failure)     Class 2 obesity in adult     Coronary artery disease 2017    Degeneration of lumbosacral intervertebral disc 07/29/2021    Depression 2017     Diabetes mellitus     Fracture, foot 3/20    Broke    Grade II diastolic dysfunction     Per echocardiogram 3/2021    H/O Closed trimalleolar fracture     Headache     HIV (human immunodeficiency virus infection)     Hypertension     LBBB (left bundle branch block)     NICM (nonischemic cardiomyopathy)     2020 EF 6% per echocardiogram; AICD present    Nonrheumatic mitral valve regurgitation 2021    Moderate per echo 3/2021    Nonrheumatic tricuspid valve regurgitation     Moderate per echocardiogram 3/2021    SHAREE (obstructive sleep apnea) 2023    Pulmonary embolism        SURGICAL HISTORY  Past Surgical History:   Procedure Laterality Date    ANKLE OPEN REDUCTION INTERNAL FIXATION  3/7/20    CARDIAC CATHETERIZATION      CARDIAC DEFIBRILLATOR PLACEMENT       SECTION      one C/S    FRACTURE SURGERY Left     left ankle    OOPHORECTOMY      h/o teratoma    TUBAL ABDOMINAL LIGATION         SOCIAL HISTORY  Social History     Socioeconomic History    Marital status: Legally    Tobacco Use    Smoking status: Former     Packs/day: 0.50     Years: 20.00     Additional pack years: 0.00     Total pack years: 10.00     Types: Cigarettes     Quit date:      Years since quittin.1     Passive exposure: Past    Smokeless tobacco: Never   Vaping Use    Vaping Use: Never used   Substance and Sexual Activity    Alcohol use: Not Currently     Comment: holiday and special occ//caffeine use: Occ    Drug use: Not Currently     Types: Marijuana     Comment: gummy    Sexual activity: Not Currently     Partners: Male     Birth control/protection: Hysterectomy           **Dragon dictation used for documentation

## 2024-03-02 ENCOUNTER — HOSPITAL ENCOUNTER (OUTPATIENT)
Dept: GENERAL RADIOLOGY | Facility: HOSPITAL | Age: 49
Discharge: HOME | End: 2024-03-02
Payer: MEDICARE

## 2024-03-02 PROCEDURE — 71046 X-RAY EXAM CHEST 2 VIEWS: CPT

## 2024-03-06 ENCOUNTER — ANTICOAGULATION VISIT (OUTPATIENT)
Dept: PHARMACY | Facility: HOSPITAL | Age: 49
End: 2024-03-06
Payer: MEDICARE

## 2024-03-06 LAB — INR PPP: 2

## 2024-03-06 PROCEDURE — G0249 PROVIDE INR TEST MATER/EQUIP: HCPCS

## 2024-03-06 RX ORDER — WARFARIN SODIUM 2.5 MG/1
TABLET ORAL
Qty: 205 TABLET | Refills: 0 | Status: SHIPPED | OUTPATIENT
Start: 2024-03-06

## 2024-03-06 RX ORDER — SACUBITRIL AND VALSARTAN 49; 51 MG/1; MG/1
TABLET, FILM COATED ORAL
Qty: 180 TABLET | Refills: 0 | Status: SHIPPED | OUTPATIENT
Start: 2024-03-06

## 2024-03-06 NOTE — PROGRESS NOTES
Anticoagulation Clinic Progress Note    Anticoagulation Summary  As of 3/6/2024      INR goal:  2.0-3.0   TTR:  60.7% (3.2 y)   INR used for dosin.00 (3/6/2024)   Warfarin maintenance plan:  7.5 mg every Wed, Sat; 5 mg all other days   Weekly warfarin total:  40 mg   No change documented:  Zina De La Rosa RPH   Plan last modified:  Zina De La Rosa RPH (1/3/2024)   Next INR check:  3/13/2024   Priority:  High   Target end date:  Indefinite    Indications    Other acute pulmonary embolism  unspecified whether acute cor pulmonale present (Resolved) [I26.99]  Acute pulmonary embolism  unspecified pulmonary embolism type  unspecified whether acute cor pulmonale present (Resolved) [I26.99]                 Anticoagulation Episode Summary       INR check location:      Preferred lab:      Send INR reminders to:   [facility]    Comments:  **Home Testing Program**          Anticoagulation Care Providers       Provider Role Specialty Phone number    Lisset Melton MD Referring Cardiology [phone]            Clinic Interview:  No pertinent clinical findings have been reported.    INR History:      2024     1:27 PM 2024    12:00 AM 2024    11:09 AM 2024    12:00 AM 2024    10:39 AM 3/6/2024    12:00 AM 3/6/2024     1:30 PM   Anticoagulation Monitoring   INR 2.80  2.90  2.60  2.00   INR Date 2024  2024  2024  3/6/2024   INR Goal 2.0-3.0  2.0-3.0  2.0-3.0  2.0-3.0   Trend Same  Same  Same  Same   Last Week Total 35 mg  40 mg  40 mg  40 mg   Next Week Total 40 mg  40 mg  40 mg  40 mg   Sun 5 mg  5 mg  5 mg  5 mg   Mon 5 mg  5 mg  5 mg  5 mg   Tue 5 mg  5 mg  5 mg  5 mg   Wed 7.5 mg  7.5 mg  7.5 mg  7.5 mg   Thu 5 mg  5 mg  5 mg  5 mg   Fri 5 mg  5 mg  5 mg  5 mg   Sat 7.5 mg  7.5 mg  7.5 mg  7.5 mg   Historical INR  2.90      2.60      2.00            This result is from an external source.       Plan:  1. INR is therapeutic today- see above in  Anticoagulation Summary.    Nina Saez to continue their warfarin regimen- see above in Anticoagulation Summary.  2. Follow up in 1 week  3. Pt has agreed to only be called if INR out of range. They have been instructed to call if any changes in medications, doses, concerns, etc. Patient expresses understanding and has no further questions at this time.    Zina De La Rosa, [facility]

## 2024-03-13 ENCOUNTER — ANTICOAGULATION VISIT (OUTPATIENT)
Dept: PHARMACY | Facility: HOSPITAL | Age: 49
End: 2024-03-13
Payer: MEDICARE

## 2024-03-13 ENCOUNTER — SPECIALTY PHARMACY (OUTPATIENT)
Dept: NEUROLOGY | Facility: CLINIC | Age: 49
End: 2024-03-13
Payer: MEDICARE

## 2024-03-13 LAB — INR PPP: 2.6

## 2024-03-13 NOTE — PROGRESS NOTES
Anticoagulation Clinic Progress Note    Anticoagulation Summary  As of 3/13/2024      INR goal:  2.0-3.0   TTR:  61.0% (3.2 y)   INR used for dosin.60 (3/13/2024)   Warfarin maintenance plan:  7.5 mg every Wed, Sat; 5 mg all other days   Weekly warfarin total:  40 mg   No change documented:  Zina De La Rosa RPH   Plan last modified:  Zina De La Rosa RPH (1/3/2024)   Next INR check:  3/20/2024   Priority:  High   Target end date:  Indefinite    Indications    Other acute pulmonary embolism  unspecified whether acute cor pulmonale present (Resolved) [I26.99]  Acute pulmonary embolism  unspecified pulmonary embolism type  unspecified whether acute cor pulmonale present (Resolved) [I26.99]                 Anticoagulation Episode Summary       INR check location:      Preferred lab:      Send INR reminders to:   [facility]    Comments:  **Home Testing Program**          Anticoagulation Care Providers       Provider Role Specialty Phone number    Lisset Melton MD Referring Cardiology [phone]            Clinic Interview:  No pertinent clinical findings have been reported.    INR History:      2024    11:09 AM 2024    12:00 AM 2024    10:39 AM 3/6/2024    12:00 AM 3/6/2024     1:30 PM 3/13/2024    12:00 AM 3/13/2024    10:07 AM   Anticoagulation Monitoring   INR 2.90  2.60  2.00  2.60   INR Date 2024  2024  3/6/2024  3/13/2024   INR Goal 2.0-3.0  2.0-3.0  2.0-3.0  2.0-3.0   Trend Same  Same  Same  Same   Last Week Total 40 mg  40 mg  40 mg  40 mg   Next Week Total 40 mg  40 mg  40 mg  40 mg   Sun 5 mg  5 mg  5 mg  5 mg   Mon 5 mg  5 mg  5 mg  5 mg   Tue 5 mg  5 mg  5 mg  5 mg   Wed 7.5 mg  7.5 mg  7.5 mg  7.5 mg   Thu 5 mg  5 mg  5 mg  5 mg   Fri 5 mg  5 mg  5 mg  5 mg   Sat 7.5 mg  7.5 mg  7.5 mg  7.5 mg   Historical INR  2.60      2.00      2.60            This result is from an external source.       Plan:  1. INR is therapeutic today- see above in  Anticoagulation Summary.    Nina Saez to continue their warfarin regimen- see above in Anticoagulation Summary.  2. Follow up in 1 week  3. Pt has agreed to only be called if INR out of range. They have been instructed to call if any changes in medications, doses, concerns, etc. Patient expresses understanding and has no further questions at this time.    Zina De La Rosa, [facility]

## 2024-03-13 NOTE — PROGRESS NOTES
Specialty Pharmacy Refill Coordination Note     Nina is a 48 y.o. female contacted today regarding refills of  [facility] specialty medication(s).    Reviewed and verified with patient:       Specialty medication(s) and dose(s) confirmed: yes    Refill Questions      Flowsheet Row Most Recent Value   Changes to allergies? No   Changes to medications? No   New conditions or infections since last clinic visit No   Unplanned office visit, urgent care, ED, or hospital admission in the last 4 weeks  No   How does patient/caregiver feel medication is working? Very good   Financial problems or insurance changes  No   Since the previous refill, were any specialty medication doses or scheduled injections missed or delayed?  No   Does this patient require a clinical escalation to a pharmacist? No            Delivery Questions      Flowsheet Row Most Recent Value   Delivery method Beeline   Delivery address verified with patient/caregiver? Yes   Delivery address Home   Number of medications in delivery 1   Medication(s) being filled and delivered Rimegepant Sulfate   Doses left of specialty medications 5   Copay verified? Yes   Copay amount $0   Copay form of payment No copayment ($0)              Medication Adherence    Adherence tools used: patient uses a pill box to manage medications  Support network for adherence: family member          Follow-up: 21 day(s)     Sydnee Lozano, Pharmacy Technician  Specialty Pharmacy Technician

## 2024-03-20 ENCOUNTER — ANTICOAGULATION VISIT (OUTPATIENT)
Dept: PHARMACY | Facility: HOSPITAL | Age: 49
End: 2024-03-20
Payer: MEDICARE

## 2024-03-20 LAB — INR PPP: 2.7

## 2024-03-20 NOTE — PROGRESS NOTES
Anticoagulation Clinic Progress Note    Anticoagulation Summary  As of 3/20/2024      INR goal:  2.0-3.0   TTR:  61.2% (3.2 y)   INR used for dosin.70 (3/20/2024)   Warfarin maintenance plan:  7.5 mg every Wed, Sat; 5 mg all other days   Weekly warfarin total:  40 mg   No change documented:  Zina De La Rosa RPH   Plan last modified:  Zina De La Rosa RPH (1/3/2024)   Next INR check:  3/27/2024   Priority:  High   Target end date:  Indefinite    Indications    Other acute pulmonary embolism  unspecified whether acute cor pulmonale present (Resolved) [I26.99]  Acute pulmonary embolism  unspecified pulmonary embolism type  unspecified whether acute cor pulmonale present (Resolved) [I26.99]                 Anticoagulation Episode Summary       INR check location:      Preferred lab:      Send INR reminders to:   [facility]    Comments:  **Home Testing Program**          Anticoagulation Care Providers       Provider Role Specialty Phone number    Lisset Melton MD Referring Cardiology [phone]            Clinic Interview:  No pertinent clinical findings have been reported.    INR History:      2024    10:39 AM 3/6/2024    12:00 AM 3/6/2024     1:30 PM 3/13/2024    12:00 AM 3/13/2024    10:07 AM 3/20/2024    12:00 AM 3/20/2024    10:40 AM   Anticoagulation Monitoring   INR 2.60  2.00  2.60  2.70   INR Date 2024  3/6/2024  3/13/2024  3/20/2024   INR Goal 2.0-3.0  2.0-3.0  2.0-3.0  2.0-3.0   Trend Same  Same  Same  Same   Last Week Total 40 mg  40 mg  40 mg  40 mg   Next Week Total 40 mg  40 mg  40 mg  40 mg   Sun 5 mg  5 mg  5 mg  5 mg   Mon 5 mg  5 mg  5 mg  5 mg   Tue 5 mg  5 mg  5 mg  5 mg   Wed 7.5 mg  7.5 mg  7.5 mg  7.5 mg   Thu 5 mg  5 mg  5 mg  5 mg   Fri 5 mg  5 mg  5 mg  5 mg   Sat 7.5 mg  7.5 mg  7.5 mg  7.5 mg   Historical INR  2.00      2.60      2.70            This result is from an external source.       Plan:  1. INR is therapeutic today- see above in  Anticoagulation Summary.    Nina Saez to continue their warfarin regimen- see above in Anticoagulation Summary.  2. Follow up in 1 week  3. Pt has agreed to only be called if INR out of range. They have been instructed to call if any changes in medications, doses, concerns, etc. Patient expresses understanding and has no further questions at this time.    Zina De La Rosa, [facility]

## 2024-03-21 ENCOUNTER — CLINICAL SUPPORT NO REQUIREMENTS (OUTPATIENT)
Age: 49
End: 2024-03-21
Payer: MEDICARE

## 2024-03-21 DIAGNOSIS — I42.8 NICM (NONISCHEMIC CARDIOMYOPATHY): Primary | ICD-10-CM

## 2024-03-22 DIAGNOSIS — I50.22 CHRONIC SYSTOLIC CONGESTIVE HEART FAILURE: ICD-10-CM

## 2024-03-22 RX ORDER — NITROGLYCERIN 0.4 MG/1
0.4 TABLET SUBLINGUAL
Qty: 25 TABLET | Refills: 0 | Status: SHIPPED | OUTPATIENT
Start: 2024-03-22

## 2024-03-22 NOTE — PROGRESS NOTES
Ankle Follow Up      Patient: Nina Saez    YOB: 1975 48 y.o. female    Chief Complaints: Ankle pain    History of Present Illness:Patient underwent open reduction internal fixation of the left bimalleolar ankle fracture including syndesmotic fixation at [facility] by Dr. Calvert on 3/7/2020.     She saw him on 2/1/2021 and I have reviewed those notes.  At that time she had persistent complaints of pain and evidently surgery was recommended for removing the hardware     However she has significant cardiac history and I was contacted by Dr. Melton let me know that if patient does need to have surgery she would prefer to be done at [facility] where she can monitor patient.       I saw her for this initially on 4/14/2021     At that time she had moderate aching pain in the left ankle some posterior laterally and medially as well as pain along the Achilles and into the heel with some occasional radiation over the anterior aspect of the ankle with some occasional feelings of numbness.  It  did feel better when she wears her lace up brace.        She was fitted with an ASO brace and sent for MRI and CT scan but when she followed up in the office on 5/3/2021 had not yet had them done and was unable to show up for her next appointment on 5/17/2021 because of flat tire.     She was seen on 8/11/2021 with persistent complaints of pain mainly over the lateral aspect of the ankle as well as some medially and anteromedially with some feelings of pain going up her leg.     We discussed treatment at that time including removal of her medial screw and lateral plate and the broken syndesmotic screw would be quite difficult to remove and could require trephining also with possible ankle scope to make sure the syndesmosis area was clear but would not necessarily open it up anterolaterally and could evaluate her medial malleolar area.     She did not want to have anything done a surgical standpoint  at that time was afraid that her heart could not handle it as she has significant decreased cardiac ejection and is followed by cardiology for that.     We decided to replace her ASO brace and prescribed Pennsaid to apply the area twice daily.  She was due to see her cardiologist for 5 weeks from that and discuss safety of surgery with her at that time.     Patient was not seen back again until 6/16/2022.  She reported that she had seen cardiology but did not specifically address whether or not she could have surgery.     She reported intermittent pain in the ankle mainly in the inferomedial aspect of the left hindfoot and really not having any further pain over the lateral aspect of her ankle.  She had not been using her ASO brace to any appreciable extent but did use compression hose as well as topical Voltaren which helped some.    We discussed treatment at that time and she declined a PTTD brace but said she would use the ASO brace.  She was sent for new MRI of the left ankle to evaluate for any posterior tib tendon pathology and degree of exacerbation of medial ankle cystic change.  She was to been seen by 4 to 6 weeks thereafter and was going to check whether or not she can even have surgery from a cardiology standpoint.  She evidently never had the MRI and was not seen back until 3/25/2024.    Patient is seen back today and did not have the MRI because she has a defibrillator.  She has had intermittent symptoms over the inferomedial hindfoot that are usually controlled with use of topical Voltaren and compression hose which is worsened in the last several weeks with moderate pain over the inferomedial aspect of the left hindfoot midfoot area along the posterior tib tendon and medial midfoot but really no pain laterally.  She does report that she has had chronic pain in her fourth toe with intermittent cramping since her injury.  HPI    ROS: ankle pain  Past Medical History:   Diagnosis Date    AICD (automatic  "cardioverter/defibrillator) present 03/06/2020    Asthma     CHF (congestive heart failure)     Class 2 obesity in adult     Coronary artery disease 2017    Degeneration of lumbosacral intervertebral disc 07/29/2021    Depression 2017    Diabetes mellitus     Fracture, foot 3/20    Broke    Grade II diastolic dysfunction     Per echocardiogram 3/2021    H/O Closed trimalleolar fracture     Headache 2000    HIV (human immunodeficiency virus infection)     Hypertension     LBBB (left bundle branch block)     NICM (nonischemic cardiomyopathy)     7/2020 EF 6% per echocardiogram; AICD present    Nonrheumatic mitral valve regurgitation 03/08/2021    Moderate per echo 3/2021    Nonrheumatic tricuspid valve regurgitation     Moderate per echocardiogram 3/2021    SHAREE (obstructive sleep apnea) 02/21/2023    Pulmonary embolism        Physical Exam:   Vitals:    03/25/24 1410   Temp: 98.6 °F (37 °C)   TempSrc: Temporal   Weight: 101 kg (222 lb 3.2 oz)   Height: 167.6 cm (65.98\")   PainSc:   8   PainLoc: Foot  Comment: ankle     Well developed with normal mood.  On exam she has moderate tenderness to palpation on the posterior tib tendon and inferomedial hindfoot along the medial navicular.  She was nontender over the lateral ankle to palpation or with range of motion.  There was some mild hammering of the second third fourth and fifth toes but no gross instability.      Radiology: 3 views left ankle including lateral view of the foot and 2 views left foot ordered evaluate pain and alignment reviewed and compared to previous x-rays from 6/16/2022.  There remains fibular and medial malleolar hardware as well as syndesmotic screw which is broken at the base of the head.  I do not see any appreciable change compared to previous x-rays.  There is an ossification that remains over the dorsum of the talar head.  Foot x-rays are similar without change with accessory navicular medially.  No obvious fracture or malalignment.    MRI " films and report of the left ankle dated 5/13/2021 reviewed which show the previous fixation and marrow signal is normal except for 12 to 13 mm diameter of marrow edema around small subcortical cyst deep to the articular cortex of the medial malleolus     Cartilage otherwise unremarkable no joint effusion or intra-articular body.  Peroneal tendons are normal.     ATFL appears chronically torn but the CFL and deltoid are intact as are the syndesmotic ligaments but there is some soft tissue edema anteriorly along the distal syndesmotic space     Assessment/Plan: 1.  Status post ORIF left ankle with broken syndesmotic screw  2.  Left ankle painful medial and lateral hardware  3.  Left ankle chronic ATFL tear  4.  Left ankle distal syndesmotic space soft tissue edema anteriorly  5.  Left ankle medial malleolar subcortical cystic change  6.  Exacerbation medial ankle pain with possible posterior tib tendon pathology.  7.  Left fourth hammertoe with cramping    We discussed treatment going forward and given the degree of her symptoms were going to get her into a boot and she says is quite bothersome she will use this for any weightbearing activity and offload with crutches.    She cannot have an MRI because of her defibrillator but the syndesmosis broken screw does not really seem to be symptomatic.  I am then going get a CT scan though to evaluate if we can her medial navicular and posterior tib tendon as well as a three-phase bone scan.    Will see her back in 3 to 4 weeks to assess progress and determine treatment course going forward.

## 2024-03-25 ENCOUNTER — OFFICE VISIT (OUTPATIENT)
Dept: ORTHOPEDIC SURGERY | Facility: CLINIC | Age: 49
End: 2024-03-25
Payer: MEDICARE

## 2024-03-25 VITALS — TEMPERATURE: 98.6 F | BODY MASS INDEX: 35.71 KG/M2 | HEIGHT: 66 IN | WEIGHT: 222.2 LBS

## 2024-03-25 DIAGNOSIS — M76.822 TIBIAL TENDONITIS, POSTERIOR, LEFT: ICD-10-CM

## 2024-03-25 DIAGNOSIS — Q74.2 ACCESSORY NAVICULAR BONE OF LEFT FOOT: ICD-10-CM

## 2024-03-25 DIAGNOSIS — R52 PAIN: Primary | ICD-10-CM

## 2024-03-25 DIAGNOSIS — M20.42 HAMMER TOE OF LEFT FOOT: ICD-10-CM

## 2024-03-25 DIAGNOSIS — Z98.890 HISTORY OF OPEN REDUCTION AND INTERNAL FIXATION (ORIF) PROCEDURE: ICD-10-CM

## 2024-03-25 PROCEDURE — 73620 X-RAY EXAM OF FOOT: CPT | Performed by: ORTHOPAEDIC SURGERY

## 2024-03-25 PROCEDURE — 99214 OFFICE O/P EST MOD 30 MIN: CPT | Performed by: ORTHOPAEDIC SURGERY

## 2024-03-25 PROCEDURE — 73610 X-RAY EXAM OF ANKLE: CPT | Performed by: ORTHOPAEDIC SURGERY

## 2024-03-25 RX ORDER — ERGOCALCIFEROL 1.25 MG/1
1 CAPSULE ORAL
COMMUNITY
Start: 2024-03-21

## 2024-03-27 ENCOUNTER — ANTICOAGULATION VISIT (OUTPATIENT)
Dept: PHARMACY | Facility: HOSPITAL | Age: 49
End: 2024-03-27
Payer: MEDICARE

## 2024-03-27 LAB — INR PPP: 2.6

## 2024-03-27 NOTE — PROGRESS NOTES
Anticoagulation Clinic Progress Note    Anticoagulation Summary  As of 3/27/2024      INR goal:  2.0-3.0   TTR:  61.4% (3.2 y)   INR used for dosin.60 (3/27/2024)   Warfarin maintenance plan:  7.5 mg every Wed, Sat; 5 mg all other days   Weekly warfarin total:  40 mg   No change documented:  Zina De La Rosa RPH   Plan last modified:  Zina De La Rosa RPH (1/3/2024)   Next INR check:  4/3/2024   Priority:  High   Target end date:  Indefinite    Indications    Other acute pulmonary embolism  unspecified whether acute cor pulmonale present (Resolved) [I26.99]  Acute pulmonary embolism  unspecified pulmonary embolism type  unspecified whether acute cor pulmonale present (Resolved) [I26.99]                 Anticoagulation Episode Summary       INR check location:      Preferred lab:      Send INR reminders to:   [facility] HOME TEST POOL    Comments:  **Home Testing Program**          Anticoagulation Care Providers       Provider Role Specialty Phone number    Lisset Melton MD Referring Cardiology [phone]            Clinic Interview:  No pertinent clinical findings have been reported.    INR History:      3/6/2024     1:30 PM 3/13/2024    12:00 AM 3/13/2024    10:07 AM 3/20/2024    12:00 AM 3/20/2024    10:40 AM 3/27/2024    12:00 AM 3/27/2024    10:12 AM   Anticoagulation Monitoring   INR 2.00  2.60  2.70  2.60   INR Date 3/6/2024  3/13/2024  3/20/2024  3/27/2024   INR Goal 2.0-3.0  2.0-3.0  2.0-3.0  2.0-3.0   Trend Same  Same  Same  Same   Last Week Total 40 mg  40 mg  40 mg  40 mg   Next Week Total 40 mg  40 mg  40 mg  40 mg   Sun 5 mg  5 mg  5 mg  5 mg   Mon 5 mg  5 mg  5 mg  5 mg   Tue 5 mg  5 mg  5 mg  5 mg   Wed 7.5 mg  7.5 mg  7.5 mg  7.5 mg   Thu 5 mg  5 mg  5 mg  5 mg   Fri 5 mg  5 mg  5 mg  5 mg   Sat 7.5 mg  7.5 mg  7.5 mg  7.5 mg   Historical INR  2.60      2.70      2.60            This result is from an external source.       Plan:  1. INR is therapeutic today- see above in  Anticoagulation Summary.    Nina Saez to continue their warfarin regimen- see above in Anticoagulation Summary.  2. Follow up in 1 week  3. Pt has agreed to only be called if INR out of range. They have been instructed to call if any changes in medications, doses, concerns, etc. Patient expresses understanding and has no further questions at this time.    Zina De La Rosa, [facility]

## 2024-03-28 ENCOUNTER — OFFICE VISIT (OUTPATIENT)
Dept: INTERNAL MEDICINE | Age: 49
End: 2024-03-28
Payer: MEDICARE

## 2024-03-28 VITALS
SYSTOLIC BLOOD PRESSURE: 126 MMHG | DIASTOLIC BLOOD PRESSURE: 84 MMHG | WEIGHT: 222 LBS | HEIGHT: 66 IN | HEART RATE: 89 BPM | TEMPERATURE: 98.3 F | OXYGEN SATURATION: 96 % | BODY MASS INDEX: 35.68 KG/M2

## 2024-03-28 DIAGNOSIS — F41.9 ANXIETY: ICD-10-CM

## 2024-03-28 DIAGNOSIS — R73.01 IMPAIRED FASTING BLOOD SUGAR: ICD-10-CM

## 2024-03-28 DIAGNOSIS — E78.2 MIXED HYPERLIPIDEMIA: ICD-10-CM

## 2024-03-28 DIAGNOSIS — R79.9 ABNORMAL FINDING OF BLOOD CHEMISTRY, UNSPECIFIED: ICD-10-CM

## 2024-03-28 DIAGNOSIS — R09.81 SINUS CONGESTION: Primary | ICD-10-CM

## 2024-03-28 DIAGNOSIS — E55.9 VITAMIN D DEFICIENCY: ICD-10-CM

## 2024-03-28 DIAGNOSIS — R53.83 OTHER FATIGUE: ICD-10-CM

## 2024-03-28 DIAGNOSIS — I10 ESSENTIAL HYPERTENSION: ICD-10-CM

## 2024-03-28 RX ORDER — FLUTICASONE PROPIONATE 50 MCG
2 SPRAY, SUSPENSION (ML) NASAL DAILY
Qty: 11.1 G | Refills: 3 | Status: SHIPPED | OUTPATIENT
Start: 2024-03-28

## 2024-03-28 RX ORDER — FEXOFENADINE HCL 180 MG/1
180 TABLET ORAL DAILY
Qty: 90 TABLET | Refills: 1 | Status: SHIPPED | OUTPATIENT
Start: 2024-03-28

## 2024-03-28 NOTE — PROGRESS NOTES
I N T E R N A L  M E D I C I N E  SAMUEL JACOB      ENCOUNTER DATE:  2024    Nina NATH Nadja / 48 y.o. / female      CHIEF COMPLAINT / REASON FOR OFFICE VISIT     Anxiety, Hyperlipidemia, Hypertension, and Fatigue      ASSESSMENT & PLAN     Problem List Items Addressed This Visit       Anxiety    Relevant Orders    Comprehensive Metabolic Panel    TSH+Free T4    Hyperlipidemia    Relevant Medications    atorvastatin (LIPITOR) 20 MG tablet    Other Relevant Orders    Comprehensive Metabolic Panel    Lipid Panel With / Chol / HDL Ratio    Vitamin D deficiency    Relevant Orders    Vitamin D,25-Hydroxy    Essential hypertension    Relevant Medications    spironolactone (ALDACTONE) 25 MG tablet    torsemide (DEMADEX) 20 MG tablet    carvedilol (COREG) 3.125 MG tablet    Other Relevant Orders    Comprehensive Metabolic Panel    CBC w AUTO Differential    TSH+Free T4    Urinalysis With Culture If Indicated - Urine, Clean Catch     Other Visit Diagnoses       Sinus congestion    -  Primary    Relevant Medications    fluticasone (FLONASE) 50 MCG/ACT nasal spray    fexofenadine (Allegra Allergy) 180 MG tablet    Impaired fasting blood sugar        Relevant Orders    Hemoglobin A1c    Other fatigue        Relevant Orders    Vitamin B12    Iron and TIBC    Ferritin    Abnormal finding of blood chemistry, unspecified        Relevant Orders    Iron and TIBC    Ferritin          Orders Placed This Encounter   Procedures    Comprehensive Metabolic Panel    Vitamin D,25-Hydroxy    Vitamin B12    Iron and TIBC    Ferritin    TSH+Free T4    Lipid Panel With / Chol / HDL Ratio    Hemoglobin A1c    Urinalysis With Culture If Indicated - Urine, Clean Catch    CBC w AUTO Differential     New Medications Ordered This Visit   Medications    fluticasone (FLONASE) 50 MCG/ACT nasal spray     Si sprays into the nostril(s) as directed by provider Daily.     Dispense:  11.1 g     Refill:  3    fexofenadine (Allegra Allergy)  "180 MG tablet     Sig: Take 1 tablet by mouth Daily.     Dispense:  90 tablet     Refill:  1       SUMMARY/DISCUSSION  Fatigue lab workup.  Patient will call cardiology to schedule with [place] and liver transplant clinic in [place].  Continue current medication regimen for hyperlipidemia, anxiety, hypertension    Next Appointment with me: Visit date not found    Return in about 6 months (around 9/28/2024) for Medicare Wellness.      VITAL SIGNS     Visit Vitals  /84 (BP Location: Left arm, Patient Position: Sitting, Cuff Size: Large Adult)   Pulse 89   Temp 98.3 °F (36.8 °C) (Temporal)   Ht 167.6 cm (65.98\")   Wt 101 kg (222 lb)   LMP 03/24/2024 (Approximate) Comment: had one on march 4 and march 24   SpO2 96%   BMI 35.85 kg/m²     Wt Readings from Last 3 Encounters:   03/28/24 101 kg (222 lb)   03/25/24 101 kg (222 lb 3.2 oz)   03/01/24 98.9 kg (218 lb)     Body mass index is 35.85 kg/m².    MEDICATIONS AT THE TIME OF OFFICE VISIT     Current Outpatient Medications on File Prior to Visit   Medication Sig    acetaminophen (TYLENOL) 325 MG tablet Take 2 tablets by mouth Every 6 (Six) Hours As Needed for Mild Pain.    albuterol sulfate  (90 Base) MCG/ACT inhaler INHALE 2 PUFFS BY MOUTH EVERY 4 HOURS AS NEEDED FOR WHEEZING    atorvastatin (LIPITOR) 20 MG tablet Take 1 tablet by mouth Daily.    baclofen (LIORESAL) 10 MG tablet Take 1 tablet by mouth 2 (Two) Times a Day.    carvedilol (COREG) 3.125 MG tablet Take 1 tablet by mouth 2 (Two) Times a Day With Meals.    CBD (cannabidiol) oral oil Take  by mouth.    cyclobenzaprine (FLEXERIL) 10 MG tablet Take 1 tablet by mouth 3 (Three) Times a Day As Needed for Muscle Spasms.    Jjamw-Luyjs-Yabvdkoo-TenofAF (Symtuza) [phone] MG per tablet Take 1 tablet by mouth Daily.    Diclofenac Sodium (VOLTAREN) 1 % gel gel Apply 4 g topically to the appropriate area as directed 2 (Two) Times a Day. Use per pkg insert    diphenhydrAMINE (BENADRYL) 25 mg capsule Take " 1 capsule by mouth Every 6 (Six) Hours As Needed for Itching (swelling).    empagliflozin (Jardiance) 10 MG tablet tablet Take 1 tablet by mouth once daily    Entresto 49-51 MG tablet Take 1 tablet by mouth twice daily    famotidine (Pepcid) 20 MG tablet Take 2 tablets by mouth 2 (Two) Times a Day As Needed for Heartburn.    Insulin Pen Needle (BD Pen Needle Heidi 2nd Gen) 32G X 4 MM misc USE 1  ONCE DAILY WITH  SAXENDA    nitroglycerin (NITROSTAT) 0.4 MG SL tablet DISSOLVE ONE TABLET UNDER THE TONGUE EVERY 5 MINUTES AS NEEDED FOR CHEST PAIN.  DO NOT EXCEED A TOTAL OF 3 DOSES IN 15 MINUTES    ondansetron (Zofran) 4 MG tablet Take 1 tablet by mouth Every 12 (Twelve) Hours As Needed for Nausea or Vomiting.    pantoprazole (Protonix) 20 MG EC tablet Take 1 tablet by mouth Daily.    potassium chloride (K-DUR,KLOR-CON) 20 MEQ CR tablet Take 3 tablets by mouth once daily    promethazine-dextromethorphan (PROMETHAZINE-DM) 6.25-15 MG/5ML syrup Take 5 mL by mouth 4 (Four) Times a Day As Needed for Cough (cough).    Rimegepant Sulfate (Nurtec) 75 MG tablet dispersible tablet Take 1 tablet by mouth 1 (One) Time As Needed for migraine. Max of 1 tablet in 24 hours.    sertraline (Zoloft) 100 MG tablet Take 1 tablet by mouth Daily.    spironolactone (ALDACTONE) 25 MG tablet 1 tablet Daily.    torsemide (DEMADEX) 20 MG tablet TAKE 2 TABLETS BY MOUTH IN THE MORNING AND 2 TABLETS IN THE AFTERNOON    vitamin D (ERGOCALCIFEROL) 1.25 MG (55079 UT) capsule capsule Take 1 capsule by mouth Every 14 (Fourteen) Days.    warfarin (COUMADIN) 2.5 MG tablet TAKE 3 TABLETS BY MOUTH ON WED AND SAT AND TAKE TWO TABLETS BY MOUTH ALL OTHER DAYS OR AS DIRECTED    [DISCONTINUED] fluticasone (FLONASE) 50 MCG/ACT nasal spray 2 sprays into the nostril(s) as directed by provider Daily.    [DISCONTINUED] vitamin D (ERGOCALCIFEROL) 81574 units capsule capsule Take 1 capsule by mouth Every 30 (Thirty) Days. (Patient not taking: Reported on 3/28/2024)     No  current facility-administered medications on file prior to visit.      HISTORY OF PRESENT ILLNESS     Seen by Dr. Cerna for left foot and ankle pain.  Is unable to have MRI because of defibrillator but the syndesmosis broken screw does not really seem to be symptomatic.  She is status post ORIF left ankle and broken syndesmosis screw.  It was decided that they would pursue a CT rather than MRI for further evaluation and bone scan.      Seen by cardiology, Dr. Melton, for heart failure with reduced ejection fraction, nonischemic cardiomyopathy.  Severe dilated nonischemic cardiomyopathy with ejection fraction of 22% with AICD in place.  Hypertension overall stable and controlled.  Left bundle branch block chronic and stable.  History of pulmonary embolism on warfarin diagnosed in 2020.  History of bradycardia with hypotension associated with fatigue dizziness and headache due to being overmedicated with Entresto and Corlanor.  Improvement decreasing Entresto and stopping Corlanor.  Now on carvedilol with better tolerance.  She was supposed to see the transplant clinic in [place] in March but the appointment was canceled.  Plan is to transition to [place].    Obstructive sleep apnea on CPAP.    Anxiety was previously uncontrolled in November 2023 for Zoloft was increased from 50 to 100 mg daily with mild to moderate improvement in symptoms now stable.  No thoughts of suicide self-harm or panic attack.    Migraine headaches stable and followed by neurology.    HIV positive followed by [facility] stable.    Scheduling mammogram.    Complain of increased fatigue.    REVIEW OF SYSTEMS     Constitutional fatigue   Resp neg  CV neg   Psych anxious     PHYSICAL EXAMINATION     Physical Exam  Constitutional  No distress  Cardiovascular Rate  normal . Rhythm: regular . Heart sounds:  normal  Pulmonary/Chest  Effort normal. Breath sounds:  normal  Psychiatric  Alert. Judgment and thought content normal. Mood normal       REVIEWED DATA     Labs:   Lab Results   Component Value Date    GLUCOSE 139 (H) 12/27/2023    BUN 21 (H) 12/27/2023    CREATININE 1.07 (H) 12/27/2023    EGFRRESULT 62 07/13/2023    EGFR 64.2 12/27/2023    BCR 19.6 12/27/2023    K 3.6 12/27/2023    CO2 23.0 12/27/2023    CALCIUM 9.6 12/27/2023    PROTENTOTREF 7.1 07/13/2023    ALBUMIN 4.4 12/27/2023    BILITOT 0.3 12/27/2023    AST 16 12/27/2023    ALT 20 12/27/2023     Lab Results   Component Value Date    WBC 9.82 12/27/2023    HGB 12.6 12/27/2023    HCT 39.6 12/27/2023    MCV 96.1 12/27/2023     12/27/2023     Lab Results   Component Value Date    CHOL 173 09/12/2022    CHLPL 167 07/13/2023    TRIG 144 07/13/2023    HDL 33 (L) 07/13/2023     (H) 07/13/2023     Lab Results   Component Value Date    TSH 1.910 07/13/2023    U4QHYXW 5.38 07/29/2020     Lab Results   Component Value Date    HGBA1C 5.8 (H) 07/13/2023     Brief Urine Lab Results  (Last result in the past 365 days)        Color   Clarity   Blood   Leuk Est   Nitrite   Protein   CREAT   Urine HCG        10/23/23 2143 Yellow   Clear   Negative   Negative   Negative   Negative                 Imaging:           Medical Tests:           Summary of old records / correspondence / consultant report:           Request outside records:             *dragon dictation used for documentation.

## 2024-03-29 ENCOUNTER — TRANSCRIBE ORDERS (OUTPATIENT)
Dept: ADMINISTRATIVE | Facility: HOSPITAL | Age: 49
End: 2024-03-29
Payer: MEDICARE

## 2024-03-29 ENCOUNTER — TELEPHONE (OUTPATIENT)
Dept: CARDIOLOGY | Facility: CLINIC | Age: 49
End: 2024-03-29
Payer: MEDICARE

## 2024-03-29 DIAGNOSIS — Z12.31 BREAST CANCER SCREENING BY MAMMOGRAM: Primary | ICD-10-CM

## 2024-03-29 LAB
25(OH)D3+25(OH)D2 SERPL-MCNC: 49.2 NG/ML (ref 30–100)
ALBUMIN SERPL-MCNC: 4.1 G/DL (ref 3.9–4.9)
ALBUMIN/GLOB SERPL: 1.5 {RATIO} (ref 1.2–2.2)
ALP SERPL-CCNC: 175 IU/L (ref 44–121)
ALT SERPL-CCNC: 22 IU/L (ref 0–32)
APPEARANCE UR: CLEAR
AST SERPL-CCNC: 20 IU/L (ref 0–40)
BACTERIA #/AREA URNS HPF: NORMAL /[HPF]
BASOPHILS # BLD AUTO: 0 X10E3/UL (ref 0–0.2)
BASOPHILS NFR BLD AUTO: 0 %
BILIRUB SERPL-MCNC: 0.4 MG/DL (ref 0–1.2)
BILIRUB UR QL STRIP: NEGATIVE
BUN SERPL-MCNC: 14 MG/DL (ref 6–24)
BUN/CREAT SERPL: 15 (ref 9–23)
CALCIUM SERPL-MCNC: 9.4 MG/DL (ref 8.7–10.2)
CASTS URNS QL MICRO: NORMAL /LPF
CHLORIDE SERPL-SCNC: 96 MMOL/L (ref 96–106)
CHOLEST SERPL-MCNC: 157 MG/DL (ref 100–199)
CHOLEST/HDLC SERPL: 4.2 RATIO (ref 0–4.4)
CO2 SERPL-SCNC: 22 MMOL/L (ref 20–29)
COLOR UR: YELLOW
CREAT SERPL-MCNC: 0.94 MG/DL (ref 0.57–1)
EGFRCR SERPLBLD CKD-EPI 2021: 75 ML/MIN/1.73
EOSINOPHIL # BLD AUTO: 0 X10E3/UL (ref 0–0.4)
EOSINOPHIL NFR BLD AUTO: 0 %
EPI CELLS #/AREA URNS HPF: NORMAL /HPF (ref 0–10)
ERYTHROCYTE [DISTWIDTH] IN BLOOD BY AUTOMATED COUNT: 12 % (ref 11.7–15.4)
FERRITIN SERPL-MCNC: 60 NG/ML (ref 15–150)
GLOBULIN SER CALC-MCNC: 2.8 G/DL (ref 1.5–4.5)
GLUCOSE SERPL-MCNC: 126 MG/DL (ref 70–99)
GLUCOSE UR QL STRIP: ABNORMAL
HBA1C MFR BLD: 5.9 % (ref 4.8–5.6)
HCT VFR BLD AUTO: 39.1 % (ref 34–46.6)
HDLC SERPL-MCNC: 37 MG/DL
HGB BLD-MCNC: 13.2 G/DL (ref 11.1–15.9)
HGB UR QL STRIP: NEGATIVE
IMM GRANULOCYTES # BLD AUTO: 0 X10E3/UL (ref 0–0.1)
IMM GRANULOCYTES NFR BLD AUTO: 0 %
IRON SATN MFR SERPL: 33 % (ref 15–55)
IRON SERPL-MCNC: 126 UG/DL (ref 27–159)
KETONES UR QL STRIP: NEGATIVE
LDLC SERPL CALC-MCNC: 96 MG/DL (ref 0–99)
LEUKOCYTE ESTERASE UR QL STRIP: NEGATIVE
LYMPHOCYTES # BLD AUTO: 2.2 X10E3/UL (ref 0.7–3.1)
LYMPHOCYTES NFR BLD AUTO: 24 %
MCH RBC QN AUTO: 32.4 PG (ref 26.6–33)
MCHC RBC AUTO-ENTMCNC: 33.8 G/DL (ref 31.5–35.7)
MCV RBC AUTO: 96 FL (ref 79–97)
MICRO URNS: ABNORMAL
MICRO URNS: ABNORMAL
MONOCYTES # BLD AUTO: 0.7 X10E3/UL (ref 0.1–0.9)
MONOCYTES NFR BLD AUTO: 8 %
NEUTROPHILS # BLD AUTO: 6.4 X10E3/UL (ref 1.4–7)
NEUTROPHILS NFR BLD AUTO: 68 %
NITRITE UR QL STRIP: NEGATIVE
PH UR STRIP: 6 [PH] (ref 5–7.5)
PLATELET # BLD AUTO: 185 X10E3/UL (ref 150–450)
POTASSIUM SERPL-SCNC: 3.7 MMOL/L (ref 3.5–5.2)
PROT SERPL-MCNC: 6.9 G/DL (ref 6–8.5)
PROT UR QL STRIP: NEGATIVE
RBC # BLD AUTO: 4.08 X10E6/UL (ref 3.77–5.28)
RBC #/AREA URNS HPF: NORMAL /HPF (ref 0–2)
SODIUM SERPL-SCNC: 136 MMOL/L (ref 134–144)
SP GR UR STRIP: 1.01 (ref 1–1.03)
T4 FREE SERPL-MCNC: 1.11 NG/DL (ref 0.82–1.77)
TIBC SERPL-MCNC: 379 UG/DL (ref 250–450)
TRIGL SERPL-MCNC: 137 MG/DL (ref 0–149)
TSH SERPL DL<=0.005 MIU/L-ACNC: 2.11 UIU/ML (ref 0.45–4.5)
UIBC SERPL-MCNC: 253 UG/DL (ref 131–425)
URINALYSIS REFLEX: ABNORMAL
UROBILINOGEN UR STRIP-MCNC: 0.2 MG/DL (ref 0.2–1)
VIT B12 SERPL-MCNC: 837 PG/ML (ref 232–1245)
VLDLC SERPL CALC-MCNC: 24 MG/DL (ref 5–40)
WBC # BLD AUTO: 9.4 X10E3/UL (ref 3.4–10.8)
WBC #/AREA URNS HPF: NORMAL /HPF (ref 0–5)

## 2024-03-29 NOTE — TELEPHONE ENCOUNTER
When reviewing a past ICD report from the pt's device, I came upon this message in EPIC from today - the pt as mentioned above thought she was shocked by her [employer] ICD. I called the pt and she said she has also been having some gas/indigestion and reports that something abruptly woke her up in the middle of the night that she thought might have been a shock from the device. I had her send over a manual transmission from her home monitor and the ICD results show that there were no new events recorded and no therapy was given. Everything appears stable and WNL on the lead trends and histograms. I let her know I would pass this information along to you.   Kindly,   Meg Schotanus Device RN

## 2024-03-29 NOTE — TELEPHONE ENCOUNTER
Caller: Nina Saez    Relationship: Self    Best call back number: [phone]    What is the best time to reach you: ANY    Who are you requesting to speak with (clinical staff, provider,  specific staff member): CLINICAL      What was the call regarding: PATIENT STATED THAT SHE WAS WOKEN UP LAST NIGHT AND THINKS THAT HER [employer] ICD WENT OFF OR SHOCKED HER. PATIENT WOULD LIKE A CALL BACK TO KNOW IF HER MONITOR SHOWED ANY ACTIVITY. PATIENT STATED THAT SHE FEELS LIKE SHE HAS GAS BUILD UP. PLEASE CONTACT PATIENT TO ADVISE.     Is it okay if the provider responds through MyChart: CALL    ”

## 2024-03-29 NOTE — TELEPHONE ENCOUNTER
Can you call her and find out what she is referencing?        ----- Message from Francisca Hunter MA sent at 3/29/2024  3:42 PM EDT -----  Regarding: FW: Good morning   Contact: [phone]  Do you know what she is referring to?    Francisca  ----- Message -----  From: Nina Saez  Sent: 3/29/2024   7:43 AM EDT  To: T[facility]  Subject: Good morning                                     Good morning Dr Melton I need to speak with you about this [facility] situation can you please give me a call when you get a chance @[phone] thank you

## 2024-04-01 NOTE — TELEPHONE ENCOUNTER
Called and spoke with the patient.  She stated that she is set up with [place] for the transplant.  She stated that she is not able to make it to [place] like she would like to.  She stated that [place] not travel on the highways by herself.  She stated that the transplant team is going to have her follow up in a [place] office.  She is wanting to know if that will be ok and if she will still be hooked up with the [facility] transplant team. She stated that she is trying to get all this together and please dont give up on her. Please advise.    Francisca

## 2024-04-03 ENCOUNTER — ANTICOAGULATION VISIT (OUTPATIENT)
Dept: PHARMACY | Facility: HOSPITAL | Age: 49
End: 2024-04-03
Payer: MEDICARE

## 2024-04-03 LAB — INR PPP: 2.6

## 2024-04-03 PROCEDURE — G0249 PROVIDE INR TEST MATER/EQUIP: HCPCS

## 2024-04-03 NOTE — PROGRESS NOTES
Anticoagulation Clinic Progress Note    Anticoagulation Summary  As of 4/3/2024      INR goal:  2.0-3.0   TTR:  61.7% (3.3 y)   INR used for dosin.60 (4/3/2024)   Warfarin maintenance plan:  7.5 mg every Wed, Sat; 5 mg all other days   Weekly warfarin total:  40 mg   No change documented:  Zina De La Rosa RPH   Plan last modified:  Zina De La Rosa RPH (1/3/2024)   Next INR check:  4/10/2024   Priority:  High   Target end date:  Indefinite    Indications    Other acute pulmonary embolism  unspecified whether acute cor pulmonale present (Resolved) [I26.99]  Acute pulmonary embolism  unspecified pulmonary embolism type  unspecified whether acute cor pulmonale present (Resolved) [I26.99]                 Anticoagulation Episode Summary       INR check location:      Preferred lab:      Send INR reminders to:   [facility] HOME TEST POOL    Comments:  **Home Testing Program**          Anticoagulation Care Providers       Provider Role Specialty Phone number    Lisset Melton MD Referring Cardiology [phone]            Clinic Interview:  No pertinent clinical findings have been reported.    INR History:      3/13/2024    10:07 AM 3/20/2024    12:00 AM 3/20/2024    10:40 AM 3/27/2024    12:00 AM 3/27/2024    10:12 AM 4/3/2024    12:00 AM 4/3/2024     1:45 PM   Anticoagulation Monitoring   INR 2.60  2.70  2.60  2.60   INR Date 3/13/2024  3/20/2024  3/27/2024  4/3/2024   INR Goal 2.0-3.0  2.0-3.0  2.0-3.0  2.0-3.0   Trend Same  Same  Same  Same   Last Week Total 40 mg  40 mg  40 mg  40 mg   Next Week Total 40 mg  40 mg  40 mg  40 mg   Sun 5 mg  5 mg  5 mg  5 mg   Mon 5 mg  5 mg  5 mg  5 mg   Tue 5 mg  5 mg  5 mg  5 mg   Wed 7.5 mg  7.5 mg  7.5 mg  7.5 mg   Thu 5 mg  5 mg  5 mg  5 mg   Fri 5 mg  5 mg  5 mg  5 mg   Sat 7.5 mg  7.5 mg  7.5 mg  7.5 mg   Historical INR  2.70      2.60      2.60            This result is from an external source.       Plan:  1. INR is therapeutic today- see above in  Anticoagulation Summary.    Nina Saez to continue their warfarin regimen- see above in Anticoagulation Summary.  2. Follow up in 1 week  3. Pt has agreed to only be called if INR out of range. They have been instructed to call if any changes in medications, doses, concerns, etc. Patient expresses understanding and has no further questions at this time.    Zina De La Rosa, [facility]

## 2024-04-05 RX ORDER — CARVEDILOL 3.12 MG/1
3.12 TABLET ORAL 2 TIMES DAILY WITH MEALS
Qty: 180 TABLET | Refills: 0 | Status: SHIPPED | OUTPATIENT
Start: 2024-04-05

## 2024-04-05 NOTE — TELEPHONE ENCOUNTER
NOV-05/21/24-RM  LOV-02/20/24-RM    Plan:      Advised that she see her GYN because of her menopausal symptoms.  She is okay to use Zofran as needed for nausea.  No other medication changes, see me back in 3 months.  Her stress study is really stable, there is no significant ischemia.  She has no restrictions for working.

## 2024-04-08 RX ORDER — POTASSIUM CHLORIDE 20 MEQ/1
TABLET, EXTENDED RELEASE ORAL
Qty: 90 TABLET | Refills: 1 | Status: SHIPPED | OUTPATIENT
Start: 2024-04-08

## 2024-04-11 ENCOUNTER — ANTICOAGULATION VISIT (OUTPATIENT)
Dept: PHARMACY | Facility: HOSPITAL | Age: 49
End: 2024-04-11
Payer: MEDICARE

## 2024-04-11 LAB — INR PPP: 2.2

## 2024-04-11 NOTE — PROGRESS NOTES
Anticoagulation Clinic Progress Note    Anticoagulation Summary  As of 2024      INR goal:  2.0-3.0   TTR:  61.9% (3.3 y)   INR used for dosin.20 (2024)   Warfarin maintenance plan:  7.5 mg every Wed, Sat; 5 mg all other days   Weekly warfarin total:  40 mg   No change documented:  Zina De La Rosa RPH   Plan last modified:  Zina De La Rosa RPH (1/3/2024)   Next INR check:  2024   Priority:  High   Target end date:  Indefinite    Indications    Other acute pulmonary embolism  unspecified whether acute cor pulmonale present (Resolved) [I26.99]  Acute pulmonary embolism  unspecified pulmonary embolism type  unspecified whether acute cor pulmonale present (Resolved) [I26.99]                 Anticoagulation Episode Summary       INR check location:      Preferred lab:      Send INR reminders to:   [facility]    Comments:  **Home Testing Program**          Anticoagulation Care Providers       Provider Role Specialty Phone number    Lisset Melton MD Referring Cardiology [phone]            Clinic Interview:  No pertinent clinical findings have been reported.    INR History:      3/20/2024    10:40 AM 3/27/2024    12:00 AM 3/27/2024    10:12 AM 4/3/2024    12:00 AM 4/3/2024     1:45 PM 2024    12:00 AM 2024    10:56 AM   Anticoagulation Monitoring   INR 2.70  2.60  2.60  2.20   INR Date 3/20/2024  3/27/2024  4/3/2024  2024   INR Goal 2.0-3.0  2.0-3.0  2.0-3.0  2.0-3.0   Trend Same  Same  Same  Same   Last Week Total 40 mg  40 mg  40 mg  40 mg   Next Week Total 40 mg  40 mg  40 mg  40 mg   Sun 5 mg  5 mg  5 mg  5 mg   Mon 5 mg  5 mg  5 mg  5 mg   Tue 5 mg  5 mg  5 mg  5 mg   Wed 7.5 mg  7.5 mg  7.5 mg  7.5 mg   Thu 5 mg  5 mg  5 mg  5 mg   Fri 5 mg  5 mg  5 mg  5 mg   Sat 7.5 mg  7.5 mg  7.5 mg  7.5 mg   Historical INR  2.60      2.60      2.20            This result is from an external source.       Plan:  1. INR is therapeutic today- see above in  Anticoagulation Summary.    Nina Saez to continue their warfarin regimen- see above in Anticoagulation Summary.  2. Follow up in 1 week  3. Pt has agreed to only be called if INR out of range. They have been instructed to call if any changes in medications, doses, concerns, etc. Patient expresses understanding and has no further questions at this time.    Zina De La Rosa, [facility]

## 2024-04-12 RX ORDER — ATORVASTATIN CALCIUM 20 MG/1
20 TABLET, FILM COATED ORAL DAILY
Qty: 90 TABLET | Refills: 0 | Status: SHIPPED | OUTPATIENT
Start: 2024-04-12

## 2024-04-16 ENCOUNTER — SPECIALTY PHARMACY (OUTPATIENT)
Dept: NEUROLOGY | Facility: CLINIC | Age: 49
End: 2024-04-16
Payer: MEDICARE

## 2024-04-16 NOTE — PROGRESS NOTES
Specialty Pharmacy Refill Coordination Note     Nina is a 48 y.o. female contacted today regarding refills of  [facility] specialty medication(s).    Reviewed and verified with patient:       Specialty medication(s) and dose(s) confirmed: yes    Refill Questions      Flowsheet Row Most Recent Value   Changes to allergies? No   Changes to medications? No   New conditions or infections since last clinic visit No   Unplanned office visit, urgent care, ED, or hospital admission in the last 4 weeks  No   How does patient/caregiver feel medication is working? Very good   Financial problems or insurance changes  No   Since the previous refill, were any specialty medication doses or scheduled injections missed or delayed?  No   Does this patient require a clinical escalation to a pharmacist? No            Delivery Questions      Flowsheet Row Most Recent Value   Delivery method Beeline   Delivery address verified with patient/caregiver? Yes   Delivery address Home   Number of medications in delivery 1   Medication(s) being filled and delivered Rimegepant Sulfate   Doses left of specialty medications couple left   Copay verified? Yes   Copay amount $0   Copay form of payment No copayment ($0)              Medication Adherence    Adherence tools used: patient uses a pill box to manage medications  Support network for adherence: family member          Follow-up: 21 day(s)     Sydnee Lozano, Pharmacy Technician  Specialty Pharmacy Technician

## 2024-04-17 ENCOUNTER — ANTICOAGULATION VISIT (OUTPATIENT)
Dept: PHARMACY | Facility: HOSPITAL | Age: 49
End: 2024-04-17
Payer: MEDICARE

## 2024-04-17 LAB — INR PPP: 3.5

## 2024-04-17 NOTE — PROGRESS NOTES
Anticoagulation Clinic Progress Note    Anticoagulation Summary  As of 4/17/2024      INR goal:  2.0-3.0   TTR:  61.9% (3.3 y)   INR used for dosing:  3.50 (4/17/2024)   Warfarin maintenance plan:  7.5 mg every Wed, Sat; 5 mg all other days   Weekly warfarin total:  40 mg   Plan last modified:  Zina De La Rosa RPH (1/3/2024)   Next INR check:  4/24/2024   Priority:  High   Target end date:  Indefinite    Indications    Other acute pulmonary embolism  unspecified whether acute cor pulmonale present (Resolved) [I26.99]  Acute pulmonary embolism  unspecified pulmonary embolism type  unspecified whether acute cor pulmonale present (Resolved) [I26.99]                 Anticoagulation Episode Summary       INR check location:      Preferred lab:      Send INR reminders to:  [facility]    Comments:  **Home Testing Program**          Anticoagulation Care Providers       Provider Role Specialty Phone number    Lisset Melton MD Referring Cardiology [phone]            Clinic Interview:  Patient Findings     Negatives:  Signs/symptoms of thrombosis, Signs/symptoms of bleeding,   Laboratory test error suspected, Change in health, Change in alcohol use,   Change in activity, Upcoming invasive procedure, Emergency department   visit, Upcoming dental procedure, Missed doses, Extra doses, Change in   medications, Change in diet/appetite, Hospital admission, Bruising, Other   complaints      Clinical Outcomes     Negatives:  Major bleeding event, Thromboembolic event,   Anticoagulation-related hospital admission, Anticoagulation-related ED   visit, Anticoagulation-related fatality        INR History:      3/27/2024    10:12 AM 4/3/2024    12:00 AM 4/3/2024     1:45 PM 4/11/2024    12:00 AM 4/11/2024    10:56 AM 4/17/2024    12:00 AM 4/17/2024    12:59 PM   Anticoagulation Monitoring   INR 2.60  2.60  2.20  3.50   INR Date 3/27/2024  4/3/2024  4/11/2024  4/17/2024   INR Goal 2.0-3.0  2.0-3.0  2.0-3.0   2.0-3.0   Trend Same  Same  Same  Same   Last Week Total 40 mg  40 mg  40 mg  40 mg   Next Week Total 40 mg  40 mg  40 mg  37.5 mg   Sun 5 mg  5 mg  5 mg  5 mg   Mon 5 mg  5 mg  5 mg  5 mg   Tue 5 mg  5 mg  5 mg  5 mg   Wed 7.5 mg  7.5 mg  7.5 mg  5 mg (4/17)   Thu 5 mg  5 mg  5 mg  5 mg   Fri 5 mg  5 mg  5 mg  5 mg   Sat 7.5 mg  7.5 mg  7.5 mg  7.5 mg   Historical INR  2.60      2.20      3.50            This result is from an external source.       Plan:  1. INR is Supratherapeutic today- see above in Anticoagulation Summary.   Will instruct Nina Saez to Change their warfarin regimen- see above in Anticoagulation Summary.  partial to 5 mg then resume, rck 1 week   2. Follow up in 1 weeks  3. They have been instructed to call if any changes in medications, doses, concerns, etc. Patient expresses understanding and has no further questions at this time.    Zina De La Rosa [facility]

## 2024-04-19 DIAGNOSIS — I50.22 CHRONIC SYSTOLIC CONGESTIVE HEART FAILURE: ICD-10-CM

## 2024-04-21 RX ORDER — NITROGLYCERIN 0.4 MG/1
0.4 TABLET SUBLINGUAL
Qty: 25 TABLET | Refills: 0 | Status: SHIPPED | OUTPATIENT
Start: 2024-04-21

## 2024-05-01 ENCOUNTER — ANTICOAGULATION VISIT (OUTPATIENT)
Dept: PHARMACY | Facility: HOSPITAL | Age: 49
End: 2024-05-01
Payer: MEDICARE

## 2024-05-01 LAB — INR PPP: 3.1

## 2024-05-01 NOTE — PROGRESS NOTES
Anticoagulation Clinic Progress Note    Anticoagulation Summary  As of 5/1/2024      INR goal:  2.0-3.0   TTR:  61.2% (3.3 y)   INR used for dosing:  3.10 (5/1/2024)   Warfarin maintenance plan:  7.5 mg every Wed, Sat; 5 mg all other days   Weekly warfarin total:  40 mg   No change documented:  Haley Lancaster, John   Plan last modified:  Zina De La Rosa RPH (1/3/2024)   Next INR check:  5/8/2024   Priority:  High   Target end date:  Indefinite    Indications    Other acute pulmonary embolism  unspecified whether acute cor pulmonale present (Resolved) [I26.99]  Acute pulmonary embolism  unspecified pulmonary embolism type  unspecified whether acute cor pulmonale present (Resolved) [I26.99]                 Anticoagulation Episode Summary       INR check location:      Preferred lab:      Send INR reminders to:   [facility]    Comments:  **Home Testing Program**          Anticoagulation Care Providers       Provider Role Specialty Phone number    Lisset Melton MD Referring Cardiology [phone]            Clinic Interview:  Patient Findings     Negatives:  Signs/symptoms of thrombosis, Signs/symptoms of bleeding,   Laboratory test error suspected, Change in health, Change in alcohol use,   Change in activity, Upcoming invasive procedure, Emergency department   visit, Upcoming dental procedure, Missed doses, Extra doses, Change in   medications, Change in diet/appetite, Hospital admission, Bruising, Other   complaints      Clinical Outcomes     Negatives:  Major bleeding event, Thromboembolic event,   Anticoagulation-related hospital admission, Anticoagulation-related ED   visit, Anticoagulation-related fatality        INR History:      4/3/2024     1:45 PM 4/11/2024    12:00 AM 4/11/2024    10:56 AM 4/17/2024    12:00 AM 4/17/2024    12:59 PM 5/1/2024    12:00 AM 5/1/2024    10:58 AM   Anticoagulation Monitoring   INR 2.60  2.20  3.50  3.10   INR Date 4/3/2024  4/11/2024  4/17/2024   5/1/2024   INR Goal 2.0-3.0  2.0-3.0  2.0-3.0  2.0-3.0   Trend Same  Same  Same  Same   Last Week Total 40 mg  40 mg  40 mg  40 mg   Next Week Total 40 mg  40 mg  37.5 mg  40 mg   Sun 5 mg  5 mg  5 mg  5 mg   Mon 5 mg  5 mg  5 mg  5 mg   Tue 5 mg  5 mg  5 mg  5 mg   Wed 7.5 mg  7.5 mg  5 mg (4/17)  7.5 mg   Thu 5 mg  5 mg  5 mg  5 mg   Fri 5 mg  5 mg  5 mg  5 mg   Sat 7.5 mg  7.5 mg  7.5 mg  7.5 mg   Historical INR  2.20      3.50      3.10            This result is from an external source.       Plan:  1. INR is Supratherapeutic today- see above in Anticoagulation Summary.   Will instruct Nina PHAM Saez to Continue their warfarin regimen- see above in Anticoagulation Summary.  2. Follow up in 1 weeks  3. They have been instructed to call if any changes in medications, doses, concerns, etc. Patient expresses understanding and has no further questions at this time.    Haley Lancaster, PharmD

## 2024-05-09 ENCOUNTER — ANTICOAGULATION VISIT (OUTPATIENT)
Dept: PHARMACY | Facility: HOSPITAL | Age: 49
End: 2024-05-09
Payer: MEDICARE

## 2024-05-09 LAB — INR PPP: 2.4

## 2024-05-09 PROCEDURE — G0249 PROVIDE INR TEST MATER/EQUIP: HCPCS

## 2024-05-11 DIAGNOSIS — I50.20 HFREF (HEART FAILURE WITH REDUCED EJECTION FRACTION): ICD-10-CM

## 2024-05-13 RX ORDER — TORSEMIDE 20 MG/1
TABLET ORAL
Qty: 150 TABLET | Refills: 0 | Status: SHIPPED | OUTPATIENT
Start: 2024-05-13

## 2024-05-16 ENCOUNTER — HOSPITAL ENCOUNTER (OUTPATIENT)
Dept: NUCLEAR MEDICINE | Facility: HOSPITAL | Age: 49
Discharge: HOME | End: 2024-05-16
Payer: MEDICARE

## 2024-05-16 ENCOUNTER — ANTICOAGULATION VISIT (OUTPATIENT)
Dept: PHARMACY | Facility: HOSPITAL | Age: 49
End: 2024-05-16
Payer: MEDICARE

## 2024-05-16 ENCOUNTER — HOSPITAL ENCOUNTER (OUTPATIENT)
Dept: MAMMOGRAPHY | Facility: HOSPITAL | Age: 49
Discharge: HOME | End: 2024-05-16
Payer: MEDICARE

## 2024-05-16 ENCOUNTER — HOSPITAL ENCOUNTER (OUTPATIENT)
Dept: CT IMAGING | Facility: HOSPITAL | Age: 49
Discharge: HOME | End: 2024-05-16
Payer: MEDICARE

## 2024-05-16 DIAGNOSIS — Z98.890 HISTORY OF OPEN REDUCTION AND INTERNAL FIXATION (ORIF) PROCEDURE: ICD-10-CM

## 2024-05-16 DIAGNOSIS — Q74.2 ACCESSORY NAVICULAR BONE OF LEFT FOOT: ICD-10-CM

## 2024-05-16 DIAGNOSIS — M76.822 TIBIAL TENDONITIS, POSTERIOR, LEFT: ICD-10-CM

## 2024-05-16 DIAGNOSIS — Z12.31 BREAST CANCER SCREENING BY MAMMOGRAM: ICD-10-CM

## 2024-05-16 LAB — INR PPP: 3.2

## 2024-05-16 PROCEDURE — G0463 HOSPITAL OUTPT CLINIC VISIT: HCPCS

## 2024-05-16 PROCEDURE — 78315 BONE IMAGING 3 PHASE: CPT

## 2024-05-16 PROCEDURE — 77063 BREAST TOMOSYNTHESIS BI: CPT

## 2024-05-16 PROCEDURE — 73700 CT LOWER EXTREMITY W/O DYE: CPT

## 2024-05-16 PROCEDURE — 77067 SCR MAMMO BI INCL CAD: CPT

## 2024-05-16 PROCEDURE — A9503 TC99M MEDRONATE: HCPCS | Performed by: ORTHOPAEDIC SURGERY

## 2024-05-16 PROCEDURE — 0 TECHNETIUM MEDRONATE KIT: Performed by: ORTHOPAEDIC SURGERY

## 2024-05-16 RX ORDER — TC 99M MEDRONATE 20 MG/10ML
18.3 INJECTION, POWDER, LYOPHILIZED, FOR SOLUTION INTRAVENOUS
Status: COMPLETED | OUTPATIENT
Start: 2024-05-16 | End: 2024-05-16

## 2024-05-16 RX ADMIN — Medication 18.3 MILLICURIE: at 10:35

## 2024-05-16 NOTE — PROGRESS NOTES
Anticoagulation Clinic Progress Note    Anticoagulation Summary  As of 5/16/2024      INR goal:  2.0-3.0   TTR:  61.4% (3.4 y)   INR used for dosing:  3.20 (5/16/2024)   Warfarin maintenance plan:  7.5 mg every Wed, Sat; 5 mg all other days   Weekly warfarin total:  40 mg   Plan last modified:  Zina De La Rosa RPH (1/3/2024)   Next INR check:  5/23/2024   Priority:  High   Target end date:  Indefinite    Indications    Other acute pulmonary embolism  unspecified whether acute cor pulmonale present (Resolved) [I26.99]  Acute pulmonary embolism  unspecified pulmonary embolism type  unspecified whether acute cor pulmonale present (Resolved) [I26.99]                 Anticoagulation Episode Summary       INR check location:      Preferred lab:      Send INR reminders to:  [facility]    Comments:  **Home Testing Program**          Anticoagulation Care Providers       Provider Role Specialty Phone number    Lisset Melton MD Referring Cardiology [phone]            Clinic Interview:  Patient Findings     Negatives:  Signs/symptoms of thrombosis, Signs/symptoms of bleeding,   Laboratory test error suspected, Change in health, Change in alcohol use,   Change in activity, Upcoming invasive procedure, Emergency department   visit, Upcoming dental procedure, Missed doses, Extra doses, Change in   medications, Change in diet/appetite, Hospital admission, Bruising, Other   complaints      Clinical Outcomes     Negatives:  Major bleeding event, Thromboembolic event,   Anticoagulation-related hospital admission, Anticoagulation-related ED   visit, Anticoagulation-related fatality        INR History:      4/17/2024    12:59 PM 5/1/2024    12:00 AM 5/1/2024    10:58 AM 5/9/2024    12:00 AM 5/9/2024     9:15 AM 5/16/2024    12:00 AM 5/16/2024     3:22 PM   Anticoagulation Monitoring   INR 3.50  3.10  2.40  3.20   INR Date 4/17/2024 5/1/2024 5/9/2024 5/16/2024   INR Goal 2.0-3.0  2.0-3.0  2.0-3.0   2.0-3.0   Trend Same  Same  Same  Same   Last Week Total 40 mg  40 mg  40 mg  40 mg   Next Week Total 37.5 mg  40 mg  40 mg  37.5 mg   Sun 5 mg  5 mg  5 mg  5 mg   Mon 5 mg  5 mg  5 mg  5 mg   Tue 5 mg  5 mg  5 mg  5 mg   Wed 5 mg (4/17)  7.5 mg  7.5 mg  7.5 mg   Thu 5 mg  5 mg  5 mg  2.5 mg (5/16)   Fri 5 mg  5 mg  5 mg  5 mg   Sat 7.5 mg  7.5 mg  7.5 mg  7.5 mg   Historical INR  3.10      2.40      3.20            This result is from an external source.       Plan:  1. INR is Supratherapeutic today- see above in Anticoagulation Summary.   Will instruct Nina Saez to Change their warfarin regimen- see above in Anticoagulation Summary.  2. Follow up in 1 weeks  3. They have been instructed to call if any changes in medications, doses, concerns, etc. Patient expresses understanding and has no further questions at this time.    Zina De La Rosa [facility]

## 2024-05-18 DIAGNOSIS — F41.1 GENERALIZED ANXIETY DISORDER: ICD-10-CM

## 2024-05-19 RX ORDER — SERTRALINE HYDROCHLORIDE 100 MG/1
100 TABLET, FILM COATED ORAL DAILY
Qty: 90 TABLET | Refills: 0 | Status: SHIPPED | OUTPATIENT
Start: 2024-05-19

## 2024-05-21 DIAGNOSIS — I50.22 CHRONIC SYSTOLIC CONGESTIVE HEART FAILURE: ICD-10-CM

## 2024-05-21 RX ORDER — NITROGLYCERIN 0.4 MG/1
0.4 TABLET SUBLINGUAL
Qty: 25 TABLET | Refills: 0 | Status: SHIPPED | OUTPATIENT
Start: 2024-05-21

## 2024-05-23 ENCOUNTER — ANTICOAGULATION VISIT (OUTPATIENT)
Dept: PHARMACY | Facility: HOSPITAL | Age: 49
End: 2024-05-23
Payer: MEDICARE

## 2024-05-23 LAB — INR PPP: 1.9

## 2024-05-23 NOTE — PROGRESS NOTES
Anticoagulation Clinic Progress Note    Anticoagulation Summary  As of 2024      INR goal:  2.0-3.0   TTR:  61.5% (3.4 y)   INR used for dosin.90 (2024)   Warfarin maintenance plan:  7.5 mg every Wed, Sat; 5 mg all other days   Weekly warfarin total:  40 mg   No change documented:  Zina De La Rosa RPH   Plan last modified:  Zina De La Rosa RPH (1/3/2024)   Next INR check:  2024   Priority:  High   Target end date:  Indefinite    Indications    Other acute pulmonary embolism  unspecified whether acute cor pulmonale present (Resolved) [I26.99]  Acute pulmonary embolism  unspecified pulmonary embolism type  unspecified whether acute cor pulmonale present (Resolved) [I26.99]                 Anticoagulation Episode Summary       INR check location:      Preferred lab:      Send INR reminders to:   [facility]    Comments:  **Home Testing Program**          Anticoagulation Care Providers       Provider Role Specialty Phone number    Lisset Melton MD Referring Cardiology [phone]            Clinic Interview:  Patient Findings     Negatives:  Signs/symptoms of thrombosis, Signs/symptoms of bleeding,   Laboratory test error suspected, Change in health, Change in alcohol use,   Change in activity, Upcoming invasive procedure, Emergency department   visit, Upcoming dental procedure, Missed doses, Extra doses, Change in   medications, Change in diet/appetite, Hospital admission, Bruising, Other   complaints      Clinical Outcomes     Negatives:  Major bleeding event, Thromboembolic event,   Anticoagulation-related hospital admission, Anticoagulation-related ED   visit, Anticoagulation-related fatality        INR History:      2024    10:58 AM 2024    12:00 AM 2024     9:15 AM 2024    12:00 AM 2024     3:22 PM 2024    12:00 AM 2024     9:38 AM   Anticoagulation Monitoring   INR 3.10  2.40  3.20  1.90   INR Date 2024   5/23/2024   INR Goal 2.0-3.0  2.0-3.0  2.0-3.0  2.0-3.0   Trend Same  Same  Same  Same   Last Week Total 40 mg  40 mg  40 mg  37.5 mg   Next Week Total 40 mg  40 mg  37.5 mg  40 mg   Sun 5 mg  5 mg  5 mg  5 mg   Mon 5 mg  5 mg  5 mg  5 mg   Tue 5 mg  5 mg  5 mg  5 mg   Wed 7.5 mg  7.5 mg  7.5 mg  7.5 mg   Thu 5 mg  5 mg  2.5 mg (5/16)  5 mg   Fri 5 mg  5 mg  5 mg  5 mg   Sat 7.5 mg  7.5 mg  7.5 mg  7.5 mg   Historical INR  2.40      3.20      1.90            This result is from an external source.       Plan:  1. INR is Subtherapeutic today- see above in Anticoagulation Summary.   Will instruct Nina Saez to resume their warfarin regimen- see above in Anticoagulation Summary.  2. Follow up in 1 weeks  3. They have been instructed to call if any changes in medications, doses, concerns, etc. Patient expresses understanding and has no further questions at this time.    Zina De La Rosa [facility]

## 2024-05-28 ENCOUNTER — OFFICE VISIT (OUTPATIENT)
Dept: CARDIOLOGY | Facility: CLINIC | Age: 49
End: 2024-05-28
Payer: MEDICARE

## 2024-05-28 VITALS
HEIGHT: 66 IN | WEIGHT: 219 LBS | HEART RATE: 94 BPM | DIASTOLIC BLOOD PRESSURE: 80 MMHG | BODY MASS INDEX: 35.2 KG/M2 | SYSTOLIC BLOOD PRESSURE: 120 MMHG

## 2024-05-28 DIAGNOSIS — I50.20 HFREF (HEART FAILURE WITH REDUCED EJECTION FRACTION): Primary | ICD-10-CM

## 2024-05-28 DIAGNOSIS — I42.0 DILATED CARDIOMYOPATHY: ICD-10-CM

## 2024-05-28 DIAGNOSIS — I42.8 NICM (NONISCHEMIC CARDIOMYOPATHY): ICD-10-CM

## 2024-05-28 DIAGNOSIS — B20 HIV INFECTION, UNSPECIFIED SYMPTOM STATUS: ICD-10-CM

## 2024-05-28 DIAGNOSIS — I10 ESSENTIAL HYPERTENSION: ICD-10-CM

## 2024-05-28 PROCEDURE — 3074F SYST BP LT 130 MM HG: CPT | Performed by: INTERNAL MEDICINE

## 2024-05-28 PROCEDURE — 1160F RVW MEDS BY RX/DR IN RCRD: CPT | Performed by: INTERNAL MEDICINE

## 2024-05-28 PROCEDURE — 99214 OFFICE O/P EST MOD 30 MIN: CPT | Performed by: INTERNAL MEDICINE

## 2024-05-28 PROCEDURE — 3079F DIAST BP 80-89 MM HG: CPT | Performed by: INTERNAL MEDICINE

## 2024-05-28 PROCEDURE — 1159F MED LIST DOCD IN RCRD: CPT | Performed by: INTERNAL MEDICINE

## 2024-05-28 PROCEDURE — 93000 ELECTROCARDIOGRAM COMPLETE: CPT | Performed by: INTERNAL MEDICINE

## 2024-05-28 NOTE — TELEPHONE ENCOUNTER
----- Message from Lesa Boles MA sent at 11/30/2020  3:32 PM EST -----  Pt would like an referral to GYN  ----- Message -----  From: Darcie Ledbetter APRN  Sent: 11/30/2020   3:27 PM EST  To: Lesa Boles MA    Call patient.     Nicky LINTON put her in a 15 minute spot tomorrow for breast exam and PAP.   I usually send patients to GYN for this, also would not be appropriate for a 15 minute spot.   See if she wants me to place a GYN referral for her.        IMPRESSION:   Acute hypoxic respiratory failure  COVID-19  Sepsis  CHF with fluid overload  Chronic kidney disease stage V on dialysis  Hypertension and diabetes by history  Hyponatremia resolved  Pt is critically ill. Time spent with pt and staff actively rendering care, managing pt and coordinating care as stated below; 30 minutes, exclusive of any procedures      RECOMMENDATIONS/PLAN:   ICU monitoring  51-year-old obese lady came in because of shortness of breath and dyspnea patient has COVID-19 also patient has history of chronic kidney disease on hemodialysis and she missed 2 dialysis before   Continue with dialysis she was getting it Monday Wednesday Friday we will repeat dialysis chest x-ray shows CHF fluid overload  She is now on 1 L nasal cannula will get pulse ox on room air to see if she qualifies for home oxygen  For COVID-19 patient on Decadron and received Actemra  CRP 24.7 procalcitonin 4.0 BNP 63570  CXR shows bilateral congestive changes superimposed infiltrate much improved  Will be available to assist in medical management while in the CCU pending disposition     [x] High complexity decision making was performed  [x] See my orders for details  HPI  51-year-old lady came in because of shortness of breath and dyspnea significant past medical history of chronic kidney disease stage V on hemodialysis hypertension CVA and diabetes mellitus. She went to [facility] and also she gets dialysis but she is to dialysis and then she was diagnosed with COVID-19 also having cough fever chills chest pain nausea and vomiting came to the emergency room she was put on high flow nasal cannula saturation was low BNP was elevated glucose was also elevated chest x-ray shows CHF fluid overload with superimposed possible COVID picture so admitted and critical care consult was called.     PMH:  has a past medical history of Allergic rhinitis, Blood clot in vein, Chronic kidney disease, CVA (cerebral vascular accident) ([facility] [address] (2014), Diabetes ([facility] [address] Dyslipidemia, Hypertension, IBS (irritable bowel syndrome), Ill-defined condition, and Renal failure. PSH:   has a past surgical history that includes hx cholecystectomy (); hx tonsillectomy (); hx  section (); pr unlisted procedure vascular surgery; and hx lap cholecystectomy. FHX: family history includes Diabetes in her father and mother; Heart Disease in her father and mother; Hypertension in her father and mother. SHX:  reports that she has never smoked. She has never used smokeless tobacco. She reports that she does not drink alcohol and does not use drugs.     ALL:   Allergies   Allergen Reactions    Fentanyl Anxiety    Sulfa (Sulfonamide Antibiotics) Nausea and Vomiting    Zithromax [Azithromycin] Unknown (comments)    Morphine Itching        MEDS:   [x] Reviewed - As Below   [] Not reviewed    Current Facility-Administered Medications   Medication    epoetin jodie-epbx (RETACRIT) injection 2,000 Units    amoxicillin-clavulanate (AUGMENTIN) 500-125 mg per tablet 1 Tablet    dexAMETHasone (DECADRON) tablet 4 mg    rivaroxaban (XARELTO) tablet 2.5 mg    melatonin tablet 3 mg    hydrOXYzine pamoate (VISTARIL) capsule 25 mg    carvediloL (COREG) tablet 6.25 mg    NIFEdipine ER (PROCARDIA XL) tablet 60 mg    insulin lispro (HUMALOG) injection    phenol throat spray (CHLORASEPTIC) 1 Spray    sorbitoL 70 % solution 60 mL    insulin lispro (HUMALOG) injection 10 Units    atorvastatin (LIPITOR) tablet 20 mg    furosemide (LASIX) tablet 80 mg    gabapentin (NEURONTIN) capsule 300 mg    sevelamer carbonate (RENVELA) tab 800 mg    glucose chewable tablet 16 g    glucagon (GLUCAGEN) injection 1 mg    acetaminophen (TYLENOL) tablet 650 mg    Or    acetaminophen (TYLENOL) suppository 650 mg    polyethylene glycol (MIRALAX) packet 17 g    ondansetron (ZOFRAN ODT) tablet 4 mg    Or    ondansetron (ZOFRAN) injection 4 mg    pantoprazole (PROTONIX) tablet 20 mg insulin glargine (LANTUS) injection 40 Units    dextromethorphan (DELSYM) 30 mg/5 mL syrup 30 mg    benzonatate (TESSALON) capsule 100 mg    traMADoL (ULTRAM) tablet 50 mg      MAR reviewed and pertinent medications noted or modified as needed   Current Facility-Administered Medications   Medication    epoetin jodie-epbx (RETACRIT) injection 2,000 Units    amoxicillin-clavulanate (AUGMENTIN) 500-125 mg per tablet 1 Tablet    dexAMETHasone (DECADRON) tablet 4 mg    rivaroxaban (XARELTO) tablet 2.5 mg    melatonin tablet 3 mg    hydrOXYzine pamoate (VISTARIL) capsule 25 mg    carvediloL (COREG) tablet 6.25 mg    NIFEdipine ER (PROCARDIA XL) tablet 60 mg    insulin lispro (HUMALOG) injection    phenol throat spray (CHLORASEPTIC) 1 Spray    sorbitoL 70 % solution 60 mL    insulin lispro (HUMALOG) injection 10 Units    atorvastatin (LIPITOR) tablet 20 mg    furosemide (LASIX) tablet 80 mg    gabapentin (NEURONTIN) capsule 300 mg    sevelamer carbonate (RENVELA) tab 800 mg    glucose chewable tablet 16 g    glucagon (GLUCAGEN) injection 1 mg    acetaminophen (TYLENOL) tablet 650 mg    Or    acetaminophen (TYLENOL) suppository 650 mg    polyethylene glycol (MIRALAX) packet 17 g    ondansetron (ZOFRAN ODT) tablet 4 mg    Or    ondansetron (ZOFRAN) injection 4 mg    pantoprazole (PROTONIX) tablet 20 mg    insulin glargine (LANTUS) injection 40 Units    dextromethorphan (DELSYM) 30 mg/5 mL syrup 30 mg    benzonatate (TESSALON) capsule 100 mg    traMADoL (ULTRAM) tablet 50 mg      PMH:  has a past medical history of Allergic rhinitis, Blood clot in vein, Chronic kidney disease, CVA (cerebral vascular accident) ([facility] [address] (2014), Diabetes ([facility] [address] Dyslipidemia, Hypertension, IBS (irritable bowel syndrome), Ill-defined condition, and Renal failure.   PSH:   has a past surgical history that includes hx cholecystectomy (); hx tonsillectomy (); hx  section (); pr unlisted procedure vascular surgery; and hx lap cholecystectomy. FHX: family history includes Diabetes in her father and mother; Heart Disease in her father and mother; Hypertension in her father and mother. SHX:  reports that she has never smoked. She has never used smokeless tobacco. She reports that she does not drink alcohol and does not use drugs. ROS:A comprehensive review of systems was negative except for that written in the HPI. Hemodynamics:    CO:    CI:    CVP:    SVR:   PAP Systolic:    PAP Diastolic:    PVR:    HJ90:        Ventilator Settings:      Mode Rate TV Press PEEP FiO2 PIP Min. Vent               28 %              Vital Signs: Telemetry:    normal sinus rhythm Intake/Output:   Visit Vitals  /84 (BP 1 Location: Right lower arm, BP Patient Position: At rest)   Pulse 64   Temp 98.2 °F (36.8 °C)   Resp 9   Ht 5' 8\" (1.727 m)   Wt 127 kg (279 lb 15.8 oz)   SpO2 96%   BMI 42.57 kg/m²       Temp (24hrs), Av.9 °F (36.6 °C), Min:97.6 °F (36.4 °C), Max:98.2 °F (36.8 °C)        O2 Device: None (Room air) O2 Flow Rate (L/min): 2 l/min       Wt Readings from Last 4 Encounters:   23 127 kg (279 lb 15.8 oz)   09/15/22 117.9 kg (260 lb)   22 122.5 kg (270 lb)   22 128.2 kg (282 lb 10.1 oz)          Intake/Output Summary (Last 24 hours) at 2023 0915  Last data filed at 2023 0100  Gross per 24 hour   Intake 1540 ml   Output 200 ml   Net 1340 ml         Last shift:      No intake/output data recorded. Last 3 shifts:  1901 -  0700  In: Unknown Singer [P.O.:1690; I.V.:200]  Out: 200 [Urine:200]       Physical Exam:     General: HFNC; alert awake  HEENT: NCAT, poor dentition, lips and mucosa dry  Eyes: anicteric; conjunctiva clear  Neck: no nodes,  trach midline; no accessory MM use. Chest: no deformity,   Cardiac: IR regular; no murmur;   Lungs: distant breath sounds; bilateral rales  Abd: soft, NT, hypoactive BS  Ext: no edema; no joint swelling;  No clubbing  : NO odom, clear urine  Neuro: Alert awake  Psych- no agitation, oriented to person;   Skin: warm, dry, no cyanosis;   Pulses: 1-2+ Bilateral pedal, radial  Capillary: brisk; pale      DATA:    MAR reviewed and pertinent medications noted or modified as needed  MEDS:   Current Facility-Administered Medications   Medication    epoetin jodie-epbx (RETACRIT) injection 2,000 Units    amoxicillin-clavulanate (AUGMENTIN) 500-125 mg per tablet 1 Tablet    dexAMETHasone (DECADRON) tablet 4 mg    rivaroxaban (XARELTO) tablet 2.5 mg    melatonin tablet 3 mg    hydrOXYzine pamoate (VISTARIL) capsule 25 mg    carvediloL (COREG) tablet 6.25 mg    NIFEdipine ER (PROCARDIA XL) tablet 60 mg    insulin lispro (HUMALOG) injection    phenol throat spray (CHLORASEPTIC) 1 Spray    sorbitoL 70 % solution 60 mL    insulin lispro (HUMALOG) injection 10 Units    atorvastatin (LIPITOR) tablet 20 mg    furosemide (LASIX) tablet 80 mg    gabapentin (NEURONTIN) capsule 300 mg    sevelamer carbonate (RENVELA) tab 800 mg    glucose chewable tablet 16 g    glucagon (GLUCAGEN) injection 1 mg    acetaminophen (TYLENOL) tablet 650 mg    Or    acetaminophen (TYLENOL) suppository 650 mg    polyethylene glycol (MIRALAX) packet 17 g    ondansetron (ZOFRAN ODT) tablet 4 mg    Or    ondansetron (ZOFRAN) injection 4 mg    pantoprazole (PROTONIX) tablet 20 mg    insulin glargine (LANTUS) injection 40 Units    dextromethorphan (DELSYM) 30 mg/5 mL syrup 30 mg    benzonatate (TESSALON) capsule 100 mg    traMADoL (ULTRAM) tablet 50 mg        Labs:    Recent Labs     01/17/23  0412 01/16/23  0600   WBC 18.2* 15.8*   HGB 10.2* 9.8*    404*       Recent Labs     01/17/23  0412 01/16/23  0600    135*   K 3.9 4.1    100   CO2 28 26   * 164*   BUN 58* 76*   CREA 3.74* 4.91*   CA 8.1* 8.2*   PHOS  --  4.3   ALB 2.5* 2.6*   ALT 10* 8*       Recent Labs     01/16/23  0605   PH 7.46*   PCO2 37   PO2 131*   HCO3 26   FIO2 24.0       No results for input(s): CPK, CKNDX, TROIQ in the last 72 hours. No lab exists for component: CPKMB  No results found for: BNPP, BNP   Lab Results   Component Value Date/Time    Culture result: No growth 5 days 01/12/2023 10:11 AM     No results found for: TSH, TSHEXT, TSHEXT     Imaging:    Results from Hospital Encounter encounter on 01/12/23    XR CHEST PORT    Narrative  INDICATION: chf    EXAMINATION:  AP CHEST, PORTABLE    COMPARISON: 1/15/2020    FINDINGS: Single AP portable view of the chest demonstrates diffuse bilateral  airspace disease. The cardiomediastinal silhouette is unchanged. Probable left  pleural effusion. No pneumothorax. Impression  No significant change. Results from [place] encounter on 08/09/22    CT ABD PELV WO CONT    Narrative  EXAM: CT ABD PELV WO CONT    INDICATION: left flank pain    COMPARISON: 6/29/2022    IV CONTRAST: None. ORAL CONTRAST:    TECHNIQUE:  Thin axial images were obtained through the abdomen and pelvis. Coronal and  sagittal reformats were generated. CT dose reduction was achieved through use of  a standardized protocol tailored for this examination and automatic exposure  control for dose modulation. The absence of intravenous contrast material reduces the sensitivity for  evaluation of the vasculature and solid organs. FINDINGS:  LOWER THORAX: Pericardial effusion is stable. Cardiomegaly is stable there is  minor left basilar atelectasis. LIVER: No mass. BILIARY TREE: Gallbladder is within normal limits. CBD is not dilated. SPLEEN: within normal limits. PANCREAS: No focal abnormality. ADRENALS: Unremarkable. KIDNEYS/URETERS: No calculus or hydronephrosis. STOMACH: Unremarkable. SMALL BOWEL: No dilatation or wall thickening. COLON: No dilatation or wall thickening. Sigmoid colon is tortuous. APPENDIX: Normal  PERITONEUM: No ascites or pneumoperitoneum. RETROPERITONEUM: No lymphadenopathy or aortic aneurysm.  There are extensive  atherosclerotic changes  REPRODUCTIVE ORGANS: Uterus is normal  URINARY BLADDER: Incompletely distended  BONES: No destructive bone lesion. ABDOMINAL WALL: There is a small umbilical hernia containing fat  ADDITIONAL COMMENTS: There is increased intra-abdominal fat    Impression  No acute abnormality identified. There has been no significant change. There are  extensive atherosclerotic changes.     1/14 on high flow 45% FiO2 85 flow at 40 L ABG acceptable we will decrease FiO2 PO2 is 142, chest x-ray shows improvement in the pulmonary edema she received Actemra yesterday we will continue to wean for hemodialysis today  1/15 on mid flow tolerating well improving PCO2 elevated will try to wean her to regular nasal cannula chest x-ray still shows congestive changes most likely secondary to COVID continue with the dialysis, continue with Lasix sore throat improved  1/16 condition improving continue with Decadron and Zosyn and dialysis chest x-ray improved continue to wean oxygen per protocol  1/17 condition much improved on 1 L nasal cannula continue with dialysis and Decadron  1/18 we will get pulse ox room air and ambulation Cephalic

## 2024-05-28 NOTE — PROGRESS NOTES
Date of Office Visit: 2023  Encounter Provider: Lisset Melton MD  Place of Service: [facility] CARDIOLOGY  Patient Name: Nina Saez  :1975      Patient ID:  Nina Saez is a 48 y.o. female is here for  followup for chronic HFrEF.        History of Present Illness    She has a history of HIV, essential hypertension, severe dilated nonischemic cardiomyopathy with chronic systolic congestive heart failure, obesity, SHAREE on CPAP, history of illicit drug use and asthma.  She has an AICD-single-chamber [employer].  In 2020, she was hospitalized for an acute PE with right lower lobe pulmonary infarct and was having hemoptysis.  She was placed on warfarin.  She is followed at [facility] for her HIV.     [facility]  An echocardiogram on cardiac catheterization which confirmed this diagnosis.  Her cardiac catheterization done 2019 showed normal coronary arteries, LVEDP 12, wedge pressure 13, PA pressure 28/15 with a mean of 19 mmHg, RA pressure 4, cardiac index 2.5 L/min/m² with a PVR of 1.2 Woods units.     She presented emergency on 20 with short windedness and cough for 3 weeks prior to presentation.  Her weight is been stable and she had no lower extremity edema but she had missed her cardiac medications and had been eating a lot of salt.  On arrival, she was found to be in congestive heart failure.  Echo done 2020 showed severe left ventricular dilation, ejection fraction 6%, very thin left ventricular walls, grade 3 diastolic dysfunction, moderate to severe mitral insufficiency, moderate tricuspid insufficiency, RVSP 48 mmHg.  There was essentially global hypokinesis with akinesis at the bases.  She was able to be dismissed on 2020.  While in the hospital, we did recommend consideration for upgrade to a  biventricular AICD.  She also had some right flank pain during hospitalization and CT scan showed a 2 mm minimally obstructing stone in the right ureter.  Urology saw her and felt like she could pass the stone without further intervention.     On 9/10/2020, she saw Dr. Jamar Azul in the office and he did not feel that she met criteria for implantation of CRT device.       She saw [facility] by telehealth date of service 1/28/21.  They noted the patient underwent a CPX which showed peak VO2 11.9 at RER greater than 1.05 while on Coreg.  Her CPX showed heart related limitations in her functional capacity, but the patient felt satisfied at her functional current level.     She has not had surgery with Dr. Rocky Calvert on her left ankle-she has never had this done.     Echo done 12/15/2021 shows severe left ventricular dilation with ejection fraction 21-25%, global hypokinesis, grade 2 diastolic dysfunction, normal RVSP and normal valvular structure and function.  Right ventricular systolic function is also mildly to moderately reduced.  When compared with prior echocardiogram, there is less valvular insufficiency.     She had headaches since changing her HIV medications.  She basically has a combination tablet with her darunavir, ritonavir, emtricitabine and tenofovir.  Since then, she has had daily headaches which lasts all day.  She has been using Tylenol extra strength 2 tablets 3 times a day to be added to headaches.      She was evaluated by Dr. Hernandez at  heart failure 2/17/2022.       Echo done 3/17/2023 showed ejection fraction of 30% with moderate left ventricular dilation, grade 1 diastolic dysfunction, dyskinesis of the septum with otherwise global hypokinesis, mild mitral insufficiency with no other significant valvular abnormalities and normal RVSP.      She was in the ER for headache neck pain upper chest pain and back pain on 12/27/2023.  Her high-sensitivity troponin was 16 then 18,  creatinine 1.07, sodium 134, BUN 21, glucose 139, otherwise normal CMP, normal CBC.  Her chest x-ray was unremarkable and her ECG was unchanged.  CT head and cervical spine showed no acute intracranial hemorrhage or hydrocephalus, no acute cervical fracture.  She had a device interrogation done 12/24/2023 which showed 10 years of battery, 0% RV pacing, no events, no changes made.    She has stress nuclear perfusion study done 1/18/2024 which showed a small sized area of mild ischemia in the septal wall.  I reviewed the Stress images and there is no significant ischemia.    CMP done 2/23/2024 show glucose 173, creatinine 1.09, otherwise normal CMP.  Device interrogation done 3/29/2024 showed 0% RV pacing, no changes made.  She saw pulmonary 5/2024 and no changes were made, she was very compliant with her CPAP device at that time.    She has no chest pain or pressure.  She has an appointment with  in November.  She has no orthopnea or PND.  She does not feel her heart racing or skipping.  She is taking her medications as directed without difficulty.  Her energy level is stable.  She is still struggling with her right leg.  She has no lower extremity edema.  She has no abdominal distention.    Past Medical History:   Diagnosis Date    AICD (automatic cardioverter/defibrillator) present 03/06/2020    Asthma     CHF (congestive heart failure)     Class 2 obesity in adult     Coronary artery disease 2017    Degeneration of lumbosacral intervertebral disc 07/29/2021    Depression 2017    Diabetes mellitus     Fracture, foot 3/20    Broke    Grade II diastolic dysfunction     Per echocardiogram 3/2021    H/O Closed trimalleolar fracture     Headache 2000    HIV (human immunodeficiency virus infection)     Hypertension     LBBB (left bundle branch block)     NICM (nonischemic cardiomyopathy)     7/2020 EF 6% per echocardiogram; AICD present    Nonrheumatic mitral valve regurgitation 03/08/2021    Moderate per echo 3/2021     Nonrheumatic tricuspid valve regurgitation     Moderate per echocardiogram 3/2021    SHAREE (obstructive sleep apnea) 2023    Pulmonary embolism          Past Surgical History:   Procedure Laterality Date    ANKLE OPEN REDUCTION INTERNAL FIXATION  3/7/20    CARDIAC CATHETERIZATION      CARDIAC DEFIBRILLATOR PLACEMENT       SECTION      one C/S    FRACTURE SURGERY Left     left ankle    OOPHORECTOMY      h/o teratoma    TUBAL ABDOMINAL LIGATION         Current Outpatient Medications on File Prior to Visit   Medication Sig Dispense Refill    acetaminophen (TYLENOL) 325 MG tablet Take 2 tablets by mouth Every 6 (Six) Hours As Needed for Mild Pain.      albuterol sulfate  (90 Base) MCG/ACT inhaler INHALE 2 PUFFS BY MOUTH EVERY 4 HOURS AS NEEDED FOR WHEEZING 18 g 0    atorvastatin (LIPITOR) 20 MG tablet Take 1 tablet by mouth once daily 90 tablet 0    baclofen (LIORESAL) 10 MG tablet Take 1 tablet by mouth 2 (Two) Times a Day. 20 tablet 0    carvedilol (COREG) 3.125 MG tablet TAKE 1 TABLET BY MOUTH TWICE DAILY WITH MEALS 180 tablet 0    CBD (cannabidiol) oral oil Take  by mouth.      cyclobenzaprine (FLEXERIL) 10 MG tablet Take 1 tablet by mouth 3 (Three) Times a Day As Needed for Muscle Spasms. 21 tablet 0    Xogjb-Hgzjt-Sgwnyvlt-TenofAF (Symtuza) 693-901-592-10 MG per tablet Take 1 tablet by mouth Daily.      Diclofenac Sodium (VOLTAREN) 1 % gel gel Apply 4 g topically to the appropriate area as directed 2 (Two) Times a Day. Use per pkg insert 100 g 2    diphenhydrAMINE (BENADRYL) 25 mg capsule Take 1 capsule by mouth Every 6 (Six) Hours As Needed for Itching (swelling). 20 capsule 0    empagliflozin (Jardiance) 10 MG tablet tablet Take 1 tablet by mouth once daily 90 tablet 1    Entresto 49-51 MG tablet Take 1 tablet by mouth twice daily 180 tablet 0    famotidine (Pepcid) 20 MG tablet Take 2 tablets by mouth 2 (Two) Times a Day As Needed for Heartburn.      fluticasone (FLONASE) 50 MCG/ACT  nasal spray 2 sprays into the nostril(s) as directed by provider Daily. 11.1 g 3    Insulin Pen Needle (BD Pen Needle Heidi 2nd Gen) 32G X 4 MM misc USE 1  ONCE DAILY WITH  SAXENDA 100 each 0    nitroglycerin (NITROSTAT) 0.4 MG SL tablet DISSOLVE ONE TABLET UNDER THE TONGUE EVERY 5 MINUTES AS NEEDED FOR CHEST PAIN.  DO NOT EXCEED A TOTAL OF 3 DOSES IN 15 MINUTES 25 tablet 0    ondansetron (Zofran) 4 MG tablet Take 1 tablet by mouth Every 12 (Twelve) Hours As Needed for Nausea or Vomiting. 30 tablet 0    pantoprazole (Protonix) 20 MG EC tablet Take 1 tablet by mouth Daily. 90 tablet 3    potassium chloride (KLOR-CON M20) 20 MEQ CR tablet TAKE 3  BY MOUTH ONCE DAILY 90 tablet 1    promethazine-dextromethorphan (PROMETHAZINE-DM) 6.25-15 MG/5ML syrup Take 5 mL by mouth 4 (Four) Times a Day As Needed for Cough (cough). 118 mL 0    Rimegepant Sulfate (Nurtec) 75 MG tablet dispersible tablet Take 1 tablet by mouth 1 (One) Time As Needed for migraine. Max of 1 tablet in 24 hours. 8 tablet 2    sertraline (ZOLOFT) 100 MG tablet Take 1 tablet by mouth once daily 90 tablet 0    spironolactone (ALDACTONE) 25 MG tablet 1 tablet Daily.      torsemide (DEMADEX) 20 MG tablet TAKE 2 TABLETS BY MOUTH IN THE MORNING AND 2 TABLETS IN THE AFTERNOON 150 tablet 0    vitamin D (ERGOCALCIFEROL) 1.25 MG (07059 UT) capsule capsule Take 1 capsule by mouth Every 14 (Fourteen) Days.      warfarin (COUMADIN) 2.5 MG tablet TAKE 3 TABLETS BY MOUTH ON WED AND SAT AND TAKE TWO TABLETS BY MOUTH ALL OTHER DAYS OR AS DIRECTED 205 tablet 0    [DISCONTINUED] fexofenadine (Allegra Allergy) 180 MG tablet Take 1 tablet by mouth Daily. 90 tablet 1     No current facility-administered medications on file prior to visit.       Social History     Socioeconomic History    Marital status: Legally    Tobacco Use    Smoking status: Former     Current packs/day: 0.00     Average packs/day: 0.5 packs/day for 20.0 years (10.0 ttl pk-yrs)     Types: Cigarettes  "    Start date:      Quit date: 2017     Years since quittin.4     Passive exposure: Past    Smokeless tobacco: Never   Vaping Use    Vaping status: Never Used   Substance and Sexual Activity    Alcohol use: Not Currently     Comment: holiday and special occ//caffeine use: Occ    Drug use: Not Currently     Types: Marijuana     Comment: gummy    Sexual activity: Not Currently     Partners: Male     Birth control/protection: Hysterectomy           ROS    Procedures    ECG 12 Lead    Date/Time: 2024 10:56 AM  Performed by: Lisset Melton MD    Authorized by: Lisset Melton MD  Comparison: compared with previous ECG   Similar to previous ECG  Rhythm: sinus rhythm  Conduction: left bundle branch block    Clinical impression: abnormal EKG              Objective:      Vitals:    24 1027   BP: 120/80   Pulse: 94   Weight: 99.3 kg (219 lb)   Height: 167.6 cm (65.98\")     Body mass index is 35.37 kg/m².    Vitals reviewed.   Constitutional:       General: Not in acute distress.     Appearance: Well-developed. Not diaphoretic.   Eyes:      General: No scleral icterus.     Conjunctiva/sclera: Conjunctivae normal.   HENT:      Head: Normocephalic and atraumatic.   Neck:      Thyroid: No thyromegaly.      Vascular: No carotid bruit or JVD.      Lymphadenopathy: No cervical adenopathy.   Pulmonary:      Effort: Pulmonary effort is normal. No respiratory distress.      Breath sounds: Normal breath sounds. No wheezing. No rhonchi. No rales.   Chest:      Chest wall: Not tender to palpatation.   Cardiovascular:      Normal rate. Regular rhythm.      Murmurs: There is no murmur.      No gallop.  No rub.   Pulses:     Intact distal pulses.      Carotid: 2+ bilaterally.     Radial: 2+ bilaterally.     Dorsalis pedis: 2+ bilaterally.     Posterior tibial: 2+ bilaterally.  Edema:     Peripheral edema absent.   Abdominal:      General: Bowel sounds are normal. There is no distension or abdominal bruit.     "  Palpations: Abdomen is soft. There is no abdominal mass.      Tenderness: There is no abdominal tenderness.   Musculoskeletal:         General: No deformity.      Extremities: No clubbing present.     Cervical back: Neck supple. Skin:     General: Skin is warm and dry. There is no cyanosis.      Coloration: Skin is not pale.      Findings: No rash.   Neurological:      Mental Status: Alert and oriented to person, place, and time.      Cranial Nerves: No cranial nerve deficit.   Psychiatric:         Judgment: Judgment normal.         Lab Review:       Assessment:      Diagnosis Plan   1. HFrEF (heart failure with reduced ejection fraction)  Adult Transthoracic Echo Complete W/ Cont if Necessary Per Protocol      2. Dilated cardiomyopathy  Adult Transthoracic Echo Complete W/ Cont if Necessary Per Protocol      3. HIV infection, unspecified symptom status        4. NICM (nonischemic cardiomyopathy)        5. Essential hypertension          Severe dilated, nonischemic cardiomyopathy, ejection fraction 29%, AICD in place, chronic HFrEF.   Her cardiomyopathy is likely multifactorial related to the old illicit drug use history, HIV, possible poorly controlled HTN in the past.  No decompensated heart failure at this time.  Essential hypertension, controlled.  HIV positive.  Followed at [facility], well treated.  Obesity.  Asthma, stable  LBBB.  Chronic and stable  History of pulmonary embolism on warfarin.  Diagnosed December 2020.  Etiology unknown.   History of bradycardia with hypotension associated with fatigue, dizziness and headache due to being overmedicated with Entresto and Corlanor.  This improved with decreasing Entresto and stopping Corlanor.  She is now on carvedilol and tolerating it well.  Migraine headaches, okay to use Topamax.  SHAREE, on CPAP.  Follows with pulmonary.     Plan:     See me in 6 months, set up echocardiogram, overall her heart has slowly improved with medical therapy and she has no decompensated  heart failure at this time.

## 2024-05-29 ENCOUNTER — ANTICOAGULATION VISIT (OUTPATIENT)
Dept: PHARMACY | Facility: HOSPITAL | Age: 49
End: 2024-05-29
Payer: MEDICARE

## 2024-05-29 LAB — INR PPP: 2.9

## 2024-05-29 NOTE — PROGRESS NOTES
Anticoagulation Clinic Progress Note    Anticoagulation Summary  As of 2024      INR goal:  2.0-3.0   TTR:  61.7% (3.4 y)   INR used for dosin.90 (2024)   Warfarin maintenance plan:  7.5 mg every Wed, Sat; 5 mg all other days   Weekly warfarin total:  40 mg   No change documented:  Haley Lancaster, PharmD   Plan last modified:  Zina De La Rosa RPH (1/3/2024)   Next INR check:  2024   Priority:  High   Target end date:  Indefinite    Indications    Other acute pulmonary embolism  unspecified whether acute cor pulmonale present (Resolved) [I26.99]  Acute pulmonary embolism  unspecified pulmonary embolism type  unspecified whether acute cor pulmonale present (Resolved) [I26.99]                 Anticoagulation Episode Summary       INR check location:      Preferred lab:      Send INR reminders to:   [facility]    Comments:  **Home Testing Program**          Anticoagulation Care Providers       Provider Role Specialty Phone number    Lisset Melton MD Referring Cardiology [phone]            Clinic Interview:  No pertinent clinical findings have been reported.    INR History:      2024     9:15 AM 2024    12:00 AM 2024     3:22 PM 2024    12:00 AM 2024     9:38 AM 2024    12:00 AM 2024    10:53 AM   Anticoagulation Monitoring   INR 2.40  3.20  1.90  2.90   INR Date 2024   INR Goal 2.0-3.0  2.0-3.0  2.0-3.0  2.0-3.0   Trend Same  Same  Same  Same   Last Week Total 40 mg  40 mg  37.5 mg  40 mg   Next Week Total 40 mg  37.5 mg  40 mg  40 mg   Sun 5 mg  5 mg  5 mg  5 mg   Mon 5 mg  5 mg  5 mg  5 mg   Tue 5 mg  5 mg  5 mg  5 mg   Wed 7.5 mg  7.5 mg  7.5 mg  7.5 mg   Thu 5 mg  2.5 mg ()  5 mg  5 mg   Fri 5 mg  5 mg  5 mg  5 mg   Sat 7.5 mg  7.5 mg  7.5 mg  7.5 mg   Historical INR  3.20      1.90      2.90            This result is from an external source.       Plan:  1. INR is therapeutic today- see  above in Anticoagulation Summary.    Nina Saez to continue their warfarin regimen- see above in Anticoagulation Summary.  2. Follow up in 1 week  3. Pt has agreed to only be called if INR out of range. They have been instructed to call if any changes in medications, doses, concerns, etc. Patient expresses understanding and has no further questions at this time.    Haley Lancaster, PharmD

## 2024-05-30 ENCOUNTER — OFFICE VISIT (OUTPATIENT)
Dept: ORTHOPEDIC SURGERY | Facility: CLINIC | Age: 49
End: 2024-05-30
Payer: MEDICARE

## 2024-05-30 VITALS — TEMPERATURE: 96 F | HEIGHT: 66 IN | WEIGHT: 219 LBS | BODY MASS INDEX: 35.2 KG/M2

## 2024-05-30 DIAGNOSIS — Z98.890 HISTORY OF OPEN REDUCTION AND INTERNAL FIXATION (ORIF) PROCEDURE: Primary | ICD-10-CM

## 2024-05-30 DIAGNOSIS — M76.822 TIBIAL TENDONITIS, POSTERIOR, LEFT: ICD-10-CM

## 2024-05-30 DIAGNOSIS — Q74.2 ACCESSORY NAVICULAR BONE OF LEFT FOOT: ICD-10-CM

## 2024-05-30 DIAGNOSIS — M76.72 PERONEAL TENDONITIS, LEFT: ICD-10-CM

## 2024-05-30 NOTE — PROGRESS NOTES
Ankle Follow Up      Patient: Nina Saez    YOB: 1975 48 y.o. female    Chief Complaints: Ankle sore but not daily    History of Present Illness:Patient underwent open reduction internal fixation of the left bimalleolar ankle fracture including syndesmotic fixation at [facility] by Dr. Calvert on 3/7/2020.     She saw him on 2/1/2021 and I have reviewed those notes.  At that time she had persistent complaints of pain and evidently surgery was recommended for removing the hardware     However she has significant cardiac history and I was contacted by Dr. Melton let me know that if patient does need to have surgery she would prefer to be done at [facility] where she can monitor patient.       I saw her for this initially on 4/14/2021     At that time she had moderate aching pain in the left ankle some posterior laterally and medially as well as pain along the Achilles and into the heel with some occasional radiation over the anterior aspect of the ankle with some occasional feelings of numbness.  It  did feel better when she wears her lace up brace.        She was fitted with an ASO brace and sent for MRI and CT scan but when she followed up in the office on 5/3/2021 had not yet had them done and was unable to show up for her next appointment on 5/17/2021 because of flat tire.     She was seen on 8/11/2021 with persistent complaints of pain mainly over the lateral aspect of the ankle as well as some medially and anteromedially with some feelings of pain going up her leg.     We discussed treatment at that time including removal of her medial screw and lateral plate and the broken syndesmotic screw would be quite difficult to remove and could require trephining also with possible ankle scope to make sure the syndesmosis area was clear but would not necessarily open it up anterolaterally and could evaluate her medial malleolar area.     She did not want to have anything done a  surgical standpoint at that time was afraid that her heart could not handle it as she has significant decreased cardiac ejection and is followed by cardiology for that.     We decided to replace her ASO brace and prescribed Pennsaid to apply the area twice daily.  She was due to see her cardiologist for 5 weeks from that and discuss safety of surgery with her at that time.     Patient was not seen back again until 6/16/2022.  She reported that she had seen cardiology but did not specifically address whether or not she could have surgery.     She reported intermittent pain in the ankle mainly in the inferomedial aspect of the left hindfoot and really not having any further pain over the lateral aspect of her ankle.  She had not been using her ASO brace to any appreciable extent but did use compression hose as well as topical Voltaren which helped some.     We discussed treatment at that time and she declined a PTTD brace but said she would use the ASO brace.  She was sent for new MRI of the left ankle to evaluate for any posterior tib tendon pathology and degree of exacerbation of medial ankle cystic change.  She was to been seen by 4 to 6 weeks thereafter and was going to check whether or not she can even have surgery from a cardiology standpoint.  She evidently never had the MRI and was not seen back until 3/25/2024.     Patient was seen on 3/25/2024 and did not have the MRI because she has a defibrillator.  She had had had intermittent symptoms over the inferomedial hindfoot that were usually controlled with use of topical Voltaren and compression hose which had worsened in the previous several weeks with moderate pain over the inferomedial aspect of the left hindfoot midfoot area along the posterior tib tendon and medial midfoot but really no pain laterally.  She did report that she has had chronic pain in her fourth toe with intermittent cramping since her injury.    Given the degree of her symptoms she was  "fitted into the boot which was to be used for weightbearing activities and offload with crutches.  Screw did not seem to be symptomatic and I sent her for CT scan to evaluate her medial navicular and posterior tib tendon as possible as well as three-phase bone scan    Patient is seen back today stating he still has pain but not daily still gets some discomfort over the inferomedial hindfoot and occasional discomfort in the anterior ankle really not appreciable pain laterally.  HPI    ROS: ankle pain  Past Medical History:   Diagnosis Date    AICD (automatic cardioverter/defibrillator) present 03/06/2020    Asthma     CHF (congestive heart failure)     Class 2 obesity in adult     Coronary artery disease 2017    Degeneration of lumbosacral intervertebral disc 07/29/2021    Depression 2017    Diabetes mellitus     Fracture, foot 3/20    Broke    Grade II diastolic dysfunction     Per echocardiogram 3/2021    H/O Closed trimalleolar fracture     Headache 2000    HIV (human immunodeficiency virus infection)     Hypertension     LBBB (left bundle branch block)     NICM (nonischemic cardiomyopathy)     7/2020 EF 6% per echocardiogram; AICD present    Nonrheumatic mitral valve regurgitation 03/08/2021    Moderate per echo 3/2021    Nonrheumatic tricuspid valve regurgitation     Moderate per echocardiogram 3/2021    SHAREE (obstructive sleep apnea) 02/21/2023    Pulmonary embolism        Physical Exam:   Vitals:    05/30/24 1027   Temp: 96 °F (35.6 °C)   Weight: 99.3 kg (219 lb)   Height: 167.6 cm (66\")   PainSc:   5     Well developed with normal mood.  On exam she had mild discomfort with inferomedial hindfoot along the medial aspect of the navicular with good inversion strength.  She had minimal discomfort of the anterior ankle and no pain focally along the lateral ankle.  On standing she had some mild heel varus and certainly no valgus.      Radiology: CT scan films and report of the left ankle dated 5/16/2024 reviewed and " compared to bone scan done the same day as well as MRI done in May 2021.  Current CT scan shows old nondisplaced fractures that are healed of the tibia and fibula with fixation of the fibula in place with fracture of the syndesmotic screw.  There was tiny subcortical changes at the medial malleolus and well-corticated ossification of the dorsum of the talar head.  There was a type II navicular without significant hypertrophic changes at the synchondrosis.  Tibiotalar and subtalar joint spaces were well-maintained.  There was some hammering of the lesser toes.  There was mild low-attenuation thickening of the distal posterior tib tendon with adjacent soft tissue edema suggesting mild tendinopathy without full-thickness tear.  There is flattening of the peroneus brevis tendon posterior to the lateral malleolus without definite tear and mild thickening of the Achilles tendon.    Bone scan reviewed from 5/16/2024 of the left ankle which showed some mild uptake at the lateral malleolus thought to possibly reflect motion at the fracture and syndesmotic screw in the fibula    MRI films and report of the left ankle dated 5/13/2021 reviewed which show the previous fixation and marrow signal is normal except for 12 to 13 mm diameter of marrow edema around small subcortical cyst deep to the articular cortex of the medial malleolus     Cartilage otherwise unremarkable no joint effusion or intra-articular body.  Peroneal tendons are normal.     ATFL appears chronically torn but the CFL and deltoid are intact as are the syndesmotic ligaments but there is some soft tissue edema anteriorly along the distal syndesmotic space      Assessment/Plan:  1.  Status post ORIF left ankle with broken syndesmotic screw  2.  Left ankle painful medial and lateral hardware  3.  Left ankle chronic ATFL tear  4.  Left ankle distal syndesmotic space soft tissue edema anteriorly  5.  Left ankle medial malleolar subcortical cystic change  6.   Exacerbation medial ankle pain with possible posterior tib tendon pathology/painful accessory navicular  7.  Left fourth hammertoe with cramping    We had a long discussion regarding treatment options she does not wish to pursue any surgical treatment and the only thing I would recommend would be excision of her accessory navicular and secondary posterior tib tendon repair.  I do not feel she would need to have a calcaneal osteotomy and the lateral hardware is not symptomatic enough for any surgical treatment and would be very difficult to get the broken syndesmosis screw out without incurring significant bony damage.  Regardless she does not wish to pursue any surgical treatment especially given her cardiac history..    Has been using some 1 percent diclofenac cream which helps we will go ahead and prescribe a compounding cream for her with a higher dosage of this as well as other compounds.  She will continue with use of her PTTD brace or boot as needed and has been seen by getting some power step orthotics from [facility] and does not wish to pursue custom orthotics nor any physical therapy.    If she has persistent worsening symptoms to let me know otherwise by mutual agreement I will see her back as needed

## 2024-05-31 DIAGNOSIS — I50.20 HFREF (HEART FAILURE WITH REDUCED EJECTION FRACTION): ICD-10-CM

## 2024-05-31 RX ORDER — POTASSIUM CHLORIDE 20 MEQ/1
60 TABLET, EXTENDED RELEASE ORAL DAILY
Qty: 90 TABLET | Refills: 7 | Status: SHIPPED | OUTPATIENT
Start: 2024-05-31

## 2024-05-31 RX ORDER — TORSEMIDE 20 MG/1
TABLET ORAL
Qty: 150 TABLET | Refills: 0 | Status: SHIPPED | OUTPATIENT
Start: 2024-05-31

## 2024-05-31 NOTE — TELEPHONE ENCOUNTER
Patient is requesting a refill on the Jardiance 10 mg daily to be sent to [facility] on [address].  Kiley Boyd was the last to fill, I was unsure if you would be willing to fill it or if you would like me to send it to Dr. Morgan?    NOV-12/03/24-RM  LOV-05/28/24-RM    Plan:      See me in 6 months, set up echocardiogram, overall her heart has slowly improved with medical therapy and she has no decompensated heart failure at this time.    Francisca

## 2024-06-05 ENCOUNTER — SPECIALTY PHARMACY (OUTPATIENT)
Dept: INFUSION THERAPY | Facility: HOSPITAL | Age: 49
End: 2024-06-05
Payer: MEDICARE

## 2024-06-05 RX ORDER — SACUBITRIL AND VALSARTAN 49; 51 MG/1; MG/1
TABLET, FILM COATED ORAL
Qty: 180 TABLET | Refills: 1 | Status: SHIPPED | OUTPATIENT
Start: 2024-06-05

## 2024-06-05 RX ORDER — WARFARIN SODIUM 2.5 MG/1
TABLET ORAL
Qty: 205 TABLET | Refills: 0 | Status: SHIPPED | OUTPATIENT
Start: 2024-06-05

## 2024-06-05 NOTE — PROGRESS NOTES
Specialty Pharmacy Patient Management Program  Neurology Reassessment     Nina Saez is a 48 y.o. female with migraines seen by a Neurology provider and enrolled in the Neurology Patient Management program offered by [facility].  A follow-up outreach was conducted, including assessment of continued therapy appropriateness, medication adherence, and side effect incidence and management for Nurtec 75mg.     Changes to Insurance Coverage or Financial Support  [employer] Medicare     Relevant Past Medical History and Comorbidities  Relevant medical history and concomitant health conditions were discussed with the patient. The patient's chart has been reviewed for relevant past medical history and comorbid health conditions and updated as necessary.   Past Medical History:   Diagnosis Date    AICD (automatic cardioverter/defibrillator) present 03/06/2020    Asthma     CHF (congestive heart failure)     Class 2 obesity in adult     Coronary artery disease 2017    Degeneration of lumbosacral intervertebral disc 07/29/2021    Depression 2017    Diabetes mellitus     Fracture, foot 3/20    Broke    Grade II diastolic dysfunction     Per echocardiogram 3/2021    H/O Closed trimalleolar fracture     Headache 2000    HIV (human immunodeficiency virus infection)     Hypertension     LBBB (left bundle branch block)     NICM (nonischemic cardiomyopathy)     7/2020 EF 6% per echocardiogram; AICD present    Nonrheumatic mitral valve regurgitation 03/08/2021    Moderate per echo 3/2021    Nonrheumatic tricuspid valve regurgitation     Moderate per echocardiogram 3/2021    SHAREE (obstructive sleep apnea) 02/21/2023    Pulmonary embolism      Social History     Socioeconomic History    Marital status: Legally    Tobacco Use    Smoking status: Former     Current packs/day: 0.00     Average packs/day: 0.5 packs/day for 20.0 years (10.0 ttl pk-yrs)     Types: Cigarettes     Start date: 1997     Quit date:  2017     Years since quittin.4     Passive exposure: Past    Smokeless tobacco: Never   Vaping Use    Vaping status: Never Used   Substance and Sexual Activity    Alcohol use: Not Currently     Comment: holiday and special occ//caffeine use: Occ    Drug use: Not Currently     Types: Marijuana     Comment: gummy    Sexual activity: Not Currently     Partners: Male     Birth control/protection: Hysterectomy     Problem list reviewed by Andrew Vazquez RPH on 2024 at 11:48 AM    Hospitalizations and Urgent Care Since Last Assessment  ED Visits, Admissions, or Hospitalizations: Per the patient, she has not had any recent ED visits or hospitalizations.    Urgent Office Visits: No need for an urgent office visit at this time. Her next appointment is scheduled for October.      Allergies  Known allergies and reactions were discussed with the patient. The patient's chart has been reviewed for allergy information and updated as necessary.   Allergies   Allergen Reactions    Lisinopril Cough     Allergies reviewed by Andrew Vazquez RPH on 2024 at 11:48 AM    Relevant Laboratory Values  Common labs          2023    12:26 2023    15:10 3/28/2024    12:15   Common Labs   Glucose 96  139  126    BUN 11  21  14    Creatinine 0.90  1.07  0.94    Sodium 137  134  136    Potassium 4.5  3.6  3.7    Chloride 103  99  96    Calcium 9.3  9.6  9.4    Total Protein   6.9    Albumin 4.0  4.4  4.1    Total Bilirubin 0.4  0.3  0.4    Alkaline Phosphatase 152  167  175    AST (SGOT) 19  16  20    ALT (SGPT) 16  20  22    WBC 8.69  9.82  9.4    Hemoglobin 12.6  12.6  13.2    Hematocrit 38.1  39.6  39.1    Platelets 188  218  185    Total Cholesterol   157    Triglycerides   137    HDL Cholesterol   37    LDL Cholesterol    96    Hemoglobin A1C   5.9        Lab Assessment  The above labs have been reviewed. No dose adjustments are needed for the specialty medication Nurtec based on the labs.  There are no required ongoing monitoring parameters for Nurtec, however, the patient was encouraged to continue and obtain routine labs.     Current Medication List  This medication list has been reviewed with the patient and evaluated for any interactions or necessary modifications/recommendations, and updated to include all prescription medications, OTC medications, and supplements the patient is currently taking.  This list reflects what is contained in the patient's profile, which has also been marked as reviewed to communicate to other providers it is the most up to date version of the patient's current medication therapy.     Current Outpatient Medications:     acetaminophen (TYLENOL) 325 MG tablet, Take 2 tablets by mouth Every 6 (Six) Hours As Needed for Mild Pain., Disp: , Rfl:     albuterol sulfate  (90 Base) MCG/ACT inhaler, INHALE 2 PUFFS BY MOUTH EVERY 4 HOURS AS NEEDED FOR WHEEZING, Disp: 18 g, Rfl: 0    atorvastatin (LIPITOR) 20 MG tablet, Take 1 tablet by mouth once daily, Disp: 90 tablet, Rfl: 0    baclofen (LIORESAL) 10 MG tablet, Take 1 tablet by mouth 2 (Two) Times a Day., Disp: 20 tablet, Rfl: 0    carvedilol (COREG) 3.125 MG tablet, TAKE 1 TABLET BY MOUTH TWICE DAILY WITH MEALS, Disp: 180 tablet, Rfl: 0    CBD (cannabidiol) oral oil, Take  by mouth., Disp: , Rfl:     cyclobenzaprine (FLEXERIL) 10 MG tablet, Take 1 tablet by mouth 3 (Three) Times a Day As Needed for Muscle Spasms., Disp: 21 tablet, Rfl: 0    Scxci-Bwooj-Tmxgtehr-TenofAF (Symtuza) 945-341-395-10 MG per tablet, Take 1 tablet by mouth Daily., Disp: , Rfl:     Diclofenac Sodium (VOLTAREN) 1 % gel gel, Apply 4 g topically to the appropriate area as directed 2 (Two) Times a Day. Use per pkg insert, Disp: 100 g, Rfl: 2    Diclofenac Sodium 4 %, Topiramate 2 %, cloNIDine HCl 0.2 %, Lidocaine HCl 5 %, Apply 1-2 g topically to the appropriate area as directed 3 (Three) to 4 (Four) times daily., Disp: 90 g, Rfl: 1    diphenhydrAMINE  (BENADRYL) 25 mg capsule, Take 1 capsule by mouth Every 6 (Six) Hours As Needed for Itching (swelling)., Disp: 20 capsule, Rfl: 0    empagliflozin (Jardiance) 10 MG tablet tablet, Take 1 tablet by mouth Daily., Disp: 90 tablet, Rfl: 3    Entresto 49-51 MG tablet, Take 1 tablet by mouth twice daily, Disp: 180 tablet, Rfl: 0    famotidine (Pepcid) 20 MG tablet, Take 2 tablets by mouth 2 (Two) Times a Day As Needed for Heartburn., Disp: , Rfl:     fluticasone (FLONASE) 50 MCG/ACT nasal spray, 2 sprays into the nostril(s) as directed by provider Daily., Disp: 11.1 g, Rfl: 3    Insulin Pen Needle (BD Pen Needle Heidi 2nd Gen) 32G X 4 MM misc, USE 1  ONCE DAILY WITH  SAXENDA, Disp: 100 each, Rfl: 0    nitroglycerin (NITROSTAT) 0.4 MG SL tablet, DISSOLVE ONE TABLET UNDER THE TONGUE EVERY 5 MINUTES AS NEEDED FOR CHEST PAIN.  DO NOT EXCEED A TOTAL OF 3 DOSES IN 15 MINUTES, Disp: 25 tablet, Rfl: 0    ondansetron (Zofran) 4 MG tablet, Take 1 tablet by mouth Every 12 (Twelve) Hours As Needed for Nausea or Vomiting., Disp: 30 tablet, Rfl: 0    pantoprazole (Protonix) 20 MG EC tablet, Take 1 tablet by mouth Daily., Disp: 90 tablet, Rfl: 3    potassium chloride (KLOR-CON M20) 20 MEQ CR tablet, Take 3 tablets by mouth once daily, Disp: 90 tablet, Rfl: 7    promethazine-dextromethorphan (PROMETHAZINE-DM) 6.25-15 MG/5ML syrup, Take 5 mL by mouth 4 (Four) Times a Day As Needed for Cough (cough). (Patient not taking: Reported on 5/30/2024), Disp: 118 mL, Rfl: 0    Rimegepant Sulfate (Nurtec) 75 MG tablet dispersible tablet, Take 1 tablet by mouth 1 (One) Time As Needed for migraine. Max of 1 tablet in 24 hours., Disp: 8 tablet, Rfl: 2    sertraline (ZOLOFT) 100 MG tablet, Take 1 tablet by mouth once daily, Disp: 90 tablet, Rfl: 0    spironolactone (ALDACTONE) 25 MG tablet, 1 tablet Daily., Disp: , Rfl:     torsemide (DEMADEX) 20 MG tablet, TAKE 2 TABLETS BY MOUTH IN THE MORNING AND 2 TABLETS IN THE AFTERNOON, Disp: 150 tablet, Rfl: 0     vitamin D (ERGOCALCIFEROL) 1.25 MG (62442 UT) capsule capsule, Take 1 capsule by mouth Every 14 (Fourteen) Days., Disp: , Rfl:     warfarin (COUMADIN) 2.5 MG tablet, TAKE 3 TABLETS BY MOUTH ON WEDNESDAY AND SATURDAY. TAKE 2 TABLETS BY MOUTH ON ALL OTHER DAYS OF THE WEEK, OR AS DIRECTED BY PROVIDER., Disp: 205 tablet, Rfl: 0  No current facility-administered medications for this visit.    Medicines reviewed by Andrew Vazquez [facility] on 6/5/2024 at 11:48 AM    Drug Interactions  The patient's medication profile has been reviewed for accuracy and assessed for interactions. There are currently no significant interactions to address at this time, however, the patient has been encouraged to notify our office if they plan to start any new medications or supplements.        Adverse Drug Reactions  Medication tolerability: Tolerating with no to minimal ADRs          Medication plan: Continue therapy with normal follow-up  Plan for ADR Management: Per the patient, there are no reportable side effects related to Nurtec.       Adherence, Self-Administration, and Current Therapy Problems  Adherence related patient's specialty therapy was discussed with the patient. The Adherence segment of this outreach has been reviewed and updated.   Adherence Questions  Linked Medication(s) Assessed: Rimegepant Sulfate  On average, how many doses/injections does the patient miss per month?: 0  What are the identified reasons for non-adherence or missed doses? : no problems identified  What is the estimated medication adherence level?: %  Based on the patient/caregiver response and refill history, does this patient require an MTP to track adherence improvements?: no    Additional Barriers to Patient Self-Administration: The patient reports being able to take Nurtec in a timely manner for most migraine episodes and is able to prevent her migraine symptoms from progressing.   Methods for Supporting Patient Self-Administration:  n/a    Recently Close Medication Therapy Problems  No medication therapy recommendations to display  Open Medication Therapy Problems  No medication therapy recommendations to display     Goals of Therapy  Goals related to the patient's specialty therapy was discussed with the patient. The Patient Goals segment of this outreach has been reviewed and updated.    Goals Addressed Today        Specialty Pharmacy General Goal      Reduce severity and frequency of migraines. Currently with ~7-12 migraine days per month normally associated with menses.     12/15/23 clinical reassessment: Patient reports 6-7 migraine days per month currently. Rimegepant completely resolves migraine symptoms every time she takes it.     As of 6/5, the patient reports having migraine symptoms once or twice per week and that Nurtec continues to provide adequate relief of symptoms in a timely manner. Nurtec typically reduces her symptoms by 50-75% within an hour of her dose.                Quality of Life Assessment   Quality of Life related to the patient's enrollment in the patient management program and services provided was discussed with the patient. The QOL segment of this outreach has been reviewed and updated.   Quality of Life Improvement Scale: 8-Moderately better    Reassessment Plan & Follow-Up  Medication Therapy Changes: There are no medication therapy changes to recommend at this time. She is continuing to maintain her status quo and is able to effectively reduce her migraine symptoms in a timely manner with Nurtec PRN.   Related Plans, Therapy Recommendations, or Issues to Be Addressed: We reviewed her medication list and discussed some of her medications that can cause headache. We also addressed the alternate every other day dosing option for Nurtec.  She is responding very well to Nurtec and I advised that if her migraines were to increase in frequency we could recommend her dose be changed to every other day for preventative  measures.   Pharmacist to perform regular reassessments no more than (6) months from the previous assessment.  Care Coordinator to set up future refill outreaches, coordinate prescription delivery, and escalate clinical questions to pharmacist.     Attestation  Therapeutic appropriateness: Appropriate  I attest the patient was actively involved in and has agreed to the above plan of care. If the prescribed therapy is at any point deemed not appropriate based on the current or future assessments, a consultation will be initiated with the patient's specialty care provider to determine the best course of action. The revised plan of therapy will be documented along with any additional patient education provided. Discussed aforementioned material with patient by phone.    Madi Vazquez, PharmD  Clinic Specialty Pharmacist, Neurology  6/5/2024  11:49 EDT

## 2024-06-05 NOTE — PROGRESS NOTES
Specialty Pharmacy Refill Coordination Note     Nina is a 48 y.o. female contacted today regarding refills of her specialty medication(s).    Specialty medication(s) and dose(s) confirmed: Nurtec 75mg  Changes to medications: no  Changes to insurance: no  Reviewed and verified with patient:         Refill Questions      Flowsheet Row Most Recent Value   Changes to allergies? No   Changes to medications? No   New conditions or infections since last clinic visit No   Unplanned office visit, urgent care, ED, or hospital admission in the last 4 weeks  No   How does patient/caregiver feel medication is working? Very good   Financial problems or insurance changes  No   Since the previous refill, were any specialty medication doses or scheduled injections missed or delayed?  No   Does this patient require a clinical escalation to a pharmacist? No            Delivery Questions      Flowsheet Row Most Recent Value   Delivery method Beeline   Delivery address verified with patient/caregiver? Yes   Delivery address Home   Number of medications in delivery 1   Medication(s) being filled and delivered Rimegepant Sulfate   Doses left of specialty medications 2   Copay verified? Yes   Copay amount 0   Copay form of payment No copayment ($0)            Medication Adherence    Adherence tools used: patient uses a pill box to manage medications  Support network for adherence: family member          Follow-up: 25 day(s)     Andrew Vazquez [facility]  6/5/2024   11:55 EDT

## 2024-06-05 NOTE — TELEPHONE ENCOUNTER
NOV-12/03/24-RM  LOV-05/28/24-    Severe dilated, nonischemic cardiomyopathy, ejection fraction 29%, AICD in place, chronic HFrEF.   Her cardiomyopathy is likely multifactorial related to the old illicit drug use history, HIV, possible poorly controlled HTN in the past.  No decompensated heart failure at this time.  Essential hypertension, controlled.  HIV positive.  Followed at [facility], well treated.  Obesity.  Asthma, stable  LBBB.  Chronic and stable  History of pulmonary embolism on warfarin.  Diagnosed December 2020.  Etiology unknown.   History of bradycardia with hypotension associated with fatigue, dizziness and headache due to being overmedicated with Entresto and Corlanor.  This improved with decreasing Entresto and stopping Corlanor.  She is now on carvedilol and tolerating it well.  Migraine headaches, okay to use Topamax.  SHAREE, on CPAP.  Follows with pulmonary.     Plan:      See me in 6 months, set up echocardiogram, overall her heart has slowly improved with medical therapy and she has no decompensated heart failure at this time.

## 2024-06-06 ENCOUNTER — OFFICE VISIT (OUTPATIENT)
Dept: OBSTETRICS AND GYNECOLOGY | Age: 49
End: 2024-06-06
Payer: MEDICARE

## 2024-06-06 VITALS
HEIGHT: 66 IN | BODY MASS INDEX: 35.23 KG/M2 | WEIGHT: 219.2 LBS | DIASTOLIC BLOOD PRESSURE: 74 MMHG | SYSTOLIC BLOOD PRESSURE: 126 MMHG

## 2024-06-06 DIAGNOSIS — Z91.89 ENCOUNTER FOR GYNECOLOGIC EXAMINATION FOR HIGH-RISK PATIENT COVERED BY MEDICARE: ICD-10-CM

## 2024-06-06 DIAGNOSIS — Z01.419 WELL WOMAN EXAM WITH ROUTINE GYNECOLOGICAL EXAM: Primary | ICD-10-CM

## 2024-06-06 NOTE — PROGRESS NOTES
[facility]   Obstetrics and Gynecology   Routine Annual Visit    2024    Patient: Nina Saez          MR#:[id number]    History of Present Illness    Chief Complaint   Patient presents with    Annual Exam     AE today, Last AE 2023, Last pap 2022 nml and HPV neg, MG 2024, Hx Hysterectomy, Cologuard 2023, No problems today       48 y.o. female  who presents for annual exam.  H/o HIV controlled with Symtuza.  Also has h/o essential hypertension, severe dilated nonischemic cardiomyopathy with chronic systolic congestive heart failure (defibrillator in place), and provoked PE after surgery 2020.  Now takes warfarin.  Managed by Heme/Onc.     She has started skipping a few months of menses intermittently.  Also having some hot flashes.  At this point they are manageable.    Studies reviewed:  IGP, Aptima HPV, Rfx 16 / 18,45 (2022 11:09) NIL, HPV neg, repeat q3yrs due to HIV  Mammo Screening Digital Tomosynthesis Bilateral With CAD (2024 13:51)   - normal, repeat 1 yr  Cologuard - Stool, Per Rectum (2023 12:30) neg, cannot get anesthesia for colonoscopy due to cardiac history, repeat 3 years    Obstetric History:  OB History          5    Para   5    Term   5            AB        Living   5         SAB        IAB        Ectopic        Molar        Multiple        Live Births   5               Menstrual History:     Patient's last menstrual period was 2024 (approximate).       Sexual History:   Not sexually active, declines STD testing       Social History:   Disabled  5 kids, 11 grandkids  ________________________________________  Patient Active Problem List   Diagnosis    Asthma    HIV (human immunodeficiency virus infection)    Class 2 obesity in adult    History of open reduction and internal fixation (ORIF) procedure    HFrEF (heart failure with reduced ejection fraction)    Tibial tendonitis, posterior, left    Anxiety     Chronic low back pain    Degeneration of lumbosacral intervertebral disc    Gastroesophageal reflux disease    Human papilloma virus infection    Hyperlipidemia    Vitamin D deficiency    Obesity    Essential hypertension    Dilated cardiomyopathy    NICM (nonischemic cardiomyopathy)    Nonrheumatic tricuspid valve regurgitation    Intractable chronic migraine without aura    Bilateral occipital neuralgia    SHAREE (obstructive sleep apnea)    Chronic chest pain with high risk for CAD    Peroneal tendonitis, left    Accessory navicular bone of left foot     Past Medical History:   Diagnosis Date    AICD (automatic cardioverter/defibrillator) present 2020    Asthma     CHF (congestive heart failure)     Class 2 obesity in adult     Coronary artery disease 2017    Degeneration of lumbosacral intervertebral disc 2021    Depression 2017    Diabetes mellitus     Fracture, foot 3/20    Broke    Grade II diastolic dysfunction     Per echocardiogram 3/2021    H/O Closed trimalleolar fracture     Headache     HIV (human immunodeficiency virus infection)     Hypertension     LBBB (left bundle branch block)     NICM (nonischemic cardiomyopathy)     2020 EF 6% per echocardiogram; AICD present    Nonrheumatic mitral valve regurgitation 2021    Moderate per echo 3/2021    Nonrheumatic tricuspid valve regurgitation     Moderate per echocardiogram 3/2021    SHAREE (obstructive sleep apnea) 2023    Pulmonary embolism      Past Surgical History:   Procedure Laterality Date    ANKLE OPEN REDUCTION INTERNAL FIXATION  3/7/20    CARDIAC CATHETERIZATION      CARDIAC DEFIBRILLATOR PLACEMENT       SECTION      one C/S    FRACTURE SURGERY Left     left ankle    OOPHORECTOMY      h/o teratoma    TUBAL ABDOMINAL LIGATION       Social History     Tobacco Use   Smoking Status Former    Current packs/day: 0.00    Average packs/day: 0.5 packs/day for 20.0 years (10.0 ttl pk-yrs)    Types: Cigarettes    Start  date:     Quit date:     Years since quittin.4    Passive exposure: Past   Smokeless Tobacco Never     Family History   Problem Relation Age of Onset    Cancer Mother     Breast cancer Mother     Bone cancer Mother     Ovarian cysts Daughter     No Known Problems Daughter     No Known Problems Daughter     Breast cancer Paternal Grandmother          at 84    Pancreatic cancer Paternal Grandmother     Diabetes Maternal Grandmother     Breast cancer Maternal Grandmother 40    Heart disease Maternal Grandmother     Hypertension Maternal Grandfather     Hyperlipidemia Maternal Grandfather     Diabetes Maternal Grandfather     Prostate cancer Maternal Grandfather     Prostate cancer Paternal Uncle     Ovarian cancer Neg Hx     Uterine cancer Neg Hx     Colon cancer Neg Hx      Prior to Admission medications    Medication Sig Start Date End Date Taking? Authorizing Provider   acetaminophen (TYLENOL) 325 MG tablet Take 2 tablets by mouth Every 6 (Six) Hours As Needed for Mild Pain.    ProviderGerard MD   albuterol sulfate  (90 Base) MCG/ACT inhaler INHALE 2 PUFFS BY MOUTH EVERY 4 HOURS AS NEEDED FOR WHEEZING 10/20/22   Zach Durand DNP, APRN   atorvastatin (LIPITOR) 20 MG tablet Take 1 tablet by mouth once daily 24   Kandice Damon APRN   baclofen (LIORESAL) 10 MG tablet Take 1 tablet by mouth 2 (Two) Times a Day. 23   Hari Marinelli MD   carvedilol (COREG) 3.125 MG tablet TAKE 1 TABLET BY MOUTH TWICE DAILY WITH MEALS 24   Lisset Melton MD   CBD (cannabidiol) oral oil Take  by mouth.    Provider, MD Gerard   cyclobenzaprine (FLEXERIL) 10 MG tablet Take 1 tablet by mouth 3 (Three) Times a Day As Needed for Muscle Spasms. 23   Zach Durand DNP, APRN   Cudnw-Lkilk-Yxrhiuwo-TenofAF (Symtuza) 147-057-410-10 MG per tablet Take 1 tablet by mouth Daily.    ProviderGerard MD   Diclofenac Sodium (VOLTAREN) 1 % gel gel Apply 4 g topically to the  appropriate area as directed 2 (Two) Times a Day. Use per pkg insert 9/2/21   Livan Munoz MD   Diclofenac Sodium 4 %, Topiramate 2 %, cloNIDine HCl 0.2 %, Lidocaine HCl 5 % Apply 1-2 g topically to the appropriate area as directed 3 (Three) to 4 (Four) times daily. 5/30/24 5/30/25  Livan Munoz MD   diphenhydrAMINE (BENADRYL) 25 mg capsule Take 1 capsule by mouth Every 6 (Six) Hours As Needed for Itching (swelling). 1/4/21   Live Broussard MD   empagliflozin (Jardiance) 10 MG tablet tablet Take 1 tablet by mouth Daily. 5/31/24   Lisset Melton MD   famotidine (Pepcid) 20 MG tablet Take 2 tablets by mouth 2 (Two) Times a Day As Needed for Heartburn. 10/24/23   Che Fuller APRN   fluticasone (FLONASE) 50 MCG/ACT nasal spray 2 sprays into the nostril(s) as directed by provider Daily. 3/28/24   Zach Durand DNP, APRN   Insulin Pen Needle (BD Pen Needle Heidi 2nd Gen) 32G X 4 MM misc USE 1  ONCE DAILY WITH  SAXENDA 10/17/23   Kiley Boyd APRN   nitroglycerin (NITROSTAT) 0.4 MG SL tablet DISSOLVE ONE TABLET UNDER THE TONGUE EVERY 5 MINUTES AS NEEDED FOR CHEST PAIN.  DO NOT EXCEED A TOTAL OF 3 DOSES IN 15 MINUTES 5/21/24   Zach Durand DNP, APRN   ondansetron (Zofran) 4 MG tablet Take 1 tablet by mouth Every 12 (Twelve) Hours As Needed for Nausea or Vomiting. 2/20/24   Lisset Melton MD   pantoprazole (Protonix) 20 MG EC tablet Take 1 tablet by mouth Daily. 11/7/23   Kandice Damon APRN   potassium chloride (KLOR-CON M20) 20 MEQ CR tablet Take 3 tablets by mouth once daily 5/31/24   Lisset Melton MD   promethazine-dextromethorphan (PROMETHAZINE-DM) 6.25-15 MG/5ML syrup Take 5 mL by mouth 4 (Four) Times a Day As Needed for Cough (cough).  Patient not taking: Reported on 5/30/2024 3/1/24   Zach Durand, SHELTON, APRN   Rimegepant Sulfate (Nurtec) 75 MG tablet dispersible tablet Take 1 tablet by mouth 1 (One) Time As Needed for migraine. Max of 1 tablet  "in 24 hours. 3/8/24   Claire Gauthier MD   sacubitril-valsartan (Entresto) 49-51 MG tablet Take 1 tablet by mouth twice daily 6/5/24   Lisset Melton MD   sertraline (ZOLOFT) 100 MG tablet Take 1 tablet by mouth once daily 5/19/24   Zach Durand DNP, APRN   spironolactone (ALDACTONE) 25 MG tablet 1 tablet Daily. 12/3/20   Gerard Lyn MD   torsemide (DEMADEX) 20 MG tablet TAKE 2 TABLETS BY MOUTH IN THE MORNING AND 2 TABLETS IN THE AFTERNOON 5/31/24   Kandice Damon APRN   vitamin D (ERGOCALCIFEROL) 1.25 MG (20780 UT) capsule capsule Take 1 capsule by mouth Every 14 (Fourteen) Days. 3/21/24   Gerard Lyn MD   warfarin (COUMADIN) 2.5 MG tablet TAKE 3 TABLETS BY MOUTH ON WEDNESDAY AND SATURDAY. TAKE 2 TABLETS BY MOUTH ON ALL OTHER DAYS OF THE WEEK, OR AS DIRECTED BY PROVIDER. 6/5/24   Lisset Melton MD     ________________________________________    Current contraception: tubal ligation  History of abnormal Pap smear: no  Family history of uterine or ovarian cancer: no  Family History of colon cancer/colon polyps: no  History of abnormal mammogram: no    The following portions of the patient's history were reviewed and updated as appropriate: allergies, current medications, past family history, past medical history, past social history, past surgical history, and problem list.    Review of Systems   All other systems reviewed and are negative.           Objective     /74   Ht 167.6 cm (66\")   Wt 99.4 kg (219 lb 3.2 oz)   LMP 03/24/2024 (Approximate) Comment: had one on march 4 and march 24  Breastfeeding No   BMI 35.38 kg/m²    BP Readings from Last 3 Encounters:   06/06/24 126/74   05/28/24 120/80   03/28/24 126/84      Wt Readings from Last 3 Encounters:   06/06/24 99.4 kg (219 lb 3.2 oz)   05/30/24 99.3 kg (219 lb)   05/28/24 99.3 kg (219 lb)        BMI: Estimated body mass index is 35.38 kg/m² as calculated from the following:    Height as of this " "encounter: 167.6 cm (66\").    Weight as of this encounter: 99.4 kg (219 lb 3.2 oz).    Physical Exam  Vitals and nursing note reviewed.   Constitutional:       General: She is not in acute distress.     Appearance: Normal appearance.   HENT:      Head: Normocephalic and atraumatic.   Eyes:      Extraocular Movements: Extraocular movements intact.   Cardiovascular:      Rate and Rhythm: Normal rate and regular rhythm.      Pulses: Normal pulses.      Heart sounds: No murmur heard.  Pulmonary:      Effort: Pulmonary effort is normal. No respiratory distress.      Breath sounds: Normal breath sounds.   Chest:   Breasts:     Right: Normal. No mass, nipple discharge, skin change or tenderness.      Left: Normal. No mass, nipple discharge, skin change or tenderness.   Abdominal:      General: There is no distension.      Palpations: Abdomen is soft. There is no mass.      Tenderness: There is no abdominal tenderness.   Genitourinary:     General: Normal vulva.      Labia:         Right: No rash or lesion.         Left: No rash or lesion.       Urethra: No prolapse, urethral swelling or urethral lesion.      Vagina: Normal.      Cervix: Normal.      Uterus: Normal.       Adnexa: Right adnexa normal and left adnexa normal.      Comments: Bladder: no masses or tenderness  Perineum/Anus: no masses, lesions, or skin changes  Musculoskeletal:         General: No swelling. Normal range of motion.      Cervical back: Normal range of motion.   Lymphadenopathy:      Upper Body:      Right upper body: No axillary adenopathy.      Left upper body: No axillary adenopathy.   Skin:     General: Skin is warm and dry.   Neurological:      General: No focal deficit present.      Mental Status: She is alert and oriented to person, place, and time.   Psychiatric:         Mood and Affect: Mood normal.         Behavior: Behavior normal.         As part of wellness and prevention, the following topics were discussed with the " patient:  Encouraged self breast exam  Physical activity and regular exercised encouraged.   Injury prevention discussed.  Healthy weight discussed.  Nutrition discussed.  Substance abuse/misuse discussed.  Sexual behavior/safe practices discussed.   Sexual transmitted disease prevention   Contraception discussed.   Mental health discussed.           Assessment:  Diagnoses and all orders for this visit:    1. Well woman exam with routine gynecological exam (Primary)    2. Encounter for gynecologic examination for high-risk patient covered by Medicare      -Breast and pelvic exam normal  - Pap up-to-date, repeat next year  - Mammogram up-to-date  - Cologuard up-to-date  - Discussed vasomotor symptoms.  She is not a candidate for estrogen but could consider Paxil or Veozah.  She wants to monitor at this time but will let me know if symptoms become unmanageable.    Plan:  Return in about 1 year (around 6/6/2025) for Annual.      Frannie Rocha MD  6/6/2024 14:12 EDT

## 2024-06-07 ENCOUNTER — ANTICOAGULATION VISIT (OUTPATIENT)
Dept: PHARMACY | Facility: HOSPITAL | Age: 49
End: 2024-06-07
Payer: MEDICARE

## 2024-06-07 LAB — INR PPP: 2.4

## 2024-06-07 PROCEDURE — G0249 PROVIDE INR TEST MATER/EQUIP: HCPCS

## 2024-06-07 NOTE — PROGRESS NOTES
Anticoagulation Clinic Progress Note    Anticoagulation Summary  As of 2024      INR goal:  2.0-3.0   TTR:  61.9% (3.4 y)   INR used for dosin.40 (2024)   Warfarin maintenance plan:  7.5 mg every Wed, Sat; 5 mg all other days   Weekly warfarin total:  40 mg   No change documented:  Zina De La Rosa RPH   Plan last modified:  Zina De La Rosa RPH (1/3/2024)   Next INR check:  2024   Priority:  High   Target end date:  Indefinite    Indications    Other acute pulmonary embolism  unspecified whether acute cor pulmonale present (Resolved) [I26.99]  Acute pulmonary embolism  unspecified pulmonary embolism type  unspecified whether acute cor pulmonale present (Resolved) [I26.99]                 Anticoagulation Episode Summary       INR check location:      Preferred lab:      [facility] IN[facility] reminders [facility]:   [facility]    Comments:  **Home Testing Program**          Anticoagulation Care Providers       Provider Role Specialty Phone number    Lisset Melton MD Referring Cardiology [phone]            Clinic Interview:  No pertinent clinical findings have been reported.    INR History:      2024     3:22 PM 2024    12:00 AM 2024     9:38 AM 2024    12:00 AM 2024    10:53 AM 2024    12:00 AM 2024     2:00 PM   Anticoagulation Monitoring   INR 3.20  1.90  2.90  2.40   INR Date 2024   INR Goal 2.0-3.0  2.0-3.0  2.0-3.0  2.0-3.0   Trend Same  Same  Same  Same   Last Week Total 40 mg  37.5 mg  40 mg  40 mg   Next Week Total 37.5 mg  40 mg  40 mg  40 mg   Sun 5 mg  5 mg  5 mg  5 mg   Mon 5 mg  5 mg  5 mg  5 mg   Tue 5 mg  5 mg  5 mg  5 mg   Wed 7.5 mg  7.5 mg  7.5 mg  7.5 mg   Thu 2.5 mg ()  5 mg  5 mg  5 mg   Fri 5 mg  5 mg  5 mg  5 mg   Sat 7.5 mg  7.5 mg  7.5 mg  7.5 mg   Historical INR  1.90      2.90      2.40            This result is from an external source.       Plan:  1. INR is therapeutic today- see above  in Anticoagulation Summary.    Nina Saez to continue their warfarin regimen- see above in Anticoagulation Summary.  2. Follow up in 1 week  3. Pt has agreed to only be called if INR out of range. They have been instructed to call if any changes in medications, doses, concerns, etc. Patient expresses understanding and has no further questions at this time.    Zina De La Rosa, [facility]

## 2024-06-12 ENCOUNTER — ANTICOAGULATION VISIT (OUTPATIENT)
Dept: PHARMACY | Facility: HOSPITAL | Age: 49
End: 2024-06-12
Payer: MEDICARE

## 2024-06-12 LAB — INR PPP: 2.2

## 2024-06-12 NOTE — PROGRESS NOTES
Anticoagulation Clinic Progress Note    Anticoagulation Summary  As of 2024      INR goal:  2.0-3.0   TTR:  62.1% (3.5 y)   INR used for dosin.20 (2024)   Warfarin maintenance plan:  7.5 mg every Wed, Sat; 5 mg all other days   Weekly warfarin total:  40 mg   No change documented:  Zina De La Rosa RPH   Plan last modified:  Zina De La Rosa RPH (1/3/2024)   Next INR check:  2024   Priority:  High   Target end date:  Indefinite    Indications    Other acute pulmonary embolism  unspecified whether acute cor pulmonale present (Resolved) [I26.99]  Acute pulmonary embolism  unspecified pulmonary embolism type  unspecified whether acute cor pulmonale present (Resolved) [I26.99]                 Anticoagulation Episode Summary       INR check location:      Preferred lab:      Send INR reminders to:   [facility]    Comments:  **Home Testing Program**          Anticoagulation Care Providers       Provider Role Specialty Phone number    Lisset Melton MD Referring Cardiology [phone]            Clinic Interview:  No pertinent clinical findings have been reported.    INR History:      2024     9:38 AM 2024    12:00 AM 2024    10:53 AM 2024    12:00 AM 2024     2:00 PM 2024    12:00 AM 2024    10:38 AM   Anticoagulation Monitoring   INR 1.90  2.90  2.40  2.20   INR Date 2024   INR Goal 2.0-3.0  2.0-3.0  2.0-3.0  2.0-3.0   Trend Same  Same  Same  Same   Last Week Total 37.5 mg  40 mg  40 mg  40 mg   Next Week Total 40 mg  40 mg  40 mg  40 mg   Sun 5 mg  5 mg  5 mg  5 mg   Mon 5 mg  5 mg  5 mg  5 mg   Tue 5 mg  5 mg  5 mg  5 mg   Wed 7.5 mg  7.5 mg  7.5 mg  7.5 mg   Thu 5 mg  5 mg  5 mg  5 mg   Fri 5 mg  5 mg  5 mg  5 mg   Sat 7.5 mg  7.5 mg  7.5 mg  7.5 mg   Historical INR  2.90      2.40      2.20            This result is from an external source.       Plan:  1. INR is therapeutic today- see above in  Anticoagulation Summary.    Nina Saez to continue their warfarin regimen- see above in Anticoagulation Summary.  2. Follow up in 1 week  3. Pt has agreed to only be called if INR out of range. They have been instructed to call if any changes in medications, doses, concerns, etc. Patient expresses understanding and has no further questions at this time.    Zina De La Rosa, [facility]

## 2024-06-16 DIAGNOSIS — I50.22 CHRONIC SYSTOLIC CONGESTIVE HEART FAILURE: ICD-10-CM

## 2024-06-17 RX ORDER — NITROGLYCERIN 0.4 MG/1
0.4 TABLET SUBLINGUAL
Qty: 25 TABLET | Refills: 0 | Status: SHIPPED | OUTPATIENT
Start: 2024-06-17

## 2024-06-19 ENCOUNTER — TELEPHONE (OUTPATIENT)
Dept: CARDIOLOGY | Facility: CLINIC | Age: 49
End: 2024-06-19
Payer: MEDICARE

## 2024-06-19 ENCOUNTER — HOSPITAL ENCOUNTER (OUTPATIENT)
Dept: CARDIOLOGY | Facility: HOSPITAL | Age: 49
Discharge: HOME | End: 2024-06-19
Admitting: INTERNAL MEDICINE
Payer: MEDICARE

## 2024-06-19 VITALS
OXYGEN SATURATION: 98 % | DIASTOLIC BLOOD PRESSURE: 62 MMHG | WEIGHT: 219 LBS | HEIGHT: 66 IN | BODY MASS INDEX: 35.2 KG/M2 | HEART RATE: 60 BPM | SYSTOLIC BLOOD PRESSURE: 118 MMHG

## 2024-06-19 DIAGNOSIS — I50.20 HFREF (HEART FAILURE WITH REDUCED EJECTION FRACTION): ICD-10-CM

## 2024-06-19 DIAGNOSIS — I42.0 DILATED CARDIOMYOPATHY: ICD-10-CM

## 2024-06-19 DIAGNOSIS — I50.20 HFREF (HEART FAILURE WITH REDUCED EJECTION FRACTION): Primary | ICD-10-CM

## 2024-06-19 LAB
AORTIC ARCH: 2.3 CM
AORTIC DIMENSIONLESS INDEX: 0.5 (DI)
ASCENDING AORTA: 2.8 CM
BH CV ECHO LEFT VENTRICLE GLOBAL LONGITUDINAL STRAIN: -7.8 %
BH CV ECHO MEAS - ACS: 1.86 CM
BH CV ECHO MEAS - AO MAX PG: 6.9 MMHG
BH CV ECHO MEAS - AO MEAN PG: 3 MMHG
BH CV ECHO MEAS - AO ROOT DIAM: 3.1 CM
BH CV ECHO MEAS - AO V2 MAX: 131 CM/SEC
BH CV ECHO MEAS - AO V2 VTI: 23.3 CM
BH CV ECHO MEAS - AVA(I,D): 1.49 CM2
BH CV ECHO MEAS - EDV(CUBED): 439 ML
BH CV ECHO MEAS - EDV(MOD-SP2): 534 ML
BH CV ECHO MEAS - EDV(MOD-SP4): 468 ML
BH CV ECHO MEAS - EF(MOD-BP): 18.7 %
BH CV ECHO MEAS - EF(MOD-SP2): 21.3 %
BH CV ECHO MEAS - EF(MOD-SP4): 17.3 %
BH CV ECHO MEAS - ESV(CUBED): 314.8 ML
BH CV ECHO MEAS - ESV(MOD-SP2): 420 ML
BH CV ECHO MEAS - ESV(MOD-SP4): 387 ML
BH CV ECHO MEAS - FS: 10.5 %
BH CV ECHO MEAS - IVS/LVPW: 0.7 CM
BH CV ECHO MEAS - IVSD: 0.7 CM
BH CV ECHO MEAS - LAT PEAK E' VEL: 4.4 CM/SEC
BH CV ECHO MEAS - LV DIASTOLIC VOL/BSA (35-75): 225.1 CM2
BH CV ECHO MEAS - LV MASS(C)D: 304.6 GRAMS
BH CV ECHO MEAS - LV MAX PG: 1.66 MMHG
BH CV ECHO MEAS - LV MEAN PG: 1 MMHG
BH CV ECHO MEAS - LV SYSTOLIC VOL/BSA (12-30): 186.2 CM2
BH CV ECHO MEAS - LV V1 MAX: 64.5 CM/SEC
BH CV ECHO MEAS - LV V1 VTI: 11.7 CM
BH CV ECHO MEAS - LVIDD: 7.6 CM
BH CV ECHO MEAS - LVIDS: 6.8 CM
BH CV ECHO MEAS - LVOT AREA: 3 CM2
BH CV ECHO MEAS - LVOT DIAM: 1.94 CM
BH CV ECHO MEAS - LVPWD: 1 CM
BH CV ECHO MEAS - MED PEAK E' VEL: 3.2 CM/SEC
BH CV ECHO MEAS - MR MAX PG: 78.9 MMHG
BH CV ECHO MEAS - MR MAX VEL: 444.2 CM/SEC
BH CV ECHO MEAS - MV A DUR: 0.13 SEC
BH CV ECHO MEAS - MV A MAX VEL: 108 CM/SEC
BH CV ECHO MEAS - MV DEC TIME: 0.16 SEC
BH CV ECHO MEAS - MV E MAX VEL: 75.5 CM/SEC
BH CV ECHO MEAS - MV E/A: 0.7
BH CV ECHO MEAS - MV MAX PG: 5.7 MMHG
BH CV ECHO MEAS - MV MEAN PG: 2.24 MMHG
BH CV ECHO MEAS - MV V2 VTI: 18.3 CM
BH CV ECHO MEAS - MVA(VTI): 1.89 CM2
BH CV ECHO MEAS - PA ACC TIME: 0.14 SEC
BH CV ECHO MEAS - PA V2 MAX: 88.2 CM/SEC
BH CV ECHO MEAS - QP/QS: 2.4
BH CV ECHO MEAS - RAP SYSTOLE: 3 MMHG
BH CV ECHO MEAS - RV MAX PG: 1.67 MMHG
BH CV ECHO MEAS - RV V1 MAX: 64.7 CM/SEC
BH CV ECHO MEAS - RV V1 VTI: 13.3 CM
BH CV ECHO MEAS - RVOT DIAM: 2.8 CM
BH CV ECHO MEAS - RVSP: 27 MMHG
BH CV ECHO MEAS - SUP REN AO DIAM: 1.6 CM
BH CV ECHO MEAS - SV(LVOT): 34.7 ML
BH CV ECHO MEAS - SV(MOD-SP2): 114 ML
BH CV ECHO MEAS - SV(MOD-SP4): 81 ML
BH CV ECHO MEAS - SV(RVOT): 83.4 ML
BH CV ECHO MEAS - SVI(LVOT): 16.7 ML/M2
BH CV ECHO MEAS - SVI(MOD-SP2): 54.8 ML/M2
BH CV ECHO MEAS - SVI(MOD-SP4): 39 ML/M2
BH CV ECHO MEAS - TAPSE (>1.6): 1.12 CM
BH CV ECHO MEAS - TR MAX PG: 23.7 MMHG
BH CV ECHO MEAS - TR MAX VEL: 243.2 CM/SEC
BH CV ECHO MEASUREMENTS AVERAGE E/E' RATIO: 19.87
BH CV XLRA - RV BASE: 3.5 CM
BH CV XLRA - RV LENGTH: 9 CM
BH CV XLRA - RV MID: 2.29 CM
BH CV XLRA - TDI S': 8.8 CM/SEC
LEFT ATRIUM VOLUME INDEX: 32.5 ML/M2
SINUS: 3.1 CM
STJ: 2.44 CM

## 2024-06-19 PROCEDURE — 93356 MYOCRD STRAIN IMG SPCKL TRCK: CPT | Performed by: INTERNAL MEDICINE

## 2024-06-19 PROCEDURE — 93306 TTE W/DOPPLER COMPLETE: CPT

## 2024-06-19 PROCEDURE — 93306 TTE W/DOPPLER COMPLETE: CPT | Performed by: INTERNAL MEDICINE

## 2024-06-19 PROCEDURE — 93356 MYOCRD STRAIN IMG SPCKL TRCK: CPT

## 2024-06-19 PROCEDURE — 25510000001 PERFLUTREN (DEFINITY) 8.476 MG IN SODIUM CHLORIDE (PF) 0.9 % 10 ML INJECTION: Performed by: INTERNAL MEDICINE

## 2024-06-19 RX ADMIN — PERFLUTREN 2 ML: 6.52 INJECTION, SUSPENSION INTRAVENOUS at 11:10

## 2024-06-19 NOTE — TELEPHONE ENCOUNTER
I called her with echo results, set up limited echo in December on the same day that I see her to reevaluate LV function, increase Entresto to 49/51, 1 tab in the morning and 2 tablets in the evening.

## 2024-06-21 ENCOUNTER — ANTICOAGULATION VISIT (OUTPATIENT)
Dept: PHARMACY | Facility: HOSPITAL | Age: 49
End: 2024-06-21
Payer: MEDICARE

## 2024-06-21 ENCOUNTER — TELEPHONE (OUTPATIENT)
Dept: CARDIOLOGY | Facility: CLINIC | Age: 49
End: 2024-06-21
Payer: MEDICARE

## 2024-06-21 LAB — INR PPP: 2.4

## 2024-06-21 NOTE — TELEPHONE ENCOUNTER
Caffeine should not cause the echocardiogram to show weakness of the heart.  Please call and let her know.    ----- Message from Francisca MOTT sent at 6/21/2024 11:59 AM EDT -----  Regarding: FW: Results   Contact: [phone]    ----- Message -----  From: Nina Saez  Sent: 6/19/2024   9:52 PM EDT  To: Tiff [facility]  Subject: Results                                          I forgot to ask also could caffeine also cause this echocardiogram to be out of wack cause I did drink a BIG cup of coke before I got there just wondering

## 2024-06-21 NOTE — TELEPHONE ENCOUNTER
Reviewed Dr. Melton's message with iNna Saez.  She verbalized understanding and stated that she is doing what Dr. Melton told her to do.    Thank you,  Alexia ALBARRAN RN  [place] [facility] [place]   12:55 EDT

## 2024-06-21 NOTE — PROGRESS NOTES
Anticoagulation Clinic Progress Note    Anticoagulation Summary  As of 2024      INR goal:  2.0-3.0   TTR:  62.4% (3.5 y)   INR used for dosin.40 (2024)   Warfarin maintenance plan:  7.5 mg every Wed, Sat; 5 mg all other days   Weekly warfarin total:  40 mg   No change documented:  Haley Lancaster, John   Plan last modified:  Zina De La Rosa RPH (1/3/2024)   Next INR check:  2024   Priority:  High   Target end date:  Indefinite    Indications    Other acute pulmonary embolism  unspecified whether acute cor pulmonale present (Resolved) [I26.99]  Acute pulmonary embolism  unspecified pulmonary embolism type  unspecified whether acute cor pulmonale present (Resolved) [I26.99]                 Anticoagulation Episode Summary       INR check location:      Preferred lab:      Send INR reminders to:   [facility]    Comments:  **Home Testing Program**          Anticoagulation Care Providers       Provider Role Specialty Phone number    Lisset Melton MD Referring Cardiology [phone]            Clinic Interview:  No pertinent clinical findings have been reported.    INR History:      2024    10:53 AM 2024    12:00 AM 2024     2:00 PM 2024    12:00 AM 2024    10:38 AM 2024    12:00 AM 2024     9:30 AM   Anticoagulation Monitoring   INR 2.90  2.40  2.20  2.40   INR Date 2024   INR Goal 2.0-3.0  2.0-3.0  2.0-3.0  2.0-3.0   Trend Same  Same  Same  Same   Last Week Total 40 mg  40 mg  40 mg  40 mg   Next Week Total 40 mg  40 mg  40 mg  40 mg   Sun 5 mg  5 mg  5 mg  5 mg   Mon 5 mg  5 mg  5 mg  5 mg   Tue 5 mg  5 mg  5 mg  5 mg   Wed 7.5 mg  7.5 mg  7.5 mg  7.5 mg   Thu 5 mg  5 mg  5 mg  5 mg   Fri 5 mg  5 mg  5 mg  5 mg   Sat 7.5 mg  7.5 mg  7.5 mg  7.5 mg   Historical INR  2.40      2.20      2.40            This result is from an external source.       Plan:  1. INR is therapeutic today- see above in  Anticoagulation Summary.    Nina Saez to continue their warfarin regimen- see above in Anticoagulation Summary.  2. Follow up in 1 week  3. Pt has agreed to only be called if INR out of range. They have been instructed to call if any changes in medications, doses, concerns, etc. Patient expresses understanding and has no further questions at this time.    Haley Lancaster, PharmD

## 2024-06-24 ENCOUNTER — TELEPHONE (OUTPATIENT)
Dept: CARDIOLOGY | Facility: CLINIC | Age: 49
End: 2024-06-24
Payer: MEDICARE

## 2024-06-24 NOTE — TELEPHONE ENCOUNTER
It sounds like she is just not tolerating the increased dose.  Would recommend resuming the previous dose of Entresto.  However, would also recommend holding both the Entresto and the carvedilol for systolic less than 100.    I will send to Dr. Melton to review as well.

## 2024-06-24 NOTE — TELEPHONE ENCOUNTER
Dr. Melton,    Pt called this am. She said she started taking both Entrestos at night. She also takes carvedilol at night. Saturday and Sunday her B/P was in the 80s/60s and pt had a headache. She wants to know if she should try a different way to take the meds because now her B/P is getting too low. Do you have any recommendations for her?    Thank you,    Geovanna Page, RN  [place] [facility] [place]  06/24/24 11:54 EDT

## 2024-06-24 NOTE — TELEPHONE ENCOUNTER
Please call her and find out what medication she is on and what time of day she is taking them as I am trying to adjust medications and timing for her low blood pressure.    ----- Message from Francisca MOTT sent at 6/24/2024 10:51 AM EDT -----  Regarding: FW: Results   Contact: [phone]    ----- Message -----  From: Nina Saez  Sent: 6/23/2024   9:57 AM EDT  To: Tiff Hdz [facility]  Subject: Results                                          My bp for Saturday and Sunday is running low 80s over 60s and my pulse is in the 80-90 what do

## 2024-06-24 NOTE — TELEPHONE ENCOUNTER
Notified patient of recommendations. Patient verbalized understanding.    Darcie Everett RN  [place] [facility] [place]

## 2024-06-27 ENCOUNTER — ANTICOAGULATION VISIT (OUTPATIENT)
Dept: PHARMACY | Facility: HOSPITAL | Age: 49
End: 2024-06-27
Payer: MEDICARE

## 2024-06-27 LAB — INR PPP: 2.3

## 2024-06-27 NOTE — PROGRESS NOTES
Anticoagulation Clinic Progress Note    Anticoagulation Summary  As of 2024      INR goal:  2.0-3.0   TTR:  62.5% (3.5 y)   INR used for dosin.30 (2024)   Warfarin maintenance plan:  7.5 mg every Wed, Sat; 5 mg all other days   Weekly warfarin total:  40 mg   No change documented:  Zina De La Rosa RPH   Plan last modified:  Zina De La Rosa RPH (1/3/2024)   Next INR check:  2024   Priority:  High   Target end date:  Indefinite    Indications    Other acute pulmonary embolism  unspecified whether acute cor pulmonale present (Resolved) [I26.99]  Acute pulmonary embolism  unspecified pulmonary embolism type  unspecified whether acute cor pulmonale present (Resolved) [I26.99]                 Anticoagulation Episode Summary       INR check location:      Preferred lab:      Send INR reminders to:   [facility]    Comments:  **Home Testing Program**          Anticoagulation Care Providers       Provider Role Specialty Phone number    Lisset Melton MD Referring Cardiology [phone]            Clinic Interview:  No pertinent clinical findings have been reported.    INR History:      2024     2:00 PM 2024    12:00 AM 2024    10:38 AM 2024    12:00 AM 2024     9:30 AM 2024    12:00 AM 2024     8:38 AM   Anticoagulation Monitoring   INR 2.40  2.20  2.40  2.30   INR Date 2024   INR Goal 2.0-3.0  2.0-3.0  2.0-3.0  2.0-3.0   Trend Same  Same  Same  Same   Last Week Total 40 mg  40 mg  40 mg  40 mg   Next Week Total 40 mg  40 mg  40 mg  40 mg   Sun 5 mg  5 mg  5 mg  5 mg   Mon 5 mg  5 mg  5 mg  5 mg   Tue 5 mg  5 mg  5 mg  5 mg   Wed 7.5 mg  7.5 mg  7.5 mg  7.5 mg   Thu 5 mg  5 mg  5 mg  5 mg   Fri 5 mg  5 mg  5 mg  5 mg   Sat 7.5 mg  7.5 mg  7.5 mg  7.5 mg   Historical INR  2.20      2.40      2.30            This result is from an external source.       Plan:  1. INR is therapeutic today- see above in  Anticoagulation Summary.    Nina Saez to continue their warfarin regimen- see above in Anticoagulation Summary.  2. Follow up in 1 week  3. Pt has agreed to only be called if INR out of range. They have been instructed to call if any changes in medications, doses, concerns, etc. Patient expresses understanding and has no further questions at this time.    Zina De La Rosa, [facility]

## 2024-07-01 RX ORDER — CARVEDILOL 3.12 MG/1
3.12 TABLET ORAL 2 TIMES DAILY WITH MEALS
Qty: 180 TABLET | Refills: 0 | Status: SHIPPED | OUTPATIENT
Start: 2024-07-01

## 2024-07-02 ENCOUNTER — ANTICOAGULATION VISIT (OUTPATIENT)
Dept: PHARMACY | Facility: HOSPITAL | Age: 49
End: 2024-07-02
Payer: MEDICARE

## 2024-07-02 DIAGNOSIS — I50.20 HFREF (HEART FAILURE WITH REDUCED EJECTION FRACTION): ICD-10-CM

## 2024-07-02 LAB — INR PPP: 2.6

## 2024-07-02 NOTE — PROGRESS NOTES
Anticoagulation Clinic Progress Note    Anticoagulation Summary  As of 2024      INR goal:  2.0-3.0   TTR:  62.7% (3.5 y)   INR used for dosin.60 (2024)   Warfarin maintenance plan:  7.5 mg every Wed, Sat; 5 mg all other days   Weekly warfarin total:  40 mg   No change documented:  Zina De La Rosa RPH   Plan last modified:  Zina De La Rosa RPH (1/3/2024)   Next INR check:  2024   Priority:  High   Target end date:  Indefinite    Indications    Other acute pulmonary embolism  unspecified whether acute cor pulmonale present (Resolved) [I26.99]  Acute pulmonary embolism  unspecified pulmonary embolism type  unspecified whether acute cor pulmonale present (Resolved) [I26.99]                 Anticoagulation Episode Summary       INR check location:      Preferred lab:      Send INR reminders to:   [facility]    Comments:  **Home Testing Program**          Anticoagulation Care Providers       Provider Role Specialty Phone number    Lisset Melton MD Referring Cardiology [phone]            Clinic Interview:  No pertinent clinical findings have been reported.    INR History:      2024    10:38 AM 2024    12:00 AM 2024     9:30 AM 2024    12:00 AM 2024     8:38 AM 2024    12:00 AM 2024     8:33 AM   Anticoagulation Monitoring   INR 2.20  2.40  2.30  2.60   INR Date 2024   INR Goal 2.0-3.0  2.0-3.0  2.0-3.0  2.0-3.0   Trend Same  Same  Same  Same   Last Week Total 40 mg  40 mg  40 mg  40 mg   Next Week Total 40 mg  40 mg  40 mg  40 mg   Sun 5 mg  5 mg  5 mg  5 mg   Mon 5 mg  5 mg  5 mg  5 mg   Tue 5 mg  5 mg  5 mg  5 mg   Wed 7.5 mg  7.5 mg  7.5 mg  7.5 mg   Thu 5 mg  5 mg  5 mg  5 mg   Fri 5 mg  5 mg  5 mg  5 mg   Sat 7.5 mg  7.5 mg  7.5 mg  7.5 mg   Historical INR  2.40      2.30      2.60            This result is from an external source.       Plan:  1. INR is therapeutic today- see above in  Anticoagulation Summary.    Nina Saez to continue their warfarin regimen- see above in Anticoagulation Summary.  2. Follow up in 1 week  3. Pt has agreed to only be called if INR out of range. They have been instructed to call if any changes in medications, doses, concerns, etc. Patient expresses understanding and has no further questions at this time.    Zina De La Rosa, [facility]

## 2024-07-03 RX ORDER — TORSEMIDE 20 MG/1
TABLET ORAL
Qty: 150 TABLET | Refills: 0 | Status: SHIPPED | OUTPATIENT
Start: 2024-07-03

## 2024-07-08 ENCOUNTER — TELEPHONE (OUTPATIENT)
Dept: CARDIOLOGY | Facility: CLINIC | Age: 49
End: 2024-07-08
Payer: MEDICARE

## 2024-07-08 NOTE — TELEPHONE ENCOUNTER
Patient said she never resumed the previous dose of Entresto. I advised her of the other recommendations and scheduled her to see Catherine tomorrow.     Darcie Everett RN  Triage [facility]

## 2024-07-08 NOTE — TELEPHONE ENCOUNTER
The last time she called, she was advised to resume the previous dose of Entresto (24-26).  Did she do this?    Would recommend holding the Entresto, Coreg, and spironolactone for SBP less than 100.    Can we bring her in to see Catherine this week to make additional recommendations/changes?

## 2024-07-08 NOTE — TELEPHONE ENCOUNTER
Patient calls because she went to work today and she had a H/A so she took 2 tylenol/motrin. She then got really dizzy and her BP was 73/43. She came back home and drank some salt water and propped her feet up and her BP was 85/56. She checked it again while we were on the phone and got 105/60. She said her coreg was just recently increased from daily to BID. I asked what her BP has normally been running since this change and she could not give me any specific readings, but said sometimes her SBP is in the 90s. Below are her meds:    Torsemide 40mg BID  Coreg 3.125mg BID  Warfarin  Entresto 49-51 BID  Potassium 60mEq daily  Jardiance 10mg daily  Spironolactone 25mg daily    Darcie Everett RN  Triage [facility]

## 2024-07-09 ENCOUNTER — OFFICE VISIT (OUTPATIENT)
Dept: CARDIOLOGY | Facility: CLINIC | Age: 49
End: 2024-07-09
Payer: MEDICARE

## 2024-07-09 VITALS
WEIGHT: 217 LBS | DIASTOLIC BLOOD PRESSURE: 60 MMHG | BODY MASS INDEX: 34.87 KG/M2 | HEIGHT: 66 IN | SYSTOLIC BLOOD PRESSURE: 110 MMHG | HEART RATE: 84 BPM

## 2024-07-09 DIAGNOSIS — I42.8 NICM (NONISCHEMIC CARDIOMYOPATHY): ICD-10-CM

## 2024-07-09 DIAGNOSIS — Z86.711 HISTORY OF PULMONARY EMBOLISM: ICD-10-CM

## 2024-07-09 DIAGNOSIS — B20 HIV INFECTION, UNSPECIFIED SYMPTOM STATUS: ICD-10-CM

## 2024-07-09 DIAGNOSIS — G47.33 OSA (OBSTRUCTIVE SLEEP APNEA): ICD-10-CM

## 2024-07-09 DIAGNOSIS — I95.9 HYPOTENSION, UNSPECIFIED HYPOTENSION TYPE: ICD-10-CM

## 2024-07-09 DIAGNOSIS — I34.0 NONRHEUMATIC MITRAL VALVE REGURGITATION: ICD-10-CM

## 2024-07-09 DIAGNOSIS — I50.20 HFREF (HEART FAILURE WITH REDUCED EJECTION FRACTION): Primary | ICD-10-CM

## 2024-07-09 DIAGNOSIS — I42.0 DILATED CARDIOMYOPATHY: ICD-10-CM

## 2024-07-09 DIAGNOSIS — I10 ESSENTIAL HYPERTENSION: ICD-10-CM

## 2024-07-09 PROCEDURE — 1160F RVW MEDS BY RX/DR IN RCRD: CPT | Performed by: FAMILY MEDICINE

## 2024-07-09 PROCEDURE — 3078F DIAST BP <80 MM HG: CPT | Performed by: FAMILY MEDICINE

## 2024-07-09 PROCEDURE — 3074F SYST BP LT 130 MM HG: CPT | Performed by: FAMILY MEDICINE

## 2024-07-09 PROCEDURE — 99214 OFFICE O/P EST MOD 30 MIN: CPT | Performed by: FAMILY MEDICINE

## 2024-07-09 PROCEDURE — 93000 ELECTROCARDIOGRAM COMPLETE: CPT | Performed by: FAMILY MEDICINE

## 2024-07-09 PROCEDURE — 1159F MED LIST DOCD IN RCRD: CPT | Performed by: FAMILY MEDICINE

## 2024-07-09 RX ORDER — SACUBITRIL AND VALSARTAN 24; 26 MG/1; MG/1
1 TABLET, FILM COATED ORAL 2 TIMES DAILY
Qty: 180 TABLET | Refills: 3 | Status: SHIPPED | OUTPATIENT
Start: 2024-07-09

## 2024-07-09 NOTE — PROGRESS NOTES
Date of Office Visit: 2024  Encounter Provider: SAMUEL Eagle  Place of Service: [facility] CARDIOLOGY  Established cardiologist: Lisset Melton MD  Patient Name: Nina Saez  :1975      Patient ID:  Nina Saez is a 48 y.o. female is here for  followup for hypotension  With a pertinent medical history of HIV, hypertension, severe dilated nonischemic cardiomyopathy, obesity, obstructive sleep apnea on CPAP, history of illicit drug use, asthma.    History of Present Illness    Diagnosed with heart failure and cardiomyopathy 2019 at [facility] when she was there visiting family.  Cardiac cath at that time showed normal coronary arteries.  Hospitalized 2020 with acute PE, placed on warfarin, follows with [facility] for HIV.  Last seen in office with Dr. Melton May 28, 2024 at this visit doing well and stable with no evidence of decompensated heart failure.   She completed an echocardiogram 2024 which showed decline in LVEF and severely dilated LV cavity.  Her Entresto was increased.    She called the office this week with reports of low blood pressure readings at home systolic in the 80s after increasing her Entresto.  She was advised to hold her Entresto and carvedilol for systolic less than 100.  She presents today feeling well.  She did take both her Entresto and carvedilol this morning.  She has brought her electronic wrist and upper arm cuff.  The readings are as follows: Wrist cuff 93/51, upper arm cuff 89/59.  I did repeat her blood pressures left arm 80/59, right arm 80/60.  She has no chest pain, shortness of breath, lightheadedness, dizziness, swelling.  There have been no episodes of passing out.  She was hesitant to hold her medications this morning as she is worried that her heart function worsened on the most recent echocardiogram.    Prior cardiovascular history & testing:     [employer] ICD,  date of implant September 2017  last device check June 18, 2024  rVP 0%  Battery: 9 years    Echocardiogram 6/19/2024  LVEF equals 18.7%  Severely dilated LV cavity  RV size and function  Normal left atrial size  Normal right atrial size  Bileaflet mitral valve thickening present, moderate MR  Mild TR    Nuclear stress test, January 2024  GI artifact is present  Imaging indicates a small sized mildly severe area of ischemia located in the septal wall  Consistent with a low risk study  LVEF equals 15%  No prior study available for comparison    Echocardiogram, March 2023  LVEF equals 29.8%  LV cavity is moderately dilated  Normal RV size and function  Normal left atrial cavity  Normal right atrial cavity  Mild MR  Trace TR  No evidence of pulmonary hypertension present  Trace pulmonic valve regurgitation    Echocardiogram, March 2021  EF equals 22.7%  LV systolic function severely decreased  Normal RV size and function, electronic lead in the RV, left atrial cavity is severely dilated  Electronic lead present in the right atrium  Moderate MR  Moderate TR  RVSP 30 mmHg  Mild pulmonic valve regurgitation    Echocardiogram July 2020  LVEF equals 6.0%  Severely dilated LV cavity, fixed restrictive pattern grade 3 LV diastolic dysfunction  Normal RV size and function with electronic lead present  Normal left atrial size  Normal right atrial size with electronically present  Moderate to severe mitral valve regurgitation  Moderate tricuspid valve regurgitation  RVSP 45 to 55 mmHg  Mild pulmonic valve regurgitation    Echocardiogram, August 2019  LVEF equals 10%  The LV cavity is severely dilated, grade 3 LV diastolic dysfunction, consistent with reversible restrictive pattern  Normal RV size and function, electronic lead in the RV  Mild to moderately dilated left atrial cavity  Electronic lead present in the right atrium  Trace aortic valve regurgitation  Severe mitral valve regurgitation  Trace tricuspid valve  regurgitation  RVSP 35 to 45 mmHg, mild pulmonary hypertension  Trace pulmonic valve regurgitation  No dilation of the aortic root is present        Cardiac catheterization, 2019-[facility]  Normal coronary arteries  LVEDP 12, wedge pressure 13, PA pressure 28/15 with a mean of 19 mmHg, RA pressure 4, cardiac index 2.5 L/min/m² with a PVR of 1.2 [place] units.   Past Medical History:   Diagnosis Date    AICD (automatic cardioverter/defibrillator) present 2020    Asthma     CHF (congestive heart failure)     Class 2 obesity in adult     Coronary artery disease 2017    Degeneration of lumbosacral intervertebral disc 2021    Depression 2017    Diabetes mellitus     Fracture, foot 3/20    Broke    Grade II diastolic dysfunction     Per echocardiogram 3/2021    H/O Closed trimalleolar fracture     Headache     HIV (human immunodeficiency virus infection)     Hypertension     LBBB (left bundle branch block)     NICM (nonischemic cardiomyopathy)     2020 EF 6% per echocardiogram; AICD present    Nonrheumatic mitral valve regurgitation 2021    Moderate per echo 3/2021    Nonrheumatic tricuspid valve regurgitation     Moderate per echocardiogram 3/2021    SHAREE (obstructive sleep apnea) 2023    Pulmonary embolism          Past Surgical History:   Procedure Laterality Date    ANKLE OPEN REDUCTION INTERNAL FIXATION  3/7/20    CARDIAC CATHETERIZATION      CARDIAC DEFIBRILLATOR PLACEMENT       SECTION      one C/S    FRACTURE SURGERY Left     left ankle    OOPHORECTOMY      h/o teratoma    TUBAL ABDOMINAL LIGATION         Current Outpatient Medications on File Prior to Visit   Medication Sig Dispense Refill    acetaminophen (TYLENOL) 325 MG tablet Take 2 tablets by mouth Every 6 (Six) Hours As Needed for Mild Pain.      albuterol sulfate  (90 Base) MCG/ACT inhaler INHALE 2 PUFFS BY MOUTH EVERY 4 HOURS AS NEEDED FOR WHEEZING 18 g 0    atorvastatin (LIPITOR) 20 MG  tablet Take 1 tablet by mouth once daily 90 tablet 0    baclofen (LIORESAL) 10 MG tablet Take 1 tablet by mouth 2 (Two) Times a Day. 20 tablet 0    carvedilol (COREG) 3.125 MG tablet TAKE 1 TABLET BY MOUTH TWICE DAILY WITH MEALS 180 tablet 0    CBD (cannabidiol) oral oil Take  by mouth.      cyclobenzaprine (FLEXERIL) 10 MG tablet Take 1 tablet by mouth 3 (Three) Times a Day As Needed for Muscle Spasms. 21 tablet 0    Ksalj-Cqurw-Brtlvcvz-TenofAF (Symtuza) 144-815-420-10 MG per tablet Take 1 tablet by mouth Daily.      Diclofenac Sodium (VOLTAREN) 1 % gel gel Apply 4 g topically to the appropriate area as directed 2 (Two) Times a Day. Use per pkg insert 100 g 2    Diclofenac Sodium 4 %, Topiramate 2 %, cloNIDine HCl 0.2 %, Lidocaine HCl 5 % Apply 1-2 g topically to the appropriate area as directed 3 (Three) to 4 (Four) times daily. 90 g 1    diphenhydrAMINE (BENADRYL) 25 mg capsule Take 1 capsule by mouth Every 6 (Six) Hours As Needed for Itching (swelling). 20 capsule 0    empagliflozin (Jardiance) 10 MG tablet tablet Take 1 tablet by mouth Daily. 90 tablet 3    famotidine (Pepcid) 20 MG tablet Take 2 tablets by mouth 2 (Two) Times a Day As Needed for Heartburn.      fluticasone (FLONASE) 50 MCG/ACT nasal spray 2 sprays into the nostril(s) as directed by provider Daily. 11.1 g 3    nitroglycerin (NITROSTAT) 0.4 MG SL tablet DISSOLVE ONE TABLET UNDER THE TONGUE EVERY 5 MINUTES AS NEEDED FOR CHEST PAIN.  DO NOT EXCEED A TOTAL OF 3 DOSES IN 15 MINUTES 25 tablet 0    ondansetron (Zofran) 4 MG tablet Take 1 tablet by mouth Every 12 (Twelve) Hours As Needed for Nausea or Vomiting. 30 tablet 0    pantoprazole (Protonix) 20 MG EC tablet Take 1 tablet by mouth Daily. 90 tablet 3    potassium chloride (KLOR-CON M20) 20 MEQ CR tablet Take 3 tablets by mouth once daily 90 tablet 7    Rimegepant Sulfate (Nurtec) 75 MG tablet dispersible tablet Take 1 tablet by mouth 1 (One) Time As Needed for migraine. Max of 1 tablet in 24  hours. 8 tablet 2    sacubitril-valsartan (Entresto) 49-51 MG tablet Take 1 tablet by mouth twice daily 180 tablet 1    sertraline (ZOLOFT) 100 MG tablet Take 1 tablet by mouth once daily 90 tablet 0    spironolactone (ALDACTONE) 25 MG tablet 1 tablet Daily.      torsemide (DEMADEX) 20 MG tablet TAKE 2 TABLETS BY MOUTH IN THE MORNING AND 2 TABLETS IN THE AFTERNOON 150 tablet 0    vitamin D (ERGOCALCIFEROL) 1.25 MG (98470 UT) capsule capsule Take 1 capsule by mouth Every 14 (Fourteen) Days.      warfarin (COUMADIN) 2.5 MG tablet TAKE 3 TABLETS BY MOUTH ON WEDNESDAY AND SATURDAY. TAKE 2 TABLETS BY MOUTH ON ALL OTHER DAYS OF THE WEEK, OR AS DIRECTED BY PROVIDER. 205 tablet 0    Insulin Pen Needle (BD Pen Needle Heidi 2nd Gen) 32G X 4 MM misc USE 1  ONCE DAILY WITH  SAXENDA 100 each 0    promethazine-dextromethorphan (PROMETHAZINE-DM) 6.25-15 MG/5ML syrup Take 5 mL by mouth 4 (Four) Times a Day As Needed for Cough (cough). 118 mL 0     No current facility-administered medications on file prior to visit.       Social History     Socioeconomic History    Marital status: Legally    Tobacco Use    Smoking status: Former     Current packs/day: 0.00     Average packs/day: 0.5 packs/day for 20.0 years (10.0 ttl pk-yrs)     Types: Cigarettes     Start date:      Quit date: 2017     Years since quittin.5     Passive exposure: Past    Smokeless tobacco: Never   Vaping Use    Vaping status: Never Used   Substance and Sexual Activity    Alcohol use: Not Currently     Comment: holiday and special occ//caffeine use: Occ    Drug use: Not Currently     Types: Marijuana    Sexual activity: Not Currently     Partners: Male     Birth control/protection: Hysterectomy       Procedures    ECG 12 Lead    Date/Time: 2024 11:15 AM  Performed by: Catherine Gloria APRN    Authorized by: Catherine Gloria APRN  Comparison: compared with previous ECG from 2024  Rhythm: sinus rhythm  T inversion: III, aVF, V4, V5 and  "V6    Clinical impression: abnormal EKG              Objective:      Vitals:    07/09/24 1048   Weight: 98.4 kg (217 lb)   Height: 167.6 cm (66\")     Body mass index is 35.02 kg/m².    Constitutional:       Appearance: Healthy appearance. Not in distress.   Pulmonary:      Effort: Pulmonary effort is normal.   Cardiovascular:      Normal rate. Regular rhythm.   Pulses:     Intact distal pulses.   Edema:     Peripheral edema absent.   Abdominal:      Comments: Truncal obesity    Skin:     General: Skin is warm and dry.   Neurological:      Mental Status: Alert and oriented to person, place and time.         Lab Review:   3/28/2024: A1c 5.9, total cholesterol 157, , HDL 37, LDL 96, TSH 2.1, unremarkable CBC, glucose 126, alk phos 175 otherwise unremarkable CMP  Assessment:   Diagnoses and all orders for this visit:    1. HFrEF (heart failure with reduced ejection fraction) (Primary)  -     ECG 12 Lead    2. Dilated cardiomyopathy  -     ECG 12 Lead    3. NICM (nonischemic cardiomyopathy)  -     ECG 12 Lead    4. Hypotension, unspecified hypotension type  -     ECG 12 Lead    5. Essential hypertension  -     ECG 12 Lead    6. Nonrheumatic mitral valve regurgitation  Overview:  Moderate per echo 3/2021      7. History of pulmonary embolism    8. SHAREE (obstructive sleep apnea)    9. HIV infection, unspecified symptom status    Other orders  -     sacubitril-valsartan (Entresto) 24-26 MG tablet; Take 1 tablet by mouth 2 (Two) Times a Day.  Dispense: 180 tablet; Refill: 3        1./ 2./ 3. LVEF with decline from 29.8% in 2023 to 18.7% on TTE June 19, 2024.  Most recent echo also showed a severely dilated LV cavity moderate mitral regurgitation.  Symptomatically she is stable.  Euvolemic.  Present GDMT: Carvedilol 3.125 mg twice daily, Jardiance 10 mg daily, sacubitril/valsartan 49 to 51 mg twice daily, spironolactone 25 mg daily, torsemide 20 mg 2 tabs in a.m. and 2 tabs in afternoon.    4. /5.  Hypotensive on present " RACHELT.  I did recheck her blood pressures in the office today 80/59.  She is completely asymptomatic.  We are decreasing her sacubitril/valsartan to 24/26.  She will hold all antihypertensives this evening and will check her blood pressure in the morning prior to resuming her medications.  She will message me with her blood pressures this week.    6.  Moderate mitral regurgitation with mitral valve thickening, echocardiogram to be completed in December 2024.  7.  History of PE on warfarin  8.  Obstructive sleep apnea on CPAP  9.  Follows with [facility] for HIV  10.  LBBB chronic    Plan:   Blood pressure medications this evening and repeat blood pressure in a.m. parameters were reviewed prior to taking blood pressure medication  We decreased Entresto today.  She will have follow-up echocardiogram in December, follow-up with Dr. Melton in the office at that time  She will follow-up with me or RM sooner with any concerns.       Thank you for allowing me to participate in this patient's care. Please call with any questions or concerns.          Dragon dictation device was utilized in this note.

## 2024-07-09 NOTE — PATIENT INSTRUCTIONS
Hold your carvedilol today  I am decreasing your entresto - do not take it today  Check BP tomorrow AM and call me with readings  Do NOT take carvedilol or entresto unless your top number of BP is greater than 105

## 2024-07-09 NOTE — Clinical Note
Hi,   I saw Ms. Saez today - BP recheck: 80/59- asymptomatic.  She did take her Entresto 49-51 and her carvedilol this morning.  I have advised her to hold them this evening I decreased her Entresto dose.  She is going to message me with her blood pressures this week. Symptomatically she is feeling well.  She has elected to follow-up in December with you and her repeat echocardiogram at that time or to call to see either of us sooner with any concerns.

## 2024-07-10 RX ORDER — ATORVASTATIN CALCIUM 20 MG/1
20 TABLET, FILM COATED ORAL DAILY
Qty: 90 TABLET | Refills: 0 | Status: SHIPPED | OUTPATIENT
Start: 2024-07-10

## 2024-07-12 ENCOUNTER — ANTICOAGULATION VISIT (OUTPATIENT)
Dept: PHARMACY | Facility: HOSPITAL | Age: 49
End: 2024-07-12
Payer: MEDICARE

## 2024-07-12 LAB — INR PPP: 2.5

## 2024-07-12 PROCEDURE — G0249 PROVIDE INR TEST MATER/EQUIP: HCPCS

## 2024-07-12 NOTE — PROGRESS NOTES
Anticoagulation Clinic Progress Note    Anticoagulation Summary  As of 2024      INR goal:  2.0-3.0   TTR:  63.0% (3.5 y)   INR used for dosin.50 (2024)   Warfarin maintenance plan:  7.5 mg every Wed, Sat; 5 mg all other days   Weekly warfarin total:  40 mg   No change documented:  Zina De La Rosa RPH   Plan last modified:  Zina De La Rosa RPH (1/3/2024)   Next INR check:  2024   Priority:  High   Target end date:  Indefinite    Indications    Other acute pulmonary embolism  unspecified whether acute cor pulmonale present (Resolved) [I26.99]  Acute pulmonary embolism  unspecified pulmonary embolism type  unspecified whether acute cor pulmonale present (Resolved) [I26.99]                 Anticoagulation Episode Summary       INR check location:      Preferred lab:      Send INR reminders to:   [facility] TEST PO[facility]    Comments:  **Home Testing Program**          Anticoagulation Care Providers       Provider Role Specialty Phone number    Lisset Melton MD Referring Cardiology [phone]            Clinic Interview:  No pertinent clinical findings have been reported.    INR History:      2024     9:30 AM 2024    12:00 AM 2024     8:38 AM 2024    12:00 AM 2024     8:33 AM 2024    12:00 AM 2024    11:00 AM   Anticoagulation Monitoring   INR 2.40  2.30  2.60  2.50   INR Date 2024   INR Goal 2.0-3.0  2.0-3.0  2.0-3.0  2.0-3.0   Trend Same  Same  Same  Same   Last Week Total 40 mg  40 mg  40 mg  40 mg   Next Week Total 40 mg  40 mg  40 mg  40 mg   Sun 5 mg  5 mg  5 mg  5 mg   Mon 5 mg  5 mg  5 mg  5 mg   Tue 5 mg  5 mg  5 mg  5 mg   Wed 7.5 mg  7.5 mg  7.5 mg  7.5 mg   Thu 5 mg  5 mg  5 mg  5 mg   Fri 5 mg  5 mg  5 mg  5 mg   Sat 7.5 mg  7.5 mg  7.5 mg  7.5 mg   Historical INR  2.30      2.60      2.50            This result is from an external source.       Plan:  1. INR is therapeutic today- see above in  Anticoagulation Summary.    Nina Saez to continue their warfarin regimen- see above in Anticoagulation Summary.  2. Follow up in 1 week  3. Pt has agreed to only be called if INR out of range. They have been instructed to call if any changes in medications, doses, concerns, etc. Patient expresses understanding and has no further questions at this time.    Zina De La Rosa, [facility]

## 2024-07-14 DIAGNOSIS — I50.22 CHRONIC SYSTOLIC CONGESTIVE HEART FAILURE: ICD-10-CM

## 2024-07-14 RX ORDER — NITROGLYCERIN 0.4 MG/1
0.4 TABLET SUBLINGUAL
Qty: 25 TABLET | Refills: 0 | Status: SHIPPED | OUTPATIENT
Start: 2024-07-14

## 2024-07-17 ENCOUNTER — HOSPITAL ENCOUNTER (OUTPATIENT)
Dept: CARDIOLOGY | Facility: HOSPITAL | Age: 49
Discharge: HOME | End: 2024-07-17
Admitting: NURSE PRACTITIONER
Payer: MEDICARE

## 2024-07-17 VITALS
HEART RATE: 88 BPM | HEIGHT: 66 IN | OXYGEN SATURATION: 96 % | BODY MASS INDEX: 34.65 KG/M2 | DIASTOLIC BLOOD PRESSURE: 73 MMHG | SYSTOLIC BLOOD PRESSURE: 103 MMHG | WEIGHT: 215.6 LBS

## 2024-07-17 DIAGNOSIS — I50.20 HFREF (HEART FAILURE WITH REDUCED EJECTION FRACTION): Primary | ICD-10-CM

## 2024-07-17 LAB
ANION GAP SERPL CALCULATED.3IONS-SCNC: 12.8 MMOL/L (ref 5–15)
BUN SERPL-MCNC: 15 MG/DL (ref 6–20)
BUN/CREAT SERPL: 14.7 (ref 7–25)
CALCIUM SPEC-SCNC: 8.9 MG/DL (ref 8.6–10.5)
CHLORIDE SERPL-SCNC: 101 MMOL/L (ref 98–107)
CO2 SERPL-SCNC: 23.2 MMOL/L (ref 22–29)
CREAT SERPL-MCNC: 1.02 MG/DL (ref 0.57–1)
EGFRCR SERPLBLD CKD-EPI 2021: 68 ML/MIN/1.73
GLUCOSE SERPL-MCNC: 80 MG/DL (ref 65–99)
NT-PROBNP SERPL-MCNC: 2236 PG/ML (ref 0–450)
POTASSIUM SERPL-SCNC: 4.1 MMOL/L (ref 3.5–5.2)
SODIUM SERPL-SCNC: 137 MMOL/L (ref 136–145)

## 2024-07-17 PROCEDURE — 83880 ASSAY OF NATRIURETIC PEPTIDE: CPT | Performed by: NURSE PRACTITIONER

## 2024-07-17 PROCEDURE — 80048 BASIC METABOLIC PNL TOTAL CA: CPT | Performed by: NURSE PRACTITIONER

## 2024-07-17 PROCEDURE — 36415 COLL VENOUS BLD VENIPUNCTURE: CPT | Performed by: NURSE PRACTITIONER

## 2024-07-17 PROCEDURE — 94726 PLETHYSMOGRAPHY LUNG VOLUMES: CPT | Performed by: NURSE PRACTITIONER

## 2024-07-17 PROCEDURE — G0463 HOSPITAL OUTPT CLINIC VISIT: HCPCS

## 2024-07-17 RX ORDER — METOPROLOL SUCCINATE 25 MG/1
12.5 TABLET, EXTENDED RELEASE ORAL DAILY
Qty: 30 TABLET | Refills: 3 | Status: SHIPPED | OUTPATIENT
Start: 2024-07-17

## 2024-07-17 NOTE — PATIENT INSTRUCTIONS
We are changing the carvedilol to metoprolol  Directions for when to call the clinic reviewed with the patient to include weight gain of 2 to 3 pounds in 24 hours, weight gain of 5 to 10 pounds within 7 days; worsening shortness of breath; worsening lower extremity edema or abdominal distention.

## 2024-07-17 NOTE — PROGRESS NOTES
[facility]    Lisset Melton MD  [address]  [facility] [address]  [place] [address]    Thank you for asking me to see Nina NATH Saez.     Congestive Heart Failure  Associated symptoms include shortness of breath (chronic). Pertinent negatives include no chest pain.       1. HFrEF    Subjective      Nina Saezis a 48 y.o. female who presents today for HFrEF.   LVEF declined from 29.8% in 2023 to 18.7% on TTE 6/19/2024.  Severely dilated LV cavity, moderate mitral regurgitation.  Has had issues with hypotension on GDMT.  Blood pressure in office on 7/9/2024 was 80/59.  Entresto was decreased to 24-26.  All other antihypertensive medications were held.    Severely dilated nonischemic cardiomyopathy.  AICD in place.  Likely mild to factorial.  Has had old illicit drug use history, HIV, poorly controlled hypertension.  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  She had a cardiac catheterization at that time that showed normal coronary arteries.  She was hospitalized December 2020 with an acute PE she was placed on Coumadin.  And right lower lobe pulmonary infarct.  She has a history of HIV and follows with [facility].    Cardiac catheterization done 4/23/2019 showed normal coronary arteries, LVEDP 12, wedge pressure 13, PA pressure 28/15 with mean of 19 mmHg, RA pressure 4, CI 2 point 0.5 L/min/m² with a PVR of 1.2 Taylor units    July/2020 she was admitted with acute congestive heart failure, echo done at that time showed severe left ventricular dilation, EF 6%, very thin left ventricle walls, grade 3 diastolic dysfunction, moderate to severe mitral insufficiency, moderate tricuspid insufficiency, RVSP 48 mmHg.  During that time she was recommended to be upgraded to a biventricular AICD.    On 9/10/2020, she saw Dr. Jamar Azul in the office and he did not feel that  she met criteria for implantation of CRT device.       She saw [facility] by telehealth date of service 1/28/21.  They noted the patient underwent a CPX which showed peak VO2 11.9 at RER greater than 1.05 while on Coreg.  Her CPX showed heart related limitations in her functional capacity, but the patient felt satisfied at her functional current level.  She was in the ER for headache neck pain upper chest pain and back pain on 12/27/2023.  Her high-sensitivity troponin was 16 then 18, creatinine 1.07, sodium 134, BUN 21, glucose 139, otherwise normal CMP, normal CBC.  Her chest x-ray was unremarkable and her ECG was unchanged.  CT head and cervical spine showed no acute intracranial hemorrhage or hydrocephalus, no acute cervical fracture.  She had a device interrogation done 12/24/2023 which showed 10 years of battery, 0% RV pacing, no events, no changes made.     She has stress nuclear perfusion study done 1/18/2024 which showed a small sized area of mild ischemia in the septal wall.  I reviewed the Stress images and there is no significant ischemia.     CMP done 2/23/2024 show glucose 173, creatinine 1.09, otherwise normal CMP.  Device interrogation done 3/29/2024 showed 0% RV pacing, no changes made.  She saw pulmonary 5/2024 and no changes were made, she was very compliant with her CPAP device at that time.     Denies any new or worsening shortness of breath.   She does reports bloated feeling.   Denies any leg swelling.     She is currently following with  for the [facility]    Review of Systems - Review of Systems   Constitutional: Negative for weight gain and weight loss.   Cardiovascular:  Negative for chest pain, dyspnea on exertion, leg swelling, orthopnea, paroxysmal nocturnal dyspnea and syncope.   Respiratory:  Positive for shortness of breath (chronic). Negative for cough, sleep disturbances due to breathing and snoring.    Gastrointestinal:  Negative for bloating.   Neurological:  Negative for  dizziness, light-headedness and weakness.          Patient Active Problem List   Diagnosis   • Asthma   • HIV (human immunodeficiency virus infection)   • Class 2 obesity in adult   • Nonrheumatic mitral valve regurgitation   • History of open reduction and internal fixation (ORIF) procedure   • HFrEF (heart failure with reduced ejection fraction)   • Tibial tendonitis, posterior, left   • Anxiety   • Chronic low back pain   • Degeneration of lumbosacral intervertebral disc   • Gastroesophageal reflux disease   • Human papilloma virus infection   • Hyperlipidemia   • Vitamin D deficiency   • Obesity   • Essential hypertension   • Dilated cardiomyopathy   • NICM (nonischemic cardiomyopathy)   • Nonrheumatic tricuspid valve regurgitation   • Intractable chronic migraine without aura   • Bilateral occipital neuralgia   • SHAREE (obstructive sleep apnea)   • Chronic chest pain with high risk for CAD   • Peroneal tendonitis, left   • Accessory navicular bone of left foot   • History of pulmonary embolism   • Hypotension     family history includes Bone cancer in her mother; Breast cancer in her mother and paternal grandmother; Breast cancer (age of onset: 40) in her maternal grandmother; Cancer in her mother; Diabetes in her maternal grandfather and maternal grandmother; Heart disease in her maternal grandmother; Hyperlipidemia in her maternal grandfather; Hypertension in her maternal grandfather; No Known Problems in her daughter and daughter; Ovarian cysts in her daughter; Pancreatic cancer in her paternal grandmother; Prostate cancer in her maternal grandfather and paternal uncle.   reports that she quit smoking about 7 years ago. Her smoking use included cigarettes. She started smoking about 27 years ago. She has a 10 pack-year smoking history. She has been exposed to tobacco smoke. She has never used smokeless tobacco. She reports that she does not currently use alcohol. She reports that she does not currently use  drugs after having used the following drugs: Marijuana.  Allergies   Allergen Reactions   • Lisinopril Cough     Physical Activity: Not on file          Current Outpatient Medications:   •  acetaminophen (TYLENOL) 325 MG tablet, Take 2 tablets by mouth Every 6 (Six) Hours As Needed for Mild Pain., Disp: , Rfl:   •  albuterol sulfate  (90 Base) MCG/ACT inhaler, INHALE 2 PUFFS BY MOUTH EVERY 4 HOURS AS NEEDED FOR WHEEZING, Disp: 18 g, Rfl: 0  •  atorvastatin (LIPITOR) 20 MG tablet, Take 1 tablet by mouth once daily, Disp: 90 tablet, Rfl: 0  •  baclofen (LIORESAL) 10 MG tablet, Take 1 tablet by mouth 2 (Two) Times a Day., Disp: 20 tablet, Rfl: 0  •  carvedilol (COREG) 3.125 MG tablet, TAKE 1 TABLET BY MOUTH TWICE DAILY WITH MEALS (Patient taking differently: Take 1 tablet by mouth Daily.), Disp: 180 tablet, Rfl: 0  •  CBD (cannabidiol) oral oil, Take  by mouth., Disp: , Rfl:   •  cyclobenzaprine (FLEXERIL) 10 MG tablet, Take 1 tablet by mouth 3 (Three) Times a Day As Needed for Muscle Spasms., Disp: 21 tablet, Rfl: 0  •  Kumza-Aajnn-Ffuabtuo-TenofAF (Symtuza) 364-887-752-10 MG per tablet, Take 1 tablet by mouth Daily., Disp: , Rfl:   •  Diclofenac Sodium (VOLTAREN) 1 % gel gel, Apply 4 g topically to the appropriate area as directed 2 (Two) Times a Day. Use per pkg insert, Disp: 100 g, Rfl: 2  •  diphenhydrAMINE (BENADRYL) 25 mg capsule, Take 1 capsule by mouth Every 6 (Six) Hours As Needed for Itching (swelling)., Disp: 20 capsule, Rfl: 0  •  empagliflozin (Jardiance) 10 MG tablet tablet, Take 1 tablet by mouth Daily., Disp: 90 tablet, Rfl: 3  •  famotidine (Pepcid) 20 MG tablet, Take 2 tablets by mouth 2 (Two) Times a Day As Needed for Heartburn., Disp: , Rfl:   •  fluticasone (FLONASE) 50 MCG/ACT nasal spray, 2 sprays into the nostril(s) as directed by provider Daily., Disp: 11.1 g, Rfl: 3  •  nitroglycerin (NITROSTAT) 0.4 MG SL tablet, DISSOLVE ONE TABLET UNDER THE TONGUE EVERY 5 MINUTES AS NEEDED FOR CHEST  PAIN.  DO NOT EXCEED A TOTAL OF 3 DOSES IN 15 MINUTES, Disp: 25 tablet, Rfl: 0  •  pantoprazole (Protonix) 20 MG EC tablet, Take 1 tablet by mouth Daily., Disp: 90 tablet, Rfl: 3  •  potassium chloride (KLOR-CON M20) 20 MEQ CR tablet, Take 3 tablets by mouth once daily, Disp: 90 tablet, Rfl: 7  •  Rimegepant Sulfate (Nurtec) 75 MG tablet dispersible tablet, Take 1 tablet by mouth 1 (One) Time As Needed for migraine. Max of 1 tablet in 24 hours., Disp: 8 tablet, Rfl: 2  •  sacubitril-valsartan (Entresto) 24-26 MG tablet, Take 1 tablet by mouth 2 (Two) Times a Day., Disp: 180 tablet, Rfl: 3  •  sertraline (ZOLOFT) 100 MG tablet, Take 1 tablet by mouth once daily, Disp: 90 tablet, Rfl: 0  •  spironolactone (ALDACTONE) 25 MG tablet, 1 tablet Daily., Disp: , Rfl:   •  torsemide (DEMADEX) 20 MG tablet, TAKE 2 TABLETS BY MOUTH IN THE MORNING AND 2 TABLETS IN THE AFTERNOON, Disp: 150 tablet, Rfl: 0  •  vitamin D (ERGOCALCIFEROL) 1.25 MG (47513 UT) capsule capsule, Take 1 capsule by mouth Every 14 (Fourteen) Days., Disp: , Rfl:   •  warfarin (COUMADIN) 2.5 MG tablet, TAKE 3 TABLETS BY MOUTH ON WEDNESDAY AND SATURDAY. TAKE 2 TABLETS BY MOUTH ON ALL OTHER DAYS OF THE WEEK, OR AS DIRECTED BY PROVIDER., Disp: 205 tablet, Rfl: 0    Objective   Vital Sign Review:   Vitals:    07/17/24 1330   BP: 103/73   Pulse: 88   SpO2: 96%     Body mass index is 34.82 kg/m².      07/17/24  1330   Weight: 97.8 kg (215 lb 9.6 oz)     Physical Exam:  Constitutional:       Appearance: Normal and healthy appearance.   Neck:      Vascular: No carotid bruit or JVD. JVD normal.   Pulmonary:      Effort: Pulmonary effort is normal.      Breath sounds: Normal breath sounds.   Cardiovascular:      PMI at left midclavicular line. Normal rate. Regular rhythm.      Murmurs: There is a high frequency blowing holosystolic murmur at the apex.   Pulses:     Intact distal pulses.   Edema:     Peripheral edema absent.   Abdominal:      Palpations: Abdomen is soft.       Tenderness: There is no abdominal tenderness.   Skin:     General: Skin is warm and dry.   Neurological:      General: No focal deficit present.      Mental Status: Alert and oriented to person, place and time.   Psychiatric:         Behavior: Behavior is cooperative.        DATA REVIEWED:   EKG:      ---------------------------------------------------  ECHO:    Results for orders placed during the hospital encounter of 06/19/24    Adult Transthoracic Echo Complete W/ Cont if Necessary Per Protocol    Interpretation Summary  •  Left ventricular systolic function is severely decreased. Calculated left ventricular EF = 18.7%  •  The left ventricular cavity is severely dilated.  •  Left ventricular mass index is increased in the setting of chamber dilatation.  •  Left ventricular diastolic function is consistent with (grade II w/high LAP) pseudonormalization.  •  There is bileaflet mitral valve thickening present.  Moderate functional mitral regurgitation.  •  Estimated right ventricular systolic pressure from tricuspid regurgitation is normal (<35 mmHg).  •  Normal biatrial and IVC size.          -----------------------------------------------------  RHC/LHC    No results found for this or any previous visit.      ---------------------------------------------------------------------------  CXR/Imaging:     Imaging Results (Most Recent)       None                -----------------------------------------------------------------------------  CT:   No radiology results for the last 30 days.        --------------------------------------------------------------------------------------------------------------    Laboratory evaluations:  Renal Function: CrCl cannot be calculated (Patient's most recent lab result is older than the maximum 30 days allowed.).    Lab Results   Component Value Date    GLUCOSE 126 (H) 03/28/2024    BUN 14 03/28/2024    CREATININE 0.94 03/28/2024    EGFRIFAFRI 68 02/02/2022    BCR 15 03/28/2024     K 3.7 03/28/2024    CO2 22 03/28/2024    CALCIUM 9.4 03/28/2024    PROTENTOTREF 6.9 03/28/2024    ALBUMIN 4.1 03/28/2024    LABIL2 1.5 03/28/2024    AST 20 03/28/2024    ALT 22 03/28/2024     Lab Results   Component Value Date    WBC 9.4 03/28/2024    HGB 13.2 03/28/2024    HCT 39.1 03/28/2024    MCV 96 03/28/2024     03/28/2024     Lab Results   Component Value Date    HGBA1C 5.9 (H) 03/28/2024     Lab Results   Component Value Date    HGBA1C 5.9 (H) 03/28/2024    HGBA1C 5.8 (H) 07/13/2023    HGBA1C 5.00 09/12/2022     Lab Results   Component Value Date    CREATININE 0.94 03/28/2024     Lab Results   Component Value Date    IRON 131 04/22/2022    TIBC 379 03/28/2024    FERRITIN 60 03/28/2024       Result Review:  I have personally reviewed the results from the time of this admission to 7/17/2024 13:52 EDT and agree with these findings:  [x]  Laboratory list / accordion  []  Microbiology  [x]  Radiology  [x]  EKG/Telemetry   [x]  Cardiology/Vascular   []  Pathology  [x]  Old records  []  Other:      ReDS VOLUMETRIC ASSESSMENT:  ReDs Vest    Performed by: Haley Birmingham APRN  Authorized by: Haley Birmingham APRN    ReDS value:  34  ReDS Value Description:  25-35 (green) = Optimal Volume Status        Assessment & Plan      1. HFrEF (heart failure with reduced ejection fraction)      HFrEF--pt is euvolemic today. She has repeat echo scheduled for December.   We will switch Carvedilol to toprol XL due to hypotension and to hopefully be able to keep patient on entresto  She is following with [facility] team  GDMT as listed below:     NYHA stage C FC-II     Clinical status was assessed and has waxed and waned.  Treatment intent: curative   ReDS reading was obtained today.  ReDS result: 34       Today, Patient appears euvolemic. and with perfusion. The patient's hemodynamics are currently acceptable. HR is: normal and is not at goal. BP/MAP was reviewed and there is not room for medication up-titration.   The patient's clinical course has been impacted by hypotension. LVEF: 18.7%.     GDMT Assessment:   GDMT changes recommended today:  change BB      BB:   start Metoprolol Succinate  12.5mg daily (changed from carvedilol)  Monitor heart rate.  Please call the [facility] for HR<50, dizziness, lightheadedness, syncope    A/A/A:     continue Entresto 24/26mg BID  Occasional monitoring of Chem-7 is recommended; call for the development of a new cough or S/Sx angioedema    SUF3D-U:  continue Empagliflozin (Jardiance) 10 mg daily  Call for S/Sx genital mycotic infections  Do not take when inadequate oral intake, NPO, GI illness    MRA:  The patient is FC-NYHA Class II and MRA is indicated.   continue Spironolactone  25mg daily  Recommended quarterly assessment of GFR and electrolytes   Avoid salt substitutes containing potassium  Please hold this medication if diarrhea, vomiting, or infection with fever and sweating occurs      Diuretic Regimen:  -DIURETIC:   toresemide 40 mg two times daily  -Fluid restriction:   -requested  -2000 ml  -Patient has been asked to weigh daily and was provided with a printed diuretic strategy.  -Sodium restriction:   -1,500 mg Na restriction was discussed.      -ICD/CRT-D:     All patients with HFrEF who maintain a diagnosis of HFrEF should be screened for ICD/CRT-D therapy.  ------------------------------------------------------------------  Patient has AICD in place      PH Screening:  All patients with HFrEF need to be screened for pulmonary hypertension.  These patient should be seen, at least once, by a PH specialist. The PASP is reported on 2D TTE. PASP>40mmHg should prompt a referral.  Patients who do have pulmonary hypertension on 2D TTE should be referred for PFTs.  Abnormal PFTs should prompt pulmonary referral.    -The patientwas screened today for PH.  The patient's last 2D TTE showed the patient does not currently have PH.      Sleep Evaluation:  Obstructive and central sleep apnea is  more common in heart failure.  In general, all patients with heart failure should be assessed for the presence of sleep apnea.  A screening tool can be utilized such as STOP-BANG.  Those patients with risks should be referred to sleep medicine.  In general, all patients should see a sleep physician at least once.    Has SHAREE, sleeps with cpap nightly    In patients with NYHA class II-IV HFrEF and central sleep apnea, adaptive servo-ventilation is harmful and not recommended.        Labs/Diagnostics/Referrals:    Labs -Chem-7       Lifestyle Advice:   - call office if I gain more than 2 pounds in one day or 5 pounds in one week   - keep legs up while sitting   - use salt in moderation   - watch for swelling in feet, ankles and legs every day   - weigh myself daily   -call for concerning s/sx   -- activity or exercise based on tolerance encouraged     CODE STATUS: FULL              Return in about 2 weeks (around 7/31/2024) for Recheck.              Thank you for allowing me to participate in the care of your patient,         Haley Ribeiro Valencia SAMUEL  [facility] HEART FAILURE  07/17/24  13:52 EDT      **Lyric Disclaimer:**  Much of this encounter note is an electronic transcription/translation of spoken language to printed text. The electronic translation of spoken language may permit erroneous, or at times, nonsensical words or phrases to be inadvertently transcribed. Although I have reviewed the note for such errors, some may still exist.    The information in this note was reviewed and updated during the visit to be as accurate as possible. As many patients have chronic medical problems, many of their individual problems and ongoing plans do not change significantly from visit to visit.  This information is correct and is consistent with my treatment plan as of today's visit.

## 2024-07-17 NOTE — LETTER
July 17, 2024     Patient: Nina Saez   YOB: 1975   Date of Visit: 7/17/2024       To Whom It May Concern:    It is my medical opinion that Nina Saez may return to work on 7/22/2024 .           Sincerely,        SAMUEL Morton    CC:   No Recipients

## 2024-07-17 NOTE — LETTER
July 21, 2024       No Recipients    Patient: Nina Saez   YOB: 1975   Date of Visit: 7/17/2024     Dear Lisset Melton MD:       Thank you for referring Nina Saez to me for evaluation. Below are the relevant portions of my assessment and plan of care.    If you have questions, please do not hesitate to call me. I look forward to following Nina along with you.         Sincerely,        SAMUEL Morton        CC:   No Recipients    Haley Birmingham APRN  07/21/24 2214  Signed    [facility]    Lisset Melton MD  [address]  [place] [address]    Thank you for asking me to see Nina Saez.     Congestive Heart Failure  Associated symptoms include shortness of breath (chronic). Pertinent negatives include no chest pain.       1. HFrEF    Subjective     Nina Saezis a 48 y.o. female who presents today for HFrEF.   LVEF declined from 29.8% in 2023 to 18.7% on TTE 6/19/2024.  Severely dilated LV cavity, moderate mitral regurgitation.  Has had issues with hypotension on GDMT.  Blood pressure in office on 7/9/2024 was 80/59.  Entresto was decreased to 24-26.  All other antihypertensive medications were held.    Severely dilated nonischemic cardiomyopathy.  AICD in place.  Likely mild to factorial.  Has had old illicit drug use history, HIV, poorly controlled hypertension.  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  She had a cardiac catheterization at that time that showed normal coronary arteries.  She was hospitalized December 2020 with an acute PE she was placed on Coumadin.  And right lower lobe pulmonary infarct.  She has a history of HIV and follows with [facility].    Cardiac catheterization done 4/23/2019 showed normal coronary arteries, LVEDP 12, wedge pressure 13, PA pressure 28/15 with mean of 19  mmHg, RA pressure 4, CI 2 point 0.5 L/min/m² with a PVR of 1.2 Taylor units    July/2020 she was admitted with acute congestive heart failure, echo done at that time showed severe left ventricular dilation, EF 6%, very thin left ventricle walls, grade 3 diastolic dysfunction, moderate to severe mitral insufficiency, moderate tricuspid insufficiency, RVSP 48 mmHg.  During that time she was recommended to be upgraded to a biventricular AICD.    On 9/10/2020, she saw Dr. Jamar Azul in the office and he did not feel that she met criteria for implantation of CRT device.       She saw  healthcare by telehealth date of service 1/28/21.  They noted the patient underwent a CPX which showed peak VO2 11.9 at RER greater than 1.05 while on Coreg.  Her CPX showed heart related limitations in her functional capacity, but the patient felt satisfied at her functional current level.  She was in the ER for headache neck pain upper chest pain and back pain on 12/27/2023.  Her high-sensitivity troponin was 16 then 18, creatinine 1.07, sodium 134, BUN 21, glucose 139, otherwise normal CMP, normal CBC.  Her chest x-ray was unremarkable and her ECG was unchanged.  CT head and cervical spine showed no acute intracranial hemorrhage or hydrocephalus, no acute cervical fracture.  She had a device interrogation done 12/24/2023 which showed 10 years of battery, 0% RV pacing, no events, no changes made.     She has stress nuclear perfusion study done 1/18/2024 which showed a small sized area of mild ischemia in the septal wall.  I reviewed the Stress images and there is no significant ischemia.     CMP done 2/23/2024 show glucose 173, creatinine 1.09, otherwise normal CMP.  Device interrogation done 3/29/2024 showed 0% RV pacing, no changes made.  She saw pulmonary 5/2024 and no changes were made, she was very compliant with her CPAP device at that time.     Denies any new or worsening shortness of breath.   She does reports bloated feeling.    Denies any leg swelling.     She is currently following with  for the [facility]    Review of Systems - Review of Systems   Constitutional: Negative for weight gain and weight loss.   Cardiovascular:  Negative for chest pain, dyspnea on exertion, leg swelling, orthopnea, paroxysmal nocturnal dyspnea and syncope.   Respiratory:  Positive for shortness of breath (chronic). Negative for cough, sleep disturbances due to breathing and snoring.    Gastrointestinal:  Negative for bloating.   Neurological:  Negative for dizziness, light-headedness and weakness.          Patient Active Problem List   Diagnosis   • Asthma   • HIV (human immunodeficiency virus infection)   • Class 2 obesity in adult   • Nonrheumatic mitral valve regurgitation   • History of open reduction and internal fixation (ORIF) procedure   • HFrEF (heart failure with reduced ejection fraction)   • Tibial tendonitis, posterior, left   • Anxiety   • Chronic low back pain   • Degeneration of lumbosacral intervertebral disc   • Gastroesophageal reflux disease   • Human papilloma virus infection   • Hyperlipidemia   • Vitamin D deficiency   • Obesity   • Essential hypertension   • Dilated cardiomyopathy   • NICM (nonischemic cardiomyopathy)   • Nonrheumatic tricuspid valve regurgitation   • Intractable chronic migraine without aura   • Bilateral occipital neuralgia   • SHAREE (obstructive sleep apnea)   • Chronic chest pain with high risk for CAD   • Peroneal tendonitis, left   • Accessory navicular bone of left foot   • History of pulmonary embolism   • Hypotension     family history includes Bone cancer in her mother; Breast cancer in her mother and paternal grandmother; Breast cancer (age of onset: 40) in her maternal grandmother; Cancer in her mother; Diabetes in her maternal grandfather and maternal grandmother; Heart disease in her maternal grandmother; Hyperlipidemia in her maternal grandfather; Hypertension in her maternal grandfather; No Known Problems in  her daughter and daughter; Ovarian cysts in her daughter; Pancreatic cancer in her paternal grandmother; Prostate cancer in her maternal grandfather and paternal uncle.   reports that she quit smoking about 7 years ago. Her smoking use included cigarettes. She started smoking about 27 years ago. She has a 10 pack-year smoking history. She has been exposed to tobacco smoke. She has never used smokeless tobacco. She reports that she does not currently use alcohol. She reports that she does not currently use drugs after having used the following drugs: Marijuana.  Allergies   Allergen Reactions   • Lisinopril Cough     Physical Activity: Not on file          Current Outpatient Medications:   •  acetaminophen (TYLENOL) 325 MG tablet, Take 2 tablets by mouth Every 6 (Six) Hours As Needed for Mild Pain., Disp: , Rfl:   •  albuterol sulfate  (90 Base) MCG/ACT inhaler, INHALE 2 PUFFS BY MOUTH EVERY 4 HOURS AS NEEDED FOR WHEEZING, Disp: 18 g, Rfl: 0  •  atorvastatin (LIPITOR) 20 MG tablet, Take 1 tablet by mouth once daily, Disp: 90 tablet, Rfl: 0  •  baclofen (LIORESAL) 10 MG tablet, Take 1 tablet by mouth 2 (Two) Times a Day., Disp: 20 tablet, Rfl: 0  •  carvedilol (COREG) 3.125 MG tablet, TAKE 1 TABLET BY MOUTH TWICE DAILY WITH MEALS (Patient taking differently: Take 1 tablet by mouth Daily.), Disp: 180 tablet, Rfl: 0  •  CBD (cannabidiol) oral oil, Take  by mouth., Disp: , Rfl:   •  cyclobenzaprine (FLEXERIL) 10 MG tablet, Take 1 tablet by mouth 3 (Three) Times a Day As Needed for Muscle Spasms., Disp: 21 tablet, Rfl: 0  •  Ishum-Diflw-Kytoaoie-TenofAF (Symtuza) [id number] MG per tablet, Take 1 tablet by mouth Daily., Disp: , Rfl:   •  Diclofenac Sodium (VOLTAREN) 1 % gel gel, Apply 4 g topically to the appropriate area as directed 2 (Two) Times a Day. Use per pkg insert, Disp: 100 g, Rfl: 2  •  diphenhydrAMINE (BENADRYL) 25 mg capsule, Take 1 capsule by mouth Every 6 (Six) Hours As Needed for Itching  (swelling)., Disp: 20 capsule, Rfl: 0  •  empagliflozin (Jardiance) 10 MG tablet tablet, Take 1 tablet by mouth Daily., Disp: 90 tablet, Rfl: 3  •  famotidine (Pepcid) 20 MG tablet, Take 2 tablets by mouth 2 (Two) Times a Day As Needed for Heartburn., Disp: , Rfl:   •  fluticasone (FLONASE) 50 MCG/ACT nasal spray, 2 sprays into the nostril(s) as directed by provider Daily., Disp: 11.1 g, Rfl: 3  •  nitroglycerin (NITROSTAT) 0.4 MG SL tablet, DISSOLVE ONE TABLET UNDER THE TONGUE EVERY 5 MINUTES AS NEEDED FOR CHEST PAIN.  DO NOT EXCEED A TOTAL OF 3 DOSES IN 15 MINUTES, Disp: 25 tablet, Rfl: 0  •  pantoprazole (Protonix) 20 MG EC tablet, Take 1 tablet by mouth Daily., Disp: 90 tablet, Rfl: 3  •  potassium chloride (KLOR-CON M20) 20 MEQ CR tablet, Take 3 tablets by mouth once daily, Disp: 90 tablet, Rfl: 7  •  Rimegepant Sulfate (Nurtec) 75 MG tablet dispersible tablet, Take 1 tablet by mouth 1 (One) Time As Needed for migraine. Max of 1 tablet in 24 hours., Disp: 8 tablet, Rfl: 2  •  sacubitril-valsartan (Entresto) 24-26 MG tablet, Take 1 tablet by mouth 2 (Two) Times a Day., Disp: 180 tablet, Rfl: 3  •  sertraline (ZOLOFT) 100 MG tablet, Take 1 tablet by mouth once daily, Disp: 90 tablet, Rfl: 0  •  spironolactone (ALDACTONE) 25 MG tablet, 1 tablet Daily., Disp: , Rfl:   •  torsemide (DEMADEX) 20 MG tablet, TAKE 2 TABLETS BY MOUTH IN THE MORNING AND 2 TABLETS IN THE AFTERNOON, Disp: 150 tablet, Rfl: 0  •  vitamin D (ERGOCALCIFEROL) 1.25 MG (08383 UT) capsule capsule, Take 1 capsule by mouth Every 14 (Fourteen) Days., Disp: , Rfl:   •  warfarin (COUMADIN) 2.5 MG tablet, TAKE 3 TABLETS BY MOUTH ON WEDNESDAY AND SATURDAY. TAKE 2 TABLETS BY MOUTH ON ALL OTHER DAYS OF THE WEEK, OR AS DIRECTED BY PROVIDER., Disp: 205 tablet, Rfl: 0    Objective  Vital Sign Review:   Vitals:    07/17/24 1330   BP: 103/73   Pulse: 88   SpO2: 96%     Body mass index is 34.82 kg/m².      07/17/24  1330   Weight: 97.8 kg (215 lb 9.6 oz)      Physical Exam:  Constitutional:       Appearance: Normal and healthy appearance.   Neck:      Vascular: No carotid bruit or JVD. JVD normal.   Pulmonary:      Effort: Pulmonary effort is normal.      Breath sounds: Normal breath sounds.   Cardiovascular:      PMI at left midclavicular line. Normal rate. Regular rhythm.      Murmurs: There is a high frequency blowing holosystolic murmur at the apex.   Pulses:     Intact distal pulses.   Edema:     Peripheral edema absent.   Abdominal:      Palpations: Abdomen is soft.      Tenderness: There is no abdominal tenderness.   Skin:     General: Skin is warm and dry.   Neurological:      General: No focal deficit present.      Mental Status: Alert and oriented to person, place and time.   Psychiatric:         Behavior: Behavior is cooperative.        DATA REVIEWED:   EKG:      ---------------------------------------------------  ECHO:    Results for orders placed during the hospital encounter of 06/19/24    Adult Transthoracic Echo Complete W/ Cont if Necessary Per Protocol    Interpretation Summary  •  Left ventricular systolic function is severely decreased. Calculated left ventricular EF = 18.7%  •  The left ventricular cavity is severely dilated.  •  Left ventricular mass index is increased in the setting of chamber dilatation.  •  Left ventricular diastolic function is consistent with (grade II w/high LAP) pseudonormalization.  •  There is bileaflet mitral valve thickening present.  Moderate functional mitral regurgitation.  •  Estimated right ventricular systolic pressure from tricuspid regurgitation is normal (<35 mmHg).  •  Normal biatrial and IVC size.          -----------------------------------------------------  RHC/LHC    No results found for this or any previous visit.      ---------------------------------------------------------------------------  CXR/Imaging:     Imaging Results (Most Recent)       None                 -----------------------------------------------------------------------------  CT:   No radiology results for the last 30 days.        --------------------------------------------------------------------------------------------------------------    Laboratory evaluations:  Renal Function: CrCl cannot be calculated (Patient's most recent lab result is older than the maximum 30 days allowed.).    Lab Results   Component Value Date    GLUCOSE 126 (H) 03/28/2024    BUN 14 03/28/2024    CREATININE 0.94 03/28/2024    EGFRIFAFRI 68 02/02/2022    BCR 15 03/28/2024    K 3.7 03/28/2024    CO2 22 03/28/2024    CALCIUM 9.4 03/28/2024    PROTENTOTREF 6.9 03/28/2024    ALBUMIN 4.1 03/28/2024    LABIL2 1.5 03/28/2024    AST 20 03/28/2024    ALT 22 03/28/2024     Lab Results   Component Value Date    WBC 9.4 03/28/2024    HGB 13.2 03/28/2024    HCT 39.1 03/28/2024    MCV 96 03/28/2024     03/28/2024     Lab Results   Component Value Date    HGBA1C 5.9 (H) 03/28/2024     Lab Results   Component Value Date    HGBA1C 5.9 (H) 03/28/2024    HGBA1C 5.8 (H) 07/13/2023    HGBA1C 5.00 09/12/2022     Lab Results   Component Value Date    CREATININE 0.94 03/28/2024     Lab Results   Component Value Date    IRON 131 04/22/2022    TIBC 379 03/28/2024    FERRITIN 60 03/28/2024       Result Review:  I have personally reviewed the results from the time of this admission to 7/17/2024 13:52 EDT and agree with these findings:  [x]  Laboratory list / accordion  []  Microbiology  [x]  Radiology  [x]  EKG/Telemetry   [x]  Cardiology/Vascular   []  Pathology  [x]  Old records  []  Other:      ReDS VOLUMETRIC ASSESSMENT:  ReDs Vest    Performed by: Haley Birmingham APRN  Authorized by: Haley Birmingham APRN    ReDS value:  34  ReDS Value Description:  25-35 (green) = Optimal Volume Status        Assessment & Plan     1. HFrEF (heart failure with reduced ejection fraction)      HFrEF--pt is euvolemic today. She has repeat echo  scheduled for December.   We will switch Carvedilol to toprol XL due to hypotension and to hopefully be able to keep patient on entresto  She is following with [facility] team  GDMT as listed below:     NYHA stage C FC-II     Clinical status was assessed and has waxed and waned.  Treatment intent: curative   ReDS reading was obtained today.  ReDS result: 34       Today, Patient appears euvolemic. and with perfusion. The patient's hemodynamics are currently acceptable. HR is: normal and is not at goal. BP/MAP was reviewed and there is not room for medication up-titration.  The patient's clinical course has been impacted by hypotension. LVEF: 18.7%.     GDMT Assessment:   GDMT changes recommended today:  change BB      BB:   start Metoprolol Succinate  12.5mg daily (changed from carvedilol)  Monitor heart rate.  Please call the [facility] for HR<50, dizziness, lightheadedness, syncope    A/A/A:     continue Entresto 24/26mg BID  Occasional monitoring of Chem-7 is recommended; call for the development of a new cough or S/Sx angioedema    [id number]:  continue Empagliflozin (Jardiance) 10 mg daily  Call for S/Sx genital mycotic infections  Do not take when inadequate oral intake, NPO, GI illness    MRA:  The patient is FC-NYHA Class II and MRA is indicated.   continue Spironolactone  25mg daily  Recommended quarterly assessment of GFR and electrolytes   Avoid salt substitutes containing potassium  Please hold this medication if diarrhea, vomiting, or infection with fever and sweating occurs      Diuretic Regimen:  -DIURETIC:   toresemide 40 mg two times daily  -Fluid restriction:   -requested  -2000 ml  -Patient has been asked to weigh daily and was provided with a printed diuretic strategy.  -Sodium restriction:   -1,500 mg Na restriction was discussed.      -ICD/CRT-D:     All patients with HFrEF who maintain a diagnosis of HFrEF should be screened for ICD/CRT-D  therapy.  ------------------------------------------------------------------  Patient has AICD in place      PH Screening:  All patients with HFrEF need to be screened for pulmonary hypertension.  These patient should be seen, at least once, by a PH specialist. The PASP is reported on 2D TTE. PASP>40mmHg should prompt a referral.  Patients who do have pulmonary hypertension on 2D TTE should be referred for PFTs.  Abnormal PFTs should prompt pulmonary referral.    -The patientwas screened today for PH.  The patient's last 2D TTE showed the patient does not currently have PH.      Sleep Evaluation:  Obstructive and central sleep apnea is more common in heart failure.  In general, all patients with heart failure should be assessed for the presence of sleep apnea.  A screening tool can be utilized such as STOP-BANG.  Those patients with risks should be referred to sleep medicine.  In general, all patients should see a sleep physician at least once.    Has SHAREE, sleeps with cpap nightly    In patients with NYHA class II-IV HFrEF and central sleep apnea, adaptive servo-ventilation is harmful and not recommended.        Labs/Diagnostics/Referrals:    Labs -Chem-7       Lifestyle Advice:   - call office if I gain more than 2 pounds in one day or 5 pounds in one week   - keep legs up while sitting   - use salt in moderation   - watch for swelling in feet, ankles and legs every day   - weigh myself daily   -call for concerning s/sx   -- activity or exercise based on tolerance encouraged     CODE STATUS: FULL              Return in about 2 weeks (around 7/31/2024) for Recheck.              Thank you for allowing me to participate in the care of your patient,         Haley Birmingham SAMUEL  [facility]  07/17/24  13:52 EDT      **Lyric Disclaimer:**  Much of this encounter note is an electronic transcription/translation of spoken language to printed text. The electronic translation of spoken language may permit  erroneous, or at times, nonsensical words or phrases to be inadvertently transcribed. Although I have reviewed the note for such errors, some may still exist.    The information in this note was reviewed and updated during the visit to be as accurate as possible. As many patients have chronic medical problems, many of their individual problems and ongoing plans do not change significantly from visit to visit.  This information is correct and is consistent with my treatment plan as of today's visit.

## 2024-07-23 ENCOUNTER — ANTICOAGULATION VISIT (OUTPATIENT)
Dept: PHARMACY | Facility: HOSPITAL | Age: 49
End: 2024-07-23
Payer: MEDICARE

## 2024-07-23 LAB — INR PPP: 2.5

## 2024-07-23 NOTE — PROGRESS NOTES
Anticoagulation Clinic Progress Note    Anticoagulation Summary  As of 2024      INR goal:  2.0-3.0   TTR:  63.3% (3.6 y)   INR used for dosin.50 (2024)   Warfarin maintenance plan:  7.5 mg every Wed, Sat; 5 mg all other days   Weekly warfarin total:  40 mg   No change documented:  Zina De La Rosa RPH   Plan last modified:  Zina De La Rosa RPH (1/3/2024)   Next INR check:  2024   Priority:  High   Target end date:  Indefinite    Indications    Other acute pulmonary embolism  unspecified whether acute cor pulmonale present (Resolved) [I26.99]  Acute pulmonary embolism  unspecified pulmonary embolism type  unspecified whether acute cor pulmonale present (Resolved) [I26.99]                 Anticoagulation Episode Summary       INR check location:      Preferred lab:      Send INR reminders to:   [facility] HOME TEST [facility]    Comments:  **Home Testing Program**          Anticoagulation Care Providers       Provider Role Specialty Phone number    Lisset Melton MD Referring Cardiology [phone]            Clinic Interview:  No pertinent clinical findings have been reported.    INR History:      2024     8:38 AM 2024    12:00 AM 2024     8:33 AM 2024    12:00 AM 2024    11:00 AM 2024    12:00 AM 2024     9:24 AM   Anticoagulation Monitoring   INR 2.30  2.60  2.50  2.50   INR Date 2024   INR Goal 2.0-3.0  2.0-3.0  2.0-3.0  2.0-3.0   Trend Same  Same  Same  Same   Last Week Total 40 mg  40 mg  40 mg  40 mg   Next Week Total 40 mg  40 mg  40 mg  40 mg   Sun 5 mg  5 mg  5 mg  5 mg   Mon 5 mg  5 mg  5 mg  5 mg   Tue 5 mg  5 mg  5 mg  5 mg   Wed 7.5 mg  7.5 mg  7.5 mg  7.5 mg   Thu 5 mg  5 mg  5 mg  5 mg   Fri 5 mg  5 mg  5 mg  5 mg   Sat 7.5 mg  7.5 mg  7.5 mg  7.5 mg   Historical INR  2.60      2.50      2.50            This result is from an external source.       Plan:  1. INR is therapeutic today- see above in  Anticoagulation Summary.    Nina Saez to continue their warfarin regimen- see above in Anticoagulation Summary.  2. Follow up in 1 week  3. Pt has agreed to only be called if INR out of range. They have been instructed to call if any changes in medications, doses, concerns, etc. Patient expresses understanding and has no further questions at this time.    Zina De La Rosa, [facility]